# Patient Record
Sex: MALE | Race: BLACK OR AFRICAN AMERICAN | NOT HISPANIC OR LATINO | Employment: OTHER | URBAN - METROPOLITAN AREA
[De-identification: names, ages, dates, MRNs, and addresses within clinical notes are randomized per-mention and may not be internally consistent; named-entity substitution may affect disease eponyms.]

---

## 2018-05-17 ENCOUNTER — OFFICE VISIT (OUTPATIENT)
Dept: INTERNAL MEDICINE CLINIC | Facility: CLINIC | Age: 53
End: 2018-05-17
Payer: MEDICARE

## 2018-05-17 VITALS
RESPIRATION RATE: 15 BRPM | SYSTOLIC BLOOD PRESSURE: 130 MMHG | BODY MASS INDEX: 20.42 KG/M2 | TEMPERATURE: 98 F | HEART RATE: 90 BPM | HEIGHT: 72 IN | WEIGHT: 150.8 LBS | DIASTOLIC BLOOD PRESSURE: 80 MMHG

## 2018-05-17 DIAGNOSIS — I10 ESSENTIAL HYPERTENSION: Primary | ICD-10-CM

## 2018-05-17 DIAGNOSIS — E11.319 DIABETIC RETINOPATHY OF BOTH EYES ASSOCIATED WITH TYPE 2 DIABETES MELLITUS, MACULAR EDEMA PRESENCE UNSPECIFIED, UNSPECIFIED RETINOPATHY SEVERITY (HCC): ICD-10-CM

## 2018-05-17 DIAGNOSIS — E11.65 UNCONTROLLED TYPE 2 DIABETES MELLITUS WITH HYPERGLYCEMIA, WITH LONG-TERM CURRENT USE OF INSULIN (HCC): ICD-10-CM

## 2018-05-17 DIAGNOSIS — I51.7 LVH (LEFT VENTRICULAR HYPERTROPHY): ICD-10-CM

## 2018-05-17 DIAGNOSIS — L30.9 DERMATITIS: ICD-10-CM

## 2018-05-17 DIAGNOSIS — Z12.5 SCREENING FOR PROSTATE CANCER: ICD-10-CM

## 2018-05-17 DIAGNOSIS — Z79.4 UNCONTROLLED TYPE 2 DIABETES MELLITUS WITH HYPERGLYCEMIA, WITH LONG-TERM CURRENT USE OF INSULIN (HCC): ICD-10-CM

## 2018-05-17 DIAGNOSIS — Z12.11 SCREENING FOR COLON CANCER: ICD-10-CM

## 2018-05-17 DIAGNOSIS — H54.8 LEGALLY BLIND: ICD-10-CM

## 2018-05-17 PROCEDURE — 99204 OFFICE O/P NEW MOD 45 MIN: CPT | Performed by: INTERNAL MEDICINE

## 2018-05-17 PROCEDURE — 93000 ELECTROCARDIOGRAM COMPLETE: CPT | Performed by: INTERNAL MEDICINE

## 2018-05-17 RX ORDER — LISINOPRIL 5 MG/1
5 TABLET ORAL DAILY
Qty: 30 TABLET | Refills: 1 | Status: SHIPPED | OUTPATIENT
Start: 2018-05-17 | End: 2018-07-05 | Stop reason: SDUPTHER

## 2018-05-17 RX ORDER — CLOTRIMAZOLE 1 %
CREAM (GRAM) TOPICAL 2 TIMES DAILY
Qty: 30 G | Refills: 0 | Status: SHIPPED | OUTPATIENT
Start: 2018-05-17 | End: 2019-01-15

## 2018-05-17 RX ORDER — AMLODIPINE BESYLATE 10 MG/1
10 TABLET ORAL DAILY
Qty: 30 TABLET | Refills: 1 | Status: SHIPPED | OUTPATIENT
Start: 2018-05-17 | End: 2018-07-05 | Stop reason: SDUPTHER

## 2018-05-17 RX ORDER — DIPHENHYDRAMINE HYDROCHLORIDE 25 MG/1
CAPSULE, LIQUID FILLED ORAL 2 TIMES DAILY
Qty: 100 EACH | Refills: 0 | Status: SHIPPED | OUTPATIENT
Start: 2018-05-17 | End: 2018-06-21 | Stop reason: SDUPTHER

## 2018-05-17 RX ORDER — AMLODIPINE BESYLATE 10 MG/1
10 TABLET ORAL
COMMUNITY
End: 2018-05-17 | Stop reason: SDUPTHER

## 2018-05-17 RX ORDER — LANCETS 30 GAUGE
EACH MISCELLANEOUS 2 TIMES DAILY
Qty: 100 EACH | Refills: 0 | Status: SHIPPED | OUTPATIENT
Start: 2018-05-17 | End: 2018-06-21 | Stop reason: SDUPTHER

## 2018-05-17 RX ORDER — GINSENG 100 MG
1 CAPSULE ORAL 4 TIMES DAILY
COMMUNITY
End: 2019-01-15

## 2018-05-17 RX ORDER — GLIPIZIDE 10 MG/1
10 TABLET ORAL
COMMUNITY
End: 2018-05-17 | Stop reason: SDUPTHER

## 2018-05-17 RX ORDER — GLIPIZIDE 10 MG/1
10 TABLET ORAL
Qty: 60 TABLET | Refills: 1 | Status: SHIPPED | OUTPATIENT
Start: 2018-05-17 | End: 2018-07-05 | Stop reason: SDUPTHER

## 2018-05-17 RX ORDER — PEN NEEDLE, DIABETIC 30 GX5/16"
NEEDLE, DISPOSABLE MISCELLANEOUS DAILY
Qty: 100 EACH | Refills: 0 | Status: SHIPPED | OUTPATIENT
Start: 2018-05-17 | End: 2019-01-15

## 2018-05-17 NOTE — ASSESSMENT & PLAN NOTE
Check A1c, continue current regimen for now and start regular blood sugar check, change the regimen based on the blood sugar numbers

## 2018-05-17 NOTE — ASSESSMENT & PLAN NOTE
Blood pressure within goal but he was on 20 mg of amlodipine, I cut him down to 10 mg and added low-dose lisinopril  Monitor on this regimen, check microalbumin  An EKG was done in the office today which showed sinus rhythm with LVH  Get an echocardiogram to evaluate for the same

## 2018-05-17 NOTE — PROGRESS NOTES
Assessment/Plan:    Essential hypertension  Blood pressure within goal but he was on 20 mg of amlodipine, I cut him down to 10 mg and added low-dose lisinopril  Monitor on this regimen, check microalbumin  An EKG was done in the office today which showed sinus rhythm with LVH  Get an echocardiogram to evaluate for the same  Uncontrolled type 2 diabetes mellitus with hyperglycemia, with long-term current use of insulin ([facility])  Check A1c, continue current regimen for now and start regular blood sugar check, change the regimen based on the blood sugar numbers  Screening PSA, referred to Gastroenterology for screening colonoscopy, Pneumovax at next visit     Diagnoses and all orders for this visit:    Essential hypertension  -     CBC and differential  -     Comprehensive metabolic panel  -     TSH, 3rd generation with T4 reflex  -     Microalbumin / creatinine urine ratio  -     HEMOGLOBIN A1C W/ EAG ESTIMATION  -     Lipid Panel with Direct LDL reflex  -     POCT ECG  -     Echo complete with contrast if indicated; Future  -     amLODIPine (NORVASC) 10 mg tablet; Take 1 tablet (10 mg total) by mouth daily  -     lisinopril (ZESTRIL) 5 mg tablet; Take 1 tablet (5 mg total) by mouth daily    Uncontrolled type 2 diabetes mellitus with hyperglycemia, with long-term current use of insulin ([facility])  -     CBC and differential  -     Comprehensive metabolic panel  -     TSH, 3rd generation with T4 reflex  -     Microalbumin / creatinine urine ratio  -     HEMOGLOBIN A1C W/ EAG ESTIMATION  -     Lipid Panel with Direct LDL reflex  -     Blood Glucose Monitoring Suppl (BLOOD GLUCOSE MONITOR SYSTEM) w/Device KIT; by Does not apply route 2 (two) times a day  -     Lancets MISC; by Does not apply route 2 (two) times a day  -     glucose blood test strip; Use as instructed  -     metFORMIN (GLUCOPHAGE) 1000 MG tablet;  Take 1 tablet (1,000 mg total) by mouth 2 (two) times a day with meals  -     glipiZIDE (GLUCOTROL) 10 mg tablet; Take 1 tablet (10 mg total) by mouth 2 (two) times a day before meals  -     Insulin Pen Needle (PEN NEEDLES 3/16") 31G X 5 MM MISC; by Does not apply route daily  -     insulin glargine (LANTUS SOLOSTAR) injection pen 100 units/mL; Inject 0 14 mL (14 Units total) under the skin daily    Diabetic retinopathy of both eyes associated with type 2 diabetes mellitus, macular edema presence unspecified, unspecified retinopathy severity (HCC)  -     CBC and differential  -     Comprehensive metabolic panel  -     TSH, 3rd generation with T4 reflex  -     Microalbumin / creatinine urine ratio  -     HEMOGLOBIN A1C W/ EAG ESTIMATION  -     Lipid Panel with Direct LDL reflex    Legally blind  -     CBC and differential    Screening for prostate cancer  -     PSA, Total Screen; Future    LVH (left ventricular hypertrophy)  -     Echo complete with contrast if indicated; Future    Dermatitis  -     clotrimazole (LOTRIMIN) 1 % cream; Apply topically 2 (two) times a day    Screening for colon cancer  -     Ambulatory referral to Gastroenterology; Future    Other orders  -     Discontinue: glipiZIDE (GLUCOTROL) 10 mg tablet; Take 10 mg by mouth 2 (two) times a day before meals  -     Discontinue: amLODIPine (NORVASC) 10 mg tablet; Take 10 mg by mouth 2 (two) times a day  -     Discontinue: metFORMIN (GLUCOPHAGE) 1000 MG tablet; Take 1,000 mg by mouth 2 (two) times a day with meals  -     bacitracin topical ointment 500 units/g topical ointment; Apply 1 large application topically 4 (four) times a day  -     Cancel: Occult Bloood,Fecal Immunochemical          Subjective:   Chief Complaint   Patient presents with    [facility]        Patient ID: Cyrilla Severin is a 46 y o  male  He comes in as a new patient to establish care  Has a history of hypertension, diabetes, hyperlipidemia  He is not completely sure of all the meds he takes  Notes that he takes metformin, glipizide, amlodipine    When asked he does remembers being on lisinopril but not completely sure  He has not checked his blood sugars in several months since he came back from [facility]  He has a history of diabetic retinopathy and complete visual loss from that  He does follow up with an ophthalmologist   He takes 14 units of insulin but not very regularly  No symptoms of hypoglycemia  Has not checked his blood pressure either  Occasional dizziness but not related to posture  Notes that when it was last checked he was told that he has no kidney function problems from the diabetes  No tingling or numbness in the legs  No chest pain or shortness of breath  He has a itchy rash on his lower back        The following portions of the patient's history were reviewed and updated as appropriate: current medications, past medical history, past social history and past surgical history      PHQ-9 Depression Screening    PHQ-9:    Frequency of the following problems over the past two weeks:                Current Outpatient Prescriptions:     amLODIPine (NORVASC) 10 mg tablet, Take 1 tablet (10 mg total) by mouth daily, Disp: 30 tablet, Rfl: 1    bacitracin topical ointment 500 units/g topical ointment, Apply 1 large application topically 4 (four) times a day, Disp: , Rfl:     glipiZIDE (GLUCOTROL) 10 mg tablet, Take 1 tablet (10 mg total) by mouth 2 (two) times a day before meals, Disp: 60 tablet, Rfl: 1    metFORMIN (GLUCOPHAGE) 1000 MG tablet, Take 1 tablet (1,000 mg total) by mouth 2 (two) times a day with meals, Disp: 60 tablet, Rfl: 1    Blood Glucose Monitoring Suppl (BLOOD GLUCOSE MONITOR SYSTEM) w/Device KIT, by Does not apply route 2 (two) times a day, Disp: 100 each, Rfl: 0    clotrimazole (LOTRIMIN) 1 % cream, Apply topically 2 (two) times a day, Disp: 30 g, Rfl: 0    glucose blood test strip, Use as instructed, Disp: 100 each, Rfl: 0    insulin glargine (LANTUS SOLOSTAR) injection pen 100 units/mL, Inject 0 14 mL (14 Units total) under the skin daily, Disp: 5 pen, Rfl: 0    Insulin Pen Needle (PEN NEEDLES 3/16") 31G X 5 MM MISC, by Does not apply route daily, Disp: 100 each, Rfl: 0    Lancets MISC, by Does not apply route 2 (two) times a day, Disp: 100 each, Rfl: 0    lisinopril (ZESTRIL) 5 mg tablet, Take 1 tablet (5 mg total) by mouth daily, Disp: 30 tablet, Rfl: 1    Review of Systems   Constitutional: Negative for fatigue, fever and unexpected weight change  HENT: Negative for ear pain, hearing loss and sore throat  Eyes: Negative for pain and discharge  Respiratory: Negative for cough, chest tightness and shortness of breath  Cardiovascular: Negative for chest pain and palpitations  Gastrointestinal: Negative for abdominal pain, blood in stool, constipation, diarrhea and nausea  Genitourinary: Negative for dysuria, frequency and hematuria  Musculoskeletal: Negative for arthralgias and joint swelling  Skin: Negative for rash  Allergic/Immunologic: Negative for immunocompromised state  Neurological: Negative for dizziness and headaches  Hematological: Negative for adenopathy  Psychiatric/Behavioral: Negative for confusion and sleep disturbance  Objective:  /80 (BP Location: Left arm, Patient Position: Sitting, Cuff Size: Standard)   Pulse 90   Temp 98 °F (36 7 °C)   Resp 15   Ht 6' (1 829 m)   Wt 68 4 kg (150 lb 12 8 oz)   BMI 20 45 kg/m²      Physical Exam   Constitutional: He appears well-developed and well-nourished  HENT:   Head: Normocephalic and atraumatic  Right Ear: Tympanic membrane normal    Left Ear: Tympanic membrane normal    Nose: Nose normal    Mouth/Throat: Oropharynx is clear and moist  No posterior oropharyngeal edema or posterior oropharyngeal erythema  Eyes: Conjunctivae are normal  Pupils are equal, round, and reactive to light  Right eye exhibits no discharge  Neck: Normal range of motion  Neck supple  No thyromegaly present     Cardiovascular: Normal rate, regular rhythm, S1 normal, S2 normal and normal heart sounds  PMI is not displaced  No murmur heard  Pulmonary/Chest: Effort normal and breath sounds normal  No accessory muscle usage  No apnea  No respiratory distress  He has no rhonchi  He has no rales  Abdominal: Soft  Normal appearance and bowel sounds are normal  He exhibits no shifting dullness  There is no hepatosplenomegaly  There is no tenderness  There is no rebound and no CVA tenderness  Hernia confirmed negative in the right inguinal area and confirmed negative in the left inguinal area  Genitourinary: Rectum normal, prostate normal, testes normal and penis normal    Musculoskeletal: Normal range of motion  He exhibits no edema or tenderness  Lymphadenopathy:     He has no cervical adenopathy  Neurological: He is alert  Skin: Skin is warm and intact  Rash noted  Rash is urticarial         Psychiatric: He has a normal mood and affect  His speech is normal    Nursing note and vitals reviewed  No results found for this or any previous visit (from the past 1008 hour(s))  ]    No results found

## 2018-06-14 ENCOUNTER — HOSPITAL ENCOUNTER (OUTPATIENT)
Dept: NON INVASIVE DIAGNOSTICS | Facility: CLINIC | Age: 53
Discharge: HOME | End: 2018-06-14
Payer: MEDICARE

## 2018-06-14 DIAGNOSIS — I10 ESSENTIAL HYPERTENSION: ICD-10-CM

## 2018-06-14 DIAGNOSIS — I51.7 LVH (LEFT VENTRICULAR HYPERTROPHY): ICD-10-CM

## 2018-06-14 PROCEDURE — 93306 TTE W/DOPPLER COMPLETE: CPT

## 2018-06-14 PROCEDURE — 93306 TTE W/DOPPLER COMPLETE: CPT | Performed by: INTERNAL MEDICINE

## 2018-06-21 ENCOUNTER — OFFICE VISIT (OUTPATIENT)
Dept: INTERNAL MEDICINE CLINIC | Facility: CLINIC | Age: 53
End: 2018-06-21
Payer: MEDICARE

## 2018-06-21 VITALS
TEMPERATURE: 97.5 F | DIASTOLIC BLOOD PRESSURE: 70 MMHG | HEIGHT: 72 IN | HEART RATE: 90 BPM | SYSTOLIC BLOOD PRESSURE: 138 MMHG | RESPIRATION RATE: 14 BRPM | WEIGHT: 149.4 LBS | BODY MASS INDEX: 20.24 KG/M2

## 2018-06-21 DIAGNOSIS — I51.7 LVH (LEFT VENTRICULAR HYPERTROPHY): ICD-10-CM

## 2018-06-21 DIAGNOSIS — Z79.4 UNCONTROLLED TYPE 2 DIABETES MELLITUS WITH HYPERGLYCEMIA, WITH LONG-TERM CURRENT USE OF INSULIN (HCC): ICD-10-CM

## 2018-06-21 DIAGNOSIS — N18.4 CKD (CHRONIC KIDNEY DISEASE) STAGE 4, GFR 15-29 ML/MIN (HCC): Primary | ICD-10-CM

## 2018-06-21 DIAGNOSIS — E11.319 DIABETIC RETINOPATHY OF BOTH EYES ASSOCIATED WITH TYPE 2 DIABETES MELLITUS, MACULAR EDEMA PRESENCE UNSPECIFIED, UNSPECIFIED RETINOPATHY SEVERITY (HCC): ICD-10-CM

## 2018-06-21 DIAGNOSIS — IMO0002 UNCONTROLLED TYPE 2 DIABETES MELLITUS WITH STAGE 4 CHRONIC KIDNEY DISEASE, WITH LONG-TERM CURRENT USE OF INSULIN: ICD-10-CM

## 2018-06-21 DIAGNOSIS — E11.65 UNCONTROLLED TYPE 2 DIABETES MELLITUS WITH HYPERGLYCEMIA, WITH LONG-TERM CURRENT USE OF INSULIN (HCC): ICD-10-CM

## 2018-06-21 DIAGNOSIS — I10 ESSENTIAL HYPERTENSION: ICD-10-CM

## 2018-06-21 DIAGNOSIS — E78.5 HYPERLIPIDEMIA, UNSPECIFIED HYPERLIPIDEMIA TYPE: ICD-10-CM

## 2018-06-21 LAB
ALBUMIN SERPL-MCNC: 3.3 G/DL (ref 3.6–5.1)
ALBUMIN/CREAT UR: 2742 MCG/MG CREAT
ALBUMIN/GLOB SERPL: 1.2 (CALC) (ref 1–2.5)
ALP SERPL-CCNC: 70 U/L (ref 40–115)
ALT SERPL-CCNC: 11 U/L (ref 9–46)
AST SERPL-CCNC: 10 U/L (ref 10–35)
BASOPHILS # BLD AUTO: 29 CELLS/UL (ref 0–200)
BASOPHILS NFR BLD AUTO: 0.6 %
BILIRUB SERPL-MCNC: 0.7 MG/DL (ref 0.2–1.2)
BUN SERPL-MCNC: 38 MG/DL (ref 7–25)
BUN/CREAT SERPL: 8 (CALC) (ref 6–22)
CALCIUM SERPL-MCNC: 9.2 MG/DL (ref 8.6–10.3)
CHLORIDE SERPL-SCNC: 105 MMOL/L (ref 98–110)
CHOLEST SERPL-MCNC: 312 MG/DL
CHOLEST/HDLC SERPL: 6 (CALC)
CO2 SERPL-SCNC: 20 MMOL/L (ref 20–31)
CREAT SERPL-MCNC: 4.71 MG/DL (ref 0.7–1.33)
CREAT UR-MCNC: 77 MG/DL (ref 20–370)
EOSINOPHIL # BLD AUTO: 78 CELLS/UL (ref 15–500)
EOSINOPHIL NFR BLD AUTO: 1.6 %
ERYTHROCYTE [DISTWIDTH] IN BLOOD BY AUTOMATED COUNT: 13.3 % (ref 11–15)
EST. AVERAGE GLUCOSE BLD GHB EST-MCNC: 321 (CALC)
EST. AVERAGE GLUCOSE BLD GHB EST-SCNC: 17.8 (CALC)
GLOBULIN SER CALC-MCNC: 2.8 G/DL (CALC) (ref 1.9–3.7)
GLUCOSE SERPL-MCNC: 323 MG/DL (ref 65–99)
HBA1C MFR BLD: 12.8 % OF TOTAL HGB
HCT VFR BLD AUTO: 34.9 % (ref 38.5–50)
HDLC SERPL-MCNC: 52 MG/DL
HGB BLD-MCNC: 11 G/DL (ref 13.2–17.1)
LDLC SERPL CALC-MCNC: 223 MG/DL (CALC)
LYMPHOCYTES # BLD AUTO: 1651 CELLS/UL (ref 850–3900)
LYMPHOCYTES NFR BLD AUTO: 33.7 %
MCH RBC QN AUTO: 25.5 PG (ref 27–33)
MCHC RBC AUTO-ENTMCNC: 31.5 G/DL (ref 32–36)
MCV RBC AUTO: 80.8 FL (ref 80–100)
MICROALBUMIN UR-MCNC: 211.1 MG/DL
MONOCYTES # BLD AUTO: 417 CELLS/UL (ref 200–950)
MONOCYTES NFR BLD AUTO: 8.5 %
NEUTROPHILS # BLD AUTO: 2724 CELLS/UL (ref 1500–7800)
NEUTROPHILS NFR BLD AUTO: 55.6 %
NONHDLC SERPL-MCNC: 260 MG/DL (CALC)
PLATELET # BLD AUTO: 258 THOUSAND/UL (ref 140–400)
PMV BLD REES-ECKER: 9.3 FL (ref 7.5–12.5)
POTASSIUM SERPL-SCNC: 5.3 MMOL/L (ref 3.5–5.3)
PROT SERPL-MCNC: 6.1 G/DL (ref 6.1–8.1)
PSA SERPL-MCNC: 0.4 NG/ML
RBC # BLD AUTO: 4.32 MILLION/UL (ref 4.2–5.8)
SL AMB EGFR AFRICAN AMERICAN: 15 ML/MIN/1.73M2
SL AMB EGFR NON AFRICAN AMERICAN: 13 ML/MIN/1.73M2
SODIUM SERPL-SCNC: 134 MMOL/L (ref 135–146)
TRIGL SERPL-MCNC: 194 MG/DL
TSH SERPL-ACNC: 2.58 MIU/L (ref 0.4–4.5)
WBC # BLD AUTO: 4.9 THOUSAND/UL (ref 3.8–10.8)

## 2018-06-21 PROCEDURE — 99215 OFFICE O/P EST HI 40 MIN: CPT | Performed by: INTERNAL MEDICINE

## 2018-06-21 RX ORDER — ATORVASTATIN CALCIUM 40 MG/1
40 TABLET, FILM COATED ORAL DAILY
Qty: 30 TABLET | Refills: 1 | Status: SHIPPED | OUTPATIENT
Start: 2018-06-21 | End: 2018-07-05 | Stop reason: SDUPTHER

## 2018-06-21 RX ORDER — LANCETS 30 GAUGE
EACH MISCELLANEOUS 3 TIMES DAILY
Qty: 100 EACH | Refills: 0 | Status: SHIPPED | OUTPATIENT
Start: 2018-06-21 | End: 2019-01-15

## 2018-06-21 RX ORDER — DIPHENHYDRAMINE HYDROCHLORIDE 25 MG/1
CAPSULE, LIQUID FILLED ORAL 3 TIMES DAILY
Qty: 100 EACH | Refills: 0 | Status: SHIPPED | OUTPATIENT
Start: 2018-06-21 | End: 2019-01-11

## 2018-06-22 PROBLEM — N18.4 CKD (CHRONIC KIDNEY DISEASE) STAGE 4, GFR 15-29 ML/MIN (HCC): Status: ACTIVE | Noted: 2018-06-22

## 2018-06-22 PROBLEM — IMO0002 UNCONTROLLED TYPE 2 DIABETES MELLITUS WITH STAGE 4 CHRONIC KIDNEY DISEASE, WITH LONG-TERM CURRENT USE OF INSULIN: Status: ACTIVE | Noted: 2018-05-17

## 2018-06-22 PROBLEM — E78.5 HYPERLIPIDEMIA: Status: ACTIVE | Noted: 2018-06-22

## 2018-06-22 NOTE — ASSESSMENT & PLAN NOTE
Lab Results   Component Value Date    HGBA1C 12 8 (H) 06/20/2018     We discussed in detail the importance of controlling his diabetes  He has several microvascular complications  He is legally blind from diabetic retinopathy and now with CKD  Unfortunately he did not call the office despite not receiving the glucometer last visit a month ago  He has been taking metformin and 14 units of basaglar at night along with glipizide  He has no symptoms that were suggestive of hypoglycemia  Increased basilar dose to 30 units, monitor blood sugars 3 times a day, stop metformin given his chronic kidney disease

## 2018-06-22 NOTE — ASSESSMENT & PLAN NOTE
We discussed the pathophysiology of chronic kidney disease likely secondary to diabetes  He has had uncontrolled diabetes for several years  GFR around 15 at this time  The advised to see a nephrologist as soon as possible  Gloria Pennington the discussion about progression of the disease and indications for dialysis  Confirmatory blood work, ultrasound of the kidneys will be done soon  Discussed the importance of diabetic control to prevent worsening of microvascular complications  Around 2 g proteinuria will be recheck  Blood pressure is well control, continue her current regimen

## 2018-06-22 NOTE — PROGRESS NOTES
Assessment/Plan:    Uncontrolled type 2 diabetes mellitus with stage 4 chronic kidney disease, with long-term current use of insulin ([facility])  Lab Results   Component Value Date    HGBA1C 12 8 (H) 06/20/2018     We discussed in detail the importance of controlling his diabetes  He has several microvascular complications  He is legally blind from diabetic retinopathy and now with CKD  Unfortunately he did not call the office despite not receiving the glucometer last visit a month ago  He has been taking metformin and 14 units of basaglar at night along with glipizide  He has no symptoms that were suggestive of hypoglycemia  Increased basilar dose to 30 units, monitor blood sugars 3 times a day, stop metformin given his chronic kidney disease  CKD (chronic kidney disease) stage 4, GFR 15-29 ml/min ([facility])  We discussed the pathophysiology of chronic kidney disease likely secondary to diabetes  He has had uncontrolled diabetes for several years  GFR around 15 at this time  The advised to see a nephrologist as soon as possible  Doyne Cotton the discussion about progression of the disease and indications for dialysis  Confirmatory blood work, ultrasound of the kidneys will be done soon  Discussed the importance of diabetic control to prevent worsening of microvascular complications  Around 2 g proteinuria will be recheck  Blood pressure is well control, continue her current regimen  Hyperlipidemia  Start Lipitor, dose titrate as tolerated continue aspirin  Essential hypertension  Blood pressure well control, echocardiogram showed left ventricular hypertrophy  Total time spent on the visit today was 45 min, 30 min were face-to-face discussing about his blood work as mentioned of well     Diagnoses and all orders for this visit:    CKD (chronic kidney disease) stage 4, GFR 15-29 ml/min ([facility])  -     Renal function panel; Future  -     US retroperitoneal complete;  Future  -     Protein / creatinine ratio, urine  -     Phosphorus; Future  -     Uric acid; Future  -     aspirin 81 MG tablet; Take 1 tablet (81 mg total) by mouth daily  -     Ambulatory referral to Nephrology; Future  -     Protein electrophoresis, serum; Future  -     Protein electrophoresis, urine  -     DORI Screen w/ Reflex to Titer/Pattern  -     C-reactive protein    Uncontrolled type 2 diabetes mellitus with hyperglycemia, with long-term current use of insulin (HCC)  -     Lancets MISC; by Does not apply route 3 (three) times a day  -     glucose blood test strip; Use as instructed 3 times daily  -     Blood Glucose Monitoring Suppl (BLOOD GLUCOSE MONITOR SYSTEM) w/Device KIT; by Does not apply route 3 (three) times a day  -     Protein / creatinine ratio, urine  -     insulin glargine (BASAGLAR KWIKPEN) 100 units/mL injection pen; Inject 30 Units under the skin daily  -     aspirin 81 MG tablet; Take 1 tablet (81 mg total) by mouth daily  -     Ambulatory referral to Nephrology; Future    Essential hypertension  -     aspirin 81 MG tablet; Take 1 tablet (81 mg total) by mouth daily    Hyperlipidemia, unspecified hyperlipidemia type  -     atorvastatin (LIPITOR) 40 mg tablet; Take 1 tablet (40 mg total) by mouth daily    Diabetic retinopathy of both eyes associated with type 2 diabetes mellitus, macular edema presence unspecified, unspecified retinopathy severity (HCC)    LVH (left ventricular hypertrophy)    Uncontrolled type 2 diabetes mellitus with stage 4 chronic kidney disease, with long-term current use of insulin (HCC)          Subjective:   Chief Complaint   Patient presents with    Follow-up     1 month        Patient ID: Sarah Tirado is a 46 y o  male  He comes in with his wife for follow-up of diabetes, hypertension, hyperlipidemia  He denies any symptoms  He is taking all his medications  No dizziness or lightheadedness  Unfortunately did not receive the glucometer and have not been checking his blood sugars          The following portions of the patient's history were reviewed and updated as appropriate: current medications, past medical history, past social history and past surgical history  PHQ-9 Depression Screening    PHQ-9:    Frequency of the following problems over the past two weeks:                Current Outpatient Prescriptions:     amLODIPine (NORVASC) 10 mg tablet, Take 1 tablet (10 mg total) by mouth daily, Disp: 30 tablet, Rfl: 1    bacitracin topical ointment 500 units/g topical ointment, Apply 1 large application topically 4 (four) times a day, Disp: , Rfl:     Blood Glucose Monitoring Suppl (BLOOD GLUCOSE MONITOR SYSTEM) w/Device KIT, by Does not apply route 3 (three) times a day, Disp: 100 each, Rfl: 0    clotrimazole (LOTRIMIN) 1 % cream, Apply topically 2 (two) times a day, Disp: 30 g, Rfl: 0    glipiZIDE (GLUCOTROL) 10 mg tablet, Take 1 tablet (10 mg total) by mouth 2 (two) times a day before meals, Disp: 60 tablet, Rfl: 1    glucose blood test strip, Use as instructed 3 times daily, Disp: 100 each, Rfl: 0    insulin glargine (BASAGLAR KWIKPEN) 100 units/mL injection pen, Inject 30 Units under the skin daily, Disp: 5 pen, Rfl: 1    Insulin Pen Needle (PEN NEEDLES 3/16") 31G X 5 MM MISC, by Does not apply route daily, Disp: 100 each, Rfl: 0    Lancets MISC, by Does not apply route 3 (three) times a day, Disp: 100 each, Rfl: 0    lisinopril (ZESTRIL) 5 mg tablet, Take 1 tablet (5 mg total) by mouth daily, Disp: 30 tablet, Rfl: 1    aspirin 81 MG tablet, Take 1 tablet (81 mg total) by mouth daily, Disp: 30 tablet, Rfl: 2    atorvastatin (LIPITOR) 40 mg tablet, Take 1 tablet (40 mg total) by mouth daily, Disp: 30 tablet, Rfl: 1    Review of Systems   Constitutional: Negative for fatigue, fever and unexpected weight change  HENT: Negative for ear pain, hearing loss and sore throat  Eyes: Negative for pain and discharge  Respiratory: Negative for cough, chest tightness and shortness of breath  Cardiovascular: Negative for chest pain and palpitations  Gastrointestinal: Negative for abdominal pain, blood in stool, constipation, diarrhea and nausea  Genitourinary: Negative for dysuria, frequency and hematuria  Musculoskeletal: Negative for arthralgias and joint swelling  Skin: Negative for rash  Allergic/Immunologic: Negative for immunocompromised state  Neurological: Negative for dizziness and headaches  Hematological: Negative for adenopathy  Psychiatric/Behavioral: Negative for confusion and sleep disturbance  Objective:  /70 (BP Location: Left arm, Patient Position: Sitting, Cuff Size: Standard)   Pulse 90   Temp 97 5 °F (36 4 °C)   Resp 14   Ht 6' (1 829 m)   Wt 67 8 kg (149 lb 6 4 oz)   BMI 20 26 kg/m²      Physical Exam   Constitutional: He appears well-developed and well-nourished  HENT:   Head: Normocephalic and atraumatic  Right Ear: Tympanic membrane normal    Left Ear: Tympanic membrane normal    Nose: Nose normal    Mouth/Throat: Oropharynx is clear and moist  No posterior oropharyngeal edema or posterior oropharyngeal erythema  Eyes: Conjunctivae are normal  Pupils are equal, round, and reactive to light  Right eye exhibits no discharge  Neck: Normal range of motion  Neck supple  No thyromegaly present  Cardiovascular: Normal rate, regular rhythm, S1 normal, S2 normal and normal heart sounds  PMI is not displaced  No murmur heard  Pulmonary/Chest: Effort normal and breath sounds normal  No accessory muscle usage  No apnea  No respiratory distress  He has no rhonchi  He has no rales  Abdominal: Soft  Normal appearance and bowel sounds are normal  He exhibits no shifting dullness  There is no hepatosplenomegaly  There is no tenderness  There is no rebound and no CVA tenderness  Musculoskeletal: Normal range of motion  He exhibits no edema or tenderness  Lymphadenopathy:     He has no cervical adenopathy  Neurological: He is alert  Skin: Skin is warm and intact  No rash noted  Psychiatric: He has a normal mood and affect  His speech is normal    Nursing note and vitals reviewed          Recent Results (from the past 1008 hour(s))   Lipid Panel with Direct LDL reflex    Collection Time: 06/20/18  8:55 AM   Result Value Ref Range    Total Cholesterol 312 (H) <200 mg/dL    SL AMB HDL CHOLESTEROL 52 >40 mg/dL    Triglycerides 194 (H) <150 mg/dL    SL AMB LDL-CHOLESTEROL 223 (H) mg/dL (calc)    SL AMB CHOL/HDLC RATIO 6 0 (H) <5 0 (calc)    SL AMB NON HDL CHOLESTEROL 260 (H) <130 mg/dL (calc)   Comprehensive metabolic panel    Collection Time: 06/20/18  8:55 AM   Result Value Ref Range    SL AMB GLUCOSE 323 (H) 65 - 99 mg/dL    BUN 38 (H) 7 - 25 mg/dL    Creatinine, Serum 4 71 (H) 0 70 - 1 33 mg/dL    eGFR Non  13 (L) > OR = 60 mL/min/1 73m2    SL AMB EGFR  15 (L) > OR = 60 mL/min/1 73m2    SL AMB BUN/CREATININE RATIO 8 6 - 22 (calc)    SL AMB SODIUM 134 (L) 135 - 146 mmol/L    SL AMB POTASSIUM 5 3 3 5 - 5 3 mmol/L    SL AMB CHLORIDE 105 98 - 110 mmol/L    SL AMB CARBON DIOXIDE 20 20 - 31 mmol/L    SL AMB CALCIUM 9 2 8 6 - 10 3 mg/dL    SL AMB PROTEIN, TOTAL 6 1 6 1 - 8 1 g/dL    Serum Albumin 3 3 (L) 3 6 - 5 1 g/dL    SL AMB GLOBULIN 2 8 1 9 - 3 7 g/dL (calc)    SL AMB ALBUMIN/GLOBULIN RATIO 1 2 1 0 - 2 5 (calc)    SL AMB BILIRUBIN, TOTAL 0 7 0 2 - 1 2 mg/dL    SL AMB ALKALINE PHOSPHATASE 70 40 - 115 U/L    SL AMB AST 10 10 - 35 U/L    SL AMB ALT 11 9 - 46 U/L   CBC and differential    Collection Time: 06/20/18  8:55 AM   Result Value Ref Range    SL AMB LAB WHITE BLOOD CELL COUNT 4 9 3 8 - 10 8 Thousand/uL    SL AMB LAB RED BLOOD CELLS 4 32 4 20 - 5 80 Million/uL    Hemoglobin 11 0 (L) 13 2 - 17 1 g/dL    Hematocrit 34 9 (L) 38 5 - 50 0 %    MCV 80 8 80 0 - 100 0 fL    MCH 25 5 (L) 27 0 - 33 0 pg    MCHC 31 5 (L) 32 0 - 36 0 g/dL    RDW 13 3 11 0 - 15 0 %    Platelet Count 168 445 - 400 Thousand/uL    SL AMB MPV 9 3 7 5 - 12 5 fL    Neutrophils (Absolute) 2,724 1,500 - 7,800 cells/uL    Lymphocytes (Absolute) 1,651 850 - 3,900 cells/uL    Monocytes (Absolute) 417 200 - 950 cells/uL    Eosinophils (Absolute) 78 15 - 500 cells/uL    Basophils (Absolute) 29 0 - 200 cells/uL    Neutrophils 55 6 %    Lymphocytes 33 7 %    Monocytes 8 5 %    Eosinophils 1 6 %    Basophils 0 6 %   PSA, Total Screen    Collection Time: 06/20/18  8:55 AM   Result Value Ref Range    Prostate Specific Antigen Total 0 4 < OR = 4 0 ng/mL   TSH, 3rd generation with Free T4 reflex    Collection Time: 06/20/18  8:55 AM   Result Value Ref Range    SL AMB TSH W/ REFLEX TO FREE T4 2 58 0 40 - 4 50 mIU/L   Hemoglobin A1C With EAG    Collection Time: 06/20/18  8:55 AM   Result Value Ref Range    Hemoglobin A1C 12 8 (H) <5 7 % of total Hgb    Estimated Average Glucose 321 (calc)    Estimated Average Glucose (mmol/L) 17 8 (calc)   Microalbumin, Random Urine (W/Creatinine)    Collection Time: 06/20/18  2:47 PM   Result Value Ref Range    Creatinine, Urine 77 20 - 370 mg/dL    Microalbum  ,U,Random 211 1 See Note: mg/dL    Microalb/Creat Ratio 2,742 (H) <30 mcg/mg creat   ]    No results found

## 2018-06-27 ENCOUNTER — APPOINTMENT (OUTPATIENT)
Dept: LAB | Facility: HOSPITAL | Age: 53
End: 2018-06-27
Payer: MEDICARE

## 2018-06-27 ENCOUNTER — HOSPITAL ENCOUNTER (OUTPATIENT)
Dept: ULTRASOUND IMAGING | Facility: HOSPITAL | Age: 53
Discharge: HOME | End: 2018-06-27
Payer: MEDICARE

## 2018-06-27 DIAGNOSIS — N18.4 CKD (CHRONIC KIDNEY DISEASE) STAGE 4, GFR 15-29 ML/MIN (HCC): ICD-10-CM

## 2018-06-27 LAB
ALBUMIN SERPL BCP-MCNC: 2.9 G/DL (ref 3.5–5)
ALBUMIN SERPL BCP-MCNC: 3 G/DL (ref 3.5–5)
ALP SERPL-CCNC: 68 U/L (ref 46–116)
ALT SERPL W P-5'-P-CCNC: 19 U/L (ref 12–78)
ANION GAP SERPL CALCULATED.3IONS-SCNC: 12 MMOL/L (ref 4–13)
ANION GAP SERPL CALCULATED.3IONS-SCNC: 13 MMOL/L (ref 4–13)
AST SERPL W P-5'-P-CCNC: 14 U/L (ref 5–45)
BASOPHILS # BLD AUTO: 0.01 THOUSANDS/ΜL (ref 0–0.1)
BASOPHILS NFR BLD AUTO: 0 % (ref 0–1)
BILIRUB SERPL-MCNC: 0.59 MG/DL (ref 0.2–1)
BUN SERPL-MCNC: 32 MG/DL (ref 5–25)
BUN SERPL-MCNC: 33 MG/DL (ref 5–25)
CALCIUM SERPL-MCNC: 9.2 MG/DL (ref 8.3–10.1)
CALCIUM SERPL-MCNC: 9.3 MG/DL (ref 8.3–10.1)
CHLORIDE SERPL-SCNC: 105 MMOL/L (ref 100–108)
CHLORIDE SERPL-SCNC: 106 MMOL/L (ref 100–108)
CHOLEST SERPL-MCNC: 281 MG/DL (ref 50–200)
CO2 SERPL-SCNC: 19 MMOL/L (ref 21–32)
CO2 SERPL-SCNC: 21 MMOL/L (ref 21–32)
CREAT SERPL-MCNC: 4.25 MG/DL (ref 0.6–1.3)
CREAT SERPL-MCNC: 4.29 MG/DL (ref 0.6–1.3)
CREAT UR-MCNC: 89.6 MG/DL
CREAT UR-MCNC: 91.4 MG/DL
CRP SERPL QL: <3 MG/L
EOSINOPHIL # BLD AUTO: 0.06 THOUSAND/ΜL (ref 0–0.61)
EOSINOPHIL NFR BLD AUTO: 1 % (ref 0–6)
ERYTHROCYTE [DISTWIDTH] IN BLOOD BY AUTOMATED COUNT: 13.6 % (ref 11.6–15.1)
EST. AVERAGE GLUCOSE BLD GHB EST-MCNC: 295 MG/DL
GFR SERPL CREATININE-BSD FRML MDRD: 17 ML/MIN/1.73SQ M
GFR SERPL CREATININE-BSD FRML MDRD: 17 ML/MIN/1.73SQ M
GLUCOSE P FAST SERPL-MCNC: 123 MG/DL (ref 65–99)
GLUCOSE P FAST SERPL-MCNC: 124 MG/DL (ref 65–99)
HBA1C MFR BLD: 11.9 % (ref 4.2–6.3)
HCT VFR BLD AUTO: 34.2 % (ref 36.5–49.3)
HDLC SERPL-MCNC: 61 MG/DL (ref 40–60)
HGB BLD-MCNC: 11.1 G/DL (ref 12–17)
LDLC SERPL CALC-MCNC: 193 MG/DL (ref 0–100)
LYMPHOCYTES # BLD AUTO: 1.45 THOUSANDS/ΜL (ref 0.6–4.47)
LYMPHOCYTES NFR BLD AUTO: 27 % (ref 14–44)
MCH RBC QN AUTO: 25.5 PG (ref 26.8–34.3)
MCHC RBC AUTO-ENTMCNC: 32.5 G/DL (ref 31.4–37.4)
MCV RBC AUTO: 78 FL (ref 82–98)
MICROALBUMIN UR-MCNC: 3590 MG/L (ref 0–20)
MICROALBUMIN/CREAT 24H UR: 3928 MG/G CREATININE (ref 0–30)
MONOCYTES # BLD AUTO: 0.42 THOUSAND/ΜL (ref 0.17–1.22)
MONOCYTES NFR BLD AUTO: 8 % (ref 4–12)
NEUTROPHILS # BLD AUTO: 3.54 THOUSANDS/ΜL (ref 1.85–7.62)
NEUTS SEG NFR BLD AUTO: 65 % (ref 43–75)
PHOSPHATE SERPL-MCNC: 4.3 MG/DL (ref 2.7–4.5)
PLATELET # BLD AUTO: 264 THOUSANDS/UL (ref 149–390)
PMV BLD AUTO: 9.1 FL (ref 8.9–12.7)
POTASSIUM SERPL-SCNC: 4.4 MMOL/L (ref 3.5–5.3)
POTASSIUM SERPL-SCNC: 4.4 MMOL/L (ref 3.5–5.3)
PROT SERPL-MCNC: 7 G/DL (ref 6.4–8.2)
PROT UR-MCNC: 438 MG/DL
PROT/CREAT UR: 4.89 MG/G{CREAT} (ref 0–0.1)
RBC # BLD AUTO: 4.36 MILLION/UL (ref 3.88–5.62)
SODIUM SERPL-SCNC: 138 MMOL/L (ref 136–145)
SODIUM SERPL-SCNC: 138 MMOL/L (ref 136–145)
TRIGL SERPL-MCNC: 135 MG/DL
TSH SERPL DL<=0.05 MIU/L-ACNC: 2.86 UIU/ML (ref 0.36–3.74)
URATE SERPL-MCNC: 4.4 MG/DL (ref 4.2–8)
WBC # BLD AUTO: 5.48 THOUSAND/UL (ref 4.31–10.16)

## 2018-06-27 PROCEDURE — 86140 C-REACTIVE PROTEIN: CPT | Performed by: INTERNAL MEDICINE

## 2018-06-27 PROCEDURE — 76770 US EXAM ABDO BACK WALL COMP: CPT

## 2018-06-27 PROCEDURE — 84166 PROTEIN E-PHORESIS/URINE/CSF: CPT | Performed by: INTERNAL MEDICINE

## 2018-06-27 PROCEDURE — 80069 RENAL FUNCTION PANEL: CPT

## 2018-06-27 PROCEDURE — 82043 UR ALBUMIN QUANTITATIVE: CPT | Performed by: INTERNAL MEDICINE

## 2018-06-27 PROCEDURE — 82570 ASSAY OF URINE CREATININE: CPT | Performed by: INTERNAL MEDICINE

## 2018-06-27 PROCEDURE — 84165 PROTEIN E-PHORESIS SERUM: CPT

## 2018-06-27 PROCEDURE — 80061 LIPID PANEL: CPT | Performed by: INTERNAL MEDICINE

## 2018-06-27 PROCEDURE — 85025 COMPLETE CBC W/AUTO DIFF WBC: CPT | Performed by: INTERNAL MEDICINE

## 2018-06-27 PROCEDURE — 83036 HEMOGLOBIN GLYCOSYLATED A1C: CPT | Performed by: INTERNAL MEDICINE

## 2018-06-27 PROCEDURE — 84443 ASSAY THYROID STIM HORMONE: CPT | Performed by: INTERNAL MEDICINE

## 2018-06-27 PROCEDURE — 84550 ASSAY OF BLOOD/URIC ACID: CPT

## 2018-06-27 PROCEDURE — 84165 PROTEIN E-PHORESIS SERUM: CPT | Performed by: PATHOLOGY

## 2018-06-27 PROCEDURE — 36415 COLL VENOUS BLD VENIPUNCTURE: CPT | Performed by: INTERNAL MEDICINE

## 2018-06-27 PROCEDURE — 86038 ANTINUCLEAR ANTIBODIES: CPT | Performed by: INTERNAL MEDICINE

## 2018-06-27 PROCEDURE — 86039 ANTINUCLEAR ANTIBODIES (ANA): CPT | Performed by: INTERNAL MEDICINE

## 2018-06-27 PROCEDURE — 84166 PROTEIN E-PHORESIS/URINE/CSF: CPT | Performed by: PATHOLOGY

## 2018-06-27 PROCEDURE — 84156 ASSAY OF PROTEIN URINE: CPT | Performed by: INTERNAL MEDICINE

## 2018-06-27 PROCEDURE — 80053 COMPREHEN METABOLIC PANEL: CPT | Performed by: INTERNAL MEDICINE

## 2018-06-29 LAB
ANA HOMOGEN SER QL IF: NORMAL
ANA HOMOGEN TITR SER: NORMAL {TITER}
RYE IGE QN: POSITIVE

## 2018-06-30 LAB
ALBUMIN SERPL ELPH-MCNC: 3.51 G/DL (ref 3.5–5)
ALBUMIN SERPL ELPH-MCNC: 54.8 % (ref 52–65)
ALBUMIN UR ELPH-MCNC: 74.4 %
ALPHA1 GLOB MFR UR ELPH: 3.6 %
ALPHA1 GLOB SERPL ELPH-MCNC: 0.33 G/DL (ref 0.1–0.4)
ALPHA1 GLOB SERPL ELPH-MCNC: 5.2 % (ref 2.5–5)
ALPHA2 GLOB MFR UR ELPH: 5.3 %
ALPHA2 GLOB SERPL ELPH-MCNC: 0.81 G/DL (ref 0.4–1.2)
ALPHA2 GLOB SERPL ELPH-MCNC: 12.6 % (ref 7–13)
B-GLOBULIN MFR UR ELPH: 7.7 %
BETA GLOB ABNORMAL SERPL ELPH-MCNC: 0.42 G/DL (ref 0.4–0.8)
BETA1 GLOB SERPL ELPH-MCNC: 6.5 % (ref 5–13)
BETA2 GLOB SERPL ELPH-MCNC: 6 % (ref 2–8)
BETA2+GAMMA GLOB SERPL ELPH-MCNC: 0.38 G/DL (ref 0.2–0.5)
GAMMA GLOB ABNORMAL SERPL ELPH-MCNC: 0.95 G/DL (ref 0.5–1.6)
GAMMA GLOB MFR UR ELPH: 9 %
GAMMA GLOB SERPL ELPH-MCNC: 14.9 % (ref 12–22)
IGG/ALB SER: 1.21 {RATIO} (ref 1.1–1.8)
PROT PATTERN SERPL ELPH-IMP: ABNORMAL
PROT PATTERN UR ELPH-IMP: ABNORMAL
PROT SERPL-MCNC: 6.4 G/DL (ref 6.4–8.2)
PROT UR-MCNC: 436 MG/DL

## 2018-07-05 ENCOUNTER — OFFICE VISIT (OUTPATIENT)
Dept: INTERNAL MEDICINE CLINIC | Facility: CLINIC | Age: 53
End: 2018-07-05
Payer: MEDICARE

## 2018-07-05 VITALS
RESPIRATION RATE: 16 BRPM | SYSTOLIC BLOOD PRESSURE: 107 MMHG | DIASTOLIC BLOOD PRESSURE: 68 MMHG | TEMPERATURE: 97.7 F | HEART RATE: 101 BPM | HEIGHT: 72 IN | BODY MASS INDEX: 19.86 KG/M2 | WEIGHT: 146.6 LBS

## 2018-07-05 DIAGNOSIS — I51.7 LVH (LEFT VENTRICULAR HYPERTROPHY): ICD-10-CM

## 2018-07-05 DIAGNOSIS — N18.4 CKD (CHRONIC KIDNEY DISEASE) STAGE 4, GFR 15-29 ML/MIN (HCC): ICD-10-CM

## 2018-07-05 DIAGNOSIS — Z79.4 UNCONTROLLED TYPE 2 DIABETES MELLITUS WITH HYPERGLYCEMIA, WITH LONG-TERM CURRENT USE OF INSULIN (HCC): ICD-10-CM

## 2018-07-05 DIAGNOSIS — I10 ESSENTIAL HYPERTENSION: ICD-10-CM

## 2018-07-05 DIAGNOSIS — E78.5 HYPERLIPIDEMIA, UNSPECIFIED HYPERLIPIDEMIA TYPE: ICD-10-CM

## 2018-07-05 DIAGNOSIS — E11.65 UNCONTROLLED TYPE 2 DIABETES MELLITUS WITH HYPERGLYCEMIA, WITH LONG-TERM CURRENT USE OF INSULIN (HCC): ICD-10-CM

## 2018-07-05 DIAGNOSIS — IMO0002 UNCONTROLLED TYPE 2 DIABETES MELLITUS WITH STAGE 4 CHRONIC KIDNEY DISEASE, WITH LONG-TERM CURRENT USE OF INSULIN: Primary | ICD-10-CM

## 2018-07-05 PROCEDURE — 99214 OFFICE O/P EST MOD 30 MIN: CPT | Performed by: INTERNAL MEDICINE

## 2018-07-05 RX ORDER — GLIPIZIDE 10 MG/1
10 TABLET ORAL
Qty: 180 TABLET | Refills: 0 | Status: SHIPPED | OUTPATIENT
Start: 2018-07-05 | End: 2019-01-02 | Stop reason: SDUPTHER

## 2018-07-05 RX ORDER — AMLODIPINE BESYLATE 10 MG/1
10 TABLET ORAL DAILY
Qty: 90 TABLET | Refills: 0 | Status: SHIPPED | OUTPATIENT
Start: 2018-07-05 | End: 2019-01-02 | Stop reason: SDUPTHER

## 2018-07-05 RX ORDER — LISINOPRIL 5 MG/1
5 TABLET ORAL DAILY
Qty: 90 TABLET | Refills: 0 | Status: SHIPPED | OUTPATIENT
Start: 2018-07-05 | End: 2019-01-02 | Stop reason: SDUPTHER

## 2018-07-05 RX ORDER — ATORVASTATIN CALCIUM 40 MG/1
40 TABLET, FILM COATED ORAL DAILY
Qty: 90 TABLET | Refills: 0 | Status: SHIPPED | OUTPATIENT
Start: 2018-07-05 | End: 2019-01-02 | Stop reason: SDUPTHER

## 2018-07-06 NOTE — PROGRESS NOTES
Assessment/Plan:    Uncontrolled type 2 diabetes mellitus with stage 4 chronic kidney disease, with long-term current use of insulin ([facility])  Lab Results   Component Value Date    HGBA1C 11 9 (H) 06/27/2018       No results for input(s): POCGLU in the last 72 hours  Blood Sugar Average: Last 72 hrs:  Still not checking blood sugars  Plans to travel for 3-4 mo  Enforced again the importance of controlling blood sugars for further complication avoidance  Essential hypertension  Well controlled, continue current regimen  CKD (chronic kidney disease) stage 4, GFR 15-29 ml/min ([facility])  Seeing Nephrology in a week, ultrasound of the kidneys did not show any significant abnormalities  Significant proteinuria likely from diabetes  Diagnoses and all orders for this visit:    Uncontrolled type 2 diabetes mellitus with stage 4 chronic kidney disease, with long-term current use of insulin ([facility])    Essential hypertension  -     lisinopril (ZESTRIL) 5 mg tablet; Take 1 tablet (5 mg total) by mouth daily  -     aspirin 81 MG tablet; Take 1 tablet (81 mg total) by mouth daily  -     amLODIPine (NORVASC) 10 mg tablet; Take 1 tablet (10 mg total) by mouth daily    LVH (left ventricular hypertrophy)    Hyperlipidemia, unspecified hyperlipidemia type  -     atorvastatin (LIPITOR) 40 mg tablet; Take 1 tablet (40 mg total) by mouth daily    Uncontrolled type 2 diabetes mellitus with hyperglycemia, with long-term current use of insulin ([facility])  -     glipiZIDE (GLUCOTROL) 10 mg tablet; Take 1 tablet (10 mg total) by mouth 2 (two) times a day before meals  -     aspirin 81 MG tablet; Take 1 tablet (81 mg total) by mouth daily  -     insulin glargine (BASAGLAR KWIKPEN) 100 units/mL injection pen; Inject 30 Units under the skin daily    CKD (chronic kidney disease) stage 4, GFR 15-29 ml/min ([facility])  -     aspirin 81 MG tablet;  Take 1 tablet (81 mg total) by mouth daily          Subjective:   Chief Complaint   Patient presents with  Follow-up     2 week        Patient ID: Aditya Cason is a 46 y o  male  He comes in for follow-up of hypertension, diabetes, hyperlipidemia, chronic kidney disease  He is not monitoring his blood sugars  He is taking Lantus will transition to basic lower due to formulary change  No episodes of symptoms suggestive of hypoglycemia or hypertension  Will be seeing the nephrologist soon  The following portions of the patient's history were reviewed and updated as appropriate: current medications, past medical history, past social history and past surgical history      PHQ-9 Depression Screening    PHQ-9:    Frequency of the following problems over the past two weeks:                Current Outpatient Prescriptions:     amLODIPine (NORVASC) 10 mg tablet, Take 1 tablet (10 mg total) by mouth daily, Disp: 90 tablet, Rfl: 0    aspirin 81 MG tablet, Take 1 tablet (81 mg total) by mouth daily, Disp: 90 tablet, Rfl: 0    atorvastatin (LIPITOR) 40 mg tablet, Take 1 tablet (40 mg total) by mouth daily, Disp: 90 tablet, Rfl: 0    bacitracin topical ointment 500 units/g topical ointment, Apply 1 large application topically 4 (four) times a day, Disp: , Rfl:     Blood Glucose Monitoring Suppl (BLOOD GLUCOSE MONITOR SYSTEM) w/Device KIT, by Does not apply route 3 (three) times a day, Disp: 100 each, Rfl: 0    clotrimazole (LOTRIMIN) 1 % cream, Apply topically 2 (two) times a day, Disp: 30 g, Rfl: 0    glipiZIDE (GLUCOTROL) 10 mg tablet, Take 1 tablet (10 mg total) by mouth 2 (two) times a day before meals, Disp: 180 tablet, Rfl: 0    glucose blood test strip, Use as instructed 3 times daily, Disp: 100 each, Rfl: 0    insulin glargine (BASAGLAR KWIKPEN) 100 units/mL injection pen, Inject 30 Units under the skin daily, Disp: 10 pen, Rfl: 0    Insulin Pen Needle (PEN NEEDLES 3/16") 31G X 5 MM MISC, by Does not apply route daily, Disp: 100 each, Rfl: 0    Lancets MISC, by Does not apply route 3 (three) times a day, Disp: 100 each, Rfl: 0    lisinopril (ZESTRIL) 5 mg tablet, Take 1 tablet (5 mg total) by mouth daily, Disp: 90 tablet, Rfl: 0    Review of Systems   Constitutional: Negative for fatigue, fever and unexpected weight change  HENT: Negative for ear pain, hearing loss and sore throat  Eyes: Negative for pain and discharge  Respiratory: Negative for cough, chest tightness and shortness of breath  Cardiovascular: Negative for chest pain and palpitations  Gastrointestinal: Negative for abdominal pain, blood in stool, constipation, diarrhea and nausea  Genitourinary: Negative for dysuria, frequency and hematuria  Musculoskeletal: Negative for arthralgias and joint swelling  Skin: Negative for rash  Allergic/Immunologic: Negative for immunocompromised state  Neurological: Negative for dizziness and headaches  Hematological: Negative for adenopathy  Psychiatric/Behavioral: Negative for confusion and sleep disturbance  Objective:  /68 (BP Location: Left arm, Patient Position: Sitting, Cuff Size: Standard)   Pulse 101   Temp 97 7 °F (36 5 °C)   Resp 16   Ht 6' (1 829 m)   Wt 66 5 kg (146 lb 9 6 oz)   BMI 19 88 kg/m²      Physical Exam   Constitutional: He appears well-developed and well-nourished  HENT:   Head: Normocephalic and atraumatic  Right Ear: Tympanic membrane normal    Left Ear: Tympanic membrane normal    Nose: Nose normal    Mouth/Throat: Oropharynx is clear and moist  No posterior oropharyngeal edema or posterior oropharyngeal erythema  Eyes: Conjunctivae are normal  Pupils are equal, round, and reactive to light  Right eye exhibits no discharge  Neck: Normal range of motion  Neck supple  No thyromegaly present  Cardiovascular: Normal rate, regular rhythm, S1 normal, S2 normal and normal heart sounds  PMI is not displaced  No murmur heard  Pulmonary/Chest: Effort normal and breath sounds normal  No accessory muscle usage  No apnea   No respiratory distress  He has no rhonchi  He has no rales  Abdominal: Soft  Normal appearance and bowel sounds are normal  He exhibits no shifting dullness  There is no hepatosplenomegaly  There is no tenderness  There is no rebound and no CVA tenderness  Musculoskeletal: Normal range of motion  He exhibits no edema or tenderness  Lymphadenopathy:     He has no cervical adenopathy  Neurological: He is alert  Skin: Skin is warm and intact  No rash noted  Psychiatric: He has a normal mood and affect  His speech is normal    Nursing note and vitals reviewed          Recent Results (from the past 1008 hour(s))   Lipid Panel with Direct LDL reflex    Collection Time: 06/20/18  8:55 AM   Result Value Ref Range    Total Cholesterol 312 (H) <200 mg/dL    HDL 52 >40 mg/dL    Triglycerides 194 (H) <150 mg/dL    LDL Direct 223 (H) mg/dL (calc)    Chol HDLC Ratio 6 0 (H) <5 0 (calc)    Non-HDL Cholesterol 260 (H) <130 mg/dL (calc)   Comprehensive metabolic panel    Collection Time: 06/20/18  8:55 AM   Result Value Ref Range    SL AMB GLUCOSE 323 (H) 65 - 99 mg/dL    BUN 38 (H) 7 - 25 mg/dL    Creatinine, Serum 4 71 (H) 0 70 - 1 33 mg/dL    eGFR Non  13 (L) > OR = 60 mL/min/1 73m2    SL AMB EGFR  15 (L) > OR = 60 mL/min/1 73m2    SL AMB BUN/CREATININE RATIO 8 6 - 22 (calc)    SL AMB SODIUM 134 (L) 135 - 146 mmol/L    SL AMB POTASSIUM 5 3 3 5 - 5 3 mmol/L    SL AMB CHLORIDE 105 98 - 110 mmol/L    SL AMB CARBON DIOXIDE 20 20 - 31 mmol/L    SL AMB CALCIUM 9 2 8 6 - 10 3 mg/dL    SL AMB PROTEIN, TOTAL 6 1 6 1 - 8 1 g/dL    Serum Albumin 3 3 (L) 3 6 - 5 1 g/dL    SL AMB GLOBULIN 2 8 1 9 - 3 7 g/dL (calc)    SL AMB ALBUMIN/GLOBULIN RATIO 1 2 1 0 - 2 5 (calc)    SL AMB BILIRUBIN, TOTAL 0 7 0 2 - 1 2 mg/dL    SL AMB ALKALINE PHOSPHATASE 70 40 - 115 U/L    SL AMB AST 10 10 - 35 U/L    SL AMB ALT 11 9 - 46 U/L   CBC and differential    Collection Time: 06/20/18  8:55 AM   Result Value Ref Range [facility] LAB WHITE BLOOD CELL COUNT 4 9 3 8 - 10 8 Thousand/uL    [facility] LAB RED BLOOD CELLS 4 32 4 20 - 5 80 Million/uL    Hemoglobin 11 0 (L) 13 2 - 17 1 g/dL    Hematocrit 34 9 (L) 38 5 - 50 0 %    MCV 80 8 80 0 - 100 0 fL    MCH 25 5 (L) 27 0 - 33 0 pg    MCHC 31 5 (L) 32 0 - 36 0 g/dL    RDW 13 3 11 0 - 15 0 %    Platelet Count 435 814 - 400 Thousand/uL     AMB MPV 9 3 7 5 - 12 5 fL    Neutrophils (Absolute) 2,724 1,500 - 7,800 cells/uL    Lymphocytes (Absolute) 1,651 850 - 3,900 cells/uL    Monocytes (Absolute) 417 200 - 950 cells/uL    Eosinophils (Absolute) 78 15 - 500 cells/uL    Basophils (Absolute) 29 0 - 200 cells/uL    Neutrophils 55 6 %    Lymphocytes 33 7 %    Monocytes 8 5 %    Eosinophils 1 6 %    Basophils 0 6 %   PSA, Total Screen    Collection Time: 06/20/18  8:55 AM   Result Value Ref Range    Prostate Specific Antigen Total 0 4 < OR = 4 0 ng/mL   TSH, 3rd generation with Free T4 reflex    Collection Time: 06/20/18  8:55 AM   Result Value Ref Range    [facility] TSH W/ REFLEX TO FREE T4 2 58 0 40 - 4 50 mIU/L   Hemoglobin A1C With EAG    Collection Time: 06/20/18  8:55 AM   Result Value Ref Range    Hemoglobin A1C 12 8 (H) <5 7 % of total Hgb    Estimated Average Glucose 321 (calc)    Estimated Average Glucose (mmol/L) 17 8 (calc)   Microalbumin, Random Urine (W/Creatinine)    Collection Time: 06/20/18  2:47 PM   Result Value Ref Range    Creatinine, Urine 77 20 - 370 mg/dL    Microalbum  ,U,Random 211 1 See Note: mg/dL    Microalb/Creat Ratio 2,742 (H) <30 mcg/mg creat   Protein / creatinine ratio, urine    Collection Time: 06/27/18  8:59 AM   Result Value Ref Range    Creatinine, Ur 89 6 mg/dL    Protein Urine Random 438 mg/dL    Prot/Creat Ratio, Ur 4 89 (H) 0 00 - 0 10   Protein electrophoresis, urine    Collection Time: 06/27/18  8:59 AM   Result Value Ref Range    Urine Protein 436 0 (H) 2 0 - 17 5 mg/dL    Albumin ELP, Urine 74 4 %    Alpha 1, Urine 3 6 %    Alpha 2, Urine 5 3 %    Beta, Urine 7 7 %    Gamma Globulin, Urine 9 0 %    UPEP Interp       The urine total protein is increased  The urine protein electrophoresis shows non-selective proteinuria  No monoclonal bands noted  Reviewed by: Eugene Randle MD ([id number])  **Electronic Signature**   DORI Screen w/ Reflex to Titer/Pattern    Collection Time: 06/27/18  8:59 AM   Result Value Ref Range    DORI Positive (A) Negative   C-reactive protein    Collection Time: 06/27/18  8:59 AM   Result Value Ref Range    CRP <3 0 <3 0 mg/L   Renal function panel    Collection Time: 06/27/18  8:59 AM   Result Value Ref Range    Albumin 2 9 (L) 3 5 - 5 0 g/dL    Calcium 9 2 8 3 - 10 1 mg/dL    Phosphorus 4 3 2 7 - 4 5 mg/dL    BUN 33 (H) 5 - 25 mg/dL    Creatinine 4 29 (H) 0 60 - 1 30 mg/dL    Sodium 138 136 - 145 mmol/L    Potassium 4 4 3 5 - 5 3 mmol/L    Chloride 106 100 - 108 mmol/L    CO2 19 (L) 21 - 32 mmol/L    Anion Gap 13 4 - 13 mmol/L    eGFR 17 ml/min/1 73sq m    Glucose, Fasting 123 (H) 65 - 99 mg/dL   Uric acid    Collection Time: 06/27/18  8:59 AM   Result Value Ref Range    Uric Acid 4 4 4 2 - 8 0 mg/dL   Protein electrophoresis, serum    Collection Time: 06/27/18  8:59 AM   Result Value Ref Range    A/G Ratio 1 21 1 10 - 1 80    Albumin Electrophoresis 54 8 52 0 - 65 0 %    Albumin CONC 3 51 3 50 - 5 00 g/dl    Alpha 1 5 2 (H) 2 5 - 5 0 %    ALPHA 1 CONC 0 33 0 10 - 0 40 g/dL    Alpha 2 12 6 7 0 - 13 0 %    ALPHA 2 CONC 0 81 0 40 - 1 20 g/dL    Beta-1 6 5 5 0 - 13 0 %    BETA 1 CONC 0 42 0 40 - 0 80 g/dL    Beta-2 6 0 2 0 - 8 0 %    BETA 2 CONC 0 38 0 20 - 0 50 g/dL    Gamma Globulin 14 9 12 0 - 22 0 %    GAMMA CONC 0 95 0 50 - 1 60 g/dL    SPEP Interpretation       The serum total protein, albumin and electrophoresis are within normal limits  No monoclonal bands noted   Reviewed by: Eugene Randle MD  ([id number])  **Electronic Signature**    Total Protein 6 4 6 4 - 8 2 g/dL   DORI titer    Collection Time: 06/27/18  8:59 AM   Result Value Ref Range    DORI Titer 1 Titer of 40     DORI Pattern 1 Speckled pattern    CBC and differential    Collection Time: 06/27/18  9:00 AM   Result Value Ref Range    WBC 5 48 4 31 - 10 16 Thousand/uL    RBC 4 36 3 88 - 5 62 Million/uL    Hemoglobin 11 1 (L) 12 0 - 17 0 g/dL    Hematocrit 34 2 (L) 36 5 - 49 3 %    MCV 78 (L) 82 - 98 fL    MCH 25 5 (L) 26 8 - 34 3 pg    MCHC 32 5 31 4 - 37 4 g/dL    RDW 13 6 11 6 - 15 1 %    MPV 9 1 8 9 - 12 7 fL    Platelets 139 837 - 570 Thousands/uL    Neutrophils Relative 65 43 - 75 %    Lymphocytes Relative 27 14 - 44 %    Monocytes Relative 8 4 - 12 %    Eosinophils Relative 1 0 - 6 %    Basophils Relative 0 0 - 1 %    Neutrophils Absolute 3 54 1 85 - 7 62 Thousands/µL    Lymphocytes Absolute 1 45 0 60 - 4 47 Thousands/µL    Monocytes Absolute 0 42 0 17 - 1 22 Thousand/µL    Eosinophils Absolute 0 06 0 00 - 0 61 Thousand/µL    Basophils Absolute 0 01 0 00 - 0 10 Thousands/µL   Comprehensive metabolic panel    Collection Time: 06/27/18  9:00 AM   Result Value Ref Range    Sodium 138 136 - 145 mmol/L    Potassium 4 4 3 5 - 5 3 mmol/L    Chloride 105 100 - 108 mmol/L    CO2 21 21 - 32 mmol/L    Anion Gap 12 4 - 13 mmol/L    BUN 32 (H) 5 - 25 mg/dL    Creatinine 4 25 (H) 0 60 - 1 30 mg/dL    Glucose, Fasting 124 (H) 65 - 99 mg/dL    Calcium 9 3 8 3 - 10 1 mg/dL    AST 14 5 - 45 U/L    ALT 19 12 - 78 U/L    Alkaline Phosphatase 68 46 - 116 U/L    Total Protein 7 0 6 4 - 8 2 g/dL    Albumin 3 0 (L) 3 5 - 5 0 g/dL    Total Bilirubin 0 59 0 20 - 1 00 mg/dL    eGFR 17 ml/min/1 73sq m   TSH, 3rd generation with T4 reflex    Collection Time: 06/27/18  9:00 AM   Result Value Ref Range    TSH 3RD GENERATON 2 863 0 358 - 3 740 uIU/mL   Microalbumin / creatinine urine ratio    Collection Time: 06/27/18  9:00 AM   Result Value Ref Range    Creatinine, Ur 91 4 mg/dL    Microalbum  ,U,Random 3,590 0 (H) 0 0 - 20 0 mg/L    Microalb Creat Ratio 3,928 (H) 0 - 30 mg/g creatinine   HEMOGLOBIN A1C W/ EAG ESTIMATION Collection Time: 06/27/18  9:00 AM   Result Value Ref Range    Hemoglobin A1C 11 9 (H) 4 2 - 6 3 %     mg/dl   Lipid Panel with Direct LDL reflex    Collection Time: 06/27/18  9:00 AM   Result Value Ref Range    Cholesterol 281 (H) 50 - 200 mg/dL    Triglycerides 135 <=150 mg/dL    HDL, Direct 61 (H) 40 - 60 mg/dL    LDL Calculated 193 (H) 0 - 100 mg/dL   ]    No results found

## 2018-07-11 ENCOUNTER — OFFICE VISIT (OUTPATIENT)
Dept: NEPHROLOGY | Facility: CLINIC | Age: 53
End: 2018-07-11
Payer: MEDICARE

## 2018-07-11 VITALS
BODY MASS INDEX: 20.18 KG/M2 | HEIGHT: 72 IN | HEART RATE: 72 BPM | DIASTOLIC BLOOD PRESSURE: 80 MMHG | WEIGHT: 149 LBS | SYSTOLIC BLOOD PRESSURE: 142 MMHG

## 2018-07-11 DIAGNOSIS — IMO0002 UNCONTROLLED TYPE 2 DIABETES MELLITUS WITH STAGE 4 CHRONIC KIDNEY DISEASE, WITH LONG-TERM CURRENT USE OF INSULIN: Primary | ICD-10-CM

## 2018-07-11 DIAGNOSIS — E11.65 UNCONTROLLED TYPE 2 DIABETES MELLITUS WITH HYPERGLYCEMIA, WITH LONG-TERM CURRENT USE OF INSULIN (HCC): ICD-10-CM

## 2018-07-11 DIAGNOSIS — Z79.4 UNCONTROLLED TYPE 2 DIABETES MELLITUS WITH HYPERGLYCEMIA, WITH LONG-TERM CURRENT USE OF INSULIN (HCC): ICD-10-CM

## 2018-07-11 DIAGNOSIS — I10 ESSENTIAL HYPERTENSION: ICD-10-CM

## 2018-07-11 DIAGNOSIS — E11.319 DIABETIC RETINOPATHY OF BOTH EYES ASSOCIATED WITH TYPE 2 DIABETES MELLITUS, MACULAR EDEMA PRESENCE UNSPECIFIED, UNSPECIFIED RETINOPATHY SEVERITY (HCC): ICD-10-CM

## 2018-07-11 DIAGNOSIS — N18.4 CKD (CHRONIC KIDNEY DISEASE) STAGE 4, GFR 15-29 ML/MIN (HCC): ICD-10-CM

## 2018-07-11 PROCEDURE — 99205 OFFICE O/P NEW HI 60 MIN: CPT | Performed by: INTERNAL MEDICINE

## 2018-07-11 NOTE — PATIENT INSTRUCTIONS
I would like you to be seen in 3 months with repeat labs  You need to go for Kidney Smart classes  Avoid NSAIDs  You need to have better blood sugar control to slow down the progression of your kidney disease  Save your nondominant arm ( left arm ) for dialysis access in the future, no intravenously lines or blood draws over the left arm  Recommend to follow a healthy lifestyle including following a low-salt diet, avoid smoking, do regular physical activity, avoid anti-inflammatory/NSAIDs medication (no ibuprofen, Motrin, Advil, naproxen), keep a good weight and to continue with regular follow-up as instructed  Hypertension and diabetes are the two most common causes of chronic kidney disease (CKD), if you have any of them, it is important to have a good blood sugar (hemoglobin A1c less than 7 percent) as well as good blood pressure control to slow down the progression of your CKD  Chronic Kidney Disease   WHAT YOU NEED TO KNOW:   What is chronic kidney disease (CKD)? CKD is the gradual and permanent loss of kidney function  It is also called chronic kidney failure, or chronic renal insufficiency  Normally, the kidneys remove fluid, chemicals, and waste from your blood  These wastes are turned into urine by your kidneys  CKD may worsen over time and lead to kidney failure  What increases my risk for CKD? · Diabetes or obesity    · High blood pressure or heart disease    · Kidney infections or kidney stones    · Autoimmune diseases, such as lupus    · An enlarged prostate    · NSAIDs, illegal drugs, or smoking    · Family history of kidney disease  What are the signs and symptoms of CKD? Your signs and symptoms will depend on how well your kidneys work   You may not have symptoms, or you may have any of the following:  · Changes in how often you need to urinate    · Swelling in your arms, legs, or feet    · Shortness of breath    · Fatigue or weakness    · Bad or bitter taste in your mouth    · Nausea, vomiting, or loss of appetite  How is CKD diagnosed? · Blood and urine tests  show how well your kidneys are working  They may also help manage or show the cause of CKD  · An ultrasound, CT scan, or MRI  may show the cause of CKD  You may be given contrast liquid to help your kidneys show up better in the pictures  Tell the healthcare provider if you have ever had an allergic reaction to contrast liquid  Do not enter the MRI room with anything metal  Metal can cause serious injury  Tell the healthcare provider if you have any metal in or on your body  · A biopsy  is a procedure to remove a small piece of tissue from your kidney  It is done to find the cause of your CKD  How is CKD treated? The goals of treatment are to control your symptoms and prevent your CKD from getting worse  You may need the following:  · Medicines  may be given to decrease blood pressure and get rid of extra fluid  You may also receive medicine to manage health conditions that may occur with CKD, such as anemia, diabetes, and heart disease  · Dialysis  is a treatment to remove chemicals and waste from your blood when your kidneys can no longer do this  · Surgery  may be needed to create an arteriovenous fistula (AVF) in your arm or insert a catheter into your abdomen  This is done so you can receive dialysis  · A kidney transplant  may be done if your CKD becomes severe  How can I manage CKD? · Maintain a healthy weight  Ask your healthcare provider how much you should weigh  Ask him to help you create a weight loss plan if you are overweight  · Exercise 30 to 60 minutes a day, 4 to 7 times a week, or as directed  Ask about the best exercise plan for you  Regular exercise can help you manage CKD, high blood pressure, and diabetes  · Follow your healthcare provider's advice about what to eat and drink  He may tell you to eat food low in sodium (salt), potassium, phosphorus, or protein   You may need to see a dietitian if you need help planning meals  Ask how much liquid to drink each day and which liquids are best for you  · Limit alcohol  Ask how much alcohol is safe for you to drink  A drink of alcohol is 12 ounces of beer, 5 ounces of wine, or 1½ ounces of liquor  · Do not smoke  Nicotine and other chemicals in cigarettes and cigars can cause lung and kidney damage  Ask your healthcare provider for information if you currently smoke and need help to quit  E-cigarettes or smokeless tobacco still contain nicotine  Talk to your healthcare provider before you use these products  · Ask your healthcare provider if you need vaccines  Infections such as pneumonia, influenza, and hepatitis can be more harmful or more likely to occur in a person who has CKD  Vaccines reduce your risk of infection with these viruses  When should I seek immediate care? · You are confused and very drowsy  · You have a seizure  · You have shortness of breath  When should I contact my healthcare provider? · You suddenly gain or lose more weight than your healthcare provider has told you is okay  · You have itchy skin or a rash  · You urinate more or less than you normally do  · You have blood in your urine  · You have nausea and repeated vomiting  · You have fatigue or muscle weakness  · You have hiccups that will not stop  · You have questions or concerns about your condition or care  CARE AGREEMENT:   You have the right to help plan your care  Learn about your health condition and how it may be treated  Discuss treatment options with your caregivers to decide what care you want to receive  You always have the right to refuse treatment  The above information is an  only  It is not intended as medical advice for individual conditions or treatments   Talk to your doctor, nurse or pharmacist before following any medical regimen to see if it is safe and effective for you  © 2017 [address] [facility] Information is for End User's use only and may not be sold, redistributed or otherwise used for commercial purposes  All illustrations and images included in CareNotes® are the copyrighted property of [employer]  or [employer]

## 2018-07-11 NOTE — PROGRESS NOTES
OFFICE CONSULT - Nephrology   Paddy Trevin 46 y o  male MRN: [id number]        ASSESSMENT and PLAN:  Paddy was seen today for consult  Diagnoses and all orders for this visit:    Uncontrolled type 2 diabetes mellitus with stage 4 chronic kidney disease, with long-term current use of insulin ([facility])    CKD (chronic kidney disease) stage 4, GFR 15-29 ml/min ([facility])  -     Ambulatory referral to Nephrology  -     CBC; Future  -     Protein / creatinine ratio, urine; Future  -     PTH, intact; Future  -     Renal function panel; Future  -     Ambulatory referral to ckd education program; Future    Uncontrolled type 2 diabetes mellitus with hyperglycemia, with long-term current use of insulin ([facility] )  -     Ambulatory referral to Nephrology    Diabetic retinopathy of both eyes associated with type 2 diabetes mellitus, macular edema presence unspecified, unspecified retinopathy severity ([facility] )    Essential hypertension        78-year-old gentleman with history of poorly controlled diabetes diagnosed in 2002, with diabetes retinopathy and legally blind, 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, hypertension ,chronic kidney disease was referred to our office for evaluation  1  Chronic kidney disease stage 4 with proteinuria  Given uncontrolled diabetes as well as associated diabetes retinopathy / legally blind 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  Noted that patient have a SPEP and UPEP that did not show any monoclonal gammopathy  Long discussion with patient and with his sister, given his advanced kidney disease I would like him to have Kidney Smart classes soon  We discussed about modality options in the near future including transplant, hemodialysis, peritoneal dialysis  Given that he is legally blind and have uncontrolled blood sugars peritoneal dialysis does not fit like a good modality for him    We discussed that he needs to be very compliant with his follow-up and have better blood sugar control to be eventually evaluated for a transplant  I would like him to go and see a vascular surgeon for access in the near future  I would like to see him back in 3 months with repeat labs  Currently he does not have any uremic symptoms  Unfortunately he is going back to [facility] at the end of this week and he may probably come back in 3 months  Discussed with patient with his sister but as soon as they know when is coming back, they should call us to arrange a follow-up appointment as soon as possible with repeat labs  discussed above saving nondominant arm (left arm ) for dialysis access in the future, no intravenously lines or blood draws over the left arm  2  Uncontrolled insulin-dependent diabetes, with retinopathy and suspected nephropathy, noted that most recent hemoglobin A1c is 11 9%  Continue follow-up with his family doctor  3  Hypertension, continue with same antihypertensive medication, advised to follow a low-salt diet  4  Mild anemia, follow CBC in 3 months  5  Mineral bone disease, most recent phosphorus normal, will check phosphorus and PTH in 3 months  Patient Instructions   I would like you to be seen in 3 months with repeat labs  You need to go for Kidney Smart classes  Avoid NSAIDs  You need to have better blood sugar control to slow down the progression of your kidney disease  Save your nondominant arm ( left arm ) for dialysis access in the future, no intravenously lines or blood draws over the left arm  HPI:  Steffany Tena is a 46 y o male who was referred by Kayy Izaguirre MD for evaluation of Consult    Patient with history of diabetes diagnosed 16 years ago but probably have longer than that, uncontrolled diabetes with A1c of 11 9  Diabetes retinopathy/legally blind, hypertension, chronic kidney disease, who was referred to our office for his family doctor for evaluation of CKD stage 4    Patient needs between back on 4 from [facility] and the [place], he used to follow with the family doctor in the [place] [address] area last time approximately 2 years ago  He come back to [place] and establish care with Dr Sherif Lyman, was found to have advanced CKD for that reason referred to our office  Today patient presents to the office with his sister  In general feeling okay denies any complaints, appetite is stable, no nausea vomiting or diarrhea, denies any chest pain or shortness of breath, no urinary problems, no lower extremity edema, no abdominal pain  Denies NSAID use  Tobacco quit smoking approximately 5 months ago, in the past used to smoke couple of cigarettes a day  Denies alcohol use  Denies prior history of kidney stones  Denies family history of kidney disease  I personally spent over half of a total 50 minutes face to face with the patient in counseling and discussion and/or coordination of care as described above  ROS: All the systems were reviewed and were negative except as documented on the HPI  Allergies: Patient has no known allergies      Medications:   Current Outpatient Prescriptions:     amLODIPine (NORVASC) 10 mg tablet, Take 1 tablet (10 mg total) by mouth daily, Disp: 90 tablet, Rfl: 0    aspirin 81 MG tablet, Take 1 tablet (81 mg total) by mouth daily, Disp: 90 tablet, Rfl: 0    atorvastatin (LIPITOR) 40 mg tablet, Take 1 tablet (40 mg total) by mouth daily, Disp: 90 tablet, Rfl: 0    bacitracin topical ointment 500 units/g topical ointment, Apply 1 large application topically 4 (four) times a day, Disp: , Rfl:     Blood Glucose Monitoring Suppl (BLOOD GLUCOSE MONITOR SYSTEM) w/Device KIT, by Does not apply route 3 (three) times a day, Disp: 100 each, Rfl: 0    clotrimazole (LOTRIMIN) 1 % cream, Apply topically 2 (two) times a day, Disp: 30 g, Rfl: 0    glipiZIDE (GLUCOTROL) 10 mg tablet, Take 1 tablet (10 mg total) by mouth 2 (two) times a day before meals, Disp: 180 tablet, Rfl: 0    glucose blood test strip, Use as instructed 3 times daily, Disp: 100 each, Rfl: 0    insulin glargine (BASAGLAR KWIKPEN) 100 units/mL injection pen, Inject 30 Units under the skin daily, Disp: 10 pen, Rfl: 0    Insulin Pen Needle (PEN NEEDLES 3/16") 31G X 5 MM MISC, by Does not apply route daily, Disp: 100 each, Rfl: 0    Lancets MISC, by Does not apply route 3 (three) times a day, Disp: 100 each, Rfl: 0    lisinopril (ZESTRIL) 5 mg tablet, Take 1 tablet (5 mg total) by mouth daily, Disp: 90 tablet, Rfl: 0    Past Medical History:   Diagnosis Date    Blind     Chronic kidney disease     [facility] [facility]     Diabetic retinopathy ([facility] )     Hypertension      Past Surgical History:   Procedure Laterality Date    HERNIA MESH REMOVAL       Family History   Problem Relation Age of Onset    Hypertension Mother     Diabetes Father     No Known Problems Sister     No Known Problems Brother     No Known Problems Maternal Aunt     No Known Problems Maternal Uncle     No Known Problems Paternal Aunt     No Known Problems Paternal Uncle     No Known Problems Maternal Grandmother     No Known Problems Maternal Grandfather     No Known Problems Paternal Grandmother     No Known Problems Paternal Grandfather       reports that he quit smoking about 5 months ago  He smoked 0 25 packs per day  He has never used smokeless tobacco  He reports that he does not drink alcohol or use drugs  Physical Exam:   Vitals:    07/11/18 1539 07/11/18 1618   BP:  142/80   BP Location:  Left arm   Patient Position:  Sitting   Pulse:  72   Weight: 67 6 kg (149 lb)    Height: 6' (1 829 m)      Body mass index is 20 21 kg/m²      General: conscious, cooperative, in not acute distress  Eyes: conjunctivae pink, anicteric sclerae  ENT: lips and mucous membranes moist  Neck: supple, no JVD  Chest: clear breath sounds bilateral, no crackles, ronchus or wheezings  CVS: distinct S1 & S2, normal rate, regular rhythm  Abdomen: soft, non-tender, non-distended, normoactive bowel sounds  Extremities: no edema of both legs  Skin: no rash  Neuro: awake, alert, oriented      Lab Results:   Results for orders placed or performed in visit on 06/27/18   Renal function panel   Result Value Ref Range    Albumin 2 9 (L) 3 5 - 5 0 g/dL    Calcium 9 2 8 3 - 10 1 mg/dL    Phosphorus 4 3 2 7 - 4 5 mg/dL    BUN 33 (H) 5 - 25 mg/dL    Creatinine 4 29 (H) 0 60 - 1 30 mg/dL    Sodium 138 136 - 145 mmol/L    Potassium 4 4 3 5 - 5 3 mmol/L    Chloride 106 100 - 108 mmol/L    CO2 19 (L) 21 - 32 mmol/L    Anion Gap 13 4 - 13 mmol/L    eGFR 17 ml/min/1 73sq m    Glucose, Fasting 123 (H) 65 - 99 mg/dL   Uric acid   Result Value Ref Range    Uric Acid 4 4 4 2 - 8 0 mg/dL   Protein electrophoresis, serum   Result Value Ref Range    A/G Ratio 1 21 1 10 - 1 80    Albumin Electrophoresis 54 8 52 0 - 65 0 %    Albumin CONC 3 51 3 50 - 5 00 g/dl    Alpha 1 5 2 (H) 2 5 - 5 0 %    ALPHA 1 CONC 0 33 0 10 - 0 40 g/dL    Alpha 2 12 6 7 0 - 13 0 %    ALPHA 2 CONC 0 81 0 40 - 1 20 g/dL    Beta-1 6 5 5 0 - 13 0 %    BETA 1 CONC 0 42 0 40 - 0 80 g/dL    Beta-2 6 0 2 0 - 8 0 %    BETA 2 CONC 0 38 0 20 - 0 50 g/dL    Gamma Globulin 14 9 12 0 - 22 0 %    GAMMA CONC 0 95 0 50 - 1 60 g/dL    SPEP Interpretation       The serum total protein, albumin and electrophoresis are within normal limits  No monoclonal bands noted  Reviewed by: Geena Hou MD  ([id number])  **Electronic Signature**    Total Protein 6 4 6 4 - 8 2 g/dL                   Portions of the record may have been created with voice recognition software  Occasional wrong word or "sound a like" substitutions may have occurred due to the inherent limitations of voice recognition software  Read the chart carefully and recognize, using context, where substitutions have occurred  If you have any questions, please contact the dictating provider

## 2018-07-12 ENCOUNTER — OFFICE VISIT (OUTPATIENT)
Dept: GASTROENTEROLOGY | Facility: MEDICAL CENTER | Age: 53
End: 2018-07-12
Payer: MEDICARE

## 2018-07-12 VITALS
WEIGHT: 149 LBS | HEIGHT: 72 IN | BODY MASS INDEX: 20.18 KG/M2 | DIASTOLIC BLOOD PRESSURE: 70 MMHG | TEMPERATURE: 98.5 F | SYSTOLIC BLOOD PRESSURE: 120 MMHG | HEART RATE: 66 BPM

## 2018-07-12 DIAGNOSIS — Z12.11 SCREENING FOR COLON CANCER: Primary | ICD-10-CM

## 2018-07-12 DIAGNOSIS — K59.04 CHRONIC IDIOPATHIC CONSTIPATION: ICD-10-CM

## 2018-07-12 DIAGNOSIS — IMO0002 UNCONTROLLED TYPE 2 DIABETES MELLITUS WITH STAGE 4 CHRONIC KIDNEY DISEASE, WITH LONG-TERM CURRENT USE OF INSULIN: ICD-10-CM

## 2018-07-12 PROCEDURE — 99204 OFFICE O/P NEW MOD 45 MIN: CPT | Performed by: INTERNAL MEDICINE

## 2018-07-12 NOTE — PATIENT INSTRUCTIONS
Constipation   AMBULATORY CARE:   Constipation  is when you have hard, dry bowel movements, or you go longer than usual between bowel movements  Constipation may be caused by a lack of water or high-fiber foods  Medicines used to treat pain or depression, or a lack of physical activity may also cause constipation  Common symptoms include the following:   · Trouble pushing out your bowel movement    · Pain or bleeding during your bowel movement    · A feeling that you did not finish having your bowel movement    · Nausea    · Bloating    · Headache  Seek immediate care for the following symptoms:   · Blood in your bowel movement    · A fever and abdominal pain with the constipation  Contact your healthcare provider if:   · Your constipation gets worse  · You start to vomit  · You have questions or concerns about your condition or care  Medicines:   · Medicine or a fiber supplement  may help make your bowel movement softer  A laxative may help relax and loosen your intestines to help you have a bowel movement  You may also be given medicine to increase fluid in your intestines  The fluid may help move bowel movements through your intestines  · Take your medicine as directed  Contact your healthcare provider if you think your medicine is not helping or if you have side effects  Tell him of her if you are allergic to any medicine  Keep a list of the medicines, vitamins, and herbs you take  Include the amounts, and when and why you take them  Bring the list or the pill bottles to follow-up visits  Carry your medicine list with you in case of an emergency  Manage or prevent constipation:   · Drink liquids as directed  You may need to drink extra liquids to help soften and move your bowels  Ask how much liquid to drink each day and which liquids are best for you  · Eat high-fiber foods  This may help decrease constipation by adding bulk to your bowel movements   High-fiber foods include fruit, vegetables, whole-grain breads and cereals, and beans  Your healthcare provider or dietitian can help you create a high-fiber meal plan  · Exercise regularly  Regular physical activity can help stimulate your intestines  Ask which exercises are best for you  · Schedule a time each day to have a bowel movement  This may help train your body to have regular bowel movements  Bend forward while you are on the toilet to help move the bowel movement out  Sit on the toilet for at least 10 minutes, even if you do not have a bowel movement  Follow up with your healthcare provider as directed:  Write down your questions so you remember to ask them during your visits  © 2017 [facility] Information is for End User's use only and may not be sold, redistributed or otherwise used for commercial purposes  All illustrations and images included in CareNotes® are the copyrighted property of A D A M , Inc  or [employer]  The above information is an  only  It is not intended as medical advice for individual conditions or treatments  Talk to your doctor, nurse or pharmacist before following any medical regimen to see if it is safe and effective for you  Start Metamucil 15-20 g daily   Start MiraLax 17g (one cap) daily    PT will call to schedule colonoscopy  Miralaxx/ Dulcolax prescribed   PT is diabetic

## 2018-07-12 NOTE — PROGRESS NOTES
[facility] - Outpatient Consultation  Wendy Zhong 46 y o  male MRN: [id number]  Encounter: [id number]          ASSESSMENT AND PLAN:      1  Screening for colon cancer  - polyethylene glycol (GOLYTELY) 4000 mL solution; Take 4,000 mL by mouth once for 1 dose  Dispense: 4000 mL; Refill: 0  - Case request operating room: COLONOSCOPY; Standing  2  Uncontrolled type 2 diabetes mellitus with stage 4 chronic kidney disease, with long-term current use of insulin ([address] )  3  Chronic idiopathic constipation-longstanding diabetes and may have dysmotility  We will plan for colonoscopy evaluation due to change in bowel habits as well as he needs it for screening colonoscopy  We discussed increasing fluids, improving his diabetes control, and Metamucil/MiraLax  He plans to go to [facility] and will schedule for colonoscopy when he returns  Discussed glycemic control is important for procedure    ______________________________________________________________________    HPI:     He is a very pleasant 69-year-old male presents here for colonoscopy evaluation  He is legally blind  He also has longstanding uncontrolled diabetes  Blood glucose has been ranging between 200-300  He plans to go to [facility] will schedule afterwards  Constipation has been a major issue  This is associated with straining with bowel movement every 2-3 days  No prior colonoscopy  REVIEW OF SYSTEMS:    CONSTITUTIONAL: Denies any fever, chills, rigors, and weight loss  HEENT: No earache or tinnitus  Denies hearing loss or visual disturbances  CARDIOVASCULAR: No chest pain or palpitations  RESPIRATORY: Denies any cough, hemoptysis, shortness of breath or dyspnea on exertion  GASTROINTESTINAL: As noted in the History of Present Illness  GENITOURINARY: No problems with urination  Denies any hematuria or dysuria  NEUROLOGIC: No dizziness or vertigo, denies headaches     MUSCULOSKELETAL: Denies any muscle or joint pain  SKIN: Denies skin rashes or itching  ENDOCRINE: Denies excessive thirst  Denies intolerance to heat or cold  PSYCHOSOCIAL: Denies depression or anxiety  Denies any recent memory loss         Historical Information   Past Medical History:   Diagnosis Date    Blind     Chronic kidney disease     [facility] mellitus [facility] [address] [facility]     Diabetic retinopathy ([facility] [address] [facility]     Hypertension      Past Surgical History:   Procedure Laterality Date    HERNIA MESH REMOVAL       Social History   History   Alcohol Use No     History   Drug Use No     History   Smoking Status    Former Smoker    Packs/day: 0 25    Quit date: 2/1/2018   Smokeless Tobacco    Never Used     Family History   Problem Relation Age of Onset    Hypertension Mother     Diabetes Father     No Known Problems Sister     No Known Problems Brother     No Known Problems Maternal Aunt     No Known Problems Maternal Uncle     No Known Problems Paternal Aunt     No Known Problems Paternal Uncle     No Known Problems Maternal Grandmother     No Known Problems Maternal Grandfather     No Known Problems Paternal Grandmother     No Known Problems Paternal Grandfather        Meds/Allergies       Current Outpatient Prescriptions:     amLODIPine (NORVASC) 10 mg tablet    aspirin 81 MG tablet    atorvastatin (LIPITOR) 40 mg tablet    bacitracin topical ointment 500 units/g topical ointment    Blood Glucose Monitoring Suppl (BLOOD GLUCOSE MONITOR SYSTEM) w/Device KIT    clotrimazole (LOTRIMIN) 1 % cream    glipiZIDE (GLUCOTROL) 10 mg tablet    glucose blood test strip    insulin glargine (BASAGLAR KWIKPEN) 100 units/mL injection pen    Insulin Pen Needle (PEN NEEDLES 3/16") 31G X 5 MM MISC    Lancets MISC    lisinopril (ZESTRIL) 5 mg tablet    polyethylene glycol (GOLYTELY) 4000 mL solution    No Known Allergies        Objective     Blood pressure 120/70, pulse 66, temperature 98 5 °F (36 9 °C), temperature source Tympanic, height 6' (1 829 m), weight 67 6 kg (149 lb)  Body mass index is 20 21 kg/m²  PHYSICAL EXAM:      General Appearance:   Alert, cooperative, no distress   HEENT:   Normocephalic, atraumatic, anicteric      Neck:  Supple, symmetrical, trachea midline   Lungs:   Clear to auscultation bilaterally; no rales, rhonchi or wheezing; respirations unlabored    Heart[de-identified]   Regular rate and rhythm; no murmur, rub, or gallop  Abdomen:   Soft, non-tender, non-distended; normal bowel sounds; no masses, no organomegaly    Genitalia:   Deferred    Rectal:   Deferred    Extremities:  No cyanosis, clubbing or edema    Pulses:  2+ and symmetric    Skin:  No jaundice, rashes, or lesions    Lymph nodes:  No palpable cervical lymphadenopathy        Lab Results:   No visits with results within 1 Day(s) from this visit  Latest known visit with results is:   Appointment on 06/27/2018   Component Date Value    Albumin 06/27/2018 2 9*    Calcium 06/27/2018 9 2     Phosphorus 06/27/2018 4 3     BUN 06/27/2018 33*    Creatinine 06/27/2018 4 29*    Sodium 06/27/2018 138     Potassium 06/27/2018 4 4     Chloride 06/27/2018 106     CO2 06/27/2018 19*    Anion Gap 06/27/2018 13     eGFR 06/27/2018 17     Glucose, Fasting 06/27/2018 123*    Uric Acid 06/27/2018 4 4     A/G Ratio 06/27/2018 1 21     Albumin Electrophoresis 06/27/2018 54 8     Albumin CONC 06/27/2018 3 51     Alpha 1 06/27/2018 5 2*    ALPHA 1 CONC 06/27/2018 0 33     Alpha 2 06/27/2018 12 6     ALPHA 2 CONC 06/27/2018 0 81     Beta-1 06/27/2018 6 5     BETA 1 CONC 06/27/2018 0 42     Beta-2 06/27/2018 6 0     BETA 2 CONC 06/27/2018 0 38     Gamma Globulin 06/27/2018 14 9     GAMMA CONC 06/27/2018 0 95     SPEP Interpretation 06/27/2018 The serum total protein, albumin and electrophoresis are within normal limits  No monoclonal bands noted   Reviewed by: Kevin Ambrosio MD  ([id number])  **Electronic Signature**     Total Protein 06/27/2018 6 4          Radiology Results: [facility]    Result Date: 6/29/2018  Narrative: RENAL ULTRASOUND INDICATION:   N18 4: Chronic kidney disease, stage 4 (severe)  History of diabetes COMPARISON: None TECHNIQUE:   Ultrasound of the retroperitoneum was performed with a curvilinear transducer utilizing volumetric sweeps and still imaging techniques  FINDINGS: KIDNEYS: Symmetric and normal size  Right kidney:  10 5 cm  Left kidney:  12 3 cm  Right kidney Normal echogenicity and contour  No suspicious masses detected  No hydronephrosis  No shadowing calculi  No perinephric fluid collections  Left kidney Normal echogenicity and contour  No suspicious masses detected  No hydronephrosis  No shadowing calculi  No perinephric fluid collections  URETERS: Nonvisualized  BLADDER: Normally distended  No focal thickening or mass lesions  Bilateral ureteral jets detected       Impression: Normal  Workstation performed: [id number]

## 2018-07-12 NOTE — LETTER
July 12, 2018     [facility] [address] [place] [address]    Patient: Cali Fermin   YOB: 1965   Date of Visit: 7/12/2018       Dear Dr Sherif Lyman:    Thank you for referring Cali Fermin to me for evaluation  Below are my notes for this consultation  If you have questions, please do not hesitate to call me  I look forward to following your patient along with you  Sincerely,        Mitul Craig MD        CC: No Recipients  Mitul Craig MD  7/12/2018 12:13 PM  Sign at close encounter  [facility] - Outpatient Consultation  Srikanth Gaspar Trevin 46 y o  male MRN: [id number]  Encounter: [id number]          ASSESSMENT AND PLAN:      1  Screening for colon cancer  - polyethylene glycol (GOLYTELY) 4000 mL solution; Take 4,000 mL by mouth once for 1 dose  Dispense: 4000 mL; Refill: 0  - Case request operating room: COLONOSCOPY; Standing  2  Uncontrolled type 2 diabetes mellitus with stage 4 chronic kidney disease, with long-term current use of insulin ([address] )  3  Chronic idiopathic constipation-longstanding diabetes and may have dysmotility  We will plan for colonoscopy evaluation due to change in bowel habits as well as he needs it for screening colonoscopy  We discussed increasing fluids, improving his diabetes control, and Metamucil/MiraLax  He plans to go to [facility] and will schedule for colonoscopy when he returns  Discussed glycemic control is important for procedure    ______________________________________________________________________    HPI:     He is a very pleasant 79-year-old male presents here for colonoscopy evaluation  He is legally blind  He also has longstanding uncontrolled diabetes  Blood glucose has been ranging between 200-300  He plans to go to [facility] will schedule afterwards  Constipation has been a major issue  This is associated with straining with bowel movement every 2-3 days       No prior colonoscopy  REVIEW OF SYSTEMS:    CONSTITUTIONAL: Denies any fever, chills, rigors, and weight loss  HEENT: No earache or tinnitus  Denies hearing loss or visual disturbances  CARDIOVASCULAR: No chest pain or palpitations  RESPIRATORY: Denies any cough, hemoptysis, shortness of breath or dyspnea on exertion  GASTROINTESTINAL: As noted in the History of Present Illness  GENITOURINARY: No problems with urination  Denies any hematuria or dysuria  NEUROLOGIC: No dizziness or vertigo, denies headaches  MUSCULOSKELETAL: Denies any muscle or joint pain  SKIN: Denies skin rashes or itching  ENDOCRINE: Denies excessive thirst  Denies intolerance to heat or cold  PSYCHOSOCIAL: Denies depression or anxiety  Denies any recent memory loss         Historical Information   Past Medical History:   Diagnosis Date    Blind     Chronic kidney disease     Diabetes mellitus ([facility] )     Diabetic retinopathy ([facility] )     Hypertension      Past Surgical History:   Procedure Laterality Date    HERNIA MESH REMOVAL       Social History   History   Alcohol Use No     History   Drug Use No     History   Smoking Status    Former Smoker    Packs/day: 0 25    Quit date: 2/1/2018   Smokeless Tobacco    Never Used     Family History   Problem Relation Age of Onset    Hypertension Mother     Diabetes Father     No Known Problems Sister     No Known Problems Brother     No Known Problems Maternal Aunt     No Known Problems Maternal Uncle     No Known Problems Paternal Aunt     No Known Problems Paternal Uncle     No Known Problems Maternal Grandmother     No Known Problems Maternal Grandfather     No Known Problems Paternal Grandmother     No Known Problems Paternal Grandfather        Meds/Allergies       Current Outpatient Prescriptions:     amLODIPine (NORVASC) 10 mg tablet    aspirin 81 MG tablet    atorvastatin (LIPITOR) 40 mg tablet    bacitracin topical ointment 500 units/g topical ointment    Blood Glucose Monitoring Suppl (BLOOD GLUCOSE MONITOR SYSTEM) w/Device KIT    clotrimazole (LOTRIMIN) 1 % cream    glipiZIDE (GLUCOTROL) 10 mg tablet    glucose blood test strip    insulin glargine (BASAGLAR KWIKPEN) 100 units/mL injection pen    Insulin Pen Needle (PEN NEEDLES 3/16") 31G X 5 MM MISC    Lancets MISC    lisinopril (ZESTRIL) 5 mg tablet    polyethylene glycol (GOLYTELY) 4000 mL solution    No Known Allergies        Objective     Blood pressure 120/70, pulse 66, temperature 98 5 °F (36 9 °C), temperature source Tympanic, height 6' (1 829 m), weight 67 6 kg (149 lb)  Body mass index is 20 21 kg/m²  PHYSICAL EXAM:      General Appearance:   Alert, cooperative, no distress   HEENT:   Normocephalic, atraumatic, anicteric      Neck:  Supple, symmetrical, trachea midline   Lungs:   Clear to auscultation bilaterally; no rales, rhonchi or wheezing; respirations unlabored    Heart[de-identified]   Regular rate and rhythm; no murmur, rub, or gallop  Abdomen:   Soft, non-tender, non-distended; normal bowel sounds; no masses, no organomegaly    Genitalia:   Deferred    Rectal:   Deferred    Extremities:  No cyanosis, clubbing or edema    Pulses:  2+ and symmetric    Skin:  No jaundice, rashes, or lesions    Lymph nodes:  No palpable cervical lymphadenopathy        Lab Results:   No visits with results within 1 Day(s) from this visit     Latest known visit with results is:   Appointment on 06/27/2018   Component Date Value    Albumin 06/27/2018 2 9*    Calcium 06/27/2018 9 2     Phosphorus 06/27/2018 4 3     BUN 06/27/2018 33*    Creatinine 06/27/2018 4 29*    Sodium 06/27/2018 138     Potassium 06/27/2018 4 4     Chloride 06/27/2018 106     CO2 06/27/2018 19*    Anion Gap 06/27/2018 13     eGFR 06/27/2018 17     Glucose, Fasting 06/27/2018 123*    Uric Acid 06/27/2018 4 4     A/G Ratio 06/27/2018 1 21     Albumin Electrophoresis 06/27/2018 54 8     Albumin CONC 06/27/2018 3 51     Alpha 1 06/27/2018 5 2*    ALPHA 1 CONC 06/27/2018 0 33     Alpha 2 06/27/2018 12 6     ALPHA 2 CONC 06/27/2018 0 81     Beta-1 06/27/2018 6 5     BETA 1 CONC 06/27/2018 0 42     Beta-2 06/27/2018 6 0     BETA 2 CONC 06/27/2018 0 38     Gamma Globulin 06/27/2018 14 9     GAMMA CONC 06/27/2018 0 95     SPEP Interpretation 06/27/2018 The serum total protein, albumin and electrophoresis are within normal limits  No monoclonal bands noted  Reviewed by: Zaria Cruz MD  ([id number])  **Electronic Signature**     Total Protein 06/27/2018 6 4          Radiology Results:   [facility]    Result Date: 6/29/2018  Narrative: RENAL ULTRASOUND INDICATION:   N18 4: Chronic kidney disease, stage 4 (severe)  History of diabetes COMPARISON: None TECHNIQUE:   Ultrasound of the retroperitoneum was performed with a curvilinear transducer utilizing volumetric sweeps and still imaging techniques  FINDINGS: KIDNEYS: Symmetric and normal size  Right kidney:  10 5 cm  Left kidney:  12 3 cm  Right kidney Normal echogenicity and contour  No suspicious masses detected  No hydronephrosis  No shadowing calculi  No perinephric fluid collections  Left kidney Normal echogenicity and contour  No suspicious masses detected  No hydronephrosis  No shadowing calculi  No perinephric fluid collections  URETERS: Nonvisualized  BLADDER: Normally distended  No focal thickening or mass lesions  Bilateral ureteral jets detected       Impression: Normal  Workstation performed: [id number]

## 2019-01-02 ENCOUNTER — OFFICE VISIT (OUTPATIENT)
Dept: INTERNAL MEDICINE CLINIC | Facility: CLINIC | Age: 54
End: 2019-01-02
Payer: MEDICARE

## 2019-01-02 VITALS
SYSTOLIC BLOOD PRESSURE: 202 MMHG | DIASTOLIC BLOOD PRESSURE: 92 MMHG | HEIGHT: 72 IN | RESPIRATION RATE: 16 BRPM | TEMPERATURE: 98.2 F | WEIGHT: 154.3 LBS | BODY MASS INDEX: 20.9 KG/M2 | HEART RATE: 101 BPM

## 2019-01-02 DIAGNOSIS — IMO0002 UNCONTROLLED TYPE 2 DIABETES MELLITUS WITH STAGE 4 CHRONIC KIDNEY DISEASE, WITH LONG-TERM CURRENT USE OF INSULIN: Primary | ICD-10-CM

## 2019-01-02 DIAGNOSIS — E11.319 DIABETIC RETINOPATHY OF BOTH EYES ASSOCIATED WITH TYPE 2 DIABETES MELLITUS, MACULAR EDEMA PRESENCE UNSPECIFIED, UNSPECIFIED RETINOPATHY SEVERITY (HCC): ICD-10-CM

## 2019-01-02 DIAGNOSIS — E11.65 UNCONTROLLED TYPE 2 DIABETES MELLITUS WITH HYPERGLYCEMIA, WITH LONG-TERM CURRENT USE OF INSULIN (HCC): ICD-10-CM

## 2019-01-02 DIAGNOSIS — Z12.5 SCREENING FOR PROSTATE CANCER: ICD-10-CM

## 2019-01-02 DIAGNOSIS — Z11.59 NEED FOR HEPATITIS C SCREENING TEST: ICD-10-CM

## 2019-01-02 DIAGNOSIS — Z23 ENCOUNTER FOR IMMUNIZATION: ICD-10-CM

## 2019-01-02 DIAGNOSIS — Z79.4 UNCONTROLLED TYPE 2 DIABETES MELLITUS WITH HYPERGLYCEMIA, WITH LONG-TERM CURRENT USE OF INSULIN (HCC): ICD-10-CM

## 2019-01-02 DIAGNOSIS — I10 ESSENTIAL HYPERTENSION: ICD-10-CM

## 2019-01-02 DIAGNOSIS — N18.4 CKD (CHRONIC KIDNEY DISEASE) STAGE 4, GFR 15-29 ML/MIN (HCC): ICD-10-CM

## 2019-01-02 DIAGNOSIS — E78.5 HYPERLIPIDEMIA, UNSPECIFIED HYPERLIPIDEMIA TYPE: ICD-10-CM

## 2019-01-02 PROCEDURE — 99214 OFFICE O/P EST MOD 30 MIN: CPT | Performed by: INTERNAL MEDICINE

## 2019-01-02 PROCEDURE — G0008 ADMIN INFLUENZA VIRUS VAC: HCPCS | Performed by: INTERNAL MEDICINE

## 2019-01-02 PROCEDURE — 90682 RIV4 VACC RECOMBINANT DNA IM: CPT | Performed by: INTERNAL MEDICINE

## 2019-01-02 RX ORDER — LISINOPRIL 5 MG/1
5 TABLET ORAL DAILY
Qty: 90 TABLET | Refills: 0 | Status: SHIPPED | OUTPATIENT
Start: 2019-01-02 | End: 2019-01-04

## 2019-01-02 RX ORDER — GLIPIZIDE 10 MG/1
10 TABLET ORAL
Qty: 180 TABLET | Refills: 0 | Status: SHIPPED | OUTPATIENT
Start: 2019-01-02 | End: 2019-04-08 | Stop reason: HOSPADM

## 2019-01-02 RX ORDER — AMLODIPINE BESYLATE 10 MG/1
10 TABLET ORAL DAILY
Qty: 90 TABLET | Refills: 0 | Status: SHIPPED | OUTPATIENT
Start: 2019-01-02 | End: 2019-04-08 | Stop reason: HOSPADM

## 2019-01-02 RX ORDER — ATORVASTATIN CALCIUM 40 MG/1
40 TABLET, FILM COATED ORAL DAILY
Qty: 90 TABLET | Refills: 0 | Status: SHIPPED | OUTPATIENT
Start: 2019-01-02 | End: 2019-04-26 | Stop reason: SDUPTHER

## 2019-01-02 RX ORDER — FLASH GLUCOSE SENSOR
1 KIT MISCELLANEOUS
Qty: 1 DEVICE | Refills: 0 | Status: SHIPPED | OUTPATIENT
Start: 2019-01-02 | End: 2019-01-15

## 2019-01-02 RX ORDER — FLASH GLUCOSE SENSOR
KIT MISCELLANEOUS
Qty: 1 EACH | Refills: 0 | Status: SHIPPED | OUTPATIENT
Start: 2019-01-02 | End: 2019-01-15

## 2019-01-03 NOTE — PROGRESS NOTES
Assessment/Plan:    Uncontrolled type 2 diabetes mellitus with stage 4 chronic kidney disease, with long-term current use of insulin ([facility])  Lab Results   Component Value Date    HGBA1C 11 9 (H) 06/27/2018       No results for input(s): POCGLU in the last 72 hours  Blood Sugar Average: Last 72 hrs:  non compliant  Needs close monitoring  Reminded that blood sugar and BP stabilization are important to prevent further decline in kidney function  Saw opthal recently  Essential hypertension  Non compliant  Restart previous regimen    CKD (chronic kidney disease) stage 4, GFR 15-29 ml/min ([facility])  Monitor kidney function  Needs follow up wit nephrology    Influenza vaccine given today     Diagnoses and all orders for this visit:    Uncontrolled type 2 diabetes mellitus with stage 4 chronic kidney disease, with long-term current use of insulin ([facility])  -     Continuous Blood Gluc Sensor ([address] [place] [address]) [facility]; Use for blood sugar monitoring  -     CBC and differential  -     Comprehensive metabolic panel  -     Hemoglobin A1C  -     Lipid Panel with Direct LDL reflex  -     Continuous Blood Gluc  (FREESTYLE EVERETT READER) LAUREN; 1 each by Does not apply route every 14 (fourteen) days    Diabetic retinopathy of both eyes associated with type 2 diabetes mellitus, macular edema presence unspecified, unspecified retinopathy severity ([facility])  -     CBC and differential  -     Comprehensive metabolic panel  -     Hemoglobin A1C  -     Lipid Panel with Direct LDL reflex    CKD (chronic kidney disease) stage 4, GFR 15-29 ml/min ([facility])  -     aspirin 81 MG tablet; Take 1 tablet (81 mg total) by mouth daily  -     CBC and differential  -     Comprehensive metabolic panel  -     Hemoglobin A1C  -     Lipid Panel with Direct LDL reflex  -     Vitamin D 25 hydroxy  -     PTH, intact    Essential hypertension  -     lisinopril (ZESTRIL) 5 mg tablet;  Take 1 tablet (5 mg total) by mouth daily  -     aspirin 81 MG tablet; Take 1 tablet (81 mg total) by mouth daily  -     amLODIPine (NORVASC) 10 mg tablet; Take 1 tablet (10 mg total) by mouth daily  -     CBC and differential  -     Comprehensive metabolic panel  -     Hemoglobin A1C  -     Lipid Panel with Direct LDL reflex    Uncontrolled type 2 diabetes mellitus with hyperglycemia, with long-term current use of insulin ([facility])  -     insulin glargine (BASAGLAR KWIKPEN) 100 units/mL injection pen; Inject 30 Units under the skin daily  -     glipiZIDE (GLUCOTROL) 10 mg tablet; Take 1 tablet (10 mg total) by mouth 2 (two) times a day before meals  -     aspirin 81 MG tablet; Take 1 tablet (81 mg total) by mouth daily  -     CBC and differential  -     Comprehensive metabolic panel  -     Hemoglobin A1C    Hyperlipidemia, unspecified hyperlipidemia type  -     atorvastatin (LIPITOR) 40 mg tablet; Take 1 tablet (40 mg total) by mouth daily  -     Hemoglobin A1C  -     Lipid Panel with Direct LDL reflex    Screening for prostate cancer  -     PSA, Total Screen; Future    Need for hepatitis C screening test  -     Hepatitis C antibody    Encounter for immunization  -     influenza vaccine, 2665-4400, quadrivalent, recombinant, PF, 0 5 mL, for patients 18 yr+ ([facility])          Subjective:   Chief Complaint   Patient presents with    Follow-up        Patient ID: Kostas Adjutant is a 46 y o  male  He comes in for dm, htn, ckd, retinopathy  Was visiting [facility], didn't check BS, BP while he was there  Ran out of all meds a while ago  The following portions of the patient's history were reviewed and updated as appropriate: current medications, past medical history, past social history and past surgical history      PHQ-9 Depression Screening    PHQ-9:    Frequency of the following problems over the past two weeks:                Current Outpatient Prescriptions:     amLODIPine (NORVASC) 10 mg tablet, Take 1 tablet (10 mg total) by mouth daily, Disp: 90 tablet, Rfl: 0   aspirin 81 MG tablet, Take 1 tablet (81 mg total) by mouth daily, Disp: 90 tablet, Rfl: 0    atorvastatin (LIPITOR) 40 mg tablet, Take 1 tablet (40 mg total) by mouth daily, Disp: 90 tablet, Rfl: 0    bacitracin topical ointment 500 units/g topical ointment, Apply 1 large application topically 4 (four) times a day, Disp: , Rfl:     Blood Glucose Monitoring Suppl (BLOOD GLUCOSE MONITOR SYSTEM) w/Device KIT, by Does not apply route 3 (three) times a day (Patient not taking: Reported on 1/2/2019 ), Disp: 100 each, Rfl: 0    clotrimazole (LOTRIMIN) 1 % cream, Apply topically 2 (two) times a day (Patient not taking: Reported on 1/2/2019 ), Disp: 30 g, Rfl: 0    Continuous Blood Gluc  (FREESTYLE EVERETT READER) LAUREN, 1 each by Does not apply route every 14 (fourteen) days, Disp: 1 Device, Rfl: 0    Continuous Blood Gluc Sensor ([address] [place] [address]) [facility], Use for blood sugar monitoring, Disp: 1 each, Rfl: 0    glipiZIDE (GLUCOTROL) 10 mg tablet, Take 1 tablet (10 mg total) by mouth 2 (two) times a day before meals, Disp: 180 tablet, Rfl: 0    glucose blood test strip, Use as instructed 3 times daily (Patient not taking: Reported on 1/2/2019 ), Disp: 100 each, Rfl: 0    insulin glargine (BASAGLAR KWIKPEN) 100 units/mL injection pen, Inject 30 Units under the skin daily, Disp: 10 pen, Rfl: 0    Insulin Pen Needle (PEN NEEDLES 3/16") 31G X 5 MM MISC, by Does not apply route daily (Patient not taking: Reported on 1/2/2019 ), Disp: 100 each, Rfl: 0    Lancets MISC, by Does not apply route 3 (three) times a day (Patient not taking: Reported on 1/2/2019 ), Disp: 100 each, Rfl: 0    lisinopril (ZESTRIL) 5 mg tablet, Take 1 tablet (5 mg total) by mouth daily, Disp: 90 tablet, Rfl: 0    polyethylene glycol (GOLYTELY) 4000 mL solution, Take 4,000 mL by mouth once for 1 dose (Patient not taking: Reported on 1/2/2019 ), Disp: 4000 mL, Rfl: 0    Review of Systems   Constitutional: Negative for fatigue, fever and unexpected weight change  HENT: Negative for ear pain, hearing loss and sore throat  Eyes: Negative for pain and discharge  Respiratory: Negative for cough, chest tightness and shortness of breath  Cardiovascular: Negative for chest pain and palpitations  Gastrointestinal: Negative for abdominal pain, blood in stool, constipation, diarrhea and nausea  Genitourinary: Negative for dysuria, frequency and hematuria  Musculoskeletal: Negative for arthralgias and joint swelling  Skin: Negative for rash  Allergic/Immunologic: Negative for immunocompromised state  Neurological: Negative for dizziness and headaches  Hematological: Negative for adenopathy  Psychiatric/Behavioral: Negative for confusion and sleep disturbance  Objective:  BP (!) 202/92 (BP Location: Left arm, Patient Position: Sitting, Cuff Size: Standard)   Pulse 101   Temp 98 2 °F (36 8 °C)   Resp 16   Ht 6' (1 829 m)   Wt 70 kg (154 lb 4 8 oz)   BMI 20 93 kg/m²      Physical Exam   Constitutional: He appears well-developed and well-nourished  HENT:   Head: Normocephalic and atraumatic  Right Ear: Tympanic membrane normal    Left Ear: Tympanic membrane normal    Nose: Nose normal    Mouth/Throat: Oropharynx is clear and moist  No posterior oropharyngeal edema or posterior oropharyngeal erythema  Eyes: Pupils are equal, round, and reactive to light  Conjunctivae are normal  Right eye exhibits no discharge  Neck: Normal range of motion  Neck supple  No thyromegaly present  Cardiovascular: Normal rate, regular rhythm, S1 normal, S2 normal and normal heart sounds  PMI is not displaced  Pulses are no weak pulses  No murmur heard  Pulses:       Dorsalis pedis pulses are 2+ on the right side, and 2+ on the left side  Posterior tibial pulses are 2+ on the right side, and 2+ on the left side  Pulmonary/Chest: Effort normal and breath sounds normal  No accessory muscle usage  No apnea   No respiratory distress  He has no rhonchi  He has no rales  Abdominal: Soft  Normal appearance and bowel sounds are normal  He exhibits no shifting dullness  There is no hepatosplenomegaly  There is no tenderness  There is no rebound and no CVA tenderness  Musculoskeletal: Normal range of motion  He exhibits no edema or tenderness  Feet:   Right Foot:   Skin Integrity: Negative for ulcer, skin breakdown, erythema, warmth, callus or dry skin  Left Foot:   Skin Integrity: Negative for ulcer, skin breakdown, erythema, warmth, callus or dry skin  Lymphadenopathy:     He has no cervical adenopathy  Neurological: He is alert  Skin: Skin is warm and intact  No rash noted  Psychiatric: He has a normal mood and affect  His speech is normal    Nursing note and vitals reviewed  Patient's shoes and socks removed  Right Foot/Ankle   Right Foot Inspection  Skin Exam: skin normal and skin intact no dry skin, no warmth, no callus, no erythema, no maceration, no abnormal color, no pre-ulcer, no ulcer and no callus                          Toe Exam: ROM and strength within normal limitsno swelling, erythema and  no right toe deformity  Sensory       Monofilament testing: intact  Vascular    The right DP pulse is 2+  The right PT pulse is 2+  Left Foot/Ankle  Left Foot Inspection  Skin Exam: skin normal and skin intactno dry skin, no warmth, no erythema, no maceration, normal color, no pre-ulcer, no ulcer and no callus                         Toe Exam: ROM and strength within normal limitsno swelling, no erythema and no left toe deformity                   Sensory       Monofilament: intact  Vascular    The left DP pulse is 2+  The left PT pulse is 2+  Assign Risk Category:  No deformity present; No loss of protective sensation; No weak pulses       Risk: 0    No results found for this or any previous visit (from the past 1008 hour(s))  ]    No results found

## 2019-01-03 NOTE — ASSESSMENT & PLAN NOTE
Lab Results   Component Value Date    HGBA1C 11 9 (H) 06/27/2018       No results for input(s): POCGLU in the last 72 hours  Blood Sugar Average: Last 72 hrs:  non compliant  Needs close monitoring  Reminded that blood sugar and BP stabilization are important to prevent further decline in kidney function  Saw jeovanny yepez

## 2019-01-04 ENCOUNTER — TELEPHONE (OUTPATIENT)
Dept: INTERNAL MEDICINE CLINIC | Facility: CLINIC | Age: 54
End: 2019-01-04

## 2019-01-04 ENCOUNTER — APPOINTMENT (OUTPATIENT)
Dept: LAB | Facility: HOSPITAL | Age: 54
End: 2019-01-04
Payer: MEDICARE

## 2019-01-04 DIAGNOSIS — N18.6 ESRD (END STAGE RENAL DISEASE) (HCC): Primary | ICD-10-CM

## 2019-01-04 DIAGNOSIS — N18.4 CKD (CHRONIC KIDNEY DISEASE) STAGE 4, GFR 15-29 ML/MIN (HCC): Primary | ICD-10-CM

## 2019-01-04 LAB
25(OH)D3 SERPL-MCNC: 19.2 NG/ML (ref 30–100)
ALBUMIN SERPL BCP-MCNC: 2.7 G/DL (ref 3.5–5)
ALP SERPL-CCNC: 99 U/L (ref 46–116)
ALT SERPL W P-5'-P-CCNC: 25 U/L (ref 12–78)
ANION GAP SERPL CALCULATED.3IONS-SCNC: 12 MMOL/L (ref 4–13)
AST SERPL W P-5'-P-CCNC: 18 U/L (ref 5–45)
BASOPHILS # BLD AUTO: 0.04 THOUSANDS/ΜL (ref 0–0.1)
BASOPHILS NFR BLD AUTO: 1 % (ref 0–1)
BILIRUB SERPL-MCNC: 0.23 MG/DL (ref 0.2–1)
BUN SERPL-MCNC: 79 MG/DL (ref 5–25)
CALCIUM SERPL-MCNC: 9.1 MG/DL (ref 8.3–10.1)
CHLORIDE SERPL-SCNC: 104 MMOL/L (ref 100–108)
CHOLEST SERPL-MCNC: 231 MG/DL (ref 50–200)
CO2 SERPL-SCNC: 21 MMOL/L (ref 21–32)
CREAT SERPL-MCNC: 9.93 MG/DL (ref 0.6–1.3)
EOSINOPHIL # BLD AUTO: 0.17 THOUSAND/ΜL (ref 0–0.61)
EOSINOPHIL NFR BLD AUTO: 3 % (ref 0–6)
ERYTHROCYTE [DISTWIDTH] IN BLOOD BY AUTOMATED COUNT: 14 % (ref 11.6–15.1)
EST. AVERAGE GLUCOSE BLD GHB EST-MCNC: 212 MG/DL
GFR SERPL CREATININE-BSD FRML MDRD: 6 ML/MIN/1.73SQ M
GLUCOSE P FAST SERPL-MCNC: 276 MG/DL (ref 65–99)
HBA1C MFR BLD: 9 % (ref 4.2–6.3)
HCT VFR BLD AUTO: 35.2 % (ref 36.5–49.3)
HCV AB SER QL: NORMAL
HDLC SERPL-MCNC: 80 MG/DL (ref 40–60)
HGB BLD-MCNC: 10.7 G/DL (ref 12–17)
IMM GRANULOCYTES # BLD AUTO: 0.02 THOUSAND/UL (ref 0–0.2)
IMM GRANULOCYTES NFR BLD AUTO: 0 % (ref 0–2)
LDLC SERPL CALC-MCNC: 127 MG/DL (ref 0–100)
LYMPHOCYTES # BLD AUTO: 1.74 THOUSANDS/ΜL (ref 0.6–4.47)
LYMPHOCYTES NFR BLD AUTO: 28 % (ref 14–44)
MCH RBC QN AUTO: 25.5 PG (ref 26.8–34.3)
MCHC RBC AUTO-ENTMCNC: 30.4 G/DL (ref 31.4–37.4)
MCV RBC AUTO: 84 FL (ref 82–98)
MONOCYTES # BLD AUTO: 0.47 THOUSAND/ΜL (ref 0.17–1.22)
MONOCYTES NFR BLD AUTO: 8 % (ref 4–12)
NEUTROPHILS # BLD AUTO: 3.82 THOUSANDS/ΜL (ref 1.85–7.62)
NEUTS SEG NFR BLD AUTO: 60 % (ref 43–75)
NRBC BLD AUTO-RTO: 0 /100 WBCS
PLATELET # BLD AUTO: 243 THOUSANDS/UL (ref 149–390)
PMV BLD AUTO: 9.3 FL (ref 8.9–12.7)
POTASSIUM SERPL-SCNC: 5 MMOL/L (ref 3.5–5.3)
PROT SERPL-MCNC: 6.9 G/DL (ref 6.4–8.2)
PTH-INTACT SERPL-MCNC: 576 PG/ML (ref 18.4–80.1)
RBC # BLD AUTO: 4.19 MILLION/UL (ref 3.88–5.62)
SODIUM SERPL-SCNC: 137 MMOL/L (ref 136–145)
TRIGL SERPL-MCNC: 120 MG/DL
WBC # BLD AUTO: 6.26 THOUSAND/UL (ref 4.31–10.16)

## 2019-01-04 PROCEDURE — 83970 ASSAY OF PARATHORMONE: CPT | Performed by: INTERNAL MEDICINE

## 2019-01-04 PROCEDURE — 83036 HEMOGLOBIN GLYCOSYLATED A1C: CPT | Performed by: INTERNAL MEDICINE

## 2019-01-04 PROCEDURE — 36415 COLL VENOUS BLD VENIPUNCTURE: CPT | Performed by: INTERNAL MEDICINE

## 2019-01-04 PROCEDURE — 80053 COMPREHEN METABOLIC PANEL: CPT | Performed by: INTERNAL MEDICINE

## 2019-01-04 PROCEDURE — 80061 LIPID PANEL: CPT | Performed by: INTERNAL MEDICINE

## 2019-01-04 PROCEDURE — 85025 COMPLETE CBC W/AUTO DIFF WBC: CPT | Performed by: INTERNAL MEDICINE

## 2019-01-04 PROCEDURE — 82306 VITAMIN D 25 HYDROXY: CPT | Performed by: INTERNAL MEDICINE

## 2019-01-04 PROCEDURE — 86803 HEPATITIS C AB TEST: CPT | Performed by: INTERNAL MEDICINE

## 2019-01-04 NOTE — PROGRESS NOTES
Order for vein mapping place, please contact vascular for evaluation for AVF sooner    I have ordered referral to vascular Dr Nima Carrillo  Thanks,

## 2019-01-04 NOTE — TELEPHONE ENCOUNTER
Spoke to Steven, the patient's sister, and reviewed his lab results  She was instructed to have the patient stop the Lisinopril and continue with the Amlodipine  I told her that we would send the lab slip for repeat in one week  We are also referring the patient to vascular  She understood      ----- Message from Jerica Sharpe MD sent at 1/4/2019  2:30 PM EST -----  His kidney function was quite low, I would want him to stop his lisinopril and continue amlodipine, recheck in a week  I will forward the results to the nephrologist as well

## 2019-01-07 ENCOUNTER — TELEPHONE (OUTPATIENT)
Dept: NEPHROLOGY | Facility: CLINIC | Age: 54
End: 2019-01-07

## 2019-01-07 ENCOUNTER — HOSPITAL ENCOUNTER (OUTPATIENT)
Dept: NON INVASIVE DIAGNOSTICS | Facility: CLINIC | Age: 54
Discharge: HOME | End: 2019-01-07
Payer: MEDICARE

## 2019-01-07 DIAGNOSIS — N18.6 ESRD (END STAGE RENAL DISEASE) (HCC): ICD-10-CM

## 2019-01-07 PROCEDURE — G0365 VESSEL MAPPING HEMO ACCESS: HCPCS

## 2019-01-07 NOTE — TELEPHONE ENCOUNTER
----- Message from Alton Briseno MD sent at 1/4/2019  5:33 PM EST -----  Regarding: FW:  Tequila Whipple - thanks so much for working on it  Really appreciate it  Bety - just as a FYI  Our practice administrator Tequila Whipple, diligently coordinated with vascular in record time and pt will be seen next week   ----- Message -----  From: Moses Spine  Sent: 1/4/2019   4:40 PM  To: Alton Briseno MD    Patient is scheduled for the vein mapping on 1/7/19 and has an appt with Dr Danae Brewer on 1/8 for the eval   Patient and his sister are aware     ----- Message -----  From: Alton Briseno MD  Sent: 1/4/2019   2:42 PM  To: Moses Spine    Can you please help me out setting up this gentleman with vascular surgery eval for AVF creation asap  I saw him once and he went back to [facility], now he is back and his numbers are worst   He has already an apt with me in couple of weeks    Thanks,    Cristian Perkins       ----- Message -----  From: Dariel Vann MD  Sent: 1/4/2019   2:27 PM  To: Alton Briseno MD    Hi Dr Baron Parry,    Just wanted to see if you can please take a look at his most recent bloodwork, GFR 9, K 5 0  He just returned from [facility]  I stopped his lisinopril and will have him check another BMP in 1 week  He wasn't symptomatic at all  Please let me know if you have any suggestions till you see him      Thanks,  Ernesto Mcghee

## 2019-01-08 ENCOUNTER — OFFICE VISIT (OUTPATIENT)
Dept: VASCULAR SURGERY | Facility: CLINIC | Age: 54
End: 2019-01-08
Payer: MEDICARE

## 2019-01-08 VITALS
HEART RATE: 86 BPM | SYSTOLIC BLOOD PRESSURE: 144 MMHG | DIASTOLIC BLOOD PRESSURE: 80 MMHG | TEMPERATURE: 96.7 F | HEIGHT: 72 IN | WEIGHT: 159 LBS | BODY MASS INDEX: 21.54 KG/M2

## 2019-01-08 DIAGNOSIS — N18.6 ESRD (END STAGE RENAL DISEASE) (HCC): Primary | ICD-10-CM

## 2019-01-08 DIAGNOSIS — N18.4 CKD (CHRONIC KIDNEY DISEASE) STAGE 4, GFR 15-29 ML/MIN (HCC): ICD-10-CM

## 2019-01-08 PROCEDURE — 93930 UPPER EXTREMITY STUDY: CPT | Performed by: SURGERY

## 2019-01-08 PROCEDURE — 99204 OFFICE O/P NEW MOD 45 MIN: CPT | Performed by: SURGERY

## 2019-01-08 NOTE — LETTER
January 8, 2019     Link Joya, [facility] [address] [place] [address]    Patient: Elsie Kramer   YOB: 1965   Date of Visit: 1/8/2019       Dear Dr Sandra Erazo:    Thank you for referring Elsie Kramer to me for evaluation  Below are the relevant portions of my assessment and plan of care  Problem List Items Addressed This Visit     None      Visit Diagnoses     ESRD (end stage renal disease) ([facility] )    -  Primary    Relevant Orders    Case request operating room: CREATION FISTULA ARTERIOVENOUS (AV) (Completed)    Type and screen    Basic metabolic panel    CBC and differential    EKG 12 lead          If you have questions, please do not hesitate to call me  I look forward to following Paddy along with you           Sincerely,        Jamar Naylor MD        CC: No Recipients

## 2019-01-08 NOTE — PATIENT INSTRUCTIONS
Schedule for surgery in upcoming weeks    Pre-Surgery Instructions:   Medication Instructions    amLODIPine (NORVASC) 10 mg tablet Take morning of surgery    aspirin 81 MG tablet Take morning of surgery    atorvastatin (LIPITOR) 40 mg tablet Take morning of surgery

## 2019-01-08 NOTE — LETTER
January 8, 2019     Caitlyn Middleton [address] [address]    Patient: Aidan Koehler   YOB: 1965   Date of Visit: 1/8/2019       Dear Dr Wendy Bryant:    Thank you for referring Aidan Koehler to me for evaluation  Below are the relevant portions of my assessment and plan of care  Problem List Items Addressed This Visit     CKD 4     CKD (chronic kidney disease) stage 4, GFR 15-29 ml/min ([facility] )  47yo M, right hand dominant with CKD4 presents to discuss HD access creation  He underwent vein mapping prior to his clinic appointment today  Upon review patient's anatomy is only amenable for a 2 stage brachiobasilic, left, AV-fistula creation  Had an extensive discussion regarding the risks/benefits alternatives of surgery with patient and accompanying sister  Patient demonstrated understanding and consented to proceed with 1st stage brachiobasilic AV-fistula creation  I will perform ultrasound intraoperatively also to assess if cephalic vein will be amenable for fistula creation  Plan for procedure in upcoming weeks  Visit Diagnoses     ESRD (end stage renal disease) ([facility] )    -  Primary    Relevant Orders    Case request operating room: CREATION FISTULA ARTERIOVENOUS (AV) (Completed)    Type and screen    Basic metabolic panel    CBC and differential    EKG 12 lead          If you have questions, please do not hesitate to call me  I look forward to following Padyd along with you           Sincerely,        Giancarlo Ashby MD        CC: No Recipients

## 2019-01-08 NOTE — ASSESSMENT & PLAN NOTE
47yo M, right hand dominant with CKD4 presents to discuss HD access creation  He underwent vein mapping prior to his clinic appointment today  Upon review patient's anatomy is only amenable for a 2 stage brachiobasilic, left, AV-fistula creation  Had an extensive discussion regarding the risks/benefits alternatives of surgery with patient and accompanying sister  Patient demonstrated understanding and consented to proceed with 1st stage brachiobasilic AV-fistula creation  I will perform ultrasound intraoperatively also to assess if cephalic vein will be amenable for fistula creation  Plan for procedure in upcoming weeks

## 2019-01-08 NOTE — PROGRESS NOTES
Assessment/Plan:    CKD (chronic kidney disease) stage 4, GFR 15-29 ml/min ([facility] )  47yo M, right hand dominant with CKD4 presents to discuss HD access creation  He underwent vein mapping prior to his clinic appointment today  Upon review patient's anatomy is only amenable for a 2 stage brachiobasilic, left, AV-fistula creation  Had an extensive discussion regarding the risks/benefits alternatives of surgery with patient and accompanying sister  Patient demonstrated understanding and consented to proceed with 1st stage brachiobasilic AV-fistula creation  I will perform ultrasound intraoperatively also to assess if cephalic vein will be amenable for fistula creation  Plan for procedure in upcoming weeks  Problem List Items Addressed This Visit        Genitourinary    CKD (chronic kidney disease) stage 4, GFR 15-29 ml/min ([facility] )     47yo M, right hand dominant with CKD4 presents to discuss HD access creation  He underwent vein mapping prior to his clinic appointment today  Upon review patient's anatomy is only amenable for a 2 stage brachiobasilic, left, AV-fistula creation  Had an extensive discussion regarding the risks/benefits alternatives of surgery with patient and accompanying sister  Patient demonstrated understanding and consented to proceed with 1st stage brachiobasilic AV-fistula creation  I will perform ultrasound intraoperatively also to assess if cephalic vein will be amenable for fistula creation  Plan for procedure in upcoming weeks  Other Visit Diagnoses     ESRD (end stage renal disease) ([facility] )    -  Primary    Relevant Orders    Case request operating room: CREATION FISTULA ARTERIOVENOUS (AV) (Completed)    Type and screen    Basic metabolic panel    CBC and differential    EKG 12 lead            Subjective:      Patient ID: Rafael Diamond is a 46 y o  male  Patient is new to the practice and had vein mapping done on 1/7/19   He presents today to discuss possible HD access per nephrologist  Patient is here today with his sister  He is right hand/arm dominant and has no any any previous surgeries or injuries  Patient is not currently on dialysis  Patient is also diabetic and is totally blind  The following portions of the patient's history were reviewed and updated as appropriate: allergies, current medications, past family history, past medical history, past social history, past surgical history and problem list     Review of Systems   Constitutional: Positive for activity change  HENT: Negative  Eyes: Positive for visual disturbance  Respiratory: Negative  Cardiovascular: Negative  Gastrointestinal: Negative  Endocrine: Negative  Genitourinary: Negative  Musculoskeletal: Negative  Skin: Negative  Allergic/Immunologic: Negative  Neurological: Positive for weakness  Hematological: Negative  Psychiatric/Behavioral: Negative  Objective:      /80 (BP Location: Right arm, Patient Position: Sitting)   Pulse 86   Temp (!) 96 7 °F (35 9 °C) (Tympanic)   Ht 6' (1 829 m)   Wt 72 1 kg (159 lb)   BMI 21 56 kg/m²          Physical Exam   Constitutional: He is oriented to person, place, and time  He appears well-developed and well-nourished  HENT:   Head: Normocephalic and atraumatic  Eyes:   Blind in both eyes   Neck: Normal range of motion  Neck supple  Cardiovascular: Normal rate, regular rhythm and normal heart sounds  Pulmonary/Chest: Effort normal and breath sounds normal    Abdominal: Soft  Bowel sounds are normal    Musculoskeletal: Normal range of motion  Neurological: He is alert and oriented to person, place, and time  Skin: Skin is warm and dry  Palpable radial/ulnar brachial pulse bilaterally  No obvious superficial veins on exam  Sensation motor intact, brisk cap refill bilaterally       Operative Scheduling Information:    Hospital:  [address] [place] [address] 24E    Physician:  Me    Surgery: Left upper extremity AV-fistula creation  Under Brachial Plexus Block     Urgency:  Standard    Case Length:  Normal    Post-op Bed:  Outpatient    OR Table:  Standard    Equipment Needs:  None    Medication Instructions:  None    Hydration:  No

## 2019-01-09 ENCOUNTER — TELEPHONE (OUTPATIENT)
Dept: VASCULAR SURGERY | Facility: CLINIC | Age: 54
End: 2019-01-09

## 2019-01-09 PROBLEM — N18.6 ESRD (END STAGE RENAL DISEASE) (HCC): Status: ACTIVE | Noted: 2019-01-09

## 2019-01-09 NOTE — TELEPHONE ENCOUNTER
I spoke to pts sister, Naveen Downs, and confirmed date of surgery for 1/23/19 at [facility] with Dr Huyen Menendez  He will go for blood work and EKG ordered  No hold on medications  Instructions reviewed and understood

## 2019-01-10 ENCOUNTER — APPOINTMENT (OUTPATIENT)
Dept: LAB | Facility: HOSPITAL | Age: 54
End: 2019-01-10
Payer: MEDICARE

## 2019-01-10 LAB
ANION GAP SERPL CALCULATED.3IONS-SCNC: 11 MMOL/L (ref 4–13)
BUN SERPL-MCNC: 82 MG/DL (ref 5–25)
CALCIUM SERPL-MCNC: 8.8 MG/DL (ref 8.3–10.1)
CHLORIDE SERPL-SCNC: 105 MMOL/L (ref 100–108)
CO2 SERPL-SCNC: 20 MMOL/L (ref 21–32)
CREAT SERPL-MCNC: 10.45 MG/DL (ref 0.6–1.3)
GFR SERPL CREATININE-BSD FRML MDRD: 6 ML/MIN/1.73SQ M
GLUCOSE P FAST SERPL-MCNC: 182 MG/DL (ref 65–99)
POTASSIUM SERPL-SCNC: 5.2 MMOL/L (ref 3.5–5.3)
SODIUM SERPL-SCNC: 136 MMOL/L (ref 136–145)

## 2019-01-10 PROCEDURE — 36415 COLL VENOUS BLD VENIPUNCTURE: CPT | Performed by: INTERNAL MEDICINE

## 2019-01-10 PROCEDURE — 80048 BASIC METABOLIC PNL TOTAL CA: CPT | Performed by: INTERNAL MEDICINE

## 2019-01-11 DIAGNOSIS — IMO0002 UNCONTROLLED TYPE 2 DIABETES MELLITUS WITH STAGE 4 CHRONIC KIDNEY DISEASE, WITH LONG-TERM CURRENT USE OF INSULIN: Primary | ICD-10-CM

## 2019-01-11 RX ORDER — LANCETS 30 GAUGE
EACH MISCELLANEOUS 2 TIMES DAILY
Qty: 100 EACH | Refills: 0 | Status: SHIPPED | OUTPATIENT
Start: 2019-01-11 | End: 2019-01-15

## 2019-01-11 RX ORDER — BLOOD-GLUCOSE METER
EACH MISCELLANEOUS 2 TIMES DAILY
Qty: 1 EACH | Refills: 0 | Status: SHIPPED | OUTPATIENT
Start: 2019-01-11 | End: 2019-01-15

## 2019-01-14 ENCOUNTER — ANESTHESIA EVENT (OUTPATIENT)
Dept: PERIOP | Facility: HOSPITAL | Age: 54
End: 2019-01-14
Payer: MEDICARE

## 2019-01-14 RX ORDER — SODIUM CHLORIDE 9 MG/ML
125 INJECTION, SOLUTION INTRAVENOUS CONTINUOUS
Status: CANCELLED | OUTPATIENT
Start: 2019-01-23

## 2019-01-15 ENCOUNTER — APPOINTMENT (OUTPATIENT)
Dept: PREADMISSION TESTING | Facility: HOSPITAL | Age: 54
End: 2019-01-15
Payer: MEDICARE

## 2019-01-15 ENCOUNTER — APPOINTMENT (OUTPATIENT)
Dept: LAB | Facility: HOSPITAL | Age: 54
End: 2019-01-15
Attending: SURGERY
Payer: MEDICARE

## 2019-01-15 ENCOUNTER — HOSPITAL ENCOUNTER (OUTPATIENT)
Dept: NON INVASIVE DIAGNOSTICS | Facility: HOSPITAL | Age: 54
Discharge: HOME | End: 2019-01-15
Attending: SURGERY
Payer: MEDICARE

## 2019-01-15 DIAGNOSIS — E11.65 UNCONTROLLED TYPE 2 DIABETES MELLITUS WITH HYPERGLYCEMIA, WITH LONG-TERM CURRENT USE OF INSULIN (HCC): Primary | ICD-10-CM

## 2019-01-15 DIAGNOSIS — N18.6 ESRD (END STAGE RENAL DISEASE) (HCC): ICD-10-CM

## 2019-01-15 DIAGNOSIS — Z79.4 UNCONTROLLED TYPE 2 DIABETES MELLITUS WITH HYPERGLYCEMIA, WITH LONG-TERM CURRENT USE OF INSULIN (HCC): Primary | ICD-10-CM

## 2019-01-15 LAB
ABO GROUP BLD: NORMAL
ANION GAP SERPL CALCULATED.3IONS-SCNC: 11 MMOL/L (ref 4–13)
ATRIAL RATE: 79 BPM
BASOPHILS # BLD AUTO: 0.04 THOUSANDS/ΜL (ref 0–0.1)
BASOPHILS NFR BLD AUTO: 1 % (ref 0–1)
BLD GP AB SCN SERPL QL: POSITIVE
BLOOD GROUP ANTIBODIES SERPL: NORMAL
BUN SERPL-MCNC: 77 MG/DL (ref 5–25)
CALCIUM SERPL-MCNC: 9 MG/DL (ref 8.3–10.1)
CHLORIDE SERPL-SCNC: 106 MMOL/L (ref 100–108)
CO2 SERPL-SCNC: 20 MMOL/L (ref 21–32)
CREAT SERPL-MCNC: 10.32 MG/DL (ref 0.6–1.3)
EOSINOPHIL # BLD AUTO: 0.13 THOUSAND/ΜL (ref 0–0.61)
EOSINOPHIL NFR BLD AUTO: 2 % (ref 0–6)
ERYTHROCYTE [DISTWIDTH] IN BLOOD BY AUTOMATED COUNT: 13.4 % (ref 11.6–15.1)
GFR SERPL CREATININE-BSD FRML MDRD: 6 ML/MIN/1.73SQ M
GLUCOSE SERPL-MCNC: 163 MG/DL (ref 65–140)
HCT VFR BLD AUTO: 34 % (ref 36.5–49.3)
HGB BLD-MCNC: 10.5 G/DL (ref 12–17)
IMM GRANULOCYTES # BLD AUTO: 0.01 THOUSAND/UL (ref 0–0.2)
IMM GRANULOCYTES NFR BLD AUTO: 0 % (ref 0–2)
LYMPHOCYTES # BLD AUTO: 1.47 THOUSANDS/ΜL (ref 0.6–4.47)
LYMPHOCYTES NFR BLD AUTO: 23 % (ref 14–44)
MCH RBC QN AUTO: 25.8 PG (ref 26.8–34.3)
MCHC RBC AUTO-ENTMCNC: 30.9 G/DL (ref 31.4–37.4)
MCV RBC AUTO: 84 FL (ref 82–98)
MONOCYTES # BLD AUTO: 0.49 THOUSAND/ΜL (ref 0.17–1.22)
MONOCYTES NFR BLD AUTO: 8 % (ref 4–12)
NEUTROPHILS # BLD AUTO: 4.26 THOUSANDS/ΜL (ref 1.85–7.62)
NEUTS SEG NFR BLD AUTO: 66 % (ref 43–75)
NRBC BLD AUTO-RTO: 0 /100 WBCS
P AXIS: 78 DEGREES
PLATELET # BLD AUTO: 255 THOUSANDS/UL (ref 149–390)
PMV BLD AUTO: 9.2 FL (ref 8.9–12.7)
POTASSIUM SERPL-SCNC: 5.4 MMOL/L (ref 3.5–5.3)
PR INTERVAL: 138 MS
QRS AXIS: 63 DEGREES
QRSD INTERVAL: 76 MS
QT INTERVAL: 364 MS
QTC INTERVAL: 417 MS
RBC # BLD AUTO: 4.07 MILLION/UL (ref 3.88–5.62)
RH BLD: POSITIVE
SODIUM SERPL-SCNC: 137 MMOL/L (ref 136–145)
SPECIMEN EXPIRATION DATE: NORMAL
T WAVE AXIS: 150 DEGREES
VENTRICULAR RATE: 79 BPM
WBC # BLD AUTO: 6.4 THOUSAND/UL (ref 4.31–10.16)

## 2019-01-15 PROCEDURE — 93005 ELECTROCARDIOGRAM TRACING: CPT

## 2019-01-15 PROCEDURE — 86900 BLOOD TYPING SEROLOGIC ABO: CPT | Performed by: SURGERY

## 2019-01-15 PROCEDURE — 93010 ELECTROCARDIOGRAM REPORT: CPT | Performed by: INTERNAL MEDICINE

## 2019-01-15 PROCEDURE — 86850 RBC ANTIBODY SCREEN: CPT

## 2019-01-15 PROCEDURE — 36415 COLL VENOUS BLD VENIPUNCTURE: CPT

## 2019-01-15 PROCEDURE — 86901 BLOOD TYPING SEROLOGIC RH(D): CPT | Performed by: SURGERY

## 2019-01-15 PROCEDURE — 86900 BLOOD TYPING SEROLOGIC ABO: CPT

## 2019-01-15 PROCEDURE — 86870 RBC ANTIBODY IDENTIFICATION: CPT | Performed by: SURGERY

## 2019-01-15 PROCEDURE — 85025 COMPLETE CBC W/AUTO DIFF WBC: CPT

## 2019-01-15 PROCEDURE — 80048 BASIC METABOLIC PNL TOTAL CA: CPT

## 2019-01-15 PROCEDURE — 86850 RBC ANTIBODY SCREEN: CPT | Performed by: SURGERY

## 2019-01-15 PROCEDURE — 86901 BLOOD TYPING SEROLOGIC RH(D): CPT

## 2019-01-15 RX ORDER — BLOOD-GLUCOSE METER
EACH MISCELLANEOUS 3 TIMES DAILY
Qty: 1 EACH | Refills: 0 | Status: SHIPPED | OUTPATIENT
Start: 2019-01-15 | End: 2019-01-25 | Stop reason: SDUPTHER

## 2019-01-15 NOTE — PRE-PROCEDURE INSTRUCTIONS
Pre-Surgery Instructions:   Medication Instructions    amLODIPine (NORVASC) 10 mg tablet Instructed patient per Anesthesia Guidelines   aspirin 81 MG tablet Patient was instructed to contact Physician for medication instruction   atorvastatin (LIPITOR) 40 mg tablet Instructed patient per Anesthesia Guidelines   Cholecalciferol (VITAMIN D-3) 5000 units TABS Instructed patient per Anesthesia Guidelines   glipiZIDE (GLUCOTROL) 10 mg tablet Instructed patient per Anesthesia Guidelines   insulin glargine (BASAGLAR KWIKPEN) 100 units/mL injection pen Instructed patient per Anesthesia Guidelines  no oral meds needed am of surgery  To take 1/2 dose- 15 units of  basaglar insulin pm prior to surgery per anesthesia DR Kishan Crow

## 2019-01-15 NOTE — ANESTHESIA PREPROCEDURE EVALUATION
Review of Systems/Medical History          Cardiovascular  Hyperlipidemia, Hypertension controlled,    Pulmonary  Negative pulmonary ROS        GI/Hepatic  Negative GI/hepatic ROS          Chronic kidney disease stage 4,        Endo/Other  Diabetes well controlled type 2 Insulin,      GYN       Hematology  Negative hematology ROS      Musculoskeletal  Negative musculoskeletal ROS        Neurology    Headaches, Visual impairment (legally blind),    Psychology   Negative psychology ROS              Physical Exam    Airway    Mallampati score: II  TM Distance: >3 FB  Neck ROM: full     Dental   No notable dental hx     Cardiovascular  Rhythm: regular, Rate: normal, Cardiovascular exam normal    Pulmonary  Pulmonary exam normal Breath sounds clear to auscultation,     Other Findings        Anesthesia Plan  ASA Score- 4     Anesthesia Type- general and regional with ASA Monitors  Additional Monitors:   Airway Plan:     Comment: Surgeon requests brachial plexus nerve block for postop pain control  Plan Factors-Patient not instructed to abstain from smoking on day of procedure       Induction- intravenous  Postoperative Plan-     Informed Consent- Anesthetic plan and risks discussed with spouse and patient

## 2019-01-16 ENCOUNTER — TELEPHONE (OUTPATIENT)
Dept: VASCULAR SURGERY | Facility: CLINIC | Age: 54
End: 2019-01-16

## 2019-01-16 NOTE — TELEPHONE ENCOUNTER
Received call from PAT to report that pt's pre-admission antibody screen is positive  Dr Maura Jeter notified as requested by PAT  Pt is scheduled for AV fistula creation 1/23

## 2019-01-22 LAB
ABO GROUP BLD: NORMAL
BLD GP AB SCN SERPL QL: POSITIVE
RH BLD: POSITIVE
SPECIMEN EXPIRATION DATE: NORMAL

## 2019-01-23 ENCOUNTER — APPOINTMENT (OUTPATIENT)
Dept: RADIOLOGY | Facility: HOSPITAL | Age: 54
End: 2019-01-23
Payer: MEDICARE

## 2019-01-23 ENCOUNTER — ANESTHESIA (OUTPATIENT)
Dept: PERIOP | Facility: HOSPITAL | Age: 54
End: 2019-01-23
Payer: MEDICARE

## 2019-01-23 ENCOUNTER — HOSPITAL ENCOUNTER (OUTPATIENT)
Facility: HOSPITAL | Age: 54
Setting detail: OUTPATIENT SURGERY
Discharge: HOME | End: 2019-01-23
Attending: SURGERY | Admitting: SURGERY
Payer: MEDICARE

## 2019-01-23 VITALS
HEART RATE: 93 BPM | TEMPERATURE: 97.3 F | HEIGHT: 66 IN | BODY MASS INDEX: 25.81 KG/M2 | WEIGHT: 160.6 LBS | SYSTOLIC BLOOD PRESSURE: 161 MMHG | DIASTOLIC BLOOD PRESSURE: 85 MMHG | RESPIRATION RATE: 16 BRPM | OXYGEN SATURATION: 100 %

## 2019-01-23 DIAGNOSIS — N18.6 END STAGE RENAL DISEASE (HCC): ICD-10-CM

## 2019-01-23 DIAGNOSIS — N18.4 CKD (CHRONIC KIDNEY DISEASE) STAGE 4, GFR 15-29 ML/MIN (HCC): Primary | ICD-10-CM

## 2019-01-23 LAB
GLUCOSE SERPL-MCNC: 162 MG/DL (ref 65–140)
GLUCOSE SERPL-MCNC: 172 MG/DL (ref 65–140)
POTASSIUM SERPL-SCNC: 4.9 MMOL/L (ref 3.5–5.3)

## 2019-01-23 PROCEDURE — 36821 AV FUSION DIRECT ANY SITE: CPT | Performed by: SURGERY

## 2019-01-23 PROCEDURE — 84132 ASSAY OF SERUM POTASSIUM: CPT | Performed by: ANESTHESIOLOGY

## 2019-01-23 PROCEDURE — 36821 AV FUSION DIRECT ANY SITE: CPT | Performed by: PHYSICIAN ASSISTANT

## 2019-01-23 PROCEDURE — 82948 REAGENT STRIP/BLOOD GLUCOSE: CPT

## 2019-01-23 RX ORDER — PROPOFOL 10 MG/ML
INJECTION, EMULSION INTRAVENOUS AS NEEDED
Status: DISCONTINUED | OUTPATIENT
Start: 2019-01-23 | End: 2019-01-23 | Stop reason: SURG

## 2019-01-23 RX ORDER — HYDRALAZINE HYDROCHLORIDE 20 MG/ML
10 INJECTION INTRAMUSCULAR; INTRAVENOUS ONCE
Status: COMPLETED | OUTPATIENT
Start: 2019-01-23 | End: 2019-01-23

## 2019-01-23 RX ORDER — MIDAZOLAM HYDROCHLORIDE 1 MG/ML
INJECTION INTRAMUSCULAR; INTRAVENOUS AS NEEDED
Status: DISCONTINUED | OUTPATIENT
Start: 2019-01-23 | End: 2019-01-23 | Stop reason: SURG

## 2019-01-23 RX ORDER — FENTANYL CITRATE 50 UG/ML
INJECTION, SOLUTION INTRAMUSCULAR; INTRAVENOUS AS NEEDED
Status: DISCONTINUED | OUTPATIENT
Start: 2019-01-23 | End: 2019-01-23 | Stop reason: SURG

## 2019-01-23 RX ORDER — ROPIVACAINE HYDROCHLORIDE 5 MG/ML
INJECTION, SOLUTION EPIDURAL; INFILTRATION; PERINEURAL AS NEEDED
Status: DISCONTINUED | OUTPATIENT
Start: 2019-01-23 | End: 2019-01-23 | Stop reason: SURG

## 2019-01-23 RX ORDER — ONDANSETRON 2 MG/ML
INJECTION INTRAMUSCULAR; INTRAVENOUS AS NEEDED
Status: DISCONTINUED | OUTPATIENT
Start: 2019-01-23 | End: 2019-01-23 | Stop reason: SURG

## 2019-01-23 RX ORDER — EPHEDRINE SULFATE 50 MG/ML
INJECTION, SOLUTION INTRAVENOUS AS NEEDED
Status: DISCONTINUED | OUTPATIENT
Start: 2019-01-23 | End: 2019-01-23 | Stop reason: SURG

## 2019-01-23 RX ORDER — HEPARIN SODIUM 1000 [USP'U]/ML
INJECTION, SOLUTION INTRAVENOUS; SUBCUTANEOUS AS NEEDED
Status: DISCONTINUED | OUTPATIENT
Start: 2019-01-23 | End: 2019-01-23 | Stop reason: SURG

## 2019-01-23 RX ORDER — FENTANYL CITRATE/PF 50 MCG/ML
25 SYRINGE (ML) INJECTION
Status: DISCONTINUED | OUTPATIENT
Start: 2019-01-23 | End: 2019-01-23 | Stop reason: HOSPADM

## 2019-01-23 RX ORDER — ONDANSETRON 2 MG/ML
4 INJECTION INTRAMUSCULAR; INTRAVENOUS ONCE AS NEEDED
Status: DISCONTINUED | OUTPATIENT
Start: 2019-01-23 | End: 2019-01-23 | Stop reason: HOSPADM

## 2019-01-23 RX ORDER — SODIUM CHLORIDE 9 MG/ML
125 INJECTION, SOLUTION INTRAVENOUS CONTINUOUS
Status: DISCONTINUED | OUTPATIENT
Start: 2019-01-23 | End: 2019-01-23 | Stop reason: HOSPADM

## 2019-01-23 RX ORDER — OXYCODONE HYDROCHLORIDE AND ACETAMINOPHEN 5; 325 MG/1; MG/1
1 TABLET ORAL EVERY 6 HOURS PRN
Qty: 20 TABLET | Refills: 0 | Status: SHIPPED | OUTPATIENT
Start: 2019-01-23 | End: 2019-01-28

## 2019-01-23 RX ORDER — CEFAZOLIN SODIUM 1 G/3ML
INJECTION, POWDER, FOR SOLUTION INTRAMUSCULAR; INTRAVENOUS AS NEEDED
Status: DISCONTINUED | OUTPATIENT
Start: 2019-01-23 | End: 2019-01-23 | Stop reason: SURG

## 2019-01-23 RX ADMIN — ONDANSETRON 4 MG: 2 INJECTION INTRAMUSCULAR; INTRAVENOUS at 09:12

## 2019-01-23 RX ADMIN — FENTANYL CITRATE 100 MCG: 50 INJECTION, SOLUTION INTRAMUSCULAR; INTRAVENOUS at 07:17

## 2019-01-23 RX ADMIN — HEPARIN SODIUM 3000 UNITS: 1000 INJECTION INTRAVENOUS; SUBCUTANEOUS at 08:34

## 2019-01-23 RX ADMIN — HYDRALAZINE HYDROCHLORIDE 10 MG: 20 INJECTION INTRAMUSCULAR; INTRAVENOUS at 06:29

## 2019-01-23 RX ADMIN — ROPIVACAINE HYDROCHLORIDE 30 ML: 5 INJECTION, SOLUTION EPIDURAL; INFILTRATION; PERINEURAL at 07:20

## 2019-01-23 RX ADMIN — CEFAZOLIN 1000 MG: 1 INJECTION, POWDER, FOR SOLUTION INTRAVENOUS at 07:45

## 2019-01-23 RX ADMIN — EPHEDRINE SULFATE 5 MG: 50 INJECTION, SOLUTION INTRAMUSCULAR; INTRAVENOUS; SUBCUTANEOUS at 08:15

## 2019-01-23 RX ADMIN — PROPOFOL 120 MG: 10 INJECTION, EMULSION INTRAVENOUS at 07:42

## 2019-01-23 RX ADMIN — MIDAZOLAM 2 MG: 1 INJECTION INTRAMUSCULAR; INTRAVENOUS at 07:17

## 2019-01-23 RX ADMIN — LIDOCAINE HYDROCHLORIDE 60 MG: 20 INJECTION, SOLUTION INTRAVENOUS at 07:42

## 2019-01-23 RX ADMIN — SODIUM CHLORIDE 125 ML/HR: 0.9 INJECTION, SOLUTION INTRAVENOUS at 06:29

## 2019-01-23 NOTE — DISCHARGE INSTRUCTIONS
DISCHARGE INSTRUCTIONS  DIALYSIS FISTULA SURGERY    Following discharge from the hospital, you may have some questions about your operation, your activities or your general condition  These instructions may answer some of your questions and help you adjust during the first few weeks following your operation  ACTIVITY:  Limit use of the operated arm to what is absolutely necessary for the first day after surgery  On the second day after surgery, you may start to increase use of your arm as tolerated  One week after surgery, you should start to exercise your hand on the side of the graft by squeezing a ball  This increases blood flow in your graft and arm so your graft will function better  DIET:   Resume your normal diet  Try to eat low fat and low cholesterol foods  INCISION:   You may include the operated area in a shower on the second day following surgery  It is normal to have some pain at the surgical site  You will receive a prescription for pain medicine at the time of discharge  There will also be some swelling and bruising around the surgical site  If increasing redness or pain develops at the incision or severe pain, numbness or weakness occurs in the hand, call our office immediately  Numbness in the region of the incision may occur following the surgery  This normally resolves in six to twelve months  ARM SWELLING:    Most patients have some noticeable arm swelling after surgery  This usually disappears within a few weeks  If swelling is present, elevate the arm whenever possible  RESTRICTIONS:   Do not have blood draws, IVs, or blood pressures performed on the operated arm  FISTULA USE:    Your fistula will not be used for six to 12 weeks  PLEASE CALL THE OFFICE IF YOU HAVE ANY QUESTIONS  [phone] Sanam Romero [phone] [facility] FREE [phone]  [address] [place] [address] , [address], [place], [address] [facility], [place], [address] [facility] Adryan Prabhakar 79[address] 0656 [facility], hospitals, [address]  [address] [facility], [facility], [address]  Leil[address], [address]st Floor, Alma Tejeda [address]  [facility], [address] [facility], [address], [place], [address] [facility] 97   [address] [facility], [address], [facility], [address] [facility], [address] [place] [address], [facility], Tiffany Yu 6

## 2019-01-23 NOTE — OP NOTE
OPERATIVE REPORT  PATIENT NAME: Mariluz Jeter    :  1965  MRN: [id number]  Pt Location: [facility] OR ROOM 01    SURGERY DATE: 2019    Surgeon(s) and Role:     * Deidre Sánchez MD - Primary     * Christine Talley PA-C - Assisting    Preop Diagnosis:  ESRD (end stage renal disease) ([facility] ) [N18 6]    Post-Op Diagnosis Codes:     * ESRD (end stage renal disease) ([facility] ) [N18 6]    Procedure(s) (LRB):  CREATION FISTULA ARTERIOVENOUS (AV) (Left)    Specimen(s):  * No specimens in log *    Estimated Blood Loss:   Minimal    Drains:       Anesthesia Type:   Regional    Operative Indications:  ES[facility] (end stage renal disease) ([facility] ) [N18 6]      Operative Findings:  Brachiocephalic AVF, Left    Complications:   None      Procedure and Technique:  The patient was brought to the operating room and placed in the supine position  A brachial plexus block was performed in the preoperative area  Full timeout was carried out with all present  Also prophylactic antibiotics were administered  Following induction of anesthesia,  A duplex was performed that showed the cephalic vein in the antecubital fossa and upper extremity was of adequate caliber The patients left arm was prepped and draped in the usual sterile fashion  A transverse ncision was made in the region of the distal brachial artery and cephalic vein in the proximal forearm below the [facility] fossa crease  The cephalic vein was identified  This measured approximately 2 5mm  in diameter  It was dissected distally and the median cubital vein was dissected further down  The lateral branch of cephalic vein was ligated and divided  The vein was mobilized  and secured with silastic loops  Next, the radial artery was identified  and dissected  The artery measured approximately 3 5 mm in  diameter and was without significant atherosclerotic disease  The  artery was mobilized proximally and distally for a 2-cm segment    The brachial artery was dissected and loops were encircled around proximally and distally  Patient was given 3000 U of iv heparin  The artery was controlled proximally and distally with placing the vessels loops under tension  A 6mm arteriotomy was made using a #11 blade and Pott's scissors  The distal vein was ligated and the vein divided with proximal control  The end of the vein was then opened and spatulated   The arteriovenous anastomosis was then performed using arunning 6-0 prolene suture  Following completion of the anastomosis,  vascular control appeared excellent  Excellent flow was established through the fistula with an easily palpable thrill, and a strong distal pulse was palpable within the radial artery  Hemostasis was achieved with electrocautery  The wound was irrigated and the subcutaneous tissues were reapproximated using interrupted 3-0 Moncryl sutures  Skin was closed with 4-0 monocryl  Histoacryl was applied  At the end of the case the instrument, sponge and needle counts were correct  The patient tolerated the procedure well and was taken to the  postanesthesia care unit in stable condition       I was present for the entire procedure     I was present for the entire procedure, A qualified resident physician was not available and A physician assistant was required during the procedure for retraction tissue handling,dissection and suturing    Patient Disposition:  PACU    SIGNATURE: Annabella Henao MD  DATE: January 23, 2019  TIME: 9:03 AM

## 2019-01-23 NOTE — H&P (VIEW-ONLY)
Assessment/Plan:    CKD (chronic kidney disease) stage 4, GFR 15-29 ml/min ([facility] [address] )  49yo M, right hand dominant with CKD4 presents to discuss HD access creation  He underwent vein mapping prior to his clinic appointment today  Upon review patient's anatomy is only amenable for a 2 stage brachiobasilic, left, AV-fistula creation  Had an extensive discussion regarding the risks/benefits alternatives of surgery with patient and accompanying sister  Patient demonstrated understanding and consented to proceed with 1st stage brachiobasilic AV-fistula creation  I will perform ultrasound intraoperatively also to assess if cephalic vein will be amenable for fistula creation  Plan for procedure in upcoming weeks  Problem List Items Addressed This Visit        Genitourinary    CKD (chronic kidney disease) stage 4, GFR 15-29 ml/min ([facility] [address] )     49yo M, right hand dominant with CKD4 presents to discuss HD access creation  He underwent vein mapping prior to his clinic appointment today  Upon review patient's anatomy is only amenable for a 2 stage brachiobasilic, left, AV-fistula creation  Had an extensive discussion regarding the risks/benefits alternatives of surgery with patient and accompanying sister  Patient demonstrated understanding and consented to proceed with 1st stage brachiobasilic AV-fistula creation  I will perform ultrasound intraoperatively also to assess if cephalic vein will be amenable for fistula creation  Plan for procedure in upcoming weeks  Other Visit Diagnoses     ESRD (end stage renal disease) ([facility] [address] )    -  Primary    Relevant Orders    Case request operating room: CREATION FISTULA ARTERIOVENOUS (AV) (Completed)    Type and screen    Basic metabolic panel    CBC and differential    EKG 12 lead            Subjective:      Patient ID: Juliana Arthur is a 46 y o  male  Patient is new to the practice and had vein mapping done on 1/7/19   He presents today to discuss possible HD access per nephrologist  Patient is here today with his sister  He is right hand/arm dominant and has no any any previous surgeries or injuries  Patient is not currently on dialysis  Patient is also diabetic and is totally blind  The following portions of the patient's history were reviewed and updated as appropriate: allergies, current medications, past family history, past medical history, past social history, past surgical history and problem list     Review of Systems   Constitutional: Positive for activity change  HENT: Negative  Eyes: Positive for visual disturbance  Respiratory: Negative  Cardiovascular: Negative  Gastrointestinal: Negative  Endocrine: Negative  Genitourinary: Negative  Musculoskeletal: Negative  Skin: Negative  Allergic/Immunologic: Negative  Neurological: Positive for weakness  Hematological: Negative  Psychiatric/Behavioral: Negative  Objective:      /80 (BP Location: Right arm, Patient Position: Sitting)   Pulse 86   Temp (!) 96 7 °F (35 9 °C) (Tympanic)   Ht 6' (1 829 m)   Wt 72 1 kg (159 lb)   BMI 21 56 kg/m²          Physical Exam   Constitutional: He is oriented to person, place, and time  He appears well-developed and well-nourished  HENT:   Head: Normocephalic and atraumatic  Eyes:   Blind in both eyes   Neck: Normal range of motion  Neck supple  Cardiovascular: Normal rate, regular rhythm and normal heart sounds  Pulmonary/Chest: Effort normal and breath sounds normal    Abdominal: Soft  Bowel sounds are normal    Musculoskeletal: Normal range of motion  Neurological: He is alert and oriented to person, place, and time  Skin: Skin is warm and dry  Palpable radial/ulnar brachial pulse bilaterally  No obvious superficial veins on exam  Sensation motor intact, brisk cap refill bilaterally       Operative Scheduling Information:    Hospital:  [address] [place] [address] 24E    Physician:  Me    Surgery: Left upper extremity AV-fistula creation  Under Brachial Plexus Block     Urgency:  Standard    Case Length:  Normal    Post-op Bed:  Outpatient    OR Table:  Standard    Equipment Needs:  None    Medication Instructions:  None    Hydration:  No

## 2019-01-23 NOTE — ANESTHESIA PROCEDURE NOTES
Peripheral Block    Patient location during procedure: holding area  Start time: 1/23/2019 7:17 AM  Reason for block: at surgeon's request and post-op pain management  Staffing  Anesthesiologist: Genesis Harris  Performed: anesthesiologist   Preanesthetic Checklist  Completed: patient identified, site marked, surgical consent, pre-op evaluation, timeout performed, IV checked, risks and benefits discussed and monitors and equipment checked  Peripheral Block  Patient position: supine  Prep: ChloraPrep  Patient monitoring: frequent blood pressure checks and continuous pulse ox  Block type: supraclavicular  Laterality: left  Injection technique: single-shot  Procedures: ultrasound guided and nerve stimulator  Ultrasound permanent image saved  Needle  Needle type: Stimuplex   Needle gauge: 22 G  Needle length: 5 cm  Needle localization: nerve stimulator and ultrasound guidance  Assessment  Injection assessment: incremental injection, local visualized surrounding nerve on ultrasound, negative aspiration for heme and no paresthesia on injection  Paresthesia pain: none  Heart rate change: no  Slow fractionated injection: yes  Post-procedure:  site cleaned  patient tolerated the procedure well with no immediate complications

## 2019-01-25 ENCOUNTER — OFFICE VISIT (OUTPATIENT)
Dept: INTERNAL MEDICINE CLINIC | Facility: CLINIC | Age: 54
End: 2019-01-25
Payer: MEDICARE

## 2019-01-25 VITALS
HEART RATE: 100 BPM | DIASTOLIC BLOOD PRESSURE: 80 MMHG | TEMPERATURE: 97.4 F | HEIGHT: 72 IN | RESPIRATION RATE: 15 BRPM | BODY MASS INDEX: 22.08 KG/M2 | WEIGHT: 163 LBS | SYSTOLIC BLOOD PRESSURE: 170 MMHG

## 2019-01-25 DIAGNOSIS — E11.65 UNCONTROLLED TYPE 2 DIABETES MELLITUS WITH HYPERGLYCEMIA, WITH LONG-TERM CURRENT USE OF INSULIN (HCC): ICD-10-CM

## 2019-01-25 DIAGNOSIS — Z79.4 UNCONTROLLED TYPE 2 DIABETES MELLITUS WITH HYPERGLYCEMIA, WITH LONG-TERM CURRENT USE OF INSULIN (HCC): ICD-10-CM

## 2019-01-25 DIAGNOSIS — I10 ESSENTIAL HYPERTENSION: Primary | ICD-10-CM

## 2019-01-25 DIAGNOSIS — IMO0002 UNCONTROLLED TYPE 2 DIABETES MELLITUS WITH STAGE 4 CHRONIC KIDNEY DISEASE, WITH LONG-TERM CURRENT USE OF INSULIN: ICD-10-CM

## 2019-01-25 DIAGNOSIS — N18.6 ESRD (END STAGE RENAL DISEASE) (HCC): ICD-10-CM

## 2019-01-25 DIAGNOSIS — K59.04 CHRONIC IDIOPATHIC CONSTIPATION: ICD-10-CM

## 2019-01-25 PROCEDURE — 99214 OFFICE O/P EST MOD 30 MIN: CPT | Performed by: INTERNAL MEDICINE

## 2019-01-25 PROCEDURE — G0438 PPPS, INITIAL VISIT: HCPCS | Performed by: INTERNAL MEDICINE

## 2019-01-25 RX ORDER — CARVEDILOL 6.25 MG/1
6.25 TABLET ORAL 2 TIMES DAILY WITH MEALS
Qty: 60 TABLET | Refills: 1 | Status: SHIPPED | OUTPATIENT
Start: 2019-01-25 | End: 2019-04-26 | Stop reason: SDUPTHER

## 2019-01-25 RX ORDER — BLOOD-GLUCOSE METER
EACH MISCELLANEOUS 3 TIMES DAILY
Qty: 1 EACH | Refills: 0 | Status: SHIPPED | OUTPATIENT
Start: 2019-01-25

## 2019-01-25 NOTE — PROGRESS NOTES
Assessment and Plan:    Problem List Items Addressed This Visit     None        Health Maintenance Due   Topic Date Due    Depression Screening PHQ  1965    Medicare Annual Wellness Visit (AWV)  1965    CRC Screening: Colonoscopy  1965    DM Eye Exam  04/07/1974    DTaP,Tdap,and Td Vaccines (1 - Tdap) 04/07/1985         HPI:  Dyana Saleh is a 46 y o  male here for his Initial Wellness Visit  Patient Active Problem List   Diagnosis    Essential hypertension    Uncontrolled type 2 diabetes mellitus with stage 4 chronic kidney disease, with long-term current use of insulin ([facility])    Diabetic retinopathy of both eyes associated with type 2 diabetes mellitus ([facility] )   Mavis Cousin Legally blind    LVH (left ventricular hypertrophy)    CKD (chronic kidney disease) stage 4, GFR 15-29 ml/min ([facility])    Hyperlipidemia    Chronic idiopathic constipation    Screening for colon cancer    ESRD (end stage renal disease) ([facility] )     Past Medical History:   Diagnosis Date    Cataract     Chronic kidney disease     Diabetes mellitus ([facility] )      IDDM    Diabetic retinopathy ([facility] )     Dizziness     occ    ESRD (end stage renal disease) ([facility] )     Headache     occ    Hypertension     Legally blind     LVH (left ventricular hypertrophy)     Preferred language is [place]     understands some English    Use of cane as ambulatory aid      Past Surgical History:   Procedure Laterality Date    INGUINAL HERNIA REPAIR Right     MO ANASTOMOSIS,AV,ANY SITE Left 1/23/2019    Procedure: CREATION FISTULA ARTERIOVENOUS (AV);   Surgeon: Erinn Caro MD;  [facility]: [facility];  Service: Vascular     Family History   Problem Relation Age of Onset    Hypertension Mother     Diabetes Father     No Known Problems Sister     No Known Problems Brother     No Known Problems Maternal Aunt     No Known Problems Maternal Uncle     No Known Problems Paternal Aunt     No Known Problems Paternal Uncle     No Known Problems Maternal Grandmother     No Known Problems Maternal Grandfather     No Known Problems Paternal Grandmother     No Known Problems Paternal Grandfather      History   Smoking Status    Former Smoker    Packs/day: 0 25    Quit date: 2/1/2018   Smokeless Tobacco    Never Used     History   Alcohol Use    Yes     Comment: 1x month      History   Drug Use No       Current Outpatient Prescriptions   Medication Sig Dispense Refill    amLODIPine (NORVASC) 10 mg tablet Take 1 tablet (10 mg total) by mouth daily (Patient taking differently: Take 10 mg by mouth every evening  ) 90 tablet 0    aspirin 81 MG tablet Take 1 tablet (81 mg total) by mouth daily 90 tablet 0    atorvastatin (LIPITOR) 40 mg tablet Take 1 tablet (40 mg total) by mouth daily 90 tablet 0    Blood Glucose Monitoring Suppl (ONE TOUCH ULTRA 2) w/Device KIT by Does not apply route 3 (three) times a day 1 each 0    Cholecalciferol (VITAMIN D-3) 5000 units TABS Take 1 tablet by mouth daily      glipiZIDE (GLUCOTROL) 10 mg tablet Take 1 tablet (10 mg total) by mouth 2 (two) times a day before meals 180 tablet 0    glucose blood test strip Test blood sugars three times daily  100 each 2    insulin glargine (BASAGLAR KWIKPEN) 100 units/mL injection pen Inject 30 Units under the skin daily (Patient taking differently: Inject 30 Units under the skin daily at bedtime  ) 10 pen 0    ONE TOUCH LANCETS MISC by Does not apply route 3 (three) times a day 100 each 1    oxyCODONE-acetaminophen (PERCOCET) 5-325 mg per tablet Take 1 tablet by mouth every 6 (six) hours as needed for moderate pain for up to 5 days Max Daily Amount: 4 tablets 20 tablet 0     No current facility-administered medications for this visit        No Known Allergies  Immunization History   Administered Date(s) Administered    Influenza 11/11/2009, 10/06/2014, 08/04/2017, 01/02/2019    Influenza, recombinant, quadrivalent,injectable, preservative free 01/02/2019    Pneumococcal Polysaccharide PPV23 11/11/2009       Patient Care Team:  Chi Renee MD as PCP - General (Internal Medicine)    Medicare Screening Tests and Risk Assessments:  Alma Rosa Patricia is here for his Initial Wellness visit  Health Risk Assessment:  Patient rates overall health as fair  Patient feels that their physical health rating is Slightly worse  Eyesight was rated as Much worse  Hearing was rated as Slightly worse  Patient feels that their emotional and mental health rating is Same  Pain experienced by patient in the last 7 days has been Some  Patient's pain rating has been 4/10  Emotional/Mental Health:  Patient has been feeling nervous/anxious  PHQ-9 Depression Screening:    Frequency of the following problems over the past two weeks:      1  Little interest or pleasure in doing things: 0 - not at all      2  Feeling down, depressed, or hopeless: 0 - not at all  PHQ-2 Score: 0          Broken Bones/Falls: Fall Risk Assessment:    In the past year, patient has experienced: No history of falling in past year          Bladder/Bowel:  Patient has not leaked urine accidently in the last six months  Patient reports no loss of bowel control  Immunizations:  Patient has had a flu vaccination within the last year  Patient has received a pneumonia shot  Patient has not received a shingles shot  Patient has not received tetanus/diphtheria shot  Home Safety:  Patient does not have trouble with stairs inside or outside of their home  Patient currently reports that there are safety hazards present in home , working smoke alarms, working carbon monoxide detectors  Preventative Screenings:   no colon cancer screen completed, glaucoma eye exam completed, 1/4/2019      Nutrition:  Current diet: Diabetic, Low Cholesterol, Low Saturated Fat, Low Carb and No Added Salt with servings of the following:    Medications:  Patient is currently taking over-the-counter supplements  Patient is not able to manage medications  Lifestyle Choices:  Patient reports no tobacco use  Patient has smoked or used tobacco in the past   Patient has stopped his tobacco use  Patient reports no alcohol use  Patient does not drive a vehicle  Patient wears seat belt  Activities of Daily Living:  Can get out of bed by his or her self, able to dress self, unable to make own meals, unable to do own shopping, able to bathe self, unable to do laundry/housekeeping, unable to manage own money and other related tasks    Previous Hospitalizations:  No hospitalization or ED visit in past 12 months        Advanced Directives:  Patient has decided on a power of   Patient has spoken to designated power of         Preventative Screening/Counseling:      Cardiovascular:      General: Screening Current          Diabetes:      General: Screening Current          Colorectal Cancer:      General: Risks and Benefits Discussed          Prostate Cancer:      General: Screening Not Indicated          Osteoporosis:      General: Screening Not Indicated          AAA:      General: Screening Not Indicated          Glaucoma:      General: Screening Current          HIV:      General: Screening Not Indicated          Hepatitis C:      General: Screening Current        Advanced Directives:   Patient has no living will for healthcare,     Immunizations:      Influenza: Influenza UTD This Year      Pneumococcal: Lifetime Vaccine Completed

## 2019-01-25 NOTE — PROGRESS NOTES
Assessment/Plan:    Essential hypertension  Uncontrolle, ACEi stopped due to hyperkalemia, will start coreg instead, Has LV concentrci hypertrophy  Once dialysis started, can restart Ace-I  Uncontrolled type 2 diabetes mellitus with stage 4 chronic kidney disease, with long-term current use of insulin ([facility])  Lab Results   Component Value Date    HGBA1C 9 0 (H) 01/04/2019       Recent Labs      01/23/19   0623  01/23/19   0935   POCGLU  162*  172*       Blood Sugar Average: Last 72 hrs:  unfortunately not monitored yet  Will try to get new glucometer  Chronic idiopathic constipation  Start metamucil  ESRD (end stage renal disease) ([facility] )  Fistula was created  Follow up with vascular surgery in a couple of days  And then will be seeing Nephrology in a couple days after that  Had a lengthy discussion about the importance of compliance, regular follow-ups  He seems motivated at this time  We discussed the complications of missing dialysis or missing medications  Up-to-date on influenza pneumonia vaccination  Once dialysis started, will reschedule colonoscopy appointment  Diagnoses and all orders for this visit:    Essential hypertension  -     carvedilol (COREG) 6 25 mg tablet; Take 1 tablet (6 25 mg total) by mouth 2 (two) times a day with meals    Uncontrolled type 2 diabetes mellitus with hyperglycemia, with long-term current use of insulin ([facility])  -     Blood Glucose Monitoring Suppl (ONE TOUCH ULTRA 2) w/Device KIT; by Does not apply route 3 (three) times a day    ESRD (end stage renal disease) ([facility] )    Uncontrolled type 2 diabetes mellitus with stage 4 chronic kidney disease, with long-term current use of insulin ([facility])    Chronic idiopathic constipation  -     psyllium (METAMUCIL) 58 6 % powder; Take 1 packet by mouth daily          Subjective:   Chief Complaint   Patient presents with    Medicare Wellness Visit    Follow-up     2 weeks        Patient ID: Kit Blakely is a 46 y  o  male     Naman Shines in for follow-up of diabetes, hypertension, hyperlipidemia, end-stage renal disease  He is taking all his medications and insulin  He unfortunately still has not been able to get his glucometer to work and has not monitored his blood sugars  He denies any symptoms of chest pain or shortness of breath or pedal edema or itching or nausea or vomiting  Pain at the surgical site is slightly improved  The following portions of the patient's history were reviewed and updated as appropriate: current medications, past medical history, past social history and past surgical history      PHQ-9 Depression Screening    PHQ-9:    Frequency of the following problems over the past two weeks:                Current Outpatient Prescriptions:     amLODIPine (NORVASC) 10 mg tablet, Take 1 tablet (10 mg total) by mouth daily (Patient taking differently: Take 10 mg by mouth every evening  ), Disp: 90 tablet, Rfl: 0    aspirin 81 MG tablet, Take 1 tablet (81 mg total) by mouth daily, Disp: 90 tablet, Rfl: 0    atorvastatin (LIPITOR) 40 mg tablet, Take 1 tablet (40 mg total) by mouth daily, Disp: 90 tablet, Rfl: 0    Cholecalciferol (VITAMIN D-3) 5000 units TABS, Take 1 tablet by mouth daily, Disp: , Rfl:     glipiZIDE (GLUCOTROL) 10 mg tablet, Take 1 tablet (10 mg total) by mouth 2 (two) times a day before meals, Disp: 180 tablet, Rfl: 0    insulin glargine (BASAGLAR KWIKPEN) 100 units/mL injection pen, Inject 30 Units under the skin daily (Patient taking differently: Inject 30 Units under the skin daily at bedtime  ), Disp: 10 pen, Rfl: 0    oxyCODONE-acetaminophen (PERCOCET) 5-325 mg per tablet, Take 1 tablet by mouth every 6 (six) hours as needed for moderate pain for up to 5 days Max Daily Amount: 4 tablets, Disp: 20 tablet, Rfl: 0    Blood Glucose Monitoring Suppl (ONE TOUCH ULTRA 2) w/Device KIT, by Does not apply route 3 (three) times a day, Disp: 1 each, Rfl: 0    carvedilol (COREG) 6 25 mg tablet, Take 1 tablet (6 25 mg total) by mouth 2 (two) times a day with meals, Disp: 60 tablet, Rfl: 1    glucose blood test strip, Test blood sugars three times daily  , Disp: 100 each, Rfl: 2    ONE TOUCH LANCETS MISC, by Does not apply route 3 (three) times a day, Disp: 100 each, Rfl: 1    psyllium (METAMUCIL) 58 6 % powder, Take 1 packet by mouth daily, Disp: 30 packet, Rfl: 2    Review of Systems   Constitutional: Negative for fatigue, fever and unexpected weight change  HENT: Negative for ear pain, hearing loss and sore throat  Eyes: Positive for visual disturbance  Negative for pain and discharge  Respiratory: Negative for cough, chest tightness and shortness of breath  Cardiovascular: Negative for chest pain and palpitations  Gastrointestinal: Negative for abdominal pain, blood in stool, constipation, diarrhea and nausea  Genitourinary: Negative for dysuria, frequency and hematuria  Musculoskeletal: Negative for arthralgias and joint swelling  Skin: Negative for rash  Allergic/Immunologic: Negative for immunocompromised state  Neurological: Negative for dizziness and headaches  Hematological: Negative for adenopathy  Psychiatric/Behavioral: Negative for confusion and sleep disturbance  Objective:  /80 (BP Location: Right arm, Patient Position: Sitting, Cuff Size: Standard)   Pulse 100   Temp (!) 97 4 °F (36 3 °C) (Tympanic)   Resp 15   Ht 6' (1 829 m)   Wt 73 9 kg (163 lb)   BMI 22 11 kg/m²      Physical Exam   Constitutional: He appears well-developed and well-nourished  HENT:   Head: Normocephalic and atraumatic  Right Ear: Tympanic membrane normal    Left Ear: Tympanic membrane normal    Nose: Nose normal    Mouth/Throat: Oropharynx is clear and moist  No posterior oropharyngeal edema or posterior oropharyngeal erythema  Eyes: Pupils are equal, round, and reactive to light  Conjunctivae are normal  Right eye exhibits no discharge     Neck: Normal range of motion  Neck supple  No thyromegaly present  Cardiovascular: Normal rate, regular rhythm, S1 normal, S2 normal and normal heart sounds  PMI is not displaced  No murmur heard  Pulmonary/Chest: Effort normal and breath sounds normal  No accessory muscle usage  No apnea  No respiratory distress  He has no rhonchi  He has no rales  Abdominal: Soft  Normal appearance and bowel sounds are normal  He exhibits no shifting dullness  There is no hepatosplenomegaly  There is no tenderness  There is no rebound and no CVA tenderness  Musculoskeletal: Normal range of motion  He exhibits no edema or tenderness  Arms:  Lymphadenopathy:     He has no cervical adenopathy  Neurological: He is alert  Skin: Skin is warm and intact  No rash noted  Psychiatric: He has a normal mood and affect  His speech is normal    Nursing note and vitals reviewed          Recent Results (from the past 1008 hour(s))   CBC and differential    Collection Time: 01/04/19 10:05 AM   Result Value Ref Range    WBC 6 26 4 31 - 10 16 Thousand/uL    RBC 4 19 3 88 - 5 62 Million/uL    Hemoglobin 10 7 (L) 12 0 - 17 0 g/dL    Hematocrit 35 2 (L) 36 5 - 49 3 %    MCV 84 82 - 98 fL    MCH 25 5 (L) 26 8 - 34 3 pg    MCHC 30 4 (L) 31 4 - 37 4 g/dL    RDW 14 0 11 6 - 15 1 %    MPV 9 3 8 9 - 12 7 fL    Platelets 881 800 - 902 Thousands/uL    nRBC 0 /100 WBCs    Neutrophils Relative 60 43 - 75 %    Immat GRANS % 0 0 - 2 %    Lymphocytes Relative 28 14 - 44 %    Monocytes Relative 8 4 - 12 %    Eosinophils Relative 3 0 - 6 %    Basophils Relative 1 0 - 1 %    Neutrophils Absolute 3 82 1 85 - 7 62 Thousands/µL    Immature Grans Absolute 0 02 0 00 - 0 20 Thousand/uL    Lymphocytes Absolute 1 74 0 60 - 4 47 Thousands/µL    Monocytes Absolute 0 47 0 17 - 1 22 Thousand/µL    Eosinophils Absolute 0 17 0 00 - 0 61 Thousand/µL    Basophils Absolute 0 04 0 00 - 0 10 Thousands/µL   Comprehensive metabolic panel    Collection Time: 01/04/19 10:05 AM   Result Value Ref Range    Sodium 137 136 - 145 mmol/L    Potassium 5 0 3 5 - 5 3 mmol/L    Chloride 104 100 - 108 mmol/L    CO2 21 21 - 32 mmol/L    ANION GAP 12 4 - 13 mmol/L    BUN 79 (H) 5 - 25 mg/dL    Creatinine 9 93 (H) 0 60 - 1 30 mg/dL    Glucose, Fasting 276 (H) 65 - 99 mg/dL    Calcium 9 1 8 3 - 10 1 mg/dL    AST 18 5 - 45 U/L    ALT 25 12 - 78 U/L    Alkaline Phosphatase 99 46 - 116 U/L    Total Protein 6 9 6 4 - 8 2 g/dL    Albumin 2 7 (L) 3 5 - 5 0 g/dL    Total Bilirubin 0 23 0 20 - 1 00 mg/dL    eGFR 6 ml/min/1 73sq m   Hemoglobin A1C    Collection Time: 01/04/19 10:05 AM   Result Value Ref Range    Hemoglobin A1C 9 0 (H) 4 2 - 6 3 %     mg/dl   Hepatitis C antibody    Collection Time: 01/04/19 10:05 AM   Result Value Ref Range    Hepatitis C Ab Non-reactive Non-reactive   Lipid Panel with Direct LDL reflex    Collection Time: 01/04/19 10:05 AM   Result Value Ref Range    Cholesterol 231 (H) 50 - 200 mg/dL    Triglycerides 120 <=150 mg/dL    HDL, Direct 80 (H) 40 - 60 mg/dL    LDL Calculated 127 (H) 0 - 100 mg/dL   Vitamin D 25 hydroxy    Collection Time: 01/04/19 10:05 AM   Result Value Ref Range    Vit D, 25-Hydroxy 19 2 (L) 30 0 - 100 0 ng/mL   PTH, intact    Collection Time: 01/04/19 10:05 AM   Result Value Ref Range     0 (H) 18 4 - 80 1 pg/mL   Basic metabolic panel    Collection Time: 01/10/19 10:29 AM   Result Value Ref Range    Sodium 136 136 - 145 mmol/L    Potassium 5 2 3 5 - 5 3 mmol/L    Chloride 105 100 - 108 mmol/L    CO2 20 (L) 21 - 32 mmol/L    ANION GAP 11 4 - 13 mmol/L    BUN 82 (H) 5 - 25 mg/dL    Creatinine 10 45 (H) 0 60 - 1 30 mg/dL    Glucose, Fasting 182 (H) 65 - 99 mg/dL    Calcium 8 8 8 3 - 10 1 mg/dL    eGFR 6 ml/min/1 73sq m   EKG 12 lead    Collection Time: 01/15/19  2:30 PM   Result Value Ref Range    Ventricular Rate 79 BPM    Atrial Rate 79 BPM    MD Interval 138 ms    QRSD Interval 76 ms    QT Interval 364 ms    QTC Interval 417 ms    P Axis 78 degrees    QRS Axis 63 degrees    T Wave Axis 150 degrees   Type and screen    Collection Time: 01/15/19  2:41 PM   Result Value Ref Range    ABO Grouping A     Rh Factor Positive     Antibody Screen Positive     Specimen Expiration Date [id number]    Basic metabolic panel    Collection Time: 01/15/19  2:41 PM   Result Value Ref Range    Sodium 137 136 - 145 mmol/L    Potassium 5 4 (H) 3 5 - 5 3 mmol/L    Chloride 106 100 - 108 mmol/L    CO2 20 (L) 21 - 32 mmol/L    ANION GAP 11 4 - 13 mmol/L    BUN 77 (H) 5 - 25 mg/dL    Creatinine 10 32 (H) 0 60 - 1 30 mg/dL    Glucose 163 (H) 65 - 140 mg/dL    Calcium 9 0 8 3 - 10 1 mg/dL    eGFR 6 ml/min/1 73sq m   CBC and differential    Collection Time: 01/15/19  2:41 PM   Result Value Ref Range    WBC 6 40 4 31 - 10 16 Thousand/uL    RBC 4 07 3 88 - 5 62 Million/uL    Hemoglobin 10 5 (L) 12 0 - 17 0 g/dL    Hematocrit 34 0 (L) 36 5 - 49 3 %    MCV 84 82 - 98 fL    MCH 25 8 (L) 26 8 - 34 3 pg    MCHC 30 9 (L) 31 4 - 37 4 g/dL    RDW 13 4 11 6 - 15 1 %    MPV 9 2 8 9 - 12 7 fL    Platelets 295 815 - 258 Thousands/uL    nRBC 0 /100 WBCs    Neutrophils Relative 66 43 - 75 %    Immat GRANS % 0 0 - 2 %    Lymphocytes Relative 23 14 - 44 %    Monocytes Relative 8 4 - 12 %    Eosinophils Relative 2 0 - 6 %    Basophils Relative 1 0 - 1 %    Neutrophils Absolute 4 26 1 85 - 7 62 Thousands/µL    Immature Grans Absolute 0 01 0 00 - 0 20 Thousand/uL    Lymphocytes Absolute 1 47 0 60 - 4 47 Thousands/µL    Monocytes Absolute 0 49 0 17 - 1 22 Thousand/µL    Eosinophils Absolute 0 13 0 00 - 0 61 Thousand/µL    Basophils Absolute 0 04 0 00 - 0 10 Thousands/µL   Antibody identification    Collection Time: 01/15/19  2:41 PM   Result Value Ref Range    ANTIBODY ID   #1 Undetermined Specificity    Type and screen    Collection Time: 01/15/19  2:41 PM   Result Value Ref Range    ABO Grouping A     Rh Factor Positive     Antibody Screen Positive     Specimen Expiration Date [id number]    Potassium    Collection Time: 01/23/19  6:17 AM   Result Value Ref Range    Potassium 4 9 3 5 - 5 3 mmol/L   Fingerstick Glucose (POCT)    Collection Time: 01/23/19  6:23 AM   Result Value Ref Range    POC Glucose 162 (H) 65 - 140 mg/dl   Fingerstick Glucose (POCT)    Collection Time: 01/23/19  9:35 AM   Result Value Ref Range    POC Glucose 172 (H) 65 - 140 mg/dl   ]    No results found

## 2019-01-25 NOTE — ASSESSMENT & PLAN NOTE
Fistula was created  Follow up with vascular surgery in a couple of days  And then will be seeing Nephrology in a couple days after that  Had a lengthy discussion about the importance of compliance, regular follow-ups  He seems motivated at this time  We discussed the complications of missing dialysis or missing medications

## 2019-01-25 NOTE — ASSESSMENT & PLAN NOTE
Uncontrolle, ACEi stopped due to hyperkalemia, will start coreg instead, Has LV concentrci hypertrophy   Once dialysis started, can restart Ace-I

## 2019-02-05 ENCOUNTER — OFFICE VISIT (OUTPATIENT)
Dept: VASCULAR SURGERY | Facility: CLINIC | Age: 54
End: 2019-02-05

## 2019-02-05 VITALS
HEART RATE: 98 BPM | WEIGHT: 164 LBS | TEMPERATURE: 98.4 F | RESPIRATION RATE: 18 BRPM | BODY MASS INDEX: 22.21 KG/M2 | HEIGHT: 72 IN | SYSTOLIC BLOOD PRESSURE: 158 MMHG | DIASTOLIC BLOOD PRESSURE: 82 MMHG

## 2019-02-05 DIAGNOSIS — Z98.890 S/P ARTERIOVENOUS (AV) FISTULA CREATION: ICD-10-CM

## 2019-02-05 DIAGNOSIS — N18.4 CKD (CHRONIC KIDNEY DISEASE) STAGE 4, GFR 15-29 ML/MIN (HCC): Primary | ICD-10-CM

## 2019-02-05 PROCEDURE — 99024 POSTOP FOLLOW-UP VISIT: CPT | Performed by: SURGERY

## 2019-02-05 NOTE — PROGRESS NOTES
Assessment/Plan:    CKD (chronic kidney disease) stage 4, GFR 15-29 ml/min [facility])  49yo male with CKD4 s/p left brachiocephalic AVF creation on 1/48/67 presents for post-op check  Denies any significant pain, numbness, tingling of the incision or hand  Can sense thrill through fistula  Excellent thrill and +bruit, Palpable radial pulse, hand warm well perfused  Will obtain ultrasound in 3 weeks to assess for maturation  Clinic f/u to discuss results  S/P arteriovenous (AV) fistula creation  Maturing well clinically, will formally assess with ultrasound in  3 weeks         Problem List Items Addressed This Visit        Genitourinary    CKD (chronic kidney disease) stage 4, GFR 15-29 ml/min ([facility]) - Primary     49yo male with CKD4 s/p left brachiocephalic AVF creation on 9/66/06 presents for post-op check  Denies any significant pain, numbness, tingling of the incision or hand  Can sense thrill through fistula  Excellent thrill and +bruit, Palpable radial pulse, hand warm well perfused  Will obtain ultrasound in 3 weeks to assess for maturation  Clinic f/u to discuss results  Relevant Orders    VAS hemodialysis access duplex left upper limb avf       Other    S/P arteriovenous (AV) fistula creation     Maturing well clinically, will formally assess with ultrasound in  3 weeks                 Subjective:      Patient ID: Monica Jasmine is a 46 y o  male  Patient had L AVF creation on 1/23 by Dr Maura Jeter  Patient has pain at incision site, and discomfort in his L arm  Patient denies numbness and tingling  Thrill and Bruit are present  Patient is diabetic and blind  The following portions of the patient's history were reviewed and updated as appropriate: ROS allergies, current medications, past family history, past medical history, past social history, past surgical history and problem list     Review of Systems   Constitutional: Negative  HENT: Negative  Eyes: Negative  Respiratory: Negative  Cardiovascular: Negative  Gastrointestinal: Negative  Endocrine: Negative  Genitourinary: Negative  Musculoskeletal:        Arm pain   Skin: Negative  Allergic/Immunologic: Negative  Neurological: Negative  Hematological: Negative  Psychiatric/Behavioral: Negative  Objective:      /82 (BP Location: Right arm, Patient Position: Sitting)   Pulse 98   Temp 98 4 °F (36 9 °C)   Resp 18   Ht 6' (1 829 m)   Wt 74 4 kg (164 lb)   BMI 22 24 kg/m²          Physical Exam   Constitutional: He is oriented to person, place, and time  He appears well-developed and well-nourished  HENT:   Head: Normocephalic and atraumatic  Eyes:   Blind in both eyes   Neck: Normal range of motion  Neck supple  Cardiovascular: Normal rate, regular rhythm and normal heart sounds  Pulmonary/Chest: Effort normal and breath sounds normal  No respiratory distress  He has no wheezes  Abdominal: Soft  Bowel sounds are normal  He exhibits no distension  There is no tenderness  Musculoskeletal: Normal range of motion  He exhibits no edema  LUE incision healing well  Easily palpable thrill +bruit, even in the proximal arm     Palpable radial pulse, motor and sensation in the hand intact  Neurological: He is alert and oriented to person, place, and time  Skin: Skin is warm and dry  No rash noted  No erythema  No pallor  Psychiatric: He has a normal mood and affect   His behavior is normal  Judgment and thought content normal

## 2019-02-07 ENCOUNTER — TELEPHONE (OUTPATIENT)
Dept: NEPHROLOGY | Facility: CLINIC | Age: 54
End: 2019-02-07

## 2019-02-07 NOTE — TELEPHONE ENCOUNTER
Spoke with patients emergency contact and informed her of blood work that needs to get done for upcoming appointment 2/7/19

## 2019-02-11 ENCOUNTER — APPOINTMENT (OUTPATIENT)
Dept: LAB | Facility: HOSPITAL | Age: 54
End: 2019-02-11
Attending: INTERNAL MEDICINE
Payer: MEDICARE

## 2019-02-11 DIAGNOSIS — Z12.5 SCREENING FOR PROSTATE CANCER: ICD-10-CM

## 2019-02-11 DIAGNOSIS — N18.4 CKD (CHRONIC KIDNEY DISEASE) STAGE 4, GFR 15-29 ML/MIN (HCC): ICD-10-CM

## 2019-02-11 LAB
ALBUMIN SERPL BCP-MCNC: 2.5 G/DL (ref 3.5–5)
ANION GAP SERPL CALCULATED.3IONS-SCNC: 11 MMOL/L (ref 4–13)
BUN SERPL-MCNC: 62 MG/DL (ref 5–25)
CALCIUM SERPL-MCNC: 8.9 MG/DL (ref 8.3–10.1)
CHLORIDE SERPL-SCNC: 109 MMOL/L (ref 100–108)
CO2 SERPL-SCNC: 19 MMOL/L (ref 21–32)
CREAT SERPL-MCNC: 9.89 MG/DL (ref 0.6–1.3)
ERYTHROCYTE [DISTWIDTH] IN BLOOD BY AUTOMATED COUNT: 13.3 % (ref 11.6–15.1)
GFR SERPL CREATININE-BSD FRML MDRD: 6 ML/MIN/1.73SQ M
GLUCOSE P FAST SERPL-MCNC: 142 MG/DL (ref 65–99)
HCT VFR BLD AUTO: 30.6 % (ref 36.5–49.3)
HGB BLD-MCNC: 9.2 G/DL (ref 12–17)
MCH RBC QN AUTO: 26 PG (ref 26.8–34.3)
MCHC RBC AUTO-ENTMCNC: 30.1 G/DL (ref 31.4–37.4)
MCV RBC AUTO: 86 FL (ref 82–98)
PHOSPHATE SERPL-MCNC: 4.8 MG/DL (ref 2.7–4.5)
PLATELET # BLD AUTO: 216 THOUSANDS/UL (ref 149–390)
PMV BLD AUTO: 9.6 FL (ref 8.9–12.7)
POTASSIUM SERPL-SCNC: 5.1 MMOL/L (ref 3.5–5.3)
PSA SERPL-MCNC: 0.5 NG/ML (ref 0–4)
PTH-INTACT SERPL-MCNC: 476 PG/ML (ref 18.4–80.1)
RBC # BLD AUTO: 3.54 MILLION/UL (ref 3.88–5.62)
SODIUM SERPL-SCNC: 139 MMOL/L (ref 136–145)
WBC # BLD AUTO: 5.43 THOUSAND/UL (ref 4.31–10.16)

## 2019-02-11 PROCEDURE — 83970 ASSAY OF PARATHORMONE: CPT

## 2019-02-11 PROCEDURE — 80069 RENAL FUNCTION PANEL: CPT

## 2019-02-11 PROCEDURE — 85027 COMPLETE CBC AUTOMATED: CPT

## 2019-02-11 PROCEDURE — 36415 COLL VENOUS BLD VENIPUNCTURE: CPT

## 2019-02-11 PROCEDURE — G0103 PSA SCREENING: HCPCS

## 2019-02-12 ENCOUNTER — TELEPHONE (OUTPATIENT)
Dept: NEPHROLOGY | Facility: CLINIC | Age: 54
End: 2019-02-12

## 2019-02-13 ENCOUNTER — OFFICE VISIT (OUTPATIENT)
Dept: NEPHROLOGY | Facility: CLINIC | Age: 54
End: 2019-02-13
Payer: MEDICARE

## 2019-02-13 VITALS
WEIGHT: 160 LBS | HEIGHT: 72 IN | SYSTOLIC BLOOD PRESSURE: 145 MMHG | DIASTOLIC BLOOD PRESSURE: 80 MMHG | HEART RATE: 70 BPM | BODY MASS INDEX: 21.67 KG/M2

## 2019-02-13 DIAGNOSIS — I10 ESSENTIAL HYPERTENSION: ICD-10-CM

## 2019-02-13 DIAGNOSIS — N18.5 CKD (CHRONIC KIDNEY DISEASE) STAGE 5, GFR LESS THAN 15 ML/MIN (HCC): ICD-10-CM

## 2019-02-13 DIAGNOSIS — H54.8 LEGALLY BLIND: ICD-10-CM

## 2019-02-13 DIAGNOSIS — E78.5 HYPERLIPIDEMIA, UNSPECIFIED HYPERLIPIDEMIA TYPE: ICD-10-CM

## 2019-02-13 DIAGNOSIS — Z98.890 S/P ARTERIOVENOUS (AV) FISTULA CREATION: ICD-10-CM

## 2019-02-13 DIAGNOSIS — E11.319 DIABETIC RETINOPATHY OF BOTH EYES ASSOCIATED WITH TYPE 2 DIABETES MELLITUS, MACULAR EDEMA PRESENCE UNSPECIFIED, UNSPECIFIED RETINOPATHY SEVERITY (HCC): ICD-10-CM

## 2019-02-13 DIAGNOSIS — IMO0002 UNCONTROLLED SECONDARY DIABETES MELLITUS WITH STAGE 5 CKD (GFR<15): Primary | ICD-10-CM

## 2019-02-13 PROCEDURE — 99214 OFFICE O/P EST MOD 30 MIN: CPT | Performed by: INTERNAL MEDICINE

## 2019-02-27 ENCOUNTER — OFFICE VISIT (OUTPATIENT)
Dept: INTERNAL MEDICINE CLINIC | Facility: CLINIC | Age: 54
End: 2019-02-27
Payer: MEDICARE

## 2019-02-27 VITALS
BODY MASS INDEX: 21.43 KG/M2 | SYSTOLIC BLOOD PRESSURE: 150 MMHG | TEMPERATURE: 96.7 F | HEIGHT: 72 IN | DIASTOLIC BLOOD PRESSURE: 88 MMHG | WEIGHT: 158.2 LBS | HEART RATE: 78 BPM

## 2019-02-27 DIAGNOSIS — I10 ESSENTIAL HYPERTENSION: ICD-10-CM

## 2019-02-27 DIAGNOSIS — N18.5 CKD (CHRONIC KIDNEY DISEASE) STAGE 5, GFR LESS THAN 15 ML/MIN (HCC): ICD-10-CM

## 2019-02-27 DIAGNOSIS — E78.5 HYPERLIPIDEMIA, UNSPECIFIED HYPERLIPIDEMIA TYPE: ICD-10-CM

## 2019-02-27 DIAGNOSIS — IMO0002 UNCONTROLLED SECONDARY DIABETES MELLITUS WITH STAGE 5 CKD (GFR<15): Primary | ICD-10-CM

## 2019-02-27 PROCEDURE — 99214 OFFICE O/P EST MOD 30 MIN: CPT | Performed by: INTERNAL MEDICINE

## 2019-02-27 NOTE — ASSESSMENT & PLAN NOTE
Slightly elevated, hopefully will improve with introduction of dialysis  Will continue the current regimen at this time    Reinforce the importance of medication compliance

## 2019-02-27 NOTE — ASSESSMENT & PLAN NOTE
Lab Results   Component Value Date    HGBA1C 9 0 (H) 01/04/2019       No results for input(s): POCGLU in the last 72 hours  Blood Sugar Average: Last 72 hrs:   blood sugar checks between 100-160 is mostly acceptable  Advised patient an sister that once he starts dialysis, he may start to notice hyperglycemia  At this point will not increase his regimen due to risk of hypoglycemia due to significantly reduced kidney function  Asked him to continue monitoring closely, bring blood sugar readings to next office visit    One starts dialysis he, he may need even further close follow-up

## 2019-02-27 NOTE — ASSESSMENT & PLAN NOTE
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

## 2019-03-27 ENCOUNTER — APPOINTMENT (OUTPATIENT)
Dept: LAB | Facility: HOSPITAL | Age: 54
End: 2019-03-27
Attending: INTERNAL MEDICINE
Payer: MEDICARE

## 2019-03-27 ENCOUNTER — HOSPITAL ENCOUNTER (OUTPATIENT)
Dept: RADIOLOGY | Facility: HOSPITAL | Age: 54
Discharge: HOME | End: 2019-03-27
Attending: INTERNAL MEDICINE
Payer: MEDICARE

## 2019-03-27 ENCOUNTER — OFFICE VISIT (OUTPATIENT)
Dept: INTERNAL MEDICINE CLINIC | Facility: CLINIC | Age: 54
End: 2019-03-27
Payer: MEDICARE

## 2019-03-27 VITALS
TEMPERATURE: 98.9 F | WEIGHT: 170.4 LBS | HEART RATE: 95 BPM | RESPIRATION RATE: 15 BRPM | DIASTOLIC BLOOD PRESSURE: 90 MMHG | BODY MASS INDEX: 23.08 KG/M2 | SYSTOLIC BLOOD PRESSURE: 180 MMHG | HEIGHT: 72 IN

## 2019-03-27 DIAGNOSIS — IMO0002 UNCONTROLLED SECONDARY DIABETES MELLITUS WITH STAGE 5 CKD (GFR<15): ICD-10-CM

## 2019-03-27 DIAGNOSIS — N18.6 ESRD (END STAGE RENAL DISEASE) (HCC): ICD-10-CM

## 2019-03-27 DIAGNOSIS — R05.9 COUGH: ICD-10-CM

## 2019-03-27 DIAGNOSIS — IMO0002 UNCONTROLLED SECONDARY DIABETES MELLITUS WITH STAGE 5 CKD (GFR<15): Primary | ICD-10-CM

## 2019-03-27 DIAGNOSIS — I10 ESSENTIAL HYPERTENSION: ICD-10-CM

## 2019-03-27 LAB
ALBUMIN SERPL BCP-MCNC: 3 G/DL (ref 3.5–5)
ANION GAP SERPL CALCULATED.3IONS-SCNC: 14 MMOL/L (ref 4–13)
BUN SERPL-MCNC: 86 MG/DL (ref 5–25)
CALCIUM SERPL-MCNC: 9.6 MG/DL (ref 8.3–10.1)
CHLORIDE SERPL-SCNC: 105 MMOL/L (ref 100–108)
CO2 SERPL-SCNC: 19 MMOL/L (ref 21–32)
CREAT SERPL-MCNC: 14.87 MG/DL (ref 0.6–1.3)
ERYTHROCYTE [DISTWIDTH] IN BLOOD BY AUTOMATED COUNT: 14.6 % (ref 11.6–15.1)
GFR SERPL CREATININE-BSD FRML MDRD: 4 ML/MIN/1.73SQ M
GLUCOSE SERPL-MCNC: 268 MG/DL (ref 65–140)
HCT VFR BLD AUTO: 30.6 % (ref 36.5–49.3)
HGB BLD-MCNC: 9.4 G/DL (ref 12–17)
MCH RBC QN AUTO: 26.2 PG (ref 26.8–34.3)
MCHC RBC AUTO-ENTMCNC: 30.7 G/DL (ref 31.4–37.4)
MCV RBC AUTO: 85 FL (ref 82–98)
PHOSPHATE SERPL-MCNC: 5.1 MG/DL (ref 2.7–4.5)
PLATELET # BLD AUTO: 181 THOUSANDS/UL (ref 149–390)
PMV BLD AUTO: 9.4 FL (ref 8.9–12.7)
POTASSIUM SERPL-SCNC: 5.4 MMOL/L (ref 3.5–5.3)
PTH-INTACT SERPL-MCNC: 522.7 PG/ML (ref 18.4–80.1)
RBC # BLD AUTO: 3.59 MILLION/UL (ref 3.88–5.62)
SODIUM SERPL-SCNC: 138 MMOL/L (ref 136–145)
WBC # BLD AUTO: 6.42 THOUSAND/UL (ref 4.31–10.16)

## 2019-03-27 PROCEDURE — 80069 RENAL FUNCTION PANEL: CPT

## 2019-03-27 PROCEDURE — 71046 X-RAY EXAM CHEST 2 VIEWS: CPT

## 2019-03-27 PROCEDURE — 83970 ASSAY OF PARATHORMONE: CPT

## 2019-03-27 PROCEDURE — 36415 COLL VENOUS BLD VENIPUNCTURE: CPT

## 2019-03-27 PROCEDURE — 85027 COMPLETE CBC AUTOMATED: CPT

## 2019-03-27 PROCEDURE — 99215 OFFICE O/P EST HI 40 MIN: CPT | Performed by: INTERNAL MEDICINE

## 2019-03-28 NOTE — PROGRESS NOTES
Assessment/Plan:  Orthopnea, cough, 12 pound weight gain in last 1 month, all points towards fluid overload, luckily O2 sats 100% on RA, no JVD, clear lung exam, not in acute distress  Going for duplex in 2 days to ensure if fistula is mature  Hopefully it is and can be started on HD as soon as possible  Advised against travel to [facility] until dialysis started and stabilized  Talked to sister again after getting chest xray which showed small pleural effusion, no pulmonary edema, also discssed with Dr Love Rojo  Reminded sister about symptoms of acute fluid overload with worsening dyspnea, vomiting, worsning fatigue, palpitations, to seek immediate medical attention in that case  Wont change antihypertensives at this time, BP should improve with initiation of dialysis  BS will get worse once HD started so will adjust insulin regimen in couple of days after starting HD  Total time spent 50 mins with >50% face to face counselling, coordination of care, reviewing chest xray results  Diagnoses and all orders for this visit:    Uncontrolled secondary diabetes mellitus with stage 5 CKD (GFR<15) ([facility] [address] )    Essential hypertension    ESRD (end stage renal disease) (HCC)  -     XR chest pa & lateral; Future    Cough  -     XR chest pa & lateral; Future          Subjective:   Chief Complaint   Patient presents with    Follow-up     1 month        Patient ID: Isidro Grant is a 46 y o  male  He comes in for follow up of HTN, DM, ESRD    For last 2 weeks having a dry cough, feeling fatigue, somewhat short of breath on laying flat, using 3 pillows  No CP, no fevers  BS some high some good  Anxious to go to [facility] to meet family       The following portions of the patient's history were reviewed and updated as appropriate: current medications, past medical history, past social history and past surgical history      PHQ-9 Depression Screening    PHQ-9:    Frequency of the following problems over the past two weeks: Current Outpatient Medications:     amLODIPine (NORVASC) 10 mg tablet, Take 1 tablet (10 mg total) by mouth daily (Patient taking differently: Take 10 mg by mouth every evening  ), Disp: 90 tablet, Rfl: 0    aspirin 81 MG tablet, Take 1 tablet (81 mg total) by mouth daily, Disp: 90 tablet, Rfl: 0    atorvastatin (LIPITOR) 40 mg tablet, Take 1 tablet (40 mg total) by mouth daily, Disp: 90 tablet, Rfl: 0    Blood Glucose Monitoring Suppl (ONE TOUCH ULTRA 2) w/Device KIT, by Does not apply route 3 (three) times a day, Disp: 1 each, Rfl: 0    carvedilol (COREG) 6 25 mg tablet, Take 1 tablet (6 25 mg total) by mouth 2 (two) times a day with meals, Disp: 60 tablet, Rfl: 1    Cholecalciferol (VITAMIN D-3) 5000 units TABS, Take 1 tablet by mouth daily, Disp: , Rfl:     glipiZIDE (GLUCOTROL) 10 mg tablet, Take 1 tablet (10 mg total) by mouth 2 (two) times a day before meals, Disp: 180 tablet, Rfl: 0    glucose blood test strip, Test blood sugars three times daily  , Disp: 100 each, Rfl: 2    insulin glargine (BASAGLAR KWIKPEN) 100 units/mL injection pen, Inject 30 Units under the skin daily (Patient taking differently: Inject 30 Units under the skin daily at bedtime  ), Disp: 10 pen, Rfl: 0    ONE TOUCH LANCETS MISC, by Does not apply route 3 (three) times a day, Disp: 100 each, Rfl: 1    psyllium (METAMUCIL) 58 6 % powder, Take 1 packet by mouth daily, Disp: 30 packet, Rfl: 2    Review of Systems   Constitutional: Positive for fatigue  Negative for fever and unexpected weight change  HENT: Negative for ear pain, hearing loss and sore throat  Eyes: Negative for pain and discharge  Respiratory: Positive for cough and shortness of breath  Negative for chest tightness  Cardiovascular: Negative for chest pain and palpitations  Gastrointestinal: Negative for abdominal pain, blood in stool, constipation, diarrhea and nausea  Genitourinary: Negative for dysuria, frequency and hematuria  Musculoskeletal: Negative for arthralgias and joint swelling  Skin: Negative for rash  Allergic/Immunologic: Negative for immunocompromised state  Neurological: Negative for dizziness and headaches  Hematological: Negative for adenopathy  Psychiatric/Behavioral: Negative for confusion and sleep disturbance  Objective:  BP (!) 180/90 (BP Location: Right arm, Patient Position: Sitting, Cuff Size: Standard)   Pulse 95   Temp 98 9 °F (37 2 °C)   Resp 15   Ht 6' (1 829 m)   Wt 77 3 kg (170 lb 6 4 oz)   BMI 23 11 kg/m²      Physical Exam   Constitutional: He appears well-developed and well-nourished  HENT:   Head: Normocephalic and atraumatic  Right Ear: Tympanic membrane normal    Left Ear: Tympanic membrane normal    Nose: Nose normal    Mouth/Throat: Oropharynx is clear and moist  No posterior oropharyngeal edema or posterior oropharyngeal erythema  Eyes: Pupils are equal, round, and reactive to light  Conjunctivae are normal  Right eye exhibits no discharge  Neck: Normal range of motion  Neck supple  No thyromegaly present  Cardiovascular: Normal rate, regular rhythm, S1 normal, S2 normal and normal heart sounds  PMI is not displaced  No murmur heard  Pulmonary/Chest: Effort normal and breath sounds normal  No accessory muscle usage  No apnea  No respiratory distress  He has no rhonchi  He has no rales  Abdominal: Soft  Normal appearance and bowel sounds are normal  He exhibits no shifting dullness  There is no hepatosplenomegaly  There is no tenderness  There is no rebound and no CVA tenderness  Musculoskeletal: Normal range of motion  He exhibits no edema or tenderness  Lymphadenopathy:     He has no cervical adenopathy  Neurological: He is alert  Skin: Skin is warm and intact  No rash noted  Psychiatric: He has a normal mood and affect  His speech is normal    Nursing note and vitals reviewed          Recent Results (from the past 1008 hour(s))   Renal function panel    Collection Time: 03/27/19 10:27 AM   Result Value Ref Range    Albumin 3 0 (L) 3 5 - 5 0 g/dL    Calcium 9 6 8 3 - 10 1 mg/dL    Phosphorus 5 1 (H) 2 7 - 4 5 mg/dL    Glucose 268 (H) 65 - 140 mg/dL    BUN 86 (H) 5 - 25 mg/dL    Creatinine 14 87 (H) 0 60 - 1 30 mg/dL    Sodium 138 136 - 145 mmol/L    Potassium 5 4 (H) 3 5 - 5 3 mmol/L    Chloride 105 100 - 108 mmol/L    CO2 19 (L) 21 - 32 mmol/L    ANION GAP 14 (H) 4 - 13 mmol/L    eGFR 4 ml/min/1 73sq m   PTH, intact    Collection Time: 03/27/19 10:27 AM   Result Value Ref Range     7 (H) 18 4 - 80 1 pg/mL   CBC    Collection Time: 03/27/19 10:27 AM   Result Value Ref Range    WBC 6 42 4 31 - 10 16 Thousand/uL    RBC 3 59 (L) 3 88 - 5 62 Million/uL    Hemoglobin 9 4 (L) 12 0 - 17 0 g/dL    Hematocrit 30 6 (L) 36 5 - 49 3 %    MCV 85 82 - 98 fL    MCH 26 2 (L) 26 8 - 34 3 pg    MCHC 30 7 (L) 31 4 - 37 4 g/dL    RDW 14 6 11 6 - 15 1 %    Platelets 286 400 - 075 Thousands/uL    MPV 9 4 8 9 - 12 7 fL   ]    No results found

## 2019-03-29 ENCOUNTER — HOSPITAL ENCOUNTER (OUTPATIENT)
Dept: NON INVASIVE DIAGNOSTICS | Facility: CLINIC | Age: 54
Discharge: HOME | End: 2019-03-29
Payer: MEDICARE

## 2019-03-29 DIAGNOSIS — N18.4 CKD (CHRONIC KIDNEY DISEASE) STAGE 4, GFR 15-29 ML/MIN (HCC): ICD-10-CM

## 2019-03-29 PROCEDURE — 93990 DOPPLER FLOW TESTING: CPT

## 2019-03-30 PROCEDURE — 93990 DOPPLER FLOW TESTING: CPT | Performed by: SURGERY

## 2019-04-02 ENCOUNTER — TELEPHONE (OUTPATIENT)
Dept: INTERNAL MEDICINE CLINIC | Facility: CLINIC | Age: 54
End: 2019-04-02

## 2019-04-03 ENCOUNTER — APPOINTMENT (EMERGENCY)
Dept: RADIOLOGY | Facility: HOSPITAL | Age: 54
DRG: 699 | End: 2019-04-03
Payer: MEDICARE

## 2019-04-03 ENCOUNTER — APPOINTMENT (INPATIENT)
Dept: RADIOLOGY | Facility: HOSPITAL | Age: 54
DRG: 699 | End: 2019-04-03
Payer: MEDICARE

## 2019-04-03 ENCOUNTER — APPOINTMENT (EMERGENCY)
Dept: CT IMAGING | Facility: HOSPITAL | Age: 54
DRG: 699 | End: 2019-04-03
Payer: MEDICARE

## 2019-04-03 ENCOUNTER — HOSPITAL ENCOUNTER (INPATIENT)
Facility: HOSPITAL | Age: 54
LOS: 5 days | Discharge: HOME | DRG: 699 | End: 2019-04-08
Attending: EMERGENCY MEDICINE | Admitting: HOSPITALIST
Payer: MEDICARE

## 2019-04-03 ENCOUNTER — OFFICE VISIT (OUTPATIENT)
Dept: NEPHROLOGY | Facility: CLINIC | Age: 54
End: 2019-04-03
Payer: MEDICARE

## 2019-04-03 VITALS
WEIGHT: 170 LBS | HEART RATE: 80 BPM | DIASTOLIC BLOOD PRESSURE: 100 MMHG | BODY MASS INDEX: 24.34 KG/M2 | SYSTOLIC BLOOD PRESSURE: 182 MMHG | HEIGHT: 70 IN

## 2019-04-03 DIAGNOSIS — IMO0002 UNCONTROLLED SECONDARY DIABETES MELLITUS WITH STAGE 5 CKD (GFR<15): ICD-10-CM

## 2019-04-03 DIAGNOSIS — I16.0 HYPERTENSIVE URGENCY: ICD-10-CM

## 2019-04-03 DIAGNOSIS — I51.7 LVH (LEFT VENTRICULAR HYPERTROPHY): ICD-10-CM

## 2019-04-03 DIAGNOSIS — E11.319 DIABETIC RETINOPATHY OF BOTH EYES ASSOCIATED WITH TYPE 2 DIABETES MELLITUS, MACULAR EDEMA PRESENCE UNSPECIFIED, UNSPECIFIED RETINOPATHY SEVERITY (HCC): ICD-10-CM

## 2019-04-03 DIAGNOSIS — I10 ESSENTIAL HYPERTENSION: ICD-10-CM

## 2019-04-03 DIAGNOSIS — N17.9 ACUTE ON CHRONIC RENAL FAILURE (HCC): ICD-10-CM

## 2019-04-03 DIAGNOSIS — N18.5 CKD (CHRONIC KIDNEY DISEASE) STAGE 5, GFR LESS THAN 15 ML/MIN (HCC): Primary | ICD-10-CM

## 2019-04-03 DIAGNOSIS — Z98.890 S/P ARTERIOVENOUS (AV) FISTULA CREATION: ICD-10-CM

## 2019-04-03 DIAGNOSIS — N18.9 ACUTE ON CHRONIC RENAL FAILURE (HCC): ICD-10-CM

## 2019-04-03 DIAGNOSIS — E78.5 HYPERLIPIDEMIA, UNSPECIFIED HYPERLIPIDEMIA TYPE: ICD-10-CM

## 2019-04-03 DIAGNOSIS — R77.8 ELEVATED TROPONIN: ICD-10-CM

## 2019-04-03 PROBLEM — E87.2 ACIDOSIS: Status: ACTIVE | Noted: 2019-04-03

## 2019-04-03 PROBLEM — E87.5 HYPERKALEMIA: Status: ACTIVE | Noted: 2019-04-03

## 2019-04-03 PROBLEM — R79.89 ELEVATED TROPONIN I LEVEL: Status: ACTIVE | Noted: 2019-04-03

## 2019-04-03 PROBLEM — D64.9 ANEMIA: Status: ACTIVE | Noted: 2019-04-03

## 2019-04-03 PROBLEM — E87.20 ACIDOSIS: Status: ACTIVE | Noted: 2019-04-03

## 2019-04-03 LAB
ANION GAP SERPL CALCULATED.3IONS-SCNC: 15 MMOL/L (ref 4–13)
APTT PPP: 39 SECONDS (ref 26–38)
ATRIAL RATE: 90 BPM
BUN SERPL-MCNC: 103 MG/DL (ref 5–25)
CALCIUM SERPL-MCNC: 9.5 MG/DL (ref 8.3–10.1)
CHLORIDE SERPL-SCNC: 108 MMOL/L (ref 100–108)
CO2 SERPL-SCNC: 18 MMOL/L (ref 21–32)
CREAT SERPL-MCNC: 15.82 MG/DL (ref 0.6–1.3)
ERYTHROCYTE [DISTWIDTH] IN BLOOD BY AUTOMATED COUNT: 15.4 % (ref 11.6–15.1)
GFR SERPL CREATININE-BSD FRML MDRD: 3 ML/MIN/1.73SQ M
GLUCOSE SERPL-MCNC: 162 MG/DL (ref 65–140)
GLUCOSE SERPL-MCNC: 189 MG/DL (ref 65–140)
GLUCOSE SERPL-MCNC: 380 MG/DL (ref 65–140)
HCT VFR BLD AUTO: 27.9 % (ref 36.5–49.3)
HGB BLD-MCNC: 8.6 G/DL (ref 12–17)
INR PPP: 1.12 (ref 0.86–1.17)
LACTATE SERPL-SCNC: 0.6 MMOL/L (ref 0.5–2)
LIPASE SERPL-CCNC: 335 U/L (ref 73–393)
MAGNESIUM SERPL-MCNC: 1.8 MG/DL (ref 1.6–2.6)
MCH RBC QN AUTO: 26.7 PG (ref 26.8–34.3)
MCHC RBC AUTO-ENTMCNC: 30.8 G/DL (ref 31.4–37.4)
MCV RBC AUTO: 87 FL (ref 82–98)
P AXIS: 79 DEGREES
PHOSPHATE SERPL-MCNC: 5.6 MG/DL (ref 2.7–4.5)
PLATELET # BLD AUTO: 154 THOUSANDS/UL (ref 149–390)
PLATELET # BLD AUTO: 215 THOUSANDS/UL (ref 149–390)
PMV BLD AUTO: 10 FL (ref 8.9–12.7)
PMV BLD AUTO: 9.3 FL (ref 8.9–12.7)
POTASSIUM SERPL-SCNC: 5.2 MMOL/L (ref 3.5–5.3)
PR INTERVAL: 146 MS
PROCALCITONIN SERPL-MCNC: 0.24 NG/ML
PROTHROMBIN TIME: 14.5 SECONDS (ref 11.8–14.2)
QRS AXIS: 90 DEGREES
QRSD INTERVAL: 88 MS
QT INTERVAL: 366 MS
QTC INTERVAL: 447 MS
RBC # BLD AUTO: 3.22 MILLION/UL (ref 3.88–5.62)
SODIUM SERPL-SCNC: 141 MMOL/L (ref 136–145)
T WAVE AXIS: 86 DEGREES
TROPONIN I SERPL-MCNC: 0.1 NG/ML
TROPONIN I SERPL-MCNC: 0.13 NG/ML
VENTRICULAR RATE: 90 BPM
WBC # BLD AUTO: 5.99 THOUSAND/UL (ref 4.31–10.16)

## 2019-04-03 PROCEDURE — 83690 ASSAY OF LIPASE: CPT | Performed by: EMERGENCY MEDICINE

## 2019-04-03 PROCEDURE — 85610 PROTHROMBIN TIME: CPT | Performed by: EMERGENCY MEDICINE

## 2019-04-03 PROCEDURE — 99223 1ST HOSP IP/OBS HIGH 75: CPT | Performed by: HOSPITALIST

## 2019-04-03 PROCEDURE — 96365 THER/PROPH/DIAG IV INF INIT: CPT

## 2019-04-03 PROCEDURE — 77001 FLUOROGUIDE FOR VEIN DEVICE: CPT

## 2019-04-03 PROCEDURE — 99285 EMERGENCY DEPT VISIT HI MDM: CPT | Performed by: EMERGENCY MEDICINE

## 2019-04-03 PROCEDURE — 36558 INSERT TUNNELED CV CATH: CPT

## 2019-04-03 PROCEDURE — 82948 REAGENT STRIP/BLOOD GLUCOSE: CPT

## 2019-04-03 PROCEDURE — 74176 CT ABD & PELVIS W/O CONTRAST: CPT

## 2019-04-03 PROCEDURE — 77001 FLUOROGUIDE FOR VEIN DEVICE: CPT | Performed by: RADIOLOGY

## 2019-04-03 PROCEDURE — 99152 MOD SED SAME PHYS/QHP 5/>YRS: CPT

## 2019-04-03 PROCEDURE — 0JH63XZ INSERTION OF TUNNELED VASCULAR ACCESS DEVICE INTO CHEST SUBCUTANEOUS TISSUE AND FASCIA, PERCUTANEOUS APPROACH: ICD-10-PCS | Performed by: RADIOLOGY

## 2019-04-03 PROCEDURE — 99291 CRITICAL CARE FIRST HOUR: CPT

## 2019-04-03 PROCEDURE — 93005 ELECTROCARDIOGRAM TRACING: CPT

## 2019-04-03 PROCEDURE — 76937 US GUIDE VASCULAR ACCESS: CPT

## 2019-04-03 PROCEDURE — 99215 OFFICE O/P EST HI 40 MIN: CPT | Performed by: INTERNAL MEDICINE

## 2019-04-03 PROCEDURE — 84100 ASSAY OF PHOSPHORUS: CPT | Performed by: EMERGENCY MEDICINE

## 2019-04-03 PROCEDURE — 71046 X-RAY EXAM CHEST 2 VIEWS: CPT

## 2019-04-03 PROCEDURE — C1894 INTRO/SHEATH, NON-LASER: HCPCS

## 2019-04-03 PROCEDURE — C1750 CATH, HEMODIALYSIS,LONG-TERM: HCPCS

## 2019-04-03 PROCEDURE — 76937 US GUIDE VASCULAR ACCESS: CPT | Performed by: RADIOLOGY

## 2019-04-03 PROCEDURE — 99222 1ST HOSP IP/OBS MODERATE 55: CPT | Performed by: INTERNAL MEDICINE

## 2019-04-03 PROCEDURE — 83735 ASSAY OF MAGNESIUM: CPT | Performed by: EMERGENCY MEDICINE

## 2019-04-03 PROCEDURE — 93010 ELECTROCARDIOGRAM REPORT: CPT | Performed by: INTERNAL MEDICINE

## 2019-04-03 PROCEDURE — 85049 AUTOMATED PLATELET COUNT: CPT | Performed by: PHYSICIAN ASSISTANT

## 2019-04-03 PROCEDURE — 36561 INSERT TUNNELED CV CATH: CPT

## 2019-04-03 PROCEDURE — 84484 ASSAY OF TROPONIN QUANT: CPT | Performed by: PHYSICIAN ASSISTANT

## 2019-04-03 PROCEDURE — 02H633Z INSERTION OF INFUSION DEVICE INTO RIGHT ATRIUM, PERCUTANEOUS APPROACH: ICD-10-PCS | Performed by: RADIOLOGY

## 2019-04-03 PROCEDURE — 83605 ASSAY OF LACTIC ACID: CPT | Performed by: EMERGENCY MEDICINE

## 2019-04-03 PROCEDURE — 84145 PROCALCITONIN (PCT): CPT | Performed by: EMERGENCY MEDICINE

## 2019-04-03 PROCEDURE — 80048 BASIC METABOLIC PNL TOTAL CA: CPT | Performed by: EMERGENCY MEDICINE

## 2019-04-03 PROCEDURE — 96376 TX/PRO/DX INJ SAME DRUG ADON: CPT

## 2019-04-03 PROCEDURE — 99153 MOD SED SAME PHYS/QHP EA: CPT

## 2019-04-03 PROCEDURE — 96375 TX/PRO/DX INJ NEW DRUG ADDON: CPT

## 2019-04-03 PROCEDURE — 36415 COLL VENOUS BLD VENIPUNCTURE: CPT | Performed by: EMERGENCY MEDICINE

## 2019-04-03 PROCEDURE — 87040 BLOOD CULTURE FOR BACTERIA: CPT | Performed by: EMERGENCY MEDICINE

## 2019-04-03 PROCEDURE — 84484 ASSAY OF TROPONIN QUANT: CPT | Performed by: EMERGENCY MEDICINE

## 2019-04-03 PROCEDURE — 85027 COMPLETE CBC AUTOMATED: CPT | Performed by: EMERGENCY MEDICINE

## 2019-04-03 PROCEDURE — 36558 INSERT TUNNELED CV CATH: CPT | Performed by: RADIOLOGY

## 2019-04-03 PROCEDURE — 85730 THROMBOPLASTIN TIME PARTIAL: CPT | Performed by: EMERGENCY MEDICINE

## 2019-04-03 RX ORDER — HYDRALAZINE HYDROCHLORIDE 20 MG/ML
5 INJECTION INTRAMUSCULAR; INTRAVENOUS EVERY 6 HOURS PRN
Status: DISCONTINUED | OUTPATIENT
Start: 2019-04-03 | End: 2019-04-03

## 2019-04-03 RX ORDER — HYDRALAZINE HYDROCHLORIDE 20 MG/ML
10 INJECTION INTRAMUSCULAR; INTRAVENOUS EVERY 6 HOURS PRN
Status: DISCONTINUED | OUTPATIENT
Start: 2019-04-03 | End: 2019-04-03

## 2019-04-03 RX ORDER — ASPIRIN 81 MG/1
81 TABLET ORAL DAILY
Status: DISCONTINUED | OUTPATIENT
Start: 2019-04-04 | End: 2019-04-08 | Stop reason: HOSPADM

## 2019-04-03 RX ORDER — ONDANSETRON 2 MG/ML
4 INJECTION INTRAMUSCULAR; INTRAVENOUS EVERY 6 HOURS PRN
Status: DISCONTINUED | OUTPATIENT
Start: 2019-04-03 | End: 2019-04-08 | Stop reason: HOSPADM

## 2019-04-03 RX ORDER — FENTANYL CITRATE 50 UG/ML
INJECTION, SOLUTION INTRAMUSCULAR; INTRAVENOUS CODE/TRAUMA/SEDATION MEDICATION
Status: COMPLETED | OUTPATIENT
Start: 2019-04-03 | End: 2019-04-03

## 2019-04-03 RX ORDER — INSULIN GLARGINE 100 [IU]/ML
30 INJECTION, SOLUTION SUBCUTANEOUS
Status: DISCONTINUED | OUTPATIENT
Start: 2019-04-03 | End: 2019-04-05

## 2019-04-03 RX ORDER — ATORVASTATIN CALCIUM 40 MG/1
40 TABLET, FILM COATED ORAL
Status: DISCONTINUED | OUTPATIENT
Start: 2019-04-03 | End: 2019-04-08 | Stop reason: HOSPADM

## 2019-04-03 RX ORDER — ONDANSETRON 2 MG/ML
INJECTION INTRAMUSCULAR; INTRAVENOUS CODE/TRAUMA/SEDATION MEDICATION
Status: COMPLETED | OUTPATIENT
Start: 2019-04-03 | End: 2019-04-03

## 2019-04-03 RX ORDER — CARVEDILOL 3.12 MG/1
6.25 TABLET ORAL 2 TIMES DAILY WITH MEALS
Status: DISCONTINUED | OUTPATIENT
Start: 2019-04-03 | End: 2019-04-04

## 2019-04-03 RX ORDER — LIDOCAINE HYDROCHLORIDE 10 MG/ML
INJECTION, SOLUTION INFILTRATION; PERINEURAL CODE/TRAUMA/SEDATION MEDICATION
Status: COMPLETED | OUTPATIENT
Start: 2019-04-03 | End: 2019-04-03

## 2019-04-03 RX ORDER — HEPARIN SODIUM 5000 [USP'U]/ML
5000 INJECTION, SOLUTION INTRAVENOUS; SUBCUTANEOUS EVERY 8 HOURS SCHEDULED
Status: DISCONTINUED | OUTPATIENT
Start: 2019-04-03 | End: 2019-04-08 | Stop reason: HOSPADM

## 2019-04-03 RX ORDER — LABETALOL 20 MG/4 ML (5 MG/ML) INTRAVENOUS SYRINGE
10 ONCE
Status: COMPLETED | OUTPATIENT
Start: 2019-04-03 | End: 2019-04-03

## 2019-04-03 RX ORDER — HYDRALAZINE HYDROCHLORIDE 20 MG/ML
5 INJECTION INTRAMUSCULAR; INTRAVENOUS EVERY 6 HOURS PRN
Status: DISCONTINUED | OUTPATIENT
Start: 2019-04-03 | End: 2019-04-08 | Stop reason: HOSPADM

## 2019-04-03 RX ORDER — ALBUTEROL SULFATE 2.5 MG/3ML
5 SOLUTION RESPIRATORY (INHALATION) ONCE
Status: DISCONTINUED | OUTPATIENT
Start: 2019-04-03 | End: 2019-04-03

## 2019-04-03 RX ORDER — AMLODIPINE BESYLATE 10 MG/1
10 TABLET ORAL DAILY
Status: DISCONTINUED | OUTPATIENT
Start: 2019-04-04 | End: 2019-04-04

## 2019-04-03 RX ORDER — NIFEDIPINE 30 MG/1
30 TABLET, EXTENDED RELEASE ORAL DAILY
Status: DISCONTINUED | OUTPATIENT
Start: 2019-04-03 | End: 2019-04-04

## 2019-04-03 RX ORDER — HYDRALAZINE HYDROCHLORIDE 20 MG/ML
5 INJECTION INTRAMUSCULAR; INTRAVENOUS ONCE
Status: COMPLETED | OUTPATIENT
Start: 2019-04-03 | End: 2019-04-03

## 2019-04-03 RX ADMIN — HYDRALAZINE HYDROCHLORIDE 5 MG: 20 INJECTION INTRAMUSCULAR; INTRAVENOUS at 13:09

## 2019-04-03 RX ADMIN — CARVEDILOL 6.25 MG: 3.12 TABLET, FILM COATED ORAL at 18:13

## 2019-04-03 RX ADMIN — INSULIN LISPRO 4 UNITS: 100 INJECTION, SOLUTION INTRAVENOUS; SUBCUTANEOUS at 22:21

## 2019-04-03 RX ADMIN — AZITHROMYCIN MONOHYDRATE 500 MG: 500 INJECTION, POWDER, LYOPHILIZED, FOR SOLUTION INTRAVENOUS at 18:25

## 2019-04-03 RX ADMIN — FENTANYL CITRATE 50 MCG: 50 INJECTION, SOLUTION INTRAMUSCULAR; INTRAVENOUS at 16:36

## 2019-04-03 RX ADMIN — CEFTRIAXONE SODIUM 1000 MG: 10 INJECTION, POWDER, FOR SOLUTION INTRAVENOUS at 14:22

## 2019-04-03 RX ADMIN — ATORVASTATIN CALCIUM 40 MG: 40 TABLET, FILM COATED ORAL at 22:20

## 2019-04-03 RX ADMIN — HYDRALAZINE HYDROCHLORIDE 5 MG: 20 INJECTION INTRAMUSCULAR; INTRAVENOUS at 14:18

## 2019-04-03 RX ADMIN — ONDANSETRON 4 MG: 2 INJECTION INTRAMUSCULAR; INTRAVENOUS at 16:38

## 2019-04-03 RX ADMIN — LIDOCAINE HYDROCHLORIDE 9 ML: 10 INJECTION, SOLUTION INFILTRATION; PERINEURAL at 16:48

## 2019-04-03 RX ADMIN — LABETALOL 20 MG/4 ML (5 MG/ML) INTRAVENOUS SYRINGE 10 MG: at 15:02

## 2019-04-03 RX ADMIN — HEPARIN SODIUM 5000 UNITS: 5000 INJECTION INTRAVENOUS; SUBCUTANEOUS at 22:20

## 2019-04-03 RX ADMIN — INSULIN LISPRO 1 UNITS: 100 INJECTION, SOLUTION INTRAVENOUS; SUBCUTANEOUS at 18:26

## 2019-04-03 RX ADMIN — INSULIN GLARGINE 30 UNITS: 100 INJECTION, SOLUTION SUBCUTANEOUS at 22:21

## 2019-04-03 RX ADMIN — FENTANYL CITRATE 25 MCG: 50 INJECTION, SOLUTION INTRAMUSCULAR; INTRAVENOUS at 16:53

## 2019-04-03 RX ADMIN — NIFEDIPINE 30 MG: 30 TABLET, FILM COATED, EXTENDED RELEASE ORAL at 22:22

## 2019-04-04 ENCOUNTER — APPOINTMENT (INPATIENT)
Dept: DIALYSIS | Facility: HOSPITAL | Age: 54
DRG: 699 | End: 2019-04-04
Payer: MEDICARE

## 2019-04-04 PROBLEM — D63.1 ANEMIA OF CHRONIC RENAL FAILURE, STAGE 5 (HCC): Status: ACTIVE | Noted: 2019-04-03

## 2019-04-04 PROBLEM — N18.5 ANEMIA OF CHRONIC RENAL FAILURE, STAGE 5 (HCC): Status: ACTIVE | Noted: 2019-04-03

## 2019-04-04 PROBLEM — I12.9 PARENCHYMAL RENAL HYPERTENSION: Status: ACTIVE | Noted: 2019-04-04

## 2019-04-04 PROBLEM — R79.89 AZOTEMIA: Status: ACTIVE | Noted: 2019-04-04

## 2019-04-04 LAB
ANION GAP SERPL CALCULATED.3IONS-SCNC: 15 MMOL/L (ref 4–13)
BACTERIA UR QL AUTO: ABNORMAL /HPF
BASOPHILS # BLD AUTO: 0.02 THOUSANDS/ΜL (ref 0–0.1)
BASOPHILS NFR BLD AUTO: 0 % (ref 0–1)
BILIRUB UR QL STRIP: NEGATIVE
BUN SERPL-MCNC: 113 MG/DL (ref 5–25)
CALCIUM SERPL-MCNC: 9 MG/DL (ref 8.3–10.1)
CHLORIDE SERPL-SCNC: 108 MMOL/L (ref 100–108)
CLARITY UR: CLEAR
CO2 SERPL-SCNC: 16 MMOL/L (ref 21–32)
COLOR UR: YELLOW
CREAT SERPL-MCNC: 16.05 MG/DL (ref 0.6–1.3)
EOSINOPHIL # BLD AUTO: 0.02 THOUSAND/ΜL (ref 0–0.61)
EOSINOPHIL NFR BLD AUTO: 0 % (ref 0–6)
ERYTHROCYTE [DISTWIDTH] IN BLOOD BY AUTOMATED COUNT: 15.1 % (ref 11.6–15.1)
EST. AVERAGE GLUCOSE BLD GHB EST-MCNC: 186 MG/DL
GFR SERPL CREATININE-BSD FRML MDRD: 3 ML/MIN/1.73SQ M
GLUCOSE SERPL-MCNC: 101 MG/DL (ref 65–140)
GLUCOSE SERPL-MCNC: 199 MG/DL (ref 65–140)
GLUCOSE SERPL-MCNC: 204 MG/DL (ref 65–140)
GLUCOSE SERPL-MCNC: 224 MG/DL (ref 65–140)
GLUCOSE SERPL-MCNC: 258 MG/DL (ref 65–140)
GLUCOSE SERPL-MCNC: 51 MG/DL (ref 65–140)
GLUCOSE SERPL-MCNC: 65 MG/DL (ref 65–140)
GLUCOSE UR STRIP-MCNC: ABNORMAL MG/DL
HBA1C MFR BLD: 8.1 % (ref 4.2–6.3)
HBV CORE AB SER QL: NORMAL
HBV CORE IGM SER QL: NORMAL
HBV SURFACE AB SER-ACNC: <3.1 MIU/ML
HBV SURFACE AG SER QL: NORMAL
HCT VFR BLD AUTO: 24.2 % (ref 36.5–49.3)
HCV AB SER QL: NORMAL
HGB BLD-MCNC: 7.4 G/DL (ref 12–17)
HGB UR QL STRIP.AUTO: ABNORMAL
IMM GRANULOCYTES # BLD AUTO: 0.01 THOUSAND/UL (ref 0–0.2)
IMM GRANULOCYTES NFR BLD AUTO: 0 % (ref 0–2)
KETONES UR STRIP-MCNC: NEGATIVE MG/DL
LEUKOCYTE ESTERASE UR QL STRIP: NEGATIVE
LYMPHOCYTES # BLD AUTO: 0.71 THOUSANDS/ΜL (ref 0.6–4.47)
LYMPHOCYTES NFR BLD AUTO: 11 % (ref 14–44)
MAGNESIUM SERPL-MCNC: 1.8 MG/DL (ref 1.6–2.6)
MCH RBC QN AUTO: 26.4 PG (ref 26.8–34.3)
MCHC RBC AUTO-ENTMCNC: 30.6 G/DL (ref 31.4–37.4)
MCV RBC AUTO: 86 FL (ref 82–98)
MONOCYTES # BLD AUTO: 0.4 THOUSAND/ΜL (ref 0.17–1.22)
MONOCYTES NFR BLD AUTO: 6 % (ref 4–12)
NEUTROPHILS # BLD AUTO: 5.16 THOUSANDS/ΜL (ref 1.85–7.62)
NEUTS SEG NFR BLD AUTO: 83 % (ref 43–75)
NITRITE UR QL STRIP: NEGATIVE
NON-SQ EPI CELLS URNS QL MICRO: ABNORMAL /HPF
NRBC BLD AUTO-RTO: 0 /100 WBCS
PH UR STRIP.AUTO: 6 [PH]
PHOSPHATE SERPL-MCNC: 6 MG/DL (ref 2.7–4.5)
PLATELET # BLD AUTO: 181 THOUSANDS/UL (ref 149–390)
PMV BLD AUTO: 10.5 FL (ref 8.9–12.7)
POTASSIUM SERPL-SCNC: 5.9 MMOL/L (ref 3.5–5.3)
PROT UR STRIP-MCNC: >=300 MG/DL
RBC # BLD AUTO: 2.8 MILLION/UL (ref 3.88–5.62)
RBC #/AREA URNS AUTO: ABNORMAL /HPF
SODIUM SERPL-SCNC: 139 MMOL/L (ref 136–145)
SP GR UR STRIP.AUTO: 1.02 (ref 1–1.03)
TROPONIN I SERPL-MCNC: 0.17 NG/ML
TROPONIN I SERPL-MCNC: 0.21 NG/ML
UROBILINOGEN UR QL STRIP.AUTO: 0.2 E.U./DL
WBC # BLD AUTO: 6.32 THOUSAND/UL (ref 4.31–10.16)
WBC #/AREA URNS AUTO: ABNORMAL /HPF

## 2019-04-04 PROCEDURE — 80048 BASIC METABOLIC PNL TOTAL CA: CPT | Performed by: PHYSICIAN ASSISTANT

## 2019-04-04 PROCEDURE — 99232 SBSQ HOSP IP/OBS MODERATE 35: CPT | Performed by: HOSPITALIST

## 2019-04-04 PROCEDURE — 86803 HEPATITIS C AB TEST: CPT | Performed by: INTERNAL MEDICINE

## 2019-04-04 PROCEDURE — 86706 HEP B SURFACE ANTIBODY: CPT | Performed by: INTERNAL MEDICINE

## 2019-04-04 PROCEDURE — 87086 URINE CULTURE/COLONY COUNT: CPT | Performed by: PHYSICIAN ASSISTANT

## 2019-04-04 PROCEDURE — 84484 ASSAY OF TROPONIN QUANT: CPT | Performed by: PHYSICIAN ASSISTANT

## 2019-04-04 PROCEDURE — 84100 ASSAY OF PHOSPHORUS: CPT | Performed by: PHYSICIAN ASSISTANT

## 2019-04-04 PROCEDURE — 5A1D70Z PERFORMANCE OF URINARY FILTRATION, INTERMITTENT, LESS THAN 6 HOURS PER DAY: ICD-10-PCS | Performed by: INTERNAL MEDICINE

## 2019-04-04 PROCEDURE — 90935 HEMODIALYSIS ONE EVALUATION: CPT | Performed by: INTERNAL MEDICINE

## 2019-04-04 PROCEDURE — 83036 HEMOGLOBIN GLYCOSYLATED A1C: CPT | Performed by: PHYSICIAN ASSISTANT

## 2019-04-04 PROCEDURE — 87340 HEPATITIS B SURFACE AG IA: CPT | Performed by: INTERNAL MEDICINE

## 2019-04-04 PROCEDURE — 83735 ASSAY OF MAGNESIUM: CPT | Performed by: PHYSICIAN ASSISTANT

## 2019-04-04 PROCEDURE — 86705 HEP B CORE ANTIBODY IGM: CPT | Performed by: INTERNAL MEDICINE

## 2019-04-04 PROCEDURE — 82948 REAGENT STRIP/BLOOD GLUCOSE: CPT

## 2019-04-04 PROCEDURE — 85025 COMPLETE CBC W/AUTO DIFF WBC: CPT | Performed by: PHYSICIAN ASSISTANT

## 2019-04-04 PROCEDURE — 86704 HEP B CORE ANTIBODY TOTAL: CPT | Performed by: INTERNAL MEDICINE

## 2019-04-04 PROCEDURE — 81001 URINALYSIS AUTO W/SCOPE: CPT | Performed by: PHYSICIAN ASSISTANT

## 2019-04-04 RX ORDER — CARVEDILOL 3.12 MG/1
6.25 TABLET ORAL 2 TIMES DAILY WITH MEALS
Status: DISCONTINUED | OUTPATIENT
Start: 2019-04-04 | End: 2019-04-08 | Stop reason: HOSPADM

## 2019-04-04 RX ORDER — NIFEDIPINE 30 MG/1
30 TABLET, EXTENDED RELEASE ORAL DAILY
Status: DISCONTINUED | OUTPATIENT
Start: 2019-04-04 | End: 2019-04-05

## 2019-04-04 RX ADMIN — INSULIN LISPRO 2 UNITS: 100 INJECTION, SOLUTION INTRAVENOUS; SUBCUTANEOUS at 21:15

## 2019-04-04 RX ADMIN — INSULIN GLARGINE 30 UNITS: 100 INJECTION, SOLUTION SUBCUTANEOUS at 21:15

## 2019-04-04 RX ADMIN — NIFEDIPINE 30 MG: 30 TABLET, FILM COATED, EXTENDED RELEASE ORAL at 11:43

## 2019-04-04 RX ADMIN — HEPARIN SODIUM 5000 UNITS: 5000 INJECTION INTRAVENOUS; SUBCUTANEOUS at 06:46

## 2019-04-04 RX ADMIN — INSULIN LISPRO 1 UNITS: 100 INJECTION, SOLUTION INTRAVENOUS; SUBCUTANEOUS at 12:45

## 2019-04-04 RX ADMIN — HEPARIN SODIUM 5000 UNITS: 5000 INJECTION INTRAVENOUS; SUBCUTANEOUS at 21:15

## 2019-04-04 RX ADMIN — ATORVASTATIN CALCIUM 40 MG: 40 TABLET, FILM COATED ORAL at 17:41

## 2019-04-04 RX ADMIN — INSULIN LISPRO 1 UNITS: 100 INJECTION, SOLUTION INTRAVENOUS; SUBCUTANEOUS at 17:41

## 2019-04-04 RX ADMIN — ASPIRIN 81 MG: 81 TABLET, COATED ORAL at 11:47

## 2019-04-04 RX ADMIN — CARVEDILOL 6.25 MG: 3.12 TABLET, FILM COATED ORAL at 17:41

## 2019-04-04 RX ADMIN — HEPARIN SODIUM 5000 UNITS: 5000 INJECTION INTRAVENOUS; SUBCUTANEOUS at 14:14

## 2019-04-05 ENCOUNTER — APPOINTMENT (INPATIENT)
Dept: DIALYSIS | Facility: HOSPITAL | Age: 54
DRG: 699 | End: 2019-04-05
Payer: MEDICARE

## 2019-04-05 LAB
ANION GAP SERPL CALCULATED.3IONS-SCNC: 14 MMOL/L (ref 4–13)
BACTERIA UR CULT: NORMAL
BUN SERPL-MCNC: 79 MG/DL (ref 5–25)
CALCIUM SERPL-MCNC: 9 MG/DL (ref 8.3–10.1)
CHLORIDE SERPL-SCNC: 107 MMOL/L (ref 100–108)
CO2 SERPL-SCNC: 21 MMOL/L (ref 21–32)
CREAT SERPL-MCNC: 12.81 MG/DL (ref 0.6–1.3)
GFR SERPL CREATININE-BSD FRML MDRD: 4 ML/MIN/1.73SQ M
GLUCOSE SERPL-MCNC: 117 MG/DL (ref 65–140)
GLUCOSE SERPL-MCNC: 117 MG/DL (ref 65–140)
GLUCOSE SERPL-MCNC: 129 MG/DL (ref 65–140)
GLUCOSE SERPL-MCNC: 150 MG/DL (ref 65–140)
GLUCOSE SERPL-MCNC: 204 MG/DL (ref 65–140)
GLUCOSE SERPL-MCNC: 278 MG/DL (ref 65–140)
GLUCOSE SERPL-MCNC: 42 MG/DL (ref 65–140)
GLUCOSE SERPL-MCNC: 49 MG/DL (ref 65–140)
GLUCOSE SERPL-MCNC: 56 MG/DL (ref 65–140)
GLUCOSE SERPL-MCNC: 71 MG/DL (ref 65–140)
GLUCOSE SERPL-MCNC: 72 MG/DL (ref 65–140)
HCT VFR BLD AUTO: 26.2 % (ref 36.5–49.3)
HGB BLD-MCNC: 8.1 G/DL (ref 12–17)
POTASSIUM SERPL-SCNC: 4.8 MMOL/L (ref 3.5–5.3)
SODIUM SERPL-SCNC: 142 MMOL/L (ref 136–145)

## 2019-04-05 PROCEDURE — 85014 HEMATOCRIT: CPT | Performed by: HOSPITALIST

## 2019-04-05 PROCEDURE — 80048 BASIC METABOLIC PNL TOTAL CA: CPT | Performed by: INTERNAL MEDICINE

## 2019-04-05 PROCEDURE — 82948 REAGENT STRIP/BLOOD GLUCOSE: CPT

## 2019-04-05 PROCEDURE — 99232 SBSQ HOSP IP/OBS MODERATE 35: CPT | Performed by: INTERNAL MEDICINE

## 2019-04-05 PROCEDURE — 85018 HEMOGLOBIN: CPT | Performed by: HOSPITALIST

## 2019-04-05 PROCEDURE — 5A1D70Z PERFORMANCE OF URINARY FILTRATION, INTERMITTENT, LESS THAN 6 HOURS PER DAY: ICD-10-PCS | Performed by: INTERNAL MEDICINE

## 2019-04-05 PROCEDURE — 99232 SBSQ HOSP IP/OBS MODERATE 35: CPT | Performed by: HOSPITALIST

## 2019-04-05 RX ORDER — NIFEDIPINE 60 MG/1
60 TABLET, EXTENDED RELEASE ORAL DAILY
Status: DISCONTINUED | OUTPATIENT
Start: 2019-04-06 | End: 2019-04-08 | Stop reason: HOSPADM

## 2019-04-05 RX ORDER — INSULIN GLARGINE 100 [IU]/ML
25 INJECTION, SOLUTION SUBCUTANEOUS
Status: DISCONTINUED | OUTPATIENT
Start: 2019-04-05 | End: 2019-04-06

## 2019-04-05 RX ORDER — POLYETHYLENE GLYCOL 3350 17 G/17G
17 POWDER, FOR SOLUTION ORAL DAILY PRN
Status: DISCONTINUED | OUTPATIENT
Start: 2019-04-05 | End: 2019-04-08 | Stop reason: HOSPADM

## 2019-04-05 RX ADMIN — ASPIRIN 81 MG: 81 TABLET, COATED ORAL at 16:36

## 2019-04-05 RX ADMIN — ATORVASTATIN CALCIUM 40 MG: 40 TABLET, FILM COATED ORAL at 16:36

## 2019-04-05 RX ADMIN — HEPARIN SODIUM 5000 UNITS: 5000 INJECTION INTRAVENOUS; SUBCUTANEOUS at 13:14

## 2019-04-05 RX ADMIN — CARVEDILOL 6.25 MG: 3.12 TABLET, FILM COATED ORAL at 16:37

## 2019-04-05 RX ADMIN — INSULIN GLARGINE 25 UNITS: 100 INJECTION, SOLUTION SUBCUTANEOUS at 22:26

## 2019-04-05 RX ADMIN — HYDRALAZINE HYDROCHLORIDE 5 MG: 20 INJECTION INTRAMUSCULAR; INTRAVENOUS at 13:58

## 2019-04-05 RX ADMIN — INSULIN LISPRO 3 UNITS: 100 INJECTION, SOLUTION INTRAVENOUS; SUBCUTANEOUS at 13:14

## 2019-04-05 RX ADMIN — INSULIN LISPRO 1 UNITS: 100 INJECTION, SOLUTION INTRAVENOUS; SUBCUTANEOUS at 22:26

## 2019-04-05 RX ADMIN — HEPARIN SODIUM 5000 UNITS: 5000 INJECTION INTRAVENOUS; SUBCUTANEOUS at 21:10

## 2019-04-05 RX ADMIN — HEPARIN SODIUM 5000 UNITS: 5000 INJECTION INTRAVENOUS; SUBCUTANEOUS at 06:25

## 2019-04-06 ENCOUNTER — APPOINTMENT (INPATIENT)
Dept: DIALYSIS | Facility: HOSPITAL | Age: 54
DRG: 699 | End: 2019-04-06
Payer: MEDICARE

## 2019-04-06 PROBLEM — E83.9 CHRONIC KIDNEY DISEASE-MINERAL AND BONE DISORDER: Status: ACTIVE | Noted: 2019-04-06

## 2019-04-06 PROBLEM — N18.9 CHRONIC KIDNEY DISEASE-MINERAL AND BONE DISORDER: Status: ACTIVE | Noted: 2019-04-06

## 2019-04-06 PROBLEM — M89.9 CHRONIC KIDNEY DISEASE-MINERAL AND BONE DISORDER: Status: ACTIVE | Noted: 2019-04-06

## 2019-04-06 LAB
ANION GAP SERPL CALCULATED.3IONS-SCNC: 12 MMOL/L (ref 4–13)
BASOPHILS # BLD AUTO: 0.02 THOUSANDS/ΜL (ref 0–0.1)
BASOPHILS NFR BLD AUTO: 0 % (ref 0–1)
BUN SERPL-MCNC: 49 MG/DL (ref 5–25)
CALCIUM SERPL-MCNC: 8.8 MG/DL (ref 8.3–10.1)
CHLORIDE SERPL-SCNC: 105 MMOL/L (ref 100–108)
CO2 SERPL-SCNC: 24 MMOL/L (ref 21–32)
CREAT SERPL-MCNC: 8.63 MG/DL (ref 0.6–1.3)
EOSINOPHIL # BLD AUTO: 0.13 THOUSAND/ΜL (ref 0–0.61)
EOSINOPHIL NFR BLD AUTO: 2 % (ref 0–6)
ERYTHROCYTE [DISTWIDTH] IN BLOOD BY AUTOMATED COUNT: 15.5 % (ref 11.6–15.1)
GFR SERPL CREATININE-BSD FRML MDRD: 7 ML/MIN/1.73SQ M
GLUCOSE SERPL-MCNC: 102 MG/DL (ref 65–140)
GLUCOSE SERPL-MCNC: 271 MG/DL (ref 65–140)
GLUCOSE SERPL-MCNC: 285 MG/DL (ref 65–140)
GLUCOSE SERPL-MCNC: 69 MG/DL (ref 65–140)
GLUCOSE SERPL-MCNC: 86 MG/DL (ref 65–140)
GLUCOSE SERPL-MCNC: 98 MG/DL (ref 65–140)
HCT VFR BLD AUTO: 25.3 % (ref 36.5–49.3)
HGB BLD-MCNC: 7.9 G/DL (ref 12–17)
IMM GRANULOCYTES # BLD AUTO: 0.02 THOUSAND/UL (ref 0–0.2)
IMM GRANULOCYTES NFR BLD AUTO: 0 % (ref 0–2)
LYMPHOCYTES # BLD AUTO: 1.53 THOUSANDS/ΜL (ref 0.6–4.47)
LYMPHOCYTES NFR BLD AUTO: 27 % (ref 14–44)
MCH RBC QN AUTO: 26.5 PG (ref 26.8–34.3)
MCHC RBC AUTO-ENTMCNC: 31.2 G/DL (ref 31.4–37.4)
MCV RBC AUTO: 85 FL (ref 82–98)
MONOCYTES # BLD AUTO: 0.78 THOUSAND/ΜL (ref 0.17–1.22)
MONOCYTES NFR BLD AUTO: 14 % (ref 4–12)
NEUTROPHILS # BLD AUTO: 3.24 THOUSANDS/ΜL (ref 1.85–7.62)
NEUTS SEG NFR BLD AUTO: 57 % (ref 43–75)
NRBC BLD AUTO-RTO: 0 /100 WBCS
PLATELET # BLD AUTO: 207 THOUSANDS/UL (ref 149–390)
PMV BLD AUTO: 9.8 FL (ref 8.9–12.7)
POTASSIUM SERPL-SCNC: 4 MMOL/L (ref 3.5–5.3)
RBC # BLD AUTO: 2.98 MILLION/UL (ref 3.88–5.62)
SODIUM SERPL-SCNC: 141 MMOL/L (ref 136–145)
WBC # BLD AUTO: 5.72 THOUSAND/UL (ref 4.31–10.16)

## 2019-04-06 PROCEDURE — 82948 REAGENT STRIP/BLOOD GLUCOSE: CPT

## 2019-04-06 PROCEDURE — 80048 BASIC METABOLIC PNL TOTAL CA: CPT | Performed by: INTERNAL MEDICINE

## 2019-04-06 PROCEDURE — 85025 COMPLETE CBC W/AUTO DIFF WBC: CPT | Performed by: INTERNAL MEDICINE

## 2019-04-06 PROCEDURE — 90935 HEMODIALYSIS ONE EVALUATION: CPT | Performed by: INTERNAL MEDICINE

## 2019-04-06 PROCEDURE — 99232 SBSQ HOSP IP/OBS MODERATE 35: CPT | Performed by: HOSPITALIST

## 2019-04-06 RX ORDER — DOXAZOSIN 2 MG/1
2 TABLET ORAL
Status: DISCONTINUED | OUTPATIENT
Start: 2019-04-06 | End: 2019-04-07

## 2019-04-06 RX ORDER — INSULIN GLARGINE 100 [IU]/ML
20 INJECTION, SOLUTION SUBCUTANEOUS
Status: DISCONTINUED | OUTPATIENT
Start: 2019-04-06 | End: 2019-04-08 | Stop reason: HOSPADM

## 2019-04-06 RX ORDER — BUTALBITAL, ACETAMINOPHEN AND CAFFEINE 50; 325; 40 MG/1; MG/1; MG/1
1 TABLET ORAL EVERY 4 HOURS PRN
Status: DISCONTINUED | OUTPATIENT
Start: 2019-04-06 | End: 2019-04-08 | Stop reason: HOSPADM

## 2019-04-06 RX ADMIN — HEPARIN SODIUM 5000 UNITS: 5000 INJECTION INTRAVENOUS; SUBCUTANEOUS at 16:54

## 2019-04-06 RX ADMIN — BUTALBITAL, ACETAMINOPHEN, AND CAFFEINE 1 TABLET: 50; 325; 40 TABLET ORAL at 12:25

## 2019-04-06 RX ADMIN — CARVEDILOL 6.25 MG: 3.12 TABLET, FILM COATED ORAL at 09:00

## 2019-04-06 RX ADMIN — INSULIN LISPRO 3 UNITS: 100 INJECTION, SOLUTION INTRAVENOUS; SUBCUTANEOUS at 21:25

## 2019-04-06 RX ADMIN — HEPARIN SODIUM 5000 UNITS: 5000 INJECTION INTRAVENOUS; SUBCUTANEOUS at 21:26

## 2019-04-06 RX ADMIN — DOXAZOSIN 2 MG: 2 TABLET ORAL at 21:25

## 2019-04-06 RX ADMIN — ASPIRIN 81 MG: 81 TABLET, COATED ORAL at 12:23

## 2019-04-06 RX ADMIN — HEPARIN SODIUM 5000 UNITS: 5000 INJECTION INTRAVENOUS; SUBCUTANEOUS at 06:12

## 2019-04-06 RX ADMIN — CARVEDILOL 6.25 MG: 3.12 TABLET, FILM COATED ORAL at 16:54

## 2019-04-06 RX ADMIN — ATORVASTATIN CALCIUM 40 MG: 40 TABLET, FILM COATED ORAL at 16:54

## 2019-04-06 RX ADMIN — INSULIN GLARGINE 20 UNITS: 100 INJECTION, SOLUTION SUBCUTANEOUS at 21:26

## 2019-04-06 RX ADMIN — NIFEDIPINE 60 MG: 60 TABLET, FILM COATED, EXTENDED RELEASE ORAL at 12:24

## 2019-04-06 RX ADMIN — INSULIN LISPRO 3 UNITS: 100 INJECTION, SOLUTION INTRAVENOUS; SUBCUTANEOUS at 16:54

## 2019-04-07 PROBLEM — E83.39 HYPERPHOSPHATEMIA: Status: ACTIVE | Noted: 2019-04-07

## 2019-04-07 LAB
ANION GAP SERPL CALCULATED.3IONS-SCNC: 9 MMOL/L (ref 4–13)
BUN SERPL-MCNC: 32 MG/DL (ref 5–25)
CALCIUM SERPL-MCNC: 8.8 MG/DL (ref 8.3–10.1)
CHLORIDE SERPL-SCNC: 105 MMOL/L (ref 100–108)
CO2 SERPL-SCNC: 28 MMOL/L (ref 21–32)
CREAT SERPL-MCNC: 6.53 MG/DL (ref 0.6–1.3)
FERRITIN SERPL-MCNC: 164 NG/ML (ref 8–388)
GFR SERPL CREATININE-BSD FRML MDRD: 10 ML/MIN/1.73SQ M
GLUCOSE SERPL-MCNC: 151 MG/DL (ref 65–140)
GLUCOSE SERPL-MCNC: 286 MG/DL (ref 65–140)
GLUCOSE SERPL-MCNC: 378 MG/DL (ref 65–140)
GLUCOSE SERPL-MCNC: 61 MG/DL (ref 65–140)
GLUCOSE SERPL-MCNC: 66 MG/DL (ref 65–140)
IRON SATN MFR SERPL: 12 %
IRON SERPL-MCNC: 33 UG/DL (ref 65–175)
PHOSPHATE SERPL-MCNC: 3.6 MG/DL (ref 2.7–4.5)
POTASSIUM SERPL-SCNC: 4.2 MMOL/L (ref 3.5–5.3)
SODIUM SERPL-SCNC: 142 MMOL/L (ref 136–145)
TIBC SERPL-MCNC: 266 UG/DL (ref 250–450)

## 2019-04-07 PROCEDURE — 84100 ASSAY OF PHOSPHORUS: CPT | Performed by: NURSE PRACTITIONER

## 2019-04-07 PROCEDURE — 83540 ASSAY OF IRON: CPT | Performed by: NURSE PRACTITIONER

## 2019-04-07 PROCEDURE — 82948 REAGENT STRIP/BLOOD GLUCOSE: CPT

## 2019-04-07 PROCEDURE — 82728 ASSAY OF FERRITIN: CPT | Performed by: NURSE PRACTITIONER

## 2019-04-07 PROCEDURE — 83550 IRON BINDING TEST: CPT | Performed by: NURSE PRACTITIONER

## 2019-04-07 PROCEDURE — 80048 BASIC METABOLIC PNL TOTAL CA: CPT | Performed by: NURSE PRACTITIONER

## 2019-04-07 PROCEDURE — 99232 SBSQ HOSP IP/OBS MODERATE 35: CPT | Performed by: INTERNAL MEDICINE

## 2019-04-07 PROCEDURE — 99232 SBSQ HOSP IP/OBS MODERATE 35: CPT | Performed by: HOSPITALIST

## 2019-04-07 RX ORDER — DOXAZOSIN 2 MG/1
2 TABLET ORAL
Status: DISCONTINUED | OUTPATIENT
Start: 2019-04-07 | End: 2019-04-08 | Stop reason: HOSPADM

## 2019-04-07 RX ADMIN — CARVEDILOL 6.25 MG: 3.12 TABLET, FILM COATED ORAL at 17:26

## 2019-04-07 RX ADMIN — HEPARIN SODIUM 5000 UNITS: 5000 INJECTION INTRAVENOUS; SUBCUTANEOUS at 13:38

## 2019-04-07 RX ADMIN — NIFEDIPINE 60 MG: 60 TABLET, FILM COATED, EXTENDED RELEASE ORAL at 09:11

## 2019-04-07 RX ADMIN — DOXAZOSIN 2 MG: 2 TABLET ORAL at 21:43

## 2019-04-07 RX ADMIN — CARVEDILOL 6.25 MG: 3.12 TABLET, FILM COATED ORAL at 09:11

## 2019-04-07 RX ADMIN — HEPARIN SODIUM 5000 UNITS: 5000 INJECTION INTRAVENOUS; SUBCUTANEOUS at 05:31

## 2019-04-07 RX ADMIN — INSULIN LISPRO 1 UNITS: 100 INJECTION, SOLUTION INTRAVENOUS; SUBCUTANEOUS at 13:38

## 2019-04-07 RX ADMIN — HEPARIN SODIUM 5000 UNITS: 5000 INJECTION INTRAVENOUS; SUBCUTANEOUS at 21:44

## 2019-04-07 RX ADMIN — ATORVASTATIN CALCIUM 40 MG: 40 TABLET, FILM COATED ORAL at 17:26

## 2019-04-07 RX ADMIN — ASPIRIN 81 MG: 81 TABLET, COATED ORAL at 09:12

## 2019-04-07 RX ADMIN — INSULIN LISPRO 3 UNITS: 100 INJECTION, SOLUTION INTRAVENOUS; SUBCUTANEOUS at 17:28

## 2019-04-07 RX ADMIN — INSULIN LISPRO 4 UNITS: 100 INJECTION, SOLUTION INTRAVENOUS; SUBCUTANEOUS at 21:44

## 2019-04-07 RX ADMIN — INSULIN GLARGINE 20 UNITS: 100 INJECTION, SOLUTION SUBCUTANEOUS at 21:44

## 2019-04-08 VITALS
OXYGEN SATURATION: 97 % | TEMPERATURE: 96.4 F | BODY MASS INDEX: 24.18 KG/M2 | RESPIRATION RATE: 18 BRPM | DIASTOLIC BLOOD PRESSURE: 83 MMHG | HEIGHT: 70 IN | HEART RATE: 91 BPM | WEIGHT: 168.87 LBS | SYSTOLIC BLOOD PRESSURE: 158 MMHG

## 2019-04-08 LAB
BACTERIA BLD CULT: NORMAL
BACTERIA BLD CULT: NORMAL
GLUCOSE SERPL-MCNC: 298 MG/DL (ref 65–140)
GLUCOSE SERPL-MCNC: 70 MG/DL (ref 65–140)
GLUCOSE SERPL-MCNC: 99 MG/DL (ref 65–140)

## 2019-04-08 PROCEDURE — 82948 REAGENT STRIP/BLOOD GLUCOSE: CPT

## 2019-04-08 PROCEDURE — 99232 SBSQ HOSP IP/OBS MODERATE 35: CPT | Performed by: INTERNAL MEDICINE

## 2019-04-08 PROCEDURE — 99239 HOSP IP/OBS DSCHRG MGMT >30: CPT | Performed by: HOSPITALIST

## 2019-04-08 RX ORDER — NIFEDIPINE 60 MG/1
60 TABLET, FILM COATED, EXTENDED RELEASE ORAL DAILY
Qty: 30 TABLET | Refills: 1 | Status: SHIPPED | OUTPATIENT
Start: 2019-04-09 | End: 2019-04-26 | Stop reason: SDUPTHER

## 2019-04-08 RX ORDER — DOXAZOSIN 2 MG/1
2 TABLET ORAL
Qty: 30 TABLET | Refills: 1 | Status: SHIPPED | OUTPATIENT
Start: 2019-04-08 | End: 2019-04-26 | Stop reason: SDUPTHER

## 2019-04-08 RX ORDER — INSULIN GLARGINE 100 [IU]/ML
20 INJECTION, SOLUTION SUBCUTANEOUS
Qty: 10 ML | Refills: 0 | Status: SHIPPED | OUTPATIENT
Start: 2019-04-08 | End: 2019-04-12

## 2019-04-08 RX ADMIN — NIFEDIPINE 60 MG: 60 TABLET, FILM COATED, EXTENDED RELEASE ORAL at 08:34

## 2019-04-08 RX ADMIN — BUTALBITAL, ACETAMINOPHEN, AND CAFFEINE 1 TABLET: 50; 325; 40 TABLET ORAL at 08:34

## 2019-04-08 RX ADMIN — HEPARIN SODIUM 5000 UNITS: 5000 INJECTION INTRAVENOUS; SUBCUTANEOUS at 05:17

## 2019-04-08 RX ADMIN — BUTALBITAL, ACETAMINOPHEN, AND CAFFEINE 1 TABLET: 50; 325; 40 TABLET ORAL at 02:50

## 2019-04-08 RX ADMIN — INSULIN LISPRO 3 UNITS: 100 INJECTION, SOLUTION INTRAVENOUS; SUBCUTANEOUS at 11:50

## 2019-04-08 RX ADMIN — HEPARIN SODIUM 5000 UNITS: 5000 INJECTION INTRAVENOUS; SUBCUTANEOUS at 13:56

## 2019-04-08 RX ADMIN — IRON SUCROSE 200 MG: 20 INJECTION, SOLUTION INTRAVENOUS at 10:39

## 2019-04-08 RX ADMIN — CARVEDILOL 6.25 MG: 3.12 TABLET, FILM COATED ORAL at 08:33

## 2019-04-08 RX ADMIN — ASPIRIN 81 MG: 81 TABLET, COATED ORAL at 08:34

## 2019-04-09 ENCOUNTER — TRANSITIONAL CARE MANAGEMENT (OUTPATIENT)
Dept: INTERNAL MEDICINE CLINIC | Facility: CLINIC | Age: 54
End: 2019-04-09

## 2019-04-12 ENCOUNTER — OFFICE VISIT (OUTPATIENT)
Dept: INTERNAL MEDICINE CLINIC | Facility: CLINIC | Age: 54
End: 2019-04-12
Payer: MEDICARE

## 2019-04-12 VITALS
HEART RATE: 91 BPM | BODY MASS INDEX: 24.34 KG/M2 | TEMPERATURE: 97.6 F | SYSTOLIC BLOOD PRESSURE: 150 MMHG | OXYGEN SATURATION: 97 % | WEIGHT: 170 LBS | DIASTOLIC BLOOD PRESSURE: 70 MMHG | HEIGHT: 70 IN

## 2019-04-12 DIAGNOSIS — N18.6 ESRD (END STAGE RENAL DISEASE) (HCC): Primary | ICD-10-CM

## 2019-04-12 DIAGNOSIS — R26.2 AMBULATORY DYSFUNCTION: ICD-10-CM

## 2019-04-12 DIAGNOSIS — N18.6 BENIGN HYPERTENSION WITH ESRD (END-STAGE RENAL DISEASE) (HCC): ICD-10-CM

## 2019-04-12 DIAGNOSIS — IMO0002 UNCONTROLLED SECONDARY DIABETES MELLITUS WITH STAGE 5 CKD (GFR<15): ICD-10-CM

## 2019-04-12 DIAGNOSIS — I12.0 BENIGN HYPERTENSION WITH ESRD (END-STAGE RENAL DISEASE) (HCC): ICD-10-CM

## 2019-04-12 PROBLEM — I16.0 HYPERTENSIVE URGENCY: Status: RESOLVED | Noted: 2019-04-03 | Resolved: 2019-04-12

## 2019-04-12 PROCEDURE — 99496 TRANSJ CARE MGMT HIGH F2F 7D: CPT | Performed by: INTERNAL MEDICINE

## 2019-04-25 ENCOUNTER — EVALUATION (OUTPATIENT)
Dept: PHYSICAL THERAPY | Facility: CLINIC | Age: 54
End: 2019-04-25
Payer: MEDICARE

## 2019-04-25 DIAGNOSIS — R26.2 AMBULATORY DYSFUNCTION: ICD-10-CM

## 2019-04-25 PROCEDURE — 97161 PT EVAL LOW COMPLEX 20 MIN: CPT | Performed by: PHYSICAL MEDICINE & REHABILITATION

## 2019-04-26 ENCOUNTER — OFFICE VISIT (OUTPATIENT)
Dept: INTERNAL MEDICINE CLINIC | Facility: CLINIC | Age: 54
End: 2019-04-26
Payer: MEDICARE

## 2019-04-26 VITALS
WEIGHT: 164 LBS | TEMPERATURE: 96.7 F | HEART RATE: 84 BPM | DIASTOLIC BLOOD PRESSURE: 76 MMHG | HEIGHT: 70 IN | SYSTOLIC BLOOD PRESSURE: 162 MMHG | RESPIRATION RATE: 15 BRPM | BODY MASS INDEX: 23.48 KG/M2

## 2019-04-26 DIAGNOSIS — I16.0 HYPERTENSIVE URGENCY: ICD-10-CM

## 2019-04-26 DIAGNOSIS — Z99.2 ANEMIA DUE TO CHRONIC KIDNEY DISEASE, ON CHRONIC DIALYSIS (HCC): ICD-10-CM

## 2019-04-26 DIAGNOSIS — Z79.4 UNCONTROLLED TYPE 2 DIABETES MELLITUS WITH HYPERGLYCEMIA, WITH LONG-TERM CURRENT USE OF INSULIN (HCC): ICD-10-CM

## 2019-04-26 DIAGNOSIS — N18.6 BENIGN HYPERTENSION WITH ESRD (END-STAGE RENAL DISEASE) (HCC): ICD-10-CM

## 2019-04-26 DIAGNOSIS — I10 ESSENTIAL HYPERTENSION: ICD-10-CM

## 2019-04-26 DIAGNOSIS — B35.1 ONYCHOMYCOSIS: ICD-10-CM

## 2019-04-26 DIAGNOSIS — N18.6 ANEMIA DUE TO CHRONIC KIDNEY DISEASE, ON CHRONIC DIALYSIS (HCC): ICD-10-CM

## 2019-04-26 DIAGNOSIS — E11.65 UNCONTROLLED TYPE 2 DIABETES MELLITUS WITH HYPERGLYCEMIA, WITH LONG-TERM CURRENT USE OF INSULIN (HCC): ICD-10-CM

## 2019-04-26 DIAGNOSIS — N18.6 ESRD (END STAGE RENAL DISEASE) (HCC): ICD-10-CM

## 2019-04-26 DIAGNOSIS — IMO0002 UNCONTROLLED SECONDARY DIABETES MELLITUS WITH STAGE 5 CKD (GFR<15): Primary | ICD-10-CM

## 2019-04-26 DIAGNOSIS — K59.04 CHRONIC IDIOPATHIC CONSTIPATION: ICD-10-CM

## 2019-04-26 DIAGNOSIS — N18.4 CKD (CHRONIC KIDNEY DISEASE) STAGE 4, GFR 15-29 ML/MIN (HCC): ICD-10-CM

## 2019-04-26 DIAGNOSIS — I12.0 BENIGN HYPERTENSION WITH ESRD (END-STAGE RENAL DISEASE) (HCC): ICD-10-CM

## 2019-04-26 DIAGNOSIS — D63.1 ANEMIA DUE TO CHRONIC KIDNEY DISEASE, ON CHRONIC DIALYSIS (HCC): ICD-10-CM

## 2019-04-26 DIAGNOSIS — E78.5 HYPERLIPIDEMIA, UNSPECIFIED HYPERLIPIDEMIA TYPE: ICD-10-CM

## 2019-04-26 PROCEDURE — 99214 OFFICE O/P EST MOD 30 MIN: CPT | Performed by: INTERNAL MEDICINE

## 2019-04-26 RX ORDER — CARVEDILOL 6.25 MG/1
6.25 TABLET ORAL 2 TIMES DAILY WITH MEALS
Qty: 60 TABLET | Refills: 1 | Status: SHIPPED | OUTPATIENT
Start: 2019-04-26 | End: 2019-07-22 | Stop reason: SDUPTHER

## 2019-04-26 RX ORDER — DOXAZOSIN 2 MG/1
2 TABLET ORAL
Qty: 30 TABLET | Refills: 1 | Status: SHIPPED | OUTPATIENT
Start: 2019-04-26 | End: 2021-02-27 | Stop reason: HOSPADM

## 2019-04-26 RX ORDER — ATORVASTATIN CALCIUM 40 MG/1
40 TABLET, FILM COATED ORAL DAILY
Qty: 90 TABLET | Refills: 0 | Status: SHIPPED | OUTPATIENT
Start: 2019-04-26 | End: 2019-07-22 | Stop reason: SDUPTHER

## 2019-04-26 RX ORDER — NIFEDIPINE 60 MG/1
60 TABLET, FILM COATED, EXTENDED RELEASE ORAL DAILY
Qty: 30 TABLET | Refills: 1 | Status: SHIPPED | OUTPATIENT
Start: 2019-04-26 | End: 2021-02-27 | Stop reason: HOSPADM

## 2019-04-30 ENCOUNTER — HOSPITAL ENCOUNTER (OUTPATIENT)
Dept: RADIOLOGY | Facility: HOSPITAL | Age: 54
Discharge: HOME | End: 2019-04-30
Attending: INTERNAL MEDICINE | Admitting: RADIOLOGY
Payer: MEDICARE

## 2019-04-30 VITALS — DIASTOLIC BLOOD PRESSURE: 102 MMHG | HEART RATE: 76 BPM | OXYGEN SATURATION: 100 % | SYSTOLIC BLOOD PRESSURE: 200 MMHG

## 2019-04-30 DIAGNOSIS — N18.9 CKD (CHRONIC KIDNEY DISEASE): ICD-10-CM

## 2019-04-30 DIAGNOSIS — N18.9 CHRONIC KIDNEY DISEASE, UNSPECIFIED CKD STAGE: ICD-10-CM

## 2019-04-30 PROCEDURE — 36902 INTRO CATH DIALYSIS CIRCUIT: CPT

## 2019-04-30 PROCEDURE — C1725 CATH, TRANSLUMIN NON-LASER: HCPCS

## 2019-04-30 PROCEDURE — C1894 INTRO/SHEATH, NON-LASER: HCPCS

## 2019-04-30 PROCEDURE — C1769 GUIDE WIRE: HCPCS

## 2019-04-30 PROCEDURE — 36902 INTRO CATH DIALYSIS CIRCUIT: CPT | Performed by: RADIOLOGY

## 2019-04-30 RX ORDER — LABETALOL 20 MG/4 ML (5 MG/ML) INTRAVENOUS SYRINGE
CODE/TRAUMA/SEDATION MEDICATION
Status: COMPLETED | OUTPATIENT
Start: 2019-04-30 | End: 2019-04-30

## 2019-04-30 RX ORDER — HYDRALAZINE HYDROCHLORIDE 20 MG/ML
INJECTION INTRAMUSCULAR; INTRAVENOUS CODE/TRAUMA/SEDATION MEDICATION
Status: COMPLETED | OUTPATIENT
Start: 2019-04-30 | End: 2019-04-30

## 2019-04-30 RX ADMIN — HYDRALAZINE HYDROCHLORIDE 10 MG: 20 INJECTION INTRAMUSCULAR; INTRAVENOUS at 15:12

## 2019-04-30 RX ADMIN — IOHEXOL 60 ML: 300 INJECTION, SOLUTION INTRAVENOUS at 16:13

## 2019-04-30 RX ADMIN — LABETALOL 20 MG/4 ML (5 MG/ML) INTRAVENOUS SYRINGE 5 MG: at 14:39

## 2019-04-30 RX ADMIN — LABETALOL 20 MG/4 ML (5 MG/ML) INTRAVENOUS SYRINGE 10 MG: at 14:19

## 2019-05-02 ENCOUNTER — OFFICE VISIT (OUTPATIENT)
Dept: PHYSICAL THERAPY | Facility: CLINIC | Age: 54
End: 2019-05-02
Payer: MEDICARE

## 2019-05-02 DIAGNOSIS — R26.2 AMBULATORY DYSFUNCTION: Primary | ICD-10-CM

## 2019-05-02 PROCEDURE — 97116 GAIT TRAINING THERAPY: CPT | Performed by: PHYSICAL MEDICINE & REHABILITATION

## 2019-05-02 PROCEDURE — 97110 THERAPEUTIC EXERCISES: CPT | Performed by: PHYSICAL MEDICINE & REHABILITATION

## 2019-05-07 ENCOUNTER — OFFICE VISIT (OUTPATIENT)
Dept: PHYSICAL THERAPY | Facility: CLINIC | Age: 54
End: 2019-05-07
Payer: MEDICARE

## 2019-05-07 DIAGNOSIS — R26.2 AMBULATORY DYSFUNCTION: Primary | ICD-10-CM

## 2019-05-07 PROCEDURE — 97116 GAIT TRAINING THERAPY: CPT

## 2019-05-07 PROCEDURE — 97110 THERAPEUTIC EXERCISES: CPT

## 2019-05-09 ENCOUNTER — OFFICE VISIT (OUTPATIENT)
Dept: PHYSICAL THERAPY | Facility: CLINIC | Age: 54
End: 2019-05-09
Payer: MEDICARE

## 2019-05-09 DIAGNOSIS — R26.2 AMBULATORY DYSFUNCTION: Primary | ICD-10-CM

## 2019-05-09 PROCEDURE — 97116 GAIT TRAINING THERAPY: CPT

## 2019-05-09 PROCEDURE — 97110 THERAPEUTIC EXERCISES: CPT

## 2019-05-14 ENCOUNTER — OFFICE VISIT (OUTPATIENT)
Dept: VASCULAR SURGERY | Facility: CLINIC | Age: 54
End: 2019-05-14
Payer: MEDICARE

## 2019-05-14 VITALS
WEIGHT: 167 LBS | HEART RATE: 71 BPM | TEMPERATURE: 95.7 F | HEIGHT: 70 IN | SYSTOLIC BLOOD PRESSURE: 164 MMHG | BODY MASS INDEX: 23.91 KG/M2 | DIASTOLIC BLOOD PRESSURE: 76 MMHG

## 2019-05-14 DIAGNOSIS — Z98.890 S/P ARTERIOVENOUS (AV) FISTULA CREATION: Primary | ICD-10-CM

## 2019-05-14 PROCEDURE — 99213 OFFICE O/P EST LOW 20 MIN: CPT | Performed by: SURGERY

## 2019-05-14 RX ORDER — FUROSEMIDE 80 MG
80 TABLET ORAL DAILY
Refills: 0 | COMMUNITY
Start: 2019-05-11 | End: 2019-10-31 | Stop reason: SDUPTHER

## 2019-05-16 ENCOUNTER — OFFICE VISIT (OUTPATIENT)
Dept: PHYSICAL THERAPY | Facility: CLINIC | Age: 54
End: 2019-05-16
Payer: MEDICARE

## 2019-05-16 DIAGNOSIS — R26.2 AMBULATORY DYSFUNCTION: Primary | ICD-10-CM

## 2019-05-16 PROCEDURE — 97116 GAIT TRAINING THERAPY: CPT | Performed by: PHYSICAL MEDICINE & REHABILITATION

## 2019-05-16 PROCEDURE — 97530 THERAPEUTIC ACTIVITIES: CPT | Performed by: PHYSICAL MEDICINE & REHABILITATION

## 2019-05-16 PROCEDURE — 97110 THERAPEUTIC EXERCISES: CPT | Performed by: PHYSICAL MEDICINE & REHABILITATION

## 2019-05-21 ENCOUNTER — OFFICE VISIT (OUTPATIENT)
Dept: PHYSICAL THERAPY | Facility: CLINIC | Age: 54
End: 2019-05-21
Payer: MEDICARE

## 2019-05-21 DIAGNOSIS — R26.2 AMBULATORY DYSFUNCTION: Primary | ICD-10-CM

## 2019-05-21 PROCEDURE — 97116 GAIT TRAINING THERAPY: CPT

## 2019-05-21 PROCEDURE — 97530 THERAPEUTIC ACTIVITIES: CPT

## 2019-05-21 PROCEDURE — 97110 THERAPEUTIC EXERCISES: CPT

## 2019-05-23 ENCOUNTER — OFFICE VISIT (OUTPATIENT)
Dept: PHYSICAL THERAPY | Facility: CLINIC | Age: 54
End: 2019-05-23
Payer: MEDICARE

## 2019-05-23 DIAGNOSIS — R26.2 AMBULATORY DYSFUNCTION: Primary | ICD-10-CM

## 2019-05-23 PROCEDURE — 97110 THERAPEUTIC EXERCISES: CPT | Performed by: PHYSICAL MEDICINE & REHABILITATION

## 2019-05-23 PROCEDURE — 97116 GAIT TRAINING THERAPY: CPT | Performed by: PHYSICAL MEDICINE & REHABILITATION

## 2019-05-30 ENCOUNTER — OFFICE VISIT (OUTPATIENT)
Dept: PHYSICAL THERAPY | Facility: CLINIC | Age: 54
End: 2019-05-30
Payer: MEDICARE

## 2019-05-30 DIAGNOSIS — R26.2 AMBULATORY DYSFUNCTION: Primary | ICD-10-CM

## 2019-05-30 PROCEDURE — 97110 THERAPEUTIC EXERCISES: CPT

## 2019-05-30 PROCEDURE — 97112 NEUROMUSCULAR REEDUCATION: CPT

## 2019-05-30 PROCEDURE — 97116 GAIT TRAINING THERAPY: CPT

## 2019-06-04 ENCOUNTER — OFFICE VISIT (OUTPATIENT)
Dept: PHYSICAL THERAPY | Facility: CLINIC | Age: 54
End: 2019-06-04
Payer: MEDICARE

## 2019-06-04 DIAGNOSIS — R26.2 AMBULATORY DYSFUNCTION: Primary | ICD-10-CM

## 2019-06-04 PROCEDURE — 97530 THERAPEUTIC ACTIVITIES: CPT

## 2019-06-04 PROCEDURE — 97110 THERAPEUTIC EXERCISES: CPT

## 2019-06-06 ENCOUNTER — OFFICE VISIT (OUTPATIENT)
Dept: PHYSICAL THERAPY | Facility: CLINIC | Age: 54
End: 2019-06-06
Payer: MEDICARE

## 2019-06-06 DIAGNOSIS — R26.2 AMBULATORY DYSFUNCTION: Primary | ICD-10-CM

## 2019-06-06 PROCEDURE — 97110 THERAPEUTIC EXERCISES: CPT

## 2019-06-06 PROCEDURE — 97530 THERAPEUTIC ACTIVITIES: CPT

## 2019-06-11 ENCOUNTER — OFFICE VISIT (OUTPATIENT)
Dept: PHYSICAL THERAPY | Facility: CLINIC | Age: 54
End: 2019-06-11
Payer: MEDICARE

## 2019-06-11 DIAGNOSIS — R26.2 AMBULATORY DYSFUNCTION: Primary | ICD-10-CM

## 2019-06-11 PROCEDURE — 97530 THERAPEUTIC ACTIVITIES: CPT | Performed by: PHYSICAL MEDICINE & REHABILITATION

## 2019-06-11 PROCEDURE — 97116 GAIT TRAINING THERAPY: CPT | Performed by: PHYSICAL MEDICINE & REHABILITATION

## 2019-06-11 PROCEDURE — 97110 THERAPEUTIC EXERCISES: CPT | Performed by: PHYSICAL MEDICINE & REHABILITATION

## 2019-06-11 PROCEDURE — 97112 NEUROMUSCULAR REEDUCATION: CPT | Performed by: PHYSICAL MEDICINE & REHABILITATION

## 2019-06-13 ENCOUNTER — TELEPHONE (OUTPATIENT)
Dept: SURGERY | Facility: HOSPITAL | Age: 54
End: 2019-06-13

## 2019-06-13 ENCOUNTER — TELEPHONE (OUTPATIENT)
Dept: INTERVENTIONAL RADIOLOGY/VASCULAR | Facility: HOSPITAL | Age: 54
End: 2019-06-13

## 2019-06-13 ENCOUNTER — APPOINTMENT (OUTPATIENT)
Dept: PHYSICAL THERAPY | Facility: CLINIC | Age: 54
End: 2019-06-13
Payer: MEDICARE

## 2019-06-14 ENCOUNTER — HOSPITAL ENCOUNTER (OUTPATIENT)
Dept: INTERVENTIONAL RADIOLOGY/VASCULAR | Facility: HOSPITAL | Age: 54
Discharge: HOME | End: 2019-06-14
Attending: INTERNAL MEDICINE | Admitting: RADIOLOGY
Payer: MEDICARE

## 2019-06-14 VITALS
TEMPERATURE: 97.6 F | RESPIRATION RATE: 16 BRPM | BODY MASS INDEX: 21.47 KG/M2 | SYSTOLIC BLOOD PRESSURE: 176 MMHG | WEIGHT: 150 LBS | DIASTOLIC BLOOD PRESSURE: 86 MMHG | HEIGHT: 70 IN | OXYGEN SATURATION: 100 % | HEART RATE: 64 BPM

## 2019-06-14 DIAGNOSIS — N18.9 CKD (CHRONIC KIDNEY DISEASE): ICD-10-CM

## 2019-06-14 PROCEDURE — C1725 CATH, TRANSLUMIN NON-LASER: HCPCS

## 2019-06-14 PROCEDURE — C1894 INTRO/SHEATH, NON-LASER: HCPCS

## 2019-06-14 PROCEDURE — 36902 INTRO CATH DIALYSIS CIRCUIT: CPT | Performed by: RADIOLOGY

## 2019-06-14 PROCEDURE — 36902 INTRO CATH DIALYSIS CIRCUIT: CPT

## 2019-06-14 PROCEDURE — 99152 MOD SED SAME PHYS/QHP 5/>YRS: CPT | Performed by: RADIOLOGY

## 2019-06-14 PROCEDURE — 36907 BALO ANGIOP CTR DIALYSIS SEG: CPT

## 2019-06-14 PROCEDURE — C1769 GUIDE WIRE: HCPCS

## 2019-06-14 RX ORDER — LIDOCAINE HYDROCHLORIDE 10 MG/ML
INJECTION, SOLUTION INFILTRATION; PERINEURAL CODE/TRAUMA/SEDATION MEDICATION
Status: COMPLETED | OUTPATIENT
Start: 2019-06-14 | End: 2019-06-14

## 2019-06-14 RX ORDER — FENTANYL CITRATE 50 UG/ML
INJECTION, SOLUTION INTRAMUSCULAR; INTRAVENOUS CODE/TRAUMA/SEDATION MEDICATION
Status: COMPLETED | OUTPATIENT
Start: 2019-06-14 | End: 2019-06-14

## 2019-06-14 RX ORDER — MIDAZOLAM HYDROCHLORIDE 1 MG/ML
INJECTION INTRAMUSCULAR; INTRAVENOUS CODE/TRAUMA/SEDATION MEDICATION
Status: COMPLETED | OUTPATIENT
Start: 2019-06-14 | End: 2019-06-14

## 2019-06-14 RX ADMIN — MIDAZOLAM HYDROCHLORIDE 0.5 MG: 1 INJECTION, SOLUTION INTRAMUSCULAR; INTRAVENOUS at 11:32

## 2019-06-14 RX ADMIN — LIDOCAINE HYDROCHLORIDE 2 ML: 10 INJECTION, SOLUTION INFILTRATION; PERINEURAL at 11:44

## 2019-06-14 RX ADMIN — FENTANYL CITRATE 50 MCG: 50 INJECTION INTRAMUSCULAR; INTRAVENOUS at 11:21

## 2019-06-14 RX ADMIN — LIDOCAINE HYDROCHLORIDE 3 ML: 10 INJECTION, SOLUTION INFILTRATION; PERINEURAL at 11:19

## 2019-06-14 RX ADMIN — MIDAZOLAM HYDROCHLORIDE 0.5 MG: 1 INJECTION, SOLUTION INTRAMUSCULAR; INTRAVENOUS at 11:21

## 2019-06-14 RX ADMIN — FENTANYL CITRATE 50 MCG: 50 INJECTION INTRAMUSCULAR; INTRAVENOUS at 11:33

## 2019-06-18 ENCOUNTER — OFFICE VISIT (OUTPATIENT)
Dept: PHYSICAL THERAPY | Facility: CLINIC | Age: 54
End: 2019-06-18
Payer: MEDICARE

## 2019-06-18 DIAGNOSIS — R26.2 AMBULATORY DYSFUNCTION: Primary | ICD-10-CM

## 2019-06-18 PROCEDURE — 97112 NEUROMUSCULAR REEDUCATION: CPT | Performed by: PHYSICAL MEDICINE & REHABILITATION

## 2019-06-18 PROCEDURE — 97110 THERAPEUTIC EXERCISES: CPT | Performed by: PHYSICAL MEDICINE & REHABILITATION

## 2019-06-18 PROCEDURE — 97116 GAIT TRAINING THERAPY: CPT | Performed by: PHYSICAL MEDICINE & REHABILITATION

## 2019-06-20 ENCOUNTER — OFFICE VISIT (OUTPATIENT)
Dept: PHYSICAL THERAPY | Facility: CLINIC | Age: 54
End: 2019-06-20
Payer: MEDICARE

## 2019-06-20 DIAGNOSIS — R26.2 AMBULATORY DYSFUNCTION: Primary | ICD-10-CM

## 2019-06-20 PROCEDURE — 97140 MANUAL THERAPY 1/> REGIONS: CPT

## 2019-06-20 PROCEDURE — 97110 THERAPEUTIC EXERCISES: CPT

## 2019-06-25 ENCOUNTER — OFFICE VISIT (OUTPATIENT)
Dept: PHYSICAL THERAPY | Facility: CLINIC | Age: 54
End: 2019-06-25
Payer: MEDICARE

## 2019-06-25 DIAGNOSIS — R26.2 AMBULATORY DYSFUNCTION: Primary | ICD-10-CM

## 2019-06-25 PROCEDURE — 97110 THERAPEUTIC EXERCISES: CPT

## 2019-06-25 PROCEDURE — 97112 NEUROMUSCULAR REEDUCATION: CPT

## 2019-06-27 ENCOUNTER — OFFICE VISIT (OUTPATIENT)
Dept: PHYSICAL THERAPY | Facility: CLINIC | Age: 54
End: 2019-06-27
Payer: MEDICARE

## 2019-06-27 DIAGNOSIS — R26.2 AMBULATORY DYSFUNCTION: Primary | ICD-10-CM

## 2019-06-27 PROCEDURE — 97112 NEUROMUSCULAR REEDUCATION: CPT | Performed by: PHYSICAL MEDICINE & REHABILITATION

## 2019-06-27 PROCEDURE — 97110 THERAPEUTIC EXERCISES: CPT | Performed by: PHYSICAL MEDICINE & REHABILITATION

## 2019-07-09 ENCOUNTER — HOSPITAL ENCOUNTER (INPATIENT)
Facility: HOSPITAL | Age: 54
LOS: 5 days | Discharge: HOME | DRG: 314 | End: 2019-07-14
Attending: EMERGENCY MEDICINE | Admitting: INTERNAL MEDICINE
Payer: MEDICARE

## 2019-07-09 ENCOUNTER — APPOINTMENT (EMERGENCY)
Dept: RADIOLOGY | Facility: HOSPITAL | Age: 54
DRG: 314 | End: 2019-07-09
Payer: MEDICARE

## 2019-07-09 DIAGNOSIS — A41.1 SEPSIS DUE TO STAPHYLOCOCCUS EPIDERMIDIS (HCC): ICD-10-CM

## 2019-07-09 DIAGNOSIS — IMO0002 UNCONTROLLED SECONDARY DIABETES MELLITUS WITH STAGE 5 CKD (GFR<15): ICD-10-CM

## 2019-07-09 DIAGNOSIS — N18.6 ESRD (END STAGE RENAL DISEASE) (HCC): ICD-10-CM

## 2019-07-09 DIAGNOSIS — D72.819 LEUKOPENIA: ICD-10-CM

## 2019-07-09 DIAGNOSIS — R65.10 SIRS (SYSTEMIC INFLAMMATORY RESPONSE SYNDROME) (HCC): ICD-10-CM

## 2019-07-09 DIAGNOSIS — A41.9 SEPSIS (HCC): ICD-10-CM

## 2019-07-09 DIAGNOSIS — D70.9 NEUTROPENIA (HCC): ICD-10-CM

## 2019-07-09 DIAGNOSIS — R50.9 FEVER: Primary | ICD-10-CM

## 2019-07-09 PROBLEM — R11.10 VOMITING: Status: ACTIVE | Noted: 2019-07-09

## 2019-07-09 LAB
ALBUMIN SERPL BCP-MCNC: 3.5 G/DL (ref 3.5–5)
ALP SERPL-CCNC: 79 U/L (ref 46–116)
ALT SERPL W P-5'-P-CCNC: 51 U/L (ref 12–78)
ANION GAP SERPL CALCULATED.3IONS-SCNC: 11 MMOL/L (ref 4–13)
APTT PPP: 33 SECONDS (ref 23–37)
AST SERPL W P-5'-P-CCNC: 40 U/L (ref 5–45)
BASOPHILS # BLD AUTO: 0 THOUSANDS/ΜL (ref 0–0.1)
BASOPHILS NFR BLD AUTO: 0 % (ref 0–1)
BILIRUB DIRECT SERPL-MCNC: 0.09 MG/DL (ref 0–0.2)
BILIRUB SERPL-MCNC: 0.37 MG/DL (ref 0.2–1)
BUN SERPL-MCNC: 47 MG/DL (ref 5–25)
CALCIUM SERPL-MCNC: 9.1 MG/DL (ref 8.3–10.1)
CHLORIDE SERPL-SCNC: 97 MMOL/L (ref 100–108)
CO2 SERPL-SCNC: 29 MMOL/L (ref 21–32)
CREAT SERPL-MCNC: 8.79 MG/DL (ref 0.6–1.3)
EOSINOPHIL # BLD AUTO: 0.03 THOUSAND/ΜL (ref 0–0.61)
EOSINOPHIL NFR BLD AUTO: 2 % (ref 0–6)
ERYTHROCYTE [DISTWIDTH] IN BLOOD BY AUTOMATED COUNT: 12.9 % (ref 11.6–15.1)
GFR SERPL CREATININE-BSD FRML MDRD: 7 ML/MIN/1.73SQ M
GLUCOSE SERPL-MCNC: 141 MG/DL (ref 65–140)
GLUCOSE SERPL-MCNC: 161 MG/DL (ref 65–140)
GLUCOSE SERPL-MCNC: 184 MG/DL (ref 65–140)
HCT VFR BLD AUTO: 36 % (ref 36.5–49.3)
HGB BLD-MCNC: 11.6 G/DL (ref 12–17)
IMM GRANULOCYTES # BLD AUTO: 0.01 THOUSAND/UL (ref 0–0.2)
IMM GRANULOCYTES NFR BLD AUTO: 1 % (ref 0–2)
INR PPP: 1.13 (ref 0.84–1.19)
LACTATE SERPL-SCNC: 1.7 MMOL/L (ref 0.5–2)
LIPASE SERPL-CCNC: 550 U/L (ref 73–393)
LYMPHOCYTES # BLD AUTO: 0.29 THOUSANDS/ΜL (ref 0.6–4.47)
LYMPHOCYTES NFR BLD AUTO: 15 % (ref 14–44)
MCH RBC QN AUTO: 27 PG (ref 26.8–34.3)
MCHC RBC AUTO-ENTMCNC: 32.2 G/DL (ref 31.4–37.4)
MCV RBC AUTO: 84 FL (ref 82–98)
MONOCYTES # BLD AUTO: 0.03 THOUSAND/ΜL (ref 0.17–1.22)
MONOCYTES NFR BLD AUTO: 2 % (ref 4–12)
NEUTROPHILS # BLD AUTO: 1.58 THOUSANDS/ΜL (ref 1.85–7.62)
NEUTS SEG NFR BLD AUTO: 80 % (ref 43–75)
NRBC BLD AUTO-RTO: 0 /100 WBCS
PLATELET # BLD AUTO: 149 THOUSANDS/UL (ref 149–390)
PMV BLD AUTO: 9.6 FL (ref 8.9–12.7)
POTASSIUM SERPL-SCNC: 4 MMOL/L (ref 3.5–5.3)
PROCALCITONIN SERPL-MCNC: 12.78 NG/ML
PROT SERPL-MCNC: 7.5 G/DL (ref 6.4–8.2)
PROTHROMBIN TIME: 14.7 SECONDS (ref 11.6–14.5)
RBC # BLD AUTO: 4.29 MILLION/UL (ref 3.88–5.62)
SODIUM SERPL-SCNC: 137 MMOL/L (ref 136–145)
WBC # BLD AUTO: 1.94 THOUSAND/UL (ref 4.31–10.16)

## 2019-07-09 PROCEDURE — 83690 ASSAY OF LIPASE: CPT | Performed by: PHYSICIAN ASSISTANT

## 2019-07-09 PROCEDURE — 85730 THROMBOPLASTIN TIME PARTIAL: CPT | Performed by: EMERGENCY MEDICINE

## 2019-07-09 PROCEDURE — 85025 COMPLETE CBC W/AUTO DIFF WBC: CPT | Performed by: EMERGENCY MEDICINE

## 2019-07-09 PROCEDURE — 36415 COLL VENOUS BLD VENIPUNCTURE: CPT | Performed by: EMERGENCY MEDICINE

## 2019-07-09 PROCEDURE — 82948 REAGENT STRIP/BLOOD GLUCOSE: CPT

## 2019-07-09 PROCEDURE — 87040 BLOOD CULTURE FOR BACTERIA: CPT | Performed by: EMERGENCY MEDICINE

## 2019-07-09 PROCEDURE — 84145 PROCALCITONIN (PCT): CPT | Performed by: EMERGENCY MEDICINE

## 2019-07-09 PROCEDURE — 99223 1ST HOSP IP/OBS HIGH 75: CPT | Performed by: STUDENT IN AN ORGANIZED HEALTH CARE EDUCATION/TRAINING PROGRAM

## 2019-07-09 PROCEDURE — 80048 BASIC METABOLIC PNL TOTAL CA: CPT | Performed by: EMERGENCY MEDICINE

## 2019-07-09 PROCEDURE — 99285 EMERGENCY DEPT VISIT HI MDM: CPT | Performed by: EMERGENCY MEDICINE

## 2019-07-09 PROCEDURE — 93005 ELECTROCARDIOGRAM TRACING: CPT

## 2019-07-09 PROCEDURE — 99285 EMERGENCY DEPT VISIT HI MDM: CPT

## 2019-07-09 PROCEDURE — 87147 CULTURE TYPE IMMUNOLOGIC: CPT | Performed by: EMERGENCY MEDICINE

## 2019-07-09 PROCEDURE — 80076 HEPATIC FUNCTION PANEL: CPT | Performed by: EMERGENCY MEDICINE

## 2019-07-09 PROCEDURE — 83605 ASSAY OF LACTIC ACID: CPT | Performed by: EMERGENCY MEDICINE

## 2019-07-09 PROCEDURE — 87077 CULTURE AEROBIC IDENTIFY: CPT | Performed by: EMERGENCY MEDICINE

## 2019-07-09 PROCEDURE — 71046 X-RAY EXAM CHEST 2 VIEWS: CPT

## 2019-07-09 PROCEDURE — 85610 PROTHROMBIN TIME: CPT | Performed by: EMERGENCY MEDICINE

## 2019-07-09 PROCEDURE — 96365 THER/PROPH/DIAG IV INF INIT: CPT

## 2019-07-09 PROCEDURE — 87186 SC STD MICRODIL/AGAR DIL: CPT | Performed by: EMERGENCY MEDICINE

## 2019-07-09 PROCEDURE — 96375 TX/PRO/DX INJ NEW DRUG ADDON: CPT

## 2019-07-09 RX ORDER — NIFEDIPINE 60 MG/1
60 TABLET, EXTENDED RELEASE ORAL DAILY
Status: DISCONTINUED | OUTPATIENT
Start: 2019-07-10 | End: 2019-07-14 | Stop reason: HOSPADM

## 2019-07-09 RX ORDER — HEPARIN SODIUM 5000 [USP'U]/ML
5000 INJECTION, SOLUTION INTRAVENOUS; SUBCUTANEOUS EVERY 8 HOURS SCHEDULED
Status: DISCONTINUED | OUTPATIENT
Start: 2019-07-09 | End: 2019-07-14 | Stop reason: HOSPADM

## 2019-07-09 RX ORDER — ACETAMINOPHEN 325 MG/1
650 TABLET ORAL EVERY 6 HOURS PRN
Status: DISCONTINUED | OUTPATIENT
Start: 2019-07-09 | End: 2019-07-14 | Stop reason: HOSPADM

## 2019-07-09 RX ORDER — ACETAMINOPHEN 325 MG/1
650 TABLET ORAL ONCE
Status: COMPLETED | OUTPATIENT
Start: 2019-07-09 | End: 2019-07-09

## 2019-07-09 RX ORDER — VANCOMYCIN HYDROCHLORIDE 1 G/200ML
15 INJECTION, SOLUTION INTRAVENOUS ONCE
Status: COMPLETED | OUTPATIENT
Start: 2019-07-09 | End: 2019-07-09

## 2019-07-09 RX ORDER — ATORVASTATIN CALCIUM 40 MG/1
40 TABLET, FILM COATED ORAL DAILY
Status: DISCONTINUED | OUTPATIENT
Start: 2019-07-10 | End: 2019-07-14 | Stop reason: HOSPADM

## 2019-07-09 RX ORDER — CARVEDILOL 3.12 MG/1
6.25 TABLET ORAL 2 TIMES DAILY WITH MEALS
Status: DISCONTINUED | OUTPATIENT
Start: 2019-07-10 | End: 2019-07-14 | Stop reason: HOSPADM

## 2019-07-09 RX ORDER — DOXAZOSIN 2 MG/1
2 TABLET ORAL
Status: DISCONTINUED | OUTPATIENT
Start: 2019-07-09 | End: 2019-07-14 | Stop reason: HOSPADM

## 2019-07-09 RX ORDER — ASPIRIN 81 MG/1
81 TABLET, CHEWABLE ORAL DAILY
Status: DISCONTINUED | OUTPATIENT
Start: 2019-07-10 | End: 2019-07-14 | Stop reason: HOSPADM

## 2019-07-09 RX ORDER — LIDOCAINE 50 MG/G
1 PATCH TOPICAL DAILY
Status: DISCONTINUED | OUTPATIENT
Start: 2019-07-10 | End: 2019-07-14 | Stop reason: HOSPADM

## 2019-07-09 RX ORDER — INSULIN GLARGINE 100 [IU]/ML
15 INJECTION, SOLUTION SUBCUTANEOUS
Status: DISCONTINUED | OUTPATIENT
Start: 2019-07-09 | End: 2019-07-12

## 2019-07-09 RX ORDER — ONDANSETRON 2 MG/ML
4 INJECTION INTRAMUSCULAR; INTRAVENOUS EVERY 6 HOURS PRN
Status: DISCONTINUED | OUTPATIENT
Start: 2019-07-09 | End: 2019-07-14 | Stop reason: HOSPADM

## 2019-07-09 RX ORDER — FUROSEMIDE 40 MG/1
80 TABLET ORAL DAILY
Status: DISCONTINUED | OUTPATIENT
Start: 2019-07-10 | End: 2019-07-14 | Stop reason: HOSPADM

## 2019-07-09 RX ORDER — INSULIN GLARGINE 100 [IU]/ML
20 INJECTION, SOLUTION SUBCUTANEOUS
Status: DISCONTINUED | OUTPATIENT
Start: 2019-07-09 | End: 2019-07-09

## 2019-07-09 RX ADMIN — INSULIN LISPRO 1 UNITS: 100 INJECTION, SOLUTION INTRAVENOUS; SUBCUTANEOUS at 22:39

## 2019-07-09 RX ADMIN — CEFEPIME HYDROCHLORIDE 2000 MG: 2 INJECTION, POWDER, FOR SOLUTION INTRAVENOUS at 16:57

## 2019-07-09 RX ADMIN — INSULIN GLARGINE 15 UNITS: 100 INJECTION, SOLUTION SUBCUTANEOUS at 22:38

## 2019-07-09 RX ADMIN — HEPARIN SODIUM 5000 UNITS: 5000 INJECTION INTRAVENOUS; SUBCUTANEOUS at 22:38

## 2019-07-09 RX ADMIN — DOXAZOSIN 2 MG: 2 TABLET ORAL at 22:38

## 2019-07-09 RX ADMIN — ACETAMINOPHEN 650 MG: 325 TABLET ORAL at 22:37

## 2019-07-09 RX ADMIN — ACETAMINOPHEN 650 MG: 325 TABLET ORAL at 17:02

## 2019-07-09 RX ADMIN — VANCOMYCIN HYDROCHLORIDE 1000 MG: 1 INJECTION, SOLUTION INTRAVENOUS at 17:30

## 2019-07-09 NOTE — ASSESSMENT & PLAN NOTE
By fever and new leucopenia w/anc 1 58  -Unclear etiology  May be 2* occult infection or bacteremia  Pt did have bath water exposure to his tunneled catheter in his right chest wall for HD, so if found to be bacteremic need to consider catheter infection  S/sx are minimal consistent w/fatigue/malaise, gait instability, and generalized back pain through T/L/S back w/o midline tenderness and normal neuro exam in respect to strength/crude sensation  No meningismus  -rec'd cefepime/vanc in ed will continue for now, consult ID  -f/u bcx, procalcitonin, monitor cbc daily    If neutropenia develops suggest neutropenic precautions

## 2019-07-09 NOTE — ED ATTENDING ATTESTATION
Zachary Crump MD, saw and evaluated the patient  I have discussed the patient with the resident/non-physician practitioner and agree with the resident's/non-physician practitioner's findings, Plan of Care, and MDM as documented in the resident's/non-physician practitioner's note, except where noted  All available labs and Radiology studies were reviewed  I was present for key portions of any procedure(s) performed by the resident/non-physician practitioner and I was immediately available to provide assistance  At this point I agree with the current assessment done in the Emergency Department  I have conducted an independent evaluation of this patient a history and physical is as follows:  Patient with hx of ESRD, comes in with complaints of nausea, denies chest pain, dyspnea, fever, cough, abdominal pain  On exam no acute distress:  Vital signs stable, Temp noted 100 4, conscious, alert, no focal neuro deficit; lungs clear, heart sounds normal, abdomen soft nontender  Will check labs, x-ray to rule out pneumonia as patient has had a low-grade temp  Labs reviewed, leukopenia noted, lactic acid blood cultures ordered; no clear source of infection, possible dialysis catheter line as the source; will cover with cefepime and vancomycin  Admit for further eval and treatment      Critical Care Time  Procedures

## 2019-07-09 NOTE — ED PROVIDER NOTES
History  Chief Complaint   Patient presents with    Medical Problem     Pt  brought in via EMS  Pt  was at dialysis and started to have chills and vomit  Pt  reports right sided neck pain  Pt  denies chest pain  76-year-old male with a history of ESRD currently on Tuesday, Thursday, Saturday dialysis presenting to the emergency department from his dialysis clinic after an episode of chills and vomiting that occurred during his dialysis  He received 3 of for scheduled hours of dialysis before he became symptomatic and dialysis was stopped for him to be transferred to the emergency department  Upon arrival to the emergency department he states that he feels ill but does not provide more specifics other than nausea  He notes chills that occurred today while in dialysis as well as last Saturday during dialysis  Denies any symptoms in between  Says he was feeling well yesterday  Now acknowledges generalized fatigue and nausea  Denies any abdominal pain, chest pain, shortness of breath, lightheadedness, back pain, change in urination, or diarrhea  No recent antibiotics  No rashes  No pain, swelling, or purulent discharge from his catheter insertion site  Prior to Admission Medications   Prescriptions Last Dose Informant Patient Reported? Taking?    Blood Glucose Monitoring Suppl (ONE TOUCH ULTRA 2) w/Device KIT  Spouse/Significant Other No No   Sig: by Does not apply route 3 (three) times a day   NIFEdipine ER (ADALAT CC) 60 MG 24 hr tablet  Spouse/Significant Other No Yes   Sig: Take 1 tablet (60 mg total) by mouth daily   ONE TOUCH LANCETS MISC  Spouse/Significant Other No No   Sig: by Does not apply route 3 (three) times a day   aspirin 81 MG tablet  Spouse/Significant Other No Yes   Sig: Take 1 tablet (81 mg total) by mouth daily   atorvastatin (LIPITOR) 40 mg tablet  Spouse/Significant Other No Yes   Sig: Take 1 tablet (40 mg total) by mouth daily   carvedilol (COREG) 6 25 mg tablet Spouse/Significant Other No Yes   Sig: Take 1 tablet (6 25 mg total) by mouth 2 (two) times a day with meals   doxazosin (CARDURA) 2 mg tablet  Spouse/Significant Other No Yes   Sig: Take 1 tablet (2 mg total) by mouth daily at bedtime   furosemide (LASIX) 80 mg tablet  Spouse/Significant Other Yes Yes   Sig: Take 80 mg by mouth daily   glucose blood (FREESTYLE LITE) test strip  Spouse/Significant Other No No   Sig: Use as instructed TID   insulin aspart (NOVOLOG FLEXPEN) 100 Units/mL injection pen  Spouse/Significant Other No Yes   Sig: Inject 3 Units under the skin 3 (three) times a day with meals   insulin glargine (BASAGLAR KWIKPEN) 100 units/mL injection pen  Spouse/Significant Other No Yes   Sig: Inject 20 Units under the skin daily      Facility-Administered Medications: None       Past Medical History:   Diagnosis Date    Cataract     Chronic kidney disease     Diabetes mellitus (HCC)      IDDM    Diabetic retinopathy ([facility] [address] [facility]     Dizziness     occ    ESRD (end stage renal disease) ([facility] [address] [facility]     Headache     occ    Hypertension     Legally blind     LVH (left ventricular hypertrophy)     Preferred language is [place]     understands some English    Use of cane as ambulatory aid        Past Surgical History:   Procedure Laterality Date    INGUINAL HERNIA REPAIR Right     IR FISTULA/GRAFTOGRAM  4/30/2019    IR FISTULA/GRAFTOGRAM  6/14/2019    IR PERMACATH PLACEMENT  4/3/2019    DC ANASTOMOSIS,AV,ANY SITE Left 1/23/2019    Procedure: CREATION FISTULA ARTERIOVENOUS (AV);   Surgeon: Steve Sharif MD;  [facility] OR;  Service: Vascular       Family History   Problem Relation Age of Onset    Hypertension Mother     Diabetes Father     No Known Problems Sister     No Known Problems Brother     No Known Problems Maternal Aunt     No Known Problems Maternal Uncle     No Known Problems Paternal Aunt     No Known Problems Paternal Uncle     No Known Problems Maternal Grandmother     No Known Problems Maternal Grandfather     No Known Problems Paternal Grandmother     No Known Problems Paternal Grandfather      I have reviewed and agree with the history as documented  Social History     Tobacco Use    Smoking status: Former Smoker     Packs/day: 0 25     Last attempt to quit: 2018     Years since quittin 4    Smokeless tobacco: Never Used   Substance Use Topics    Alcohol use: Not Currently    Drug use: No        Review of Systems   Constitutional: Positive for chills and fatigue  Negative for fever  HENT: Negative for congestion and sore throat  Eyes: Negative for visual disturbance  Respiratory: Negative for cough and shortness of breath  Cardiovascular: Negative for chest pain and palpitations  Gastrointestinal: Positive for nausea and vomiting  Negative for abdominal pain and diarrhea  Genitourinary: Negative for difficulty urinating and dysuria  Musculoskeletal: Negative for myalgias  Skin: Negative for rash  Neurological: Negative for weakness, light-headedness, numbness and headaches  Physical Exam  ED Triage Vitals   Temperature Pulse Respirations Blood Pressure SpO2   19 1442 19 1442 19 1442 19 1442 19 1442   100 4 °F (38 °C) 90 18 167/85 98 %      Temp Source Heart Rate Source Patient Position - Orthostatic VS BP Location FiO2 (%)   19 1442 19 1442 19 1442 19 1442 --   Oral Monitor Lying Right arm       Pain Score       19 1542       No Pain             Orthostatic Vital Signs  Vitals:    19 1110 19 1115 19 1130 19 1200   BP: 154/85 146/83 145/76 119/68   Pulse: 81 80 81 76   Patient Position - Orthostatic VS:           Physical Exam   Constitutional: He is oriented to person, place, and time  He appears well-developed and well-nourished  No distress  HENT:   Head: Normocephalic and atraumatic     Mouth/Throat: Oropharynx is clear and moist    Eyes: Pupils are equal, round, and reactive to light  EOM are normal    Neck: Normal range of motion  Neck supple  Cardiovascular: Normal rate, regular rhythm, normal heart sounds and intact distal pulses  Pulmonary/Chest: Effort normal and breath sounds normal  He has no wheezes  He has no rales  Right-sided tunneled catheter in place on the chest wall without any fluctuance, tenderness, or purulent drainage  Abdominal: Soft  Bowel sounds are normal  There is no tenderness  Musculoskeletal: Normal range of motion  He exhibits no edema or tenderness  Neurological: He is alert and oriented to person, place, and time  No cranial nerve deficit  Skin: Skin is warm and dry  Capillary refill takes less than 2 seconds  Nursing note and vitals reviewed        ED Medications  Medications   aspirin chewable tablet 81 mg (81 mg Oral Given 7/11/19 0906)   atorvastatin (LIPITOR) tablet 40 mg (40 mg Oral Given 7/11/19 0906)   carvedilol (COREG) tablet 6 25 mg (6 25 mg Oral Given 7/11/19 0906)   doxazosin (CARDURA) tablet 2 mg (2 mg Oral Given 7/10/19 2150)   furosemide (LASIX) tablet 80 mg (80 mg Oral Given 7/11/19 0906)   NIFEdipine (PROCARDIA XL) 24 hr tablet 60 mg (60 mg Oral Given 7/11/19 0906)   heparin (porcine) subcutaneous injection 5,000 Units (5,000 Units Subcutaneous Given 7/11/19 0534)   ondansetron (ZOFRAN) injection 4 mg (has no administration in time range)   insulin lispro (HumaLOG) 100 units/mL subcutaneous injection 1-5 Units (2 Units Subcutaneous Given 7/11/19 1128)   insulin glargine (LANTUS) subcutaneous injection 15 Units 0 15 mL (15 Units Subcutaneous Given 7/10/19 2150)   acetaminophen (TYLENOL) tablet 650 mg (650 mg Oral Given 7/9/19 2237)   lidocaine (LIDODERM) 5 % patch 1 patch (1 patch Topical Medication Applied 7/11/19 0904)   cefepime (MAXIPIME) 1,000 mg in dextrose 5 % 50 mL IVPB (1,000 mg Intravenous New Bag 7/10/19 1827)   vancomycin (VANCOCIN) IVPB (premix) 750 mg (has no administration in time range) cefepime (MAXIPIME) 2 g/50 mL dextrose IVPB (0 mg Intravenous Stopped 7/9/19 1730)   vancomycin (VANCOCIN) IVPB (premix) 1,000 mg (0 mg Intravenous Stopped 7/9/19 1837)   acetaminophen (TYLENOL) tablet 650 mg (650 mg Oral Given 7/9/19 1702)   vancomycin (VANCOCIN) IVPB (premix) 500 mg (0 mg Intravenous Stopped 7/10/19 1430)       Diagnostic Studies  Results Reviewed     Procedure Component Value Units Date/Time    Blood culture #1 [[id number]]  (Abnormal) Collected:  07/09/19 1653    Lab Status:  Preliminary result Specimen:  Blood from Hand, Right Updated:  07/11/19 0807     Gram Stain Result Gram positive cocci in clusters    Narrative:       Culture too young, will reincubate  Blood culture #2 [[id number]]  (Abnormal) Collected:  07/09/19 1653    Lab Status:  Preliminary result Specimen:  Blood from Arm, Right Updated:  07/11/19 0805     Gram Stain Result Gram positive cocci in clusters    Narrative:       Culture too young, will reincubate      Procalcitonin [[id number]]  (Abnormal) Collected:  07/09/19 1709    Lab Status:  Final result Specimen:  Blood from Arm, Right Updated:  07/09/19 2114     Procalcitonin 12 78 ng/ml     Lipase [[id number]]  (Abnormal) Collected:  07/09/19 1709    Lab Status:  Final result Specimen:  Blood from Arm, Right Updated:  07/09/19 2050     Lipase 550 u/L     Hepatic function panel [[id number]]  (Normal) Collected:  07/09/19 1559    Lab Status:  Final result Specimen:  Blood from Arm, Right Updated:  07/09/19 1929     Total Bilirubin 0 37 mg/dL      Bilirubin, Direct 0 09 mg/dL      Alkaline Phosphatase 79 U/L      AST 40 U/L      ALT 51 U/L      Total Protein 7 5 g/dL      Albumin 3 5 g/dL     Protime-INR [[id number]]  (Abnormal) Collected:  07/09/19 1709    Lab Status:  Final result Specimen:  Blood from Arm, Right Updated:  07/09/19 1727     Protime 14 7 seconds      INR 1 13    APTT [[id number]]  (Normal) Collected:  07/09/19 1709    Lab Status:  Final result Specimen:  Blood from Arm, Right Updated:  07/09/19 1727     PTT 33 seconds     Lactic acid, plasma x2 [[id number]]  (Normal) Collected:  07/09/19 1653    Lab Status:  Final result Specimen:  Blood from Arm, Right Updated:  07/09/19 1718     LACTIC ACID 1 7 mmol/L     Narrative:       Result may be elevated if tourniquet was used during collection      CBC and differential [[id number]]  (Abnormal) Collected:  07/09/19 1559    Lab Status:  Final result Specimen:  Blood from Arm, Right Updated:  07/09/19 1621     WBC 1 94 Thousand/uL      RBC 4 29 Million/uL      Hemoglobin 11 6 g/dL      Hematocrit 36 0 %      MCV 84 fL      MCH 27 0 pg      MCHC 32 2 g/dL      RDW 12 9 %      MPV 9 6 fL      Platelets 626 Thousands/uL      nRBC 0 /100 WBCs      Neutrophils Relative 80 %      Immat GRANS % 1 %      Lymphocytes Relative 15 %      Monocytes Relative 2 %      Eosinophils Relative 2 %      Basophils Relative 0 %      Neutrophils Absolute 1 58 Thousands/µL      Immature Grans Absolute 0 01 Thousand/uL      Lymphocytes Absolute 0 29 Thousands/µL      Monocytes Absolute 0 03 Thousand/µL      Eosinophils Absolute 0 03 Thousand/µL      Basophils Absolute 0 00 Thousands/µL     Basic metabolic panel [[id number]]  (Abnormal) Collected:  07/09/19 1559    Lab Status:  Final result Specimen:  Blood from Arm, Right Updated:  07/09/19 1616     Sodium 137 mmol/L      Potassium 4 0 mmol/L      Chloride 97 mmol/L      CO2 29 mmol/L      ANION GAP 11 mmol/L      BUN 47 mg/dL      Creatinine 8 79 mg/dL      Glucose 141 mg/dL      Calcium 9 1 mg/dL      eGFR 7 ml/min/1 73sq m     Narrative:       [place] guidelines for Chronic Kidney Disease (CKD):     Stage 1 with normal or high GFR (GFR > 90 mL/min/1 73 square meters)    Stage 2 Mild CKD (GFR = 60-89 mL/min/1 73 square meters)    Stage 3A Moderate CKD (GFR = 45-59 mL/min/1 73 square meters)    Stage 3B Moderate CKD (GFR = 30-44 mL/min/1 73 square meters)    Stage 4 Severe CKD (GFR = 15-29 mL/min/1 73 square meters)    Stage 5 End Stage CKD (GFR <15 mL/min/1 73 square meters)  Note: GFR calculation is accurate only with a steady state creatinine    Fingerstick Glucose (POCT) [[id number]]  (Abnormal) Collected:  07/09/19 1454    Lab Status:  Final result Updated:  07/09/19 1455     POC Glucose 161 mg/dl                  XR chest 2 views   Final Result by Annabel Murray MD (07/09 1642)      New right IJ dialysis catheter in place with proximal lumen near the cavoatrial junction and distal lumen tip in the right atrium  No acute cardiopulmonary disease  Previously noted bilateral pleural effusions and bibasilar atelectasis have resolved  Workstation performed: [id number]               Procedures  Procedures        ED Course                   Initial Sepsis Screening     [address] Name 07/09/19 1630                Is the patient's history suggestive of a new or worsening infection? (!) Yes (Proceed)  -JS        Suspected source of infection  suspect infection, source unknown  -JS        Are two or more of the following signs & symptoms of infection both present and new to the patient? (!) Yes (Proceed)  -JS        Indicate SIRS criteria  Tachypnea > 20 resp per min;Leukopenia (WBC < 4000 IJL)  -JS        If the answer is yes to both questions, suspicion of sepsis is present          If severe sepsis is present AND tissue hypoperfusion perists in the hour after fluid resuscitation or lactate > 4, the patient meets criteria for SEPTIC SHOCK          Are any of the following organ dysfunction criteria present within 6 hours of suspected infection and SIRS criteria that are NOT considered to be chronic conditions?   No  -JS        Organ dysfunction          Date of presentation of severe sepsis          Time of presentation of severe sepsis          Tissue hypoperfusion persists in the hour after crystalloid fluid administration, evidenced, by either:          Was hypotension present within one hour of the conclusion of crystalloid fluid administration?         Date of presentation of septic shock          Time of presentation of septic shock            User Key  (r) = Recorded By, (t) = Taken By, (c) = Cosigned By    [address] Name Provider Zoila Dykes MD Resident                  MDM  Number of Diagnoses or Management Options  Fever:   Leukopenia:   Sepsis [facility]):   Diagnosis management comments: 65-year-old male with ESRD presenting emergency department with nonspecific symptoms, benign exam, fever, and leukopenia  No obvious pneumonia on his chest x-ray  Due to his fever and low white count I believe we need to further evaluate him for possible bacteremia and possibly dialysis line infection  II started him on cefepime and vancomycin in the emergency department  He did not receive a fluid bolus for sepsis because his vital signs are stable and is a dialysis patient I did not want a risk fluid overloading him  Electrolytes are stable and he does not require further emergent dialysis  I did speak with Dr Junie Gomez with Nephrology to an for him of the patient's admission and possible requirement to remove his dialysis catheter  Plan to discuss with Internal Medicine for admission and further workup        Disposition  Final diagnoses:   Fever   Leukopenia   Sepsis ([facility] [address] )   ESRD (end stage renal disease) ([facility] [address] )     Time reflects when diagnosis was documented in both MDM as applicable and the Disposition within this note     Time User Action Codes Description Comment    7/9/2019  5:28 PM Gwyndolyn Elham Add [R50 9] Fever     7/9/2019  5:28 PM Gwyndolyn Elham Add [D72 819] Leukopenia     7/9/2019  5:28 PM Abebe Ortega [place] [A41 9] Sepsis ([facility] [address] )     7/9/2019  7:32 PM Mil Driscoll Add [N18 6] ESRD (end stage renal disease) ([facility] [address] )     7/9/2019  7:32 PM Aaron Driscoll Add [D70 9] Neutropenia ([facility] [address] )     7/9/2019  7:32 PM Mil Driscoll Add [R65 10] SIRS (systemic inflammatory response syndrome) ([facility] )     7/11/2019 12:18 PM You Ortega Modify [N18 6] ESRD (end stage renal disease) [facility])       ED Disposition     ED Disposition Condition Date/Time Comment    Admit Stable Tujaycob Jul 9, 2019  5:28 PM Case was discussed with [facility] and the patient's admission status was agreed to be Admission Status: inpatient status to the service of Dr Brigette Cloud   Follow-up Information    None         Current Discharge Medication List      CONTINUE these medications which have NOT CHANGED    Details   aspirin 81 MG tablet Take 1 tablet (81 mg total) by mouth daily  Qty: 90 tablet, Refills: 0    Associated Diagnoses: CKD (chronic kidney disease) stage 4, GFR 15-29 ml/min ([facility] ); Uncontrolled type 2 diabetes mellitus with hyperglycemia, with long-term current use of insulin ([facility] );  Essential hypertension      atorvastatin (LIPITOR) 40 mg tablet Take 1 tablet (40 mg total) by mouth daily  Qty: 90 tablet, Refills: 0    Associated Diagnoses: Hyperlipidemia, unspecified hyperlipidemia type      carvedilol (COREG) 6 25 mg tablet Take 1 tablet (6 25 mg total) by mouth 2 (two) times a day with meals  Qty: 60 tablet, Refills: 1    Associated Diagnoses: Essential hypertension      doxazosin (CARDURA) 2 mg tablet Take 1 tablet (2 mg total) by mouth daily at bedtime  Qty: 30 tablet, Refills: 1    Associated Diagnoses: Hypertensive urgency      furosemide (LASIX) 80 mg tablet Take 80 mg by mouth daily  Refills: 0      insulin aspart (NOVOLOG FLEXPEN) 100 Units/mL injection pen Inject 3 Units under the skin 3 (three) times a day with meals  Qty: 5 pen, Refills: 1    Associated Diagnoses: Uncontrolled secondary diabetes mellitus with stage 5 CKD (GFR<15) ([facility])      insulin glargine (BASAGLAR KWIKPEN) 100 units/mL injection pen Inject 20 Units under the skin daily  Qty: 5 pen, Refills: 2    Associated Diagnoses: Uncontrolled secondary diabetes mellitus with stage 5 CKD (GFR<15) ([facility])      NIFEdipine ER (ADALAT CC) 60 MG 24 hr tablet Take 1 tablet (60 mg total) by mouth daily  Qty: 30 tablet, Refills: 1    Associated Diagnoses: Hypertensive urgency      Blood Glucose Monitoring Suppl (ONE TOUCH ULTRA 2) w/Device KIT by Does not apply route 3 (three) times a day  Qty: 1 each, Refills: 0    Associated Diagnoses: Uncontrolled type 2 diabetes mellitus with hyperglycemia, with long-term current use of insulin ([facility])      glucose blood (FREESTYLE LITE) test strip Use as instructed TID  Qty: 100 each, Refills: 2    Associated Diagnoses: Uncontrolled secondary diabetes mellitus with stage 5 CKD (GFR<15) ([facility])      ONE TOUCH LANCETS MISC by Does not apply route 3 (three) times a day  Qty: 100 each, Refills: 1    Associated Diagnoses: Uncontrolled type 2 diabetes mellitus with hyperglycemia, with long-term current use of insulin ([facility] )           No discharge procedures on file  ED Provider  Attending physically available and evaluated Paddy Zhong  ABIMAEL managed the patient along with the ED Attending      Electronically Signed by         Robe Lantigua MD  07/10/19 0758       Robe Lantigua MD  07/11/19 7046

## 2019-07-10 PROBLEM — N18.6 TYPE 2 DIABETES MELLITUS WITH CHRONIC KIDNEY DISEASE ON CHRONIC DIALYSIS, WITH LONG-TERM CURRENT USE OF INSULIN (HCC): Status: ACTIVE | Noted: 2018-05-17

## 2019-07-10 PROBLEM — A41.9 SEPSIS (HCC): Status: ACTIVE | Noted: 2019-07-09

## 2019-07-10 PROBLEM — Z99.2 TYPE 2 DIABETES MELLITUS WITH CHRONIC KIDNEY DISEASE ON CHRONIC DIALYSIS, WITH LONG-TERM CURRENT USE OF INSULIN (HCC): Status: ACTIVE | Noted: 2018-05-17

## 2019-07-10 PROBLEM — E11.22 TYPE 2 DIABETES MELLITUS WITH CHRONIC KIDNEY DISEASE ON CHRONIC DIALYSIS, WITH LONG-TERM CURRENT USE OF INSULIN (HCC): Status: ACTIVE | Noted: 2018-05-17

## 2019-07-10 LAB
ALBUMIN SERPL BCP-MCNC: 3 G/DL (ref 3.5–5)
ALP SERPL-CCNC: 71 U/L (ref 46–116)
ALT SERPL W P-5'-P-CCNC: 56 U/L (ref 12–78)
ANION GAP SERPL CALCULATED.3IONS-SCNC: 9 MMOL/L (ref 4–13)
ANISOCYTOSIS BLD QL SMEAR: PRESENT
AST SERPL W P-5'-P-CCNC: 38 U/L (ref 5–45)
BASOPHILS # BLD MANUAL: 0.13 THOUSAND/UL (ref 0–0.1)
BASOPHILS NFR MAR MANUAL: 1 % (ref 0–1)
BILIRUB SERPL-MCNC: 0.43 MG/DL (ref 0.2–1)
BUN SERPL-MCNC: 55 MG/DL (ref 5–25)
CALCIUM SERPL-MCNC: 9.1 MG/DL (ref 8.3–10.1)
CHLORIDE SERPL-SCNC: 96 MMOL/L (ref 100–108)
CO2 SERPL-SCNC: 30 MMOL/L (ref 21–32)
CREAT SERPL-MCNC: 10.78 MG/DL (ref 0.6–1.3)
EOSINOPHIL # BLD MANUAL: 0 THOUSAND/UL (ref 0–0.4)
EOSINOPHIL NFR BLD MANUAL: 0 % (ref 0–6)
ERYTHROCYTE [DISTWIDTH] IN BLOOD BY AUTOMATED COUNT: 13.1 % (ref 11.6–15.1)
GFR SERPL CREATININE-BSD FRML MDRD: 6 ML/MIN/1.73SQ M
GLUCOSE SERPL-MCNC: 102 MG/DL (ref 65–140)
GLUCOSE SERPL-MCNC: 117 MG/DL (ref 65–140)
GLUCOSE SERPL-MCNC: 136 MG/DL (ref 65–140)
GLUCOSE SERPL-MCNC: 178 MG/DL (ref 65–140)
GLUCOSE SERPL-MCNC: 198 MG/DL (ref 65–140)
HCT VFR BLD AUTO: 35.1 % (ref 36.5–49.3)
HGB BLD-MCNC: 11.2 G/DL (ref 12–17)
LYMPHOCYTES # BLD AUTO: 0.54 THOUSAND/UL (ref 0.6–4.47)
LYMPHOCYTES # BLD AUTO: 4 % (ref 14–44)
MCH RBC QN AUTO: 26.9 PG (ref 26.8–34.3)
MCHC RBC AUTO-ENTMCNC: 31.9 G/DL (ref 31.4–37.4)
MCV RBC AUTO: 84 FL (ref 82–98)
MONOCYTES # BLD AUTO: 0.4 THOUSAND/UL (ref 0–1.22)
MONOCYTES NFR BLD: 3 % (ref 4–12)
NEUTROPHILS # BLD MANUAL: 12.36 THOUSAND/UL (ref 1.85–7.62)
NEUTS BAND NFR BLD MANUAL: 13 % (ref 0–8)
NEUTS SEG NFR BLD AUTO: 79 % (ref 43–75)
NRBC BLD AUTO-RTO: 0 /100 WBCS
PLATELET # BLD AUTO: 149 THOUSANDS/UL (ref 149–390)
PLATELET BLD QL SMEAR: ADEQUATE
PMV BLD AUTO: 9.7 FL (ref 8.9–12.7)
POTASSIUM SERPL-SCNC: 4.8 MMOL/L (ref 3.5–5.3)
PROT SERPL-MCNC: 6.9 G/DL (ref 6.4–8.2)
RBC # BLD AUTO: 4.16 MILLION/UL (ref 3.88–5.62)
RBC MORPH BLD: PRESENT
SODIUM SERPL-SCNC: 135 MMOL/L (ref 136–145)
TOTAL CELLS COUNTED SPEC: 100
WBC # BLD AUTO: 13.43 THOUSAND/UL (ref 4.31–10.16)

## 2019-07-10 PROCEDURE — 99232 SBSQ HOSP IP/OBS MODERATE 35: CPT | Performed by: INTERNAL MEDICINE

## 2019-07-10 PROCEDURE — 82948 REAGENT STRIP/BLOOD GLUCOSE: CPT

## 2019-07-10 PROCEDURE — 85027 COMPLETE CBC AUTOMATED: CPT | Performed by: PHYSICIAN ASSISTANT

## 2019-07-10 PROCEDURE — 99223 1ST HOSP IP/OBS HIGH 75: CPT | Performed by: INTERNAL MEDICINE

## 2019-07-10 PROCEDURE — 87081 CULTURE SCREEN ONLY: CPT | Performed by: PHYSICIAN ASSISTANT

## 2019-07-10 PROCEDURE — 99222 1ST HOSP IP/OBS MODERATE 55: CPT | Performed by: INTERNAL MEDICINE

## 2019-07-10 PROCEDURE — 85007 BL SMEAR W/DIFF WBC COUNT: CPT | Performed by: PHYSICIAN ASSISTANT

## 2019-07-10 PROCEDURE — 87207 SMEAR SPECIAL STAIN: CPT | Performed by: INTERNAL MEDICINE

## 2019-07-10 PROCEDURE — 80053 COMPREHEN METABOLIC PANEL: CPT | Performed by: PHYSICIAN ASSISTANT

## 2019-07-10 RX ORDER — VANCOMYCIN HYDROCHLORIDE 500 MG/100ML
500 INJECTION, SOLUTION INTRAVENOUS ONCE
Status: COMPLETED | OUTPATIENT
Start: 2019-07-10 | End: 2019-07-10

## 2019-07-10 RX ADMIN — ASPIRIN 81 MG 81 MG: 81 TABLET ORAL at 09:51

## 2019-07-10 RX ADMIN — DOXAZOSIN 2 MG: 2 TABLET ORAL at 21:50

## 2019-07-10 RX ADMIN — FUROSEMIDE 80 MG: 40 TABLET ORAL at 09:51

## 2019-07-10 RX ADMIN — CARVEDILOL 6.25 MG: 3.12 TABLET, FILM COATED ORAL at 16:27

## 2019-07-10 RX ADMIN — INSULIN GLARGINE 15 UNITS: 100 INJECTION, SOLUTION SUBCUTANEOUS at 21:50

## 2019-07-10 RX ADMIN — HEPARIN SODIUM 5000 UNITS: 5000 INJECTION INTRAVENOUS; SUBCUTANEOUS at 13:48

## 2019-07-10 RX ADMIN — VANCOMYCIN HYDROCHLORIDE 500 MG: 500 INJECTION, SOLUTION INTRAVENOUS at 13:23

## 2019-07-10 RX ADMIN — INSULIN LISPRO 1 UNITS: 100 INJECTION, SOLUTION INTRAVENOUS; SUBCUTANEOUS at 11:42

## 2019-07-10 RX ADMIN — HEPARIN SODIUM 5000 UNITS: 5000 INJECTION INTRAVENOUS; SUBCUTANEOUS at 21:50

## 2019-07-10 RX ADMIN — LIDOCAINE 1 PATCH: 50 PATCH TOPICAL at 09:51

## 2019-07-10 RX ADMIN — CEFEPIME HYDROCHLORIDE 1000 MG: 1 INJECTION, POWDER, FOR SOLUTION INTRAMUSCULAR; INTRAVENOUS at 18:27

## 2019-07-10 RX ADMIN — ATORVASTATIN CALCIUM 40 MG: 40 TABLET, FILM COATED ORAL at 09:51

## 2019-07-10 RX ADMIN — INSULIN LISPRO 1 UNITS: 100 INJECTION, SOLUTION INTRAVENOUS; SUBCUTANEOUS at 21:50

## 2019-07-10 RX ADMIN — CARVEDILOL 6.25 MG: 3.12 TABLET, FILM COATED ORAL at 08:28

## 2019-07-10 RX ADMIN — HEPARIN SODIUM 5000 UNITS: 5000 INJECTION INTRAVENOUS; SUBCUTANEOUS at 06:00

## 2019-07-10 RX ADMIN — NIFEDIPINE 60 MG: 60 TABLET, FILM COATED, EXTENDED RELEASE ORAL at 09:51

## 2019-07-10 NOTE — H&P
H&P- Paddy Trevin 1965, 53 y o  male MRN: [id number]    Unit/Bed#: E2 -01 Encounter: [id number]    Primary Care Provider: Kenya Montez MD   Date and time admitted to hospital: 7/9/2019  2:39 PM    History and Physical - [facility] Internal Medicine    Patient Information: David Scruggs Trevin 53 y o  male MRN: [id number]  Unit/Bed#: E2 -01 Encounter: [id number]  Admitting Physician: Maryann Camarena PA-C  PCP: Kenya Montez MD  Date of Admission:  07/09/19    Assessment/Plan:    Hospital Problem List:     Principal Problem:    SIRS (systemic inflammatory response syndrome) ([facility] )  Active Problems:    ESRD (end stage renal disease) ([facility] )    Uncontrolled secondary diabetes mellitus with stage 5 CKD (GFR<15) ([facility] )    Legally blind    S/P arteriovenous (AV) fistula creation    Leucopenia    Vomiting        * SIRS (systemic inflammatory response syndrome) ([facility] )  Assessment & Plan  By fever and new leucopenia w/anc 1 58  -Unclear etiology  May be 2* occult infection or bacteremia  Pt did have bath water exposure to his tunneled catheter in his right chest wall for HD, so if found to be bacteremic need to consider catheter infection  S/sx are minimal consistent w/fatigue/malaise, gait instability, and generalized back pain through T/L/S back w/o midline tenderness and normal neuro exam in respect to strength/crude sensation  No meningismus  -rec'd cefepime/vanc in ed will continue for now, consult ID  -f/u bcx, procalcitonin, monitor cbc daily  If neutropenia develops suggest neutropenic precautions      ESRD (end stage renal disease) ([facility] )  Assessment & Plan  On HD w/tunneled cath in right chest wall  Last HD was today (partial per pt)  Maintained onT/Th/S  Consult nephrology while ip    Vomiting  Assessment & Plan  One episode of vomiting today, non bilious, non bloody  lfts  wnl and abdomen exam benign    Will check lipase for completeness, but clinically minimal s/sx otherwise to suggest GI origin      Leucopenia  Assessment & Plan  No significant elevation on diff to suggest suppression  Minimal anemia but no thrombocytopenia  Continue to monitor, workup as above    Legally blind  Assessment & Plan  Minimal vision of 'shadows' in right eye, blind in left eye    Uncontrolled secondary diabetes mellitus with stage 5 CKD (GFR<15) ([facility])  Assessment & Plan  Lab Results   Component Value Date    HGBA1C 8 1 (H) 04/04/2019       Recent Labs     07/09/19  1454   POCGLU 161*       Blood Sugar Average: Last 72 hrs:  (P) 161     Poorly controlled dm  Appetite somewhat poor  decraese lantus to 15 iu qhs and start ssi/poc bs              VTE Prophylaxis: Heparin  / sequential compression device   Code Status:   POLST: There is no POLST form on file for this patient (pre-hospital)    Anticipated Length of Stay:  Patient will be admitted on an Inpatient basis with an anticipated length of stay of  greater than 2 midnights  Justification for Hospital Stay: sirs vs sepsis of unclear etiology    Total Time for Visit, including Counseling / Coordination of Care: 45 minutes  Greater than 50% of this total time spent on direct patient counseling and coordination of care  Chief Complaint:   Fevers/chills    History of Present Illness:    Steffany Tena is a 53 y o  male who presents with [facility]  End-stage renal disease on hemodialysis, hypertension diabetes legally blind status with diabetic retinopathy coming to the hospital for fevers/chills  Pt was in normal state of health  3 days PTA pt was bathing and got bath water in his right sided tunneled cath from chest wall  He reported shortly after developing malaise/ fevers/chills Deberah Click  This progressed until day of admission where he had one episode of emesis  pt attended HD and was sent to the ED for evaluation due to apparent illness  He received partial treatment  In ED pt was found to be leucopenic which was new    He has no specific complaints other than the above s/sx save gait instability due to 'weakness' and worsening diffuse back pain in thoracolumbosacral back over last 3 mo  He denies headache, neck pain or stiffness, cp/sob/sore throat cough  He denies new arthralgias  He denies any sick contacts  He denies night sweats unexplained weight loss or hemoptysis  He did spend 6 mo from 06 to 12/2018 in [facility] but has been back since  No smoking, alcohol or drug use  We are asked to admit pt for sirs vs sepsis    Review of Systems:    Review of Systems   Constitutional: Positive for appetite change, chills and fever  HENT: Negative for sore throat  Eyes: Positive for visual disturbance  Respiratory: Negative for cough and shortness of breath  Cardiovascular: Negative for chest pain and leg swelling  Gastrointestinal: Positive for nausea and vomiting  Negative for abdominal pain and diarrhea  Musculoskeletal: Positive for back pain and gait problem  Negative for arthralgias, myalgias, neck pain and neck stiffness  Neurological: Positive for weakness  Negative for numbness and headaches  Psychiatric/Behavioral: Negative for confusion  All other systems reviewed and are negative        Past Medical and Surgical History:     Past Medical History:   Diagnosis Date    Cataract     Chronic kidney disease     Diabetes mellitus (HCC)      IDDM    Diabetic retinopathy ([facility] )     Dizziness     occ    ESRD (end stage renal disease) ([facility] )     Headache     occ    Hypertension     Legally blind     LVH (left ventricular hypertrophy)     Preferred language is [place]     understands some English    Use of cane as ambulatory aid        Past Surgical History:   Procedure Laterality Date    INGUINAL HERNIA REPAIR Right     IR FISTULA/GRAFTOGRAM  4/30/2019    IR FISTULA/GRAFTOGRAM  6/14/2019    IR [address] PLACEMENT  4/3/2019    ME ANASTOMOSIS,AV,ANY SITE Left 1/23/2019    Procedure: CREATION FISTULA ARTERIOVENOUS (AV); Surgeon: Rachel Zimmer MD;  Location: [facility];  Service: Vascular       Meds/Allergies:    Prior to Admission medications    Medication Sig Start Date End Date Taking?  Authorizing Provider   aspirin 81 MG tablet Take 1 tablet (81 mg total) by mouth daily 4/26/19  Yes Nixon Velez MD   atorvastatin (LIPITOR) 40 mg tablet Take 1 tablet (40 mg total) by mouth daily 4/26/19  Yes Nixon Velez MD   carvedilol (COREG) 6 25 mg tablet Take 1 tablet (6 25 mg total) by mouth 2 (two) times a day with meals 4/26/19  Yes Nixon Velez MD   doxazosin (CARDURA) 2 mg tablet Take 1 tablet (2 mg total) by mouth daily at bedtime 4/26/19  Yes Nixon Velez MD   furosemide (LASIX) 80 mg tablet Take 80 mg by mouth daily 5/11/19  Yes Historical Provider, MD   insulin aspart (NOVOLOG FLEXPEN) 100 Units/mL injection pen Inject 3 Units under the skin 3 (three) times a day with meals 4/26/19  Yes Nixon Velez MD   insulin glargine (BASAGLAR KWIKPEN) 100 units/mL injection pen Inject 20 Units under the skin daily 4/26/19  Yes Nixon Velez MD   NIFEdipine ER (ADALAT CC) 60 MG 24 hr tablet Take 1 tablet (60 mg total) by mouth daily 4/26/19  Yes Nixon Velez MD   Blood Glucose Monitoring Suppl (ONE TOUCH ULTRA 2) w/Device KIT by Does not apply route 3 (three) times a day 1/25/19   Nixon Velez MD   glucose blood (FREESTYLE LITE) test strip Use as instructed TID 4/26/19   Nixon Velez MD   ONE TOUCH LANCETS MISC by Does not apply route 3 (three) times a day 1/15/19   Nixon Velez MD         Allergies: No Known Allergies    Social History:     Marital Status: Legally    Occupation:   Patient Pre-hospital Living Situation:   Patient Pre-hospital Level of Mobility:   Patient Pre-hospital Diet Restrictions:   Substance Use History:   Social History     Substance and Sexual Activity   Alcohol Use Not Currently     Social History     Tobacco Use   Smoking Status Former Smoker    Packs/day: 0 25    Last attempt to quit: 2018    Years since quittin 4   Smokeless Tobacco Never Used     Social History     Substance and Sexual Activity   Drug Use No       Family History:     Family History   Problem Relation Age of Onset    Hypertension Mother     Diabetes Father     No Known Problems Sister     No Known Problems Brother     No Known Problems Maternal Aunt     No Known Problems Maternal Uncle     No Known Problems Paternal Aunt     No Known Problems Paternal Uncle     No Known Problems Maternal Grandmother     No Known Problems Maternal Grandfather     No Known Problems Paternal Grandmother     No Known Problems Paternal Grandfather        Physical Exam:     Vitals:   Blood Pressure: (!) 181/63 (19)  Pulse: 98 (19)  Temperature: (!) 101 5 °F (38 6 °C) (19)  Temp Source: Oral (19)  Respirations: 18 (19)  Height: 5' 10" (177 8 cm) (19)  Weight - Scale: 72 2 kg (159 lb 2 8 oz) (19)  SpO2: 98 % (19)    Physical Exam   Constitutional: He is oriented to person, place, and time  He appears well-developed  No distress  Patient appears diaphoretic appears stated age appears ill   HENT:   Head: Normocephalic and atraumatic  Right Ear: External ear normal    Left Ear: External ear normal    Eyes: Conjunctivae are normal    Neck: Normal range of motion  Cardiovascular: Normal rate, regular rhythm and normal heart sounds  Exam reveals no gallop and no friction rub  No murmur heard  Pulmonary/Chest: Effort normal and breath sounds normal  No stridor  No respiratory distress  He has no wheezes  He has no rales  Abdominal: Soft  He exhibits no distension and no mass  There is no tenderness  There is no rebound and no guarding  Musculoskeletal: He exhibits no edema or tenderness (no TTP of back despite palpation across midline spine and paraspinal soft tissue  )     No meningismus or kernig   Neurological: He is alert and oriented to person, place, and time  No sensory deficit  He exhibits normal muscle tone  Skin: Skin is warm  He is diaphoretic  Psychiatric: He has a normal mood and affect  Vitals reviewed  (  Be Sure to Include Physical Exam: Delete this entire line when you have entered your exam)    Additional Data:     Lab Results: I have personally reviewed pertinent reports  Results from last 7 days   Lab Units 07/09/19  1559   WBC Thousand/uL 1 94*   HEMOGLOBIN g/dL 11 6*   HEMATOCRIT % 36 0*   PLATELETS Thousands/uL 149   NEUTROS PCT % 80*   LYMPHS PCT % 15   MONOS PCT % 2*   EOS PCT % 2     Results from last 7 days   Lab Units 07/09/19  1559   POTASSIUM mmol/L 4 0   CHLORIDE mmol/L 97*   CO2 mmol/L 29   BUN mg/dL 47*   CREATININE mg/dL 8 79*   CALCIUM mg/dL 9 1   ALK PHOS U/L 79   ALT U/L 51   AST U/L 40     Results from last 7 days   Lab Units 07/09/19  1709   INR  1 13       Imaging: I have personally reviewed pertinent reports  cxr reviewed by myself  No vascular congestion or infiltrate noted  Xr Chest 2 Views    Result Date: 7/9/2019  Narrative: CHEST INDICATION:   fever, evaluate for pna  COMPARISON:  April 3, 2019  EXAM PERFORMED/VIEWS:  XR CHEST PA & LATERAL FINDINGS:  New right IJ dialysis catheter in place with proximal lumen near the cavoatrial junction and distal lumen tip in the right atrium  Cardiomediastinal silhouette appears unremarkable  The lungs are clear  Previously noted bilateral pleural effusions and bibasilar atelectasis have resolved  No pneumothorax  Osseous structures appear within normal limits for patient age  Impression: New right IJ dialysis catheter in place with proximal lumen near the cavoatrial junction and distal lumen tip in the right atrium  No acute cardiopulmonary disease  Previously noted bilateral pleural effusions and bibasilar atelectasis have resolved   Workstation performed: [id number]     Ir Fistula/graftogram    Result Date: 6/14/2019  Narrative: Arteriovenous fistulogram with angioplasty Clinical History: Nonmaturing fistula  Contrast: 33 mL Omnipaque 350 Fluoro time: 8 8 minutes Number of Images: 53 Concious sedation time: 64 minutes Technique: The patient was brought to the interventional radiology suite and identified verbally and by wristband  The patient was placed supine on the table and the existing left upper extremity fistula was prepped and draped in usual sterile fashion  A  micropuncture set  was advanced into the fistula near the arterial anastomosis, directed towards the arterial inflow  An arteriovenous fistula gram was performed from the arterial anastomosis to the superior vena cava  Findings: A brachiocephalic fistula is in place with overall small caliber throughout  There are multiple focal stenoses beginning at the arterial anastomosis, through the immediate arterialized vein, the mid fistula, and then at the cephalic arch  The  central veins are widely patent  Intervention: A 6-Lebanese vascular sheath was placed, and 6 x 2 cutting balloon angioplasty was performed of the arterial anastomosis and immediate arterialized vein  This was followed with 6 mm angioplasty at higher pressure  Follow-up imaging demonstrated significant improvement  The fistula was then punctured near the arterial anastomosis, directed towards the venous outflow  A 6-Lebanese vascular sheath was placed, and the 6 mm cutting balloon was advanced, and the identified focal stenoses were all treated with 6 mm cutting balloon angioplasty  The entire length of the fistula was then treated with 6 mm angioplasty at 15 jannet  Follow-up imaging demonstrated a significant overall improvement in caliber with a good result at the areas of focal stenosis  By physical exam, a strong thrill was felt throughout the fistula  Both sheaths were removed, manual compression applied until hemostasis was achieved  The patient tolerated the procedure well  Impression: Impression: Diffusely small fistula with multiple focal stenoses as described above  The focal stenoses were successfully treated with 6 mm cutting balloon angioplasty, and the entire length of the fistula was treated with 6 mm angioplasty at higher pressure  A good result was obtained  Workstation performed: [id number]       EKG, Pathology, and Other Studies Reviewed on Admission:   · EKG:     Allscripts / Epic Records Reviewed: Yes     ** Please Note: This note has been constructed using a voice recognition system   **

## 2019-07-10 NOTE — UTILIZATION REVIEW
Initial Clinical Review    Admission: Date/Time/Statement: 7/9/19 @ 1738     Orders Placed This Encounter   Procedures    Inpatient Admission     Standing Status:   Standing     Number of Occurrences:   1     Order Specific Question:   Admitting Physician     Answer:   Yamileth Vance [[id number]]     Order Specific Question:   Level of Care     Answer:   Med Surg [[id number]]     Order Specific Question:   Estimated length of stay     Answer:   More than 2 Midnights     Order Specific Question:   Certification     Answer:   I certify that inpatient services are medically necessary for this patient for a duration of greater than two midnights  See H&P and MD Progress Notes for additional information about the patient's course of treatment  ED Arrival Information     Expected Arrival Acuity Means of Arrival Escorted By Service Admission Type    - 7/9/2019 14:39 Urgent [facility] General Medicine Urgent    Arrival Complaint    -        Chief Complaint   Patient presents with   [facility] Medical Problem     Pt  brought in via EMS  Pt  was at dialysis and started to have chills and vomit  Pt  reports right sided neck pain  Pt  denies chest pain  Assessment/Plan: SIRS (systemic inflammatory response syndrome) (HCC)  Assessment & Plan  By fever and new leucopenia w/anc 1 58  -Unclear etiology  May be 2* occult infection or bacteremia  Pt did have bath water exposure to his tunneled catheter in his right chest wall for HD, so if found to be bacteremic need to consider catheter infection  S/sx are minimal consistent w/fatigue/malaise, gait instability, and generalized back pain through T/L/S back w/o midline tenderness and normal neuro exam in respect to strength/crude sensation  No meningismus  -rec'd cefepime/vanc in ed will continue for now, consult ID  -f/u bcx, procalcitonin, monitor cbc daily    If neutropenia develops suggest neutropenic precautions        ESRD (end stage renal disease) ([facility] )  Assessment & Plan  On HD w/tunneled cath in right chest wall  Last HD was today (partial per pt)  Maintained onT/Th/S  Consult nephrology while ip     Vomiting  Assessment & Plan  One episode of vomiting today, non bilious, non bloody  lfts  wnl and abdomen exam benign  Will check lipase for completeness, but clinically minimal s/sx otherwise to suggest GI origin  Leucopenia  Assessment & Plan  No significant elevation on diff to suggest suppression  Minimal anemia but no thrombocytopenia  Continue to monitor, workup as above     Legally blind  Assessment & Plan  Minimal vision of 'shadows' in right eye, blind in left eye     Uncontrolled secondary diabetes mellitus with stage 5 CKD (GFR<15) ([facility] [address] )   Anticipated Length of Stay:  Patient will be admitted on an Inpatient basis with an anticipated length of stay of  greater than 2 midnights  Justification for Hospital Stay: sirs vs sepsis of unclear etiology    Paddy Zhong is a 53 y o  male who presents with [facility]  End-stage renal disease on hemodialysis, hypertension diabetes legally blind status with diabetic retinopathy coming to the hospital for fevers/chills  Pt was in normal state of health  3 days PTA pt was bathing and got bath water in his right sided tunneled cath from chest wall  He reported shortly after developing malaise/ fevers/chills [facility] Members  This progressed until day of admission where he had one episode of emesis  pt attended HD and was sent to the ED for evaluation due to apparent illness  He received partial treatment      In ED pt was found to be leucopenic which was new  He has no specific complaints other than the above s/sx save gait instability due to 'weakness' and worsening diffuse back pain in thoracolumbosacral back over last 3 mo        He denies headache, neck pain or stiffness, cp/sob/sore throat cough  He denies new arthralgias  He denies any sick contacts    He denies night sweats unexplained weight loss or hemoptysis       He did spend 6 mo from 06 to 12/2018 in [facility] but has been back since  No smoking, alcohol or drug use        We are asked to admit pt for sirs vs sepsis    Per  ID  Consult:    Sepsis-POA  Fever, tachycardia, leukopenia  Concerned about the possibility of a catheter related bacteremia  No other clear source appreciated  Much less likely malaria as the patient returned from [facility] several months ago, however still need to consider this a possibility  The patient remains hemodynamically stable despite his systemic illness  He seems to be tolerating the antibiotics without difficulty  -will re-dose the vancomycin 500 mg x1 today    -increase the cefepime to 1 g IV Q 24 hours  -check vancomycin level tomorrow a m   -consult pharmacy for vancomycin trough management  -follow-up blood cultures  -check malaria smear now and tomorrow a m   -monitor CBC with diff  -supportive care     2  Leukopenia-now with leukocytosis  Suspect this is all related to the patient's sepsis  No other clear source appreciated    -monitor CBC with diff  -supportive care  -antibiotics as above  -no additional ID workup for now      ED Triage Vitals   Temperature Pulse Respirations Blood Pressure SpO2   07/09/19 1442 07/09/19 1442 07/09/19 1442 07/09/19 1442 07/09/19 1442   100 4 °F (38 °C) 90 18 167/85 98 %      Temp Source Heart Rate Source Patient Position - Orthostatic VS BP Location FiO2 (%)   07/09/19 1442 07/09/19 1442 07/09/19 1442 07/09/19 1442 --   Oral Monitor Lying Right arm       Pain Score       07/09/19 1542       No Pain        Wt Readings from Last 1 Encounters:   07/09/19 72 2 kg (159 lb 2 8 oz)     Additional Vital Signs:   07/10/19 0712  98 5 °F (36 9 °C)  91  18  152/88  99 %  None (Room air)  Lying   07/10/19 0027  98 °F (36 7 °C)  84  18  149/69  96 %    Lying   07/09/19 2238        177/60Abnormal          07/09/19 1822  101 5 °F (38 6 °C)Abnormal   98  18  181/63Abnormal   98 %  None (Room air)  Lying   07/09/19 1718    99  18  184/88Abnormal   99 %  None (Room air)  Lying   07/09/19 1542    95  17  178/84Abnormal   100 %  None (Room air)  Lying   07/09/19 1455    96  16  176/84Abnormal   99 %  None (Room air)  Lying   07/09/19 1442  100 4 °F (38 °C)  90  18  167/85  98 %  None (Room air)  Lying         Pertinent Labs/Diagnostic Test Results:     CXR  (  7/9)     New right IJ dialysis catheter in place with proximal lumen near the cavoatrial junction and distal lumen tip in the right atrium  No acute cardiopulmonary disease  Previously noted bilateral pleural effusions and bibasilar atelectasis have resolved    Results from last 7 days   Lab Units 07/10/19  0606 07/09/19  1559   WBC Thousand/uL 13 43* 1 94*   HEMOGLOBIN g/dL 11 2* 11 6*   HEMATOCRIT % 35 1* 36 0*   PLATELETS Thousands/uL 149 149   NEUTROS ABS Thousands/µL  --  1 58*   BANDS PCT % 13*  --          Results from last 7 days   Lab Units 07/10/19  0606 07/09/19  1559   SODIUM mmol/L 135* 137   POTASSIUM mmol/L 4 8 4 0   CHLORIDE mmol/L 96* 97*   CO2 mmol/L 30 29   ANION GAP mmol/L 9 11   BUN mg/dL 55* 47*   CREATININE mg/dL 10 78* 8 79*   EGFR ml/min/1 73sq m 6 7   CALCIUM mg/dL 9 1 9 1     Results from last 7 days   Lab Units 07/10/19  0606 07/09/19  1559   AST U/L 38 40   ALT U/L 56 51   ALK PHOS U/L 71 79   TOTAL PROTEIN g/dL 6 9 7 5   ALBUMIN g/dL 3 0* 3 5   TOTAL BILIRUBIN mg/dL 0 43 0 37   BILIRUBIN DIRECT mg/dL  --  0 09     Results from last 7 days   Lab Units 07/10/19  1126 07/10/19  0717 07/09/19  2129 07/09/19  1454   POC GLUCOSE mg/dl 198* 102 184* 161*     Results from last 7 days   Lab Units 07/10/19  0606 07/09/19  1559   GLUCOSE RANDOM mg/dL 117 141*         Results from last 7 days   Lab Units 07/09/19  1709   PROTIME seconds 14 7*   INR  1 13   PTT seconds 33     Results from last 7 days   Lab Units 07/09/19  1709   PROCALCITONIN ng/ml 12 78*     Results from last 7 days   Lab Units 07/09/19  1653   LACTIC ACID mmol/L 1 7           Results from last 7 days   Lab Units 07/09/19  1709   LIPASE u/L 550*         ED Treatment:   Medication Administration from 07/09/2019 1439 to 07/09/2019 1804       Date/Time Order Dose Route Action Action by Comments     07/09/2019 1730 cefepime (MAXIPIME) 2 g/50 mL dextrose IVPB 0 mg Intravenous Stopped Dustin Sosa RN      07/09/2019 1657 cefepime (MAXIPIME) 2 g/50 mL dextrose IVPB 2,000 mg Intravenous New Bag Karlo Brown RN      07/09/2019 1730 vancomycin (VANCOCIN) IVPB (premix) 1,000 mg 1,000 mg Intravenous New Bag Dustin Sosa RN      07/09/2019 1702 acetaminophen (TYLENOL) tablet 650 mg 650 mg Oral Given Karlo Brown RN         Past Medical History:   Diagnosis Date    Cataract     Chronic kidney disease     Diabetes mellitus ([facility] )      IDDM    Diabetic retinopathy ([facility] )     Dizziness     occ    ESRD (end stage renal disease) ([place] [address] )     Headache     occ    Hypertension     Legally blind     LVH (left ventricular hypertrophy)     Preferred language is [place]     understands some English    Use of cane as ambulatory aid      Present on Admission:   ESRD (end stage renal disease) ([facility] )   Benign hypertension with [facility]D (end-stage renal disease) ([facility]      Admitting Diagnosis: Leukopenia [D72 819]  Vomiting [R11 10]  Fever [R50 9]  Sepsis ([facility] ) [A41 9]  Age/Sex: 53 y o  male  Admission Orders:    Current Facility-Administered Medications:  acetaminophen 650 mg Oral Q6H PRN Kassy Driscoll PA-C   aspirin 81 mg Oral Daily Kassy Driscoll PA-C   atorvastatin 40 mg Oral Daily Kassy Driscoll PA-C   carvedilol 6 25 mg Oral BID With Meals Kassy Driscoll PA-C   cefepime 1,000 mg Intravenous Q24H Rosendo Marcano MD   doxazosin 2 mg Oral HS Kassy Driscoll PA-C   furosemide 80 mg Oral Daily Kassy Driscoll PA-C   heparin (porcine) 5,000 Units Subcutaneous Q8H [address] Kassy M Delabre, PA-C   insulin glargine 15 Units Subcutaneous HS Kassy Driscoll PAGayleC   insulin lispro 1-5 Units Subcutaneous 4x Daily (AC & HS) Kassy Driscoll PA-C   lidocaine 1 patch Topical Daily Ksasy Driscoll PA-C   NIFEdipine ER 60 mg Oral Daily Kassy Driscoll PA-C   ondansetron 4 mg Intravenous Q6H PRN Kassy Driscoll PA-C       IP CONSULT TO NEPHROLOGY  IP CONSULT TO INFECTIOUS DISEASES  IP CONSULT TO PHARMACY     VENITA Kohler   Sat    Network Utilization Review Department  Phone: [phone]; Fax [phone]  [email or web address]  ATTENTION: Please call with any questions or concerns to [phone]  and carefully listen to the prompts so that you are directed to the right person  Send all requests for admission clinical reviews, approved or denied determinations and any other requests to fax [phone]   All voicemails are confidential

## 2019-07-10 NOTE — PROGRESS NOTES
Progress Note - Paddy Trevin 1965, 53 y o  male MRN: [id number]    Unit/Bed#: E2 -01 Encounter: [id number]    Primary Care Provider: Man Kaufman MD   Date and time admitted to hospital: 7/9/2019  2:39 PM        * Sepsis [facility])  Assessment & Plan  · Due to suspected central line associated bloodstream infection  · Chest x-ray negative for infection  · Abdominal exam benign  · Blood cultures obtained yesterday and are pending  · He was initiated on vancomycin 1 g x1 yesterday and cefepime 500 mg Q 24 hours  · Follow up blood culture results and monitor clinical course      ESRD (end stage renal disease) [facility])  Assessment & Plan  Nephrology consultation  Anticipate hemodialysis tomorrow 07/11/2019 via [facility]  If he does have proven bacteremia, then catheter will have to be removed    Type 2 diabetes mellitus with chronic kidney disease on chronic dialysis, with long-term current use of insulin [facility])  Assessment & Plan  Lab Results   Component Value Date    HGBA1C 8 1 (H) 04/04/2019       Recent Labs     07/09/19  1454 07/09/19  2129 07/10/19  0717   POCGLU 161* 184* 102       Blood Sugar Average: Last 72 hrs:  (P) 149     Historically with poor control  A1c down to 8 1%  Agree with lowering his dose of Lantus  Continue 15 units with sliding-scale insulin    Leucopenia  Assessment & Plan  Possibly due to sepsis  Recheck CBC in a m  Benign hypertension with ESRD (end-stage renal disease) ([facility] [address] )  Assessment & Plan  · Watch for hypotension  Continue Coreg 6 25 mg b i d , Cardura 2 mg at bedtime, Procardia XL 60 mg daily with hold parameters      VTE Pharmacologic Prophylaxis:   Pharmacologic: Heparin  Mechanical VTE Prophylaxis in Place: No    Patient Centered Rounds: I have performed bedside rounds with nursing staff today  Discussions with Specialists or Other Care Team Provider:  H&P reviewed    Time Spent for Care: 20 minutes    More than 50% of total time spent on counseling and coordination of care as described above  Current Length of Stay: 1 day(s)    Current Patient Status: Inpatient   Certification Statement: The patient will continue to require additional inpatient hospital stay due to Suspected line infection    Discharge Plan: To be determined    Code Status: Level 1 - Full Code      Subjective:   Still complains of chills and feeling cold  No shortness of breath  No vomiting  No abdominal pain    Objective:     Vitals:   Temp (24hrs), Av 6 °F (37 6 °C), Min:98 °F (36 7 °C), Max:101 5 °F (38 6 °C)    Temp:  [98 °F (36 7 °C)-101 5 °F (38 6 °C)] 98 5 °F (36 9 °C)  HR:  [84-99] 91  Resp:  [16-18] 18  BP: (149-184)/(60-88) 152/88  SpO2:  [96 %-100 %] 99 %  Body mass index is 22 84 kg/m²  Input and Output Summary (last 24 hours): Intake/Output Summary (Last 24 hours) at 7/10/2019 1109  Last data filed at 2019 1730  Gross per 24 hour   Intake 50 ml   Output    Net 50 ml       Physical Exam:     Physical Exam   Constitutional: He is oriented to person, place, and time  He appears well-developed  HENT:   Head: Normocephalic and atraumatic  Eyes:   Legally blind   Neck: No JVD present  Cardiovascular: Normal rate  Exam reveals no gallop and no friction rub  No murmur heard  Pulmonary/Chest: Effort normal  No stridor  No respiratory distress  He has no wheezes  Abdominal: Soft  He exhibits no distension  There is no tenderness  There is no guarding  Musculoskeletal: He exhibits no edema or deformity  Neurological: He is alert and oriented to person, place, and time  Skin: Skin is warm and dry  Psychiatric: He has a normal mood and affect   His behavior is normal        Additional Data:     Labs:    Results from last 7 days   Lab Units 07/10/19  0606 19  1559   WBC Thousand/uL 13 43* 1 94*   HEMOGLOBIN g/dL 11 2* 11 6*   HEMATOCRIT % 35 1* 36 0*   PLATELETS Thousands/uL 149 149   BANDS PCT % 13*  --    NEUTROS PCT %  --  80*   LYMPHS PCT %  --  15 LYMPHO PCT % 4*  --    MONOS PCT %  --  2*   MONO PCT % 3*  --    EOS PCT % 0 2     Results from last 7 days   Lab Units 07/10/19  0606   SODIUM mmol/L 135*   POTASSIUM mmol/L 4 8   CHLORIDE mmol/L 96*   CO2 mmol/L 30   BUN mg/dL 55*   CREATININE mg/dL 10 78*   ANION GAP mmol/L 9   CALCIUM mg/dL 9 1   ALBUMIN g/dL 3 0*   TOTAL BILIRUBIN mg/dL 0 43   ALK PHOS U/L 71   ALT U/L 56   AST U/L 38   GLUCOSE RANDOM mg/dL 117     Results from last 7 days   Lab Units 07/09/19  1709   INR  1 13     Results from last 7 days   Lab Units 07/10/19  0717 07/09/19  2129 07/09/19  1454   POC GLUCOSE mg/dl 102 184* 161*         Results from last 7 days   Lab Units 07/09/19  1709 07/09/19  1653   LACTIC ACID mmol/L  --  1 7   PROCALCITONIN ng/ml 12 78*  --            * I Have Reviewed All Lab Data Listed Above  * Additional Pertinent Lab Tests Reviewed:  All Labs Within Last 24 Hours Reviewed    Imaging:    Imaging Reports Reviewed Today Include:  Chest x-ray    Recent Cultures (last 7 days):           Last 24 Hours Medication List:     Current Facility-Administered Medications:  acetaminophen 650 mg Oral Q6H PRN Kassy Driscoll PA-C   aspirin 81 mg Oral Daily Kassy Driscoll PA-C   atorvastatin 40 mg Oral Daily Kassy Driscoll PA-C   carvedilol 6 25 mg Oral BID With Meals Kassy Driscoll PA-C   cefepime 500 mg Intravenous Q24H Kassy Driscoll PA-C   doxazosin 2 mg Oral HS Kassy Driscoll PA-C   furosemide 80 mg Oral Daily Kassy Driscoll PA-C   heparin (porcine) 5,000 Units Subcutaneous Q8H [facility] Kassy Driscoll PA-C   insulin glargine 15 Units Subcutaneous HS Kassy Driscoll PA-C   insulin lispro 1-5 Units Subcutaneous 4x Daily (AC & HS) Kassy Driscoll PA-C   lidocaine 1 patch Topical Daily Kassy Driscoll PA-C   NIFEdipine ER 60 mg Oral Daily Kassy Driscoll PA-C   ondansetron 4 mg Intravenous Q6H PRN Janiya Cortez PA-C        Today, Patient Was Seen By: Jessica Antony MD    ** Please Note: Dictation voice to text software may have been used in the creation of this document   **

## 2019-07-10 NOTE — ASSESSMENT & PLAN NOTE
Lab Results   Component Value Date    HGBA1C 8 1 (H) 04/04/2019       Recent Labs     07/09/19  1454 07/09/19  2129 07/10/19  0717   POCGLU 161* 184* 102       Blood Sugar Average: Last 72 hrs:  (P) 149     Historically with poor control  A1c down to 8 1%  Agree with lowering his dose of Lantus    Continue 15 units with sliding-scale insulin

## 2019-07-10 NOTE — ASSESSMENT & PLAN NOTE
On HD w/tunneled cath in right chest wall  Last HD was today (partial per pt)    Maintained onT/Th/S  Consult nephrology while ip

## 2019-07-10 NOTE — ASSESSMENT & PLAN NOTE
· Watch for hypotension    Continue Coreg 6 25 mg b i d , Cardura 2 mg at bedtime, Procardia XL 60 mg daily with hold parameters

## 2019-07-10 NOTE — CONSULTS
Consultation - Nephrology   Paddy Trevin 53 y o  male MRN: [id number]  Unit/Bed#: E2 -01 Encounter: [id number]      Assessment/Plan:  1  End-stage renal disease, maintenance hemodialysis Tuesday Thursday Saturday, [address]  2  Fever chills, leukocytosis, possible catheter in so she ate infection, blood cultures are pending, given empiric vancomycin and cefepime  3  Anemia of chronic disease, hemoglobin appears stable  4  Hypertension, blood pressure acceptable  5  Chronic lower back pain, may require further imaging  6  Access currently right chest wall PermCath as well as left upper arm AV fistula with recent angioplasty using 1 needle in the outpatient dialysis unit    Plan:  · Continue with Tuesday Thursday Saturday schedule  · Awaiting blood cultures results, status post cefepime and IV vancomycin, id consult is pending  · Will attempt to use left upper arm AV fistula if successful we may be able to remove PermCath, without reinsertion of dialysis catheter  · Hemoglobin stable  · Blood pressure volume status acceptable, chest x-ray without infiltrate or edema    History of Present Illness   Physician Requesting Consult: Jessica Antony MD  Reason for Consult / Principal Problem:  End-stage renal disease  HPI: Rickey Juarez is a 53y o  year old male who presents with fever chills    Patient is a 21-year-old male who has a known history of end-stage renal disease on maintenance hemodialysis Tuesday Thursday Saturday  Patient presents after developing fever chills and vomiting with hemodialysis  Patient was initially found to be leukopenic although that could be secondary to hemodialysis  Noted fever chills which still has been episodic  Appetite remains poor no further vomiting  No abdominal pain  Most of his complaints are secondary to lower back discomfort      Discussed with dialysis unit, they have been using his left upper arm AV fistula with 1 needle without significant difficulty  Denies any chest pain or shortness of breath  No cough or sputum production  Legally blind  History obtained from chart review and the patient    Review of Systems   All other systems reviewed and are negative  Review of Systems: pertinent findings as noted above otherwise negative    Historical Information   Patient Active Problem List   Diagnosis    Type 2 diabetes mellitus with chronic kidney disease on chronic dialysis, with long-term current use of insulin [facility]    Diabetic retinopathy of both eyes associated with type 2 diabetes mellitus ([facility] [facility]    Legally blind    LVH (left ventricular hypertrophy)    Hyperlipidemia    Chronic idiopathic constipation    Screening for colon cancer    ESRD (end stage renal disease) ([facility]    S/P arteriovenous (AV) fistula creation    Hyperkalemia    Acidosis    Elevated troponin I level    Azotemia    Chronic kidney disease-mineral and bone disorder    Anemia due to chronic kidney disease, on chronic dialysis [facility]    Hyperphosphatemia    Benign hypertension with ESRD (end-stage renal disease) ([facility]    Leucopenia    Sepsis ([facility] [address] )     Past Medical History:   Diagnosis Date    Cataract     Chronic kidney disease     Diabetes mellitus (HCC)      IDDM    Diabetic retinopathy ([facility] [facility]     Dizziness     occ    ESRD (end stage renal disease) ([facility] [address] [facility]     Headache     occ    Hypertension     Legally blind     LVH (left ventricular hypertrophy)     Preferred language is [place]     understands some English    Use of cane as ambulatory aid      Past Surgical History:   Procedure Laterality Date    INGUINAL HERNIA REPAIR Right     IR FISTULA/GRAFTOGRAM  4/30/2019    IR FISTULA/GRAFTOGRAM  6/14/2019    IR [address] PLACEMENT  4/3/2019    NC ANASTOMOSIS,AV,ANY SITE Left 1/23/2019    Procedure: CREATION FISTULA ARTERIOVENOUS (AV);   Surgeon: Sin Cesar MD;  Location: [facility];  Service: Vascular     Social History   Social History Substance and Sexual Activity   Alcohol Use Not Currently     Social History     Substance and Sexual Activity   Drug Use No     Social History     Tobacco Use   Smoking Status Former Smoker    Packs/day: 0 25    Last attempt to quit: 2018    Years since quittin 4   Smokeless Tobacco Never Used     Family History   Problem Relation Age of Onset    Hypertension Mother     Diabetes Father     No Known Problems Sister     No Known Problems Brother     No Known Problems Maternal Aunt     No Known Problems Maternal Uncle     No Known Problems Paternal Aunt     No Known Problems Paternal Uncle     No Known Problems Maternal Grandmother     No Known Problems Maternal Grandfather     No Known Problems Paternal Grandmother     No Known Problems Paternal Grandfather        Meds/Allergies   current meds:   Current Facility-Administered Medications   Medication Dose Route Frequency    acetaminophen (TYLENOL) tablet 650 mg  650 mg Oral Q6H PRN    aspirin chewable tablet 81 mg  81 mg Oral Daily    atorvastatin (LIPITOR) tablet 40 mg  40 mg Oral Daily    carvedilol (COREG) tablet 6 25 mg  6 25 mg Oral BID With Meals    cefepime (MAXIPIME) 500 mg in sodium chloride 0 9 % 50 mL IVPB  500 mg Intravenous Q24H    doxazosin (CARDURA) tablet 2 mg  2 mg Oral HS    furosemide (LASIX) tablet 80 mg  80 mg Oral Daily    heparin (porcine) subcutaneous injection 5,000 Units  5,000 Units Subcutaneous Q8H [address]    insulin glargine (LANTUS) subcutaneous injection 15 Units 0 15 mL  15 Units Subcutaneous HS    insulin lispro (HumaLOG) 100 units/mL subcutaneous injection 1-5 Units  1-5 Units Subcutaneous 4x Daily (AC & HS)    lidocaine (LIDODERM) 5 % patch 1 patch  1 patch Topical Daily    NIFEdipine (PROCARDIA XL) 24 hr tablet 60 mg  60 mg Oral Daily    ondansetron (ZOFRAN) injection 4 mg  4 mg Intravenous Q6H PRN       No Known Allergies      Objective   /88 (BP Location: Right arm)   Pulse 91   Temp 98 5 °F (36 9 °C) (Tympanic)   Resp 18   Ht 5' 10" (1 778 m)   Wt 72 2 kg (159 lb 2 8 oz)   SpO2 99%   BMI 22 84 kg/m²     Intake/Output Summary (Last 24 hours) at 7/10/2019 1109  Last data filed at 7/9/2019 1730  Gross per 24 hour   Intake 50 ml   Output    Net 50 ml       Current Weight: Weight - Scale: 72 2 kg (159 lb 2 8 oz)    Physical Exam   Constitutional: He is oriented to person, place, and time  No distress  HENT:   Head: Normocephalic  Neck: Neck supple  Cardiovascular: Normal rate and regular rhythm  Pulmonary/Chest: Effort normal and breath sounds normal    Abdominal: Soft  Musculoskeletal: He exhibits no edema  Neurological: He is alert and oriented to person, place, and time  Skin: Skin is warm and dry  Right chest wall PermCath without erythema or tenderness with palpation  No drainage noted  Left upper arm AV fistula with good thrill and bruit although appears slightly small           Lab Results:    Results from last 7 days   Lab Units 07/10/19  0606   WBC Thousand/uL 13 43*   HEMOGLOBIN g/dL 11 2*   HEMATOCRIT % 35 1*   PLATELETS Thousands/uL 149     Results from last 7 days   Lab Units 07/10/19  0606   POTASSIUM mmol/L 4 8   CHLORIDE mmol/L 96*   CO2 mmol/L 30   BUN mg/dL 55*   CREATININE mg/dL 10 78*   CALCIUM mg/dL 9 1

## 2019-07-10 NOTE — CONSULTS
Consultation - Infectious Disease   Paddy Trevin 53 y o  male MRN: [id number]  Unit/Bed#: E2 -01 Encounter: [id number]      IMPRESSION & RECOMMENDATIONS:   1  Sepsis-POA  Fever, tachycardia, leukopenia  Concerned about the possibility of a catheter related bacteremia  No other clear source appreciated  Much less likely malaria as the patient returned from [facility] several months ago, however still need to consider this a possibility  The patient remains hemodynamically stable despite his systemic illness  He seems to be tolerating the antibiotics without difficulty  -will re-dose the vancomycin 500 mg x1 today    -increase the cefepime to 1 g IV Q 24 hours  -check vancomycin level tomorrow a m   -consult pharmacy for vancomycin trough management  -follow-up blood cultures  -check malaria smear now and tomorrow a m   -monitor CBC with diff  -supportive care    2  Leukopenia-now with leukocytosis  Suspect this is all related to the patient's sepsis  No other clear source appreciated  -monitor CBC with diff  -supportive care  -antibiotics as above  -no additional ID workup for now    3  End-stage renal disease-on hemodialysis Q Tuesday Thursday Saturday  -nephrology will attempt to use the fistula  -if it becomes apparent that no PermCath is needed for dialysis, would remove the PermCath  -close nephrology follow-up    4  Diabetes mellitus-type 2 with chronic kidney disease on chronic dialysis with long-term insulin use    Discussed the above plan with primary service    HISTORY OF PRESENT ILLNESS:  Reason for Consult:  Systemic inflammatory response syndrome  HPI: Dusty Child is a 53y o  year old male with a history of end-stage renal disease on hemodialysis admitted to [facility] in [facility] with fever and shaking chills, who I am asked to assist with management    The patient has recently transitioned to end-stage renal disease and started hemodialysis back in February this year   He apparently had been in [facility] for 6 months and return from [facility] in December  During that time he did not take any malaria chemoprophylaxis but does not think he had a mosquito bites  He has been on dialysis with a PermCath in place but also had a fistula created  Apparently 3 days prior to admission he was bathing and got bath water in the right PermCath site and was concerned may be contaminated  He began developing some malaise and overall feeling poorly  Patient was on hemodialysis yesterday and immediately following developed fever and shaking chills  He also developed some nausea and vomiting  Because of the symptoms he was sent to the ER further evaluation  In the emergency department the patient was found to be tachycardic and febrile  He was also found to be leukopenic  He had blood cultures obtained and was given a dose of vancomycin and started on cefepime and admitted for further management  Since yesterday his temperatures come down  He has not had any more nausea vomiting  He denies any headache or stiff neck, denies any sore throat or rhinorrhea or nasal congestion, denies any nausea vomiting or diarrhea, denies any dysuria or hematuria, denies any new rash or skin lesions, denies any new joint or muscle pains  REVIEW OF SYSTEMS:  A complete 12 point system-based review of systems is negative other than that noted in the HPI      PAST MEDICAL HISTORY:  Past Medical History:   Diagnosis Date    Cataract     Chronic kidney disease     Diabetes mellitus [facility] [address] [facility]      IDDM    Diabetic retinopathy ([facility] [address] [facility]     Dizziness     occ    ESRD (end stage renal disease) ([facility] [address] [facility]     Headache     occ    Hypertension     Legally blind     LVH (left ventricular hypertrophy)     Preferred language is [place]     understands some English    Use of cane as ambulatory aid      Past Surgical History:   Procedure Laterality Date    INGUINAL HERNIA REPAIR Right     IR FISTULA/GRAFTOGRAM  4/30/2019  IR FISTULA/GRAFTOGRAM  2019    IR PERMACATH PLACEMENT  4/3/2019    SD ANASTOMOSIS,AV,ANY SITE Left 2019    Procedure: CREATION FISTULA ARTERIOVENOUS (AV); Surgeon: Pili Daugherty MD;  Location: [facility];  Service: Vascular       FAMILY HISTORY:  Non-contributory    SOCIAL HISTORY:  Social History   Social History     Substance and Sexual Activity   Alcohol Use Not Currently     Social History     Substance and Sexual Activity   Drug Use No     Social History     Tobacco Use   Smoking Status Former Smoker    Packs/day: 0 25    Last attempt to quit: 2018    Years since quittin 4   Smokeless Tobacco Never Used       ALLERGIES:  No Known Allergies    MEDICATIONS:  All current active medications have been reviewed  Antibiotics:  Vancomycin cefepime 2    PHYSICAL EXAM:  Temp:  [98 °F (36 7 °C)-101 5 °F (38 6 °C)] 98 5 °F (36 9 °C)  HR:  [84-99] 91  Resp:  [16-18] 18  BP: (149-184)/(60-88) 152/88  SpO2:  [96 %-100 %] 99 %  Temp (24hrs), Av 6 °F (37 6 °C), Min:98 °F (36 7 °C), Max:101 5 °F (38 6 °C)  Current: Temperature: 98 5 °F (36 9 °C)    Intake/Output Summary (Last 24 hours) at 7/10/2019 1222  Last data filed at 2019 1730  Gross per 24 hour   Intake 50 ml   Output    Net 50 ml       General Appearance:  Appearing well, nontoxic, and in no distress   Head:  Normocephalic, without obvious abnormality, atraumatic   Eyes:  Conjunctiva pink and sclera anicteric, both eyes   Nose: Nares normal, mucosa normal, no drainage   Throat: Oropharynx moist without lesions   Neck: Supple, symmetrical, no adenopathy, no tenderness/mass/nodules   Back:   Symmetric, no curvature, ROM normal, no CVA tenderness   Lungs:   Clear to auscultation bilaterally, respirations unlabored   Chest Wall:  No tenderness or deformity    Right subclavian PermCath site without erythema or drainage   Heart:  RRR; no murmur, rub or gallop   Abdomen:   Soft, non-tender, non-distended, positive bowel sounds Extremities: No cyanosis, clubbing or edema  Left upper extremity fistula site without erythema or drainage   Skin: No rashes or lesions  No draining wounds noted  Lymph nodes: Cervical, supraclavicular nodes normal   Neurologic: Alert and oriented times 3, extremity strength 5/5 and symmetric       LABS, IMAGING, & OTHER STUDIES:  Lab Results:  I have personally reviewed pertinent labs  Results from last 7 days   Lab Units 07/10/19  0606 07/09/19  1559   WBC Thousand/uL 13 43* 1 94*   HEMOGLOBIN g/dL 11 2* 11 6*   PLATELETS Thousands/uL 149 149     Results from last 7 days   Lab Units 07/10/19  0606 07/09/19  1559   SODIUM mmol/L 135* 137   POTASSIUM mmol/L 4 8 4 0   CHLORIDE mmol/L 96* 97*   CO2 mmol/L 30 29   BUN mg/dL 55* 47*   CREATININE mg/dL 10 78* 8 79*   EGFR ml/min/1 73sq m 6 7   CALCIUM mg/dL 9 1 9 1   AST U/L 38 40   ALT U/L 56 51   ALK PHOS U/L 71 79         Results from last 7 days   Lab Units 07/09/19  1709   PROCALCITONIN ng/ml 12 78*       Imaging Studies:   I have personally reviewed pertinent imaging study reports and images in PACS      Chest x-ray-no acute cardiopulmonary disease

## 2019-07-10 NOTE — ASSESSMENT & PLAN NOTE
Nephrology consultation  Anticipate hemodialysis tomorrow 07/11/2019 via PermCath  If he does have proven bacteremia, then catheter will have to be removed

## 2019-07-10 NOTE — ASSESSMENT & PLAN NOTE
Lab Results   Component Value Date    HGBA1C 8 1 (H) 04/04/2019       Recent Labs     07/09/19  1454   POCGLU 161*       Blood Sugar Average: Last 72 hrs:  (P) 161     Poorly controlled dm  Appetite somewhat poor    decraese lantus to 15 iu qhs and start ssi/poc bs

## 2019-07-10 NOTE — SOCIAL WORK
CM met with the patient to complete a general SW assessment, he confirms no changes since his last admission:    He is here LOS day 1  GMLOS is 4 8  He is not a bundle  He is not a re-admission  Pt lives in a house with his sister  ADL's are completed with some assistance due to his visual impairment  Pt uses a cane for ambulation  PCP identified as Dr Jossie Rodriguez   Pharmacy identified as [facility] on [address] has no VNA/STR hx   Pt's sister provides transportation  Sister reports that she will be transporting him for dialysis  The patient is TTS chair time at [facility] - 1045am     CM following for discharge needs

## 2019-07-10 NOTE — PHYSICIAN ADVISOR
Current patient class: Inpatient  The patient is currently on Hospital Day: 2 at [address] [facility]      The patient was admitted to the hospital at [phone] on 7/9/19 for the following diagnosis:  Leukopenia [D72 819]  Vomiting [R11 10]  Fever [R50 9]  Sepsis ([address] ) [A41 9]       There is documentation in the medical record of an expected length of stay of at least 2 midnights  The patient is therefore expected to satisfy the 2 midnight benchmark and given the 2 midnight presumption is appropriate for INPATIENT ADMISSION  Given this expectation of a satisfying stay, CMS instructs us that the patient is most often appropriate for inpatient admission under part A provided medical necessity is documented in the chart  After review of the relevant documentation, labs, vital signs and test results, the patient is appropriate for INPATIENT ADMISSION  Admission to the hospital as an inpatient is a complex decision making process which requires the practitioner to consider the patients presenting complaint, history and physical examination and all relevant testing  With this in mind, in this case, the patient was deemed appropriate for INPATIENT ADMISSION  After review of the documentation and testing available at the time of the admission I concur with this clinical determination of medical necessity  Rationale is as follows: The patient is a 53 yrs old Male who presented to the ED at 7/9/2019  2:39 PM with a chief complaint of Medical Problem (Pt  brought in via EMS  Pt  was at dialysis and started to have chills and vomit  Pt  reports right sided neck pain  Pt  denies chest pain  ) patient did come in SIRS criteria  Patient did have a fever with leukopenia  The patient's initial WBC count was 1 94  Today it is 13,000  Patient does have a history of end-stage renal disease    Infectious Disease did evaluate the patient any think this is sepsis which could be catheter related with the patient having end-stage renal disease  They increased the cefepime to 1 gram IV every 24 hours and also put the patient on vancomycin  They also recommended checking malaria smear  Blood cultures still pending  Given that this is a 69-year-old male patient who came in with chills concerning for a probable catheter related bacteremia needing IV antibiotics the patient is going to cross the 2 midnight benchmark as set by Medicare and is therefore inpatient admission appropriate based on medical necessity  The patients vitals on arrival were ED Triage Vitals   Temperature Pulse Respirations Blood Pressure SpO2   07/09/19 1442 07/09/19 1442 07/09/19 1442 07/09/19 1442 07/09/19 1442   100 4 °F (38 °C) 90 18 167/85 98 %      Temp Source Heart Rate Source Patient Position - Orthostatic VS BP Location FiO2 (%)   07/09/19 1442 07/09/19 1442 07/09/19 1442 07/09/19 1442 --   Oral Monitor Lying Right arm       Pain Score       07/09/19 1542       No Pain           Past Medical History:   Diagnosis Date    Cataract     Chronic kidney disease     [facility] mellitus [facility] [address] [facility]      IDDM    Diabetic retinopathy ([facility] [address] [facility]     Dizziness     occ    ESRD (end stage renal disease) ([facility] [address] [facility]     Headache     occ    Hypertension     Legally blind     LVH (left ventricular hypertrophy)     Preferred language is [place]     understands some English    Use of cane as ambulatory aid      Past Surgical History:   Procedure Laterality Date    INGUINAL HERNIA REPAIR Right     IR FISTULA/GRAFTOGRAM  4/30/2019    IR FISTULA/GRAFTOGRAM  6/14/2019    IR PERMACATH PLACEMENT  4/3/2019    OH ANASTOMOSIS,AV,ANY SITE Left 1/23/2019    Procedure: CREATION FISTULA ARTERIOVENOUS (AV);   Surgeon: Sandra Honeycutt MD;  [facility];  Service: Vascular           Consults have been placed to:   IP CONSULT TO NEPHROLOGY  IP CONSULT TO INFECTIOUS DISEASES  IP CONSULT TO PHARMACY    Vitals:    07/09/19 1822 07/09/19 2238 07/10/19 0027 07/10/19 0712   BP: (!) 181/63 (!) 177/60 149/69 152/88   BP Location: Right arm  Right arm Right arm   Pulse: 98  84 91   Resp: 18  18 18   Temp: (!) 101 5 °F (38 6 °C)  98 °F (36 7 °C) 98 5 °F (36 9 °C)   TempSrc: Oral  Tympanic Tympanic   SpO2: 98%  96% 99%   Weight: 72 2 kg (159 lb 2 8 oz)      Height: 5' 10" (1 778 m)          Most recent labs:    Recent Labs     07/09/19  1709 07/10/19  0606   WBC  --  13 43*   HGB  --  11 2*   HCT  --  35 1*   PLT  --  149   K  --  4 8   CALCIUM  --  9 1   BUN  --  55*   CREATININE  --  10 78*   LIPASE 550*  --    INR 1 13  --    AST  --  38   ALT  --  56   ALKPHOS  --  71       Scheduled Meds:  Current Facility-Administered Medications:  acetaminophen 650 mg Oral Q6H PRN Kassy Driscoll PA-C   aspirin 81 mg Oral Daily Kassy Driscoll PA-C   atorvastatin 40 mg Oral Daily Kassy Driscoll PA-C   carvedilol 6 25 mg Oral BID With Meals Kassy Driscoll PA-C   cefepime 1,000 mg Intravenous Q24H Abran Arora MD   doxazosin 2 mg Oral HS Kassy Driscoll PA-C   furosemide 80 mg Oral Daily Kassy Driscoll PA-C   heparin (porcine) 5,000 Units Subcutaneous Q8H [facility] Kassy Driscoll PA-C   insulin glargine 15 Units Subcutaneous HS Kassy Driscoll PA-C   insulin lispro 1-5 Units Subcutaneous 4x Daily (AC & HS) Kassy Driscoll PA-C   lidocaine 1 patch Topical Daily Kassy Driscoll PA-C   NIFEdipine ER 60 mg Oral Daily Kassy Driscoll PA-C   ondansetron 4 mg Intravenous Q6H PRN Kassy Driscoll PA-C   [START ON 7/11/2019] vancomycin 750 mg Intravenous After Dialysis Leah Willams DO     Continuous Infusions:   PRN Meds:   acetaminophen    ondansetron    [START ON 7/11/2019] vancomycin    Surgical procedures (if appropriate):

## 2019-07-10 NOTE — ASSESSMENT & PLAN NOTE
No significant elevation on diff to suggest suppression    Minimal anemia but no thrombocytopenia  Continue to monitor, workup as above

## 2019-07-10 NOTE — ASSESSMENT & PLAN NOTE
One episode of vomiting today, non bilious, non bloody  lfts  wnl and abdomen exam benign    Will check lipase for completeness, but clinically minimal s/sx otherwise to suggest GI origin

## 2019-07-11 ENCOUNTER — APPOINTMENT (INPATIENT)
Dept: DIALYSIS | Facility: HOSPITAL | Age: 54
DRG: 314 | End: 2019-07-11
Attending: INTERNAL MEDICINE
Payer: MEDICARE

## 2019-07-11 LAB
% PARASITEMIA: 0
% PARASITEMIA: 0
ATRIAL RATE: 97 BPM
GLUCOSE SERPL-MCNC: 146 MG/DL (ref 65–140)
GLUCOSE SERPL-MCNC: 196 MG/DL (ref 65–140)
GLUCOSE SERPL-MCNC: 233 MG/DL (ref 65–140)
GLUCOSE SERPL-MCNC: 273 MG/DL (ref 65–140)
MRSA NOSE QL CULT: NORMAL
P AXIS: 74 DEGREES
PARASITE BLD: NO
PARASITE BLD: NO
PATHOLOGIST INTERPRETATION: NORMAL
PATHOLOGIST INTERPRETATION: NORMAL
PR INTERVAL: 140 MS
QRS AXIS: 50 DEGREES
QRSD INTERVAL: 76 MS
QT INTERVAL: 364 MS
QTC INTERVAL: 462 MS
T WAVE AXIS: 98 DEGREES
VANCOMYCIN SERPL-MCNC: 23.9 UG/ML
VENTRICULAR RATE: 97 BPM

## 2019-07-11 PROCEDURE — 99233 SBSQ HOSP IP/OBS HIGH 50: CPT | Performed by: INTERNAL MEDICINE

## 2019-07-11 PROCEDURE — 82948 REAGENT STRIP/BLOOD GLUCOSE: CPT

## 2019-07-11 PROCEDURE — 93010 ELECTROCARDIOGRAM REPORT: CPT | Performed by: INTERNAL MEDICINE

## 2019-07-11 PROCEDURE — 80202 ASSAY OF VANCOMYCIN: CPT | Performed by: INTERNAL MEDICINE

## 2019-07-11 PROCEDURE — 87207 SMEAR SPECIAL STAIN: CPT | Performed by: INTERNAL MEDICINE

## 2019-07-11 PROCEDURE — 99232 SBSQ HOSP IP/OBS MODERATE 35: CPT | Performed by: INTERNAL MEDICINE

## 2019-07-11 PROCEDURE — 5A1D70Z PERFORMANCE OF URINARY FILTRATION, INTERMITTENT, LESS THAN 6 HOURS PER DAY: ICD-10-PCS | Performed by: INTERNAL MEDICINE

## 2019-07-11 RX ORDER — AMOXICILLIN 250 MG
1 CAPSULE ORAL 2 TIMES DAILY
Status: DISCONTINUED | OUTPATIENT
Start: 2019-07-11 | End: 2019-07-14 | Stop reason: HOSPADM

## 2019-07-11 RX ADMIN — NIFEDIPINE 60 MG: 60 TABLET, FILM COATED, EXTENDED RELEASE ORAL at 09:06

## 2019-07-11 RX ADMIN — ATORVASTATIN CALCIUM 40 MG: 40 TABLET, FILM COATED ORAL at 09:06

## 2019-07-11 RX ADMIN — FUROSEMIDE 80 MG: 40 TABLET ORAL at 09:06

## 2019-07-11 RX ADMIN — ASPIRIN 81 MG 81 MG: 81 TABLET ORAL at 09:06

## 2019-07-11 RX ADMIN — INSULIN LISPRO 2 UNITS: 100 INJECTION, SOLUTION INTRAVENOUS; SUBCUTANEOUS at 11:28

## 2019-07-11 RX ADMIN — VANCOMYCIN HYDROCHLORIDE 750 MG: 750 INJECTION, SOLUTION INTRAVENOUS at 14:33

## 2019-07-11 RX ADMIN — INSULIN LISPRO 2 UNITS: 100 INJECTION, SOLUTION INTRAVENOUS; SUBCUTANEOUS at 21:24

## 2019-07-11 RX ADMIN — HEPARIN SODIUM 5000 UNITS: 5000 INJECTION INTRAVENOUS; SUBCUTANEOUS at 21:22

## 2019-07-11 RX ADMIN — INSULIN GLARGINE 15 UNITS: 100 INJECTION, SOLUTION SUBCUTANEOUS at 21:22

## 2019-07-11 RX ADMIN — LIDOCAINE 1 PATCH: 50 PATCH TOPICAL at 09:04

## 2019-07-11 RX ADMIN — HEPARIN SODIUM 5000 UNITS: 5000 INJECTION INTRAVENOUS; SUBCUTANEOUS at 05:34

## 2019-07-11 RX ADMIN — CARVEDILOL 6.25 MG: 3.12 TABLET, FILM COATED ORAL at 16:48

## 2019-07-11 RX ADMIN — DOXAZOSIN 2 MG: 2 TABLET ORAL at 21:24

## 2019-07-11 RX ADMIN — SENNOSIDES AND DOCUSATE SODIUM 1 TABLET: 8.6; 5 TABLET ORAL at 21:22

## 2019-07-11 RX ADMIN — INSULIN LISPRO 1 UNITS: 100 INJECTION, SOLUTION INTRAVENOUS; SUBCUTANEOUS at 09:06

## 2019-07-11 RX ADMIN — CARVEDILOL 6.25 MG: 3.12 TABLET, FILM COATED ORAL at 09:06

## 2019-07-11 RX ADMIN — HEPARIN SODIUM 5000 UNITS: 5000 INJECTION INTRAVENOUS; SUBCUTANEOUS at 14:02

## 2019-07-11 NOTE — PROGRESS NOTES
NEPHROLOGY PROGRESS NOTE   Paddy Trevin 53 y o  male MRN: [id number]  Unit/Bed#: E2 -01 Encounter: [id number]  Reason for Consult: ESRD    Assessment/Plan:  1  End-stage renal disease, maintenance hemodialysis Tuesday Thursday Saturday, [address]  2  Bacteremia, blood cultures positive with gram-positive cocci in clusters  3  Anemia of chronic disease, hemoglobin appears stable  4  Hypertension, blood pressure acceptable  5  Chronic lower back pain, improving  1  Access currently right chest wall PermCath as well as left upper arm AV fistula with recent angioplasty using 1 needle in the outpatient dialysis unit     PLAN:  · Attempt to use 2 needles with his left upper arm AV fistula if successful will plan for PermCath removal  · Antibiotics as per Infectious Disease  · Blood pressure volume status appears stable    SUBJECTIVE:  Seen examined  Patient awake alert  Overall is feeling better  No further back pain or discomfort  Denies any chest pain or shortness of Breath  Review of Systems    OBJECTIVE:  Current Weight: Weight - Scale: 72 2 kg (159 lb 2 8 oz)  Vitals:    07/10/19 1559 07/10/19 2149 07/10/19 2341 07/11/19 0747   BP: 113/68 137/78 124/73 145/75   BP Location: Right arm Right arm Right arm Right arm   Pulse: 83 82 81 80   Resp: 18  18 18   Temp: 98 °F (36 7 °C)  98 4 °F (36 9 °C) 98 1 °F (36 7 °C)   TempSrc: Tympanic  Tympanic Tympanic   SpO2: 99%  98% 98%   Weight:       Height:           Intake/Output Summary (Last 24 hours) at 7/11/2019 1027  Last data filed at 7/11/2019 0900  Gross per 24 hour   Intake 20 ml   Output    Net 20 ml       Physical Exam   Constitutional: He is oriented to person, place, and time  No distress  HENT:   Head: Normocephalic  Neck: Neck supple  Cardiovascular: Normal rate and regular rhythm  Pulmonary/Chest: Effort normal and breath sounds normal    Abdominal: Soft  Musculoskeletal: He exhibits no edema     Neurological: He is alert and oriented to person, place, and time  Skin: Skin is warm and dry         Medications:    Current Facility-Administered Medications:     acetaminophen (TYLENOL) tablet 650 mg, 650 mg, Oral, Q6H PRN, Kassy Driscoll PA-C, 650 mg at 07/09/19 2237    aspirin chewable tablet 81 mg, 81 mg, Oral, Daily, Kassy Driscoll PA-C, 81 mg at 07/11/19 0906    atorvastatin (LIPITOR) tablet 40 mg, 40 mg, Oral, Daily, Kassy Driscoll PA-C, 40 mg at 07/11/19 0906    carvedilol (COREG) tablet 6 25 mg, 6 25 mg, Oral, BID With Meals, Kassy Driscoll PA-C, 6 25 mg at 07/11/19 0906    cefepime (MAXIPIME) 1,000 mg in dextrose 5 % 50 mL IVPB, 1,000 mg, Intravenous, Q24H, Manoj Brock MD, Last Rate: 100 mL/hr at 07/10/19 1827, 1,000 mg at 07/10/19 1827    doxazosin (CARDURA) tablet 2 mg, 2 mg, Oral, HS, Kassy Driscoll PA-C, 2 mg at 07/10/19 2150    furosemide (LASIX) tablet 80 mg, 80 mg, Oral, Daily, Kassy Driscoll PA-C, 80 mg at 07/11/19 0906    heparin (porcine) subcutaneous injection 5,000 Units, 5,000 Units, Subcutaneous, Q8H [facility], 5,000 Units at 07/11/19 0534 **AND** [CANCELED] Platelet count, , , Once, Kassy Driscoll PA-C    insulin glargine (LANTUS) subcutaneous injection 15 Units 0 15 mL, 15 Units, Subcutaneous, HS, Kassy Driscoll PA-C, 15 Units at 07/10/19 2150    insulin lispro (HumaLOG) 100 units/mL subcutaneous injection 1-5 Units, 1-5 Units, Subcutaneous, 4x Daily (AC & HS), 1 Units at 07/11/19 0906 **AND** Fingerstick Glucose (POCT), , , 4x Daily AC and at bedtime, Kassy Driscoll PA-C    lidocaine (LIDODERM) 5 % patch 1 patch, 1 patch, Topical, Daily, Kassy Driscoll PA-C, 1 patch at 07/11/19 0904    NIFEdipine (PROCARDIA XL) 24 hr tablet 60 mg, 60 mg, Oral, Daily, Kassy Driscoll PA-C, 60 mg at 07/11/19 0906    ondansetron (ZOFRAN) injection 4 mg, 4 mg, Intravenous, Q6H PRN, Kassy Driscoll PA-C    vancomycin (VANCOCIN) IVPB (premix) 750 mg, 750 mg, Intravenous, After Dialysis, Birdia Rubinstein, DO    Laboratory Results:  Results from last 7 days   Lab Units 07/10/19  0606 07/09/19  1559   WBC Thousand/uL 13 43* 1 94*   HEMOGLOBIN g/dL 11 2* 11 6*   HEMATOCRIT % 35 1* 36 0*   PLATELETS Thousands/uL 149 149   POTASSIUM mmol/L 4 8 4 0   CHLORIDE mmol/L 96* 97*   CO2 mmol/L 30 29   BUN mg/dL 55* 47*   CREATININE mg/dL 10 78* 8 79*   CALCIUM mg/dL 9 1 9 1

## 2019-07-11 NOTE — ASSESSMENT & PLAN NOTE
· Due to central line associated bloodstream infection (HD catheter)  · Blood cultures obtained yesterday are growing gram-positive cocci in clusters  · He was initiated on vancomycin 1 g x1 yesterday and cefepime 500 mg Q 24 hours  · Infectious Disease follow-up  Anticipate cefepime will be discontinued    · DC HD catheter if okay with renal given that the left AV fistula is working and usable  · Check echocardiogram

## 2019-07-11 NOTE — HEMODIALYSIS
Patient completed 4 hour HD treatment using left upper arm AVF  Cannulated with two 17 gauge needles  This is first treatment for patient using 2 needles  BFR at 200 for duration of treatment  No issues with fistula, venous and arterial pressures as expected throughout  Given this success, will likely have dialysis catheter removed  Dialysis system did clot after 3 hours, pt successfully reinfused, treatment pause and restarted using new system  During last 30 minutes of treatment, SBP dropped into the 60's  UF reduced and pt given 200 mL nss  SBP quickly normalized and pt finished treatment without incident  Vanco administered during last hour  Pre treatment weight, using bedscale, was 72 2 kg and post treatment weight was 70 9 kg  Attempted to UF 2 2 L, however, events of treatment required extra fluids (prime/rinseback of extra system, vanco, and additional nss for hypotension)  No other issues noted at this time

## 2019-07-11 NOTE — ASSESSMENT & PLAN NOTE
· Watch for hypotension    · Continue Coreg 6 25 mg b i d , Cardura 2 mg at bedtime, Procardia XL 60 mg daily with hold parameters

## 2019-07-11 NOTE — ASSESSMENT & PLAN NOTE
Lab Results   Component Value Date    HGBA1C 8 1 (H) 04/04/2019       Recent Labs     07/10/19  1556 07/10/19  2058 07/11/19  0750 07/11/19  1118   POCGLU 136 178* 196* 273*       Blood Sugar Average: Last 72 hrs:  (P) 178 5     Historically with poor control    A1c down to 8 1%  Continue Lantus 15 units with sliding-scale insulin

## 2019-07-11 NOTE — PLAN OF CARE
Problem: Nutrition/Hydration-ADULT  Goal: Nutrient/Hydration intake appropriate for improving, restoring or maintaining nutritional needs  Description  Monitor and assess patient's nutrition/hydration status for malnutrition (ex- brittle hair, bruises, dry skin, pale skin and conjunctiva, muscle wasting, smooth red tongue, and disorientation)  Collaborate with interdisciplinary team and initiate plan and interventions as ordered  Monitor patient's weight and dietary intake as ordered or per policy  Utilize nutrition screening tool and intervene per policy  Determine patient's food preferences and provide high-protein, high-caloric foods as appropriate  INTERVENTIONS:  - Monitor oral intake, urinary output, labs, and treatment plans  - Assess nutrition and hydration status and recommend course of action  - Evaluate amount of meals eaten  - Assist patient with eating if necessary   - Allow adequate time for meals  - Recommend/ encourage appropriate diets, oral nutritional supplements, and vitamin/mineral supplements  - Order, calculate, and assess calorie counts as needed  - Recommend, monitor, and adjust tube feedings and TPN/PPN based on assessed needs  - Assess need for intravenous fluids  - Provide specific nutrition/hydration education as appropriate  - Include patient/family/caregiver in decisions related to nutrition  Outcome: Progressing     Problem: Potential for Falls  Goal: Patient will remain free of falls  Description  INTERVENTIONS:  - Assess patient frequently for physical needs  -  Identify cognitive and physical deficits and behaviors that affect risk of falls    -  [place] fall precautions as indicated by assessment   - Educate patient/family on patient safety including physical limitations  - Instruct patient to call for assistance with activity based on assessment  - Modify environment to reduce risk of injury  - Consider OT/PT consult to assist with strengthening/mobility  Outcome: Progressing

## 2019-07-11 NOTE — PROGRESS NOTES
Progress Note - Infectious Disease   Paddy Trevin 53 y o  male MRN: [id number]  Unit/Bed#: E2 -01 Encounter: [id number]      Impression/Plan:  1  Sepsis-POA  Fever, tachycardia, leukopenia  Appears to be secondary to Gram-positive bacteremia  No other clear source appreciated  The patient remains hemodynamically stable despite his systemic illness  He seems to be tolerating the antibiotics without difficulty   -antibiotics as below  -monitor CBC with diff  -supportive care     2  Gram-positive bacteremia-suspect catheter related bacteremia in the setting of a patient with a PermCath in place  The fistula is now being utilized for dialysis and seems to be working well  Consideration for the possibility of a methicillin-resistant Staph infection  -discontinue cefepime  -continue vancomycin with dosing to be 750 mg at each hemodialysis for now  -recheck blood cultures x2 sets tomorrow a m  -up sensitivities and adjust antibiotics as needed  -check echocardiogram  -removed PermCath     3  End-stage renal disease-on hemodialysis Q   -nephrology will attempt to use the fistula  -if it becomes apparent that no PermCath is needed for dialysis, would remove the PermCath  -close nephrology follow-up     4  Diabetes mellitus-type 2 with chronic kidney disease on chronic dialysis with long-term insulin use     Discussed the above plan with primary service    Antibiotics:  Vancomycin 3  Cefepime 3    Subjective:  Patient has no fever, chills, sweats; no nausea, vomiting, diarrhea; no cough, shortness of breath; no pain  No new symptoms  He feels a bit better today      Objective:  Vitals:  Temp:  [98 °F (36 7 °C)-98 4 °F (36 9 °C)] 98 1 °F (36 7 °C)  HR:  [72-83] 72  Resp:  [18] 18  BP: ()/(59-85) 92/59  SpO2:  [98 %-99 %] 98 %  Temp (24hrs), Av 2 °F (36 8 °C), Min:98 °F (36 7 °C), Max:98 4 °F (36 9 °C)  Current: Temperature: 98 1 °F (36 7 °C)    Physical Exam:   General Appearance:  Alert, interactive, nontoxic, no acute distress  Patient currently on dialysis   Throat: Oropharynx moist without lesions  Lungs:   Clear to auscultation bilaterally; no wheezes, rhonchi or rales; respirations unlabored   Heart:  RRR; no murmur, rub or gallop   Abdomen:   Soft, non-tender, non-distended, positive bowel sounds  Extremities: No clubbing, cyanosis or edema   Skin: No new rashes or lesions  No draining wounds noted         Labs, Imaging, & Other studies:   All pertinent labs and imaging studies were personally reviewed  Results from last 7 days   Lab Units 07/10/19  0606 07/09/19  1559   WBC Thousand/uL 13 43* 1 94*   HEMOGLOBIN g/dL 11 2* 11 6*   PLATELETS Thousands/uL 149 149     Results from last 7 days   Lab Units 07/10/19  0606 07/09/19  1559   SODIUM mmol/L 135* 137   POTASSIUM mmol/L 4 8 4 0   CHLORIDE mmol/L 96* 97*   CO2 mmol/L 30 29   BUN mg/dL 55* 47*   CREATININE mg/dL 10 78* 8 79*   EGFR ml/min/1 73sq m 6 7   CALCIUM mg/dL 9 1 9 1   AST U/L 38 40   ALT U/L 56 51   ALK PHOS U/L 71 79     Results from last 7 days   Lab Units 07/10/19  0600 07/09/19  1653   GRAM STAIN RESULT   --  Gram positive cocci in clusters*  Gram positive cocci in clusters*   MRSA CULTURE ONLY  No Methicillin Resistant Staphlyococcus aureus (MRSA) isolated  --      Results from last 7 days   Lab Units 07/09/19  1709   PROCALCITONIN ng/ml 12 78*

## 2019-07-11 NOTE — PROGRESS NOTES
Progress Note - Paddy Trevin 1965, 53 y o  male MRN: [id number]    Unit/Bed#: E2 -01 Encounter: [id number]    Primary Care Provider: Magalis Gonzalez MD   Date and time admitted to hospital: 7/9/2019  2:39 PM        * Sepsis [facility])  Assessment & Plan  · Due to central line associated bloodstream infection (HD catheter)  · Blood cultures obtained yesterday are growing gram-positive cocci in clusters  · He was initiated on vancomycin 1 g x1 yesterday and cefepime 500 mg Q 24 hours  · Infectious Disease follow-up  Anticipate cefepime will be discontinued  · DC HD catheter if okay with renal given that the left AV fistula is working and usable  · Check echocardiogram    ESRD (end stage renal disease) ([facility] )  Assessment & Plan  Hemodialysis today via AV fistula  DC HD catheter if okay with Renal given that AV fistula is working and usable    Type 2 diabetes mellitus with chronic kidney disease on chronic dialysis, with long-term current use of insulin [facility])  Assessment & Plan  Lab Results   Component Value Date    HGBA1C 8 1 (H) 04/04/2019       Recent Labs     07/10/19  1556 07/10/19  2058 07/11/19  0750 07/11/19  1118   POCGLU 136 178* 196* 273*       Blood Sugar Average: Last 72 hrs:  (P) 178 5     Historically with poor control  A1c down to 8 1%  Continue Lantus 15 units with sliding-scale insulin    Leucopenia  Assessment & Plan  Likely due to sepsis  On next blood draw, recheck CBC       Benign hypertension with ESRD (end-stage renal disease) ([facility] )  Assessment & Plan  · Watch for hypotension  · Continue Coreg 6 25 mg b i d , Cardura 2 mg at bedtime, Procardia XL 60 mg daily with hold parameters    VTE Pharmacologic Prophylaxis:   Pharmacologic: Heparin  Mechanical VTE Prophylaxis in Place: No    Patient Centered Rounds: I have performed bedside rounds with nursing staff today       Discussed with ID and renal    Education and Discussions with Family / Patient:  Discussed with sister Patel Dumont and gave update    Time Spent for Care: 30 minutes  More than 50% of total time spent on counseling and coordination of care as described above  Current Length of Stay: 2 day(s)    Current Patient Status: Inpatient   Certification Statement: The patient will continue to require additional inpatient hospital stay due to sepsis    Discharge Plan: to be determined    Code Status: Level 1 - Full Code      Subjective:   No fever or chill  Undergoing HD without difficulty via left AV fistula    Objective:     Vitals:   Temp (24hrs), Av 2 °F (36 8 °C), Min:98 °F (36 7 °C), Max:98 4 °F (36 9 °C)    Temp:  [98 °F (36 7 °C)-98 4 °F (36 9 °C)] 98 1 °F (36 7 °C)  HR:  [76-83] 76  Resp:  [18] 18  BP: (102-154)/(66-85) 102/66  SpO2:  [98 %-99 %] 98 %  Body mass index is 22 84 kg/m²  Input and Output Summary (last 24 hours): Intake/Output Summary (Last 24 hours) at 2019 1236  Last data filed at 2019 1115  Gross per 24 hour   Intake 220 ml   Output    Net 220 ml       Physical Exam:     Physical Exam   Constitutional: He appears well-developed  No distress  HENT:   Head: Normocephalic and atraumatic  Eyes:   Blind   Neck: No JVD present  Cardiovascular: Regular rhythm  Murmur heard  Pulmonary/Chest: Effort normal  No stridor  No respiratory distress  He has no wheezes  Abdominal: Soft  He exhibits no distension  There is no tenderness  Musculoskeletal: He exhibits no edema  Left AV fistula   Neurological: He is alert  Skin: Skin is warm and dry  He is not diaphoretic  Psychiatric: He has a normal mood and affect   His behavior is normal      Additional Data:     Labs:    Results from last 7 days   Lab Units 07/10/19  0606 19  1559   WBC Thousand/uL 13 43* 1 94*   HEMOGLOBIN g/dL 11 2* 11 6*   HEMATOCRIT % 35 1* 36 0*   PLATELETS Thousands/uL 149 149   BANDS PCT % 13*  --    NEUTROS PCT %  --  80*   LYMPHS PCT %  --  15   LYMPHO PCT % 4*  --    MONOS PCT %  --  2*   MONO PCT % 3*  -- EOS PCT % 0 2     Results from last 7 days   Lab Units 07/10/19  0606   SODIUM mmol/L 135*   POTASSIUM mmol/L 4 8   CHLORIDE mmol/L 96*   CO2 mmol/L 30   BUN mg/dL 55*   CREATININE mg/dL 10 78*   ANION GAP mmol/L 9   CALCIUM mg/dL 9 1   ALBUMIN g/dL 3 0*   TOTAL BILIRUBIN mg/dL 0 43   ALK PHOS U/L 71   ALT U/L 56   AST U/L 38   GLUCOSE RANDOM mg/dL 117     Results from last 7 days   Lab Units 07/09/19  1709   INR  1 13     Results from last 7 days   Lab Units 07/11/19  1118 07/11/19  0750 07/10/19  2058 07/10/19  1556 07/10/19  1126 07/10/19  0717 07/09/19  2129 07/09/19  1454   POC GLUCOSE mg/dl 273* 196* 178* 136 198* 102 184* 161*         Results from last 7 days   Lab Units 07/09/19  1709 07/09/19  1653   LACTIC ACID mmol/L  --  1 7   PROCALCITONIN ng/ml 12 78*  --            * I Have Reviewed All Lab Data Listed Above  * Additional Pertinent Lab Tests Reviewed:  All Labs Within Last 24 Hours Reviewed    Imaging:    Imaging Reports Reviewed Today Include:  None  Recent Cultures (last 7 days):     Results from last 7 days   Lab Units 07/09/19  1653   GRAM STAIN RESULT  Gram positive cocci in clusters*  Gram positive cocci in clusters*       Last 24 Hours Medication List:     Current Facility-Administered Medications:  acetaminophen 650 mg Oral Q6H PRN Kassy Driscoll PA-C    aspirin 81 mg Oral Daily Kassy Driscoll PA-C    atorvastatin 40 mg Oral Daily Kassy Driscoll PA-C    carvedilol 6 25 mg Oral BID With Meals Kassy Driscoll PA-C    cefepime 1,000 mg Intravenous Q24H Tavia Fermin MD Last Rate: 1,000 mg (07/10/19 1827)   doxazosin 2 mg Oral HS Kassy Driscoll PA-C    furosemide 80 mg Oral Daily Kassy Driscoll PA-C    heparin (porcine) 5,000 Units Subcutaneous Q8H [address] Kassy Driscoll PA-C    insulin glargine 15 Units Subcutaneous HS Kassy Driscoll PA-C    insulin lispro 1-5 Units Subcutaneous 4x Daily (AC & HS) Kassy Driscoll PA-C    lidocaine 1 patch Topical Daily Kassy ROSAS ALMA DELIA Driscoll    NIFEdipine ER 60 mg Oral Daily Kassy Driscoll PA-C    ondansetron 4 mg Intravenous Q6H PRN Kassy Driscoll PA-C    vancomycin 750 mg Intravenous After Dialysis Lindsay Alanis DO         Today, Patient Was Seen By: Digna Walsh MD    ** Please Note: Dictation voice to text software may have been used in the creation of this document   **

## 2019-07-11 NOTE — ASSESSMENT & PLAN NOTE
Hemodialysis today via AV fistula  DC HD catheter if okay with Renal given that AV fistula is working and usable

## 2019-07-12 ENCOUNTER — APPOINTMENT (INPATIENT)
Dept: NON INVASIVE DIAGNOSTICS | Facility: HOSPITAL | Age: 54
DRG: 314 | End: 2019-07-12
Payer: MEDICARE

## 2019-07-12 ENCOUNTER — APPOINTMENT (OUTPATIENT)
Dept: RADIOLOGY | Facility: HOSPITAL | Age: 54
DRG: 314 | End: 2019-07-12
Attending: INTERNAL MEDICINE
Payer: MEDICARE

## 2019-07-12 PROBLEM — D72.819 LEUCOPENIA: Status: RESOLVED | Noted: 2019-07-09 | Resolved: 2019-07-12

## 2019-07-12 PROBLEM — K59.01 SLOW TRANSIT CONSTIPATION: Status: ACTIVE | Noted: 2019-07-12

## 2019-07-12 PROBLEM — A41.2 STAPHYLOCOCCUS SEPSIS (HCC): Status: ACTIVE | Noted: 2019-07-09

## 2019-07-12 LAB
BASOPHILS # BLD AUTO: 0.05 THOUSANDS/ΜL (ref 0–0.1)
BASOPHILS NFR BLD AUTO: 1 % (ref 0–1)
EOSINOPHIL # BLD AUTO: 0.21 THOUSAND/ΜL (ref 0–0.61)
EOSINOPHIL NFR BLD AUTO: 3 % (ref 0–6)
ERYTHROCYTE [DISTWIDTH] IN BLOOD BY AUTOMATED COUNT: 13.2 % (ref 11.6–15.1)
GLUCOSE SERPL-MCNC: 159 MG/DL (ref 65–140)
GLUCOSE SERPL-MCNC: 242 MG/DL (ref 65–140)
GLUCOSE SERPL-MCNC: 393 MG/DL (ref 65–140)
GLUCOSE SERPL-MCNC: 454 MG/DL (ref 65–140)
GLUCOSE SERPL-MCNC: 467 MG/DL (ref 65–140)
GLUCOSE SERPL-MCNC: >500 MG/DL (ref 65–140)
HCT VFR BLD AUTO: 34.2 % (ref 36.5–49.3)
HGB BLD-MCNC: 10.8 G/DL (ref 12–17)
IMM GRANULOCYTES # BLD AUTO: 0.07 THOUSAND/UL (ref 0–0.2)
IMM GRANULOCYTES NFR BLD AUTO: 1 % (ref 0–2)
LYMPHOCYTES # BLD AUTO: 1.49 THOUSANDS/ΜL (ref 0.6–4.47)
LYMPHOCYTES NFR BLD AUTO: 18 % (ref 14–44)
MCH RBC QN AUTO: 27.1 PG (ref 26.8–34.3)
MCHC RBC AUTO-ENTMCNC: 31.6 G/DL (ref 31.4–37.4)
MCV RBC AUTO: 86 FL (ref 82–98)
MONOCYTES # BLD AUTO: 1.17 THOUSAND/ΜL (ref 0.17–1.22)
MONOCYTES NFR BLD AUTO: 14 % (ref 4–12)
NEUTROPHILS # BLD AUTO: 5.27 THOUSANDS/ΜL (ref 1.85–7.62)
NEUTS SEG NFR BLD AUTO: 63 % (ref 43–75)
NRBC BLD AUTO-RTO: 0 /100 WBCS
PLATELET # BLD AUTO: 161 THOUSANDS/UL (ref 149–390)
PMV BLD AUTO: 9.9 FL (ref 8.9–12.7)
RBC # BLD AUTO: 3.98 MILLION/UL (ref 3.88–5.62)
WBC # BLD AUTO: 8.26 THOUSAND/UL (ref 4.31–10.16)

## 2019-07-12 PROCEDURE — 87040 BLOOD CULTURE FOR BACTERIA: CPT | Performed by: INTERNAL MEDICINE

## 2019-07-12 PROCEDURE — 87186 SC STD MICRODIL/AGAR DIL: CPT | Performed by: INTERNAL MEDICINE

## 2019-07-12 PROCEDURE — 99232 SBSQ HOSP IP/OBS MODERATE 35: CPT | Performed by: INTERNAL MEDICINE

## 2019-07-12 PROCEDURE — 93306 TTE W/DOPPLER COMPLETE: CPT | Performed by: INTERNAL MEDICINE

## 2019-07-12 PROCEDURE — 87077 CULTURE AEROBIC IDENTIFY: CPT | Performed by: INTERNAL MEDICINE

## 2019-07-12 PROCEDURE — 82948 REAGENT STRIP/BLOOD GLUCOSE: CPT

## 2019-07-12 PROCEDURE — 36589 REMOVAL TUNNELED CV CATH: CPT | Performed by: RADIOLOGY

## 2019-07-12 PROCEDURE — 87070 CULTURE OTHR SPECIMN AEROBIC: CPT | Performed by: INTERNAL MEDICINE

## 2019-07-12 PROCEDURE — 02PYX3Z REMOVAL OF INFUSION DEVICE FROM GREAT VESSEL, EXTERNAL APPROACH: ICD-10-PCS | Performed by: RADIOLOGY

## 2019-07-12 PROCEDURE — 85025 COMPLETE CBC W/AUTO DIFF WBC: CPT | Performed by: INTERNAL MEDICINE

## 2019-07-12 PROCEDURE — 36589 REMOVAL TUNNELED CV CATH: CPT

## 2019-07-12 PROCEDURE — 93306 TTE W/DOPPLER COMPLETE: CPT

## 2019-07-12 RX ORDER — INSULIN GLARGINE 100 [IU]/ML
30 INJECTION, SOLUTION SUBCUTANEOUS
Status: DISCONTINUED | OUTPATIENT
Start: 2019-07-12 | End: 2019-07-14 | Stop reason: HOSPADM

## 2019-07-12 RX ORDER — POLYETHYLENE GLYCOL 3350 17 G/17G
17 POWDER, FOR SOLUTION ORAL DAILY PRN
Status: DISCONTINUED | OUTPATIENT
Start: 2019-07-12 | End: 2019-07-14 | Stop reason: HOSPADM

## 2019-07-12 RX ORDER — LIDOCAINE HYDROCHLORIDE AND EPINEPHRINE 10; 10 MG/ML; UG/ML
INJECTION, SOLUTION INFILTRATION; PERINEURAL CODE/TRAUMA/SEDATION MEDICATION
Status: COMPLETED | OUTPATIENT
Start: 2019-07-12 | End: 2019-07-12

## 2019-07-12 RX ADMIN — CARVEDILOL 6.25 MG: 3.12 TABLET, FILM COATED ORAL at 08:25

## 2019-07-12 RX ADMIN — INSULIN LISPRO 2 UNITS: 100 INJECTION, SOLUTION INTRAVENOUS; SUBCUTANEOUS at 21:24

## 2019-07-12 RX ADMIN — INSULIN LISPRO 10 UNITS: 100 INJECTION, SOLUTION INTRAVENOUS; SUBCUTANEOUS at 17:15

## 2019-07-12 RX ADMIN — SENNOSIDES AND DOCUSATE SODIUM 1 TABLET: 8.6; 5 TABLET ORAL at 17:15

## 2019-07-12 RX ADMIN — FUROSEMIDE 80 MG: 40 TABLET ORAL at 08:25

## 2019-07-12 RX ADMIN — ASPIRIN 81 MG 81 MG: 81 TABLET ORAL at 08:25

## 2019-07-12 RX ADMIN — SENNOSIDES AND DOCUSATE SODIUM 1 TABLET: 8.6; 5 TABLET ORAL at 08:25

## 2019-07-12 RX ADMIN — NIFEDIPINE 60 MG: 60 TABLET, FILM COATED, EXTENDED RELEASE ORAL at 08:25

## 2019-07-12 RX ADMIN — HEPARIN SODIUM 5000 UNITS: 5000 INJECTION INTRAVENOUS; SUBCUTANEOUS at 14:43

## 2019-07-12 RX ADMIN — HEPARIN SODIUM 5000 UNITS: 5000 INJECTION INTRAVENOUS; SUBCUTANEOUS at 05:56

## 2019-07-12 RX ADMIN — LIDOCAINE 1 PATCH: 50 PATCH TOPICAL at 08:25

## 2019-07-12 RX ADMIN — LIDOCAINE HYDROCHLORIDE,EPINEPHRINE BITARTRATE 5 ML: 10; .01 INJECTION, SOLUTION INFILTRATION; PERINEURAL at 08:52

## 2019-07-12 RX ADMIN — INSULIN GLARGINE 30 UNITS: 100 INJECTION, SOLUTION SUBCUTANEOUS at 21:25

## 2019-07-12 RX ADMIN — INSULIN LISPRO 1 UNITS: 100 INJECTION, SOLUTION INTRAVENOUS; SUBCUTANEOUS at 08:25

## 2019-07-12 RX ADMIN — INSULIN LISPRO 4 UNITS: 100 INJECTION, SOLUTION INTRAVENOUS; SUBCUTANEOUS at 17:15

## 2019-07-12 RX ADMIN — CARVEDILOL 6.25 MG: 3.12 TABLET, FILM COATED ORAL at 17:16

## 2019-07-12 RX ADMIN — HEPARIN SODIUM 5000 UNITS: 5000 INJECTION INTRAVENOUS; SUBCUTANEOUS at 21:25

## 2019-07-12 RX ADMIN — DOXAZOSIN 2 MG: 2 TABLET ORAL at 21:25

## 2019-07-12 RX ADMIN — ATORVASTATIN CALCIUM 40 MG: 40 TABLET, FILM COATED ORAL at 08:25

## 2019-07-12 RX ADMIN — INSULIN LISPRO 5 UNITS: 100 INJECTION, SOLUTION INTRAVENOUS; SUBCUTANEOUS at 11:30

## 2019-07-12 NOTE — ASSESSMENT & PLAN NOTE
· Due to central line associated bloodstream infection (HD catheter)  · Blood cultures obtained yesterday are growing gram-positive cocci in clusters  · He was initiated on vancomycin 750 mg after HD  · HD catheter removed    · Check echocardiogram  · Follow up repeat blood cultures

## 2019-07-12 NOTE — PROGRESS NOTES
Progress Note - Paddy Trevin 1965, 53 y o  male MRN: [id number]    Unit/Bed#: E2 -01 Encounter: [id number]    Primary Care Provider: Rohit Olivia MD   Date and time admitted to hospital: 7/9/2019  2:39 PM        * Staphylococcus sepsis [facility])  Assessment & Plan  · Due to central line associated bloodstream infection (HD catheter)  · Blood cultures obtained yesterday are growing gram-positive cocci in clusters  · He was initiated on vancomycin 750 mg after HD  · HD catheter removed  · Check echocardiogram  · Follow up repeat blood cultures    ESRD (end stage renal disease) ([facility] )  Assessment & Plan  Hemodialysis today via AV fistula  HD catheter removed    Type 2 diabetes mellitus with chronic kidney disease on chronic dialysis, with long-term current use of insulin [facility])  Assessment & Plan  Lab Results   Component Value Date    HGBA1C 8 1 (H) 04/04/2019       Recent Labs     07/11/19  2053 07/12/19  0719 07/12/19  1056 07/12/19  1058   POCGLU 233* 159* >500* 454*       Blood Sugar Average: Last 72 hrs:  (P) 993 [id number]     Historically with poor control  A1c down to 8 1%  Continue Lantus 15 units with sliding-scale insulin  Recheck blood sugar now to confirm  hyperglycemia    Slow transit constipation  Assessment & Plan  Continue PEricolac  Add miralax prn     Benign hypertension with ESRD (end-stage renal disease) ([facility] )  Assessment & Plan  · Watch for hypotension  · Continue Coreg 6 25 mg b i d , Cardura 2 mg at bedtime, Procardia XL 60 mg daily with hold parameters      VTE Pharmacologic Prophylaxis:   Pharmacologic: Heparin  Mechanical VTE Prophylaxis in Place: No    Discussions with Specialists or Other Care Team Provider: ID    Time Spent for Care: 20 minutes  More than 50% of total time spent on counseling and coordination of care as described above      Current Length of Stay: 3 day(s)    Current Patient Status: Inpatient   Certification Statement: The patient will continue to require additional inpatient hospital stay due to Bacteremia    Discharge Plan: to be determined    Code Status: Level 1 - Full Code      Subjective:   No fever/chills  No bleeding from previous HD cath insertion site  + Constipated for several days    Objective:     Vitals:   Temp (24hrs), Av 7 °F (36 5 °C), Min:97 2 °F (36 2 °C), Max:98 °F (36 7 °C)    Temp:  [97 2 °F (36 2 °C)-98 °F (36 7 °C)] 97 4 °F (36 3 °C)  HR:  [65-81] 81  Resp:  [18] 18  BP: ()/(44-86) 120/55  SpO2:  [97 %-100 %] 100 %  Body mass index is 22 84 kg/m²  Input and Output Summary (last 24 hours): Intake/Output Summary (Last 24 hours) at 2019 1341  Last data filed at 2019 1540  Gross per 24 hour   Intake 500 ml   Output 2807 ml   Net -2307 ml       Physical Exam:     Physical Exam   Constitutional: He is oriented to person, place, and time  He appears well-developed  No distress  HENT:   Head: Normocephalic and atraumatic  Eyes:   blind   Neck: No JVD present  Cardiovascular: Normal rate  Murmur heard  Pulmonary/Chest: Effort normal  No stridor  No respiratory distress  He has no wheezes  Abdominal: Soft  He exhibits no distension  There is no tenderness  Neurological: He is alert and oriented to person, place, and time  Skin: Skin is warm and dry  He is not diaphoretic  Psychiatric: He has a normal mood and affect   His behavior is normal      Additional Data:     Labs:    Results from last 7 days   Lab Units 19  0611 07/10/19  0606   WBC Thousand/uL 8 26 13 43*   HEMOGLOBIN g/dL 10 8* 11 2*   HEMATOCRIT % 34 2* 35 1*   PLATELETS Thousands/uL 161 149   BANDS PCT %  --  13*   NEUTROS PCT % 63  --    LYMPHS PCT % 18  --    LYMPHO PCT %  --  4*   MONOS PCT % 14*  --    MONO PCT %  --  3*   EOS PCT % 3 0     Results from last 7 days   Lab Units 07/10/19  0606   SODIUM mmol/L 135*   POTASSIUM mmol/L 4 8   CHLORIDE mmol/L 96*   CO2 mmol/L 30   BUN mg/dL 55*   CREATININE mg/dL 10 78*   ANION GAP mmol/L 9   CALCIUM mg/dL 9 1   ALBUMIN g/dL 3 0*   TOTAL BILIRUBIN mg/dL 0 43   ALK PHOS U/L 71   ALT U/L 56   AST U/L 38   GLUCOSE RANDOM mg/dL 117     Results from last 7 days   Lab Units 07/09/19  1709   INR  1 13     Results from last 7 days   Lab Units 07/12/19  1058 07/12/19  1056 07/12/19  0719 07/11/19  2053 07/11/19  1619 07/11/19  1118 07/11/19  0750 07/10/19  2058 07/10/19  1556 07/10/19  1126 07/10/19  0717 07/09/19  2129   POC GLUCOSE mg/dl 454* >500* 159* 233* 146* 273* 196* 178* 136 198* 102 184*         Results from last 7 days   Lab Units 07/09/19  1709 07/09/19  1653   LACTIC ACID mmol/L  --  1 7   PROCALCITONIN ng/ml 12 78*  --            * I Have Reviewed All Lab Data Listed Above  * Additional Pertinent Lab Tests Reviewed:  All Labs Within Last 24 Hours Reviewed    Imaging:    Imaging Reports Reviewed Today Include: no new imaging      Recent Cultures (last 7 days):     Results from last 7 days   Lab Units 07/09/19  1653   BLOOD CULTURE  Staphylococcus coagulase negative*  Staphylococcus coagulase negative*   GRAM STAIN RESULT  Gram positive cocci in clusters*  Gram positive cocci in clusters*       Last 24 Hours Medication List:     Current Facility-Administered Medications:  acetaminophen 650 mg Oral Q6H PRN Kassy Driscoll PA-C    aspirin 81 mg Oral Daily Kassy Driscoll PA-C    atorvastatin 40 mg Oral Daily Kassy Driscoll PA-C    carvedilol 6 25 mg Oral BID With Meals Kassy Driscoll PA-C    doxazosin 2 mg Oral HS Kassy Driscoll PA-C    furosemide 80 mg Oral Daily Kassy Driscoll PA-C    heparin (porcine) 5,000 Units Subcutaneous Q8H [facility] Kassy Driscoll PA-C    insulin glargine 15 Units Subcutaneous HS Kassy Driscoll PA-C    insulin lispro 1-5 Units Subcutaneous 4x Daily (AC & HS) Kassy Driscoll PA-C    lidocaine 1 patch Topical Daily Kassy Driscoll PA-C    NIFEdipine ER 60 mg Oral Daily Kassy Driscoll PA-C    ondansetron 4 mg Intravenous Q6H PRN Thanh Morales PA-C    polyethylene glycol 17 g Oral Daily PRN Chepe Sanford MD    senna-docusate sodium 1 tablet Oral BID Jennifer Anderson PA-C    vancomycin 750 mg Intravenous After Dialysis Marc Parry DO Last Rate: Stopped (07/11/19 1536)        Today, Patient Was Seen By: Chepe Sanfodr MD    ** Please Note: Dictation voice to text software may have been used in the creation of this document   **

## 2019-07-12 NOTE — PROGRESS NOTES
Progress Note - Infectious Disease   Paddy Zhong 53 y o  male MRN: [id number]  Unit/Bed#: E2 -01 Encounter: [id number]      Impression/Plan:  1  Sepsis-POA   Fever, tachycardia, leukopenia  Appears to be secondary to Gram-positive bacteremia   No other clear source appreciated    The patient remains hemodynamically stable despite his systemic illness   He seems to be tolerating the antibiotics without difficulty  He is much improved clinically  -antibiotics as below  -monitor CBC with diff  -supportive care     2  Coagulase-negative staphylococcal bacteremia-suspect catheter related bacteremia in the setting of a patient with a PermCath in place  The fistula is now being utilized for dialysis and seems to be working well    -continue vancomycin for now at current dose with dialysis  -pharmacy follow-up for vancomycin trough management  -follow up repeat blood cultures  -follow-up up sensitivities and adjust antibiotics as needed  -check echocardiogram  -as long as echocardiogram without valvular vegetation, and follow-up blood cultures are negative, will plan to treat through 7/16/2019 to complete at least 5 days from the time of catheter removal and at least 1 week of treatment     3  End-stage renal disease-on hemodialysis Q Tuesday Thursday Saturday  -nephrology will attempt to use the fistula  -if it becomes apparent that no PermCath is needed for dialysis, would remove the PermCath  -close nephrology follow-up     4  Diabetes mellitus-type 2 with chronic kidney disease on chronic dialysis with long-term insulin use     Discussed the above plan with primary service    Antibiotics:  Vancomycin 4  Subjective:  Patient has no fever, chills, sweats; no nausea, vomiting, diarrhea; no cough, shortness of breath; no pain  No new symptoms  He had a PermCath removed yesterday      Objective:  Vitals:  Temp:  [97 2 °F (36 2 °C)-98 °F (36 7 °C)] 97 4 °F (36 3 °C)  HR:  [65-81] 81  Resp:  [18] 18  BP: ()/(44-86) 120/55  SpO2:  [97 %-100 %] 100 %  Temp (24hrs), Av 7 °F (36 5 °C), Min:97 2 °F (36 2 °C), Max:98 °F (36 7 °C)  Current: Temperature: (!) 97 4 °F (36 3 °C)    Physical Exam:   General Appearance:  Alert, interactive, nontoxic, no acute distress  Throat: Oropharynx moist without lesions  Lungs:   Clear to auscultation bilaterally; no wheezes, rhonchi or rales; respirations unlabored   Heart:  RRR; no murmur, rub or gallop   Abdomen:   Soft, non-tender, non-distended, positive bowel sounds  Extremities: No clubbing, cyanosis or edema   Skin: No new rashes or lesions  No draining wounds noted         Labs, Imaging, & Other studies:   All pertinent labs and imaging studies were personally reviewed  Results from last 7 days   Lab Units 19  0611 07/10/19  0606 19  1559   WBC Thousand/uL 8 26 13 43* 1 94*   HEMOGLOBIN g/dL 10 8* 11 2* 11 6*   PLATELETS Thousands/uL 161 149 149     Results from last 7 days   Lab Units 07/10/19  0606 19  1559   SODIUM mmol/L 135* 137   POTASSIUM mmol/L 4 8 4 0   CHLORIDE mmol/L 96* 97*   CO2 mmol/L 30 29   BUN mg/dL 55* 47*   CREATININE mg/dL 10 78* 8 79*   EGFR ml/min/1 73sq m 6 7   CALCIUM mg/dL 9 1 9 1   AST U/L 38 40   ALT U/L 56 51   ALK PHOS U/L 71 79     Results from last 7 days   Lab Units 07/10/19  0600 19  1653   BLOOD CULTURE   --  Staphylococcus coagulase negative*  Staphylococcus coagulase negative*   GRAM STAIN RESULT   --  Gram positive cocci in clusters*  Gram positive cocci in clusters*   MRSA CULTURE ONLY  No Methicillin Resistant Staphlyococcus aureus (MRSA) isolated  --      Results from last 7 days   Lab Units 19  1709   PROCALCITONIN ng/ml 12 78*

## 2019-07-12 NOTE — PLAN OF CARE
Problem: Nutrition/Hydration-ADULT  Goal: Nutrient/Hydration intake appropriate for improving, restoring or maintaining nutritional needs  Description  Monitor and assess patient's nutrition/hydration status for malnutrition (ex- brittle hair, bruises, dry skin, pale skin and conjunctiva, muscle wasting, smooth red tongue, and disorientation)  Collaborate with interdisciplinary team and initiate plan and interventions as ordered  Monitor patient's weight and dietary intake as ordered or per policy  Utilize nutrition screening tool and intervene per policy  Determine patient's food preferences and provide high-protein, high-caloric foods as appropriate  INTERVENTIONS:  - Monitor oral intake, urinary output, labs, and treatment plans  - Assess nutrition and hydration status and recommend course of action  - Evaluate amount of meals eaten  - Assist patient with eating if necessary   - Allow adequate time for meals  - Recommend/ encourage appropriate diets, oral nutritional supplements, and vitamin/mineral supplements  - Order, calculate, and assess calorie counts as needed  - Recommend, monitor, and adjust tube feedings and TPN/PPN based on assessed needs  - Assess need for intravenous fluids  - Provide specific nutrition/hydration education as appropriate  - Include patient/family/caregiver in decisions related to nutrition  Outcome: Progressing     Problem: Potential for Falls  Goal: Patient will remain free of falls  Description  INTERVENTIONS:  - Assess patient frequently for physical needs  -  Identify cognitive and physical deficits and behaviors that affect risk of falls    -  [place] fall precautions as indicated by assessment   - Educate patient/family on patient safety including physical limitations  - Instruct patient to call for assistance with activity based on assessment  - Modify environment to reduce risk of injury  - Consider OT/PT consult to assist with strengthening/mobility  Outcome: Progressing

## 2019-07-12 NOTE — PROGRESS NOTES
Vancomycin IV Pharmacy-to-Dose Consultation    Tab Fermin is a 53 y o  male who is currently receiving Vancomycin IV with management by the Pharmacy Consult service  Assessment/Plan:  The patient was reviewed  No signs or symptoms of infusion reactions were documented in the chart  Based on todays assessment, continue current vancomycin (day # 4) dosing of 750mg post HD after each dialysis session (Tue-Thur-Sat)  Plan for trough to be drawn pre-HD on 7/13/19  We will continue to follow the patients culture results and clinical progress daily      Kobe Prather, Pharmacist

## 2019-07-12 NOTE — PROGRESS NOTES
NEPHROLOGY OUTPATIENT PROGRESS NOTE   Paddy Zhong 53 y o  male MRN: [id number]  Reason for visit: ESRD    Assessment/Plan:  1  End-stage renal disease, maintenance hemodialysis Tuesday Thursday Saturday, [address]  2  Bacteremia, blood cultures positive with gram-positive cocci in clusters--coag-negative staph  3  Anemia of chronic disease, hemoglobin appears stable  4  Hypertension, blood pressure acceptable  5  Chronic lower back pain, improving  6  Access:  PermCath removed, using left upper arm AV fistula    · PermCath has been removed  · Repeat cultures are pending  · Antibiotics as per Infectious Disease  · Continue with hemodialysis, Tuesday Thursday Saturday    SUBJECTIVE / INTERVAL HISTORY:  Seen examined  Patient awake alert  No further back pain or discomfort  Denies any chest pain or shortness of breath  Denies any nausea vomiting  Review of Systems      OBJECTIVE:  /55 (BP Location: Right arm)   Pulse 81   Temp (!) 97 4 °F (36 3 °C) (Tympanic)   Resp 18   Ht 5' 10" (1 778 m)   Wt 72 2 kg (159 lb 2 8 oz)   SpO2 100%   BMI 22 84 kg/m²   Vitals:    07/09/19 1442 07/09/19 1822   Weight: 68 kg (149 lb 14 6 oz) 72 2 kg (159 lb 2 8 oz)       Physical Exam   Constitutional: No distress  HENT:   Head: Normocephalic  Neck: Neck supple  Cardiovascular: Normal rate and regular rhythm  Pulmonary/Chest: Effort normal and breath sounds normal    Abdominal: Soft  Musculoskeletal: He exhibits no edema  Neurological: He is alert  Skin: Skin is warm and dry           Medications:    Current Facility-Administered Medications:     acetaminophen (TYLENOL) tablet 650 mg, 650 mg, Oral, Q6H PRN, Kassy Driscoll PA-C, 650 mg at 07/09/19 2237    aspirin chewable tablet 81 mg, 81 mg, Oral, Daily, Kassy Driscoll PA-C, 81 mg at 07/12/19 0825    atorvastatin (LIPITOR) tablet 40 mg, 40 mg, Oral, Daily, Kassy Driscoll PA-C, 40 mg at 07/12/19 0825    carvedilol (COREG) tablet 6 25 mg, 6 25 mg, Oral, BID With Meals, Kassy Driscoll PA-C, 6 25 mg at 07/12/19 0825    doxazosin (CARDURA) tablet 2 mg, 2 mg, Oral, HS, Kassy Driscoll PA-C, 2 mg at 07/11/19 2124    furosemide (LASIX) tablet 80 mg, 80 mg, Oral, Daily, Kassy Driscoll PA-C, 80 mg at 07/12/19 0825    heparin (porcine) subcutaneous injection 5,000 Units, 5,000 Units, Subcutaneous, Q8H [address], 5,000 Units at 07/12/19 0556 **AND** [CANCELED] Platelet count, , , Once, Kassy Driscoll PA-C    insulin glargine (LANTUS) subcutaneous injection 15 Units 0 15 mL, 15 Units, Subcutaneous, HS, Kassy Driscoll PA-C, 15 Units at 07/11/19 2122    insulin lispro (HumaLOG) 100 units/mL subcutaneous injection 1-5 Units, 1-5 Units, Subcutaneous, 4x Daily (AC & HS), 1 Units at 07/12/19 0825 **AND** Fingerstick Glucose (POCT), , , 4x Daily AC and at bedtime, Kassy Driscoll PA-C    lidocaine (LIDODERM) 5 % patch 1 patch, 1 patch, Topical, Daily, Kassy Driscoll PA-C, 1 patch at 07/12/19 0825    NIFEdipine (PROCARDIA XL) 24 hr tablet 60 mg, 60 mg, Oral, Daily, Kassy Driscoll PA-C, 60 mg at 07/12/19 0825    ondansetron (ZOFRAN) injection 4 mg, 4 mg, Intravenous, Q6H PRN, Kassy Driscoll PA-C    senna-docusate sodium (SENOKOT S) 8 6-50 mg per tablet 1 tablet, 1 tablet, Oral, BID, Elizabeth Walter PA-C, 1 tablet at 07/12/19 0825    vancomycin (VANCOCIN) IVPB (premix) 750 mg, 750 mg, Intravenous, After Dialysis, Abraham Ragsdale DO, Stopped at 07/11/19 1536    Laboratory Results:  Results for orders placed or performed during the hospital encounter of 07/09/19   Blood culture #1   Result Value Ref Range    Blood Culture Staphylococcus coagulase negative (A)     Gram Stain Result Gram positive cocci in clusters (A)    Blood culture #2   Result Value Ref Range    Blood Culture Staphylococcus coagulase negative (A)     Gram Stain Result Gram positive cocci in clusters (A)    MRSA culture   Result Value Ref Range MRSA Culture Only       No Methicillin Resistant Staphlyococcus aureus (MRSA) isolated   Blood Parasite smear   Result Value Ref Range    % Parasitemia 0     Is Blood Parasite Present No    Blood Parasite smear   Result Value Ref Range    % Parasitemia 0     Is Blood Parasite Present No    CBC and differential   Result Value Ref Range    WBC 1 94 (LL) 4 31 - 10 16 Thousand/uL    RBC 4 29 3 88 - 5 62 Million/uL    Hemoglobin 11 6 (L) 12 0 - 17 0 g/dL    Hematocrit 36 0 (L) 36 5 - 49 3 %    MCV 84 82 - 98 fL    MCH 27 0 26 8 - 34 3 pg    MCHC 32 2 31 4 - 37 4 g/dL    RDW 12 9 11 6 - 15 1 %    MPV 9 6 8 9 - 12 7 fL    Platelets 239 247 - 793 Thousands/uL    nRBC 0 /100 WBCs    Neutrophils Relative 80 (H) 43 - 75 %    Immat GRANS % 1 0 - 2 %    Lymphocytes Relative 15 14 - 44 %    Monocytes Relative 2 (L) 4 - 12 %    Eosinophils Relative 2 0 - 6 %    Basophils Relative 0 0 - 1 %    Neutrophils Absolute 1 58 (L) 1 85 - 7 62 Thousands/µL    Immature Grans Absolute 0 01 0 00 - 0 20 Thousand/uL    Lymphocytes Absolute 0 29 (L) 0 60 - 4 47 Thousands/µL    Monocytes Absolute 0 03 (L) 0 17 - 1 22 Thousand/µL    Eosinophils Absolute 0 03 0 00 - 0 61 Thousand/µL    Basophils Absolute 0 00 0 00 - 0 10 Thousands/µL   Basic metabolic panel   Result Value Ref Range    Sodium 137 136 - 145 mmol/L    Potassium 4 0 3 5 - 5 3 mmol/L    Chloride 97 (L) 100 - 108 mmol/L    CO2 29 21 - 32 mmol/L    ANION GAP 11 4 - 13 mmol/L    BUN 47 (H) 5 - 25 mg/dL    Creatinine 8 79 (H) 0 60 - 1 30 mg/dL    Glucose 141 (H) 65 - 140 mg/dL    Calcium 9 1 8 3 - 10 1 mg/dL    eGFR 7 ml/min/1 73sq m   Lactic acid, plasma x2   Result Value Ref Range    LACTIC ACID 1 7 0 5 - 2 0 mmol/L   Protime-INR   Result Value Ref Range    Protime 14 7 (H) 11 6 - 14 5 seconds    INR 1 13 0 84 - 1 19   APTT   Result Value Ref Range    PTT 33 23 - 37 seconds   Hepatic function panel   Result Value Ref Range    Total Bilirubin 0 37 0 20 - 1 00 mg/dL    Bilirubin, Direct 0 09 0 00 - 0 20 mg/dL    Alkaline Phosphatase 79 46 - 116 U/L    AST 40 5 - 45 U/L    ALT 51 12 - 78 U/L    Total Protein 7 5 6 4 - 8 2 g/dL    Albumin 3 5 3 5 - 5 0 g/dL   Procalcitonin   Result Value Ref Range    Procalcitonin 12 78 (H) <=0 25 ng/ml   Lipase   Result Value Ref Range    Lipase 550 (H) 73 - 393 u/L   Comprehensive metabolic panel   Result Value Ref Range    Sodium 135 (L) 136 - 145 mmol/L    Potassium 4 8 3 5 - 5 3 mmol/L    Chloride 96 (L) 100 - 108 mmol/L    CO2 30 21 - 32 mmol/L    ANION GAP 9 4 - 13 mmol/L    BUN 55 (H) 5 - 25 mg/dL    Creatinine 10 78 (H) 0 60 - 1 30 mg/dL    Glucose 117 65 - 140 mg/dL    Calcium 9 1 8 3 - 10 1 mg/dL    AST 38 5 - 45 U/L    ALT 56 12 - 78 U/L    Alkaline Phosphatase 71 46 - 116 U/L    Total Protein 6 9 6 4 - 8 2 g/dL    Albumin 3 0 (L) 3 5 - 5 0 g/dL    Total Bilirubin 0 43 0 20 - 1 00 mg/dL    eGFR 6 ml/min/1 73sq m   CBC and differential   Result Value Ref Range    WBC 13 43 (H) 4 31 - 10 16 Thousand/uL    RBC 4 16 3 88 - 5 62 Million/uL    Hemoglobin 11 2 (L) 12 0 - 17 0 g/dL    Hematocrit 35 1 (L) 36 5 - 49 3 %    MCV 84 82 - 98 fL    MCH 26 9 26 8 - 34 3 pg    MCHC 31 9 31 4 - 37 4 g/dL    RDW 13 1 11 6 - 15 1 %    MPV 9 7 8 9 - 12 7 fL    Platelets 336 481 - 817 Thousands/uL    nRBC 0 /100 WBCs   Vancomycin, random   Result Value Ref Range    Vancomycin Rm 23 9 ug/mL   Path Slide Review   Result Value Ref Range    Path Review       No parasites seen  Reviewed by Dr Diana Peters 7/11/2019    8:20am    Path Slide Review   Result Value Ref Range    Path Review No parasites seen  Reviewed by Diana Frankel Sober and differential   Result Value Ref Range    WBC 8 26 4 31 - 10 16 Thousand/uL    RBC 3 98 3 88 - 5 62 Million/uL    Hemoglobin 10 8 (L) 12 0 - 17 0 g/dL    Hematocrit 34 2 (L) 36 5 - 49 3 %    MCV 86 82 - 98 fL    MCH 27 1 26 8 - 34 3 pg    MCHC 31 6 31 4 - 37 4 g/dL    RDW 13 2 11 6 - 15 1 %    MPV 9 9 8 9 - 12 7 fL    Platelets 050 096 - 355 Thousands/uL    nRBC 0 /100 WBCs    Neutrophils Relative 63 43 - 75 %    Immat GRANS % 1 0 - 2 %    Lymphocytes Relative 18 14 - 44 %    Monocytes Relative 14 (H) 4 - 12 %    Eosinophils Relative 3 0 - 6 %    Basophils Relative 1 0 - 1 %    Neutrophils Absolute 5 27 1 85 - 7 62 Thousands/µL    Immature Grans Absolute 0 07 0 00 - 0 20 Thousand/uL    Lymphocytes Absolute 1 49 0 60 - 4 47 Thousands/µL    Monocytes Absolute 1 17 0 17 - 1 22 Thousand/µL    Eosinophils Absolute 0 21 0 00 - 0 61 Thousand/µL    Basophils Absolute 0 05 0 00 - 0 10 Thousands/µL   ECG 12 lead   Result Value Ref Range    Ventricular Rate 97 BPM    Atrial Rate 97 BPM    NE Interval 140 ms    QRSD Interval 76 ms    QT Interval 364 ms    QTC Interval 462 ms    P Axis 74 degrees    QRS Axis 50 degrees    T Wave Axis 98 degrees   Fingerstick Glucose (POCT)   Result Value Ref Range    POC Glucose 161 (H) 65 - 140 mg/dl   Fingerstick Glucose (POCT)   Result Value Ref Range    POC Glucose 184 (H) 65 - 140 mg/dl   Fingerstick Glucose (POCT)   Result Value Ref Range    POC Glucose 102 65 - 140 mg/dl   Fingerstick Glucose (POCT)   Result Value Ref Range    POC Glucose 198 (H) 65 - 140 mg/dl   Fingerstick Glucose (POCT)   Result Value Ref Range    POC Glucose 136 65 - 140 mg/dl   Fingerstick Glucose (POCT)   Result Value Ref Range    POC Glucose 178 (H) 65 - 140 mg/dl   Fingerstick Glucose (POCT)   Result Value Ref Range    POC Glucose 196 (H) 65 - 140 mg/dl   Fingerstick Glucose (POCT)   Result Value Ref Range    POC Glucose 273 (H) 65 - 140 mg/dl   Fingerstick Glucose (POCT)   Result Value Ref Range    POC Glucose 146 (H) 65 - 140 mg/dl   Fingerstick Glucose (POCT)   Result Value Ref Range    POC Glucose 233 (H) 65 - 140 mg/dl   Fingerstick Glucose (POCT)   Result Value Ref Range    POC Glucose 159 (H) 65 - 140 mg/dl   Fingerstick Glucose (POCT)   Result Value Ref Range    POC Glucose >500 (HH) 65 - 140 mg/dl   Fingerstick Glucose (POCT)   Result Value Ref Range    POC Glucose 454 (H) 65 - 140 mg/dl   Manual Differential(PHLEBS Do Not Order)   Result Value Ref Range    Segmented % 79 (H) 43 - 75 %    Bands % 13 (H) 0 - 8 %    Lymphocytes % 4 (L) 14 - 44 %    Monocytes % 3 (L) 4 - 12 %    Eosinophils, % 0 0 - 6 %    Basophils % 1 0 - 1 %    Absolute Neutrophils 12 36 (H) 1 85 - 7 62 Thousand/uL    Lymphocytes Absolute 0 54 (L) 0 60 - 4 47 Thousand/uL    Monocytes Absolute 0 40 0 00 - 1 22 Thousand/uL    Eosinophils Absolute 0 00 0 00 - 0 40 Thousand/uL    Basophils Absolute 0 13 (H) 0 00 - 0 10 Thousand/uL    Total Counted 100     RBC Morphology Present     Anisocytosis Present     Platelet Estimate Adequate Adequate

## 2019-07-12 NOTE — ASSESSMENT & PLAN NOTE
Lab Results   Component Value Date    HGBA1C 8 1 (H) 04/04/2019       Recent Labs     07/11/19 2053 07/12/19  0719 07/12/19  1056 07/12/19  1058   POCGLU 233* 159* >500* 454*       Blood Sugar Average: Last 72 hrs:  (P) 899 [id number]     Historically with poor control    A1c down to 8 1%  Continue Lantus 15 units with sliding-scale insulin  Recheck blood sugar now to confirm  hyperglycemia

## 2019-07-12 NOTE — PLAN OF CARE
Problem: Nutrition/Hydration-ADULT  Goal: Nutrient/Hydration intake appropriate for improving, restoring or maintaining nutritional needs  Description  Monitor and assess patient's nutrition/hydration status for malnutrition (ex- brittle hair, bruises, dry skin, pale skin and conjunctiva, muscle wasting, smooth red tongue, and disorientation)  Collaborate with interdisciplinary team and initiate plan and interventions as ordered  Monitor patient's weight and dietary intake as ordered or per policy  Utilize nutrition screening tool and intervene per policy  Determine patient's food preferences and provide high-protein, high-caloric foods as appropriate  INTERVENTIONS:  - Monitor oral intake, urinary output, labs, and treatment plans  - Assess nutrition and hydration status and recommend course of action  - Evaluate amount of meals eaten  - Assist patient with eating if necessary   - Allow adequate time for meals  - Recommend/ encourage appropriate diets, oral nutritional supplements, and vitamin/mineral supplements  - Order, calculate, and assess calorie counts as needed  - Recommend, monitor, and adjust tube feedings and TPN/PPN based on assessed needs  - Assess need for intravenous fluids  - Provide specific nutrition/hydration education as appropriate  - Include patient/family/caregiver in decisions related to nutrition  Outcome: Progressing     Problem: Potential for Falls  Goal: Patient will remain free of falls  Description  INTERVENTIONS:  - Assess patient frequently for physical needs  -  Identify cognitive and physical deficits and behaviors that affect risk of falls    -  [place] fall precautions as indicated by assessment   - Educate patient/family on patient safety including physical limitations  - Instruct patient to call for assistance with activity based on assessment  - Modify environment to reduce risk of injury  - Consider OT/PT consult to assist with strengthening/mobility  Outcome: Progressing

## 2019-07-12 NOTE — BRIEF OP NOTE (RAD/CATH)
Dialysis catheter removal - Procedure Note    PATIENT NAME: Manuel Aguilar  : 1965  MRN: [id number]     Pre-op Diagnosis:   1  Fever    2  Leukopenia    3  Sepsis ([address] )    4  ESRD (end stage renal disease) ([address] )    5  Neutropenia ([address] )    6  SIRS (systemic inflammatory response syndrome) ([facility])      Post-op Diagnosis:   1  Fever    2  Leukopenia    3  Sepsis ([address] )    4  ESRD (end stage renal disease) ([facility] [address] )    5  Neutropenia ([facility] [address] )    6  SIRS (systemic inflammatory response syndrome) [facility])        Surgeon:   Crow Adler MD  Assistants:     No qualified resident was available, Resident is only observing    Estimated Blood Loss: minimal  Findings: Successful tunneled dialysis catheter removal   Plan: Bedrest for 1 hour lying down flat      Specimens:   * No specimens in log *    Complications:  None immediate    Anesthesia: Local    rCow Adler MD     Date: 2019  Time: 9:06 AM

## 2019-07-13 ENCOUNTER — APPOINTMENT (INPATIENT)
Dept: DIALYSIS | Facility: HOSPITAL | Age: 54
DRG: 314 | End: 2019-07-13
Payer: MEDICARE

## 2019-07-13 PROBLEM — A41.1 SEPSIS DUE TO STAPHYLOCOCCUS EPIDERMIDIS (HCC): Status: ACTIVE | Noted: 2019-07-09

## 2019-07-13 LAB
ANION GAP SERPL CALCULATED.3IONS-SCNC: 12 MMOL/L (ref 4–13)
BACTERIA BLD CULT: ABNORMAL
BACTERIA BLD CULT: ABNORMAL
BUN SERPL-MCNC: 68 MG/DL (ref 5–25)
CALCIUM SERPL-MCNC: 9.5 MG/DL (ref 8.3–10.1)
CHLORIDE SERPL-SCNC: 97 MMOL/L (ref 100–108)
CO2 SERPL-SCNC: 29 MMOL/L (ref 21–32)
CREAT SERPL-MCNC: 13.53 MG/DL (ref 0.6–1.3)
GFR SERPL CREATININE-BSD FRML MDRD: 4 ML/MIN/1.73SQ M
GLUCOSE SERPL-MCNC: 131 MG/DL (ref 65–140)
GLUCOSE SERPL-MCNC: 140 MG/DL (ref 65–140)
GLUCOSE SERPL-MCNC: 172 MG/DL (ref 65–140)
GLUCOSE SERPL-MCNC: 231 MG/DL (ref 65–140)
GLUCOSE SERPL-MCNC: 246 MG/DL (ref 65–140)
GRAM STN SPEC: ABNORMAL
GRAM STN SPEC: ABNORMAL
POTASSIUM SERPL-SCNC: 4.2 MMOL/L (ref 3.5–5.3)
SODIUM SERPL-SCNC: 138 MMOL/L (ref 136–145)
VANCOMYCIN TROUGH SERPL-MCNC: 22.8 UG/ML (ref 10–20)

## 2019-07-13 PROCEDURE — 90935 HEMODIALYSIS ONE EVALUATION: CPT | Performed by: NURSE PRACTITIONER

## 2019-07-13 PROCEDURE — 99232 SBSQ HOSP IP/OBS MODERATE 35: CPT | Performed by: INTERNAL MEDICINE

## 2019-07-13 PROCEDURE — 82948 REAGENT STRIP/BLOOD GLUCOSE: CPT

## 2019-07-13 PROCEDURE — 5A1D70Z PERFORMANCE OF URINARY FILTRATION, INTERMITTENT, LESS THAN 6 HOURS PER DAY: ICD-10-PCS | Performed by: INTERNAL MEDICINE

## 2019-07-13 PROCEDURE — NC001 PR NO CHARGE: Performed by: NURSE PRACTITIONER

## 2019-07-13 PROCEDURE — 80048 BASIC METABOLIC PNL TOTAL CA: CPT | Performed by: NURSE PRACTITIONER

## 2019-07-13 PROCEDURE — 80202 ASSAY OF VANCOMYCIN: CPT | Performed by: INTERNAL MEDICINE

## 2019-07-13 RX ORDER — DOXERCALCIFEROL 2 UG/ML
1.5 INJECTION, SOLUTION INTRAVENOUS 3 TIMES WEEKLY
Status: DISCONTINUED | OUTPATIENT
Start: 2019-07-13 | End: 2019-07-14 | Stop reason: HOSPADM

## 2019-07-13 RX ADMIN — INSULIN LISPRO 10 UNITS: 100 INJECTION, SOLUTION INTRAVENOUS; SUBCUTANEOUS at 18:41

## 2019-07-13 RX ADMIN — HEPARIN SODIUM 5000 UNITS: 5000 INJECTION INTRAVENOUS; SUBCUTANEOUS at 13:08

## 2019-07-13 RX ADMIN — INSULIN LISPRO 10 UNITS: 100 INJECTION, SOLUTION INTRAVENOUS; SUBCUTANEOUS at 07:55

## 2019-07-13 RX ADMIN — NIFEDIPINE 60 MG: 60 TABLET, FILM COATED, EXTENDED RELEASE ORAL at 18:41

## 2019-07-13 RX ADMIN — INSULIN LISPRO 2 UNITS: 100 INJECTION, SOLUTION INTRAVENOUS; SUBCUTANEOUS at 13:04

## 2019-07-13 RX ADMIN — EPOETIN ALFA 2000 UNITS: 2000 SOLUTION INTRAVENOUS; SUBCUTANEOUS at 17:01

## 2019-07-13 RX ADMIN — SENNOSIDES AND DOCUSATE SODIUM 1 TABLET: 8.6; 5 TABLET ORAL at 07:59

## 2019-07-13 RX ADMIN — DOXAZOSIN 2 MG: 2 TABLET ORAL at 21:51

## 2019-07-13 RX ADMIN — CARVEDILOL 6.25 MG: 3.12 TABLET, FILM COATED ORAL at 18:40

## 2019-07-13 RX ADMIN — INSULIN LISPRO 10 UNITS: 100 INJECTION, SOLUTION INTRAVENOUS; SUBCUTANEOUS at 13:05

## 2019-07-13 RX ADMIN — DOXERCALCIFEROL 1.5 MCG: 4 INJECTION, SOLUTION INTRAVENOUS at 17:01

## 2019-07-13 RX ADMIN — ATORVASTATIN CALCIUM 40 MG: 40 TABLET, FILM COATED ORAL at 18:39

## 2019-07-13 RX ADMIN — VANCOMYCIN HYDROCHLORIDE 750 MG: 750 INJECTION, SOLUTION INTRAVENOUS at 16:56

## 2019-07-13 RX ADMIN — SENNOSIDES AND DOCUSATE SODIUM 1 TABLET: 8.6; 5 TABLET ORAL at 18:41

## 2019-07-13 RX ADMIN — HEPARIN SODIUM 5000 UNITS: 5000 INJECTION INTRAVENOUS; SUBCUTANEOUS at 06:30

## 2019-07-13 RX ADMIN — INSULIN GLARGINE 30 UNITS: 100 INJECTION, SOLUTION SUBCUTANEOUS at 21:51

## 2019-07-13 RX ADMIN — FUROSEMIDE 80 MG: 40 TABLET ORAL at 18:39

## 2019-07-13 RX ADMIN — INSULIN LISPRO 1 UNITS: 100 INJECTION, SOLUTION INTRAVENOUS; SUBCUTANEOUS at 21:51

## 2019-07-13 RX ADMIN — POLYETHYLENE GLYCOL 3350 17 G: 17 POWDER, FOR SOLUTION ORAL at 07:57

## 2019-07-13 RX ADMIN — ASPIRIN 81 MG 81 MG: 81 TABLET ORAL at 18:40

## 2019-07-13 RX ADMIN — LIDOCAINE 1 PATCH: 50 PATCH TOPICAL at 08:02

## 2019-07-13 RX ADMIN — CARVEDILOL 6.25 MG: 3.12 TABLET, FILM COATED ORAL at 08:00

## 2019-07-13 RX ADMIN — HEPARIN SODIUM 5000 UNITS: 5000 INJECTION INTRAVENOUS; SUBCUTANEOUS at 21:51

## 2019-07-13 NOTE — PROGRESS NOTES
HEMODIALYSIS PROCEDURE NOTE  The patient was seen and examined on hemodialysis  He is below EDW by 1 kg    Will attempt 0 5-1 kg UF  Time: 4hours  Sodium: 138 Blood flow: 300   Dialyzer: F160 Potassium: 4 Dialysate flow: 1 5   Access: right AVF Bicarbonate: 35 Ultrafiltration goal: 0 5-1   Medications on HD:

## 2019-07-13 NOTE — ASSESSMENT & PLAN NOTE
Hemodialysis today via AV fistula  HD catheter removed  Outpatient hemodialysis every Tuesday Thursday Saturday

## 2019-07-13 NOTE — ASSESSMENT & PLAN NOTE
· Due to central line associated bloodstream infection (HD catheter)  · He was initiated on vancomycin 750 mg after HD  · HD catheter removed    · Echocardiogram without obvious vegetation  · Follow up repeat blood cultures negative for 24 hours  · Last dose of antibiotic on 07/16/2019 after HD

## 2019-07-13 NOTE — PROGRESS NOTES
Progress Note - Infectious Disease   Paddy Trevin 53 y o  male MRN: [id number]  Unit/Bed#: E2 -01 Encounter: [id number]      Impression/Plan:  1  Sepsis  POA  Fever, tachycardia, and leukopenia  Likely secondary to Staph epidermidis bacteremia  Likely source was patient's dialysis catheter at the tip is also showing coag-negative Staph  Fortunately, despite being systemically ill, he has remained clinically stable and nontoxic  Today the patient is afebrile and his WBC count has improved  His repeat blood cultures are clear after 24 hours   -antibiotics as below  -monitor CBC and BMP  -follow up repeat blood cultures  -monitor vitals  -supportive care    2  Staph epidermidis bacteremia  Likely secondary to patient's previous PermCath  Catheter tip is also showing coag-negative Staph  Catheter has been removed  His repeat blood cultures are negative after 24 hours  TTE was negative for valvular vegetation  Patient will not need placement of a new catheter as his fistula is now matured and being utilized for dialysis  The patient has been receiving IV vancomycin is tolerating without difficulty  Will continue vancomycin dosing with HD through 07/16/2019   -continue IV vancomycin with HD through 07/16/2019  -pharmacy follow-up for vancomycin trough management  -follow up repeat blood cultures  -monitor vitals     3  End-stage renal disease  Dialysis access is now a fistula, no need to place new PermCath  His current dialysis schedule is [id number]  Is awaiting dialysis this afternoon  -HD per Nephrology  -dose antibiotics incoordination with HD  -continue close nephrology follow-up     4  Type 2 diabetes mellitus with chronic kidney disease and hyperglycemia  Patient's last hemoglobin A1c was 8 1% on 04/04/2019   -blood glucose management per primary service     Antibiotics:  Vancomycin 5  Antibiotics 5    Subjective:  Patient reports he is feeling better today    Is glad they are able to use his fistula for dialysis  He has no acute complaints  He denies fever, chills, sweats; no nausea, vomiting, or diarrhea; no cough, shortness of breath, or chest pain  No new symptoms  Objective:  Vitals:  Temp:  [96 5 °F (35 8 °C)-97 8 °F (36 6 °C)] 97 °F (36 1 °C)  HR:  [68-75] 69  Resp:  [16-18] 18  BP: (127-154)/(65-79) 150/79  SpO2:  [99 %-100 %] 100 %  Temp (24hrs), Av 1 °F (36 2 °C), Min:96 5 °F (35 8 °C), Max:97 8 °F (36 6 °C)  Current: Temperature: (!) 97 °F (36 1 °C)    Physical Exam:   General Appearance:  Alert, interactive, nontoxic, no acute distress  Eyes closed  Throat: Oropharynx moist without lesions  Lungs:   Clear to auscultation bilaterally; no wheezes, rhonchi or rales; respirations unlabored   Heart:  RRR; no murmur, rub or gallop   Abdomen:   Soft, non-tender, non-distended, positive bowel sounds  Extremities: No clubbing or cyanosis, no edema; L AV fistula   Skin: No new rashes or lesions  No draining wounds noted       Labs, Imaging, & Other studies:   All pertinent labs and imaging studies were personally reviewed  Results from last 7 days   Lab Units 19  0611 07/10/19  0619  1559   WBC Thousand/uL 8 26 13 43* 1 94*   HEMOGLOBIN g/dL 10 8* 11 2* 11 6*   PLATELETS Thousands/uL 161 149 149     Results from last 7 days   Lab Units 07/10/19  0606 19  1559   POTASSIUM mmol/L 4 8 4 0   CHLORIDE mmol/L 96* 97*   CO2 mmol/L 30 29   BUN mg/dL 55* 47*   CREATININE mg/dL 10 78* 8 79*   EGFR ml/min/1 73sq m 6 7   CALCIUM mg/dL 9 1 9 1   AST U/L 38 40   ALT U/L 56 51   ALK PHOS U/L 71 79     Results from last 7 days   Lab Units 19  0612 07/10/19  0600 19  1653   BLOOD CULTURE  No Growth at 24 hrs   --  Staphylococcus epidermidis*  Staphylococcus epidermidis*   GRAM STAIN RESULT   --   --  Gram positive cocci in clusters*  Gram positive cocci in clusters*   MRSA CULTURE ONLY   --  No Methicillin Resistant Staphlyococcus aureus (MRSA) isolated  --

## 2019-07-13 NOTE — ASSESSMENT & PLAN NOTE
Lab Results   Component Value Date    HGBA1C 8 1 (H) 04/04/2019       Recent Labs     07/12/19  1447 07/12/19  1604 07/12/19  2056 07/13/19  0716   POCGLU 467* 393* 242* 131       Blood Sugar Average: Last 72 hrs:  (P) 726 [id number]     Blood sugars were uncontrolled yesterday in the high 400s and greater than 500  This dose was increased to 30 units  Humalog 10 mg t i d  was started  Blood sugars are stable today

## 2019-07-13 NOTE — PROGRESS NOTES
NEPHROLOGY PROGRESS NOTE   Paddy Zhong 53 y o  male MRN: [id number]  Unit/Bed#: E2 -01 Encounter: [id number]  Reason for Consult: ESRD    ASSESSMENT/PLAN:  1  End-stage renal disease: On maintenance hemodialysis every T[place] at [facility] [place] 2]  -patient for dialysis today  -electrolytes stable with last check  -continue trend I/O, lab values in volume status  -outpatient dialysis center updated on patient condition and tentative discharge for tomorrow  No change in outpatient dialysis orders, however, RN aware of patient requires one more dose of vancomycin 750 mg with dialysis on Tuesday    2  Access:  Left AV fistula with positive bruit and thrill    3  Bacteremia/sepsis:  Negative staphylococcal bacteremia  -suspect catheter related in the setting of PermCath placement-now discontinued  -currently using AV fistula without issues  -patient on vancomycin with dialysis  -planned vancomycin through 07/16/2019 to complete at least five day course from time of catheter removal as long as blood cultures are negative and negative vegetation on valve  -repeat blood cultures pending-  -echocardiogram-does not reveal vegetation, EF 65%  Small concentric pericardial effusion without hemodynamic compromise noted  [facility] 750 mg on Tuesday    4  Hypertension:  BP remains acceptable  -continues on carvedilol 6 25 mg 2 times daily, furosemide 80 mg daily, doxazosin 2 mg at bedtime, and nifedipine 60 mg daily    5  Anemia Chronic Kidney Disease  -hemoglobin stable at 10 8  -will continue to trend    6  Bone mineral disease of Chronic Kidney Disease  -continue to trend outpatient phosphorus and PTH    7  Chronic back pain:  Continues to improve      Disposition:  Renal function stable from a dialysis standpoint  Okay for discharge when medically ready    SUBJECTIVE:  Patient seen and examined  Denies chest pain shortness of breath    Feels better overall    OBJECTIVE:  Current Weight: Weight - Scale: 72 2 kg (159 lb 2 8 oz)  Vitals:    07/12/19 1500 07/12/19 1715 07/12/19 2300 07/13/19 0721   BP: 127/65 148/73 154/70 150/79   BP Location: Right arm Right arm Right arm Right arm   Pulse: 68 75 72 69   Resp: 18  16 18   Temp: 97 8 °F (36 6 °C)  (!) 96 5 °F (35 8 °C) (!) 97 °F (36 1 °C)   TempSrc: Tympanic  Tympanic Tympanic   SpO2: 99%  100% 100%   Weight:       Height:         No intake or output data in the 24 hours ending 07/13/19 1131  General:  No acute distress, cooperative  Skin:  Warm and dry without rash  HEENT:  Mucous membrane moist without exudate  Neck:  Supple without JVD  Chest:  Clear on auscultation without crackles, rhonchi, wheezes  Heart:  Regular rate Without rub  Abdomen:   And rhythm soft, nontender, no distention, active bowel sound  Extremities:  No edema noted bilaterally  Neuro:  Alert oriented and awake  Psych:  Appropriate affect    Medications:    Current Facility-Administered Medications:     acetaminophen (TYLENOL) tablet 650 mg, 650 mg, Oral, Q6H PRN, AMADOU Medina-C, 650 mg at 07/09/19 2237    aspirin chewable tablet 81 mg, 81 mg, Oral, Daily, Kassy Driscoll PA-C, 81 mg at 07/12/19 0825    atorvastatin (LIPITOR) tablet 40 mg, 40 mg, Oral, Daily, Kassy Driscoll PA-C, 40 mg at 07/12/19 0825    carvedilol (COREG) tablet 6 25 mg, 6 25 mg, Oral, BID With Meals, Kassy Driscoll PA-C, 6 25 mg at 07/13/19 0800    doxazosin (CARDURA) tablet 2 mg, 2 mg, Oral, HS, Kassy Driscoll PA-C, 2 mg at 07/12/19 2125    furosemide (LASIX) tablet 80 mg, 80 mg, Oral, Daily, Kassy Driscoll PA-C, 80 mg at 07/12/19 0825    heparin (porcine) subcutaneous injection 5,000 Units, 5,000 Units, Subcutaneous, Q8H [facility], 5,000 Units at 07/13/19 0630 **AND** [CANCELED] Platelet count, , , Once, Kassy Driscoll PA-C    insulin glargine (LANTUS) subcutaneous injection 30 Units 0 3 mL, 30 Units, Subcutaneous, HS, Vaishnavi Oneil MD, 30 Units at 07/12/19 2125    insulin lispro (HumaLOG) 100 units/mL subcutaneous injection 1-5 Units, 1-5 Units, Subcutaneous, 4x Daily (AC & HS), 2 Units at 07/12/19 2124 **AND** Fingerstick Glucose (POCT), , , 4x Daily AC and at bedtime, Kassy Driscoll PA-C    insulin lispro (HumaLOG) 100 units/mL subcutaneous injection 10 Units, 10 Units, Subcutaneous, TID With Meals, Jeremy Sparrow MD, 10 Units at 07/13/19 0755    lidocaine (LIDODERM) 5 % patch 1 patch, 1 patch, Topical, Daily, Kassy Driscoll PA-C, 1 patch at 07/13/19 0802    NIFEdipine (PROCARDIA XL) 24 hr tablet 60 mg, 60 mg, Oral, Daily, Kassy Driscoll PA-C, 60 mg at 07/12/19 0825    ondansetron (ZOFRAN) injection 4 mg, 4 mg, Intravenous, Q6H PRN, Kassy Driscoll PA-C    polyethylene glycol (MIRALAX) packet 17 g, 17 g, Oral, Daily PRN, Jeremy Sparrow MD, 17 g at 07/13/19 0757    senna-docusate sodium (SENOKOT S) 8 6-50 mg per tablet 1 tablet, 1 tablet, Oral, BID, Lesley Briceño PA-C, 1 tablet at 07/13/19 0759    vancomycin (VANCOCIN) IVPB (premix) 750 mg, 750 mg, Intravenous, After Dialysis, [facility] April, DO, Stopped at 07/11/19 1536    Laboratory Results:  Results from last 7 days   Lab Units 07/12/19  0611 07/10/19  0606 07/09/19  1559   WBC Thousand/uL 8 26 13 43* 1 94*   HEMOGLOBIN g/dL 10 8* 11 2* 11 6*   HEMATOCRIT % 34 2* 35 1* 36 0*   PLATELETS Thousands/uL 161 149 149   POTASSIUM mmol/L  --  4 8 4 0   CHLORIDE mmol/L  --  96* 97*   CO2 mmol/L  --  30 29   BUN mg/dL  --  55* 47*   CREATININE mg/dL  --  10 78* 8 79*   CALCIUM mg/dL  --  9 1 9 1

## 2019-07-13 NOTE — PROGRESS NOTES
Vancomycin IV Pharmacy-to-Dose Consultation    Cyrilla Severin is a 53 y o  male who is currently receiving Vancomycin IV with management by the Pharmacy Consult service  Assessment/Plan:  The patient was reviewed  No signs or symptoms of infusion reactions were documented in the chart  The most recent vancomycin level Pre-HD is 22 8 (therapeutic)  Goal of 20-25 pre-HD equates to a 15-20 Post HD   Based on todays assessment, continue current vancomycin dosing of 750mg post HD after each dialysis session (Tue-Thur-sat)  We will continue to follow the patients culture results and clinical progress daily      Toluis Mckeon, Pharmacist

## 2019-07-13 NOTE — PROGRESS NOTES
Progress Note - Paddy Trevin 1965, 53 y o  male MRN: [id number]    Unit/Bed#: E2 -01 Encounter: [id number]    Primary Care Provider: Aby Ching MD   Date and time admitted to hospital: 7/9/2019  2:39 PM        * Sepsis due to Staphylococcus epidermidis [facility])  Assessment & Plan  · Due to central line associated bloodstream infection (HD catheter)  · He was initiated on vancomycin 750 mg after HD  · HD catheter removed  · Echocardiogram without obvious vegetation  · Follow up repeat blood cultures negative for 24 hours  · Last dose of antibiotic on 07/16/2019 after HD    ESRD (end stage renal disease) [facility])  Assessment & Plan  Hemodialysis today via AV fistula  HD catheter removed  Outpatient hemodialysis every Tuesday Thursday Saturday    Type 2 diabetes mellitus with chronic kidney disease on chronic dialysis, with long-term current use of insulin [facility])  Assessment & Plan  Lab Results   Component Value Date    HGBA1C 8 1 (H) 04/04/2019       Recent Labs     07/12/19  1447 07/12/19  1604 07/12/19  2056 07/13/19  0716   POCGLU 467* 393* 242* 131       Blood Sugar Average: Last 72 hrs:  (P) 578 [id number]     Blood sugars were uncontrolled yesterday in the high 400s and greater than 500  This dose was increased to 30 units  Humalog 10 mg t i d  was started  Blood sugars are stable today    Slow transit constipation  Assessment & Plan  Continue Pericolace  Add miralax prn     Benign hypertension with ESRD (end-stage renal disease) ([facility] [address] )  Assessment & Plan  · Watch for hypotension  · Continue Coreg 6 25 mg b i d , Cardura 2 mg at bedtime, Procardia XL 60 mg daily with hold parameters        VTE Pharmacologic Prophylaxis:   Pharmacologic: Heparin  Mechanical VTE Prophylaxis in Place: Yes    Patient Centered Rounds: I have performed bedside rounds with nursing staff today  Discussions with Specialists or Other Care Team Provider:  Nephrology    Time Spent for Care: 20 minutes    More than 50% of total time spent on counseling and coordination of care as described above  Current Length of Stay: 4 day(s)    Current Patient Status: Inpatient   Certification Statement: The patient will continue to require additional inpatient hospital stay due to Sepsis    Discharge Plan:  Anticipate discharge in 24 hours    Code Status: Level 1 - Full Code      Subjective:   Feels better with no fever or chills  No abdominal pain nausea or vomiting  He is due for hemodialysis today    Objective:     Vitals:   Temp (24hrs), Av 1 °F (36 2 °C), Min:96 5 °F (35 8 °C), Max:97 8 °F (36 6 °C)    Temp:  [96 5 °F (35 8 °C)-97 8 °F (36 6 °C)] 97 °F (36 1 °C)  HR:  [68-75] 69  Resp:  [16-18] 18  BP: (127-154)/(65-79) 150/79  SpO2:  [99 %-100 %] 100 %  Body mass index is 22 84 kg/m²  Input and Output Summary (last 24 hours):     No intake or output data in the 24 hours ending 19 1213    Physical Exam:     Physical Exam   Constitutional: He is oriented to person, place, and time  He appears well-developed  No distress  HENT:   Head: Normocephalic and atraumatic  Eyes:   Blind   Neck: No JVD present  Cardiovascular: Normal rate  Murmur heard  Pulmonary/Chest: Effort normal  No stridor  No respiratory distress  He has no wheezes  Abdominal: Soft  He exhibits no distension  There is no tenderness  There is no guarding  Musculoskeletal:   Left arm AV fistula   Neurological: He is alert and oriented to person, place, and time  Skin: Skin is warm and dry  He is not diaphoretic  Psychiatric: He has a normal mood and affect   His behavior is normal      Additional Data:     Labs:    Results from last 7 days   Lab Units 19  0611 07/10/19  0606   WBC Thousand/uL 8 26 13 43*   HEMOGLOBIN g/dL 10 8* 11 2*   HEMATOCRIT % 34 2* 35 1*   PLATELETS Thousands/uL 161 149   BANDS PCT %  --  13*   NEUTROS PCT % 63  --    LYMPHS PCT % 18  --    LYMPHO PCT %  --  4*   MONOS PCT % 14*  --    MONO PCT %  --  3* EOS PCT % 3 0     Results from last 7 days   Lab Units 07/10/19  0606   SODIUM mmol/L 135*   POTASSIUM mmol/L 4 8   CHLORIDE mmol/L 96*   CO2 mmol/L 30   BUN mg/dL 55*   CREATININE mg/dL 10 78*   ANION GAP mmol/L 9   CALCIUM mg/dL 9 1   ALBUMIN g/dL 3 0*   TOTAL BILIRUBIN mg/dL 0 43   ALK PHOS U/L 71   ALT U/L 56   AST U/L 38   GLUCOSE RANDOM mg/dL 117     Results from last 7 days   Lab Units 07/09/19  1709   INR  1 13     Results from last 7 days   Lab Units 07/13/19  0716 07/12/19  2056 07/12/19  1604 07/12/19  1447 07/12/19  1058 07/12/19  1056 07/12/19  0719 07/11/19 2053 07/11/19  1619 07/11/19  1118 07/11/19  0750 07/10/19  2058   POC GLUCOSE mg/dl 131 242* 393* 467* 454* >500* 159* 233* 146* 273* 196* 178*         Results from last 7 days   Lab Units 07/09/19  1709 07/09/19  1653   LACTIC ACID mmol/L  --  1 7   PROCALCITONIN ng/ml 12 78*  --            * I Have Reviewed All Lab Data Listed Above  * Additional Pertinent Lab Tests Reviewed: All Labs Within Last 24 Hours Reviewed    Imaging:    Imaging Reports Reviewed Today Include:  None    Recent Cultures (last 7 days):     Results from last 7 days   Lab Units 07/12/19  0612 07/09/19  1653   BLOOD CULTURE  No Growth at 24 hrs   Staphylococcus epidermidis*  Staphylococcus epidermidis*   GRAM STAIN RESULT   --  Gram positive cocci in clusters*  Gram positive cocci in clusters*       Last 24 Hours Medication List:     Current Facility-Administered Medications:  acetaminophen 650 mg Oral Q6H PRN Kassy Driscoll PA-C    aspirin 81 mg Oral Daily Kassy Driscoll PA-C    atorvastatin 40 mg Oral Daily Kassy Driscoll PA-C    carvedilol 6 25 mg Oral BID With Meals Kassy Driscoll PA-C    doxazosin 2 mg Oral HS Kassy Driscoll PA-C    furosemide 80 mg Oral Daily Kassy Driscoll PA-C    heparin (porcine) 5,000 Units Subcutaneous [facility] Kassy Driscoll PA-C    insulin glargine 30 Units Subcutaneous  Raymond Allen MD    insulin lispro 1-5 Units Subcutaneous 4x Daily (AC & HS) Kassy Driscoll PA-C    insulin lispro 10 Units Subcutaneous TID With Meals Karina Gaytan MD    lidocaine 1 patch Topical Daily Kassy Driscoll PA-C    NIFEdipine ER 60 mg Oral Daily Kassy Driscoll PA-C    ondansetron 4 mg Intravenous Q6H PRN Kassy Driscoll PA-C    polyethylene glycol 17 g Oral Daily PRN Karina Gaytan MD    senna-docusate sodium 1 tablet Oral BID Carlos Perez PA-C    vancomycin 750 mg Intravenous After Dialysis Gary Mcgrath DO Last Rate: Stopped (07/11/19 1536)        Today, Patient Was Seen By: Karina Gaytan MD    ** Please Note: Dictation voice to text software may have been used in the creation of this document   **

## 2019-07-14 VITALS
RESPIRATION RATE: 18 BRPM | HEIGHT: 70 IN | DIASTOLIC BLOOD PRESSURE: 59 MMHG | TEMPERATURE: 98.5 F | OXYGEN SATURATION: 100 % | WEIGHT: 159.17 LBS | HEART RATE: 78 BPM | BODY MASS INDEX: 22.79 KG/M2 | SYSTOLIC BLOOD PRESSURE: 100 MMHG

## 2019-07-14 LAB
GLUCOSE SERPL-MCNC: 269 MG/DL (ref 65–140)
GLUCOSE SERPL-MCNC: 277 MG/DL (ref 65–140)
GLUCOSE SERPL-MCNC: 330 MG/DL (ref 65–140)

## 2019-07-14 PROCEDURE — 99239 HOSP IP/OBS DSCHRG MGMT >30: CPT | Performed by: INTERNAL MEDICINE

## 2019-07-14 PROCEDURE — 82948 REAGENT STRIP/BLOOD GLUCOSE: CPT

## 2019-07-14 PROCEDURE — 99232 SBSQ HOSP IP/OBS MODERATE 35: CPT | Performed by: INTERNAL MEDICINE

## 2019-07-14 PROCEDURE — 99231 SBSQ HOSP IP/OBS SF/LOW 25: CPT | Performed by: NURSE PRACTITIONER

## 2019-07-14 RX ADMIN — FUROSEMIDE 80 MG: 40 TABLET ORAL at 08:54

## 2019-07-14 RX ADMIN — INSULIN LISPRO 10 UNITS: 100 INJECTION, SOLUTION INTRAVENOUS; SUBCUTANEOUS at 13:02

## 2019-07-14 RX ADMIN — ASPIRIN 81 MG 81 MG: 81 TABLET ORAL at 08:33

## 2019-07-14 RX ADMIN — INSULIN LISPRO 2 UNITS: 100 INJECTION, SOLUTION INTRAVENOUS; SUBCUTANEOUS at 08:32

## 2019-07-14 RX ADMIN — CARVEDILOL 6.25 MG: 3.12 TABLET, FILM COATED ORAL at 08:33

## 2019-07-14 RX ADMIN — INSULIN LISPRO 3 UNITS: 100 INJECTION, SOLUTION INTRAVENOUS; SUBCUTANEOUS at 11:08

## 2019-07-14 RX ADMIN — HEPARIN SODIUM 5000 UNITS: 5000 INJECTION INTRAVENOUS; SUBCUTANEOUS at 06:26

## 2019-07-14 RX ADMIN — INSULIN LISPRO 10 UNITS: 100 INJECTION, SOLUTION INTRAVENOUS; SUBCUTANEOUS at 08:32

## 2019-07-14 RX ADMIN — LIDOCAINE 1 PATCH: 50 PATCH TOPICAL at 08:35

## 2019-07-14 RX ADMIN — SENNOSIDES AND DOCUSATE SODIUM 1 TABLET: 8.6; 5 TABLET ORAL at 08:33

## 2019-07-14 RX ADMIN — NIFEDIPINE 60 MG: 60 TABLET, FILM COATED, EXTENDED RELEASE ORAL at 08:33

## 2019-07-14 RX ADMIN — ATORVASTATIN CALCIUM 40 MG: 40 TABLET, FILM COATED ORAL at 08:54

## 2019-07-14 NOTE — ASSESSMENT & PLAN NOTE
Lab Results   Component Value Date    HGBA1C 8 1 (H) 04/04/2019       Recent Labs     07/13/19  2056 07/14/19  0755 07/14/19  1103 07/14/19  1211   POCGLU 172* 269* 330* 277*       Blood Sugar Average: Last 72 hrs:  (P) 590 [id number]     Blood sugars were uncontrolled   Lantus dose was increased to 30 units  Humalog was increased to 10 mg t i d     For discharge, resume Basaglar 30 units at bedtime and NovoLog 10 units t i d

## 2019-07-14 NOTE — DISCHARGE SUMMARY
Discharge- Paddy Trevin 1965, 53 y o  male MRN: [id number]    Unit/Bed#: E2 -01 Encounter: [id number]    Primary Care Provider: Melinda Ugalde MD   Date and time admitted to hospital: 7/9/2019  2:39 PM        * Sepsis due to Staphylococcus epidermidis [facility])  Assessment & Plan  · Due to central line associated bloodstream infection (HD catheter)  · He was initiated on vancomycin 750 mg after HD  · HD catheter removed  · Echocardiogram without obvious vegetation  · Follow up repeat blood cultures negative for 24 hours  · Last dose of antibiotic on 07/16/2019 after HD  · Stable for discharge home today    ESRD (end stage renal disease) ([facility] )  Assessment & Plan  Hemodialysis today via AV fistula  HD catheter removed  Outpatient hemodialysis every Tuesday Thursday Saturday    Type 2 diabetes mellitus with chronic kidney disease on chronic dialysis, with long-term current use of insulin [facility])  Assessment & Plan  Lab Results   Component Value Date    HGBA1C 8 1 (H) 04/04/2019       Recent Labs     07/13/19  2056 07/14/19  0755 07/14/19  1103 07/14/19  1211   POCGLU 172* 269* 330* 277*       Blood Sugar Average: Last 72 hrs:  (P) 021 [id number]     Blood sugars were uncontrolled   Lantus dose was increased to 30 units  Humalog was increased to 10 mg t i d  For discharge, resume Basaglar 30 units at bedtime and NovoLog 10 units t i d      Slow transit constipation  Assessment & Plan  Placed on troy Colace and MiraLax while in the hospital    Benign hypertension with ESRD (end-stage renal disease) ([facility] )  Assessment & Plan  · Continue Coreg 6 25 mg b i d , Cardura 2 mg at bedtime, Procardia XL 60 mg daily with hold parameters      Discharging Physician / Practitioner: Attila Taylor MD  PCP: Melinda Ugalde MD  Admission Date:   Admission Orders (From admission, onward)    Ordered        07/09/19 1732  Inpatient Admission  Once             Discharge Date: 07/14/19    Resolved Problems  Date Reviewed: 7/14/2019          Resolved    Leucopenia 7/12/2019     Resolved by  Laura Colin MD          Consultations During Hospital Stay:  · Nephrology  · Id  · IR    Procedures Performed:   · HD catheter removal  · Hemodialysis    Significant Findings / Test Results:   · Staph epidermidis bacteremia      Incidental Findings:   · None     Test Results Pending at Discharge (will require follow up): · None     Outpatient Tests Requested:  · None    Complications:  None    Reason for Admission:  Fever and chills    Hospital Course:     Laura Bruno is a 53 y o  male patient who originally presented to the hospital on 7/9/2019 due to fever and chills  He has known history of end-stage renal disease, dialyzed via a right IJ PermCath  Due to suspected central line associated bloodstream infection from his HD catheter, he was admitted and started on vancomycin and cefepime  He was seen by Nephrology, Infectious Disease, and IR  This antibiotic was changed to vancomycin 750 mg Q HD alone once his cultures came back positive for gram-positive cocci in clusters  Final culture revealed that this was Staphylococcus epidermidis  He underwent hemodialysis via his left AV fistula which has now matured  His HD catheter was removed by IR  Catheter tip culture was also positive for infection  Echocardiogram showed no vegetation  Overall his infection resolved, bacteremia has cleared  He will require 1 more dose of antibiotic on July 16, 2019 after his dialysis  Blood pressure was stable on his usual home regimen  Blood sugar was uncontrolled so his basaglar dose was increased to 30 units at bedtime, and NovoLog dose was increased to 10 units with meals  Please see above list of diagnoses and related plan for additional information  Condition at Discharge: good     Discharge Day Visit / Exam:     Subjective:  No fever or chills  Ready to go home    Vitals: Blood Pressure: 100/59 (07/14/19 1200)  Pulse: 78 (07/14/19 1200)  Temperature: 98 5 °F (36 9 °C) (07/14/19 1200)  Temp Source: Tympanic (07/14/19 1200)  Respirations: 18 (07/14/19 1200)  Height: 5' 10" (177 8 cm) (07/09/19 1822)  Weight - Scale: 72 2 kg (159 lb 2 8 oz) (07/09/19 1822)  SpO2: 100 % (07/14/19 1200)  Exam:   Physical Exam   Constitutional: He is oriented to person, place, and time  He appears well-developed  No distress  HENT:   Head: Normocephalic and atraumatic  Eyes:   Blind   Neck: No JVD present  Cardiovascular: Normal rate  Exam reveals no gallop and no friction rub  Murmur heard  Pulmonary/Chest: Effort normal  No stridor  No respiratory distress  He has no wheezes  Abdominal: Soft  He exhibits no distension  There is no tenderness  There is no guarding  Musculoskeletal: He exhibits no edema or deformity  Neurological: He is alert and oriented to person, place, and time  Skin: He is not diaphoretic  Psychiatric: He has a normal mood and affect  His behavior is normal        Discussion with Family:  Spoke with sister Marlen Contreras and gave update    Discharge instructions/Information to patient and family:   See after visit summary for information provided to patient and family  Provisions for Follow-Up Care:  See after visit summary for information related to follow-up care and any pertinent home health orders  Disposition:     Home    Planned Readmission: no     Discharge Statement:  I spent >30 minutes discharging the patient  This time was spent on the day of discharge  I had direct contact with the patient on the day of discharge  Greater than 50% of the total time was spent examining patient, answering all patient questions, arranging and discussing plan of care with patient as well as directly providing post-discharge instructions  Additional time then spent on discharge activities      Discharge Medications:  See after visit summary for reconciled discharge medications provided to patient and family        ** Please Note: This note has been constructed using a voice recognition system **

## 2019-07-14 NOTE — NURSING NOTE
Discharged with nursing goals met, pt states ready to go home; accompanied by sister with whom he shares a home

## 2019-07-14 NOTE — PROGRESS NOTES
Vancomycin IV Pharmacy-to-Dose Consultation    Ele Land is a 53 y o  male who is currently receiving Vancomycin IV with management by the Pharmacy Consult service  Assessment/Plan:  The patient was reviewed  No signs or symptoms of infusion reactions were documented in the chart  Based on todays assessment, continue current vancomycin dosing of 750mg post HD after each dialysis session through 7/16/19  We will continue to follow the patients culture results and clinical progress daily      Harry Marlow, Pharmacist

## 2019-07-14 NOTE — PROGRESS NOTES
Progress Note - Infectious Disease   Paddy Trevin 53 y o  male MRN: [id number]  Unit/Bed#: E2 -01 Encounter: [id number]    Impression/Plan:  1  Sepsis  POA  Fever, tachycardia, and leukopenia  Likely secondary to Staph epidermidis bacteremia  Likely source was patient's dialysis catheter as the tip also grew staph epidermidis  Fortunately, despite being systemically ill, he has remained clinically stable and nontoxic  Today the patient is afebrile  WBC count has improved  His repeat blood cultures are clear >24 hours   -antibiotics as below  -monitor CBC and BMP  -follow up repeat blood cultures  -monitor vitals  -supportive care     2  Staph epidermidis bacteremia  Likely secondary to patient's previous PermCath  Catheter tip also grew staph epidermidis  Catheter has been removed  His repeat blood cultures are negative >24 hours  TTE was negative for valvular vegetation  Patient will not need placement of a new catheter as his fistula is now matured and being utilized for dialysis  The patient has been receiving IV vancomycin is tolerating without difficulty  Will continue vancomycin dosing with HD through 07/16/2019   -continue IV vancomycin with HD through 07/16/2019  -nephrology has already coordinated outpatient dialysis vancomycin administration  -follow up repeat blood cultures  -monitor vitals     3  End-stage renal disease  Dialysis access is now a fistula, no need to place new PermCath  His current dialysis schedule is [id number]  -HD per Nephrology  -dose antibiotics in coordination with HD  -continue close nephrology follow-up     4  Type 2 diabetes mellitus with chronic kidney disease and hyperglycemia  Patient's last hemoglobin A1c was 8 1% on 04/04/2019   -blood glucose management per primary service     Patient is stable for discharge from ID standpoint  Antibiotics:  Vancomycin 6  Antibiotics 6    Subjective:  Patient reports he is fine today   He has no acute complaints  He denies fever, chills, sweats; no nausea, vomiting, diarrhea; no cough, shortness of breath, or chest pain  No new symptoms  Objective:  Vitals:  Temp:  [97 9 °F (36 6 °C)] 97 9 °F (36 6 °C)  HR:  [66-77] 72  Resp:  [15] 15  BP: (134-192)/(71-98) 180/98  SpO2:  [100 %] 100 %  Temp (24hrs), Av 9 °F (36 6 °C), Min:97 9 °F (36 6 °C), Max:97 9 °F (36 6 °C)  Current: Temperature: 97 9 °F (36 6 °C)    Physical Exam:   General Appearance:  Alert, interactive, nontoxic, no acute distress  Eyes closed  Throat: Oropharynx moist without lesions  Lungs:   Clear to auscultation bilaterally; no wheezes, rhonchi or rales; respirations unlabored   Heart:  RRR; no murmur, rub or gallop   Abdomen:   Soft, non-tender, non-distended, positive bowel sounds  Extremities: No clubbing or cyanosis, no edema; L AV fistula   Skin: No new rashes or lesions  No draining wounds noted  Labs, Imaging, & Other studies:   All pertinent labs and imaging studies were personally reviewed  Results from last 7 days   Lab Units 19  0611 07/10/19  0606 19  1559   WBC Thousand/uL 8 26 13 43* 1 94*   HEMOGLOBIN g/dL 10 8* 11 2* 11 6*   PLATELETS Thousands/uL 161 149 149     Results from last 7 days   Lab Units 19  1423 07/10/19  0606 19  1559   POTASSIUM mmol/L 4 2 4 8 4 0   CHLORIDE mmol/L 97* 96* 97*   CO2 mmol/L 29 30 29   BUN mg/dL 68* 55* 47*   CREATININE mg/dL 13 53* 10 78* 8 79*   EGFR ml/min/1 73sq m 4 6 7   CALCIUM mg/dL 9 5 9 1 9 1   AST U/L  --  38 40   ALT U/L  --  56 51   ALK PHOS U/L  --  71 79     Results from last 7 days   Lab Units 19  1050 19  0612 07/10/19  0600 19  1653   BLOOD CULTURE  No Growth at 24 hrs   No Growth at 24 hrs   --  Staphylococcus epidermidis*  Staphylococcus epidermidis*   GRAM STAIN RESULT   --   --   --  Gram positive cocci in clusters*  Gram positive cocci in clusters*   MRSA CULTURE ONLY   --   --  No Methicillin Resistant Staphlyococcus aureus (MRSA) isolated  --

## 2019-07-14 NOTE — ASSESSMENT & PLAN NOTE
· Due to central line associated bloodstream infection (HD catheter)  · He was initiated on vancomycin 750 mg after HD  · HD catheter removed    · Echocardiogram without obvious vegetation  · Follow up repeat blood cultures negative for 24 hours  · Last dose of antibiotic on 07/16/2019 after HD  · Stable for discharge home today

## 2019-07-14 NOTE — PROGRESS NOTES
Patient status post hemodialysis yesterday, electrolytes stable  Renal function stable from a hemodialysis standpoint okay for discharge the medically ready  If patient discharged today, outpatient dialysis unit aware to give vancomycin 750 mg with next HD treatment on Tuesday    Will see Monday if patient still at admitted

## 2019-07-14 NOTE — PLAN OF CARE
Problem: Nutrition/Hydration-ADULT  Goal: Nutrient/Hydration intake appropriate for improving, restoring or maintaining nutritional needs  Description  Monitor and assess patient's nutrition/hydration status for malnutrition (ex- brittle hair, bruises, dry skin, pale skin and conjunctiva, muscle wasting, smooth red tongue, and disorientation)  Collaborate with interdisciplinary team and initiate plan and interventions as ordered  Monitor patient's weight and dietary intake as ordered or per policy  Utilize nutrition screening tool and intervene per policy  Determine patient's food preferences and provide high-protein, high-caloric foods as appropriate       INTERVENTIONS:  - Monitor oral intake, urinary output, labs, and treatment plans  - Assess nutrition and hydration status and recommend course of action  - Evaluate amount of meals eaten  - Assist patient with eating if necessary   - Allow adequate time for meals  - Recommend/ encourage appropriate diets, oral nutritional supplements, and vitamin/mineral supplements  - Order, calculate, and assess calorie counts as needed  - Recommend, monitor, and adjust tube feedings and TPN/PPN based on assessed needs  - Assess need for intravenous fluids  - Provide specific nutrition/hydration education as appropriate  - Include patient/family/caregiver in decisions related to nutrition  7/14/2019 1427 by Zeferino Mays, RN  Outcome: Adequate for Discharge  7/14/2019 1159 by Zeferino Mays, RN  Outcome: Adequate for Discharge

## 2019-07-14 NOTE — ASSESSMENT & PLAN NOTE
· Continue Coreg 6 25 mg b i d , Cardura 2 mg at bedtime, Procardia XL 60 mg daily with hold parameters

## 2019-07-14 NOTE — DISCHARGE INSTRUCTIONS
Jazmine Johnson was treated for a blood infection  His catheter was infected  His catheter was removed  Infection was treated appropriately with an antibiotic called vancomycin  He will have 1 more dose of vancomycin after his dialysis on Tuesday 07/16/2019  During this admission his blood sugars were noted to be very high  For this reason we increased his Basaglar (long-acting insulin) to 30 units at night, and NovoLog (mealtime insulin) to 10 units with meals  Please monitor his blood sugars    If his blood sugars start to come down, decrease Basaglar to his usual 20 units at night, and NovoLog to his usual 3 units with meals  Call his family doctor for advice if his blood sugars are less than 70 or greater than 300

## 2019-07-15 ENCOUNTER — TRANSITIONAL CARE MANAGEMENT (OUTPATIENT)
Dept: INTERNAL MEDICINE CLINIC | Facility: CLINIC | Age: 54
End: 2019-07-15

## 2019-07-16 LAB — BACTERIA CATH TIP CULT: ABNORMAL

## 2019-07-17 LAB
BACTERIA BLD CULT: NORMAL
BACTERIA BLD CULT: NORMAL

## 2019-07-22 ENCOUNTER — OFFICE VISIT (OUTPATIENT)
Dept: INTERNAL MEDICINE CLINIC | Facility: CLINIC | Age: 54
End: 2019-07-22
Payer: MEDICARE

## 2019-07-22 VITALS
BODY MASS INDEX: 23.13 KG/M2 | RESPIRATION RATE: 16 BRPM | HEART RATE: 76 BPM | SYSTOLIC BLOOD PRESSURE: 118 MMHG | WEIGHT: 161.6 LBS | TEMPERATURE: 98.2 F | DIASTOLIC BLOOD PRESSURE: 56 MMHG | HEIGHT: 70 IN

## 2019-07-22 DIAGNOSIS — M54.50 CHRONIC MIDLINE LOW BACK PAIN WITHOUT SCIATICA: ICD-10-CM

## 2019-07-22 DIAGNOSIS — Z12.11 SCREENING FOR COLON CANCER: ICD-10-CM

## 2019-07-22 DIAGNOSIS — N18.6 ESRD (END STAGE RENAL DISEASE) (HCC): ICD-10-CM

## 2019-07-22 DIAGNOSIS — E78.5 HYPERLIPIDEMIA, UNSPECIFIED HYPERLIPIDEMIA TYPE: ICD-10-CM

## 2019-07-22 DIAGNOSIS — Z99.2 TYPE 2 DIABETES MELLITUS WITH CHRONIC KIDNEY DISEASE ON CHRONIC DIALYSIS, WITH LONG-TERM CURRENT USE OF INSULIN (HCC): ICD-10-CM

## 2019-07-22 DIAGNOSIS — E11.22 TYPE 2 DIABETES MELLITUS WITH CHRONIC KIDNEY DISEASE ON CHRONIC DIALYSIS, WITH LONG-TERM CURRENT USE OF INSULIN (HCC): ICD-10-CM

## 2019-07-22 DIAGNOSIS — Z79.4 TYPE 2 DIABETES MELLITUS WITH CHRONIC KIDNEY DISEASE ON CHRONIC DIALYSIS, WITH LONG-TERM CURRENT USE OF INSULIN (HCC): ICD-10-CM

## 2019-07-22 DIAGNOSIS — E11.65 UNCONTROLLED TYPE 2 DIABETES MELLITUS WITH HYPERGLYCEMIA, WITH LONG-TERM CURRENT USE OF INSULIN (HCC): ICD-10-CM

## 2019-07-22 DIAGNOSIS — G89.29 CHRONIC MIDLINE LOW BACK PAIN WITHOUT SCIATICA: ICD-10-CM

## 2019-07-22 DIAGNOSIS — Z79.4 UNCONTROLLED TYPE 2 DIABETES MELLITUS WITH HYPERGLYCEMIA, WITH LONG-TERM CURRENT USE OF INSULIN (HCC): ICD-10-CM

## 2019-07-22 DIAGNOSIS — I10 ESSENTIAL HYPERTENSION: ICD-10-CM

## 2019-07-22 DIAGNOSIS — N18.6 BENIGN HYPERTENSION WITH ESRD (END-STAGE RENAL DISEASE) (HCC): ICD-10-CM

## 2019-07-22 DIAGNOSIS — N18.4 CKD (CHRONIC KIDNEY DISEASE) STAGE 4, GFR 15-29 ML/MIN (HCC): ICD-10-CM

## 2019-07-22 DIAGNOSIS — A41.1 SEPSIS DUE TO STAPHYLOCOCCUS EPIDERMIDIS (HCC): Primary | ICD-10-CM

## 2019-07-22 DIAGNOSIS — N18.6 TYPE 2 DIABETES MELLITUS WITH CHRONIC KIDNEY DISEASE ON CHRONIC DIALYSIS, WITH LONG-TERM CURRENT USE OF INSULIN (HCC): ICD-10-CM

## 2019-07-22 DIAGNOSIS — I12.0 BENIGN HYPERTENSION WITH ESRD (END-STAGE RENAL DISEASE) (HCC): ICD-10-CM

## 2019-07-22 PROCEDURE — 99495 TRANSJ CARE MGMT MOD F2F 14D: CPT | Performed by: INTERNAL MEDICINE

## 2019-07-22 RX ORDER — ATORVASTATIN CALCIUM 40 MG/1
40 TABLET, FILM COATED ORAL DAILY
Qty: 90 TABLET | Refills: 1 | Status: SHIPPED | OUTPATIENT
Start: 2019-07-22 | End: 2020-02-24

## 2019-07-22 RX ORDER — PREDNISOLONE ACETATE 10 MG/ML
1 SUSPENSION/ DROPS OPHTHALMIC 2 TIMES DAILY
Refills: 0 | COMMUNITY
Start: 2019-06-25

## 2019-07-22 RX ORDER — CARVEDILOL 6.25 MG/1
6.25 TABLET ORAL 2 TIMES DAILY WITH MEALS
Qty: 180 TABLET | Refills: 1 | Status: SHIPPED | OUTPATIENT
Start: 2019-07-22 | End: 2020-01-06

## 2019-07-22 NOTE — PROGRESS NOTES
Assessment/Plan:  Will continue current regimen of medications  I asked him to monitor his blood sugars closely and bring readings to the next office visit  Continue current dose of insulin at this time  Check A1c before next office visit as well  He was complaining of some dizziness but no orthostatic hypotension, no hypertension after dialysis either  Will arrange for physical therapy for acute worsening of chronic back pain  No fevers, sweats or anything concerning for osteomyelitis  Consider imaging if symptoms not improved     Diagnoses and all orders for this visit:    Sepsis due to Staphylococcus epidermidis ([facility] )    Benign hypertension with ESRD (end-stage renal disease) ([facility] )  -     Comprehensive metabolic panel  -     Hemoglobin A1C  -     Lipid Panel with Direct LDL reflex  -     CBC and differential    Type 2 diabetes mellitus with chronic kidney disease on chronic dialysis, with long-term current use of insulin ([facility])  -     Comprehensive metabolic panel  -     Hemoglobin A1C  -     Lipid Panel with Direct LDL reflex  -     CBC and differential    Chronic midline low back pain without sciatica  -     Ambulatory referral to Physical Therapy; Future    Screening for colon cancer  -     Occult Blood, Fecal Immunochemical    ESRD (end stage renal disease) ([facility])  -     Comprehensive metabolic panel  -     CBC and differential    Essential hypertension  -     carvedilol (COREG) 6 25 mg tablet; Take 1 tablet (6 25 mg total) by mouth 2 (two) times a day with meals  -     aspirin 81 MG tablet; Take 1 tablet (81 mg total) by mouth daily    Hyperlipidemia, unspecified hyperlipidemia type  -     atorvastatin (LIPITOR) 40 mg tablet; Take 1 tablet (40 mg total) by mouth daily    CKD (chronic kidney disease) stage 4, GFR 15-29 ml/min ([facility])  -     aspirin 81 MG tablet;  Take 1 tablet (81 mg total) by mouth daily    Uncontrolled type 2 diabetes mellitus with hyperglycemia, with long-term current use of insulin ([facility] )  -     aspirin 81 MG tablet; Take 1 tablet (81 mg total) by mouth daily    Other orders  -     prednisoLONE acetate (PRED FORTE) 1 % ophthalmic suspension          Subjective:   Chief Complaint   Patient presents with    Transition of Care Management     D/c [facility] on 7/14/19 Sepsis        Patient ID: Noman Vila is a 53 y o  male  TCM Call (since 6/21/2019)     Date and time call was made  7/15/2019 10:38 AM    Patient was hospitialized at  [address] [place] [id number]    Date of Admission  07/09/19    Date of discharge  07/14/19    Diagnosis  sepsis    Disposition  Home    Were the patients medications reviewed and updated  Yes    Current Symptoms  None      TCM Call (since 6/21/2019)     Should patient be enrolled in anticoag monitoring? No    Scheduled for follow up? Yes    I have advised the patient to call PCP with any new or worsening symptoms  ALISON Mahmood MA          He comes in for follow-up after hospitalization  He was admitted due to sepsis due to Staph epidermidis  He was treated with vancomycin which she completed the course with dialysis  Since discharge he has noticed worsening low back pain with stiffness  No tingling or numbness in the extremities  No fevers, no pinpoint tenderness  Dialysis is being performed with the AV fistula now with major issues  Blood sugars are still somewhat elevated occasionally  He notes that occasional fasting blood sugars are 120-130 but this morning was close to 190  He is taking his insulin regularly  Continues to have decline in vision, following up with his ophthalmologist   He plans to travel to [facility] end of this week, arrangements for dialysis has been already made up their  The following portions of the patient's history were reviewed and updated as appropriate: current medications, past medical history, past social history and past surgical history      PHQ-9 Depression Screening    PHQ-9:    Frequency of the following problems over the past two weeks:                Current Outpatient Medications:     aspirin 81 MG tablet, Take 1 tablet (81 mg total) by mouth daily, Disp: 90 tablet, Rfl: 1    atorvastatin (LIPITOR) 40 mg tablet, Take 1 tablet (40 mg total) by mouth daily, Disp: 90 tablet, Rfl: 1    Blood Glucose Monitoring Suppl (ONE TOUCH ULTRA 2) w/Device KIT, by Does not apply route 3 (three) times a day, Disp: 1 each, Rfl: 0    carvedilol (COREG) 6 25 mg tablet, Take 1 tablet (6 25 mg total) by mouth 2 (two) times a day with meals, Disp: 180 tablet, Rfl: 1    doxazosin (CARDURA) 2 mg tablet, Take 1 tablet (2 mg total) by mouth daily at bedtime, Disp: 30 tablet, Rfl: 1    furosemide (LASIX) 80 mg tablet, Take 80 mg by mouth daily, Disp: , Rfl: 0    glucose blood (FREESTYLE LITE) test strip, Use as instructed TID, Disp: 100 each, Rfl: 2    insulin aspart (NOVOLOG FLEXPEN) 100 Units/mL injection pen, Inject 10 Units under the skin 3 (three) times a day with meals, Disp: 5 pen, Rfl: 0    insulin glargine (BASAGLAR KWIKPEN) 100 units/mL injection pen, Inject 30 Units under the skin daily, Disp: 5 pen, Rfl: 0    NIFEdipine ER (ADALAT CC) 60 MG 24 hr tablet, Take 1 tablet (60 mg total) by mouth daily, Disp: 30 tablet, Rfl: 1    ONE TOUCH LANCETS MISC, by Does not apply route 3 (three) times a day, Disp: 100 each, Rfl: 1    prednisoLONE acetate (PRED FORTE) 1 % ophthalmic suspension, , Disp: , Rfl: 0    Review of Systems   Constitutional: Negative for fatigue, fever and unexpected weight change  HENT: Negative for ear pain, hearing loss and sore throat  Eyes: Negative for pain and discharge  Respiratory: Negative for cough, chest tightness and shortness of breath  Cardiovascular: Negative for chest pain and palpitations  Gastrointestinal: Negative for abdominal pain, blood in stool, constipation, diarrhea and nausea  Genitourinary: Negative for dysuria, frequency and hematuria  Musculoskeletal: Positive for back pain  Negative for arthralgias and joint swelling  Skin: Negative for rash  Allergic/Immunologic: Negative for immunocompromised state  Neurological: Positive for dizziness  Negative for headaches  Hematological: Negative for adenopathy  Psychiatric/Behavioral: Negative for confusion and sleep disturbance  Objective:  /56 (Patient Position: Standing)   Pulse 76   Temp 98 2 °F (36 8 °C)   Resp 16   Ht 5' 10" (1 778 m)   Wt 73 3 kg (161 lb 9 6 oz)   BMI 23 19 kg/m²      Physical Exam   Constitutional: He appears well-developed and well-nourished  HENT:   Head: Normocephalic and atraumatic  Right Ear: Tympanic membrane normal    Left Ear: Tympanic membrane normal    Nose: Nose normal    Mouth/Throat: Oropharynx is clear and moist  No posterior oropharyngeal edema or posterior oropharyngeal erythema  Eyes: Pupils are equal, round, and reactive to light  Conjunctivae are normal  Right eye exhibits no discharge  Left eye exhibits no discharge  Neck: Normal range of motion  Neck supple  No thyromegaly present  Cardiovascular: Normal rate, regular rhythm, S1 normal, S2 normal and normal heart sounds  PMI is not displaced  No murmur heard  Pulmonary/Chest: Effort normal and breath sounds normal  No accessory muscle usage  No apnea  No respiratory distress  He has no rhonchi  He has no rales  Abdominal: Soft  Normal appearance and bowel sounds are normal  He exhibits no shifting dullness  There is no hepatosplenomegaly  There is no tenderness  There is no rebound and no CVA tenderness  Musculoskeletal: Normal range of motion  He exhibits no edema or tenderness  Back:         Arms:  Lymphadenopathy:     He has no cervical adenopathy  Neurological: He is alert  Skin: Skin is warm and intact  No rash noted  Psychiatric: He has a normal mood and affect  His speech is normal    Nursing note and vitals reviewed          Recent Results (from the past 1008 hour(s))   ECG 12 lead Collection Time: 07/09/19  2:45 PM   Result Value Ref Range    Ventricular Rate 97 BPM    Atrial Rate 97 BPM    LA Interval 140 ms    QRSD Interval 76 ms    QT Interval 364 ms    QTC Interval 462 ms    P Axis 74 degrees    QRS Axis 50 degrees    T Wave Axis 98 degrees   Fingerstick Glucose (POCT)    Collection Time: 07/09/19  2:54 PM   Result Value Ref Range    POC Glucose 161 (H) 65 - 140 mg/dl   CBC and differential    Collection Time: 07/09/19  3:59 PM   Result Value Ref Range    WBC 1 94 (LL) 4 31 - 10 16 Thousand/uL    RBC 4 29 3 88 - 5 62 Million/uL    Hemoglobin 11 6 (L) 12 0 - 17 0 g/dL    Hematocrit 36 0 (L) 36 5 - 49 3 %    MCV 84 82 - 98 fL    MCH 27 0 26 8 - 34 3 pg    MCHC 32 2 31 4 - 37 4 g/dL    RDW 12 9 11 6 - 15 1 %    MPV 9 6 8 9 - 12 7 fL    Platelets 802 228 - 575 Thousands/uL    nRBC 0 /100 WBCs    Neutrophils Relative 80 (H) 43 - 75 %    Immat GRANS % 1 0 - 2 %    Lymphocytes Relative 15 14 - 44 %    Monocytes Relative 2 (L) 4 - 12 %    Eosinophils Relative 2 0 - 6 %    Basophils Relative 0 0 - 1 %    Neutrophils Absolute 1 58 (L) 1 85 - 7 62 Thousands/µL    Immature Grans Absolute 0 01 0 00 - 0 20 Thousand/uL    Lymphocytes Absolute 0 29 (L) 0 60 - 4 47 Thousands/µL    Monocytes Absolute 0 03 (L) 0 17 - 1 22 Thousand/µL    Eosinophils Absolute 0 03 0 00 - 0 61 Thousand/µL    Basophils Absolute 0 00 0 00 - 0 10 Thousands/µL   Basic metabolic panel    Collection Time: 07/09/19  3:59 PM   Result Value Ref Range    Sodium 137 136 - 145 mmol/L    Potassium 4 0 3 5 - 5 3 mmol/L    Chloride 97 (L) 100 - 108 mmol/L    CO2 29 21 - 32 mmol/L    ANION GAP 11 4 - 13 mmol/L    BUN 47 (H) 5 - 25 mg/dL    Creatinine 8 79 (H) 0 60 - 1 30 mg/dL    Glucose 141 (H) 65 - 140 mg/dL    Calcium 9 1 8 3 - 10 1 mg/dL    eGFR 7 ml/min/1 73sq m   Hepatic function panel    Collection Time: 07/09/19  3:59 PM   Result Value Ref Range    Total Bilirubin 0 37 0 20 - 1 00 mg/dL    Bilirubin, Direct 0 09 0 00 - 0 20 mg/dL Alkaline Phosphatase 79 46 - 116 U/L    AST 40 5 - 45 U/L    ALT 51 12 - 78 U/L    Total Protein 7 5 6 4 - 8 2 g/dL    Albumin 3 5 3 5 - 5 0 g/dL   Lactic acid, plasma x2    Collection Time: 07/09/19  4:53 PM   Result Value Ref Range    LACTIC ACID 1 7 0 5 - 2 0 mmol/L   Blood culture #1    Collection Time: 07/09/19  4:53 PM   Result Value Ref Range    Blood Culture Staphylococcus epidermidis (A)     Gram Stain Result Gram positive cocci in clusters (A)        Susceptibility    Staphylococcus epidermidis - KIM     ZID Performed Yes       Ampicillin ($$) <=2 00 Resistant ug/ml     Cefazolin ($) <=4 00 Resistant ug/ml     Clindamycin ($) <=0 25 Susceptible ug/ml     Erythromycin ($$$$) >4 00 Resistant ug/ml     Gentamicin ($$) <=1 Susceptible ug/ml     Oxacillin >2 00 Resistant ug/ml     Tetracycline <=2 Susceptible ug/ml     Trimethoprim + Sulfamethoxazole ($$$) >2/38 Resistant ug/ml     Vancomycin ($) 1 00 Susceptible ug/ml   Blood culture #2    Collection Time: 07/09/19  4:53 PM   Result Value Ref Range    Blood Culture Staphylococcus epidermidis (A)     Gram Stain Result Gram positive cocci in clusters (A)        Susceptibility    Staphylococcus epidermidis - KIM     ZID Performed Yes     Protime-INR    Collection Time: 07/09/19  5:09 PM   Result Value Ref Range    Protime 14 7 (H) 11 6 - 14 5 seconds    INR 1 13 0 84 - 1 19   APTT    Collection Time: 07/09/19  5:09 PM   Result Value Ref Range    PTT 33 23 - 37 seconds   Procalcitonin    Collection Time: 07/09/19  5:09 PM   Result Value Ref Range    Procalcitonin 12 78 (H) <=0 25 ng/ml   Lipase    Collection Time: 07/09/19  5:09 PM   Result Value Ref Range    Lipase 550 (H) 73 - 393 u/L   Fingerstick Glucose (POCT)    Collection Time: 07/09/19  9:29 PM   Result Value Ref Range    POC Glucose 184 (H) 65 - 140 mg/dl   MRSA culture    Collection Time: 07/10/19  6:00 AM   Result Value Ref Range    MRSA Culture Only       No Methicillin Resistant Staphlyococcus aureus (MRSA) isolated   Comprehensive metabolic panel    Collection Time: 07/10/19  6:06 AM   Result Value Ref Range    Sodium 135 (L) 136 - 145 mmol/L    Potassium 4 8 3 5 - 5 3 mmol/L    Chloride 96 (L) 100 - 108 mmol/L    CO2 30 21 - 32 mmol/L    ANION GAP 9 4 - 13 mmol/L    BUN 55 (H) 5 - 25 mg/dL    Creatinine 10 78 (H) 0 60 - 1 30 mg/dL    Glucose 117 65 - 140 mg/dL    Calcium 9 1 8 3 - 10 1 mg/dL    AST 38 5 - 45 U/L    ALT 56 12 - 78 U/L    Alkaline Phosphatase 71 46 - 116 U/L    Total Protein 6 9 6 4 - 8 2 g/dL    Albumin 3 0 (L) 3 5 - 5 0 g/dL    Total Bilirubin 0 43 0 20 - 1 00 mg/dL    eGFR 6 ml/min/1 73sq m   CBC and differential    Collection Time: 07/10/19  6:06 AM   Result Value Ref Range    WBC 13 43 (H) 4 31 - 10 16 Thousand/uL    RBC 4 16 3 88 - 5 62 Million/uL    Hemoglobin 11 2 (L) 12 0 - 17 0 g/dL    Hematocrit 35 1 (L) 36 5 - 49 3 %    MCV 84 82 - 98 fL    MCH 26 9 26 8 - 34 3 pg    MCHC 31 9 31 4 - 37 4 g/dL    RDW 13 1 11 6 - 15 1 %    MPV 9 7 8 9 - 12 7 fL    Platelets 309 117 - 165 Thousands/uL    nRBC 0 /100 WBCs   Manual Differential(PHLEBS Do Not Order)    Collection Time: 07/10/19  6:06 AM   Result Value Ref Range    Segmented % 79 (H) 43 - 75 %    Bands % 13 (H) 0 - 8 %    Lymphocytes % 4 (L) 14 - 44 %    Monocytes % 3 (L) 4 - 12 %    Eosinophils, % 0 0 - 6 %    Basophils % 1 0 - 1 %    Absolute Neutrophils 12 36 (H) 1 85 - 7 62 Thousand/uL    Lymphocytes Absolute 0 54 (L) 0 60 - 4 47 Thousand/uL    Monocytes Absolute 0 40 0 00 - 1 22 Thousand/uL    Eosinophils Absolute 0 00 0 00 - 0 40 Thousand/uL    Basophils Absolute 0 13 (H) 0 00 - 0 10 Thousand/uL    Total Counted 100     RBC Morphology Present     Anisocytosis Present     Platelet Estimate Adequate Adequate   Fingerstick Glucose (POCT)    Collection Time: 07/10/19  7:17 AM   Result Value Ref Range    POC Glucose 102 65 - 140 mg/dl   Fingerstick Glucose (POCT)    Collection Time: 07/10/19 11:26 AM   Result Value Ref Range    POC Glucose 198 (H) 65 - 140 mg/dl   Blood Parasite smear    Collection Time: 07/10/19  1:55 PM   Result Value Ref Range    % Parasitemia 0     Is Blood Parasite Present No    Path Slide Review    Collection Time: 07/10/19  1:55 PM   Result Value Ref Range    Path Review       No parasites seen  Reviewed by Dr Manfred Estrada 7/11/2019    8:20am    Fingerstick Glucose (POCT)    Collection Time: 07/10/19  3:56 PM   Result Value Ref Range    POC Glucose 136 65 - 140 mg/dl   Fingerstick Glucose (POCT)    Collection Time: 07/10/19  8:58 PM   Result Value Ref Range    POC Glucose 178 (H) 65 - 140 mg/dl   Vancomycin, random    Collection Time: 07/11/19  5:32 AM   Result Value Ref Range    Vancomycin Rm 23 9 ug/mL   Blood Parasite smear    Collection Time: 07/11/19  5:32 AM   Result Value Ref Range    % Parasitemia 0     Is Blood Parasite Present No    Path Slide Review    Collection Time: 07/11/19  5:32 AM   Result Value Ref Range    Path Review No parasites seen  Reviewed by Manfred WILLARD          Fingerstick Glucose (POCT)    Collection Time: 07/11/19  7:50 AM   Result Value Ref Range    POC Glucose 196 (H) 65 - 140 mg/dl   Fingerstick Glucose (POCT)    Collection Time: 07/11/19 11:18 AM   Result Value Ref Range    POC Glucose 273 (H) 65 - 140 mg/dl   Fingerstick Glucose (POCT)    Collection Time: 07/11/19  4:19 PM   Result Value Ref Range    POC Glucose 146 (H) 65 - 140 mg/dl   Fingerstick Glucose (POCT)    Collection Time: 07/11/19  8:53 PM   Result Value Ref Range    POC Glucose 233 (H) 65 - 140 mg/dl   CBC and differential    Collection Time: 07/12/19  6:11 AM   Result Value Ref Range    WBC 8 26 4 31 - 10 16 Thousand/uL    RBC 3 98 3 88 - 5 62 Million/uL    Hemoglobin 10 8 (L) 12 0 - 17 0 g/dL    Hematocrit 34 2 (L) 36 5 - 49 3 %    MCV 86 82 - 98 fL    MCH 27 1 26 8 - 34 3 pg    MCHC 31 6 31 4 - 37 4 g/dL    RDW 13 2 11 6 - 15 1 %    MPV 9 9 8 9 - 12 7 fL    Platelets 332 387 - 797 Thousands/uL    nRBC 0 /100 WBCs Neutrophils Relative 63 43 - 75 %    Immat GRANS % 1 0 - 2 %    Lymphocytes Relative 18 14 - 44 %    Monocytes Relative 14 (H) 4 - 12 %    Eosinophils Relative 3 0 - 6 %    Basophils Relative 1 0 - 1 %    Neutrophils Absolute 5 27 1 85 - 7 62 Thousands/µL    Immature Grans Absolute 0 07 0 00 - 0 20 Thousand/uL    Lymphocytes Absolute 1 49 0 60 - 4 47 Thousands/µL    Monocytes Absolute 1 17 0 17 - 1 22 Thousand/µL    Eosinophils Absolute 0 21 0 00 - 0 61 Thousand/µL    Basophils Absolute 0 05 0 00 - 0 10 Thousands/µL   Blood culture    Collection Time: 07/12/19  6:12 AM   Result Value Ref Range    Blood Culture No Growth After 5 Days  Fingerstick Glucose (POCT)    Collection Time: 07/12/19  7:19 AM   Result Value Ref Range    POC Glucose 159 (H) 65 - 140 mg/dl   Culture, Catheter Tip    Collection Time: 07/12/19  9:06 AM   Result Value Ref Range    Catheter Tip Culture >100 colonies Staphylococcus epidermidis (A)        Susceptibility    Staphylococcus epidermidis - KIM     ZID Performed Yes       Ampicillin ($$) >8 00 Resistant ug/ml     Cefazolin ($) <=4 00 Resistant ug/ml     Clindamycin ($) <=0 25 Susceptible ug/ml     Erythromycin ($$$$) >4 00 Resistant ug/ml     Gentamicin ($$) <=1 Susceptible ug/ml     Oxacillin >2 00 Resistant ug/ml     Tetracycline <=2 Susceptible ug/ml     Trimethoprim + Sulfamethoxazole ($$$) >2/38 Resistant ug/ml     Vancomycin ($) 2 00 Susceptible ug/ml   Blood culture    Collection Time: 07/12/19 10:50 AM   Result Value Ref Range    Blood Culture No Growth After 5 Days      Fingerstick Glucose (POCT)    Collection Time: 07/12/19 10:56 AM   Result Value Ref Range    POC Glucose >500 (HH) 65 - 140 mg/dl   Fingerstick Glucose (POCT)    Collection Time: 07/12/19 10:58 AM   Result Value Ref Range    POC Glucose 454 (H) 65 - 140 mg/dl   Fingerstick Glucose (POCT)    Collection Time: 07/12/19  2:47 PM   Result Value Ref Range    POC Glucose 467 (H) 65 - 140 mg/dl   Fingerstick Glucose (POCT) Collection Time: 07/12/19  4:04 PM   Result Value Ref Range    POC Glucose 393 (H) 65 - 140 mg/dl   Fingerstick Glucose (POCT)    Collection Time: 07/12/19  8:56 PM   Result Value Ref Range    POC Glucose 242 (H) 65 - 140 mg/dl   Fingerstick Glucose (POCT)    Collection Time: 07/13/19  7:16 AM   Result Value Ref Range    POC Glucose 131 65 - 140 mg/dl   Vancomycin, trough Take trough level PRIOR to HD (draw PERIPHERALLY)    Collection Time: 07/13/19 10:37 AM   Result Value Ref Range    Vancomycin Tr 22 8 (HH) 10 0 - 20 0 ug/mL   Fingerstick Glucose (POCT)    Collection Time: 07/13/19 11:25 AM   Result Value Ref Range    POC Glucose 246 (H) 65 - 140 mg/dl   Basic metabolic panel    Collection Time: 07/13/19  2:23 PM   Result Value Ref Range    Sodium 138 136 - 145 mmol/L    Potassium 4 2 3 5 - 5 3 mmol/L    Chloride 97 (L) 100 - 108 mmol/L    CO2 29 21 - 32 mmol/L    ANION GAP 12 4 - 13 mmol/L    BUN 68 (H) 5 - 25 mg/dL    Creatinine 13 53 (H) 0 60 - 1 30 mg/dL    Glucose 231 (H) 65 - 140 mg/dL    Calcium 9 5 8 3 - 10 1 mg/dL    eGFR 4 ml/min/1 73sq m   Fingerstick Glucose (POCT)    Collection Time: 07/13/19  3:44 PM   Result Value Ref Range    POC Glucose 140 65 - 140 mg/dl   Fingerstick Glucose (POCT)    Collection Time: 07/13/19  8:56 PM   Result Value Ref Range    POC Glucose 172 (H) 65 - 140 mg/dl   Fingerstick Glucose (POCT)    Collection Time: 07/14/19  7:55 AM   Result Value Ref Range    POC Glucose 269 (H) 65 - 140 mg/dl   Fingerstick Glucose (POCT)    Collection Time: 07/14/19 11:03 AM   Result Value Ref Range    POC Glucose 330 (H) 65 - 140 mg/dl   Fingerstick Glucose (POCT)    Collection Time: 07/14/19 12:11 PM   Result Value Ref Range    POC Glucose 277 (H) 65 - 140 mg/dl   ]    Procedure: Ir Fistula/graftogram    Result Date: 6/14/2019  Narrative: Arteriovenous fistulogram with angioplasty Clinical History: Nonmaturing fistula   Contrast: 33 mL Omnipaque 350 Fluoro time: 8 8 minutes Number of Images: 53 Concious sedation time: 64 minutes Technique: The patient was brought to the interventional radiology suite and identified verbally and by wristband  The patient was placed supine on the table and the existing left upper extremity fistula was prepped and draped in usual sterile fashion  A  micropuncture set  was advanced into the fistula near the arterial anastomosis, directed towards the arterial inflow  An arteriovenous fistula gram was performed from the arterial anastomosis to the superior vena cava  Findings: A brachiocephalic fistula is in place with overall small caliber throughout  There are multiple focal stenoses beginning at the arterial anastomosis, through the immediate arterialized vein, the mid fistula, and then at the cephalic arch  The  central veins are widely patent  Intervention: A 6-Nepalese vascular sheath was placed, and 6 x 2 cutting balloon angioplasty was performed of the arterial anastomosis and immediate arterialized vein  This was followed with 6 mm angioplasty at higher pressure  Follow-up imaging demonstrated significant improvement  The fistula was then punctured near the arterial anastomosis, directed towards the venous outflow  A 6-Nepalese vascular sheath was placed, and the 6 mm cutting balloon was advanced, and the identified focal stenoses were all treated with 6 mm cutting balloon angioplasty  The entire length of the fistula was then treated with 6 mm angioplasty at 15 jannet  Follow-up imaging demonstrated a significant overall improvement in caliber with a good result at the areas of focal stenosis  By physical exam, a strong thrill was felt throughout the fistula  Both sheaths were removed, manual compression applied until hemostasis was achieved  The patient tolerated the procedure well  Impression: Impression: Diffusely small fistula with multiple focal stenoses as described above    The focal stenoses were successfully treated with 6 mm cutting balloon angioplasty, and the entire length of the fistula was treated with 6 mm angioplasty at higher pressure  A good result was obtained   Workstation performed: [id number]

## 2019-08-30 ENCOUNTER — APPOINTMENT (OUTPATIENT)
Dept: LAB | Facility: CLINIC | Age: 54
End: 2019-08-30
Payer: MEDICARE

## 2019-08-30 LAB
ALBUMIN SERPL BCP-MCNC: 3.6 G/DL (ref 3.5–5)
ALP SERPL-CCNC: 92 U/L (ref 46–116)
ALT SERPL W P-5'-P-CCNC: 38 U/L (ref 12–78)
ANION GAP SERPL CALCULATED.3IONS-SCNC: 7 MMOL/L (ref 4–13)
AST SERPL W P-5'-P-CCNC: 19 U/L (ref 5–45)
BASOPHILS # BLD AUTO: 0.04 THOUSANDS/ΜL (ref 0–0.1)
BASOPHILS NFR BLD AUTO: 1 % (ref 0–1)
BILIRUB SERPL-MCNC: 0.45 MG/DL (ref 0.2–1)
BUN SERPL-MCNC: 49 MG/DL (ref 5–25)
CALCIUM SERPL-MCNC: 9.4 MG/DL (ref 8.3–10.1)
CHLORIDE SERPL-SCNC: 94 MMOL/L (ref 100–108)
CHOLEST SERPL-MCNC: 120 MG/DL (ref 50–200)
CO2 SERPL-SCNC: 30 MMOL/L (ref 21–32)
CREAT SERPL-MCNC: 9.12 MG/DL (ref 0.6–1.3)
EOSINOPHIL # BLD AUTO: 0.14 THOUSAND/ΜL (ref 0–0.61)
EOSINOPHIL NFR BLD AUTO: 3 % (ref 0–6)
ERYTHROCYTE [DISTWIDTH] IN BLOOD BY AUTOMATED COUNT: 16 % (ref 11.6–15.1)
EST. AVERAGE GLUCOSE BLD GHB EST-MCNC: 217 MG/DL
GFR SERPL CREATININE-BSD FRML MDRD: 7 ML/MIN/1.73SQ M
GLUCOSE P FAST SERPL-MCNC: 360 MG/DL (ref 65–99)
HBA1C MFR BLD: 9.2 % (ref 4.2–6.3)
HCT VFR BLD AUTO: 33.1 % (ref 36.5–49.3)
HDLC SERPL-MCNC: 54 MG/DL (ref 40–60)
HGB BLD-MCNC: 10.2 G/DL (ref 12–17)
IMM GRANULOCYTES # BLD AUTO: 0.01 THOUSAND/UL (ref 0–0.2)
IMM GRANULOCYTES NFR BLD AUTO: 0 % (ref 0–2)
LDLC SERPL CALC-MCNC: 49 MG/DL (ref 0–100)
LYMPHOCYTES # BLD AUTO: 1.89 THOUSANDS/ΜL (ref 0.6–4.47)
LYMPHOCYTES NFR BLD AUTO: 33 % (ref 14–44)
MCH RBC QN AUTO: 27.7 PG (ref 26.8–34.3)
MCHC RBC AUTO-ENTMCNC: 30.8 G/DL (ref 31.4–37.4)
MCV RBC AUTO: 90 FL (ref 82–98)
MONOCYTES # BLD AUTO: 0.75 THOUSAND/ΜL (ref 0.17–1.22)
MONOCYTES NFR BLD AUTO: 13 % (ref 4–12)
NEUTROPHILS # BLD AUTO: 2.88 THOUSANDS/ΜL (ref 1.85–7.62)
NEUTS SEG NFR BLD AUTO: 50 % (ref 43–75)
NRBC BLD AUTO-RTO: 0 /100 WBCS
PLATELET # BLD AUTO: 261 THOUSANDS/UL (ref 149–390)
PMV BLD AUTO: 9.5 FL (ref 8.9–12.7)
POTASSIUM SERPL-SCNC: 4.7 MMOL/L (ref 3.5–5.3)
PROT SERPL-MCNC: 7.8 G/DL (ref 6.4–8.2)
RBC # BLD AUTO: 3.68 MILLION/UL (ref 3.88–5.62)
SODIUM SERPL-SCNC: 131 MMOL/L (ref 136–145)
TRIGL SERPL-MCNC: 87 MG/DL
WBC # BLD AUTO: 5.71 THOUSAND/UL (ref 4.31–10.16)

## 2019-08-30 PROCEDURE — 83036 HEMOGLOBIN GLYCOSYLATED A1C: CPT | Performed by: INTERNAL MEDICINE

## 2019-08-30 PROCEDURE — 36415 COLL VENOUS BLD VENIPUNCTURE: CPT | Performed by: INTERNAL MEDICINE

## 2019-08-30 PROCEDURE — 80061 LIPID PANEL: CPT | Performed by: INTERNAL MEDICINE

## 2019-08-30 PROCEDURE — 85025 COMPLETE CBC W/AUTO DIFF WBC: CPT | Performed by: INTERNAL MEDICINE

## 2019-08-30 PROCEDURE — 80053 COMPREHEN METABOLIC PANEL: CPT | Performed by: INTERNAL MEDICINE

## 2019-09-04 ENCOUNTER — TELEPHONE (OUTPATIENT)
Dept: INTERNAL MEDICINE CLINIC | Facility: CLINIC | Age: 54
End: 2019-09-04

## 2019-09-04 NOTE — TELEPHONE ENCOUNTER
----- Message from Mely Bruno MD sent at 8/30/2019  4:03 PM EDT -----  Blood sugars are still elevated, he needs a follow-up appointment

## 2019-09-09 ENCOUNTER — OFFICE VISIT (OUTPATIENT)
Dept: INTERNAL MEDICINE CLINIC | Facility: CLINIC | Age: 54
End: 2019-09-09
Payer: MEDICARE

## 2019-09-09 VITALS
HEART RATE: 78 BPM | WEIGHT: 161.6 LBS | TEMPERATURE: 98.4 F | BODY MASS INDEX: 23.13 KG/M2 | SYSTOLIC BLOOD PRESSURE: 109 MMHG | HEIGHT: 70 IN | DIASTOLIC BLOOD PRESSURE: 65 MMHG | RESPIRATION RATE: 15 BRPM

## 2019-09-09 DIAGNOSIS — Z79.4 TYPE 2 DIABETES MELLITUS WITH CHRONIC KIDNEY DISEASE ON CHRONIC DIALYSIS, WITH LONG-TERM CURRENT USE OF INSULIN (HCC): ICD-10-CM

## 2019-09-09 DIAGNOSIS — E78.5 HYPERLIPIDEMIA, UNSPECIFIED HYPERLIPIDEMIA TYPE: ICD-10-CM

## 2019-09-09 DIAGNOSIS — N18.6 TYPE 2 DIABETES MELLITUS WITH CHRONIC KIDNEY DISEASE ON CHRONIC DIALYSIS, WITH LONG-TERM CURRENT USE OF INSULIN (HCC): ICD-10-CM

## 2019-09-09 DIAGNOSIS — E11.22 TYPE 2 DIABETES MELLITUS WITH CHRONIC KIDNEY DISEASE ON CHRONIC DIALYSIS, WITH LONG-TERM CURRENT USE OF INSULIN (HCC): ICD-10-CM

## 2019-09-09 DIAGNOSIS — N18.6 BENIGN HYPERTENSION WITH ESRD (END-STAGE RENAL DISEASE) (HCC): ICD-10-CM

## 2019-09-09 DIAGNOSIS — I12.0 BENIGN HYPERTENSION WITH ESRD (END-STAGE RENAL DISEASE) (HCC): ICD-10-CM

## 2019-09-09 DIAGNOSIS — Z99.2 TYPE 2 DIABETES MELLITUS WITH CHRONIC KIDNEY DISEASE ON CHRONIC DIALYSIS, WITH LONG-TERM CURRENT USE OF INSULIN (HCC): ICD-10-CM

## 2019-09-09 DIAGNOSIS — Z23 ENCOUNTER FOR IMMUNIZATION: Primary | ICD-10-CM

## 2019-09-09 PROBLEM — E87.20 ACIDOSIS: Status: RESOLVED | Noted: 2019-04-03 | Resolved: 2019-09-09

## 2019-09-09 PROBLEM — A41.1 SEPSIS DUE TO STAPHYLOCOCCUS EPIDERMIDIS (HCC): Status: RESOLVED | Noted: 2019-07-09 | Resolved: 2019-09-09

## 2019-09-09 PROBLEM — E87.2 ACIDOSIS: Status: RESOLVED | Noted: 2019-04-03 | Resolved: 2019-09-09

## 2019-09-09 PROBLEM — E87.5 HYPERKALEMIA: Status: RESOLVED | Noted: 2019-04-03 | Resolved: 2019-09-09

## 2019-09-09 PROCEDURE — 90682 RIV4 VACC RECOMBINANT DNA IM: CPT | Performed by: INTERNAL MEDICINE

## 2019-09-09 PROCEDURE — G0008 ADMIN INFLUENZA VIRUS VAC: HCPCS | Performed by: INTERNAL MEDICINE

## 2019-09-09 PROCEDURE — 99214 OFFICE O/P EST MOD 30 MIN: CPT | Performed by: INTERNAL MEDICINE

## 2019-09-10 NOTE — PROGRESS NOTES
Assessment/Plan:  Blood sugars are not well controlled  Given his current medical issues, significant complications from diabetes, visual issues and dialysis, goal A1c would be less than 8  At this point I recommended him to monitor blood sugars 3 times a day, bring the blood sugar readings to the next office visit to titrate insulin doses better  Blood pressure seems well controlled  No more evidence of fluid overload  No more signs of sepsis  LDL is less than 100, continue current dose of statin     Diagnoses and all orders for this visit:    Encounter for immunization  -     influenza vaccine, [id number], quadrivalent, recombinant, PF, 0 5 mL, for patients 50-64 yr (FLUBLOK)    Type 2 diabetes mellitus with chronic kidney disease on chronic dialysis, with long-term current use of insulin ([facility] [address] )    Benign hypertension with [facility]D (end-stage [facility]) [facility]    Hyperlipidemia, unspecified hyperlipidemia type          Subjective:   Chief Complaint   Patient presents with    Follow-up     Overdue         Patient ID: Unruly Cabrera is a 53 y o  male  He comes in for follow-up of diabetes, hypertension, end-stage renal disease on dialysis  He recently returned back from [facility]  He notes that his blood sugar readings have been sometimes up sometimes down  He is not very specific work when they are elevated only oral joana  The sister accompanies him for the appointment in hopes with some degree of consolation  She notes that he has not had any severe hypoglycemic events and no blood sugars above 200  Will discuss the blood work results, she was surprised that he had elevated above 300 since she has not noted any at home  She does admit to not checking blood sugars very frequently  Mostly does only fasting      The following portions of the patient's history were reviewed and updated as appropriate: current medications, past medical history, past social history and past surgical history      PHQ-9 Depression Screening    PHQ-9:    Frequency of the following problems over the past two weeks:                Current Outpatient Medications:     aspirin 81 MG tablet, Take 1 tablet (81 mg total) by mouth daily, Disp: 90 tablet, Rfl: 1    atorvastatin (LIPITOR) 40 mg tablet, Take 1 tablet (40 mg total) by mouth daily, Disp: 90 tablet, Rfl: 1    Blood Glucose Monitoring Suppl (ONE TOUCH ULTRA 2) w/Device KIT, by Does not apply route 3 (three) times a day, Disp: 1 each, Rfl: 0    carvedilol (COREG) 6 25 mg tablet, Take 1 tablet (6 25 mg total) by mouth 2 (two) times a day with meals, Disp: 180 tablet, Rfl: 1    doxazosin (CARDURA) 2 mg tablet, Take 1 tablet (2 mg total) by mouth daily at bedtime, Disp: 30 tablet, Rfl: 1    furosemide (LASIX) 80 mg tablet, Take 80 mg by mouth daily, Disp: , Rfl: 0    glucose blood (FREESTYLE LITE) test strip, Use as instructed TID, Disp: 100 each, Rfl: 2    insulin aspart (NOVOLOG FLEXPEN) 100 Units/mL injection pen, Inject 10 Units under the skin 3 (three) times a day with meals, Disp: 5 pen, Rfl: 0    insulin glargine (BASAGLAR KWIKPEN) 100 units/mL injection pen, Inject 30 Units under the skin daily, Disp: 5 pen, Rfl: 0    NIFEdipine ER (ADALAT CC) 60 MG 24 hr tablet, Take 1 tablet (60 mg total) by mouth daily, Disp: 30 tablet, Rfl: 1    ONE TOUCH LANCETS MISC, by Does not apply route 3 (three) times a day, Disp: 100 each, Rfl: 1    prednisoLONE acetate (PRED FORTE) 1 % ophthalmic suspension, , Disp: , Rfl: 0    Review of Systems   Constitutional: Negative for fatigue, fever and unexpected weight change  HENT: Negative for ear pain, hearing loss and sore throat  Eyes: Positive for visual disturbance  Negative for pain and discharge  Respiratory: Negative for cough, chest tightness and shortness of breath  Cardiovascular: Negative for chest pain and palpitations  Gastrointestinal: Positive for constipation   Negative for abdominal pain, blood in stool, diarrhea and nausea  Genitourinary: Negative for dysuria, frequency and hematuria  Musculoskeletal: Negative for arthralgias and joint swelling  Skin: Negative for rash  Allergic/Immunologic: Negative for immunocompromised state  Neurological: Negative for dizziness and headaches  Hematological: Negative for adenopathy  Psychiatric/Behavioral: Negative for confusion and sleep disturbance  Objective:  /65 (BP Location: Right arm, Patient Position: Sitting, Cuff Size: Standard)   Pulse 78   Temp 98 4 °F (36 9 °C)   Resp 15   Ht 5' 10" (1 778 m)   Wt 73 3 kg (161 lb 9 6 oz)   BMI 23 19 kg/m²      Physical Exam   Constitutional: He appears well-developed and well-nourished  HENT:   Head: Normocephalic and atraumatic  Right Ear: Tympanic membrane normal    Left Ear: Tympanic membrane normal    Nose: Nose normal    Mouth/Throat: Oropharynx is clear and moist  No posterior oropharyngeal edema or posterior oropharyngeal erythema  Eyes: Pupils are equal, round, and reactive to light  Conjunctivae are normal  Right eye exhibits no discharge  Left eye exhibits no discharge  Neck: Normal range of motion  Neck supple  No thyromegaly present  Cardiovascular: Normal rate, regular rhythm, S1 normal, S2 normal and normal heart sounds  PMI is not displaced  No murmur heard  Pulmonary/Chest: Effort normal and breath sounds normal  No accessory muscle usage  No apnea  No respiratory distress  He has no rhonchi  He has no rales  Abdominal: Soft  Normal appearance and bowel sounds are normal  He exhibits no shifting dullness  There is no hepatosplenomegaly  There is no tenderness  There is no rebound and no CVA tenderness  Musculoskeletal: Normal range of motion  He exhibits no edema or tenderness  Lymphadenopathy:     He has no cervical adenopathy  Neurological: He is alert  Skin: Skin is warm and intact  No rash noted  Psychiatric: He has a normal mood and affect   His speech is normal  Nursing note and vitals reviewed          Recent Results (from the past 1008 hour(s))   Comprehensive metabolic panel    Collection Time: 08/30/19  9:58 AM   Result Value Ref Range    Sodium 131 (L) 136 - 145 mmol/L    Potassium 4 7 3 5 - 5 3 mmol/L    Chloride 94 (L) 100 - 108 mmol/L    CO2 30 21 - 32 mmol/L    ANION GAP 7 4 - 13 mmol/L    BUN 49 (H) 5 - 25 mg/dL    Creatinine 9 12 (H) 0 60 - 1 30 mg/dL    Glucose, Fasting 360 (H) 65 - 99 mg/dL    Calcium 9 4 8 3 - 10 1 mg/dL    AST 19 5 - 45 U/L    ALT 38 12 - 78 U/L    Alkaline Phosphatase 92 46 - 116 U/L    Total Protein 7 8 6 4 - 8 2 g/dL    Albumin 3 6 3 5 - 5 0 g/dL    Total Bilirubin 0 45 0 20 - 1 00 mg/dL    eGFR 7 ml/min/1 73sq m   Hemoglobin A1C    Collection Time: 08/30/19  9:58 AM   Result Value Ref Range    Hemoglobin A1C 9 2 (H) 4 2 - 6 3 %     mg/dl   Lipid Panel with Direct LDL reflex    Collection Time: 08/30/19  9:58 AM   Result Value Ref Range    Cholesterol 120 50 - 200 mg/dL    Triglycerides 87 <=150 mg/dL    HDL, Direct 54 40 - 60 mg/dL    LDL Calculated 49 0 - 100 mg/dL   CBC and differential    Collection Time: 08/30/19  9:58 AM   Result Value Ref Range    WBC 5 71 4 31 - 10 16 Thousand/uL    RBC 3 68 (L) 3 88 - 5 62 Million/uL    Hemoglobin 10 2 (L) 12 0 - 17 0 g/dL    Hematocrit 33 1 (L) 36 5 - 49 3 %    MCV 90 82 - 98 fL    MCH 27 7 26 8 - 34 3 pg    MCHC 30 8 (L) 31 4 - 37 4 g/dL    RDW 16 0 (H) 11 6 - 15 1 %    MPV 9 5 8 9 - 12 7 fL    Platelets 686 129 - 653 Thousands/uL    nRBC 0 /100 WBCs    Neutrophils Relative 50 43 - 75 %    Immat GRANS % 0 0 - 2 %    Lymphocytes Relative 33 14 - 44 %    Monocytes Relative 13 (H) 4 - 12 %    Eosinophils Relative 3 0 - 6 %    Basophils Relative 1 0 - 1 %    Neutrophils Absolute 2 88 1 85 - 7 62 Thousands/µL    Immature Grans Absolute 0 01 0 00 - 0 20 Thousand/uL    Lymphocytes Absolute 1 89 0 60 - 4 47 Thousands/µL    Monocytes Absolute 0 75 0 17 - 1 22 Thousand/µL    Eosinophils Absolute 0  14 0 00 - 0 61 Thousand/µL    Basophils Absolute 0 04 0 00 - 0 10 Thousands/µL   ]    Procedure: Ir Other    Result Date: 7/12/2019  Narrative: Perma-Cath removal  Clinical History: Patient presenting for Perma-Cath removal  The existing catheter was prepped and draped in the usual sterile fashion  Lidocaine was administered the skin and subcutaneous tissues  The cuff was dissected free, and the catheter was removed  Manual compression was applied to the puncture site and tunnel until hemostasis was achieved  The patient tolerated the procedure well and suffered no complications  Impression: Impression: Successful Perma-Cath removal as described   Workstation performed: [id number]

## 2019-09-23 ENCOUNTER — OFFICE VISIT (OUTPATIENT)
Dept: INTERNAL MEDICINE CLINIC | Facility: CLINIC | Age: 54
End: 2019-09-23
Payer: MEDICARE

## 2019-09-23 VITALS
HEART RATE: 85 BPM | RESPIRATION RATE: 15 BRPM | TEMPERATURE: 97 F | WEIGHT: 164.2 LBS | SYSTOLIC BLOOD PRESSURE: 118 MMHG | BODY MASS INDEX: 23.51 KG/M2 | DIASTOLIC BLOOD PRESSURE: 67 MMHG | HEIGHT: 70 IN

## 2019-09-23 DIAGNOSIS — K59.04 CHRONIC IDIOPATHIC CONSTIPATION: ICD-10-CM

## 2019-09-23 DIAGNOSIS — Z99.2 TYPE 2 DIABETES MELLITUS WITH CHRONIC KIDNEY DISEASE ON CHRONIC DIALYSIS, WITH LONG-TERM CURRENT USE OF INSULIN (HCC): Primary | ICD-10-CM

## 2019-09-23 DIAGNOSIS — E78.5 HYPERLIPIDEMIA, UNSPECIFIED HYPERLIPIDEMIA TYPE: ICD-10-CM

## 2019-09-23 DIAGNOSIS — IMO0002 UNCONTROLLED SECONDARY DIABETES MELLITUS WITH STAGE 5 CKD (GFR<15): ICD-10-CM

## 2019-09-23 DIAGNOSIS — N18.6 ESRD (END STAGE RENAL DISEASE) (HCC): ICD-10-CM

## 2019-09-23 DIAGNOSIS — Z79.4 TYPE 2 DIABETES MELLITUS WITH CHRONIC KIDNEY DISEASE ON CHRONIC DIALYSIS, WITH LONG-TERM CURRENT USE OF INSULIN (HCC): Primary | ICD-10-CM

## 2019-09-23 DIAGNOSIS — E11.22 TYPE 2 DIABETES MELLITUS WITH CHRONIC KIDNEY DISEASE ON CHRONIC DIALYSIS, WITH LONG-TERM CURRENT USE OF INSULIN (HCC): Primary | ICD-10-CM

## 2019-09-23 DIAGNOSIS — N18.6 TYPE 2 DIABETES MELLITUS WITH CHRONIC KIDNEY DISEASE ON CHRONIC DIALYSIS, WITH LONG-TERM CURRENT USE OF INSULIN (HCC): Primary | ICD-10-CM

## 2019-09-23 PROCEDURE — 99214 OFFICE O/P EST MOD 30 MIN: CPT | Performed by: INTERNAL MEDICINE

## 2019-09-23 RX ORDER — POLYETHYLENE GLYCOL 3350 17 G/17G
17 POWDER, FOR SOLUTION ORAL DAILY
Qty: 30 EACH | Refills: 1 | Status: SHIPPED | OUTPATIENT
Start: 2019-09-23

## 2019-09-24 NOTE — PROGRESS NOTES
Assessment/Plan:  I would recommend to continue the basaglar at the current dose, given his complexity of medical problems, an A1c less than 8 would be an ideal goal   A1c of less than 7 May not be practically feasible or good for the patient given the risk of hypoglycemia  I advised him to get a higher dose NovoLog at dinnertime if he ends up eating more than usual   Nails explained to the sister that his blood sugar of 113 was good this morning for fasting blood sugar, he should still take NovoLog with his usual breakfast   Blood pressure is well controlled, fluid status is within normal limits as well  Continues to be constipated, start MiraLax  Diagnoses and all orders for this visit:    Type 2 diabetes mellitus with chronic kidney disease on chronic dialysis, with long-term current use of insulin ([facility])    Uncontrolled secondary diabetes mellitus with stage 5 CKD (GFR<15) ([facility])  -     insulin aspart (NOVOLOG FLEXPEN) 100 Units/mL injection pen; 10U with breakfast and lunch, 10-14 U with dinner    ESRD (end stage renal disease) ([facility])    Hyperlipidemia, unspecified hyperlipidemia type    Chronic idiopathic constipation  -     polyethylene glycol (MIRALAX) 17 g packet; Take 17 g by mouth daily          Subjective:   Chief Complaint   Patient presents with    Follow-up     2 weeks        Patient ID: Mannie Carrillo is a 53 y o  male  He comes in with his sister for follow-up of hypertension, diabetes, end-stage renal disease and dialysis  She brings his blood sugar readings with approximately to readings every day  Most readings have a decent fasting like today was 113  Occasionally his blood sugar readings are around 200  Discussion, understood that the days he is a have year meal at dinner time, his fastings next day are above 200 and he continues to be higher that day  He had 1 blood sugar of 73  She notes that she has been quite consistent with giving his insulin    But admits that there are days where he eats quite a bit  He skipped his NovoLog this morning after he saw blood sugar of 113 worried that he would drop  The following portions of the patient's history were reviewed and updated as appropriate: current medications, past medical history, past social history and past surgical history      PHQ-9 Depression Screening    PHQ-9:    Frequency of the following problems over the past two weeks:                Current Outpatient Medications:     aspirin 81 MG tablet, Take 1 tablet (81 mg total) by mouth daily, Disp: 90 tablet, Rfl: 1    atorvastatin (LIPITOR) 40 mg tablet, Take 1 tablet (40 mg total) by mouth daily, Disp: 90 tablet, Rfl: 1    Blood Glucose Monitoring Suppl (ONE TOUCH ULTRA 2) w/Device KIT, by Does not apply route 3 (three) times a day, Disp: 1 each, Rfl: 0    carvedilol (COREG) 6 25 mg tablet, Take 1 tablet (6 25 mg total) by mouth 2 (two) times a day with meals, Disp: 180 tablet, Rfl: 1    doxazosin (CARDURA) 2 mg tablet, Take 1 tablet (2 mg total) by mouth daily at bedtime, Disp: 30 tablet, Rfl: 1    furosemide (LASIX) 80 mg tablet, Take 80 mg by mouth daily, Disp: , Rfl: 0    glucose blood (FREESTYLE LITE) test strip, Use as instructed TID, Disp: 100 each, Rfl: 2    insulin aspart (NOVOLOG FLEXPEN) 100 Units/mL injection pen, 10U with breakfast and lunch, 10-14 U with dinner, Disp: 5 pen, Rfl: 0    insulin glargine (BASAGLAR KWIKPEN) 100 units/mL injection pen, Inject 30 Units under the skin daily, Disp: 5 pen, Rfl: 0    NIFEdipine ER (ADALAT CC) 60 MG 24 hr tablet, Take 1 tablet (60 mg total) by mouth daily, Disp: 30 tablet, Rfl: 1    ONE TOUCH LANCETS MISC, by Does not apply route 3 (three) times a day, Disp: 100 each, Rfl: 1    prednisoLONE acetate (PRED FORTE) 1 % ophthalmic suspension, , Disp: , Rfl: 0    polyethylene glycol (MIRALAX) 17 g packet, Take 17 g by mouth daily, Disp: 30 each, Rfl: 1    Review of Systems   Constitutional: Negative for fatigue, fever and unexpected weight change  HENT: Negative for ear pain, hearing loss and sore throat  Eyes: Positive for visual disturbance  Negative for pain and discharge  Respiratory: Negative for cough, chest tightness and shortness of breath  Cardiovascular: Negative for chest pain and palpitations  Gastrointestinal: Positive for constipation  Negative for abdominal pain, blood in stool, diarrhea and nausea  Genitourinary: Negative for dysuria, frequency and hematuria  Musculoskeletal: Negative for arthralgias and joint swelling  Skin: Negative for rash  Allergic/Immunologic: Negative for immunocompromised state  Neurological: Negative for dizziness and headaches  Hematological: Negative for adenopathy  Psychiatric/Behavioral: Negative for confusion and sleep disturbance  Objective:  /67 (BP Location: Right arm, Patient Position: Sitting, Cuff Size: Standard)   Pulse 85   Temp (!) 97 °F (36 1 °C)   Resp 15   Ht 5' 10" (1 778 m)   Wt 74 5 kg (164 lb 3 2 oz)   BMI 23 56 kg/m²      Physical Exam   Constitutional: He appears well-developed and well-nourished  HENT:   Head: Normocephalic and atraumatic  Right Ear: Tympanic membrane normal    Left Ear: Tympanic membrane normal    Nose: Nose normal    Mouth/Throat: Oropharynx is clear and moist  No posterior oropharyngeal edema or posterior oropharyngeal erythema  Eyes: Pupils are equal, round, and reactive to light  Conjunctivae are normal  Right eye exhibits no discharge  Left eye exhibits no discharge  Neck: Normal range of motion  Neck supple  No thyromegaly present  Cardiovascular: Normal rate, regular rhythm, S1 normal, S2 normal and normal heart sounds  PMI is not displaced  No murmur heard  Pulmonary/Chest: Effort normal and breath sounds normal  No accessory muscle usage  No apnea  No respiratory distress  He has no rhonchi  He has no rales  Abdominal: Soft   Normal appearance and bowel sounds are normal  He exhibits no shifting dullness  There is no hepatosplenomegaly  There is no tenderness  There is no rebound and no CVA tenderness  Musculoskeletal: Normal range of motion  He exhibits no edema or tenderness  Lymphadenopathy:     He has no cervical adenopathy  Neurological: He is alert  Skin: Skin is warm and intact  No rash noted  Psychiatric: He has a normal mood and affect  His speech is normal    Nursing note and vitals reviewed          Recent Results (from the past 1008 hour(s))   Comprehensive metabolic panel    Collection Time: 08/30/19  9:58 AM   Result Value Ref Range    Sodium 131 (L) 136 - 145 mmol/L    Potassium 4 7 3 5 - 5 3 mmol/L    Chloride 94 (L) 100 - 108 mmol/L    CO2 30 21 - 32 mmol/L    ANION GAP 7 4 - 13 mmol/L    BUN 49 (H) 5 - 25 mg/dL    Creatinine 9 12 (H) 0 60 - 1 30 mg/dL    Glucose, Fasting 360 (H) 65 - 99 mg/dL    Calcium 9 4 8 3 - 10 1 mg/dL    AST 19 5 - 45 U/L    ALT 38 12 - 78 U/L    Alkaline Phosphatase 92 46 - 116 U/L    Total Protein 7 8 6 4 - 8 2 g/dL    Albumin 3 6 3 5 - 5 0 g/dL    Total Bilirubin 0 45 0 20 - 1 00 mg/dL    eGFR 7 ml/min/1 73sq m   Hemoglobin A1C    Collection Time: 08/30/19  9:58 AM   Result Value Ref Range    Hemoglobin A1C 9 2 (H) 4 2 - 6 3 %     mg/dl   Lipid Panel with Direct LDL reflex    Collection Time: 08/30/19  9:58 AM   Result Value Ref Range    Cholesterol 120 50 - 200 mg/dL    Triglycerides 87 <=150 mg/dL    HDL, Direct 54 40 - 60 mg/dL    LDL Calculated 49 0 - 100 mg/dL   CBC and differential    Collection Time: 08/30/19  9:58 AM   Result Value Ref Range    WBC 5 71 4 31 - 10 16 Thousand/uL    RBC 3 68 (L) 3 88 - 5 62 Million/uL    Hemoglobin 10 2 (L) 12 0 - 17 0 g/dL    Hematocrit 33 1 (L) 36 5 - 49 3 %    MCV 90 82 - 98 fL    MCH 27 7 26 8 - 34 3 pg    MCHC 30 8 (L) 31 4 - 37 4 g/dL    RDW 16 0 (H) 11 6 - 15 1 %    MPV 9 5 8 9 - 12 7 fL    Platelets 732 926 - 816 Thousands/uL    nRBC 0 /100 WBCs    Neutrophils Relative 50 43 - 75 %    Immat GRANS % 0 0 - 2 %    Lymphocytes Relative 33 14 - 44 %    Monocytes Relative 13 (H) 4 - 12 %    Eosinophils Relative 3 0 - 6 %    Basophils Relative 1 0 - 1 %    Neutrophils Absolute 2 88 1 85 - 7 62 Thousands/µL    Immature Grans Absolute 0 01 0 00 - 0 20 Thousand/uL    Lymphocytes Absolute 1 89 0 60 - 4 47 Thousands/µL    Monocytes Absolute 0 75 0 17 - 1 22 Thousand/µL    Eosinophils Absolute 0 14 0 00 - 0 61 Thousand/µL    Basophils Absolute 0 04 0 00 - 0 10 Thousands/µL   ]    No results found

## 2019-10-31 DIAGNOSIS — I10 ESSENTIAL HYPERTENSION: Primary | ICD-10-CM

## 2019-10-31 RX ORDER — FUROSEMIDE 80 MG
80 TABLET ORAL DAILY
Qty: 90 TABLET | Refills: 3 | Status: SHIPPED | OUTPATIENT
Start: 2019-10-31 | End: 2020-11-19 | Stop reason: SDUPTHER

## 2019-12-16 ENCOUNTER — OFFICE VISIT (OUTPATIENT)
Dept: INTERNAL MEDICINE CLINIC | Facility: CLINIC | Age: 54
End: 2019-12-16
Payer: MEDICARE

## 2019-12-16 VITALS
BODY MASS INDEX: 23.74 KG/M2 | RESPIRATION RATE: 16 BRPM | WEIGHT: 165.8 LBS | DIASTOLIC BLOOD PRESSURE: 72 MMHG | HEART RATE: 74 BPM | HEIGHT: 70 IN | TEMPERATURE: 96.9 F | OXYGEN SATURATION: 99 % | SYSTOLIC BLOOD PRESSURE: 130 MMHG

## 2019-12-16 DIAGNOSIS — I12.0 BENIGN HYPERTENSION WITH ESRD (END-STAGE RENAL DISEASE) (HCC): ICD-10-CM

## 2019-12-16 DIAGNOSIS — E11.22 TYPE 2 DIABETES MELLITUS WITH CHRONIC KIDNEY DISEASE ON CHRONIC DIALYSIS, WITH LONG-TERM CURRENT USE OF INSULIN (HCC): Primary | ICD-10-CM

## 2019-12-16 DIAGNOSIS — IMO0002 UNCONTROLLED SECONDARY DIABETES MELLITUS WITH STAGE 5 CKD (GFR<15): ICD-10-CM

## 2019-12-16 DIAGNOSIS — N18.6 TYPE 2 DIABETES MELLITUS WITH CHRONIC KIDNEY DISEASE ON CHRONIC DIALYSIS, WITH LONG-TERM CURRENT USE OF INSULIN (HCC): Primary | ICD-10-CM

## 2019-12-16 DIAGNOSIS — Z12.11 SCREENING FOR COLON CANCER: ICD-10-CM

## 2019-12-16 DIAGNOSIS — N18.6 BENIGN HYPERTENSION WITH ESRD (END-STAGE RENAL DISEASE) (HCC): ICD-10-CM

## 2019-12-16 DIAGNOSIS — Z79.4 TYPE 2 DIABETES MELLITUS WITH CHRONIC KIDNEY DISEASE ON CHRONIC DIALYSIS, WITH LONG-TERM CURRENT USE OF INSULIN (HCC): Primary | ICD-10-CM

## 2019-12-16 DIAGNOSIS — E78.5 HYPERLIPIDEMIA, UNSPECIFIED HYPERLIPIDEMIA TYPE: ICD-10-CM

## 2019-12-16 DIAGNOSIS — Z99.2 TYPE 2 DIABETES MELLITUS WITH CHRONIC KIDNEY DISEASE ON CHRONIC DIALYSIS, WITH LONG-TERM CURRENT USE OF INSULIN (HCC): Primary | ICD-10-CM

## 2019-12-16 LAB — SL AMB POCT HEMOGLOBIN AIC: 9 (ref ?–6.5)

## 2019-12-16 PROCEDURE — 99214 OFFICE O/P EST MOD 30 MIN: CPT | Performed by: INTERNAL MEDICINE

## 2019-12-16 PROCEDURE — 83036 HEMOGLOBIN GLYCOSYLATED A1C: CPT | Performed by: INTERNAL MEDICINE

## 2019-12-16 NOTE — PROGRESS NOTES
Assessment/Plan:  1  Type 2 diabetes mellitus with chronic kidney disease on chronic dialysis, with long-term current use of insulin ([facility])  Assessment & Plan:    Lab Results   Component Value Date    HGBA1C 9 0 (A) 12/16/2019    Not at goal, occasional fastings are between 100-130 and post prandials are also 130-150 so that tells me that he can get well controlled if he takes his insulin regularly and follows the dietary restrictions but it is hard to make changes in insulin if he is not taking them regularly  Discussed the importance of taking ownership of his own medications, his sister helps him with making sure that the insulin doses set right since he has visual issues but ultimately he does need to take it regularly while she is at work  Orders:  -     POCT hemoglobin A1c    2  Hyperlipidemia, unspecified hyperlipidemia type  Assessment & Plan:  Continue statin  3  Benign hypertension with ESRD (end-stage renal disease) ([facility] [address] )  Assessment & Plan:  Well controlled  4  Screening for colon cancer  Assessment & Plan:  Refer to get a colonoscopy    Orders:  -     Ambulatory referral to Gastroenterology; Future    5  Uncontrolled secondary diabetes mellitus with stage 5 CKD (GFR<15) ([facility])  -     insulin glargine (BASAGLAR KWIKPEN) 100 units/mL injection pen; Inject 30 Units under the skin daily  -     insulin aspart (NOVOLOG FLEXPEN) 100 Units/mL injection pen; 10U with breakfast and lunch, 10-14 U with dinner        Subjective:   Chief Complaint   Patient presents with    Follow-up        Patient ID: Ishmael Ramirez is a 53 y o  male  He comes in with his sister for follow-up of hypertension, diabetes, end-stage renal disease and dialysis  Blood sugars continue to errattic, he does admit to missing his insulin doses occasionally  He has been having issues with his eyes and feels like a gritty sensation  Blood pressure has been well control, no issues with dialysis    Constipation seems to be improving  The following portions of the patient's history were reviewed and updated as appropriate: current medications, past medical history, past social history and past surgical history  PHQ-9 Depression Screening    PHQ-9:    Frequency of the following problems over the past two weeks:                Current Outpatient Medications:     aspirin 81 MG tablet, Take 1 tablet (81 mg total) by mouth daily, Disp: 90 tablet, Rfl: 1    atorvastatin (LIPITOR) 40 mg tablet, Take 1 tablet (40 mg total) by mouth daily, Disp: 90 tablet, Rfl: 1    Blood Glucose Monitoring Suppl (ONE TOUCH ULTRA 2) w/Device KIT, by Does not apply route 3 (three) times a day, Disp: 1 each, Rfl: 0    carvedilol (COREG) 6 25 mg tablet, Take 1 tablet (6 25 mg total) by mouth 2 (two) times a day with meals, Disp: 180 tablet, Rfl: 1    doxazosin (CARDURA) 2 mg tablet, Take 1 tablet (2 mg total) by mouth daily at bedtime, Disp: 30 tablet, Rfl: 1    furosemide (LASIX) 80 mg tablet, Take 1 tablet (80 mg total) by mouth daily, Disp: 90 tablet, Rfl: 3    glucose blood (FREESTYLE LITE) test strip, Use as instructed TID, Disp: 100 each, Rfl: 2    insulin aspart (NOVOLOG FLEXPEN) 100 Units/mL injection pen, 10U with breakfast and lunch, 10-14 U with dinner, Disp: 5 pen, Rfl: 2    NIFEdipine ER (ADALAT CC) 60 MG 24 hr tablet, Take 1 tablet (60 mg total) by mouth daily, Disp: 30 tablet, Rfl: 1    ONE TOUCH LANCETS MISC, by Does not apply route 3 (three) times a day, Disp: 100 each, Rfl: 1    polyethylene glycol (MIRALAX) 17 g packet, Take 17 g by mouth daily, Disp: 30 each, Rfl: 1    prednisoLONE acetate (PRED FORTE) 1 % ophthalmic suspension, , Disp: , Rfl: 0    insulin glargine (BASAGLAR KWIKPEN) 100 units/mL injection pen, Inject 30 Units under the skin daily, Disp: 5 pen, Rfl: 2    Review of Systems   Constitutional: Negative for fatigue, fever and unexpected weight change  HENT: Negative for ear pain, hearing loss and sore throat  Eyes: Positive for discharge and visual disturbance  Negative for pain  Respiratory: Negative for cough, chest tightness and shortness of breath  Cardiovascular: Negative for chest pain and palpitations  Gastrointestinal: Negative for abdominal pain, blood in stool, constipation, diarrhea and nausea  Genitourinary: Negative for dysuria, frequency and hematuria  Musculoskeletal: Negative for arthralgias and joint swelling  Skin: Negative for rash  Allergic/Immunologic: Negative for immunocompromised state  Neurological: Negative for dizziness and headaches  Hematological: Negative for adenopathy  Psychiatric/Behavioral: Negative for confusion and sleep disturbance  Objective:  /72   Pulse 74   Temp (!) 96 9 °F (36 1 °C) (Tympanic)   Resp 16   Ht 5' 10" (1 778 m)   Wt 75 2 kg (165 lb 12 8 oz)   SpO2 99%   BMI 23 79 kg/m²      Physical Exam   Constitutional: He appears well-developed and well-nourished  HENT:   Head: Normocephalic and atraumatic  Right Ear: Tympanic membrane normal    Left Ear: Tympanic membrane normal    Nose: Nose normal    Mouth/Throat: Oropharynx is clear and moist  No posterior oropharyngeal edema or posterior oropharyngeal erythema  Eyes: Pupils are equal, round, and reactive to light  Right eye exhibits no discharge  Left eye exhibits discharge  Left conjunctiva is injected  Neck: Normal range of motion  Neck supple  No thyromegaly present  Cardiovascular: Normal rate, regular rhythm, S1 normal, S2 normal and normal heart sounds  PMI is not displaced  No murmur heard  Pulmonary/Chest: Effort normal and breath sounds normal  No accessory muscle usage  No apnea  No respiratory distress  He has no rhonchi  He has no rales  Abdominal: Soft  Normal appearance and bowel sounds are normal  He exhibits no shifting dullness  There is no hepatosplenomegaly  There is no tenderness  There is no rebound and no CVA tenderness     Musculoskeletal: Normal range of motion  He exhibits no edema or tenderness  Lymphadenopathy:     He has no cervical adenopathy  Neurological: He is alert  Skin: Skin is warm and intact  No rash noted  Psychiatric: He has a normal mood and affect  His speech is normal    Nursing note and vitals reviewed  Recent Results (from the past 1008 hour(s))   POCT hemoglobin A1c    Collection Time: 12/16/19 10:44 AM   Result Value Ref Range    Hemoglobin A1C 9 0 (A) 6 5   ]    No results found

## 2019-12-16 NOTE — ASSESSMENT & PLAN NOTE
Lab Results   Component Value Date    HGBA1C 9 0 (A) 12/16/2019    Not at goal, occasional fastings are between 100-130 and post prandials are also 130-150 so that tells me that he can get well controlled if he takes his insulin regularly and follows the dietary restrictions but it is hard to make changes in insulin if he is not taking them regularly  Discussed the importance of taking ownership of his own medications, his sister helps him with making sure that the insulin doses set right since he has visual issues but ultimately he does need to take it regularly while she is at work

## 2020-01-05 DIAGNOSIS — I10 ESSENTIAL HYPERTENSION: ICD-10-CM

## 2020-01-06 RX ORDER — CARVEDILOL 6.25 MG/1
TABLET ORAL
Qty: 180 TABLET | Refills: 0 | Status: SHIPPED | OUTPATIENT
Start: 2020-01-06 | End: 2020-04-13

## 2020-01-17 ENCOUNTER — OFFICE VISIT (OUTPATIENT)
Dept: GASTROENTEROLOGY | Facility: MEDICAL CENTER | Age: 55
End: 2020-01-17
Payer: MEDICARE

## 2020-01-17 VITALS
HEIGHT: 70 IN | DIASTOLIC BLOOD PRESSURE: 60 MMHG | BODY MASS INDEX: 23.62 KG/M2 | SYSTOLIC BLOOD PRESSURE: 120 MMHG | WEIGHT: 165 LBS | TEMPERATURE: 97.8 F | HEART RATE: 68 BPM

## 2020-01-17 DIAGNOSIS — K59.00 CONSTIPATION, UNSPECIFIED CONSTIPATION TYPE: ICD-10-CM

## 2020-01-17 DIAGNOSIS — Z12.11 COLON CANCER SCREENING: Primary | ICD-10-CM

## 2020-01-17 DIAGNOSIS — K21.9 GASTROESOPHAGEAL REFLUX DISEASE, ESOPHAGITIS PRESENCE NOT SPECIFIED: ICD-10-CM

## 2020-01-17 PROCEDURE — 99214 OFFICE O/P EST MOD 30 MIN: CPT | Performed by: PHYSICIAN ASSISTANT

## 2020-01-17 RX ORDER — CINACALCET 30 MG/1
30 TABLET, FILM COATED ORAL DAILY
COMMUNITY
End: 2022-02-13 | Stop reason: HOSPADM

## 2020-01-17 RX ORDER — SEVELAMER CARBONATE 800 MG/1
800 TABLET, FILM COATED ORAL
COMMUNITY
End: 2022-02-13 | Stop reason: HOSPADM

## 2020-01-17 RX ORDER — PANTOPRAZOLE SODIUM 20 MG/1
20 TABLET, DELAYED RELEASE ORAL DAILY
Qty: 30 TABLET | Refills: 3 | Status: SHIPPED | OUTPATIENT
Start: 2020-01-17 | End: 2021-08-10

## 2020-01-17 RX ORDER — SODIUM ZIRCONIUM CYCLOSILICATE 10 G/10G
POWDER, FOR SUSPENSION ORAL
COMMUNITY
Start: 2019-12-31 | End: 2022-02-13 | Stop reason: HOSPADM

## 2020-01-17 NOTE — PROGRESS NOTES
[facility] - Outpatient Follow-up Note  Paddy Trevin 53 y o  male MRN: [id number]  Encounter: [id number]          ASSESSMENT AND PLAN:      1  Colon cancer screening:   He is 53years old and has never had a colonoscopy in the past, he is overdue for colon cancer screening purposes  Him and his sister here today and would like to schedule this procedure for him  Risks were discussed including but not limited to bleeding, infection, perforation and missed lesions  They understand and agree to proceed with procedure  - Colonoscopy; Future  -miralax/dulcolax bowel prep    2  Constipation, unspecified constipation type: he has a hx of constipation and has been using miralax/dulcolax without relief  He has a BM every 5 days  Will give sample of Toni Chappell and they will let us know if this works for them  -[employer]  -colonoscopy for further evaluation    3  Gastroesophageal reflux disease, esophagitis presence not specified: he admits to acid reflux after meals for the past few months  Will try PPI, if unable to control symptoms with medication would recommend EGD for further evaluation    - pantoprazole (PROTONIX) 20 mg tablet; Take 1 tablet (20 mg total) by mouth daily  Dispense: 30 tablet; Refill: 3    ______________________________________________________________________    SUBJECTIVE:  Marco Antonio Duenas is a 54 yo male with a past medical history of chronic idiopathic constipation, diabetes type 2, end-stage renal disease on hemodialysis, left ventricular hypertrophy, he is legally blind here for colon cancer screening consult  He was seen in July of 2018 for colon cancer screening consult at that time and constipation however, secondary to traveling his colonoscopy was canceled and not rescheduled  He was sent here for again to set this up today  He does have a history of constipation  He admits to having a bowel movement every 5 days    He is on MiraLax and Colace but his sister states that he does not use this regularly  We will give him samples of Amitiza 8 micro g daily for him to try  If this works we can send a prescription for him  We will plan for colonoscopy for bowel change in for colon cancer screening purposes as he has never had a colonoscopy in the past   He is also experiencing mild reflux after meals and we will start Protonix 20 mg daily      REVIEW OF SYSTEMS IS OTHERWISE NEGATIVE  Historical Information   Past Medical History:   Diagnosis Date    Cataract     Chronic kidney disease     Diabetes mellitus (HCC)      IDDM    Di[facility] retinopathy ([facility] [address] [facility]     Dizziness     occ    ESRD (end stage renal disease) ([facility] [facility]     Headache     occ    Hypertension     Legally blind     LVH (left ventricular hypertrophy)     Preferred language is [place]     understands some English    Use of cane as ambulatory aid      Past Surgical History:   Procedure Laterality Date    INGUINAL HERNIA REPAIR Right     IR FISTULA/GRAFTOGRAM  2019    IR FISTULA/GRAFTOGRAM  2019    IR PERMACATH PLACEMENT  4/3/2019    DC ANASTOMOSIS,AV,ANY SITE Left 2019    Procedure: CREATION FISTULA ARTERIOVENOUS (AV);   Surgeon: Pao Pacheco MD;  Location: [facility];  Service: Vascular     Social History   Social History     Substance and Sexual Activity   Alcohol Use Not Currently     Social History     Substance and Sexual Activity   Drug Use No     Social History     Tobacco Use   Smoking Status Former Smoker    Packs/day: 0 25    Last attempt to quit: 2018    Years since quittin 9   Smokeless Tobacco Never Used     Family History   Problem Relation Age of Onset    Hypertension Mother     Diabetes Father     No Known Problems Sister     No Known Problems Brother     No Known Problems Maternal Aunt     No Known Problems Maternal Uncle     No Known Problems Paternal Aunt     No Known Problems Paternal Uncle     No Known Problems Maternal Grandmother     No Known Problems Maternal Grandfather     No Known Problems Paternal Grandmother     No Known Problems Paternal Grandfather        Meds/Allergies       Current Outpatient Medications:     aspirin 81 MG tablet    atorvastatin (LIPITOR) 40 mg tablet    Blood Glucose Monitoring Suppl (ONE TOUCH ULTRA 2) w/Device KIT    carvedilol (COREG) 6 25 mg tablet    cinacalcet (SENSIPAR) 30 mg tablet    furosemide (LASIX) 80 mg tablet    glucose blood (FREESTYLE LITE) test strip    insulin aspart (NOVOLOG FLEXPEN) 100 Units/mL injection pen    insulin glargine (BASAGLAR KWIKPEN) 100 units/mL injection pen    NIFEdipine ER (ADALAT CC) 60 MG 24 hr tablet    ONE TOUCH LANCETS MISC    polyethylene glycol (MIRALAX) 17 g packet    prednisoLONE acetate (PRED FORTE) 1 % ophthalmic suspension    sevelamer carbonate (RENVELA) 800 mg tablet    doxazosin (CARDURA) 2 mg tablet    LOKELMA 10 g PACK    pantoprazole (PROTONIX) 20 mg tablet    No Known Allergies        Objective     Blood pressure 120/60, pulse 68, temperature 97 8 °F (36 6 °C), temperature source Tympanic, height 5' 10" (1 778 m), weight 74 8 kg (165 lb)  Body mass index is 23 68 kg/m²  PHYSICAL EXAM:      General Appearance:   Alert, cooperative, no distress, walks with cane   HEENT:   Normocephalic, atraumatic, anicteric  Patient is blind  Right eye exhibits no discharge  Left eye exhibits discharge  No scleral icterus    Neck:  Supple, symmetrical, trachea midline, no stridor    Lungs:   Clear to auscultation bilaterally; no rales, rhonchi or wheezing; respirations unlabored    Heart[de-identified]   Regular rate and rhythm; no murmur, rub, or gallop     Abdomen:   Soft, non-tender, non-distended; normal bowel sounds; no masses, no organomegaly    Genitalia:   Deferred    Rectal:   Deferred    Extremities:  No cyanosis, clubbing or edema        Skin:  No jaundice, rashes, or lesions          Lab Results:   No visits with results within 1 Day(s) from this visit  Latest known visit with results is:   Office Visit on 12/16/2019   Component Date Value    Hemoglobin A1C 12/16/2019 9 0*         Radiology Results:   No results found

## 2020-01-17 NOTE — PATIENT INSTRUCTIONS
The patient is scheduled at [facility] for a Colonoscopy with Dr Umaña on 03/18/2020   Miralax/Dulcolax  prep instructions have been gone over in the office, with the patient, by the MA  The patient is aware that they will receive a call with the arrival time the day prior to procedure and that they will need a  the day of the procedure  I have asked the patient to call with any questions that they might have prior to procedure   follow up post procedure scheduled

## 2020-02-03 DIAGNOSIS — Z79.4 UNCONTROLLED TYPE 2 DIABETES MELLITUS WITH HYPERGLYCEMIA, WITH LONG-TERM CURRENT USE OF INSULIN (HCC): ICD-10-CM

## 2020-02-03 DIAGNOSIS — I10 ESSENTIAL HYPERTENSION: ICD-10-CM

## 2020-02-03 DIAGNOSIS — E11.65 UNCONTROLLED TYPE 2 DIABETES MELLITUS WITH HYPERGLYCEMIA, WITH LONG-TERM CURRENT USE OF INSULIN (HCC): ICD-10-CM

## 2020-02-03 DIAGNOSIS — N18.4 CKD (CHRONIC KIDNEY DISEASE) STAGE 4, GFR 15-29 ML/MIN (HCC): ICD-10-CM

## 2020-02-03 RX ORDER — ACETAMINOPHEN/DIPHENHYDRAMINE 500MG-25MG
TABLET ORAL
Qty: 90 TABLET | Refills: 0 | Status: SHIPPED | OUTPATIENT
Start: 2020-02-03 | End: 2020-04-13

## 2020-02-19 NOTE — ASSESSMENT & PLAN NOTE
Lab Results   Component Value Date    HGBA1C 11 9 (H) 06/27/2018       No results for input(s): POCGLU in the last 72 hours  Blood Sugar Average: Last 72 hrs:  Still not checking blood sugars  Plans to travel for 3-4 mo  Enforced again the importance of controlling blood sugars for further complication avoidance 
Seeing Nephrology in a week, ultrasound of the kidneys did not show any significant abnormalities  Significant proteinuria likely from diabetes 
Well controlled, continue current regimen 
normal

## 2020-02-20 ENCOUNTER — DOCUMENTATION (OUTPATIENT)
Dept: GASTROENTEROLOGY | Facility: MEDICAL CENTER | Age: 55
End: 2020-02-20

## 2020-02-22 DIAGNOSIS — E78.5 HYPERLIPIDEMIA, UNSPECIFIED HYPERLIPIDEMIA TYPE: ICD-10-CM

## 2020-02-24 RX ORDER — ATORVASTATIN CALCIUM 40 MG/1
TABLET, FILM COATED ORAL
Qty: 90 TABLET | Refills: 1 | Status: SHIPPED | OUTPATIENT
Start: 2020-02-24 | End: 2020-08-27

## 2020-03-16 ENCOUNTER — OFFICE VISIT (OUTPATIENT)
Dept: INTERNAL MEDICINE CLINIC | Facility: CLINIC | Age: 55
End: 2020-03-16
Payer: MEDICARE

## 2020-03-16 VITALS
SYSTOLIC BLOOD PRESSURE: 180 MMHG | OXYGEN SATURATION: 99 % | DIASTOLIC BLOOD PRESSURE: 80 MMHG | HEIGHT: 70 IN | TEMPERATURE: 96.2 F | BODY MASS INDEX: 23.48 KG/M2 | WEIGHT: 164 LBS | HEART RATE: 72 BPM

## 2020-03-16 DIAGNOSIS — L91.0 KELOID OF SKIN: ICD-10-CM

## 2020-03-16 DIAGNOSIS — N18.6 TYPE 2 DIABETES MELLITUS WITH CHRONIC KIDNEY DISEASE ON CHRONIC DIALYSIS, WITH LONG-TERM CURRENT USE OF INSULIN (HCC): ICD-10-CM

## 2020-03-16 DIAGNOSIS — D70.9 NEUTROPENIA, UNSPECIFIED TYPE (HCC): ICD-10-CM

## 2020-03-16 DIAGNOSIS — Z12.5 SCREENING FOR PROSTATE CANCER: ICD-10-CM

## 2020-03-16 DIAGNOSIS — Z00.00 MEDICARE ANNUAL WELLNESS VISIT, SUBSEQUENT: Primary | ICD-10-CM

## 2020-03-16 DIAGNOSIS — N18.6 ESRD (END STAGE RENAL DISEASE) (HCC): ICD-10-CM

## 2020-03-16 DIAGNOSIS — E78.5 HYPERLIPIDEMIA, UNSPECIFIED HYPERLIPIDEMIA TYPE: ICD-10-CM

## 2020-03-16 DIAGNOSIS — Z99.2 TYPE 2 DIABETES MELLITUS WITH CHRONIC KIDNEY DISEASE ON CHRONIC DIALYSIS, WITH LONG-TERM CURRENT USE OF INSULIN (HCC): ICD-10-CM

## 2020-03-16 DIAGNOSIS — Z79.4 TYPE 2 DIABETES MELLITUS WITH CHRONIC KIDNEY DISEASE ON CHRONIC DIALYSIS, WITH LONG-TERM CURRENT USE OF INSULIN (HCC): ICD-10-CM

## 2020-03-16 DIAGNOSIS — Z99.2 ANEMIA DUE TO CHRONIC KIDNEY DISEASE, ON CHRONIC DIALYSIS (HCC): ICD-10-CM

## 2020-03-16 DIAGNOSIS — E11.22 TYPE 2 DIABETES MELLITUS WITH CHRONIC KIDNEY DISEASE ON CHRONIC DIALYSIS, WITH LONG-TERM CURRENT USE OF INSULIN (HCC): ICD-10-CM

## 2020-03-16 DIAGNOSIS — N18.6 BENIGN HYPERTENSION WITH ESRD (END-STAGE RENAL DISEASE) (HCC): ICD-10-CM

## 2020-03-16 DIAGNOSIS — I12.0 BENIGN HYPERTENSION WITH ESRD (END-STAGE RENAL DISEASE) (HCC): ICD-10-CM

## 2020-03-16 DIAGNOSIS — N18.6 ANEMIA DUE TO CHRONIC KIDNEY DISEASE, ON CHRONIC DIALYSIS (HCC): ICD-10-CM

## 2020-03-16 DIAGNOSIS — D63.1 ANEMIA DUE TO CHRONIC KIDNEY DISEASE, ON CHRONIC DIALYSIS (HCC): ICD-10-CM

## 2020-03-16 DIAGNOSIS — Z11.4 SCREENING FOR HIV (HUMAN IMMUNODEFICIENCY VIRUS): ICD-10-CM

## 2020-03-16 PROCEDURE — 1036F TOBACCO NON-USER: CPT | Performed by: INTERNAL MEDICINE

## 2020-03-16 PROCEDURE — 99214 OFFICE O/P EST MOD 30 MIN: CPT | Performed by: INTERNAL MEDICINE

## 2020-03-16 PROCEDURE — G0439 PPPS, SUBSEQ VISIT: HCPCS | Performed by: INTERNAL MEDICINE

## 2020-03-16 PROCEDURE — 3066F NEPHROPATHY DOC TX: CPT | Performed by: INTERNAL MEDICINE

## 2020-03-16 PROCEDURE — 3008F BODY MASS INDEX DOCD: CPT | Performed by: INTERNAL MEDICINE

## 2020-03-16 NOTE — PATIENT INSTRUCTIONS
Medicare Preventive Visit Patient Instructions  Thank you for completing your Welcome to Medicare Visit or Medicare Annual Wellness Visit today  Your next wellness visit will be due in one year (3/16/2021)  The screening/preventive services that you may require over the next 5-10 years are detailed below  Some tests may not apply to you based off risk factors and/or age  Screening tests ordered at today's visit but not completed yet may show as past due  Also, please note that scanned in results may not display below  Preventive Screenings:  Service Recommendations Previous Testing/Comments   Colorectal Cancer Screening  · Colonoscopy    · Fecal Occult Blood Test (FOBT)/Fecal Immunochemical Test (FIT)  · Fecal DNA/Cologuard Test  · Flexible Sigmoidoscopy Age: 54-65 years old   Colonoscopy: every 10 years (May be performed more frequently if at higher risk)  OR  FOBT/FIT: every 1 year  OR  Cologuard: every 3 years  OR  Sigmoidoscopy: every 5 years  Screening may be recommended earlier than age 48 if at higher risk for colorectal cancer  Also, an individualized decision between you and your healthcare provider will decide whether screening between the ages of 74-80 would be appropriate  Colonoscopy: Not on file  FOBT/FIT: Not on file  Cologuard: Not on file  Sigmoidoscopy: Not on file         Prostate Cancer Screening Individualized decision between patient and health care provider in men between ages of 53-78   Medicare will cover every 12 months beginning on the day after your 50th birthday PSA: 0 5 ng/mL          Hepatitis C Screening Once for adults born between 1945 and 1965  More frequently in patients at high risk for Hepatitis C Hep C Antibody: 04/04/2019       Diabetes Screening 1-2 times per year if you're at risk for diabetes or have pre-diabetes Fasting glucose: 360 mg/dL   A1C: 9 0       Cholesterol Screening Once every 5 years if you don't have a lipid disorder   May order more often based on risk factors  Lipid panel: 08/30/2019          Other Preventive Screenings Covered by Medicare:  1  Abdominal Aortic Aneurysm (AAA) Screening: covered once if your at risk  You're considered to be at risk if you have a family history of AAA or a male between the age of 73-68 who smoking at least 100 cigarettes in your lifetime  2  Lung Cancer Screening: covers low dose CT scan once per year if you meet all of the following conditions: (1) Age 50-69; (2) No signs or symptoms of lung cancer; (3) Current smoker or have quit smoking within the last 15 years; (4) You have a tobacco smoking history of at least 30 pack years (packs per day x number of years you smoked); (5) You get a written order from a healthcare provider  3  Glaucoma Screening: covered annually if you're considered high risk: (1) You have diabetes OR (2) Family history of glaucoma OR (3)  aged 48 and older OR (3)  American aged 72 and older  3  Osteoporosis Screening: covered every 2 years if you meet one of the following conditions: (1) Have a vertebral abnormality; (2) On glucocorticoid therapy for more than 3 months; (3) Have primary hyperparathyroidism; (4) On osteoporosis medications and need to assess response to drug therapy  5  HIV Screening: covered annually if you're between the age of 12-76  Also covered annually if you are younger than 13 and older than 72 with risk factors for HIV infection  For pregnant patients, it is covered up to 3 times per pregnancy  Immunizations:  Immunization Recommendations   Influenza Vaccine Annual influenza vaccination during flu season is recommended for all persons aged >= 6 months who do not have contraindications   Pneumococcal Vaccine (Prevnar and Pneumovax)  * Prevnar = PCV13  * Pneumovax = PPSV23 Adults 25-60 years old: 1-3 doses may be recommended based on certain risk factors  Adults 72 years old: Prevnar (PCV13) vaccine recommended followed by Pneumovax (PPSV23) vaccine   If already received PPSV23 since turning 65, then PCV13 recommended at least one year after PPSV23 dose  Hepatitis B Vaccine 3 dose series if at intermediate or high risk (ex: diabetes, end stage renal disease, liver disease)   Tetanus (Td) Vaccine - COST NOT COVERED BY MEDICARE PART B Following completion of primary series, a booster dose should be given every 10 years to maintain immunity against tetanus  Td may also be given as tetanus wound prophylaxis  Tdap Vaccine - COST NOT COVERED BY MEDICARE PART B Recommended at least once for all adults  For pregnant patients, recommended with each pregnancy  Shingles Vaccine (Shingrix) - COST NOT COVERED BY MEDICARE PART B  2 shot series recommended in those aged 48 and above     Health Maintenance Due:      Topic Date Due    CRC Screening: Colonoscopy  1965    Hepatitis C Screening  Completed     Immunizations Due:      Topic Date Due    DTaP,Tdap,and Td Vaccines (1 - Tdap) 04/07/1975    Pneumococcal Vaccine: Pediatrics (0 to 5 Years) and At-Risk Patients (6 to 59 Years) (2 of 3 - PCV13) 11/11/2010     Advance Directives   What are advance directives? Advance directives are legal documents that state your wishes and plans for medical care  These plans are made ahead of time in case you lose your ability to make decisions for yourself  Advance directives can apply to any medical decision, such as the treatments you want, and if you want to donate organs  What are the types of advance directives? There are many types of advance directives, and each state has rules about how to use them  You may choose a combination of any of the following:  · Living will: This is a written record of the treatment you want  You can also choose which treatments you do not want, which to limit, and which to stop at a certain time  This includes surgery, medicine, IV fluid, and tube feedings  · Durable power of  for healthcare [facility]):   This is a written record that states who you want to make healthcare choices for you when you are unable to make them for yourself  This person, called a proxy, is usually a family member or a friend  You may choose more than 1 proxy  · Do not resuscitate (DNR) order:  A DNR order is used in case your heart stops beating or you stop breathing  It is a request not to have certain forms of treatment, such as CPR  A DNR order may be included in other types of advance directives  · Medical directive: This covers the care that you want if you are in a coma, near death, or unable to make decisions for yourself  You can list the treatments you want for each condition  Treatment may include pain medicine, surgery, blood transfusions, dialysis, IV or tube feedings, and a ventilator (breathing machine)  · Values history: This document has questions about your views, beliefs, and how you feel and think about life  This information can help others choose the care that you would choose  Why are advance directives important? An advance directive helps you control your care  Although spoken wishes may be used, it is better to have your wishes written down  Spoken wishes can be misunderstood, or not followed  Treatments may be given even if you do not want them  An advance directive may make it easier for your family to make difficult choices about your care  © Copyright [employer] 2018 Information is for End User's use only and may not be sold, redistributed or otherwise used for commercial purposes   All illustrations and images included in CareNotes® are the copyrighted property of [employer]  or [address] [place] [address] [employer]  Full Thickness Lip Wedge Repair (Flap) Text: Given the location of the defect and the proximity to free margins a full thickness wedge repair was deemed most appropriate.  Using a sterile surgical marker, the appropriate repair was drawn incorporating the defect and placing the expected incisions perpendicular to the vermilion border.  The vermilion border was also meticulously outlined to ensure appropriate reapproximation during the repair.  The area thus outlined was incised through and through with a #15 scalpel blade.  The muscularis and dermis were reaproximated with deep sutures following hemostasis. Care was taken to realign the vermilion border before proceeding with the superficial closure.  Once the vermilion was realigned the superfical and mucosal closure was finished.

## 2020-03-16 NOTE — PROGRESS NOTES
Assessment/Plan:  1  Medicare annual wellness visit, subsequent  -     TSH, 3rd generation with Free T4 reflex    2  Type 2 diabetes mellitus with chronic kidney disease on chronic dialysis, with long-term current use of insulin ([facility])  -     CBC and differential  -     Hemoglobin A1C  -     TSH, 3rd generation with Free T4 reflex    3  Benign hypertension with ESRD (end-stage renal disease) ([facility])  -     CBC and differential  -     Hemoglobin A1C  -     Lipid Panel with Direct LDL reflex  -     TSH, 3rd generation with Free T4 reflex    4  ESRD (end stage renal disease) ([facility] [address] )  -     Lipid Panel with Direct LDL reflex  -     TSH, 3rd generation with Free T4 reflex    5  Anemia due to chronic kidney disease, on chronic dialysis ([facility])  -     CBC and differential    6  Hyperlipidemia, unspecified hyperlipidemia type  -     Comprehensive metabolic panel  -     Lipid Panel with Direct LDL reflex    7  Screening for HIV (human immunodeficiency virus)  -     HIV 1/2 Antigen/Antibody (4th Generation) w Reflex [facility]; Future    8  Screening for prostate cancer  -     PSA, Total Screen; Future    9  Neutropenia, unspecified type ([facility] )    Advised to get blood work done  Blood pressure elevated today likely due to medication noncompliance  We had another discussion about how important it is for him to take his insulin regularly to prevent any further complications from uncontrolled diabetes  Does need to see his ophthalmologist due to significant watering from his eye and redness  Subjective:   Chief Complaint   Patient presents with    Medicare Wellness Visit        Patient ID: Aimee Rascon is a 53 y o  male  He comes in with his sister for follow-up of hypertension, diabetes, end-stage renal disease and dialysis  Blood sugars continue to errattic when he misses his insulin doses occasionally  He has been having issues with his eyes and feels like a gritty sensation, seeing his ophthalmologist soon     Blood pressure has been well control, no issues with dialysis  Did not take blood pressure medicine today  Constipation seems to be improving  The following portions of the patient's history were reviewed and updated as appropriate: current medications, past medical history, past social history and past surgical history      PHQ-9 Depression Screening    PHQ-9:    Frequency of the following problems over the past two weeks:       Little interest or pleasure in doing things:  1 - several days  Feeling down, depressed, or hopeless:  1 - several days  PHQ-2 Score:  2           Current Outpatient Medications:     atorvastatin (LIPITOR) 40 mg tablet, take 1 tablet by mouth once daily, Disp: 90 tablet, Rfl: 1    Blood Glucose Monitoring Suppl (ONE TOUCH ULTRA 2) w/Device KIT, by Does not apply route 3 (three) times a day, Disp: 1 each, Rfl: 0    carvedilol (COREG) 6 25 mg tablet, take 1 tablet by mouth twice a day with meals, Disp: 180 tablet, Rfl: 0    cinacalcet (SENSIPAR) 30 mg tablet, Take 30 mg by mouth daily, Disp: , Rfl:     doxazosin (CARDURA) 2 mg tablet, Take 1 tablet (2 mg total) by mouth daily at bedtime, Disp: 30 tablet, Rfl: 1    furosemide (LASIX) 80 mg tablet, Take 1 tablet (80 mg total) by mouth daily, Disp: 90 tablet, Rfl: 3    glucose blood (FREESTYLE LITE) test strip, Use as instructed TID, Disp: 100 each, Rfl: 2    insulin aspart (NOVOLOG FLEXPEN) 100 Units/mL injection pen, 10U with breakfast and lunch, 10-14 U with dinner, Disp: 5 pen, Rfl: 2    insulin glargine (BASAGLAR KWIKPEN) 100 units/mL injection pen, Inject 30 Units under the skin daily, Disp: 5 pen, Rfl: 2    LOKELMA 10 g PACK, , Disp: , Rfl:     NIFEdipine ER (ADALAT CC) 60 MG 24 hr tablet, Take 1 tablet (60 mg total) by mouth daily, Disp: 30 tablet, Rfl: 1    ONE TOUCH LANCETS MISC, by Does not apply route 3 (three) times a day, Disp: 100 each, Rfl: 1    pantoprazole (PROTONIX) 20 mg tablet, Take 1 tablet (20 mg total) by mouth daily, Disp: 30 tablet, Rfl: 3    polyethylene glycol (MIRALAX) 17 g packet, Take 17 g by mouth daily, Disp: 30 each, Rfl: 1    prednisoLONE acetate (PRED FORTE) 1 % ophthalmic suspension, , Disp: , Rfl: 0    RA ASPIRIN EC 81 MG EC tablet, take 1 tablet by mouth once daily, Disp: 90 tablet, Rfl: 0    sevelamer carbonate (RENVELA) 800 mg tablet, Take 800 mg by mouth 3 (three) times a day with meals, Disp: , Rfl:     Review of Systems   Constitutional: Negative for fatigue, fever and unexpected weight change  HENT: Negative for ear pain, hearing loss and sore throat  Eyes: Positive for redness and visual disturbance  Negative for pain and discharge  Respiratory: Negative for cough, chest tightness and shortness of breath  Cardiovascular: Negative for chest pain and palpitations  Gastrointestinal: Negative for abdominal pain, blood in stool, constipation, diarrhea and nausea  Genitourinary: Negative for dysuria, frequency and hematuria  Musculoskeletal: Negative for arthralgias and joint swelling  Skin: Negative for rash  Allergic/Immunologic: Negative for immunocompromised state  Neurological: Negative for dizziness and headaches  Hematological: Negative for adenopathy  Psychiatric/Behavioral: Negative for confusion and sleep disturbance  Objective:  BP (!) 180/80 (BP Location: Right arm, Patient Position: Sitting, Cuff Size: Standard)   Pulse 72   Temp (!) 96 2 °F (35 7 °C) (Tympanic)   Ht 5' 10" (1 778 m)   Wt 74 4 kg (164 lb)   SpO2 99%   BMI 23 53 kg/m²      Physical Exam   Constitutional: He appears well-developed and well-nourished  HENT:   Head: Normocephalic and atraumatic  Right Ear: Tympanic membrane normal    Left Ear: Tympanic membrane normal    Nose: Nose normal    Mouth/Throat: Oropharynx is clear and moist  No posterior oropharyngeal edema or posterior oropharyngeal erythema  Eyes: Pupils are equal, round, and reactive to light   Right eye exhibits discharge  Left eye exhibits discharge  Neck: Normal range of motion  Neck supple  No thyromegaly present  Cardiovascular: Normal rate, regular rhythm, S1 normal, S2 normal and normal heart sounds  PMI is not displaced  Pulses are no weak pulses  No murmur heard  Pulses:       Dorsalis pedis pulses are 2+ on the right side, and 2+ on the left side  Posterior tibial pulses are 2+ on the right side, and 2+ on the left side  Pulmonary/Chest: Effort normal and breath sounds normal  No accessory muscle usage  No apnea  No respiratory distress  He has no rhonchi  He has no rales  Abdominal: Soft  Normal appearance and bowel sounds are normal  He exhibits no shifting dullness  There is no hepatosplenomegaly  There is no tenderness  There is no rebound and no CVA tenderness  Musculoskeletal: Normal range of motion  He exhibits no edema or tenderness  Feet:   Right Foot:   Skin Integrity: Negative for ulcer, skin breakdown, erythema, warmth, callus or dry skin  Left Foot:   Skin Integrity: Negative for ulcer, skin breakdown, erythema, warmth, callus or dry skin  Lymphadenopathy:     He has no cervical adenopathy  Neurological: He is alert  Skin: Skin is warm and intact  No rash noted  Psychiatric: He has a normal mood and affect  His speech is normal    Nursing note and vitals reviewed  Patient's shoes and socks removed  Right Foot/Ankle   Right Foot Inspection  Skin Exam: skin normal and skin intact no dry skin, no warmth, no callus, no erythema, no maceration, no abnormal color, no pre-ulcer, no ulcer and no callus                          Toe Exam: ROM and strength within normal limitsno swelling, erythema and  no right toe deformity  Sensory       Monofilament testing: intact  Vascular    The right DP pulse is 2+  The right PT pulse is 2+       Left Foot/Ankle  Left Foot Inspection  Skin Exam: skin normal and skin intactno dry skin, no warmth, no erythema, no maceration, normal color, no pre-ulcer, no ulcer and no callus                         Toe Exam: ROM and strength within normal limitsno swelling, no erythema and no left toe deformity                   Sensory       Monofilament: intact  Vascular    The left DP pulse is 2+  The left PT pulse is 2+  Assign Risk Category:  No deformity present; No loss of protective sensation; No weak pulses       Risk: 0      No results found for this or any previous visit (from the past 1008 hour(s))  ]    No results found

## 2020-03-16 NOTE — PROGRESS NOTES
Assessment and Plan:     Problem List Items Addressed This Visit        Endocrine    Type 2 diabetes mellitus with chronic kidney disease on chronic dialysis, with long-term current use of insulin ([facility] )    Relevant Orders    CBC and differential    Hemoglobin A1C    TSH, 3rd generation with Free T4 reflex       Cardiovascular and Mediastinum    Benign hypertension with ESRD (end-stage renal disease) ([facility] )    Relevant Orders    CBC and differential    Hemoglobin A1C    Lipid Panel with Direct LDL reflex    TSH, 3rd generation with Free T4 reflex       Genitourinary    ESRD (end stage renal disease) ([facility] )    Relevant Orders    Lipid Panel with Direct LDL reflex    TSH, 3rd generation with Free T4 reflex    Anemia due to chronic kidney disease, on chronic dialysis ([facility] )    Relevant Orders    CBC and differential       Other    Hyperlipidemia    Relevant Orders    Comprehensive metabolic panel    Lipid Panel with Direct LDL reflex    Neutropenia ([facility] )      Other Visit Diagnoses     Medicare annual wellness visit, subsequent    -  Primary    Relevant Orders    TSH, 3rd generation with Free T4 reflex    Screening for HIV (human immunodeficiency virus)        Relevant Orders    HIV 1/2 Antigen/Antibody (4th Generation) w Reflex [facility]    Screening for prostate cancer        Relevant Orders    PSA, Total Screen           Preventive health issues were discussed with patient, and age appropriate screening tests were ordered as noted in patient's After Visit Summary  Personalized health advice and appropriate referrals for health education or preventive services given if needed, as noted in patient's After Visit Summary       History of Present Illness:     Patient presents for Medicare Annual Wellness visit    Patient Care Team:  Nicole Williamson MD as PCP - General (Internal Medicine)  Nakita Uribe MD (Vascular Surgery)     Problem List:     Patient Active Problem List   Diagnosis    Type 2 diabetes mellitus with chronic kidney disease on chronic dialysis, with long-term current use of insulin (HCC)    Diabetic retinopathy of both eyes associated with type 2 diabetes mellitus ([facility] )    Legally blind    LVH (left ventricular hypertrophy)    Hyperlipidemia    Chronic idiopathic constipation    Screening for colon cancer    ESRD (end stage renal disease) ([facility] )    S/P arteriovenous (AV) fistula creation    Elevated troponin I level    Azotemia    Chronic kidney disease-mineral and bone disorder    Anemia due to chronic kidney disease, on chronic dialysis [facility])    Hyperphosphatemia    Benign hypertension with ESRD (end-stage renal disease) ([facility])    Slow transit constipation    Neutropenia ([facility])      Past Medical and Surgical History:     Past Medical History:   Diagnosis Date    Cataract     Chronic kidney disease     Diabetes mellitus ([facility] )      IDDM    Diabetic retinopathy ([facility] )     Dizziness     occ    ESRD (end stage renal disease) ([facility] )     Headache     occ    Hypertension     Legally blind     LVH (left ventricular hypertrophy)     Preferred language is [place]     understands some English    Use of cane as ambulatory aid      Past Surgical History:   Procedure Laterality Date    INGUINAL HERNIA REPAIR Right     IR FISTULA/GRAFTOGRAM  4/30/2019    IR FISTULA/GRAFTOGRAM  6/14/2019    IR PERMACATH PLACEMENT  4/3/2019    MS ANASTOMOSIS,AV,ANY SITE Left 1/23/2019    Procedure: CREATION FISTULA ARTERIOVENOUS (AV);   Surgeon: Christoph Haney MD;  Location: [facility] OR;  Service: Vascular      Family History:     Family History   Problem Relation Age of Onset    Hypertension Mother     Diabetes Father     No Known Problems Sister     No Known Problems Brother     No Known Problems Maternal Aunt     No Known Problems Maternal Uncle     No Known Problems Paternal Aunt     No Known Problems Paternal Uncle     No Known Problems Maternal Grandmother     No Known Problems Maternal Grandfather     No Known Problems Paternal Grandmother     No Known Problems Paternal Grandfather       Social History:        Social History     Socioeconomic History    Marital status: Legally      Spouse name: None    Number of children: None    Years of education: None    Highest education level: None   Occupational History    None   Social Needs    Financial resource strain: None    Food insecurity:     Worry: None     Inability: None    Transportation needs:     Medical: None     Non-medical: None   Tobacco Use    Smoking status: Former Smoker     Packs/day: 0 25     Last attempt to quit: 2018     Years since quittin 1    Smokeless tobacco: Never Used   Substance and Sexual Activity    Alcohol use: Not Currently    Drug use: No    Sexual activity: None   Lifestyle    Physical activity:     Days per week: None     Minutes per session: None    Stress: None   Relationships    Social connections:     Talks on phone: None     Gets together: None     Attends [employer] service: None     Active member of club or organization: None     Attends meetings of clubs or organizations: None     Relationship status: None    Intimate partner violence:     Fear of current or ex partner: None     Emotionally abused: None     Physically abused: None     Forced sexual activity: None   Other Topics Concern    None   Social History Narrative    None      Medications and Allergies:     Current Outpatient Medications   Medication Sig Dispense Refill    atorvastatin (LIPITOR) 40 mg tablet take 1 tablet by mouth once daily 90 tablet 1    Blood Glucose Monitoring Suppl (ONE TOUCH ULTRA 2) w/Device KIT by Does not apply route 3 (three) times a day 1 each 0    carvedilol (COREG) 6 25 mg tablet take 1 tablet by mouth twice a day with meals 180 tablet 0    cinacalcet (SENSIPAR) 30 mg tablet Take 30 mg by mouth daily      doxazosin (CARDURA) 2 mg tablet Take 1 tablet (2 mg total) by mouth daily at bedtime 30 tablet 1  furosemide (LASIX) 80 mg tablet Take 1 tablet (80 mg total) by mouth daily 90 tablet 3    glucose blood (FREESTYLE LITE) test strip Use as instructed  each 2    insulin aspart (NOVOLOG FLEXPEN) 100 Units/mL injection pen 10U with breakfast and lunch, 10-14 U with dinner 5 pen 2    insulin glargine (BASAGLAR KWIKPEN) 100 units/mL injection pen Inject 30 Units under the skin daily 5 pen 2    LOKELMA 10 g PACK       NIFEdipine ER (ADALAT CC) 60 MG 24 hr tablet Take 1 tablet (60 mg total) by mouth daily 30 tablet 1    ONE TOUCH LANCETS MISC by Does not apply route 3 (three) times a day 100 each 1    pantoprazole (PROTONIX) 20 mg tablet Take 1 tablet (20 mg total) by mouth daily 30 tablet 3    polyethylene glycol (MIRALAX) 17 g packet Take 17 g by mouth daily 30 each 1    prednisoLONE acetate (PRED FORTE) 1 % ophthalmic suspension   0    RA ASPIRIN EC 81 MG EC tablet take 1 tablet by mouth once daily 90 tablet 0    sevelamer carbonate (RENVELA) 800 mg tablet Take 800 mg by mouth 3 (three) times a day with meals       No current facility-administered medications for this visit        No Known Allergies   Immunizations:     Immunization History   Administered Date(s) Administered    INFLUENZA 11/11/2009, 10/06/2014, 08/04/2017, 01/02/2019    Influenza, recombinant, quadrivalent,injectable, preservative free 01/02/2019, 09/09/2019    Pneumococcal Polysaccharide PPV23 11/11/2009      Health Maintenance:         Topic Date Due    CRC Screening: Colonoscopy  1965    Hepatitis C Screening  Completed         Topic Date Due    DTaP,Tdap,and Td Vaccines (1 - Tdap) 04/07/1975    Pneumococcal Vaccine: Pediatrics (0 to 5 Years) and At-Risk Patients (6 to 59 Years) (2 of 3 - PCV13) 11/11/2010      Medicare Health Risk Assessment:     BP (!) 180/80 (BP Location: Right arm, Patient Position: Sitting, Cuff Size: Standard)   Pulse 72   Temp (!) 96 2 °F (35 7 °C) (Tympanic)   Ht 5' 10" (1 778 m)   [employer] 74 4 kg (164 lb)   SpO2 99%   BMI 23 53 kg/m²      Paddy is here for his Subsequent Wellness visit  Last Medicare Wellness visit information reviewed, patient interviewed and updates made to the record today  Health Risk Assessment:   Patient rates overall health as fair  Patient feels that their physical health rating is slightly better  Eyesight was rated as much worse  Hearing was rated as same  Patient feels that their emotional and mental health rating is same  Pain experienced in the last 7 days has been none  Patient states that he has experienced no weight loss or gain in last 6 months  Depression Screening:   PHQ-2 Score: 2      Fall Risk Screening: In the past year, patient has experienced: no history of falling in past year      Home Safety:  Patient has trouble with stairs inside or outside of their home  Patient has working smoke alarms and has working carbon monoxide detector  Home safety hazards include: none  Nutrition:   Current diet is Regular  Medications:   Patient is not currently taking any over-the-counter supplements  Patient is not able to manage medications  Activities of Daily Living (ADLs)/Instrumental Activities of Daily Living (IADLs):   Walk and transfer into and out of bed and chair?: Yes  Dress and groom yourself?: Yes    Bathe or shower yourself?: Yes    Feed yourself?  Yes  Do your laundry/housekeeping?: No  Manage your money, pay your bills and track your expenses?: No  Make your own meals?: No    Do your own shopping?: No    Previous Hospitalizations:   Any hospitalizations or ED visits within the last 12 months?: No      Advance Care Planning:   Living will: No    Advanced directive: Yes      Cognitive Screening:   Provider or family/friend/caregiver concerned regarding cognition?: No    PREVENTIVE SCREENINGS      Cardiovascular Screening:    General: Screening Not Indicated and History Lipid Disorder      Diabetes Screening:     General: Screening Not Indicated and History Diabetes      Colorectal Cancer Screening:     General: Risks and Benefits Discussed    Due for: Colonoscopy - Low Risk      Prostate Cancer Screening:      Due for: PSA      Osteoporosis Screening:    General: Screening Not Indicated      Abdominal Aortic Aneurysm (AAA) Screening:    Risk factors include: tobacco use        Lung Cancer Screening:     General: Screening Not Indicated      Hepatitis C Screening:    General: Screening Current      Baudilio Sotomayor MD

## 2020-03-17 ENCOUNTER — ANESTHESIA EVENT (OUTPATIENT)
Dept: GASTROENTEROLOGY | Facility: HOSPITAL | Age: 55
End: 2020-03-17

## 2020-03-18 ENCOUNTER — HOSPITAL ENCOUNTER (OUTPATIENT)
Dept: GASTROENTEROLOGY | Facility: HOSPITAL | Age: 55
Setting detail: OUTPATIENT SURGERY
Discharge: HOME | End: 2020-03-18
Attending: INTERNAL MEDICINE | Admitting: INTERNAL MEDICINE
Payer: MEDICARE

## 2020-03-18 ENCOUNTER — ANESTHESIA (OUTPATIENT)
Dept: GASTROENTEROLOGY | Facility: HOSPITAL | Age: 55
End: 2020-03-18

## 2020-03-18 VITALS
HEART RATE: 62 BPM | DIASTOLIC BLOOD PRESSURE: 72 MMHG | RESPIRATION RATE: 18 BRPM | TEMPERATURE: 98.6 F | HEIGHT: 70 IN | BODY MASS INDEX: 23.62 KG/M2 | WEIGHT: 165 LBS | SYSTOLIC BLOOD PRESSURE: 152 MMHG | OXYGEN SATURATION: 100 %

## 2020-03-18 DIAGNOSIS — Z12.11 COLON CANCER SCREENING: ICD-10-CM

## 2020-03-18 LAB — GLUCOSE SERPL-MCNC: 163 MG/DL (ref 65–140)

## 2020-03-18 PROCEDURE — 82948 REAGENT STRIP/BLOOD GLUCOSE: CPT

## 2020-03-18 PROCEDURE — 88305 TISSUE EXAM BY PATHOLOGIST: CPT | Performed by: PATHOLOGY

## 2020-03-18 PROCEDURE — 45385 COLONOSCOPY W/LESION REMOVAL: CPT | Performed by: INTERNAL MEDICINE

## 2020-03-18 RX ORDER — PROPOFOL 10 MG/ML
INJECTION, EMULSION INTRAVENOUS AS NEEDED
Status: DISCONTINUED | OUTPATIENT
Start: 2020-03-18 | End: 2020-03-18 | Stop reason: SURG

## 2020-03-18 RX ORDER — LIDOCAINE HYDROCHLORIDE 20 MG/ML
INJECTION, SOLUTION EPIDURAL; INFILTRATION; INTRACAUDAL; PERINEURAL AS NEEDED
Status: DISCONTINUED | OUTPATIENT
Start: 2020-03-18 | End: 2020-03-18 | Stop reason: SURG

## 2020-03-18 RX ORDER — SODIUM CHLORIDE 9 MG/ML
125 INJECTION, SOLUTION INTRAVENOUS CONTINUOUS
Status: DISCONTINUED | OUTPATIENT
Start: 2020-03-18 | End: 2020-03-22 | Stop reason: HOSPADM

## 2020-03-18 RX ADMIN — PROPOFOL 50 MG: 10 INJECTION, EMULSION INTRAVENOUS at 08:20

## 2020-03-18 RX ADMIN — PROPOFOL 50 MG: 10 INJECTION, EMULSION INTRAVENOUS at 08:24

## 2020-03-18 RX ADMIN — PROPOFOL 100 MG: 10 INJECTION, EMULSION INTRAVENOUS at 08:12

## 2020-03-18 RX ADMIN — PROPOFOL 50 MG: 10 INJECTION, EMULSION INTRAVENOUS at 08:16

## 2020-03-18 RX ADMIN — SODIUM CHLORIDE 125 ML/HR: 0.9 INJECTION, SOLUTION INTRAVENOUS at 07:56

## 2020-03-18 RX ADMIN — LIDOCAINE HYDROCHLORIDE 20 MG: 20 INJECTION, SOLUTION EPIDURAL; INFILTRATION; INTRACAUDAL; PERINEURAL at 08:12

## 2020-03-18 NOTE — H&P
History and Physical -  [facility]  Kvng Morley Trevin 53 y o  male MRN: [id number]                  HPI: Mike Raygoza is a 53y o  year old male who presents for colonoscopy for constipation  REVIEW OF SYSTEMS: Per the HPI, and otherwise unremarkable  Historical Information   Past Medical History:   Diagnosis Date    Cataract     Chronic kidney disease     Diabetes mellitus (HCC)      IDDM    [facility]     Dizziness     occ    ESRD (end stage renal disease) ([facility] )     GERD (gastroesophageal reflux disease)     Headache     occ    Hyperlipidemia     Hypertension     Legally blind     LVH (left ventricular hypertrophy)     Preferred language is [place]     understands some English    Use of cane as ambulatory aid      Past Surgical History:   Procedure Laterality Date    INGUINAL HERNIA REPAIR Right     IR FISTULA/GRAFTOGRAM  2019    IR FISTULA/GRAFTOGRAM  2019    IR PERMACATH PLACEMENT  4/3/2019    VT ANASTOMOSIS,AV,ANY SITE Left 2019    Procedure: CREATION FISTULA ARTERIOVENOUS (AV);   Surgeon: Amanda Cosme MD;  Location: [facility] OR;  Service: Vascular     Social History   Social History     Substance and Sexual Activity   Alcohol Use Not Currently     Social History     Substance and Sexual Activity   Drug Use No     Social History     Tobacco Use   Smoking Status Former Smoker    Packs/day: 0 25    Last attempt to quit: 2018    Years since quittin 1   Smokeless Tobacco Never Used     Family History   Problem Relation Age of Onset    Hypertension Mother     Diabetes Father     No Known Problems Sister     No Known Problems Brother     No Known Problems Maternal Aunt     No Known Problems Maternal Uncle     No Known Problems Paternal Aunt     No Known Problems Paternal Uncle     No Known Problems Maternal Grandmother     No Known Problems Maternal Grandfather     No Known Problems Paternal Grandmother  No Known Problems Paternal Grandfather        Meds/Allergies       (Not in a hospital admission)    No Known Allergies    Objective     There were no vitals taken for this visit  PHYSICAL EXAM    Gen: NAD  CV: RRR  CHEST: Clear  ABD: soft, NT/ND  EXT: no edema      ASSESSMENT/PLAN:  This is a 53y o  year old male here for colonoscopy, and he is stable and optimized for his procedure

## 2020-03-18 NOTE — DISCHARGE INSTRUCTIONS
Colonoscopy   WHAT YOU NEED TO KNOW:   A colonoscopy is a procedure to examine the inside of your colon (intestine) with a scope  Polyps or tissue growths may have been removed during your colonoscopy  It is normal to feel bloated and to have some abdominal discomfort  You should be passing gas  If you have hemorrhoids or you had polyps removed, you may have a small amount of bleeding  DISCHARGE INSTRUCTIONS:   Seek care immediately if:   · You have a large amount of bright red blood in your bowel movements  · Your abdomen is hard and firm and you have severe pain  · You have sudden trouble breathing  Contact your healthcare provider if:   · You develop a rash or hives  · You have a fever within 24 hours of your procedure       · You have not had a bowel movement for 3 days after your procedure  · You have questions or concerns about your condition or care  Activity:   · Do not lift, strain, or run  for 3 days after your procedure  · Rest after your procedure  You have been given medicine to relax you  Do not  drive or make important decisions until the day after your procedure  Return to your normal activity as directed  · Relieve gas and discomfort from bloating  by lying on your right side with a heating pad on your abdomen  You may need to take short walks to help the gas move out  Eat small meals until bloating is relieved  If you had polyps removed: For 7 days after your procedure:  · Do not  take aspirin  · Do not  go on long car rides  Follow up with your healthcare provider as directed:  Write down your questions so you remember to ask them during your visits  © 2017 [employer] is for End User's use only and may not be sold, redistributed or otherwise used for commercial purposes  All illustrations and images included in CareNotes® are the copyrighted property of [employer]  or [employer]    The above information is an  only  It is not intended as medical advice for individual conditions or treatments  Talk to your doctor, nurse or pharmacist before following any medical regimen to see if it is safe and effective for you

## 2020-03-18 NOTE — ANESTHESIA PREPROCEDURE EVALUATION
Review of Systems/Medical History  Patient summary reviewed  Chart reviewed  No history of anesthetic complications     Cardiovascular  Hyperlipidemia, Hypertension controlled,    Pulmonary  Negative pulmonary ROS        GI/Hepatic    GERD well controlled,        Chronic kidney disease stage 4, Dialysis AV fistula Date of last dialysis: 3/17/20,        Endo/Other  Diabetes well controlled type 2 Insulin,      GYN       Hematology  Anemia ,     Musculoskeletal  Negative musculoskeletal ROS        Neurology    Headaches, Diabetic neuropathy, Visual impairment (legally blind),    Psychology   Negative psychology ROS              Physical Exam    Airway    Mallampati score: II  TM Distance: >3 FB  Neck ROM: full     Dental   No notable dental hx     Cardiovascular  Rhythm: regular, Rate: normal, Cardiovascular exam normal    Pulmonary  Pulmonary exam normal Breath sounds clear to auscultation,     Other Findings        Anesthesia Plan  ASA Score- 4     Anesthesia Type- IV sedation with anesthesia with ASA Monitors  Additional Monitors:   Airway Plan:         Plan Factors-Patient not instructed to abstain from smoking on day of procedure       Induction- intravenous  Postoperative Plan-     Informed Consent- Anesthetic plan and risks discussed with spouse and patient  I personally reviewed this patient with the CRNA  Discussed and agreed on the Anesthesia Plan with the CRNA  Carmella Santamaria

## 2020-03-23 ENCOUNTER — TELEPHONE (OUTPATIENT)
Dept: GASTROENTEROLOGY | Facility: MEDICAL CENTER | Age: 55
End: 2020-03-23

## 2020-03-23 NOTE — TELEPHONE ENCOUNTER
----- Message from Rom Zee MD sent at 3/21/2020  4:10 PM EDT -----  Multiple colonic polyps removed were tubular adenomas repeat in 3 years

## 2020-04-11 DIAGNOSIS — I10 ESSENTIAL HYPERTENSION: ICD-10-CM

## 2020-04-11 DIAGNOSIS — N18.4 CKD (CHRONIC KIDNEY DISEASE) STAGE 4, GFR 15-29 ML/MIN (HCC): ICD-10-CM

## 2020-04-11 DIAGNOSIS — Z79.4 UNCONTROLLED TYPE 2 DIABETES MELLITUS WITH HYPERGLYCEMIA, WITH LONG-TERM CURRENT USE OF INSULIN (HCC): ICD-10-CM

## 2020-04-11 DIAGNOSIS — E11.65 UNCONTROLLED TYPE 2 DIABETES MELLITUS WITH HYPERGLYCEMIA, WITH LONG-TERM CURRENT USE OF INSULIN (HCC): ICD-10-CM

## 2020-04-13 RX ORDER — CARVEDILOL 6.25 MG/1
TABLET ORAL
Qty: 180 TABLET | Refills: 0 | Status: SHIPPED | OUTPATIENT
Start: 2020-04-13 | End: 2020-07-15

## 2020-04-13 RX ORDER — ACETAMINOPHEN/DIPHENHYDRAMINE 500MG-25MG
TABLET ORAL
Qty: 90 TABLET | Refills: 0 | Status: SHIPPED | OUTPATIENT
Start: 2020-04-13 | End: 2020-08-04

## 2020-04-27 ENCOUNTER — APPOINTMENT (OUTPATIENT)
Dept: LAB | Facility: CLINIC | Age: 55
End: 2020-04-27
Payer: MEDICARE

## 2020-04-27 DIAGNOSIS — Z11.4 SCREENING FOR HIV (HUMAN IMMUNODEFICIENCY VIRUS): ICD-10-CM

## 2020-04-27 DIAGNOSIS — Z12.5 SCREENING FOR PROSTATE CANCER: ICD-10-CM

## 2020-04-27 LAB
ALBUMIN SERPL BCP-MCNC: 4.1 G/DL (ref 3.5–5)
ALP SERPL-CCNC: 85 U/L (ref 46–116)
ALT SERPL W P-5'-P-CCNC: 46 U/L (ref 12–78)
ANION GAP SERPL CALCULATED.3IONS-SCNC: 7 MMOL/L (ref 4–13)
AST SERPL W P-5'-P-CCNC: 22 U/L (ref 5–45)
BASOPHILS # BLD AUTO: 0.04 THOUSANDS/ΜL (ref 0–0.1)
BASOPHILS NFR BLD AUTO: 1 % (ref 0–1)
BILIRUB SERPL-MCNC: 0.34 MG/DL (ref 0.2–1)
BUN SERPL-MCNC: 65 MG/DL (ref 5–25)
CALCIUM SERPL-MCNC: 9.4 MG/DL (ref 8.3–10.1)
CHLORIDE SERPL-SCNC: 101 MMOL/L (ref 100–108)
CHOLEST SERPL-MCNC: 128 MG/DL (ref 50–200)
CO2 SERPL-SCNC: 28 MMOL/L (ref 21–32)
CREAT SERPL-MCNC: 11.6 MG/DL (ref 0.6–1.3)
EOSINOPHIL # BLD AUTO: 0.17 THOUSAND/ΜL (ref 0–0.61)
EOSINOPHIL NFR BLD AUTO: 3 % (ref 0–6)
ERYTHROCYTE [DISTWIDTH] IN BLOOD BY AUTOMATED COUNT: 14 % (ref 11.6–15.1)
EST. AVERAGE GLUCOSE BLD GHB EST-MCNC: 183 MG/DL
GFR SERPL CREATININE-BSD FRML MDRD: 5 ML/MIN/1.73SQ M
GLUCOSE P FAST SERPL-MCNC: 158 MG/DL (ref 65–99)
HBA1C MFR BLD: 8 %
HCT VFR BLD AUTO: 41.4 % (ref 36.5–49.3)
HDLC SERPL-MCNC: 50 MG/DL
HGB BLD-MCNC: 13 G/DL (ref 12–17)
IMM GRANULOCYTES # BLD AUTO: 0.01 THOUSAND/UL (ref 0–0.2)
IMM GRANULOCYTES NFR BLD AUTO: 0 % (ref 0–2)
LDLC SERPL CALC-MCNC: 57 MG/DL (ref 0–100)
LYMPHOCYTES # BLD AUTO: 2.01 THOUSANDS/ΜL (ref 0.6–4.47)
LYMPHOCYTES NFR BLD AUTO: 39 % (ref 14–44)
MCH RBC QN AUTO: 28.8 PG (ref 26.8–34.3)
MCHC RBC AUTO-ENTMCNC: 31.4 G/DL (ref 31.4–37.4)
MCV RBC AUTO: 92 FL (ref 82–98)
MONOCYTES # BLD AUTO: 0.56 THOUSAND/ΜL (ref 0.17–1.22)
MONOCYTES NFR BLD AUTO: 11 % (ref 4–12)
NEUTROPHILS # BLD AUTO: 2.42 THOUSANDS/ΜL (ref 1.85–7.62)
NEUTS SEG NFR BLD AUTO: 46 % (ref 43–75)
NRBC BLD AUTO-RTO: 0 /100 WBCS
PLATELET # BLD AUTO: 239 THOUSANDS/UL (ref 149–390)
PMV BLD AUTO: 9.9 FL (ref 8.9–12.7)
POTASSIUM SERPL-SCNC: 5.4 MMOL/L (ref 3.5–5.3)
PROT SERPL-MCNC: 8 G/DL (ref 6.4–8.2)
PSA SERPL-MCNC: 0.5 NG/ML (ref 0–4)
RBC # BLD AUTO: 4.51 MILLION/UL (ref 3.88–5.62)
SODIUM SERPL-SCNC: 136 MMOL/L (ref 136–145)
TRIGL SERPL-MCNC: 103 MG/DL
TSH SERPL DL<=0.05 MIU/L-ACNC: 2.49 UIU/ML (ref 0.36–3.74)
WBC # BLD AUTO: 5.21 THOUSAND/UL (ref 4.31–10.16)

## 2020-04-27 PROCEDURE — 87389 HIV-1 AG W/HIV-1&-2 AB AG IA: CPT

## 2020-04-27 PROCEDURE — 36415 COLL VENOUS BLD VENIPUNCTURE: CPT | Performed by: INTERNAL MEDICINE

## 2020-04-27 PROCEDURE — 85025 COMPLETE CBC W/AUTO DIFF WBC: CPT | Performed by: INTERNAL MEDICINE

## 2020-04-27 PROCEDURE — 80053 COMPREHEN METABOLIC PANEL: CPT | Performed by: INTERNAL MEDICINE

## 2020-04-27 PROCEDURE — 83036 HEMOGLOBIN GLYCOSYLATED A1C: CPT | Performed by: INTERNAL MEDICINE

## 2020-04-27 PROCEDURE — 80061 LIPID PANEL: CPT | Performed by: INTERNAL MEDICINE

## 2020-04-27 PROCEDURE — G0103 PSA SCREENING: HCPCS

## 2020-04-27 PROCEDURE — 84443 ASSAY THYROID STIM HORMONE: CPT | Performed by: INTERNAL MEDICINE

## 2020-04-29 LAB — HIV 1+2 AB+HIV1 P24 AG SERPL QL IA: NORMAL

## 2020-05-18 ENCOUNTER — OFFICE VISIT (OUTPATIENT)
Dept: PODIATRY | Facility: CLINIC | Age: 55
End: 2020-05-18
Payer: MEDICARE

## 2020-05-18 DIAGNOSIS — E11.42 DIABETIC POLYNEUROPATHY ASSOCIATED WITH TYPE 2 DIABETES MELLITUS (HCC): Primary | ICD-10-CM

## 2020-05-18 DIAGNOSIS — B35.1 ONYCHOMYCOSIS: ICD-10-CM

## 2020-05-18 PROCEDURE — 3066F NEPHROPATHY DOC TX: CPT | Performed by: PODIATRIST

## 2020-05-18 PROCEDURE — 1036F TOBACCO NON-USER: CPT | Performed by: PODIATRIST

## 2020-05-18 PROCEDURE — 99203 OFFICE O/P NEW LOW 30 MIN: CPT | Performed by: PODIATRIST

## 2020-05-18 PROCEDURE — 3052F HG A1C>EQUAL 8.0%<EQUAL 9.0%: CPT | Performed by: PODIATRIST

## 2020-06-15 ENCOUNTER — OFFICE VISIT (OUTPATIENT)
Dept: INTERNAL MEDICINE CLINIC | Facility: CLINIC | Age: 55
End: 2020-06-15
Payer: MEDICARE

## 2020-06-15 ENCOUNTER — TELEPHONE (OUTPATIENT)
Dept: ADMINISTRATIVE | Facility: OTHER | Age: 55
End: 2020-06-15

## 2020-06-15 VITALS
TEMPERATURE: 97.6 F | HEIGHT: 70 IN | DIASTOLIC BLOOD PRESSURE: 94 MMHG | BODY MASS INDEX: 23.68 KG/M2 | HEART RATE: 74 BPM | SYSTOLIC BLOOD PRESSURE: 183 MMHG

## 2020-06-15 DIAGNOSIS — Z79.4 TYPE 2 DIABETES MELLITUS WITH CHRONIC KIDNEY DISEASE ON CHRONIC DIALYSIS, WITH LONG-TERM CURRENT USE OF INSULIN (HCC): Primary | ICD-10-CM

## 2020-06-15 DIAGNOSIS — I12.0 BENIGN HYPERTENSION WITH ESRD (END-STAGE RENAL DISEASE) (HCC): ICD-10-CM

## 2020-06-15 DIAGNOSIS — Z99.2 TYPE 2 DIABETES MELLITUS WITH CHRONIC KIDNEY DISEASE ON CHRONIC DIALYSIS, WITH LONG-TERM CURRENT USE OF INSULIN (HCC): Primary | ICD-10-CM

## 2020-06-15 DIAGNOSIS — M54.2 NECK PAIN: ICD-10-CM

## 2020-06-15 DIAGNOSIS — E11.22 TYPE 2 DIABETES MELLITUS WITH CHRONIC KIDNEY DISEASE ON CHRONIC DIALYSIS, WITH LONG-TERM CURRENT USE OF INSULIN (HCC): Primary | ICD-10-CM

## 2020-06-15 DIAGNOSIS — N18.6 TYPE 2 DIABETES MELLITUS WITH CHRONIC KIDNEY DISEASE ON CHRONIC DIALYSIS, WITH LONG-TERM CURRENT USE OF INSULIN (HCC): Primary | ICD-10-CM

## 2020-06-15 DIAGNOSIS — E78.5 HYPERLIPIDEMIA, UNSPECIFIED HYPERLIPIDEMIA TYPE: ICD-10-CM

## 2020-06-15 DIAGNOSIS — K59.09 CHRONIC CONSTIPATION: ICD-10-CM

## 2020-06-15 DIAGNOSIS — N18.6 BENIGN HYPERTENSION WITH ESRD (END-STAGE RENAL DISEASE) (HCC): ICD-10-CM

## 2020-06-15 PROCEDURE — 3066F NEPHROPATHY DOC TX: CPT | Performed by: INTERNAL MEDICINE

## 2020-06-15 PROCEDURE — 99214 OFFICE O/P EST MOD 30 MIN: CPT | Performed by: INTERNAL MEDICINE

## 2020-06-15 PROCEDURE — 1036F TOBACCO NON-USER: CPT | Performed by: INTERNAL MEDICINE

## 2020-06-15 PROCEDURE — 3052F HG A1C>EQUAL 8.0%<EQUAL 9.0%: CPT | Performed by: INTERNAL MEDICINE

## 2020-06-15 RX ORDER — CYCLOBENZAPRINE HCL 5 MG
5 TABLET ORAL
Qty: 30 TABLET | Refills: 1 | Status: SHIPPED | OUTPATIENT
Start: 2020-06-15 | End: 2022-02-21

## 2020-06-18 DIAGNOSIS — IMO0002 UNCONTROLLED SECONDARY DIABETES MELLITUS WITH STAGE 5 CKD (GFR<15): ICD-10-CM

## 2020-06-19 ENCOUNTER — TELEPHONE (OUTPATIENT)
Dept: INTERVENTIONAL RADIOLOGY/VASCULAR | Facility: HOSPITAL | Age: 55
End: 2020-06-19

## 2020-06-24 ENCOUNTER — HOSPITAL ENCOUNTER (OUTPATIENT)
Dept: INTERVENTIONAL RADIOLOGY/VASCULAR | Facility: HOSPITAL | Age: 55
Discharge: HOME | End: 2020-06-24
Attending: INTERNAL MEDICINE
Payer: MEDICARE

## 2020-06-24 VITALS
SYSTOLIC BLOOD PRESSURE: 145 MMHG | OXYGEN SATURATION: 100 % | DIASTOLIC BLOOD PRESSURE: 75 MMHG | TEMPERATURE: 97 F | RESPIRATION RATE: 18 BRPM | HEART RATE: 71 BPM

## 2020-06-24 DIAGNOSIS — N18.6 ESRD (END STAGE RENAL DISEASE) (HCC): ICD-10-CM

## 2020-06-24 PROCEDURE — 36902 INTRO CATH DIALYSIS CIRCUIT: CPT

## 2020-06-24 PROCEDURE — C1725 CATH, TRANSLUMIN NON-LASER: HCPCS

## 2020-06-24 PROCEDURE — 82948 REAGENT STRIP/BLOOD GLUCOSE: CPT

## 2020-06-24 PROCEDURE — 99152 MOD SED SAME PHYS/QHP 5/>YRS: CPT | Performed by: RADIOLOGY

## 2020-06-24 PROCEDURE — 99153 MOD SED SAME PHYS/QHP EA: CPT

## 2020-06-24 PROCEDURE — C1769 GUIDE WIRE: HCPCS

## 2020-06-24 PROCEDURE — C2617 STENT, NON-COR, TEM W/O DEL: HCPCS

## 2020-06-24 PROCEDURE — G9198 NO ORDER FOR CEPH NO REASON: HCPCS | Performed by: RADIOLOGY

## 2020-06-24 PROCEDURE — C1894 INTRO/SHEATH, NON-LASER: HCPCS

## 2020-06-24 PROCEDURE — 36902 INTRO CATH DIALYSIS CIRCUIT: CPT | Performed by: RADIOLOGY

## 2020-06-24 PROCEDURE — 99152 MOD SED SAME PHYS/QHP 5/>YRS: CPT

## 2020-06-24 RX ORDER — MIDAZOLAM HYDROCHLORIDE 2 MG/2ML
INJECTION, SOLUTION INTRAMUSCULAR; INTRAVENOUS CODE/TRAUMA/SEDATION MEDICATION
Status: COMPLETED | OUTPATIENT
Start: 2020-06-24 | End: 2020-06-24

## 2020-06-24 RX ORDER — FENTANYL CITRATE 50 UG/ML
INJECTION, SOLUTION INTRAMUSCULAR; INTRAVENOUS CODE/TRAUMA/SEDATION MEDICATION
Status: COMPLETED | OUTPATIENT
Start: 2020-06-24 | End: 2020-06-24

## 2020-06-24 RX ORDER — LIDOCAINE WITH 8.4% SOD BICARB 0.9%(10ML)
SYRINGE (ML) INJECTION CODE/TRAUMA/SEDATION MEDICATION
Status: COMPLETED | OUTPATIENT
Start: 2020-06-24 | End: 2020-06-24

## 2020-06-24 RX ADMIN — IOHEXOL 56 ML: 350 INJECTION, SOLUTION INTRAVENOUS at 12:05

## 2020-06-24 RX ADMIN — MIDAZOLAM HYDROCHLORIDE 1 MG: 1 INJECTION, SOLUTION INTRAMUSCULAR; INTRAVENOUS at 11:33

## 2020-06-24 RX ADMIN — Medication 5 ML: at 10:58

## 2020-06-24 RX ADMIN — MIDAZOLAM HYDROCHLORIDE 1 MG: 1 INJECTION, SOLUTION INTRAMUSCULAR; INTRAVENOUS at 11:01

## 2020-06-24 RX ADMIN — FENTANYL CITRATE 50 MCG: 50 INJECTION INTRAMUSCULAR; INTRAVENOUS at 11:01

## 2020-06-24 RX ADMIN — FENTANYL CITRATE 50 MCG: 50 INJECTION INTRAMUSCULAR; INTRAVENOUS at 11:33

## 2020-06-25 LAB — GLUCOSE SERPL-MCNC: 117 MG/DL (ref 65–140)

## 2020-07-15 DIAGNOSIS — I10 ESSENTIAL HYPERTENSION: ICD-10-CM

## 2020-07-15 RX ORDER — CARVEDILOL 6.25 MG/1
TABLET ORAL
Qty: 180 TABLET | Refills: 0 | Status: SHIPPED | OUTPATIENT
Start: 2020-07-15 | End: 2020-12-02

## 2020-07-17 ENCOUNTER — TELEPHONE (OUTPATIENT)
Dept: NEPHROLOGY | Facility: CLINIC | Age: 55
End: 2020-07-17

## 2020-07-23 DIAGNOSIS — IMO0002 UNCONTROLLED SECONDARY DIABETES MELLITUS WITH STAGE 5 CKD (GFR<15): ICD-10-CM

## 2020-08-04 DIAGNOSIS — Z79.4 UNCONTROLLED TYPE 2 DIABETES MELLITUS WITH HYPERGLYCEMIA, WITH LONG-TERM CURRENT USE OF INSULIN (HCC): ICD-10-CM

## 2020-08-04 DIAGNOSIS — E11.65 UNCONTROLLED TYPE 2 DIABETES MELLITUS WITH HYPERGLYCEMIA, WITH LONG-TERM CURRENT USE OF INSULIN (HCC): ICD-10-CM

## 2020-08-04 DIAGNOSIS — I10 ESSENTIAL HYPERTENSION: ICD-10-CM

## 2020-08-04 DIAGNOSIS — N18.4 CKD (CHRONIC KIDNEY DISEASE) STAGE 4, GFR 15-29 ML/MIN (HCC): ICD-10-CM

## 2020-08-04 RX ORDER — ACETAMINOPHEN/DIPHENHYDRAMINE 500MG-25MG
TABLET ORAL
Qty: 90 TABLET | Refills: 0 | Status: SHIPPED | OUTPATIENT
Start: 2020-08-04 | End: 2020-11-02

## 2020-08-12 ENCOUNTER — TELEPHONE (OUTPATIENT)
Dept: NEPHROLOGY | Facility: CLINIC | Age: 55
End: 2020-08-12

## 2020-08-12 ENCOUNTER — OFFICE VISIT (OUTPATIENT)
Dept: NEPHROLOGY | Facility: CLINIC | Age: 55
End: 2020-08-12
Payer: MEDICARE

## 2020-08-12 VITALS
BODY MASS INDEX: 23.68 KG/M2 | DIASTOLIC BLOOD PRESSURE: 60 MMHG | TEMPERATURE: 98.6 F | WEIGHT: 165 LBS | SYSTOLIC BLOOD PRESSURE: 114 MMHG

## 2020-08-12 DIAGNOSIS — Z01.818 PRE-TRANSPLANT EVALUATION FOR ESRD (END STAGE RENAL DISEASE): Primary | ICD-10-CM

## 2020-08-12 PROCEDURE — 1036F TOBACCO NON-USER: CPT | Performed by: INTERNAL MEDICINE

## 2020-08-12 PROCEDURE — 99215 OFFICE O/P EST HI 40 MIN: CPT | Performed by: INTERNAL MEDICINE

## 2020-08-12 PROCEDURE — 3052F HG A1C>EQUAL 8.0%<EQUAL 9.0%: CPT | Performed by: INTERNAL MEDICINE

## 2020-08-12 PROCEDURE — 3066F NEPHROPATHY DOC TX: CPT | Performed by: INTERNAL MEDICINE

## 2020-08-12 NOTE — PATIENT INSTRUCTIONS
1) Please call our office if you do not hear from [facility] in 4 weeks  Thank you

## 2020-08-12 NOTE — TELEPHONE ENCOUNTER
COVID Pre-Visit Screening     1  Is this a family member screening? No  2  Have you traveled outside of your state in the past 2 weeks? No  3  Do you presently have a fever or flu-like symptoms? No  4  Do you have symptoms of an upper respiratory infection like runny nose, sore throat, or cough? No  5  Are you suffering from new headache that you have not had in the past?  No  6  Do you have/have you experienced any new shortness of breath recently? No  7  Do you have any new diarrhea, nausea or vomiting? No  8  Have you been in contact with anyone who has been sick or diagnosed with COVID-19? No  9  Do you have any new loss of taste or smell? No  10  Are you able to wear a mask without a valve for the entire visit? Yes    Patient's sister who was accompanying patient was also screened  COVID Pre-Visit Screening     11  Is this a family member screening? Yes  12  Have you traveled outside of your state in the past 2 weeks? No  13  Do you presently have a fever or flu-like symptoms? No  14  Do you have symptoms of an upper respiratory infection like runny nose, sore throat, or cough? No  15  Are you suffering from new headache that you have not had in the past?  No  16  Do you have/have you experienced any new shortness of breath recently? No  17  Do you have any new diarrhea, nausea or vomiting? No  18  Have you been in contact with anyone who has been sick or diagnosed with COVID-19? No  19  Do you have any new loss of taste or smell? No  20  Are you able to wear a mask without a valve for the entire visit?  Yes

## 2020-08-12 NOTE — LETTER
2020     Ankur Olvera, [address]  [address]  [address] [place] [address]    Patient: Daniela Boss   YOB: 1965   Date of Visit: 2020       Dear Dr Santana Han:    Thank you for referring Daniela Boss to me for evaluation  Below are my notes for this consultation  If you have questions, please do not hesitate to call me  I look forward to following your patient along with you  Sincerely,        Dequan Womack MD        CC: MD Dequan Neely MD  2020 10:56 AM  Sign when Signing Visit  Assessment     Daniela Boss is a 63 y o  male [place] referred by Dr Santana Han, with PMHx of ESRD on HD at [facility], HTN (), DM (diagnosed ), retinopathy, legally blind, neuropathy, , history of chronic idiopathic constipation, former worked in security, former smoker quit in  (smoked since teenage years), no ETOH, no drugs,seen in the Nephrology Clinic for pre-transplant kidney evaluation  ESRD presumed to be due to DM  On HD since 2019 - admission for staph epidermidis bacteremia in setting of dialyssi catheter  2019 - ECHO EF 65; mod to severe concentric LVH;     Plan   1  Patient does have risk factors including long standing DM, blindness  Patient has strong social support system at home  2  The need to avoid blood transfusion to decrease allosensitization was explained to the patient  3  The advantages of a living donor over a  donor was explained to the patient and family  I strongly encouraged the patient and family to pursue a living donor  4  Endocrine - history of DM; 60 units total daily; BMI 23 7  5  Colonoscopy - completed in 2020 with polyp; small and internal hemorrhoids  Repeat colonoscopy in  due to poor prep  6  Cardiology - will need clearance eval  7  GI - history of chronic idiopathic constipation  8  Transplant Psychiatry - history of depression per patient  9  Social work eval - pt sister is main care giver (sister is home during day and works in Nursing at [employer])  Pt takes own medications and insulin based on clicks on insulin pen and bottles are laid out for patient and pt takes on own based on timing of day  10  PVD - will need clearance eval    I have discussed with Paddy Zhong and family at length about the risk and benefits of kidney transplantation  I have strongly encouraged him to pursue living donation, and explained to him the benefits of living donation over  donor transplantation  We have briefly discussed the surgical procedure  We have discussed about the need for life long immunosuppression and the importance of compliance  We have discussed the side effects of immunosuppression including but not limited to infection, malignancies and developing/worsening diabetes control  Paddy Zhong verbalized understanding and is interested in pursuing transplant  During this visit, I spent 60 minutes with the patient; more than 50% of the time I counseled the patient regarding the risks and benefits of kidney transplantation, the immediate and long-term complications of kidney transplantation, and the advantage of receiving a living donor kidney transplant  It was a pleasure evaluating your patient in the office today  Thank you for allowing our team to participate in the care of Mr Rosezetta Boas  Please do not hesitate to contact our team if further issues/questions shall arise in the interim  HISTORY OF PRESENT ILLNESS    Rosezetta Boas is a 63 y o  male seen in the Nephrology Clinic for evaluation for kidney transplant  ESRD due to DM  Renal disease is not biopsy proven  Primary Nephrologist is Dr Rex Villafuerte  HD unit - [facility]    HD:   he is currently on dialysis since 2019    HD days are TTS    Native Urine Output: minimal  Hx of voiding difficulty: no  Hx of hematuria: no  Hx of proteinuria: yes  Hx of nephrolithiasis: no  Hx of recurrent urinary tract infection: no    HTN: onset 2011,  hx of malignant HTN: no, admission for HTN emergencies: no    DM type 2: onset 2002    Total insulin requirement/day 60 units  DM retinopathy: yes  DM neuropathy: yes  DM gastroparesis: no  DKA: no  Hypoglycemic awareness: no  Hx of amputation: no     PAD/PVD: unknown, Claudication: some times    Cardiac history  CAD: no    Primary Cardiologist: no    Exercise tolerance: no    Hx of CVA: no    Hx of DVT/PE: no    Viral Infection:    HIV: no  Hep B: no  Hep C: no    Cancer: History of cancer: no    Health maintenance and other pertinent history:    If AA, history of sickle cell: no    Male:    Colonoscopy: yes  Prostate: no    Blood transfusion: no    Review Of Systems:     Constitutional: nl appetite  no fevers, chills, involuntary weight gain or weight loss  Eyes: retinopathy, legally blind  ENT: no ear disease, epistaxis or oral ulcers  Respiratory: no SOB, cough, hemoptysis, UMAÑA  Cardiovascular: no CP, palpitations, claudication, edema  GI: no N/V/D/C, abdominal pain, melena, BRBPR  : see HPI  Endocrine: no thyroid disease  Heme: no bleeding or clotting disorders or swollen lymph nodes  MS: no joint effusions or deformities    Skin: no skin disease  Neuro: no seizure; occ HA   Psych: + depression    Lives with mother, sister    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; prior worked in security    HD Access: LUE   Abdominal Surgeries: hernia repair prior    Smoke: quit smoking (2016)  ETOH: no  Drugs: no    Past Medical History:   Diagnosis Date    Cataract     Chronic kidney disease     Diabetes mellitus ([facility] )      IDDM    [facility]     Dizziness     occ    [facility]D [facility]end [facility]) [facility] )     GERD (gastroesophageal reflux disease)     Headache     occ    Hyperlipidemia     Hypertension     Legally blind     LVH (left ventricular hypertrophy)     Preferred language is [place] understands some English    Use of cane as ambulatory aid      Past Surgical History:   Procedure Laterality Date    INGUINAL HERNIA REPAIR Right     IR FISTULA/GRAFTOGRAM  2019    IR FISTULA/GRAFTOGRAM  2019    IR FISTULA/GRAFTOGRAM  2020    IR PERMACATH PLACEMENT  4/3/2019    ME ANASTOMOSIS,AV,ANY SITE Left 2019    Procedure: CREATION FISTULA ARTERIOVENOUS (AV);   Surgeon: Jose Collins MD;  Location: [facility];  Service: Vascular       Family History   Problem Relation Age of Onset    Hypertension Mother     Diabetes Father     No Known Problems Sister     No Known Problems Brother     No Known Problems Maternal Aunt     No Known Problems Maternal Uncle     No Known Problems Paternal Aunt     No Known Problems Paternal Uncle     No Known Problems Maternal Grandmother     No Known Problems Maternal Grandfather     No Known Problems Paternal Grandmother     No Known Problems Paternal Grandfather      Social History     Socioeconomic History    Marital status: Legally      Spouse name: None    Number of children: None    Years of education: None    Highest education level: None   Occupational History    Occupation: DISABLED   Social Needs    Financial resource strain: None    Food insecurity     Worry: None     Inability: None    Transportation needs     Medical: None     Non-medical: None   Tobacco Use    Smoking status: Former Smoker     Packs/day: 0 25     Last attempt to quit: 2018     Years since quittin 5    Smokeless tobacco: Never Used   Substance and Sexual Activity    Alcohol use: Not Currently    Drug use: No    Sexual activity: None   Lifestyle    Physical activity     Days per week: None     Minutes per session: None    Stress: None   Relationships    Social connections     Talks on phone: None     Gets together: None     Attends Uatsdin service: None     Active member of club or organization: None     Attends meetings of clubs or organizations: None     Relationship status: None    Intimate partner violence     Fear of current or ex partner: None     Emotionally abused: None     Physically abused: None     Forced sexual activity: None   Other Topics Concern    None   Social History Narrative    None         Living donors: has not discussed with family    Current Outpatient Medications   Medication Sig Dispense Refill    atorvastatin (LIPITOR) 40 mg tablet take 1 tablet by mouth once daily 90 tablet 1    carvedilol (COREG) 6 25 mg tablet take 1 tablet by mouth twice a day with meals 180 tablet 0    cinacalcet (SENSIPAR) 30 mg tablet Take 30 mg by mouth daily      cyclobenzaprine (FLEXERIL) 5 mg tablet Take 1 tablet (5 mg total) by mouth daily at bedtime as needed for muscle spasms 30 tablet 1    doxazosin (CARDURA) 2 mg tablet Take 1 tablet (2 mg total) by mouth daily at bedtime 30 tablet 1    furosemide (LASIX) 80 mg tablet Take 1 tablet (80 mg total) by mouth daily 90 tablet 3    insulin aspart (NOVOLOG FLEXPEN) 100 Units/mL injection pen 10U with breakfast and lunch, 10-14 U with dinner 5 pen 2    insulin glargine (Basaglar KwikPen) 100 units/mL injection pen Inject 30 Units under the skin daily 5 pen 2    NIFEdipine ER (ADALAT CC) 60 MG 24 hr tablet Take 1 tablet (60 mg total) by mouth daily 30 tablet 1    pantoprazole (PROTONIX) 20 mg tablet Take 1 tablet (20 mg total) by mouth daily 30 tablet 3    polyethylene glycol (MIRALAX) 17 g packet Take 17 g by mouth daily 30 each 1    prednisoLONE acetate (PRED FORTE) 1 % ophthalmic suspension   0    RA Aspirin EC 81 MG EC tablet take 1 tablet by mouth once daily 90 tablet 0    sevelamer carbonate (RENVELA) 800 mg tablet Take 800 mg by mouth 3 (three) times a day with meals      Blood Glucose Monitoring Suppl (ONE TOUCH ULTRA 2) w/Device KIT by Does not apply route 3 (three) times a day 1 each 0    glucose blood (FREESTYLE LITE) test strip Test blood sugars three times daily  100 each 2    LOKELMA 10 g PACK       ONE TOUCH LANCETS MISC by Does not apply route 3 (three) times a day 100 each 1     No current facility-administered medications for this visit        No known allergies    Physical Exam:    /60 (BP Location: Right arm, Patient Position: Sitting, Cuff Size: Standard)   Temp 98 6 °F (37 °C)   Wt 74 8 kg (165 lb)   BMI 23 68 kg/m²     General : NAD    Cardiac:  RRR, S1, S2 normal,  Soft referred murmur    Lung:  CTAB   Abdomen:  soft, nontender, no ascites   HD access: LUE AVF with good thrill/bruit   femoral pulses (2+ and soft, + bruit); pulses not palpable on feet  no edema   Neuro:no focal neuro deficits   Skin: tattoo no  Carotid bruit: no  Lymph: no LN- cervical, axillary, inguinal

## 2020-08-12 NOTE — PROGRESS NOTES
Assessment     Paddy Zhong is a 63 y o  male [place] referred by Dr Libra Ley, with PMHx of ESRD on HD at [facility], HTN (), DM (diagnosed ), retinopathy, legally blind, neuropathy, , history of chronic idiopathic constipation, former worked in security, former smoker quit in 2016 (smoked since teenage years), no ETOH, no drugs,seen in the Nephrology Clinic for pre-transplant kidney evaluation  ESRD presumed to be due to DM  On HD since 2019 - admission for staph epidermidis bacteremia in setting of dialyssi catheter  2019 - ECHO EF 65; mod to severe concentric LVH;     Plan   1  Patient does have risk factors including long standing DM, blindness  Patient has strong social support system at home  2  The need to avoid blood transfusion to decrease allosensitization was explained to the patient  3  The advantages of a living donor over a  donor was explained to the patient and family  I strongly encouraged the patient and family to pursue a living donor  4  Endocrine - history of DM; 60 units total daily; BMI 23 7  5  Colonoscopy - completed in 2020 with polyp; small and internal hemorrhoids  Repeat colonoscopy in  due to poor prep  6  Cardiology - will need clearance eval  7  GI - history of chronic idiopathic constipation  8  Transplant Psychiatry - history of depression per patient  9  Social work eval - pt sister is main care giver (sister is home during day and works in Nursing at [employer])  Pt takes own medications and insulin based on clicks on insulin pen and bottles are laid out for patient and pt takes on own based on timing of day  10  PVD - will need clearance eval    I have discussed with Paddy Zhong and family at length about the risk and benefits of kidney transplantation  I have strongly encouraged him to pursue living donation, and explained to him the benefits of living donation over  donor transplantation   We have briefly discussed the surgical procedure  We have discussed about the need for life long immunosuppression and the importance of compliance  We have discussed the side effects of immunosuppression including but not limited to infection, malignancies and developing/worsening diabetes control  Josephcari Zhong verbalized understanding and is interested in pursuing transplant  During this visit, I spent 60 minutes with the patient; more than 50% of the time I counseled the patient regarding the risks and benefits of kidney transplantation, the immediate and long-term complications of kidney transplantation, and the advantage of receiving a living donor kidney transplant  It was a pleasure evaluating your patient in the office today  Thank you for allowing our team to participate in the care of Mr Elihue Harada  Please do not hesitate to contact our team if further issues/questions shall arise in the interim  HISTORY OF PRESENT ILLNESS    Elihue Harada is a 63 y o  male seen in the Nephrology Clinic for evaluation for kidney transplant  ESRD due to DM  Renal disease is not biopsy proven  Primary Nephrologist is Dr Luba Leyden  HD unit - [facility]    HD:   he is currently on dialysis since 2019    HD days are TTS    Native Urine Output: minimal  Hx of voiding difficulty: no  Hx of hematuria: no  Hx of proteinuria: yes  Hx of nephrolithiasis: no  Hx of recurrent urinary tract infection: no    HTN: onset 2011,  hx of malignant HTN: no, admission for HTN emergencies: no    DM type 2: onset 2002    Total insulin requirement/day 60 units  DM retinopathy: yes  DM neuropathy: yes  DM gastroparesis: no  DKA: no  Hypoglycemic awareness: no  Hx of amputation: no     PAD/PVD: unknown, Claudication: some times    Cardiac history - CAD: no    Primary Cardiologist: no    Exercise tolerance: no    Hx of CVA: no    Hx of DVT/PE: no    Viral Infection:    HIV: no  Hep B: no  Hep C: no    Cancer: History of cancer: no    Health maintenance and other pertinent history:    If AA, history of sickle cell: no    Male:    Colonoscopy: yes  Prostate: no    Blood transfusion: no    Review Of Systems:     Constitutional: nl appetite  no fevers, chills, involuntary weight gain or weight loss  Eyes: retinopathy, legally blind  ENT: no ear disease, epistaxis or oral ulcers  Respiratory: no SOB, cough, hemoptysis, UMAÑA  Cardiovascular: no CP, palpitations, claudication, edema  GI: no N/V/D/C, abdominal pain, melena, BRBPR  : see HPI  Endocrine: no thyroid disease  Heme: no bleeding or clotting disorders or swollen lymph nodes  MS: no joint effusions or deformities  Skin: no skin disease  Neuro: no seizure; occ HA   Psych: + depression    Lives with mother, sister    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; prior worked in security    HD Access: LUE   Abdominal Surgeries: hernia repair prior    Smoke: quit smoking (2016)  ETOH: no  Drugs: no    Past Medical History:   Diagnosis Date    Cataract     Chronic kidney disease     [facility] )      IDDM    Diabetic retinopathy ([facility] )     Dizziness     occ    ESRD (end stage renal disease) ([facility] )     GERD (gastroesophageal reflux disease)     Headache     occ    Hyperlipidemia     Hypertension     Legally blind     LVH (left ventricular hypertrophy)     Preferred language is [place]     understands some English    Use of cane as ambulatory aid      Past Surgical History:   Procedure Laterality Date    INGUINAL HERNIA REPAIR Right     IR FISTULA/GRAFTOGRAM  4/30/2019    IR FISTULA/GRAFTOGRAM  6/14/2019    IR FISTULA/GRAFTOGRAM  6/24/2020    IR PERMACATH PLACEMENT  4/3/2019    ID ANASTOMOSIS,AV,ANY SITE Left 1/23/2019    Procedure: CREATION FISTULA ARTERIOVENOUS (AV);   Surgeon: Brad Hinds MD;  Location: [facility];  Service: Vascular       Family History   Problem Relation Age of Onset    Hypertension Mother     Diabetes Father    [facility] No Known Problems Sister     No Known Problems Brother     No Known Problems Maternal Aunt     No Known Problems Maternal Uncle     No Known Problems Paternal Aunt     No Known Problems Paternal Uncle     No Known Problems Maternal Grandmother     No Known Problems Maternal Grandfather     No Known Problems Paternal Grandmother     No Known Problems Paternal Grandfather      Social History     Socioeconomic History    Marital status: Legally      Spouse name: None    Number of children: None    Years of education: None    Highest education level: None   Occupational History    Occupation: DISABLED   Social Needs    Financial resource strain: None    Food insecurity     Worry: None     Inability: None    Transportation needs     Medical: None     Non-medical: None   Tobacco Use    Smoking status: Former Smoker     Packs/day: 0 25     Last attempt to quit: 2018     Years since quittin 5    Smokeless tobacco: Never Used   Substance and Sexual Activity    Alcohol use: Not Currently    Drug use: No    Sexual activity: None   Lifestyle    Physical activity     Days per week: None     Minutes per session: None    Stress: None   Relationships    Social connections     Talks on phone: None     Gets together: None     Attends Yarsani service: None     Active member of club or organization: None     Attends meetings of clubs or organizations: None     Relationship status: None    Intimate partner violence     Fear of current or ex partner: None     Emotionally abused: None     Physically abused: None     Forced sexual activity: None   Other Topics Concern    None   Social History Narrative    None         Living donors: has not discussed with family    Current Outpatient Medications   Medication Sig Dispense Refill    atorvastatin (LIPITOR) 40 mg tablet take 1 tablet by mouth once daily 90 tablet 1    carvedilol (COREG) 6 25 mg tablet take 1 tablet by mouth twice a day with meals 180 tablet 0    cinacalcet (SENSIPAR) 30 mg tablet Take 30 mg by mouth daily      cyclobenzaprine (FLEXERIL) 5 mg tablet Take 1 tablet (5 mg total) by mouth daily at bedtime as needed for muscle spasms 30 tablet 1    doxazosin (CARDURA) 2 mg tablet Take 1 tablet (2 mg total) by mouth daily at bedtime 30 tablet 1    furosemide (LASIX) 80 mg tablet Take 1 tablet (80 mg total) by mouth daily 90 tablet 3    insulin aspart (NOVOLOG FLEXPEN) 100 Units/mL injection pen 10U with breakfast and lunch, 10-14 U with dinner 5 pen 2    insulin glargine (Basaglar KwikPen) 100 units/mL injection pen Inject 30 Units under the skin daily 5 pen 2    NIFEdipine ER (ADALAT CC) 60 MG 24 hr tablet Take 1 tablet (60 mg total) by mouth daily 30 tablet 1    pantoprazole (PROTONIX) 20 mg tablet Take 1 tablet (20 mg total) by mouth daily 30 tablet 3    polyethylene glycol (MIRALAX) 17 g packet Take 17 g by mouth daily 30 each 1    prednisoLONE acetate (PRED FORTE) 1 % ophthalmic suspension   0    RA Aspirin EC 81 MG EC tablet take 1 tablet by mouth once daily 90 tablet 0    sevelamer carbonate (RENVELA) 800 mg tablet Take 800 mg by mouth 3 (three) times a day with meals      Blood Glucose Monitoring Suppl (ONE TOUCH ULTRA 2) w/Device KIT by Does not apply route 3 (three) times a day 1 each 0    glucose blood (FREESTYLE LITE) test strip Test blood sugars three times daily  100 each 2    LOKELMA 10 g PACK       ONE TOUCH LANCETS MISC by Does not apply route 3 (three) times a day 100 each 1     No current facility-administered medications for this visit        No known allergies    Physical Exam:    /60 (BP Location: Right arm, Patient Position: Sitting, Cuff Size: Standard)   Temp 98 6 °F (37 °C)   Wt 74 8 kg (165 lb)   BMI 23 68 kg/m²     General : NAD    Cardiac:  RRR, S1, S2 normal,  Soft referred murmur    Lung:  CTAB   Abdomen:  soft, nontender, no ascites   HD access: LUE AVF with good thrill/bruit   femoral pulses (2+ and soft, + bruit); pulses not palpable on feet  no edema   Neuro:no focal neuro deficits   Skin: tattoo no  Carotid bruit: no  Lymph: no LN- cervical, axillary, inguinal

## 2020-08-27 DIAGNOSIS — E78.5 HYPERLIPIDEMIA, UNSPECIFIED HYPERLIPIDEMIA TYPE: ICD-10-CM

## 2020-08-27 RX ORDER — ATORVASTATIN CALCIUM 40 MG/1
TABLET, FILM COATED ORAL
Qty: 90 TABLET | Refills: 1 | Status: SHIPPED | OUTPATIENT
Start: 2020-08-27 | End: 2020-09-14

## 2020-09-12 DIAGNOSIS — E78.5 HYPERLIPIDEMIA, UNSPECIFIED HYPERLIPIDEMIA TYPE: ICD-10-CM

## 2020-09-14 RX ORDER — ATORVASTATIN CALCIUM 40 MG/1
TABLET, FILM COATED ORAL
Qty: 90 TABLET | Refills: 1 | Status: SHIPPED | OUTPATIENT
Start: 2020-09-14 | End: 2021-03-30 | Stop reason: SDUPTHER

## 2020-09-16 ENCOUNTER — OFFICE VISIT (OUTPATIENT)
Dept: INTERNAL MEDICINE CLINIC | Facility: CLINIC | Age: 55
End: 2020-09-16
Payer: MEDICARE

## 2020-09-16 VITALS
BODY MASS INDEX: 24.48 KG/M2 | TEMPERATURE: 97.3 F | SYSTOLIC BLOOD PRESSURE: 114 MMHG | DIASTOLIC BLOOD PRESSURE: 66 MMHG | HEIGHT: 70 IN | WEIGHT: 171 LBS | HEART RATE: 81 BPM | RESPIRATION RATE: 12 BRPM

## 2020-09-16 DIAGNOSIS — Z23 ENCOUNTER FOR IMMUNIZATION: ICD-10-CM

## 2020-09-16 DIAGNOSIS — E11.22 TYPE 2 DIABETES MELLITUS WITH CHRONIC KIDNEY DISEASE ON CHRONIC DIALYSIS, WITH LONG-TERM CURRENT USE OF INSULIN (HCC): Primary | ICD-10-CM

## 2020-09-16 DIAGNOSIS — N18.6 ESRD (END STAGE RENAL DISEASE) (HCC): ICD-10-CM

## 2020-09-16 DIAGNOSIS — E78.5 HYPERLIPIDEMIA, UNSPECIFIED HYPERLIPIDEMIA TYPE: ICD-10-CM

## 2020-09-16 DIAGNOSIS — Z79.4 TYPE 2 DIABETES MELLITUS WITH CHRONIC KIDNEY DISEASE ON CHRONIC DIALYSIS, WITH LONG-TERM CURRENT USE OF INSULIN (HCC): Primary | ICD-10-CM

## 2020-09-16 DIAGNOSIS — N18.6 TYPE 2 DIABETES MELLITUS WITH CHRONIC KIDNEY DISEASE ON CHRONIC DIALYSIS, WITH LONG-TERM CURRENT USE OF INSULIN (HCC): Primary | ICD-10-CM

## 2020-09-16 DIAGNOSIS — Z99.2 TYPE 2 DIABETES MELLITUS WITH CHRONIC KIDNEY DISEASE ON CHRONIC DIALYSIS, WITH LONG-TERM CURRENT USE OF INSULIN (HCC): Primary | ICD-10-CM

## 2020-09-16 LAB — SL AMB POCT HEMOGLOBIN AIC: 7.6 (ref ?–6.5)

## 2020-09-16 PROCEDURE — 83036 HEMOGLOBIN GLYCOSYLATED A1C: CPT | Performed by: INTERNAL MEDICINE

## 2020-09-16 PROCEDURE — G0008 ADMIN INFLUENZA VIRUS VAC: HCPCS | Performed by: INTERNAL MEDICINE

## 2020-09-16 PROCEDURE — 90682 RIV4 VACC RECOMBINANT DNA IM: CPT | Performed by: INTERNAL MEDICINE

## 2020-09-16 PROCEDURE — 99214 OFFICE O/P EST MOD 30 MIN: CPT | Performed by: INTERNAL MEDICINE

## 2020-09-16 NOTE — PROGRESS NOTES
Assessment/Plan:  1  Type 2 diabetes mellitus with chronic kidney disease on chronic dialysis, with long-term current use of insulin ([facility])  -     POCT hemoglobin A1c    2  ESRD (end stage renal disease) ([facility] [address] )    3  Hyperlipidemia, unspecified hyperlipidemia type    4  Encounter for immunization  -     FLUBLOK: influenza vaccine, quadrivalent, recombinant, PF, 0 5 mL      A1c is better at 7 6 from 8  Blood sugar seems better but I think he does not check it as regularly as he should  No change in medication regimen but continue focusing on a better diet  Blood pressure is well controlled  Continues to have constipation but does improve when he takes the MiraLax  He just needs to take it a bit more regularly, ample fiber with limitations of avoiding things that could cause hyperkalemia    We discussed in detail about renal transplant and the process  Discussed that compliance and following all the steps are quite important before he can be put on a transplant list   We discussed about the lengthy process    Influenza vaccine was given today  Subjective:   Chief Complaint   Patient presents with    Follow-up     3 month        Patient ID: Shefali Lui is a 63 y o  male  He comes in with his sister for follow-up of hypertension, diabetes, end-stage renal disease and dialysis  He saw his eye doctor recently and is going to see another specialist   Will try to get records from them  Brings his blood sugar readings and most of them are heard around , occasional 150 and 180s but they are not done every day  He is getting better with compliance with insulin  Taking his blood pressure medications  Going for dialysis regularly  The following portions of the patient's history were reviewed and updated as appropriate: current medications, past medical history, past social history and past surgical history      PHQ-9 Depression Screening    PHQ-9:    Frequency of the following problems over the past two weeks:                Current Outpatient Medications:     atorvastatin (LIPITOR) 40 mg tablet, take 1 tablet by mouth once daily, Disp: 90 tablet, Rfl: 1    Blood Glucose Monitoring Suppl (ONE TOUCH ULTRA 2) w/Device KIT, by Does not apply route 3 (three) times a day, Disp: 1 each, Rfl: 0    carvedilol (COREG) 6 25 mg tablet, take 1 tablet by mouth twice a day with meals, Disp: 180 tablet, Rfl: 0    cinacalcet (SENSIPAR) 30 mg tablet, Take 30 mg by mouth daily, Disp: , Rfl:     cyclobenzaprine (FLEXERIL) 5 mg tablet, Take 1 tablet (5 mg total) by mouth daily at bedtime as needed for muscle spasms, Disp: 30 tablet, Rfl: 1    doxazosin (CARDURA) 2 mg tablet, Take 1 tablet (2 mg total) by mouth daily at bedtime, Disp: 30 tablet, Rfl: 1    furosemide (LASIX) 80 mg tablet, Take 1 tablet (80 mg total) by mouth daily, Disp: 90 tablet, Rfl: 3    glucose blood (FREESTYLE LITE) test strip, Test blood sugars three times daily  , Disp: 100 each, Rfl: 2    insulin aspart (NOVOLOG FLEXPEN) 100 Units/mL injection pen, 10U with breakfast and lunch, 10-14 U with dinner, Disp: 5 pen, Rfl: 2    insulin glargine (Basaglar KwikPen) 100 units/mL injection pen, Inject 30 Units under the skin daily, Disp: 5 pen, Rfl: 2    LOKELMA 10 g PACK, , Disp: , Rfl:     NIFEdipine ER (ADALAT CC) 60 MG 24 hr tablet, Take 1 tablet (60 mg total) by mouth daily, Disp: 30 tablet, Rfl: 1    ONE TOUCH LANCETS MISC, by Does not apply route 3 (three) times a day, Disp: 100 each, Rfl: 1    pantoprazole (PROTONIX) 20 mg tablet, Take 1 tablet (20 mg total) by mouth daily, Disp: 30 tablet, Rfl: 3    polyethylene glycol (MIRALAX) 17 g packet, Take 17 g by mouth daily, Disp: 30 each, Rfl: 1    prednisoLONE acetate (PRED FORTE) 1 % ophthalmic suspension, , Disp: , Rfl: 0    RA Aspirin EC 81 MG EC tablet, take 1 tablet by mouth once daily, Disp: 90 tablet, Rfl: 0    sevelamer carbonate (RENVELA) 800 mg tablet, Take 800 mg by mouth 3 (three) times a day with meals, Disp: , Rfl:     Review of Systems   Constitutional: Negative for fatigue, fever and unexpected weight change  HENT: Negative for ear pain, hearing loss and sore throat  Eyes: Positive for pain, discharge and visual disturbance  Respiratory: Negative for cough, chest tightness and shortness of breath  Cardiovascular: Negative for chest pain and palpitations  Gastrointestinal: Positive for constipation  Negative for abdominal pain, blood in stool, diarrhea and nausea  Genitourinary: Negative for dysuria, frequency and hematuria  Musculoskeletal: Negative for arthralgias and joint swelling  Skin: Negative for rash  Allergic/Immunologic: Negative for immunocompromised state  Neurological: Negative for dizziness and headaches  Hematological: Negative for adenopathy  Psychiatric/Behavioral: Negative for confusion and sleep disturbance  Objective:  /66 (BP Location: Left arm, Patient Position: Sitting, Cuff Size: Standard)   Pulse 81   Temp (!) 97 3 °F (36 3 °C)   Resp 12   Ht 5' 10" (1 778 m)   Wt 77 6 kg (171 lb)   BMI 24 54 kg/m²      Physical Exam  Vitals signs and nursing note reviewed  Constitutional:       Appearance: Normal appearance  He is well-developed  HENT:      Head: Normocephalic and atraumatic  Right Ear: Tympanic membrane normal       Left Ear: Tympanic membrane normal       Nose: Nose normal    Eyes:      General:         Right eye: Discharge present  Left eye: Discharge present  Pupils: Pupils are equal, round, and reactive to light  Neck:      Musculoskeletal: Normal range of motion and neck supple  Thyroid: No thyromegaly  Cardiovascular:      Rate and Rhythm: Normal rate and regular rhythm  Chest Wall: PMI is not displaced  Heart sounds: Normal heart sounds, S1 normal and S2 normal  No murmur     Pulmonary:      Effort: Pulmonary effort is normal  No accessory muscle usage or respiratory distress  Breath sounds: Normal breath sounds  No rhonchi or rales  Abdominal:      General: Bowel sounds are normal       Palpations: Abdomen is soft  There is no shifting dullness  Tenderness: There is no abdominal tenderness  There is no rebound  Musculoskeletal: Normal range of motion  General: No tenderness  Lymphadenopathy:      Cervical: No cervical adenopathy  Skin:     General: Skin is warm  Findings: No rash  Neurological:      Mental Status: He is alert  Psychiatric:         Speech: Speech normal            Recent Results (from the past 1008 hour(s))   POCT hemoglobin A1c    Collection Time: 09/16/20 10:30 AM   Result Value Ref Range    Hemoglobin A1C 7 6 (A) 6 5   ]    No results found

## 2020-10-01 ENCOUNTER — TRANSCRIBE ORDERS (OUTPATIENT)
Dept: ADMINISTRATIVE | Facility: HOSPITAL | Age: 55
End: 2020-10-01

## 2020-10-01 DIAGNOSIS — E78.00 PURE HYPERCHOLESTEROLEMIA: ICD-10-CM

## 2020-10-01 DIAGNOSIS — N18.6 END STAGE RENAL DISEASE (HCC): ICD-10-CM

## 2020-10-01 DIAGNOSIS — H54.8 LEGALLY BLIND: ICD-10-CM

## 2020-10-01 DIAGNOSIS — Z99.2 TYPE 2 DIABETES MELLITUS WITH CHRONIC KIDNEY DISEASE ON CHRONIC DIALYSIS, WITH LONG-TERM CURRENT USE OF INSULIN (HCC): ICD-10-CM

## 2020-10-01 DIAGNOSIS — N18.6 TYPE 2 DIABETES MELLITUS WITH CHRONIC KIDNEY DISEASE ON CHRONIC DIALYSIS, WITH LONG-TERM CURRENT USE OF INSULIN (HCC): ICD-10-CM

## 2020-10-01 DIAGNOSIS — Z98.890 S/P ARTERIOVENOUS (AV) FISTULA CREATION: ICD-10-CM

## 2020-10-01 DIAGNOSIS — E11.22 TYPE 2 DIABETES MELLITUS WITH CHRONIC KIDNEY DISEASE ON CHRONIC DIALYSIS, WITH LONG-TERM CURRENT USE OF INSULIN (HCC): ICD-10-CM

## 2020-10-01 DIAGNOSIS — I51.7 LVH (LEFT VENTRICULAR HYPERTROPHY): ICD-10-CM

## 2020-10-01 DIAGNOSIS — Z79.4 TYPE 2 DIABETES MELLITUS WITH CHRONIC KIDNEY DISEASE ON CHRONIC DIALYSIS, WITH LONG-TERM CURRENT USE OF INSULIN (HCC): ICD-10-CM

## 2020-10-01 DIAGNOSIS — R79.89 AZOTEMIA: Primary | ICD-10-CM

## 2020-10-09 ENCOUNTER — HOSPITAL ENCOUNTER (OUTPATIENT)
Dept: CT IMAGING | Facility: HOSPITAL | Age: 55
Discharge: HOME | End: 2020-10-09
Attending: INTERNAL MEDICINE
Payer: MEDICARE

## 2020-10-09 DIAGNOSIS — Z79.4 TYPE 2 DIABETES MELLITUS WITH CHRONIC KIDNEY DISEASE ON CHRONIC DIALYSIS, WITH LONG-TERM CURRENT USE OF INSULIN (HCC): ICD-10-CM

## 2020-10-09 DIAGNOSIS — H54.8 LEGALLY BLIND: ICD-10-CM

## 2020-10-09 DIAGNOSIS — Z98.890 S/P ARTERIOVENOUS (AV) FISTULA CREATION: ICD-10-CM

## 2020-10-09 DIAGNOSIS — N18.6 END STAGE RENAL DISEASE (HCC): ICD-10-CM

## 2020-10-09 DIAGNOSIS — N18.6 TYPE 2 DIABETES MELLITUS WITH CHRONIC KIDNEY DISEASE ON CHRONIC DIALYSIS, WITH LONG-TERM CURRENT USE OF INSULIN (HCC): ICD-10-CM

## 2020-10-09 DIAGNOSIS — I51.7 LVH (LEFT VENTRICULAR HYPERTROPHY): ICD-10-CM

## 2020-10-09 DIAGNOSIS — Z99.2 TYPE 2 DIABETES MELLITUS WITH CHRONIC KIDNEY DISEASE ON CHRONIC DIALYSIS, WITH LONG-TERM CURRENT USE OF INSULIN (HCC): ICD-10-CM

## 2020-10-09 DIAGNOSIS — R79.89 AZOTEMIA: ICD-10-CM

## 2020-10-09 DIAGNOSIS — E11.22 TYPE 2 DIABETES MELLITUS WITH CHRONIC KIDNEY DISEASE ON CHRONIC DIALYSIS, WITH LONG-TERM CURRENT USE OF INSULIN (HCC): ICD-10-CM

## 2020-10-09 DIAGNOSIS — E78.00 PURE HYPERCHOLESTEROLEMIA: ICD-10-CM

## 2020-10-09 PROCEDURE — 74176 CT ABD & PELVIS W/O CONTRAST: CPT

## 2020-10-09 PROCEDURE — G1004 CDSM NDSC: HCPCS

## 2020-10-23 ENCOUNTER — HOSPITAL ENCOUNTER (OUTPATIENT)
Dept: NON INVASIVE DIAGNOSTICS | Facility: HOSPITAL | Age: 55
Discharge: HOME | End: 2020-10-23
Attending: INTERNAL MEDICINE
Payer: MEDICARE

## 2020-10-23 DIAGNOSIS — E11.22 TYPE 2 DIABETES MELLITUS WITH CHRONIC KIDNEY DISEASE ON CHRONIC DIALYSIS, WITH LONG-TERM CURRENT USE OF INSULIN (HCC): ICD-10-CM

## 2020-10-23 DIAGNOSIS — N18.6 END STAGE RENAL DISEASE (HCC): ICD-10-CM

## 2020-10-23 DIAGNOSIS — H54.8 LEGALLY BLIND: ICD-10-CM

## 2020-10-23 DIAGNOSIS — Z98.890 S/P ARTERIOVENOUS (AV) FISTULA CREATION: ICD-10-CM

## 2020-10-23 DIAGNOSIS — R79.89 AZOTEMIA: ICD-10-CM

## 2020-10-23 DIAGNOSIS — E78.00 PURE HYPERCHOLESTEROLEMIA: ICD-10-CM

## 2020-10-23 DIAGNOSIS — Z99.2 TYPE 2 DIABETES MELLITUS WITH CHRONIC KIDNEY DISEASE ON CHRONIC DIALYSIS, WITH LONG-TERM CURRENT USE OF INSULIN (HCC): ICD-10-CM

## 2020-10-23 DIAGNOSIS — N18.6 TYPE 2 DIABETES MELLITUS WITH CHRONIC KIDNEY DISEASE ON CHRONIC DIALYSIS, WITH LONG-TERM CURRENT USE OF INSULIN (HCC): ICD-10-CM

## 2020-10-23 DIAGNOSIS — Z79.4 TYPE 2 DIABETES MELLITUS WITH CHRONIC KIDNEY DISEASE ON CHRONIC DIALYSIS, WITH LONG-TERM CURRENT USE OF INSULIN (HCC): ICD-10-CM

## 2020-10-23 DIAGNOSIS — I51.7 LVH (LEFT VENTRICULAR HYPERTROPHY): ICD-10-CM

## 2020-10-23 PROCEDURE — 93880 EXTRACRANIAL BILAT STUDY: CPT

## 2020-10-23 PROCEDURE — 93925 LOWER EXTREMITY STUDY: CPT

## 2020-10-23 PROCEDURE — 93923 UPR/LXTR ART STDY 3+ LVLS: CPT

## 2020-10-25 PROCEDURE — 93925 LOWER EXTREMITY STUDY: CPT | Performed by: SURGERY

## 2020-10-25 PROCEDURE — 93922 UPR/L XTREMITY ART 2 LEVELS: CPT | Performed by: SURGERY

## 2020-10-25 PROCEDURE — 93880 EXTRACRANIAL BILAT STUDY: CPT | Performed by: SURGERY

## 2020-11-01 DIAGNOSIS — I10 ESSENTIAL HYPERTENSION: ICD-10-CM

## 2020-11-01 DIAGNOSIS — N18.4 CKD (CHRONIC KIDNEY DISEASE) STAGE 4, GFR 15-29 ML/MIN (HCC): ICD-10-CM

## 2020-11-01 DIAGNOSIS — Z79.4 UNCONTROLLED TYPE 2 DIABETES MELLITUS WITH HYPERGLYCEMIA, WITH LONG-TERM CURRENT USE OF INSULIN (HCC): ICD-10-CM

## 2020-11-01 DIAGNOSIS — E11.65 UNCONTROLLED TYPE 2 DIABETES MELLITUS WITH HYPERGLYCEMIA, WITH LONG-TERM CURRENT USE OF INSULIN (HCC): ICD-10-CM

## 2020-11-02 RX ORDER — ACETAMINOPHEN/DIPHENHYDRAMINE 500MG-25MG
TABLET ORAL
Qty: 90 TABLET | Refills: 0 | Status: SHIPPED | OUTPATIENT
Start: 2020-11-02 | End: 2021-02-22

## 2020-11-13 ENCOUNTER — HOSPITAL ENCOUNTER (OUTPATIENT)
Dept: NON INVASIVE DIAGNOSTICS | Facility: HOSPITAL | Age: 55
Discharge: HOME | End: 2020-11-13
Attending: INTERNAL MEDICINE
Payer: MEDICARE

## 2020-11-13 ENCOUNTER — HOSPITAL ENCOUNTER (OUTPATIENT)
Dept: RADIOLOGY | Facility: HOSPITAL | Age: 55
Discharge: HOME | End: 2020-11-13
Attending: INTERNAL MEDICINE
Payer: MEDICARE

## 2020-11-13 DIAGNOSIS — Z98.890 S/P ARTERIOVENOUS (AV) FISTULA CREATION: ICD-10-CM

## 2020-11-13 DIAGNOSIS — N18.6 TYPE 2 DIABETES MELLITUS WITH CHRONIC KIDNEY DISEASE ON CHRONIC DIALYSIS, WITH LONG-TERM CURRENT USE OF INSULIN (HCC): ICD-10-CM

## 2020-11-13 DIAGNOSIS — Z79.4 TYPE 2 DIABETES MELLITUS WITH CHRONIC KIDNEY DISEASE ON CHRONIC DIALYSIS, WITH LONG-TERM CURRENT USE OF INSULIN (HCC): ICD-10-CM

## 2020-11-13 DIAGNOSIS — E78.00 PURE HYPERCHOLESTEROLEMIA: ICD-10-CM

## 2020-11-13 DIAGNOSIS — Z99.2 TYPE 2 DIABETES MELLITUS WITH CHRONIC KIDNEY DISEASE ON CHRONIC DIALYSIS, WITH LONG-TERM CURRENT USE OF INSULIN (HCC): ICD-10-CM

## 2020-11-13 DIAGNOSIS — E11.22 TYPE 2 DIABETES MELLITUS WITH CHRONIC KIDNEY DISEASE ON CHRONIC DIALYSIS, WITH LONG-TERM CURRENT USE OF INSULIN (HCC): ICD-10-CM

## 2020-11-13 DIAGNOSIS — R79.89 AZOTEMIA: ICD-10-CM

## 2020-11-13 DIAGNOSIS — I51.7 LVH (LEFT VENTRICULAR HYPERTROPHY): ICD-10-CM

## 2020-11-13 DIAGNOSIS — H54.8 LEGALLY BLIND: ICD-10-CM

## 2020-11-13 DIAGNOSIS — N18.6 END STAGE RENAL DISEASE (HCC): ICD-10-CM

## 2020-11-13 LAB
CHEST PAIN STATEMENT: NORMAL
MAX DIASTOLIC BP: 71 MMHG
MAX HEART RATE: 84 BPM
MAX PREDICTED HEART RATE: 164 BPM
MAX. SYSTOLIC BP: 134 MMHG
PROTOCOL NAME: NORMAL
REASON FOR TERMINATION: NORMAL
TARGET HR FORMULA: NORMAL
TEST INDICATION: NORMAL
TIME IN EXERCISE PHASE: NORMAL

## 2020-11-13 PROCEDURE — 93018 CV STRESS TEST I&R ONLY: CPT | Performed by: INTERNAL MEDICINE

## 2020-11-13 PROCEDURE — 78452 HT MUSCLE IMAGE SPECT MULT: CPT

## 2020-11-13 PROCEDURE — G1004 CDSM NDSC: HCPCS

## 2020-11-13 PROCEDURE — 93017 CV STRESS TEST TRACING ONLY: CPT

## 2020-11-13 PROCEDURE — 93306 TTE W/DOPPLER COMPLETE: CPT

## 2020-11-13 PROCEDURE — A9502 TC99M TETROFOSMIN: HCPCS

## 2020-11-13 PROCEDURE — 93016 CV STRESS TEST SUPVJ ONLY: CPT | Performed by: INTERNAL MEDICINE

## 2020-11-13 PROCEDURE — 78452 HT MUSCLE IMAGE SPECT MULT: CPT | Performed by: INTERNAL MEDICINE

## 2020-11-13 PROCEDURE — 93306 TTE W/DOPPLER COMPLETE: CPT | Performed by: INTERNAL MEDICINE

## 2020-11-13 RX ADMIN — REGADENOSON 0.4 MG: 0.08 INJECTION, SOLUTION INTRAVENOUS at 09:40

## 2020-11-19 DIAGNOSIS — I10 ESSENTIAL HYPERTENSION: ICD-10-CM

## 2020-11-19 RX ORDER — FUROSEMIDE 80 MG
80 TABLET ORAL DAILY
Qty: 90 TABLET | Refills: 1 | Status: SHIPPED | OUTPATIENT
Start: 2020-11-19 | End: 2021-06-07 | Stop reason: SDUPTHER

## 2020-12-02 ENCOUNTER — OFFICE VISIT (OUTPATIENT)
Dept: PODIATRY | Facility: CLINIC | Age: 55
End: 2020-12-02
Payer: MEDICARE

## 2020-12-02 VITALS
HEIGHT: 70 IN | WEIGHT: 171.6 LBS | HEART RATE: 74 BPM | BODY MASS INDEX: 24.57 KG/M2 | SYSTOLIC BLOOD PRESSURE: 120 MMHG | DIASTOLIC BLOOD PRESSURE: 72 MMHG

## 2020-12-02 DIAGNOSIS — B35.1 ONYCHOMYCOSIS: ICD-10-CM

## 2020-12-02 DIAGNOSIS — IMO0002 UNCONTROLLED SECONDARY DIABETES MELLITUS WITH STAGE 5 CKD (GFR<15): ICD-10-CM

## 2020-12-02 DIAGNOSIS — I10 ESSENTIAL HYPERTENSION: ICD-10-CM

## 2020-12-02 DIAGNOSIS — E11.42 DIABETIC POLYNEUROPATHY ASSOCIATED WITH TYPE 2 DIABETES MELLITUS (HCC): Primary | ICD-10-CM

## 2020-12-02 PROCEDURE — 11721 DEBRIDE NAIL 6 OR MORE: CPT | Performed by: PODIATRIST

## 2020-12-02 RX ORDER — CARVEDILOL 6.25 MG/1
TABLET ORAL
Qty: 180 TABLET | Refills: 0 | Status: SHIPPED | OUTPATIENT
Start: 2020-12-02 | End: 2021-02-27 | Stop reason: HOSPADM

## 2020-12-02 RX ORDER — INSULIN GLARGINE 100 [IU]/ML
INJECTION, SOLUTION SUBCUTANEOUS
Qty: 15 ML | Refills: 2 | Status: SHIPPED | OUTPATIENT
Start: 2020-12-02 | End: 2021-06-07 | Stop reason: SDUPTHER

## 2021-01-18 ENCOUNTER — TRANSCRIBE ORDERS (OUTPATIENT)
Dept: LAB | Facility: CLINIC | Age: 56
End: 2021-01-18

## 2021-01-18 ENCOUNTER — LAB (OUTPATIENT)
Dept: LAB | Facility: CLINIC | Age: 56
DRG: 177 | End: 2021-01-18
Payer: MEDICARE

## 2021-01-18 DIAGNOSIS — N18.6 END STAGE RENAL DISEASE (HCC): ICD-10-CM

## 2021-01-18 DIAGNOSIS — D70.0 CONGENITAL NEUTROPENIA (HCC): ICD-10-CM

## 2021-01-18 DIAGNOSIS — H54.8 LEGAL BLINDNESS: ICD-10-CM

## 2021-01-18 DIAGNOSIS — I51.7 LVH (LEFT VENTRICULAR HYPERTROPHY): ICD-10-CM

## 2021-01-18 DIAGNOSIS — K59.01 SLOW TRANSIT CONSTIPATION: ICD-10-CM

## 2021-01-18 DIAGNOSIS — M89.9 CHRONIC KIDNEY DISEASE-MINERAL AND BONE DISORDER: ICD-10-CM

## 2021-01-18 DIAGNOSIS — Z79.4 TYPE 2 DIABETES MELLITUS WITH CHRONIC KIDNEY DISEASE, WITH LONG-TERM CURRENT USE OF INSULIN, UNSPECIFIED CKD STAGE (HCC): ICD-10-CM

## 2021-01-18 DIAGNOSIS — N18.9 CHRONIC KIDNEY DISEASE-MINERAL AND BONE DISORDER: ICD-10-CM

## 2021-01-18 DIAGNOSIS — E11.22 TYPE 2 DIABETES MELLITUS WITH CHRONIC KIDNEY DISEASE, WITH LONG-TERM CURRENT USE OF INSULIN, UNSPECIFIED CKD STAGE (HCC): ICD-10-CM

## 2021-01-18 DIAGNOSIS — R79.89 AZOTEMIA: ICD-10-CM

## 2021-01-18 DIAGNOSIS — Z98.890 S/P ARTERIOVENOUS (AV) FISTULA CREATION: ICD-10-CM

## 2021-01-18 DIAGNOSIS — E83.9 CHRONIC KIDNEY DISEASE-MINERAL AND BONE DISORDER: ICD-10-CM

## 2021-01-18 DIAGNOSIS — R79.89 AZOTEMIA: Primary | ICD-10-CM

## 2021-01-18 DIAGNOSIS — E11.311 DIABETIC RETINOPATHY OF BOTH EYES WITH MACULAR EDEMA ASSOCIATED WITH TYPE 2 DIABETES MELLITUS, UNSPECIFIED RETINOPATHY SEVERITY (HCC): ICD-10-CM

## 2021-01-18 LAB
ABO GROUP BLD: NORMAL
AFP-TM SERPL-MCNC: 6.1 NG/ML (ref 0.5–8)
ALBUMIN SERPL BCP-MCNC: 3.8 G/DL (ref 3.5–5)
ALP SERPL-CCNC: 150 U/L (ref 46–116)
ALT SERPL W P-5'-P-CCNC: 82 U/L (ref 12–78)
ANION GAP SERPL CALCULATED.3IONS-SCNC: 7 MMOL/L (ref 4–13)
APTT PPP: 39 SECONDS (ref 23–37)
AST SERPL W P-5'-P-CCNC: 43 U/L (ref 5–45)
BASOPHILS # BLD AUTO: 0.05 THOUSANDS/ΜL (ref 0–0.1)
BASOPHILS NFR BLD AUTO: 1 % (ref 0–1)
BILIRUB SERPL-MCNC: 0.56 MG/DL (ref 0.2–1)
BUN SERPL-MCNC: 56 MG/DL (ref 5–25)
CALCIUM SERPL-MCNC: 8.8 MG/DL (ref 8.3–10.1)
CHLORIDE SERPL-SCNC: 100 MMOL/L (ref 100–108)
CHOLEST SERPL-MCNC: 131 MG/DL (ref 50–200)
CO2 SERPL-SCNC: 28 MMOL/L (ref 21–32)
CREAT SERPL-MCNC: 10.9 MG/DL (ref 0.6–1.3)
EOSINOPHIL # BLD AUTO: 0.18 THOUSAND/ΜL (ref 0–0.61)
EOSINOPHIL NFR BLD AUTO: 3 % (ref 0–6)
ERYTHROCYTE [DISTWIDTH] IN BLOOD BY AUTOMATED COUNT: 15.2 % (ref 11.6–15.1)
EST. AVERAGE GLUCOSE BLD GHB EST-MCNC: 169 MG/DL
GFR SERPL CREATININE-BSD FRML MDRD: 5 ML/MIN/1.73SQ M
GLUCOSE P FAST SERPL-MCNC: 120 MG/DL (ref 65–99)
HBA1C MFR BLD: 7.5 %
HCT VFR BLD AUTO: 42.4 % (ref 36.5–49.3)
HDLC SERPL-MCNC: 54 MG/DL
HGB BLD-MCNC: 12.9 G/DL (ref 12–17)
IMM GRANULOCYTES # BLD AUTO: 0.01 THOUSAND/UL (ref 0–0.2)
IMM GRANULOCYTES NFR BLD AUTO: 0 % (ref 0–2)
INR PPP: 1.05 (ref 0.84–1.19)
LDLC SERPL CALC-MCNC: 58 MG/DL (ref 0–100)
LYMPHOCYTES # BLD AUTO: 1.72 THOUSANDS/ΜL (ref 0.6–4.47)
LYMPHOCYTES NFR BLD AUTO: 29 % (ref 14–44)
MAGNESIUM SERPL-MCNC: 2.6 MG/DL (ref 1.6–2.6)
MCH RBC QN AUTO: 28.5 PG (ref 26.8–34.3)
MCHC RBC AUTO-ENTMCNC: 30.4 G/DL (ref 31.4–37.4)
MCV RBC AUTO: 94 FL (ref 82–98)
MONOCYTES # BLD AUTO: 0.54 THOUSAND/ΜL (ref 0.17–1.22)
MONOCYTES NFR BLD AUTO: 9 % (ref 4–12)
NEUTROPHILS # BLD AUTO: 3.39 THOUSANDS/ΜL (ref 1.85–7.62)
NEUTS SEG NFR BLD AUTO: 58 % (ref 43–75)
NONHDLC SERPL-MCNC: 77 MG/DL
NRBC BLD AUTO-RTO: 0 /100 WBCS
PHOSPHATE SERPL-MCNC: 3.2 MG/DL (ref 2.7–4.5)
PLATELET # BLD AUTO: 262 THOUSANDS/UL (ref 149–390)
PMV BLD AUTO: 10.4 FL (ref 8.9–12.7)
POTASSIUM SERPL-SCNC: 5 MMOL/L (ref 3.5–5.3)
PROT SERPL-MCNC: 8.1 G/DL (ref 6.4–8.2)
PROTHROMBIN TIME: 13.7 SECONDS (ref 11.6–14.5)
PSA SERPL-MCNC: 0.6 NG/ML (ref 0–4)
RBC # BLD AUTO: 4.52 MILLION/UL (ref 3.88–5.62)
RH BLD: POSITIVE
RUBV IGG SERPL IA-ACNC: >175 IU/ML
SODIUM SERPL-SCNC: 135 MMOL/L (ref 136–145)
TRIGL SERPL-MCNC: 95 MG/DL
WBC # BLD AUTO: 5.89 THOUSAND/UL (ref 4.31–10.16)

## 2021-01-18 PROCEDURE — 85730 THROMBOPLASTIN TIME PARTIAL: CPT

## 2021-01-18 PROCEDURE — 86592 SYPHILIS TEST NON-TREP QUAL: CPT

## 2021-01-18 PROCEDURE — 86787 VARICELLA-ZOSTER ANTIBODY: CPT

## 2021-01-18 PROCEDURE — 86038 ANTINUCLEAR ANTIBODIES: CPT

## 2021-01-18 PROCEDURE — G0103 PSA SCREENING: HCPCS

## 2021-01-18 PROCEDURE — 86900 BLOOD TYPING SEROLOGIC ABO: CPT

## 2021-01-18 PROCEDURE — 86645 CMV ANTIBODY IGM: CPT

## 2021-01-18 PROCEDURE — 87389 HIV-1 AG W/HIV-1&-2 AB AG IA: CPT

## 2021-01-18 PROCEDURE — 83735 ASSAY OF MAGNESIUM: CPT

## 2021-01-18 PROCEDURE — 84100 ASSAY OF PHOSPHORUS: CPT

## 2021-01-18 PROCEDURE — 85300 ANTITHROMBIN III ACTIVITY: CPT

## 2021-01-18 PROCEDURE — 85303 CLOT INHIBIT PROT C ACTIVITY: CPT

## 2021-01-18 PROCEDURE — 86704 HEP B CORE ANTIBODY TOTAL: CPT

## 2021-01-18 PROCEDURE — 86663 EPSTEIN-BARR ANTIBODY: CPT

## 2021-01-18 PROCEDURE — 80053 COMPREHEN METABOLIC PANEL: CPT

## 2021-01-18 PROCEDURE — 86901 BLOOD TYPING SEROLOGIC RH(D): CPT

## 2021-01-18 PROCEDURE — 86147 CARDIOLIPIN ANTIBODY EA IG: CPT

## 2021-01-18 PROCEDURE — 86708 HEPATITIS A ANTIBODY: CPT

## 2021-01-18 PROCEDURE — 82105 ALPHA-FETOPROTEIN SERUM: CPT

## 2021-01-18 PROCEDURE — 86644 CMV ANTIBODY: CPT

## 2021-01-18 PROCEDURE — 83036 HEMOGLOBIN GLYCOSYLATED A1C: CPT

## 2021-01-18 PROCEDURE — 85610 PROTHROMBIN TIME: CPT

## 2021-01-18 PROCEDURE — 84681 ASSAY OF C-PEPTIDE: CPT

## 2021-01-18 PROCEDURE — 85306 CLOT INHIBIT PROT S FREE: CPT

## 2021-01-18 PROCEDURE — 86735 MUMPS ANTIBODY: CPT

## 2021-01-18 PROCEDURE — 86765 RUBEOLA ANTIBODY: CPT

## 2021-01-18 PROCEDURE — 86039 ANTINUCLEAR ANTIBODIES (ANA): CPT

## 2021-01-18 PROCEDURE — 85025 COMPLETE CBC W/AUTO DIFF WBC: CPT

## 2021-01-18 PROCEDURE — 80061 LIPID PANEL: CPT

## 2021-01-18 PROCEDURE — 86146 BETA-2 GLYCOPROTEIN ANTIBODY: CPT

## 2021-01-18 PROCEDURE — 86480 TB TEST CELL IMMUN MEASURE: CPT

## 2021-01-18 PROCEDURE — 86706 HEP B SURFACE ANTIBODY: CPT

## 2021-01-18 PROCEDURE — 86762 RUBELLA ANTIBODY: CPT

## 2021-01-18 PROCEDURE — 36415 COLL VENOUS BLD VENIPUNCTURE: CPT

## 2021-01-19 ENCOUNTER — APPOINTMENT (OUTPATIENT)
Dept: LAB | Facility: CLINIC | Age: 56
DRG: 177 | End: 2021-01-19
Payer: MEDICARE

## 2021-01-19 LAB
C PEPTIDE SERPL-MCNC: 5.4 NG/ML (ref 1.1–4.4)
CMV IGG SERPL IA-ACNC: 6.8 U/ML (ref 0–0.59)
CMV IGM SERPL IA-ACNC: <30 AU/ML (ref 0–29.9)
DEPRECATED AT III PPP: 107 % OF NORMAL (ref 92–136)
EBV EA IGG SER-ACNC: <9 U/ML (ref 0–8.9)
HAV AB SER QL IA: REACTIVE
HBV CORE AB SER QL: NORMAL
HBV SURFACE AB SER-ACNC: 92.6 MIU/ML
HIV 1+2 AB+HIV1 P24 AG SERPL QL IA: NORMAL
MEV IGG SER QL: NORMAL
MUV IGG SER QL: NORMAL
RPR SER QL: NORMAL
VZV IGG SER IA-ACNC: NORMAL

## 2021-01-19 PROCEDURE — 80307 DRUG TEST PRSMV CHEM ANLYZR: CPT

## 2021-01-20 ENCOUNTER — HOSPITAL ENCOUNTER (INPATIENT)
Facility: HOSPITAL | Age: 56
LOS: 1 days | Discharge: HOME | DRG: 177 | End: 2021-01-21
Attending: INTERNAL MEDICINE | Admitting: HOSPITALIST
Payer: MEDICARE

## 2021-01-20 ENCOUNTER — TREATMENT (OUTPATIENT)
Dept: NEPHROLOGY | Facility: CLINIC | Age: 56
End: 2021-01-20

## 2021-01-20 ENCOUNTER — TELEPHONE (OUTPATIENT)
Dept: NEPHROLOGY | Facility: CLINIC | Age: 56
End: 2021-01-20

## 2021-01-20 ENCOUNTER — APPOINTMENT (INPATIENT)
Dept: RADIOLOGY | Facility: HOSPITAL | Age: 56
DRG: 177 | End: 2021-01-20
Payer: MEDICARE

## 2021-01-20 DIAGNOSIS — Z22.7 TB LUNG, LATENT: ICD-10-CM

## 2021-01-20 DIAGNOSIS — R76.12 POSITIVE QUANTIFERON-TB GOLD TEST: Primary | ICD-10-CM

## 2021-01-20 DIAGNOSIS — N18.6 ESRD (END STAGE RENAL DISEASE) (HCC): ICD-10-CM

## 2021-01-20 DIAGNOSIS — R05.9 COUGH: ICD-10-CM

## 2021-01-20 DIAGNOSIS — R76.12 POSITIVE QUANTIFERON-TB GOLD TEST: ICD-10-CM

## 2021-01-20 DIAGNOSIS — Z01.818 PRE-TRANSPLANT EVALUATION FOR ESRD (END STAGE RENAL DISEASE): Primary | ICD-10-CM

## 2021-01-20 LAB
ALBUMIN SERPL BCP-MCNC: 3.6 G/DL (ref 3.5–5)
ALP SERPL-CCNC: 153 U/L (ref 46–116)
ALT SERPL W P-5'-P-CCNC: 74 U/L (ref 12–78)
AMPHETAMINES UR QL SCN: NEGATIVE NG/ML
ANA HOMOGEN SER QL IF: NORMAL
ANA HOMOGEN TITR SER: NORMAL {TITER}
ANION GAP SERPL CALCULATED.3IONS-SCNC: 8 MMOL/L (ref 4–13)
AST SERPL W P-5'-P-CCNC: 40 U/L (ref 5–45)
B2 GLYCOPROT1 IGA SER-ACNC: <9 GPI IGA UNITS (ref 0–25)
B2 GLYCOPROT1 IGG SER-ACNC: <9 GPI IGG UNITS (ref 0–20)
B2 GLYCOPROT1 IGM SER-ACNC: <9 GPI IGM UNITS (ref 0–32)
BARBITURATES UR QL SCN: NEGATIVE NG/ML
BASOPHILS # BLD AUTO: 0.05 THOUSANDS/ΜL (ref 0–0.1)
BASOPHILS NFR BLD AUTO: 1 % (ref 0–1)
BENZODIAZ UR QL: NEGATIVE NG/ML
BILIRUB SERPL-MCNC: 0.5 MG/DL (ref 0.2–1)
BUN SERPL-MCNC: 62 MG/DL (ref 5–25)
BZE UR QL: NEGATIVE NG/ML
CALCIUM SERPL-MCNC: 8.5 MG/DL (ref 8.3–10.1)
CANNABINOIDS UR QL SCN: NEGATIVE NG/ML
CARDIOLIPIN IGA SER IA-ACNC: <9 APL U/ML (ref 0–11)
CARDIOLIPIN IGG SER IA-ACNC: <9 GPL U/ML (ref 0–14)
CARDIOLIPIN IGM SER IA-ACNC: <9 MPL U/ML (ref 0–12)
CHLORIDE SERPL-SCNC: 97 MMOL/L (ref 100–108)
CO2 SERPL-SCNC: 31 MMOL/L (ref 21–32)
CREAT SERPL-MCNC: 10.78 MG/DL (ref 0.6–1.3)
EOSINOPHIL # BLD AUTO: 0.19 THOUSAND/ΜL (ref 0–0.61)
EOSINOPHIL NFR BLD AUTO: 4 % (ref 0–6)
ERYTHROCYTE [DISTWIDTH] IN BLOOD BY AUTOMATED COUNT: 14.7 % (ref 11.6–15.1)
F5 GENE MUT ANL BLD/T: NORMAL
GAMMA INTERFERON BACKGROUND BLD IA-ACNC: 1.12 IU/ML
GFR SERPL CREATININE-BSD FRML MDRD: 5 ML/MIN/1.73SQ M
GLUCOSE SERPL-MCNC: 181 MG/DL (ref 65–140)
HCT VFR BLD AUTO: 41 % (ref 36.5–49.3)
HGB BLD-MCNC: 13.2 G/DL (ref 12–17)
IMM GRANULOCYTES # BLD AUTO: 0.01 THOUSAND/UL (ref 0–0.2)
IMM GRANULOCYTES NFR BLD AUTO: 0 % (ref 0–2)
LYMPHOCYTES # BLD AUTO: 1.72 THOUSANDS/ΜL (ref 0.6–4.47)
LYMPHOCYTES NFR BLD AUTO: 32 % (ref 14–44)
M TB IFN-G BLD-IMP: POSITIVE
M TB IFN-G CD4+ BCKGRND COR BLD-ACNC: 2.47 IU/ML
M TB IFN-G CD4+ BCKGRND COR BLD-ACNC: 2.72 IU/ML
MCH RBC QN AUTO: 29.1 PG (ref 26.8–34.3)
MCHC RBC AUTO-ENTMCNC: 32.2 G/DL (ref 31.4–37.4)
MCV RBC AUTO: 90 FL (ref 82–98)
METHADONE UR QL SCN: NEGATIVE NG/ML
MITOGEN IGNF BCKGRD COR BLD-ACNC: >10 IU/ML
MONOCYTES # BLD AUTO: 0.83 THOUSAND/ΜL (ref 0.17–1.22)
MONOCYTES NFR BLD AUTO: 16 % (ref 4–12)
NEUTROPHILS # BLD AUTO: 2.53 THOUSANDS/ΜL (ref 1.85–7.62)
NEUTS SEG NFR BLD AUTO: 47 % (ref 43–75)
NRBC BLD AUTO-RTO: 0 /100 WBCS
OPIATES UR QL: NEGATIVE NG/ML
PCP UR QL: NEGATIVE NG/ML
PLATELET # BLD AUTO: 233 THOUSANDS/UL (ref 149–390)
PMV BLD AUTO: 9.5 FL (ref 8.9–12.7)
POTASSIUM SERPL-SCNC: 5.5 MMOL/L (ref 3.5–5.3)
PROPOXYPH UR QL SCN: NEGATIVE NG/ML
PROT SERPL-MCNC: 8.2 G/DL (ref 6.4–8.2)
RBC # BLD AUTO: 4.54 MILLION/UL (ref 3.88–5.62)
RYE IGE QN: POSITIVE
SODIUM SERPL-SCNC: 136 MMOL/L (ref 136–145)
WBC # BLD AUTO: 5.33 THOUSAND/UL (ref 4.31–10.16)

## 2021-01-20 PROCEDURE — 84145 PROCALCITONIN (PCT): CPT | Performed by: PHYSICIAN ASSISTANT

## 2021-01-20 PROCEDURE — 99223 1ST HOSP IP/OBS HIGH 75: CPT | Performed by: INTERNAL MEDICINE

## 2021-01-20 PROCEDURE — 85025 COMPLETE CBC W/AUTO DIFF WBC: CPT | Performed by: PHYSICIAN ASSISTANT

## 2021-01-20 PROCEDURE — 80053 COMPREHEN METABOLIC PANEL: CPT | Performed by: PHYSICIAN ASSISTANT

## 2021-01-20 PROCEDURE — 71045 X-RAY EXAM CHEST 1 VIEW: CPT

## 2021-01-20 PROCEDURE — 0241U HB NFCT DS VIR RESP RNA 4 TRGT: CPT | Performed by: INTERNAL MEDICINE

## 2021-01-20 PROCEDURE — 82948 REAGENT STRIP/BLOOD GLUCOSE: CPT

## 2021-01-20 RX ORDER — PANTOPRAZOLE SODIUM 20 MG/1
20 TABLET, DELAYED RELEASE ORAL
Status: DISCONTINUED | OUTPATIENT
Start: 2021-01-21 | End: 2021-01-21 | Stop reason: HOSPADM

## 2021-01-20 RX ORDER — CINACALCET 30 MG/1
30 TABLET, FILM COATED ORAL DAILY
Status: DISCONTINUED | OUTPATIENT
Start: 2021-01-21 | End: 2021-01-21 | Stop reason: HOSPADM

## 2021-01-20 RX ORDER — ACETAMINOPHEN 325 MG/1
650 TABLET ORAL EVERY 6 HOURS PRN
Status: DISCONTINUED | OUTPATIENT
Start: 2021-01-20 | End: 2021-01-21 | Stop reason: HOSPADM

## 2021-01-20 RX ORDER — ASPIRIN 81 MG/1
81 TABLET ORAL DAILY
Status: DISCONTINUED | OUTPATIENT
Start: 2021-01-21 | End: 2021-01-21 | Stop reason: HOSPADM

## 2021-01-20 RX ORDER — ONDANSETRON 2 MG/ML
4 INJECTION INTRAMUSCULAR; INTRAVENOUS EVERY 6 HOURS PRN
Status: DISCONTINUED | OUTPATIENT
Start: 2021-01-20 | End: 2021-01-21 | Stop reason: HOSPADM

## 2021-01-20 RX ORDER — CALCIUM CARBONATE 200(500)MG
1000 TABLET,CHEWABLE ORAL DAILY PRN
Status: DISCONTINUED | OUTPATIENT
Start: 2021-01-20 | End: 2021-01-21 | Stop reason: HOSPADM

## 2021-01-20 RX ORDER — NIFEDIPINE 30 MG/1
60 TABLET, EXTENDED RELEASE ORAL DAILY
Status: DISCONTINUED | OUTPATIENT
Start: 2021-01-21 | End: 2021-01-21 | Stop reason: HOSPADM

## 2021-01-20 RX ORDER — BENZONATATE 100 MG/1
100 CAPSULE ORAL 3 TIMES DAILY PRN
Status: DISCONTINUED | OUTPATIENT
Start: 2021-01-20 | End: 2021-01-21 | Stop reason: HOSPADM

## 2021-01-20 RX ORDER — POLYETHYLENE GLYCOL 3350 17 G/17G
17 POWDER, FOR SOLUTION ORAL DAILY
Status: DISCONTINUED | OUTPATIENT
Start: 2021-01-21 | End: 2021-01-21 | Stop reason: HOSPADM

## 2021-01-20 RX ORDER — HYDRALAZINE HYDROCHLORIDE 25 MG/1
25 TABLET, FILM COATED ORAL EVERY 8 HOURS SCHEDULED
Status: DISCONTINUED | OUTPATIENT
Start: 2021-01-20 | End: 2021-01-21 | Stop reason: HOSPADM

## 2021-01-20 RX ORDER — DOXAZOSIN 2 MG/1
2 TABLET ORAL
Status: DISCONTINUED | OUTPATIENT
Start: 2021-01-20 | End: 2021-01-21 | Stop reason: HOSPADM

## 2021-01-20 RX ORDER — CYCLOBENZAPRINE HCL 10 MG
5 TABLET ORAL
Status: DISCONTINUED | OUTPATIENT
Start: 2021-01-20 | End: 2021-01-21 | Stop reason: HOSPADM

## 2021-01-20 RX ORDER — PREDNISOLONE ACETATE 10 MG/ML
2 SUSPENSION/ DROPS OPHTHALMIC 3 TIMES DAILY
Status: DISCONTINUED | OUTPATIENT
Start: 2021-01-20 | End: 2021-01-21 | Stop reason: HOSPADM

## 2021-01-20 RX ORDER — HEPARIN SODIUM 5000 [USP'U]/ML
5000 INJECTION, SOLUTION INTRAVENOUS; SUBCUTANEOUS EVERY 8 HOURS SCHEDULED
Status: DISCONTINUED | OUTPATIENT
Start: 2021-01-20 | End: 2021-01-21 | Stop reason: HOSPADM

## 2021-01-20 RX ORDER — ATORVASTATIN CALCIUM 40 MG/1
40 TABLET, FILM COATED ORAL DAILY
Status: DISCONTINUED | OUTPATIENT
Start: 2021-01-21 | End: 2021-01-21 | Stop reason: HOSPADM

## 2021-01-20 RX ORDER — INSULIN GLARGINE 100 [IU]/ML
30 INJECTION, SOLUTION SUBCUTANEOUS
Status: DISCONTINUED | OUTPATIENT
Start: 2021-01-20 | End: 2021-01-21 | Stop reason: HOSPADM

## 2021-01-20 RX ORDER — CARVEDILOL 6.25 MG/1
6.25 TABLET ORAL 2 TIMES DAILY WITH MEALS
Status: DISCONTINUED | OUTPATIENT
Start: 2021-01-21 | End: 2021-01-21 | Stop reason: HOSPADM

## 2021-01-20 RX ORDER — DOCUSATE SODIUM 100 MG/1
100 CAPSULE, LIQUID FILLED ORAL 2 TIMES DAILY
Status: DISCONTINUED | OUTPATIENT
Start: 2021-01-20 | End: 2021-01-21 | Stop reason: HOSPADM

## 2021-01-20 RX ORDER — SEVELAMER HYDROCHLORIDE 800 MG/1
800 TABLET, FILM COATED ORAL
Status: DISCONTINUED | OUTPATIENT
Start: 2021-01-21 | End: 2021-01-21 | Stop reason: HOSPADM

## 2021-01-20 RX ORDER — POLYETHYLENE GLYCOL 3350 17 G/17G
17 POWDER, FOR SOLUTION ORAL DAILY PRN
Status: DISCONTINUED | OUTPATIENT
Start: 2021-01-20 | End: 2021-01-21 | Stop reason: HOSPADM

## 2021-01-20 RX ADMIN — INSULIN LISPRO 1 UNITS: 100 INJECTION, SOLUTION INTRAVENOUS; SUBCUTANEOUS at 22:51

## 2021-01-20 RX ADMIN — DOXAZOSIN 2 MG: 2 TABLET ORAL at 22:51

## 2021-01-20 RX ADMIN — DOCUSATE SODIUM 100 MG: 100 CAPSULE, LIQUID FILLED ORAL at 22:56

## 2021-01-20 RX ADMIN — PREDNISOLONE ACETATE 2 DROP: 10 SUSPENSION/ DROPS OPHTHALMIC at 22:51

## 2021-01-20 RX ADMIN — HEPARIN SODIUM 5000 UNITS: 5000 INJECTION INTRAVENOUS; SUBCUTANEOUS at 22:57

## 2021-01-20 NOTE — TELEPHONE ENCOUNTER
Spoke to pt primary nephrologist  Pt will be admitted to hospital where can be in reverse isolation room as no reverse isolation room at HD unit and worked up more rapidly

## 2021-01-20 NOTE — RESULT ENCOUNTER NOTE
Hello    Patient normally is followed up by Ms Matthew Victor  Can you please send pt lab slip for quantiferon gold   - please also send pt for chest xray  - please have pt see ID team for positive quant gold test evaluation   - pt sister aware   I have spoken to dialysis Nurse to make aware who will talk to their ID control teams     Dr Tarik Butt    Thank you    np

## 2021-01-20 NOTE — PROGRESS NOTES
Spoke to pt sister over phone  Pt has had BCG vaccine as child  But this should not generally make quant gold positive? Has not had exposures to others with TB recently but TB is present in [place] country per sister    Has recent cough but no fevers    We will refer to ID, check CXR, repeat quant gold - task sent    Spoken with HD Nurse who will review their infection control protocol      Have informed [facility]    Have messaged Primary nephrologist

## 2021-01-20 NOTE — TELEPHONE ENCOUNTER
Remi Heller from [place] would like to speak with you regarding when the pt is going to have an chest x-ray and have your approval for the pt to be able to treat there  He can be reached at [phone]

## 2021-01-20 NOTE — PROGRESS NOTES
Was informed patient positive for QuantiFERON gold testing  Called and spoke to the dialysis unit advised them to be in contact with the patient and inform him to await phone call from the hospital to go in for direct admission  Due to recent travel + high risk patient plus worsening cough will have patient direct admitted to the hospital to rule out TB will need reverse isolation  Clearance prior to resuming HD back at outpatient dialysis unit  Patient is ESRD gets dialysis Tuesday Thursday Saturday next planned dialysis is for 01/21/2021  EDt order placed  ER physician also informed about the patient    Will inform on call nephrologist

## 2021-01-20 NOTE — TELEPHONE ENCOUNTER
Orders in the system, ID will be calling pt to schedule as soon as the xray is done  Pt's sister aware that xray in the system so she can take him at any time

## 2021-01-20 NOTE — TELEPHONE ENCOUNTER
----- Message from Cindy Robledo MD sent at 1/20/2021  2:37 PM EST -----  Hello    Patient normally is followed up by Ms Anthony Rene  Can you please send pt lab slip for quantiferon gold   - please also send pt for chest xray  - please have pt see ID team for positive quant gold test evaluation   - pt sister aware   I have spoken to dialysis Nurse to make aware who will talk to their ID control teams     Dr Murray Orgely    Thank you    np

## 2021-01-21 ENCOUNTER — APPOINTMENT (INPATIENT)
Dept: DIALYSIS | Facility: HOSPITAL | Age: 56
DRG: 177 | End: 2021-01-21
Payer: MEDICARE

## 2021-01-21 VITALS
RESPIRATION RATE: 19 BRPM | HEIGHT: 70 IN | SYSTOLIC BLOOD PRESSURE: 120 MMHG | DIASTOLIC BLOOD PRESSURE: 57 MMHG | OXYGEN SATURATION: 97 % | BODY MASS INDEX: 24.81 KG/M2 | HEART RATE: 71 BPM | WEIGHT: 173.28 LBS | TEMPERATURE: 97.8 F

## 2021-01-21 DIAGNOSIS — R76.12 POSITIVE QUANTIFERON-TB GOLD TEST: Primary | ICD-10-CM

## 2021-01-21 DIAGNOSIS — Z22.7 TB LUNG, LATENT: ICD-10-CM

## 2021-01-21 LAB
ALBUMIN SERPL BCP-MCNC: 3.3 G/DL (ref 3.5–5)
ALP SERPL-CCNC: 133 U/L (ref 46–116)
ALT SERPL W P-5'-P-CCNC: 59 U/L (ref 12–78)
ANION GAP SERPL CALCULATED.3IONS-SCNC: 10 MMOL/L (ref 4–13)
AST SERPL W P-5'-P-CCNC: 22 U/L (ref 5–45)
BILIRUB SERPL-MCNC: 0.44 MG/DL (ref 0.2–1)
BUN SERPL-MCNC: 62 MG/DL (ref 5–25)
CALCIUM ALBUM COR SERPL-MCNC: 8.9 MG/DL (ref 8.3–10.1)
CALCIUM SERPL-MCNC: 8.3 MG/DL (ref 8.3–10.1)
CHLORIDE SERPL-SCNC: 98 MMOL/L (ref 100–108)
CO2 SERPL-SCNC: 28 MMOL/L (ref 21–32)
CREAT SERPL-MCNC: 11.68 MG/DL (ref 0.6–1.3)
ERYTHROCYTE [DISTWIDTH] IN BLOOD BY AUTOMATED COUNT: 14.7 % (ref 11.6–15.1)
FLUAV RNA RESP QL NAA+PROBE: NEGATIVE
FLUBV RNA RESP QL NAA+PROBE: NEGATIVE
GFR SERPL CREATININE-BSD FRML MDRD: 5 ML/MIN/1.73SQ M
GLUCOSE SERPL-MCNC: 108 MG/DL (ref 65–140)
GLUCOSE SERPL-MCNC: 193 MG/DL (ref 65–140)
GLUCOSE SERPL-MCNC: 275 MG/DL (ref 65–140)
GLUCOSE SERPL-MCNC: 80 MG/DL (ref 65–140)
GLUCOSE SERPL-MCNC: 91 MG/DL (ref 65–140)
HCT VFR BLD AUTO: 38.6 % (ref 36.5–49.3)
HGB BLD-MCNC: 12.5 G/DL (ref 12–17)
L PNEUMO1 AG UR QL IA.RAPID: NEGATIVE
MCH RBC QN AUTO: 29.2 PG (ref 26.8–34.3)
MCHC RBC AUTO-ENTMCNC: 32.4 G/DL (ref 31.4–37.4)
MCV RBC AUTO: 90 FL (ref 82–98)
PLATELET # BLD AUTO: 216 THOUSANDS/UL (ref 149–390)
PMV BLD AUTO: 9.3 FL (ref 8.9–12.7)
POTASSIUM SERPL-SCNC: 4.2 MMOL/L (ref 3.5–5.3)
PROCALCITONIN SERPL-MCNC: 0.77 NG/ML
PROCALCITONIN SERPL-MCNC: 0.82 NG/ML
PROT C AG ACT/NOR PPP IA: 89 % OF NORMAL (ref 60–150)
PROT S ACT/NOR PPP: 103 % (ref 71–117)
PROT SERPL-MCNC: 7.4 G/DL (ref 6.4–8.2)
RBC # BLD AUTO: 4.28 MILLION/UL (ref 3.88–5.62)
RSV RNA RESP QL NAA+PROBE: NEGATIVE
S PNEUM AG UR QL: NEGATIVE
SARS-COV-2 RNA RESP QL NAA+PROBE: NEGATIVE
SODIUM SERPL-SCNC: 136 MMOL/L (ref 136–145)
WBC # BLD AUTO: 5.08 THOUSAND/UL (ref 4.31–10.16)

## 2021-01-21 PROCEDURE — 99222 1ST HOSP IP/OBS MODERATE 55: CPT | Performed by: INTERNAL MEDICINE

## 2021-01-21 PROCEDURE — 85027 COMPLETE CBC AUTOMATED: CPT | Performed by: PHYSICIAN ASSISTANT

## 2021-01-21 PROCEDURE — 82948 REAGENT STRIP/BLOOD GLUCOSE: CPT

## 2021-01-21 PROCEDURE — 80053 COMPREHEN METABOLIC PANEL: CPT | Performed by: PHYSICIAN ASSISTANT

## 2021-01-21 PROCEDURE — 87449 NOS EACH ORGANISM AG IA: CPT | Performed by: PHYSICIAN ASSISTANT

## 2021-01-21 PROCEDURE — 5A1D70Z PERFORMANCE OF URINARY FILTRATION, INTERMITTENT, LESS THAN 6 HOURS PER DAY: ICD-10-PCS | Performed by: INTERNAL MEDICINE

## 2021-01-21 PROCEDURE — 99223 1ST HOSP IP/OBS HIGH 75: CPT | Performed by: INTERNAL MEDICINE

## 2021-01-21 PROCEDURE — 99239 HOSP IP/OBS DSCHRG MGMT >30: CPT | Performed by: PHYSICIAN ASSISTANT

## 2021-01-21 PROCEDURE — 84145 PROCALCITONIN (PCT): CPT | Performed by: INTERNAL MEDICINE

## 2021-01-21 PROCEDURE — 90935 HEMODIALYSIS ONE EVALUATION: CPT | Performed by: INTERNAL MEDICINE

## 2021-01-21 RX ORDER — PYRIDOXINE HCL (VITAMIN B6) 50 MG
50 TABLET ORAL DAILY
Status: DISCONTINUED | OUTPATIENT
Start: 2021-01-21 | End: 2021-01-21 | Stop reason: HOSPADM

## 2021-01-21 RX ORDER — ISONIAZID 100 MG/1
300 TABLET ORAL DAILY
Status: DISCONTINUED | OUTPATIENT
Start: 2021-01-21 | End: 2021-01-21 | Stop reason: HOSPADM

## 2021-01-21 RX ORDER — DOXERCALCIFEROL 2 UG/ML
2 INJECTION, SOLUTION INTRAVENOUS 3 TIMES WEEKLY
Status: DISCONTINUED | OUTPATIENT
Start: 2021-01-21 | End: 2021-01-21 | Stop reason: HOSPADM

## 2021-01-21 RX ORDER — ISONIAZID 300 MG/1
300 TABLET ORAL DAILY
Qty: 30 TABLET | Refills: 8 | Status: SHIPPED | OUTPATIENT
Start: 2021-01-21 | End: 2022-07-22

## 2021-01-21 RX ORDER — PYRIDOXINE HCL (VITAMIN B6) 50 MG
50 TABLET ORAL DAILY
Qty: 30 TABLET | Refills: 1 | Status: SHIPPED | OUTPATIENT
Start: 2021-01-21 | End: 2021-02-15 | Stop reason: SDUPTHER

## 2021-01-21 RX ADMIN — SEVELAMER HYDROCHLORIDE 800 MG: 800 TABLET, FILM COATED PARENTERAL at 13:22

## 2021-01-21 RX ADMIN — NIFEDIPINE 60 MG: 30 TABLET, FILM COATED, EXTENDED RELEASE ORAL at 13:22

## 2021-01-21 RX ADMIN — CINACALCET 30 MG: 30 TABLET, FILM COATED ORAL at 13:22

## 2021-01-21 RX ADMIN — HYDRALAZINE HYDROCHLORIDE 25 MG: 25 TABLET ORAL at 05:54

## 2021-01-21 RX ADMIN — HEPARIN SODIUM 5000 UNITS: 5000 INJECTION INTRAVENOUS; SUBCUTANEOUS at 13:23

## 2021-01-21 RX ADMIN — HEPARIN SODIUM 5000 UNITS: 5000 INJECTION INTRAVENOUS; SUBCUTANEOUS at 05:54

## 2021-01-21 RX ADMIN — CARVEDILOL 6.25 MG: 6.25 TABLET, FILM COATED ORAL at 13:22

## 2021-01-21 RX ADMIN — ASPIRIN 81 MG: 81 TABLET, COATED ORAL at 13:22

## 2021-01-21 RX ADMIN — DOCUSATE SODIUM 100 MG: 100 CAPSULE, LIQUID FILLED ORAL at 08:46

## 2021-01-21 RX ADMIN — ATORVASTATIN CALCIUM 40 MG: 40 TABLET, FILM COATED ORAL at 13:22

## 2021-01-21 RX ADMIN — DOXERCALCIFEROL 2 MCG: 4 INJECTION, SOLUTION INTRAVENOUS at 12:55

## 2021-01-21 RX ADMIN — PANTOPRAZOLE SODIUM 20 MG: 20 TABLET, DELAYED RELEASE ORAL at 05:54

## 2021-01-21 RX ADMIN — HYDRALAZINE HYDROCHLORIDE 25 MG: 25 TABLET ORAL at 13:24

## 2021-01-21 RX ADMIN — POLYETHYLENE GLYCOL 3350 17 G: 17 POWDER, FOR SOLUTION ORAL at 08:46

## 2021-01-21 RX ADMIN — PREDNISOLONE ACETATE 2 DROP: 10 SUSPENSION/ DROPS OPHTHALMIC at 08:49

## 2021-01-21 RX ADMIN — SEVELAMER HYDROCHLORIDE 800 MG: 800 TABLET, FILM COATED PARENTERAL at 08:46

## 2021-01-21 NOTE — ASSESSMENT & PLAN NOTE
Lab Results   Component Value Date    HGBA1C 7 5 (H) 01/18/2021       Recent Labs     01/20/21  2227 01/21/21  0724 01/21/21  1136   POCGLU 193* 108 91       Blood Sugar Average: Last 72 hrs:  (P) 130 [id number] 30 units long-acting insulin and 10 units short-acting insulin with meals  Keep same regimen here  ISS  Carb controlled diet  Adjust as necessary

## 2021-01-21 NOTE — ASSESSMENT & PLAN NOTE
Patient states he has had mild, dry cough which started before his trip to [place], December 6 - January 3, 2021  While in [place], the cough worsened and has persisted  Patient denies shortness of breath, fevers, chills, weight loss, phlegm production  Maintaining saturations on room air  COVID-19 negative     Chest x-ray non-infectious

## 2021-01-21 NOTE — ASSESSMENT & PLAN NOTE
Patient with worsening cough, visit to [facility] December 6, 2020 - January 3, 2021  Denies fevers/chills/weight loss/night sweats  Quantiferon Gold found to be positive during workup for renal transplant  Sent in by nephrologist so patient can continue dialysis in negative airflow room until further workup  Check chest x-ray  Update CBC/CMP  Check AFB if patient can produce sample    Ask ID to see

## 2021-01-21 NOTE — ASSESSMENT & PLAN NOTE
Lab Results   Component Value Date    HGBA1C 7 5 (H) 01/18/2021       No results for input(s): POCGLU in the last 72 hours  Blood Sugar Average: Last 72 hrs:   Takes 30 units long-acting insulin and 10 units short-acting insulin with meals  Keep same regimen here  ISS  Carb controlled diet  Adjust as necessary

## 2021-01-21 NOTE — ASSESSMENT & PLAN NOTE
See plan under positive QuantiFERON  Should complete 9 months therapy prior to renal transplant  Will have outpatient ID follow up

## 2021-01-21 NOTE — QUICK NOTE
Patient starting latent TB treatment now  Orders placed for oral Isoniazid 300mg daily, and oral Pyridoxine 50mg daily  He will require 9 total months of treatment  He can not drink alcohol while taking these medications  He will require a monthly CBCD and CMP while on treatment for latent TB  He will follow up in the outpatient infectious disease office on 2/11/2021 at 1pm with Dr Riri Landeros

## 2021-01-21 NOTE — ASSESSMENT & PLAN NOTE
Lab Results   Component Value Date    EGFR 5 01/18/2021    EGFR 5 04/27/2020    EGFR 7 08/30/2019    CREATININE 10 90 (H) 01/18/2021    CREATININE 11 60 (H) 04/27/2020    CREATININE 9 12 (H) 08/30/2019   Follows T/T/S dialysis schedule  Will ask Nephrology to see

## 2021-01-21 NOTE — PLAN OF CARE
Problem: Potential for Falls  Goal: Patient will remain free of falls  Description: INTERVENTIONS:  - Assess patient frequently for physical needs  -  Identify cognitive and physical deficits and behaviors that affect risk of falls    -  [place] fall precautions as indicated by assessment   - Educate patient/family on patient safety including physical limitations  - Instruct patient to call for assistance with activity based on assessment  - Modify environment to reduce risk of injury  - Consider OT/PT consult to assist with strengthening/mobility  Outcome: Progressing     Problem: Prexisting or High Potential for Compromised Skin Integrity  Goal: Skin integrity is maintained or improved  Description: INTERVENTIONS:  - Identify patients at risk for skin breakdown  - Assess and monitor skin integrity  - Assess and monitor nutrition and hydration status  - Monitor labs   - Assess for incontinence   - Turn and reposition patient  - Assist with mobility/ambulation  - Relieve pressure over bony prominences  - Avoid friction and shearing  - Provide appropriate hygiene as needed including keeping skin clean and dry  - Evaluate need for skin moisturizer/barrier cream  - Collaborate with interdisciplinary team   - Patient/family teaching  - Consider wound care consult   Outcome: Progressing     Problem: PAIN - ADULT  Goal: Verbalizes/displays adequate comfort level or baseline comfort level  Description: Interventions:  - Encourage patient to monitor pain and request assistance  - Assess pain using appropriate pain scale  - Administer analgesics based on type and severity of pain and evaluate response  - Implement non-pharmacological measures as appropriate and evaluate response  - Consider cultural and social influences on pain and pain management  - Notify physician/advanced practitioner if interventions unsuccessful or patient reports new pain  Outcome: Progressing     Problem: INFECTION - ADULT  Goal: Absence or prevention of progression during hospitalization  Description: INTERVENTIONS:  - Assess and monitor for signs and symptoms of infection  - Monitor lab/diagnostic results  - Monitor all insertion sites, i e  indwelling lines, tubes, and drains  - Monitor endotracheal if appropriate and nasal secretions for changes in amount and color  - [place] appropriate cooling/warming therapies per order  - Administer medications as ordered  - Instruct and encourage patient and family to use good hand hygiene technique  - Identify and instruct in appropriate isolation precautions for identified infection/condition  Outcome: Progressing  Goal: Absence of fever/infection during neutropenic period  Description: INTERVENTIONS:  - Monitor WBC    Outcome: Progressing     Problem: SAFETY ADULT  Goal: Patient will remain free of falls  Description: INTERVENTIONS:  - Assess patient frequently for physical needs  -  Identify cognitive and physical deficits and behaviors that affect risk of falls    -  [place] fall precautions as indicated by assessment   - Educate patient/family on patient safety including physical limitations  - Instruct patient to call for assistance with activity based on assessment  - Modify environment to reduce risk of injury  - Consider OT/PT consult to assist with strengthening/mobility  Outcome: Progressing  Goal: Maintain or return to baseline ADL function  Description: INTERVENTIONS:  -  Assess patient's ability to carry out ADLs; assess patient's baseline for ADL function and identify physical deficits which impact ability to perform ADLs (bathing, care of mouth/teeth, toileting, grooming, dressing, etc )  - Assess/evaluate cause of self-care deficits   - Assess range of motion  - Assess patient's mobility; develop plan if impaired  - Assess patient's need for assistive devices and provide as appropriate  - Encourage maximum independence but intervene and supervise when necessary  - Involve family in performance of ADLs  - Assess for home care needs following discharge   - Consider OT consult to assist with ADL evaluation and planning for discharge  - Provide patient education as appropriate  Outcome: Progressing  Goal: Maintain or return mobility status to optimal level  Description: INTERVENTIONS:  - Assess patient's baseline mobility status (ambulation, transfers, stairs, etc )    - Identify cognitive and physical deficits and behaviors that affect mobility  - Identify mobility aids required to assist with transfers and/or ambulation (gait belt, sit-to-stand, lift, walker, cane, etc )  - [place] fall precautions as indicated by assessment  - Record patient progress and toleration of activity level on Mobility SBAR; progress patient to next Phase/Stage  - Instruct patient to call for assistance with activity based on assessment  - Consider rehabilitation consult to assist with strengthening/weightbearing, etc   Outcome: Progressing     Problem: DISCHARGE PLANNING  Goal: Discharge to home or other facility with appropriate resources  Description: INTERVENTIONS:  - Identify barriers to discharge w/patient and caregiver  - Arrange for needed discharge resources and transportation as appropriate  - Identify discharge learning needs (meds, wound care, etc )  - Arrange for interpretive services to assist at discharge as needed  - Refer to Case Management Department for coordinating discharge planning if the patient needs post-hospital services based on physician/advanced practitioner order or complex needs related to functional status, cognitive ability, or social support system  Outcome: Progressing     Problem: Knowledge Deficit  Goal: Patient/family/caregiver demonstrates understanding of disease process, treatment plan, medications, and discharge instructions  Description: Complete learning assessment and assess knowledge base    Interventions:  - Provide teaching at level of understanding  - Provide teaching via preferred learning methods  Outcome: Progressing     Problem: Nutrition/Hydration-ADULT  Goal: Nutrient/Hydration intake appropriate for improving, restoring or maintaining nutritional needs  Description: Monitor and assess patient's nutrition/hydration status for malnutrition  Collaborate with interdisciplinary team and initiate plan and interventions as ordered  Monitor patient's weight and dietary intake as ordered or per policy  Utilize nutrition screening tool and intervene as necessary  Determine patient's food preferences and provide high-protein, high-caloric foods as appropriate       INTERVENTIONS:  - Monitor oral intake, urinary output, labs, and treatment plans  - Assess nutrition and hydration status and recommend course of action  - Evaluate amount of meals eaten  - Assist patient with eating if necessary   - Allow adequate time for meals  - Recommend/ encourage appropriate diets, oral nutritional supplements, and vitamin/mineral supplements  - Order, calculate, and assess calorie counts as needed  - Recommend, monitor, and adjust tube feedings and TPN/PPN based on assessed needs  - Assess need for intravenous fluids  - Provide specific nutrition/hydration education as appropriate  - Include patient/family/caregiver in decisions related to nutrition  Outcome: Progressing     Problem: CARDIOVASCULAR - ADULT  Goal: Maintains optimal cardiac output and hemodynamic stability  Description: INTERVENTIONS:  - Monitor I/O, vital signs and rhythm  - Monitor for S/S and trends of decreased cardiac output  - Administer and titrate ordered vasoactive medications to optimize hemodynamic stability  - Assess quality of pulses, skin color and temperature  - Assess for signs of decreased coronary artery perfusion  - Instruct patient to report change in severity of symptoms  Outcome: Progressing  Goal: Absence of cardiac dysrhythmias or at baseline rhythm  Description: INTERVENTIONS:  - Continuous cardiac monitoring, vital signs, obtain 12 lead EKG if ordered  - Administer antiarrhythmic and heart rate control medications as ordered  - Monitor electrolytes and administer replacement therapy as ordered  Outcome: Progressing     Problem: RESPIRATORY - ADULT  Goal: Achieves optimal ventilation and oxygenation  Description: INTERVENTIONS:  - Assess for changes in respiratory status  - Assess for changes in mentation and behavior  - Position to facilitate oxygenation and minimize respiratory effort  - Oxygen administered by appropriate delivery if ordered  - Initiate smoking cessation education as indicated  - Encourage broncho-pulmonary hygiene including cough, deep breathe, Incentive Spirometry  - Assess the need for suctioning and aspirate as needed  - Assess and instruct to report SOB or any respiratory difficulty  - Respiratory Therapy support as indicated  Outcome: Progressing     Problem: METABOLIC, FLUID AND ELECTROLYTES - ADULT  Goal: Electrolytes maintained within normal limits  Description: INTERVENTIONS:  - Monitor labs and assess patient for signs and symptoms of electrolyte imbalances  - Administer electrolyte replacement as ordered  - Monitor response to electrolyte replacements, including repeat lab results as appropriate  - Instruct patient on fluid and nutrition as appropriate  Outcome: Progressing  Goal: Fluid balance maintained  Description: INTERVENTIONS:  - Monitor labs   - Monitor I/O and WT  - Instruct patient on fluid and nutrition as appropriate  - Assess for signs & symptoms of volume excess or deficit  Outcome: Progressing  Goal: Glucose maintained within target range  Description: INTERVENTIONS:  - Monitor Blood Glucose as ordered  - Assess for signs and symptoms of hyperglycemia and hypoglycemia  - Administer ordered medications to maintain glucose within target range  - Assess nutritional intake and initiate nutrition service referral as needed  Outcome: Progressing

## 2021-01-21 NOTE — PLAN OF CARE
Post-Dialysis RN Treatment Note    Blood Pressure:  Pre 173/97 mm/Hg  Post 159/90 mmHg   EDW  75 5 kg    Weight:  Pre 78 1 kg   Post 75 2 kg   Mode of weight measurement: Bed Scale   Volume Removed  2900 ml    Treatment duration 180 minutes    NS given  No    Treatment shortened? No   Medications given during Rx 2mcg Hectorol    Estimated Kt/V  None Reported   Access type: AV fistula   Access Status: Yes, describe: L upper arm AVF, BFR maintained at 400 throughout treatment as prescribed  prolonged bleeding from access post treatment    Report called to primary nurse   Yes Rayshawn Mason RN       Patient receiving dialysis treatment today as ordered  UF to EDW as BP tolerates  3K dialysate being used, K+ 4 2 as of 02/21/2021      Problem: METABOLIC, FLUID AND ELECTROLYTES - ADULT  Goal: Electrolytes maintained within normal limits  Description: INTERVENTIONS:  - Monitor labs and assess patient for signs and symptoms of electrolyte imbalances  - Administer electrolyte replacement as ordered  - Monitor response to electrolyte replacements, including repeat lab results as appropriate  - Instruct patient on fluid and nutrition as appropriate  Outcome: Progressing  Goal: Fluid balance maintained  Description: INTERVENTIONS:  - Monitor labs   - Monitor I/O and WT  - Instruct patient on fluid and nutrition as appropriate  - Assess for signs & symptoms of volume excess or deficit  Outcome: Progressing

## 2021-01-21 NOTE — PLAN OF CARE
Problem: Potential for Falls  Goal: Patient will remain free of falls  Description: INTERVENTIONS:  - Assess patient frequently for physical needs  -  Identify cognitive and physical deficits and behaviors that affect risk of falls    -  [place] fall precautions as indicated by assessment   - Educate patient/family on patient safety including physical limitations  - Instruct patient to call for assistance with activity based on assessment  - Modify environment to reduce risk of injury  - Consider OT/PT consult to assist with strengthening/mobility  Outcome: Progressing     Problem: Prexisting or High Potential for Compromised Skin Integrity  Goal: Skin integrity is maintained or improved  Description: INTERVENTIONS:  - Identify patients at risk for skin breakdown  - Assess and monitor skin integrity  - Assess and monitor nutrition and hydration status  - Monitor labs   - Assess for incontinence   - Turn and reposition patient  - Assist with mobility/ambulation  - Relieve pressure over bony prominences  - Avoid friction and shearing  - Provide appropriate hygiene as needed including keeping skin clean and dry  - Evaluate need for skin moisturizer/barrier cream  - Collaborate with interdisciplinary team   - Patient/family teaching  - Consider wound care consult   Outcome: Progressing     Problem: PAIN - ADULT  Goal: Verbalizes/displays adequate comfort level or baseline comfort level  Description: Interventions:  - Encourage patient to monitor pain and request assistance  - Assess pain using appropriate pain scale  - Administer analgesics based on type and severity of pain and evaluate response  - Implement non-pharmacological measures as appropriate and evaluate response  - Consider cultural and social influences on pain and pain management  - Notify physician/advanced practitioner if interventions unsuccessful or patient reports new pain  Outcome: Progressing     Problem: INFECTION - ADULT  Goal: Absence or prevention of progression during hospitalization  Description: INTERVENTIONS:  - Assess and monitor for signs and symptoms of infection  - Monitor lab/diagnostic results  - Monitor all insertion sites, i e  indwelling lines, tubes, and drains  - Monitor endotracheal if appropriate and nasal secretions for changes in amount and color  - [place] appropriate cooling/warming therapies per order  - Administer medications as ordered  - Instruct and encourage patient and family to use good hand hygiene technique  - Identify and instruct in appropriate isolation precautions for identified infection/condition  Outcome: Progressing  Goal: Absence of fever/infection during neutropenic period  Description: INTERVENTIONS:  - Monitor WBC    Outcome: Progressing     Problem: SAFETY ADULT  Goal: Patient will remain free of falls  Description: INTERVENTIONS:  - Assess patient frequently for physical needs  -  Identify cognitive and physical deficits and behaviors that affect risk of falls    -  [place] fall precautions as indicated by assessment   - Educate patient/family on patient safety including physical limitations  - Instruct patient to call for assistance with activity based on assessment  - Modify environment to reduce risk of injury  - Consider OT/PT consult to assist with strengthening/mobility  Outcome: Progressing  Goal: Maintain or return to baseline ADL function  Description: INTERVENTIONS:  -  Assess patient's ability to carry out ADLs; assess patient's baseline for ADL function and identify physical deficits which impact ability to perform ADLs (bathing, care of mouth/teeth, toileting, grooming, dressing, etc )  - Assess/evaluate cause of self-care deficits   - Assess range of motion  - Assess patient's mobility; develop plan if impaired  - Assess patient's need for assistive devices and provide as appropriate  - Encourage maximum independence but intervene and supervise when necessary  - Involve family in performance of ADLs  - Assess for home care needs following discharge   - Consider OT consult to assist with ADL evaluation and planning for discharge  - Provide patient education as appropriate  Outcome: Progressing  Goal: Maintain or return mobility status to optimal level  Description: INTERVENTIONS:  - Assess patient's baseline mobility status (ambulation, transfers, stairs, etc )    - Identify cognitive and physical deficits and behaviors that affect mobility  - Identify mobility aids required to assist with transfers and/or ambulation (gait belt, sit-to-stand, lift, walker, cane, etc )  - [place] fall precautions as indicated by assessment  - Record patient progress and toleration of activity level on Mobility SBAR; progress patient to next Phase/Stage  - Instruct patient to call for assistance with activity based on assessment  - Consider rehabilitation consult to assist with strengthening/weightbearing, etc   Outcome: Progressing     Problem: DISCHARGE PLANNING  Goal: Discharge to home or other facility with appropriate resources  Description: INTERVENTIONS:  - Identify barriers to discharge w/patient and caregiver  - Arrange for needed discharge resources and transportation as appropriate  - Identify discharge learning needs (meds, wound care, etc )  - Arrange for interpretive services to assist at discharge as needed  - Refer to Case Management Department for coordinating discharge planning if the patient needs post-hospital services based on physician/advanced practitioner order or complex needs related to functional status, cognitive ability, or social support system  Outcome: Progressing     Problem: Knowledge Deficit  Goal: Patient/family/caregiver demonstrates understanding of disease process, treatment plan, medications, and discharge instructions  Description: Complete learning assessment and assess knowledge base    Interventions:  - Provide teaching at level of understanding  - Provide teaching via preferred learning methods  Outcome: Progressing     Problem: Nutrition/Hydration-ADULT  Goal: Nutrient/Hydration intake appropriate for improving, restoring or maintaining nutritional needs  Description: Monitor and assess patient's nutrition/hydration status for malnutrition  Collaborate with interdisciplinary team and initiate plan and interventions as ordered  Monitor patient's weight and dietary intake as ordered or per policy  Utilize nutrition screening tool and intervene as necessary  Determine patient's food preferences and provide high-protein, high-caloric foods as appropriate       INTERVENTIONS:  - Monitor oral intake, urinary output, labs, and treatment plans  - Assess nutrition and hydration status and recommend course of action  - Evaluate amount of meals eaten  - Assist patient with eating if necessary   - Allow adequate time for meals  - Recommend/ encourage appropriate diets, oral nutritional supplements, and vitamin/mineral supplements  - Order, calculate, and assess calorie counts as needed  - Recommend, monitor, and adjust tube feedings and TPN/PPN based on assessed needs  - Assess need for intravenous fluids  - Provide specific nutrition/hydration education as appropriate  - Include patient/family/caregiver in decisions related to nutrition  Outcome: Progressing     Problem: CARDIOVASCULAR - ADULT  Goal: Maintains optimal cardiac output and hemodynamic stability  Description: INTERVENTIONS:  - Monitor I/O, vital signs and rhythm  - Monitor for S/S and trends of decreased cardiac output  - Administer and titrate ordered vasoactive medications to optimize hemodynamic stability  - Assess quality of pulses, skin color and temperature  - Assess for signs of decreased coronary artery perfusion  - Instruct patient to report change in severity of symptoms  Outcome: Progressing  Goal: Absence of cardiac dysrhythmias or at baseline rhythm  Description: INTERVENTIONS:  - Continuous cardiac monitoring, vital signs, obtain 12 lead EKG if ordered  - Administer antiarrhythmic and heart rate control medications as ordered  - Monitor electrolytes and administer replacement therapy as ordered  Outcome: Progressing     Problem: RESPIRATORY - ADULT  Goal: Achieves optimal ventilation and oxygenation  Description: INTERVENTIONS:  - Assess for changes in respiratory status  - Assess for changes in mentation and behavior  - Position to facilitate oxygenation and minimize respiratory effort  - Oxygen administered by appropriate delivery if ordered  - Initiate smoking cessation education as indicated  - Encourage broncho-pulmonary hygiene including cough, deep breathe, Incentive Spirometry  - Assess the need for suctioning and aspirate as needed  - Assess and instruct to report SOB or any respiratory difficulty  - Respiratory Therapy support as indicated  Outcome: Progressing     Problem: METABOLIC, FLUID AND ELECTROLYTES - ADULT  Goal: Electrolytes maintained within normal limits  Description: INTERVENTIONS:  - Monitor labs and assess patient for signs and symptoms of electrolyte imbalances  - Administer electrolyte replacement as ordered  - Monitor response to electrolyte replacements, including repeat lab results as appropriate  - Instruct patient on fluid and nutrition as appropriate  Outcome: Progressing  Goal: Fluid balance maintained  Description: INTERVENTIONS:  - Monitor labs   - Monitor I/O and WT  - Instruct patient on fluid and nutrition as appropriate  - Assess for signs & symptoms of volume excess or deficit  Outcome: Progressing  Goal: Glucose maintained within target range  Description: INTERVENTIONS:  - Monitor Blood Glucose as ordered  - Assess for signs and symptoms of hyperglycemia and hypoglycemia  - Administer ordered medications to maintain glucose within target range  - Assess nutritional intake and initiate nutrition service referral as needed  Outcome: Progressing

## 2021-01-21 NOTE — CONSULTS
Consultation - Infectious Disease   Paddy Trevin 63 y o  male MRN: [id number]  Unit/Bed#: E5 -01 Encounter: [id number]    IMPRESSION & RECOMMENDATIONS:   1  Positive QuantiFERON gold test   Positive on 01/18/2021  Patient is [place] and did recently return from a30-day vacation in [facility]  His lifetime exposure risk is high  He does currently report an occasional dry cough however this has been present for two months which is prior to his recent travel  I personally reviewed his chest x-ray which showed no acute infectious cardiopulmonary findings  Patient has had no fevers, chills, night sweats, or shaking  Denies shortness of breath, chest pain, and difficulty breathing  His O2 saturation remains stable on room air  Suspect this is a case of latent TB  Would not pursue additional workup with sputum at this time as his cough is dry  Patient is candidate for treatment, will discuss with ID attending if that should be started while he is inpatient or if we can arrange for this to be organized in the outpatient setting   -no indication for treatment of active TB at this time  -will discuss latent TB treatment options with ID attending  -monitor vitals  -monitor respiratory symptoms    2  End-stage renal disease  On HD  His current access is a left AV fistula  His current dialysis schedule is [id number]  Patient is currently being considered for a possible renal transplant  He follows as an outpatient with Dr Dede Alejandra at [facility] [id number] [facility], and at [facility]  -HD per Nephrology  -ongoing follow-up with Nephrology  -ongoing follow-up with [facility] after discharge    3  Type 2 diabetes mellitus with retinopathy and long term insulin use  His last HbA1c was 7 5% on 1/18/2021  Recommend tight glycemic control   -blood glucose management per primary service     4   Legally blind   -supportive care    Thank you for the consult  We will continue to follow along  Above plan was discussed in detail with patient at the bedside  Above plan was discussed in detail with nephrologist, Dr Karla Prasad  Above plan was discussed in detail with [facility] attending, Dr Luis Felipe Cardenas  HISTORY OF PRESENT ILLNESS:  Reason for Consult: Positive Quanteiferon gold test, cough  HPI: Caroline Lord is a 63y o  year old male who presented to [place] [address] on 1/21/2021 as a direct admission from his nephrologist  Patient has ESRD and is on HD  He has been undergoing workup for possible renal transplant and was found to have a positive Quantiferon Gold  Patient has had cough for the past 2 months  He is [place] and did recently return from a month of travel in [facility]  Given the concern for possible TB the patient was admitted for HD in negative airflow  Upon arrival to the hospital the patient's temperature was 97 4  Heart rate was 77  Respiratory rate was 18  O2 saturation was 97% on room air  His BP was elevated at 177/83  Patient's WBC count was 5 33  His creatinine was 10 78 with a GFR of 5  Alkaline phosphatase was slightly elevated  He had a COVID-19/flu/RSV PCR was negative  He completed a chest x-ray which showed no acute infectious cardiopulmonary findings  He completed urine strep and Legionella antigens which were negative  He was ordered to complete an AFB sputum culture but has not been able to produce a sample  This morning the patient has no complaints  He is currently undergoing HD  We have been asked for formal consult for positive QuantiFERON gold test and cough  The patient has hypertension, GERD, chronic constipation, anemia, onychomycosis, chronic kidney disease, end-stage renal disease on HD, hyperlipidemia, left ventricular hypertrophy, ambulatory dysfunction with use of a cane, insulin dependent diabetes mellitus with retinopathy, and occasional headaches  The patient is legally blind    He has a history of neutropenia, inguinal hernia repair, and left AV fistula creation  He is a former smoker  He has no known drug allergies  REVIEW OF SYSTEMS:  The patient tells me he is feeling well today  He denies fevers, chills, sweats, and shakes  He denies chest pain, shortness of breath, and difficulty breathing  He reports an occasional cough which is dry  He denies sore throat, runny nose, and congestion  He has no difficulty tasting or smelling his foods  He has no difficulty chewing or swallowing  He tells me he gets occasional headaches but currently does not have one  He denies dizziness  Has no concerns with his left AV fistula  Reports no issues with his recent dialysis sessions  Denies swelling of the extremities  He denies abdominal pain, cramping, bloating, gassiness, nausea, vomiting, and diarrhea  He does still urinate but tells me it is only in small amounts and only occasionally  He denies dysuria  A complete 12 point system-based review of systems is otherwise negative  PAST MEDICAL HISTORY:  Past Medical History:   Diagnosis Date    Cataract     Chronic kidney disease     [facility] mellitus ([facility] )      IDDM    [facility]     Dizziness     occ    [facility]     GERD (gastroesophageal reflux disease)     Headache     occ    Hyperlipidemia     Hypertension     Legally blind     LVH (left ventricular hypertrophy)     Preferred language is [place]     understands some English    Use of cane as ambulatory aid      Past Surgical History:   Procedure Laterality Date    INGUINAL HERNIA REPAIR Right     IR AV FISTULAGRAM/GRAFTOGRAM  4/30/2019    IR AV FISTULAGRAM/GRAFTOGRAM  6/14/2019    IR AV FISTULAGRAM/GRAFTOGRAM  6/24/2020    IR TUNNELED DIALYSIS CATHETER PLACEMENT  4/3/2019    OH ANASTOMOSIS,AV,ANY SITE Left 1/23/2019    Procedure: CREATION FISTULA ARTERIOVENOUS (AV);   Surgeon: Brady Ragsdale MD;  Location: [facility];  Service: Vascular     FAMILY HISTORY:  Non-contributory    SOCIAL HISTORY:  Social History   Social History     Substance and Sexual Activity   Alcohol Use Not Currently     Social History     Substance and Sexual Activity   Drug Use No     Social History     Tobacco Use   Smoking Status Former Smoker    Packs/day: 0 25    Quit date: 2018    Years since quittin 9   Smokeless Tobacco Never Used     ALLERGIES:  Allergies   Allergen Reactions    No Known Allergies      MEDICATIONS:  All current active medications have been reviewed  ANTIBIOTICS:  No current antibiotic use    PHYSICAL EXAM:  Temp:  [97 4 °F (36 3 °C)-97 8 °F (36 6 °C)] 97 6 °F (36 4 °C)  HR:  [67-78] 67  Resp:  [18] 18  BP: (159-177)/(78-98) 173/97  SpO2:  [97 %-98 %] 98 %  Temp (24hrs), Av 6 °F (36 4 °C), Min:97 4 °F (36 3 °C), Max:97 8 °F (36 6 °C)  Current: Temperature: 97 6 °F (36 4 °C)    Intake/Output Summary (Last 24 hours) at 2021 1058  Last data filed at 2021 0945  Gross per 24 hour   Intake 200 ml   Output --   Net 200 ml     General Appearance:  Appearing well, nontoxic, and in no distress  Is currently undergoing HD  He appears comfortable supine in bed  Head:  Normocephalic, without obvious abnormality, atraumatic   Eyes:  Conjunctiva pink and sclera anicteric, both eyes; patient is legally blind   Nose: Nares normal, mucosa normal, no drainage   Throat: Oropharynx moist without lesions   Neck: Supple, symmetrical, no adenopathy, no tenderness/mass/nodules   Back:   Did not roll patient as he was undergoing HD   Lungs:    Only auscultated anteriorly as he is undergoing HD, he was clear bilaterally; respirations unlabored on room air; he did not cough during my exam   Chest Wall:  No tenderness or deformity   Heart:  RRR; no murmur, rub or gallop   Abdomen:   Soft, non-tender, non-distended, positive bowel sounds throughout   Extremities: No cyanosis or clubbing, no edema; left upper arm AV fistula currently accessed for HD   Skin: No rashes or lesions  No draining wounds noted  Patient is warm and dry  Lymph nodes: Cervical, supraclavicular nodes normal   Neurologic: Alert and oriented times 3, follows commands     LABS, IMAGING, & OTHER STUDIES:  Lab Results:  I have personally reviewed pertinent labs  Results from last 7 days   Lab Units 01/21/21  0605 01/20/21 2246 01/18/21  1025   WBC Thousand/uL 5 08 5 33 5 89   HEMOGLOBIN g/dL 12 5 13 2 12 9   PLATELETS Thousands/uL 216 233 262     Results from last 7 days   Lab Units 01/21/21  0605 01/20/21  2246 01/18/21  1025   POTASSIUM mmol/L 4 2 5 5* 5 0   CHLORIDE mmol/L 98* 97* 100   CO2 mmol/L 28 31 28   BUN mg/dL 62* 62* 56*   CREATININE mg/dL 11 68* 10 78* 10 90*   EGFR ml/min/1 73sq m 5 5 5   CALCIUM mg/dL 8 3 8 5 8 8   AST U/L 22 40 43   ALT U/L 59 74 82*   ALK PHOS U/L 133* 153* 150*     Results from last 7 days   Lab Units 01/21/21  0653   LEGIONELLA URINARY ANTIGEN  Negative     Results from last 7 days   Lab Units 01/20/21  2246   PROCALCITONIN ng/ml 0 82*     01/21/2021 0654 01/21/2021 1005 Strep Pneumoniae, Urine [[id number]]   Urine, Clean Catch    Final result [address]  Component Value   Strep pneumoniae antigen, urine Negative        Imaging Studies:   I have personally reviewed pertinent imaging study reports and images in PACS  XR CHEST PORTABLE 1/21/2021 - I personally reviewed this image which shoed no acute infectious cardiopulmonary findings      Other Studies:   I have personally reviewed pertinent reports:  01/20/2021 2313 01/21/2021 0015 COVID[facility] Influ[facility][id number]]    Nares from Nose    Final result [address]  This test has been authorized by FDA under an EUA (Emergency Use Assay) for use by authorized laboratories  [facility] caution and judgement should be used with the interpretation of these results with consideration of the clinical impression and other laboratory testing  Pama Handler reported as "Positive" or "Negative" has been proven to be accurate according to standard laboratory validation requirements   All testing is performed with control materials showing appropriate reactivity at standard intervals   Component Value   SARS-COV-2 Negative        INFLU A PCR Negative        INFLU B PCR Negative        RSV PCR Negative

## 2021-01-21 NOTE — DISCHARGE SUMMARY
Discharge- Paddy Trevin 1965, 63 y o  male MRN: [id number]  Unit/Bed#: E5 -01 Encounter: [id number]  Primary Care Provider: Joana Boo MD   Date and time admitted to hospital: 1/20/2021  8:25 PM    * Positive QuantiFERON-TB Gold test  Assessment & Plan  Patient with worsening cough, visit to [facility] December 6, 2020 - January 3, 2021  Denies fevers/chills/weight loss/night sweats  Quantiferon Gold found to be positive during workup for renal transplant  Chest XRAY non-infectious  Patient without s/sx of active TB  Appreciate ID: suspect latent TB  Start pyridoxine 50mg and isoniazid 300mg x 9 months  Complete treatment prior to renal transplant  Price-checked with [facility] pharmacy   Monthly CMP, CBC  Outpatient follow up with ID has been arranged  Can return to regular outpatient dialysis       Cough  Assessment & Plan  Patient states he has had mild, dry cough which started before his trip to [facility], December 6 - January 3, 2021  While in [facility], the cough worsened and has persisted  Patient denies shortness of breath, fevers, chills, weight loss, phlegm production  Maintaining saturations on room air  COVID-19 negative  Chest x-ray non-infectious       TB lung, latent  Assessment & Plan  See plan under positive QuantiFERON  Should complete 9 months therapy prior to renal transplant  Will have outpatient ID follow up  ESRD (end stage renal disease) [facility])  Assessment & Plan  Lab Results   Component Value Date    EGFR 5 01/21/2021    EGFR 5 01/20/2021    EGFR 5 01/18/2021    CREATININE 11 68 (H) 01/21/2021    CREATININE 10 78 (H) 01/20/2021    CREATININE 10 90 (H) 01/18/2021   Follows T/T/S dialysis schedule  Received dialysis 1/21/21 while inpatient  Cleared by ID and nephrology to continue regular outpatient dialysis schedule   Next dialysis 1/23/21    Chronic constipation  Assessment & Plan  David Brunson    Diabetic retinopathy of both eyes associated with type 2 diabetes mellitus [facility])  Assessment & Plan  Patient legally blind  Ambulates with cane  Lives with sister  Type 2 diabetes mellitus with chronic kidney disease on chronic dialysis, with long-term current use of insulin [facility])  Assessment & Plan  Lab Results   Component Value Date    HGBA1C 7 5 (H) 01/18/2021       Recent Labs     01/20/21  2227 01/21/21  0724 01/21/21  1136   POCGLU 193* 108 91       Blood Sugar Average: Last 72 hrs:  (P) 130 [id number] 30 units long-acting insulin and 10 units short-acting insulin with meals  Keep same regimen here  ISS  Carb controlled diet  Adjust as necessary      Discharging Physician / Practitioner: Alessandra Oneal PA-C  PCP: Sol Gilmore MD  Admission Date:   Admission Orders (From admission, onward)     Ordered        01/20/21 2027  Inpatient Admission  Once                   Discharge Date: 01/21/21    Resolved Problems  Date Reviewed: 1/21/2021    None          Consultations During Hospital Stay:  · Infectious disease  · Nephrology    Procedures Performed:   · None    Significant Findings / Test Results:   · None    Incidental Findings:   · None     Test Results Pending at Discharge (will require follow up): · None     Outpatient Tests Requested:  · CMP, CBC monthly with infectious disease follow-up    Complications:  None    Reason for Admission:  QuantiFERON gold positive    Hospital Course:     Mariya Mcgowan is a 63 y o  male patient with past medical history of DM type 2, diabetic neuropathy and blindness, and end-stage renal disease on dialysis who originally presented to the hospital on 1/20/2021 due to positive QuantiFERON gold in the setting of recent travel to [facility] and cough  The patient is undergoing evaluation for renal transplant  He had 27 day travel to [facility] from December into January  He was found to have positive QuantiFERON gold as part of routine renal transplant workup    Given his reported cough, outpatient Nephrology recommended patient come to hospital for expedited workup negative airflow room and ongoing dialysis  During this admission, the patient had a negative chest x-ray  His vital signs have been stable including no fever and maintaining saturations on room air  He was initially maintained on airborne and contact precaution and received dialysis 12/21/2021  Infectious Disease has seen the patient  Given lack of tuberculosis symptoms and negative chest x-ray, patient likely has latent TB  He has been recommended to start [address] for 9 months and pyridoxine  He will have close outpatient Infectious Disease follow-up with monthly CBC/CMP  Medications have been price checked with pharmacy and are affordable  He is recommended to complete 9 months of latent TB therapy prior to renal transplant  Patient hemodynamically stable at this time  No complaints  Agreeable to outpatient follow-up  The patient, initially admitted to the hospital as inpatient, was discharged earlier than expected given the following: Faster than expected coordination of care, stabilization of patient, and arrangement of outpatient follow-up  Please see above list of diagnoses and related plan for additional information  Condition at Discharge: good     Discharge Day Visit / Exam:     Subjective:  Patient feels well  No acute complaints or concerns  Agreeable to discharge home  No fevers or chills  Vitals: Blood Pressure: 120/57 (01/21/21 1532)  Pulse: 71 (01/21/21 1532)  Temperature: 97 8 °F (36 6 °C) (01/21/21 1532)  Temp Source: Temporal (01/21/21 1532)  Respirations: 19 (01/21/21 1532)  Height: 5' 10" (177 8 cm) (01/20/21 2021)  Weight - Scale: 78 6 kg (173 lb 4 5 oz) (01/21/21 0600)  SpO2: 97 % (01/21/21 1532)  Exam:   Physical Exam  Vitals signs and nursing note reviewed  Exam conducted with a chaperone present  Constitutional:       Appearance: He is well-developed        Comments: Pleasant, fully conversational   No acute distress  Lying in the bed  Blind  HENT:      Head: Normocephalic and atraumatic  Eyes:      Conjunctiva/sclera: Conjunctivae normal    Neck:      Musculoskeletal: Neck supple  Cardiovascular:      Rate and Rhythm: Normal rate and regular rhythm  Heart sounds: No murmur  Pulmonary:      Effort: Pulmonary effort is normal  No respiratory distress  Breath sounds: Normal breath sounds  Abdominal:      Palpations: Abdomen is soft  Tenderness: There is no abdominal tenderness  Skin:     General: Skin is warm and dry  Neurological:      Mental Status: He is alert  Discussion with Family:  Discussed over the phone with patient's sister and full update given  Discharge instructions/Information to patient and family:   See after visit summary for information provided to patient and family  Provisions for Follow-Up Care:  See after visit summary for information related to follow-up care and any pertinent home health orders  Disposition:     Home    For Discharges to [facility] SNF:   · Not Applicable to this Patient - Not Applicable to this Patient    Planned Readmission:  None     Discharge Statement:  I spent 60 minutes discharging the patient  This time was spent on the day of discharge  I had direct contact with the patient on the day of discharge  Greater than 50% of the total time was spent examining patient, answering all patient questions, arranging and discussing plan of care with patient as well as directly providing post-discharge instructions  Additional time then spent on discharge activities  Discharge Medications:  See after visit summary for reconciled discharge medications provided to patient and family        ** Please Note: This note has been constructed using a voice recognition system **

## 2021-01-21 NOTE — ASSESSMENT & PLAN NOTE
Lab Results   Component Value Date    EGFR 5 01/21/2021    EGFR 5 01/20/2021    EGFR 5 01/18/2021    CREATININE 11 68 (H) 01/21/2021    CREATININE 10 78 (H) 01/20/2021    CREATININE 10 90 (H) 01/18/2021   Follows T/T/S dialysis schedule  Received dialysis 1/21/21 while inpatient  Cleared by ID and nephrology to continue regular outpatient dialysis schedule   Next dialysis 1/23/21

## 2021-01-21 NOTE — NURSING NOTE
Patient discharged  Reviewed discharge instructions with pt and sister via telephone  Belongings packed, IV removed, instructed sister to  patient at front entrance and pickup meds from [address] [place] [address]

## 2021-01-21 NOTE — DISCHARGE INSTRUCTIONS
Monthly CBCD and CMP while on latent TB treatment  You will be set up with an appointment in the outpatient Infectious Disease office  It is important for you to keep this appointment in order for us to monitor you while you are on laten TB treatment    This will include evaluating your lab work and making sure you are not having toxicities or side effects of the medication(s) you are receiving    ------------------------------------------------------------------------------------------------------------

## 2021-01-21 NOTE — ASSESSMENT & PLAN NOTE
Patient states he has had mild, dry cough which started before his trip to [facility], December 6 - January 3, 2021  While in [facility], the cough worsened and has persisted  Patient denies shortness of breath, fevers, chills, weight loss, phlegm production  Maintaining saturations on room air  COVID-19 reportedly negative as an outpatient; cannot see results  Will check here for completion given travel    Chest x-ray  Sputum cultures, urine strep and Legionella, procalcitonin  Workup for TB as above

## 2021-01-21 NOTE — ASSESSMENT & PLAN NOTE
Patient with worsening cough, visit to [facility] December 6, 2020 - January 3, 2021  Denies fevers/chills/weight loss/night sweats  Quantiferon Gold found to be positive during workup for renal transplant  Chest XRAY non-infectious  Patient without s/sx of active TB  Appreciate ID: suspect latent TB  Start pyridoxine 50mg and isoniazid 300mg x 9 months  Complete treatment prior to renal transplant  Price-checked with [facility] pharmacy   Monthly CMP, CBC  Outpatient follow up with ID has been arranged     Can return to regular outpatient dialysis

## 2021-01-21 NOTE — H&P
H&P- Paddy Trevin 1965, 63 y o  male MRN: [id number]  Unit/Bed#: E5 -01 Encounter: [id number]  Primary Care Provider: Ananda Novak MD   Date and time admitted to hospital: 1/20/2021  8:25 PM    * Positive QuantiFERON-TB Gold test  Assessment & Plan  Patient with worsening cough, visit to [facility] December 6, 2020 - January 3, 2021  Denies fevers/chills/weight loss/night sweats  Quantiferon Gold found to be positive during workup for renal transplant  Sent in by nephrologist so patient can continue dialysis in negative airflow room until further workup  Check chest x-ray  Update CBC/CMP  Check AFB if patient can produce sample  Ask ID to see    Cough  Assessment & Plan  Patient states he has had mild, dry cough which started before his trip to [facility], December 6 - January 3, 2021  While in [facility], the cough worsened and has persisted  Patient denies shortness of breath, fevers, chills, weight loss, phlegm production  Maintaining saturations on room air  COVID-19 reportedly negative as an outpatient; cannot see results  Will check here for completion given travel  Chest x-ray  Sputum cultures, urine strep and Legionella, procalcitonin  Workup for TB as above      ESRD (end stage renal disease) [facility])  Assessment & Plan  Lab Results   Component Value Date    EGFR 5 01/18/2021    EGFR 5 04/27/2020    EGFR 7 08/30/2019    CREATININE 10 90 (H) 01/18/2021    CREATININE 11 60 (H) 04/27/2020    CREATININE 9 12 (H) 08/30/2019   Follows T/T/S dialysis schedule  Will ask Nephrology to see    Chronic constipation  Assessment & Plan  MiraLax, Colace    Diabetic retinopathy of both eyes associated with type 2 diabetes mellitus [facility])  Assessment & Plan  Patient legally blind  Ambulates with cane  Lives with sister      Type 2 diabetes mellitus with chronic kidney disease on chronic dialysis, with long-term current use of insulin [facility])  Assessment & Plan  Lab Results   Component Value Date    HGBA1C 7 5 (H) 01/18/2021       No results for input(s): POCGLU in the last 72 hours  Blood Sugar Average: Last 72 hrs: Takes 30 units long-acting insulin and 10 units short-acting insulin with meals  Keep same regimen here  ISS  Carb controlled diet  Adjust as necessary        VTE Prophylaxis: Heparin  / sequential compression device   Code Status:  Full code  POLST: There is no POLST form on file for this patient (pre-hospital)  Discussion with family:  Discussed with patient  Anticipated Length of Stay:  Patient will be admitted on an Inpatient basis with an anticipated length of stay of  greater than 2 midnights  Justification for Hospital Stay:  End-stage renal disease requiring dialysis and negative air pressure room currently, ongoing workup for tuberculosis    Total Time for Visit, including Counseling / Coordination of Care: 45 minutes  Greater than 50% of this total time spent on direct patient counseling and coordination of care  Chief Complaint:   Cough, positive QuantiFERON gold    History of Present Illness:    Marla tSack is a 63 y o  male with past medical history of type 2 diabetic neuropathy leading to legal blindness and ESRD on dialysis in process of evaluation for renal transplant who presents with positive QuantiFERON gold in the setting of cough and recent travel  The patient traveled to [facility] for 27 days from December into January  He notes prior to leaving for [facility], he had a small dry cough without phlegm production  He states during his trip in [facility] his cough worsened and has persisted  Upon return to the [place], the patient continued workup for renal transplant with his primary nephrologist   His QuantiFERON gold test did result positive  To ensure safety of dialysis patients, Nephrology recommended patient come to emergency department for expedited workup as well as negative air pressure room while he continues to receive his dialysis      Upon presentation, patient is maintaining saturations on room air  We will update lab work as most recent CBC/CMP are from 2 days ago  We will keep patient on precautions and do preliminary workup for TB as well as other common respiratory illnesses  Will ask Infectious Disease and Nephrology to comment on this case  Patient is appropriate for inpatient medical care due to rule out tuberculosis in high risk patient  Review of Systems:    Review of Systems   Constitutional: Negative for chills, fever and unexpected weight change  Eyes: Positive for visual disturbance (Blindness)  Respiratory: Positive for cough  Negative for shortness of breath  Cardiovascular: Negative for chest pain and palpitations  Gastrointestinal: Negative for abdominal pain and vomiting  Genitourinary: Negative for dysuria and hematuria  Musculoskeletal: Negative for arthralgias and back pain  Skin: Negative for color change and rash  Neurological: Negative for seizures and syncope  All other systems reviewed and are negative  Past Medical and Surgical History:     Past Medical History:   Diagnosis Date    Cataract     Chronic kidney disease     Diabetes mellitus ([facility]      IDDM    Diabetic retinopathy ([facility]     Dizziness     occ    ESRD (end stage renal disease) ([facility]     GERD (gastroesophageal reflux disease)     Headache     occ    Hyperlipidemia     Hypertension     Legally blind     LVH (left ventricular hypertrophy)     Preferred language is [place]     understands some English    Use of cane as ambulatory aid        Past Surgical History:   Procedure Laterality Date    INGUINAL HERNIA REPAIR Right     IR AV FISTULAGRAM/GRAFTOGRAM  4/30/2019    IR AV FISTULAGRAM/GRAFTOGRAM  6/14/2019    IR AV FISTULAGRAM/GRAFTOGRAM  6/24/2020    IR TUNNELED DIALYSIS CATHETER PLACEMENT  4/3/2019    AK ANASTOMOSIS,AV,ANY SITE Left 1/23/2019    Procedure: CREATION FISTULA ARTERIOVENOUS (AV);   Surgeon: Valentina Aparicio J Luis Christine MD;  Location: [facility];  Service: Vascular       Meds/Allergies:    Prior to Admission medications    Medication Sig Start Date End Date Taking? Authorizing Provider   atorvastatin (LIPITOR) 40 mg tablet take 1 tablet by mouth once daily 9/14/20   Iesha Mcdaniels MD   Basaglar KwikPen 100 units/mL injection pen INJECT 30 UNITS UNDER THE SKIN DAILY 12/2/20   Iesha Mcdaniels MD   Blood Glucose Monitoring Suppl (ONE TOUCH ULTRA 2) w/Device KIT by Does not apply route 3 (three) times a day 1/25/19   Iesha Mcdaniels MD   carvedilol (COREG) 6 25 mg tablet take 1 tablet by mouth twice a day with meals 12/2/20   Iesha Mcdaniels MD   cinacalcet (SENSIPAR) 30 mg tablet Take 30 mg by mouth daily    Historical Provider, MD   cyclobenzaprine (FLEXERIL) 5 mg tablet Take 1 tablet (5 mg total) by mouth daily at bedtime as needed for muscle spasms 6/15/20   Iesha Mcdaniels MD   doxazosin (CARDURA) 2 mg tablet Take 1 tablet (2 mg total) by mouth daily at bedtime 4/26/19   Iesha Mcdaniels MD   furosemide (LASIX) 80 mg tablet Take 1 tablet (80 mg total) by mouth daily 11/19/20   Iesha Mcdaniels MD   glucose blood (FREESTYLE LITE) test strip Test blood sugars three times daily   6/18/20   Iesha Mcdaniels MD   insulin aspart (NOVOLOG FLEXPEN) 100 Units/mL injection pen 10U with breakfast and lunch, 10-14 U with dinner 12/16/19   Iesha Mcdaniels MD   LOKELMA 10 g PACK  12/31/19   Historical Provider, MD   NIFEdipine ER (ADALAT CC) 60 MG 24 hr tablet Take 1 tablet (60 mg total) by mouth daily 4/26/19   Iesha Mcdaniels MD   ONE TOUCH LANCETS MISC by Does not apply route 3 (three) times a day 1/15/19   Iesha Mcdaniels MD   pantoprazole (PROTONIX) 20 mg tablet Take 1 tablet (20 mg total) by mouth daily 1/17/20   Paola Raymond PA-C   polyethylene glycol (MIRALAX) 17 g packet Take 17 g by mouth daily 9/23/19   Iesha Mcdaniels MD   prednisoLONE acetate (PRED FORTE) 1 % ophthalmic suspension  6/25/19   Historical Provider, MD   RA Aspirin EC 81 MG EC tablet take 1 tablet by mouth once daily 20   Ananda Novak MD   sevelamer carbonate (RENVELA) 800 mg tablet Take 800 mg by mouth 3 (three) times a day with meals    Historical Provider, MD     I have reviewed home medications with patient personally  Allergies: Allergies   Allergen Reactions    No Known Allergies        Social History:     Marital Status: Legally      Substance Use History:   Social History     Substance and Sexual Activity   Alcohol Use Not Currently     Social History     Tobacco Use   Smoking Status Former Smoker    Packs/day: 0 25    Quit date: 2018    Years since quittin 9   Smokeless Tobacco Never Used     Social History     Substance and Sexual Activity   Drug Use No       Family History:    non-contributory    Physical Exam:     Vitals:   Blood Pressure: (!) 177/83 (21)  Pulse: 77 (21)  Temperature: (!) 97 4 °F (36 3 °C) (21)  Temp Source: Temporal (21)  Respirations: 18 (21)  Height: 5' 10" (177 8 cm) (21)  Weight - Scale: 78 5 kg (173 lb 1 oz) (21)  SpO2: 97 % (21)    Physical Exam  Vitals signs and nursing note reviewed  Exam conducted with a chaperone present  Constitutional:       Appearance: He is well-developed  Comments: The patient is pleasant  He is fully conversational on room air, in no distress lying on hospital stretcher  Blind  HENT:      Head: Normocephalic and atraumatic  Eyes:      Conjunctiva/sclera: Conjunctivae normal    Neck:      Musculoskeletal: Neck supple  Cardiovascular:      Rate and Rhythm: Normal rate and regular rhythm  Heart sounds: No murmur  Pulmonary:      Effort: Pulmonary effort is normal  No respiratory distress  Breath sounds: Normal breath sounds  Abdominal:      General: There is distension  Palpations: Abdomen is soft  Tenderness: There is no abdominal tenderness     Skin:     General: Skin is warm and dry  Neurological:      Mental Status: He is alert  Additional Data:     Lab Results: I have personally reviewed pertinent reports  Results from last 7 days   Lab Units 01/18/21  1025   WBC Thousand/uL 5 89   HEMOGLOBIN g/dL 12 9   HEMATOCRIT % 42 4   PLATELETS Thousands/uL 262   NEUTROS PCT % 58   LYMPHS PCT % 29   MONOS PCT % 9   EOS PCT % 3     Results from last 7 days   Lab Units 01/18/21  1025   SODIUM mmol/L 135*   POTASSIUM mmol/L 5 0   CHLORIDE mmol/L 100   CO2 mmol/L 28   BUN mg/dL 56*   CREATININE mg/dL 10 90*   ANION GAP mmol/L 7   CALCIUM mg/dL 8 8   ALBUMIN g/dL 3 8   TOTAL BILIRUBIN mg/dL 0 56   ALK PHOS U/L 150*   ALT U/L 82*   AST U/L 43     Results from last 7 days   Lab Units 01/18/21  1025   INR  1 05         Results from last 7 days   Lab Units 01/18/21  1025   HEMOGLOBIN A1C % 7 5*           Imaging: I have personally reviewed pertinent reports  XR chest portable    (Results Pending)           [facility] Review[facility] Yes     ** Please Note: This note has been constructed using a voice recognition system   **

## 2021-01-21 NOTE — CONSULTS
Consultation - Nephrology   Paddy Trevin 63 y o  male MRN: [id number]  Unit/Bed#: E5 -01 Encounter: [id number]    Plan:  -resume with dialysis today as scheduled  -ruling out active versus latent TB   -HD orders placed  ASSESSMENT/PLAN:  ESRD on HD:  TTS at [address] in [facility]   -continue with HD as scheduled  -EDW: 75 5 kg  Access:  Left upper extremity AVF, + bruit, + thrill  Blood pressure:  Above goal   Hopefully will improve with HD   -continue UF with HD as BP tolerates  -continues on Coreg 6 25 mg 2 times a day, doxazosin 2 mg daily, hydralazine 25 mg every 8 hours, nifedipine 60 mg po daily  Anemia in CKD:  Hemoglobin currently at goal   -goal Hgb 10-12 g/dL  -continue to monitor and transfuse as needed for Hgb <7 0    -not on COLETTE  MBD in CKD:   -continue to monitor PTH, phos, and Mg as OP    -recommend renal diet    -continues on Renagel with meals   -continue Hectorol with HD  Volume status:  EF of 68%  -continue UF as BP allows  -recommend fluid restriction  Electrolytes:  Stable and acceptable   -continue to monitor and trend with HD  Possible TB:  -positive QuantiFERON gold testing   -infectious Disease following   -ruling out active TB versus latent TB   -chest x-ray shows no acute abnormality and chest       HISTORY OF PRESENT ILLNESS:  Requesting Physician: Nelson Hernandez DO  Reason for Consult:  ESRD on HD    Soniya Morataya is a 63y o  year old male with past medical history of ESRD on HD requiring dialysis on TTS schedule at [address] in [facility], who was admitted to [facility] after presenting with complaints of cough, rule out active versus latent TB  Patient had recent travel to [facility]  He tested positive on QuantiFERON gold  A renal consultation is requested today for assistance in the management of ESRD on HD      PAST MEDICAL HISTORY:  Past Medical History:   Diagnosis Date    Cataract     Chronic kidney disease     [facility] mellitus [facility]      IDDM    Diabetic retinopathy ([facility]     Dizziness     occ    [facility]     GERD (gastroesophageal reflux disease)     Headache     occ    Hyperlipidemia     Hypertension     Legally blind     LVH (left ventricular hypertrophy)     Preferred language is [place]     understands some English    Use of cane as ambulatory aid        PAST SURGICAL HISTORY:  Past Surgical History:   Procedure Laterality Date    INGUINAL HERNIA REPAIR Right     IR AV FISTULAGRAM/GRAFTOGRAM  2019    IR AV FISTULAGRAM/GRAFTOGRAM  2019    IR AV FISTULAGRAM/GRAFTOGRAM  2020    IR TUNNELED DIALYSIS CATHETER PLACEMENT  4/3/2019    MN ANASTOMOSIS,AV,ANY SITE Left 2019    Procedure: CREATION FISTULA ARTERIOVENOUS (AV);   Surgeon: Nakita Uribe MD;  [facility]: [facility];  Service: Vascular       ALLERGIES:  Allergies   Allergen Reactions    No Known Allergies        SOCIAL HISTORY:  Social History     Substance and Sexual Activity   Alcohol Use Not Currently     Social History     Substance and Sexual Activity   Drug Use No     Social History     Tobacco Use   Smoking Status Former Smoker    Packs/day: 0 25    Quit date: 2018    Years since quittin 9   Smokeless Tobacco Never Used       FAMILY HISTORY:  Family History   Problem Relation Age of Onset    Hypertension Mother     Diabetes Father     No Known Problems Sister     No Known Problems Brother     No Known Problems Maternal Aunt     No Known Problems Maternal Uncle     No Known Problems Paternal Aunt     No Known Problems Paternal Uncle     No Known Problems Maternal Grandmother     No Known Problems Maternal Grandfather     No Known Problems Paternal Grandmother     No Known Problems Paternal Grandfather        MEDICATIONS:    Current Facility-Administered Medications:     acetaminophen (TYLENOL) tablet 650 mg, 650 mg, Oral, Q6H PRN, Caroline Chatman PA-C    aspirin (ECOTRIN LOW STRENGTH) EC tablet 81 mg, 81 mg, Oral, Daily, Caroline Chatman PA-C    atorvastatin (LIPITOR) tablet 40 mg, 40 mg, Oral, Daily, Caroline Chatman PA-C    benzonatate (TESSALON PERLES) capsule 100 mg, 100 mg, Oral, TID PRN, Caroline Chatman PA-C    calcium carbonate (TUMS) chewable tablet 1,000 mg, 1,000 mg, Oral, Daily PRN, Caroline Chatman PA-C    carvedilol (COREG) tablet 6 25 mg, 6 25 mg, Oral, BID With Meals, Caroline Chatman PA-C    cinacalcet (SENSIPAR) tablet 30 mg, 30 mg, Oral, Daily, Caroline Chatman PA-C    cyclobenzaprine (FLEXERIL) tablet 5 mg, 5 mg, Oral, HS PRN, Caroline Chatman PA-C    docusate sodium (COLACE) capsule 100 mg, 100 mg, Oral, BID, Caroline Chatman PA-C, 100 mg at 01/21/21 0994    doxazosin (CARDURA) tablet 2 mg, 2 mg, Oral, HS, Caroline Chatman PA-C, 2 mg at 01/20/21 2251    doxercalciferol (HECTOROL) injection 2 mcg, 2 mcg, Intravenous, Once per day on Tue Thu Sat, KRISTI Kauffman    heparin (porcine) subcutaneous injection 5,000 Units, 5,000 Units, Subcutaneous, Q8H [facility], 5,000 Units at 01/21/21 0554 **AND** [CANCELED] Platelet count, , , Once, Caroline Chatman PA-C    hydrALAZINE (APRESOLINE) tablet 25 mg, 25 mg, Oral, Q8H [facility], Caroline Chatman PA-C, 25 mg at 01/21/21 0554    insulin glargine (LANTUS) subcutaneous injection 30 Units 0 3 mL, 30 Units, Subcutaneous, HS, Caroline Chatman PA-C    insulin lispro (HumaLOG) 100 units/mL subcutaneous injection 1-5 Units, 1-5 Units, Subcutaneous, TID AC **AND** Fingerstick Glucose (POCT), , , TID AC, Caroline Chatman PA-C    insulin lispro (HumaLOG) 100 units/mL subcutaneous injection 1-5 Units, 1-5 Units, Subcutaneous, HS, Caroline Chatman PA-C, 1 Units at 01/20/21 2251    insulin lispro (HumaLOG) 100 units/mL subcutaneous injection 10 Units, 10 Units, Subcutaneous, TID With Meals, Caroline Chatman PA-C    NIFEdipine (PROCARDIA XL) 24 hr tablet 60 mg, 60 mg, Oral, Daily, Caroline Chatman PA-C    ondansetron (ZOFRAN) injection 4 mg, 4 mg, Intravenous, Q6H PRN, Caroline Chatman PA-C    pantoprazole (PROTONIX) EC tablet 20 mg, 20 mg, Oral, Early Morning, Caroline Chatman PA-C, 20 mg at 01/21/21 0554    polyethylene glycol (MIRALAX) packet 17 g, 17 g, Oral, Daily, Caroline Chatman PA-C, 17 g at 01/21/21 0846    polyethylene glycol (MIRALAX) packet 17 g, 17 g, Oral, Daily PRN, Caroline Chatman PA-C    prednisoLONE acetate (PRED FORTE) 1 % ophthalmic suspension 2 drop, 2 drop, Both Eyes, TID, Caroline Chatman PA-C, 2 drop at 01/21/21 0849    sevelamer (RENAGEL) tablet 800 mg, 800 mg, Oral, TID With Meals, Caroline Chatman PA-C, 800 mg at 01/21/21 8390    REVIEW OF SYSTEMS:  A complete review of systems was performed and found to be negative unless otherwise noted in the history of present illness  General: No fevers, chills  Cardiovascular:  - chest pain, - leg edema  Respiratory: No cough, sputum production,  - shortness of breath  Gastrointestinal:  - nausea/vomiting,  - diarrhea,  - abdominal pain  Genitourinary: No hematuria  No foamy urine    No dysuria    PHYSICAL EXAM:  Current Weight: Weight - Scale: 78 6 kg (173 lb 4 5 oz)  First Weight: Weight - Scale: 78 5 kg (173 lb 1 oz)  Vitals:    01/21/21 0600 01/21/21 0842 01/21/21 0844 01/21/21 0945   BP:  (!) 171/86 168/87 (!) 173/97   BP Location:  Right arm  Right arm   Pulse:  70  70   Resp:  18  18   Temp:  97 5 °F (36 4 °C)  97 6 °F (36 4 °C)   TempSrc:  Temporal  Tympanic   SpO2:  98%     Weight: 78 6 kg (173 lb 4 5 oz)      Height:           Intake/Output Summary (Last 24 hours) at 1/21/2021 1032  Last data filed at 1/21/2021 0945  Gross per 24 hour   Intake 200 ml   Output --   Net 200 ml     General: NAD  Skin: warm, dry, intact, no rash  HEENT: Moist mucous membranes, sclera anicteric, normocephalic, atraumatic  Neck: No apparent JVD appreciated  Chest:lung sounds clear B/L, on RA   CVS:Regular rate and rhythm, no murmer   Abdomen: Soft, round, non-tender, +BS  Extremities: No B/L LE edema present   LUE AVF  Neuro: alert and oriented  Psych: appropriate mood and affect     Invasive Devices:      Lab Results:   Results from last 7 days   Lab Units 01/21/21  0605 01/20/21  2246 01/18/21  1025   WBC Thousand/uL 5 08 5 33 5 89   HEMOGLOBIN g/dL 12 5 13 2 12 9   HEMATOCRIT % 38 6 41 0 42 4   PLATELETS Thousands/uL 216 233 262   SODIUM mmol/L 136 136 135*   POTASSIUM mmol/L 4 2 5 5* 5 0   CHLORIDE mmol/L 98* 97* 100   CO2 mmol/L 28 31 28   BUN mg/dL 62* 62* 56*   CREATININE mg/dL 11 68* 10 78* 10 90*   CALCIUM mg/dL 8 3 8 5 8 8   MAGNESIUM mg/dL  --   --  2 6   PHOSPHORUS mg/dL  --   --  3 2   ALK PHOS U/L 133* 153* 150*   ALT U/L 59 74 82*   AST U/L 22 40 43

## 2021-01-26 ENCOUNTER — TRANSITIONAL CARE MANAGEMENT (OUTPATIENT)
Dept: INTERNAL MEDICINE CLINIC | Facility: CLINIC | Age: 56
End: 2021-01-26

## 2021-02-01 ENCOUNTER — TELEMEDICINE (OUTPATIENT)
Dept: INTERNAL MEDICINE CLINIC | Facility: CLINIC | Age: 56
End: 2021-02-01
Payer: MEDICARE

## 2021-02-01 DIAGNOSIS — N18.6 TYPE 2 DIABETES MELLITUS WITH CHRONIC KIDNEY DISEASE ON CHRONIC DIALYSIS, WITH LONG-TERM CURRENT USE OF INSULIN (HCC): ICD-10-CM

## 2021-02-01 DIAGNOSIS — I12.0 BENIGN HYPERTENSION WITH ESRD (END-STAGE RENAL DISEASE) (HCC): ICD-10-CM

## 2021-02-01 DIAGNOSIS — R76.12 POSITIVE QUANTIFERON-TB GOLD TEST: ICD-10-CM

## 2021-02-01 DIAGNOSIS — Z79.4 TYPE 2 DIABETES MELLITUS WITH CHRONIC KIDNEY DISEASE ON CHRONIC DIALYSIS, WITH LONG-TERM CURRENT USE OF INSULIN (HCC): ICD-10-CM

## 2021-02-01 DIAGNOSIS — E11.22 TYPE 2 DIABETES MELLITUS WITH CHRONIC KIDNEY DISEASE ON CHRONIC DIALYSIS, WITH LONG-TERM CURRENT USE OF INSULIN (HCC): ICD-10-CM

## 2021-02-01 DIAGNOSIS — R05.9 COUGH: Primary | ICD-10-CM

## 2021-02-01 DIAGNOSIS — N18.6 BENIGN HYPERTENSION WITH ESRD (END-STAGE RENAL DISEASE) (HCC): ICD-10-CM

## 2021-02-01 DIAGNOSIS — Z99.2 TYPE 2 DIABETES MELLITUS WITH CHRONIC KIDNEY DISEASE ON CHRONIC DIALYSIS, WITH LONG-TERM CURRENT USE OF INSULIN (HCC): ICD-10-CM

## 2021-02-01 DIAGNOSIS — Z22.7 TB LUNG, LATENT: ICD-10-CM

## 2021-02-01 PROCEDURE — 99495 TRANSJ CARE MGMT MOD F2F 14D: CPT | Performed by: INTERNAL MEDICINE

## 2021-02-01 NOTE — PROGRESS NOTES
Assessment/Plan:        Problem List Items Addressed This Visit        Endocrine    Type 2 diabetes mellitus with chronic kidney disease on chronic dialysis, with long-term current use of insulin ([facility] )       Respiratory    TB lung, latent       Cardiovascular and Mediastinum    Benign hypertension with ESRD (end-stage renal disease) ([facility] )       Other    Positive QuantiFERON-TB Gold test    Cough - Primary         Explained the diagnosis of latent tuberculosis  We discussed that this is less likely to be BCG since quant gold was positive  Advised to complete the whole 9mo therapy and discussed the importance of compliance  Discussed the possibility increased LFTs on isoniazid and need for blood work every month  Blood sugars are stable, continue current regimen of NovoLog 10 units TID, little bit higher at dinnertime depending on the meal, 30 units of Basaglar  His nausea but in the evening may be due to acid reflux  Advised him to stay up at least 2 hours after eating, if nausea continues, call the office  Blood pressure well controlled at dialysis  Continue current regimen medications  Hopefully COVID vaccination to be completed in the near future  Follow-up in 3 months  Reason for visit is tcm    Encounter provider Jenifer Green MD       Provider located at [facility] [id number]      Recent Visits  No visits were found meeting these conditions  Showing recent visits within past 7 days and meeting all other requirements     Today's Visits  Date Type Provider Dept   02/01/21 Telemedicine Jenifer Green MD [facility] [place]   Showing today's visits and meeting all other requirements     Future Appointments  No visits were found meeting these conditions     Showing future appointments within next 150 days and meeting all other requirements        After connecting through [email or web address], the patient was identified by name and date of birth  Nam Kc was informed that this is a telemedicine visit and that the visit is being conducted through [facility] and patient was informed that this is a secure, HIPAA-compliant platform  He agrees to proceed     My office door was closed  No one else was in the room  He acknowledged consent and understanding of privacy and security of the video platform  The patient has agreed to participate and understands they can discontinue the visit at any time  Patient is aware this is a billable service  Subjective:     Patient ID: Nam Kc is a 63 y o  male  Follow-up after hospitalization  He was admitted for a positive quant gold  Sputum testing was negative  He was started on 9 months therapy of isoniazid and pyridoxine  He has been taking the medication regularly  He notes that he has been having some nausea at nighttime, no heartburn or acid reflux  No abdominal pain  Going for dialysis regularly  Blood sugars have been between 120-140  Occasional 150s  Review of Systems   Constitutional: Negative for fatigue, fever and unexpected weight change  HENT: Negative for ear pain, hearing loss and sore throat  Eyes: Negative for pain and discharge  Respiratory: Negative for cough, chest tightness and shortness of breath  Cardiovascular: Negative for chest pain and palpitations  Gastrointestinal: Positive for nausea  Negative for abdominal pain, blood in stool, constipation and diarrhea  Genitourinary: Negative for dysuria, frequency and hematuria  Musculoskeletal: Negative for arthralgias and joint swelling  Skin: Negative for rash  Allergic/Immunologic: Negative for immunocompromised state  Neurological: Negative for dizziness and headaches  Hematological: Negative for adenopathy  Psychiatric/Behavioral: Negative for confusion and sleep disturbance  Objective:     There were no vitals filed for this visit     Physical Exam  Constitutional:       General: He is not in acute distress  Appearance: He is well-developed  He is not ill-appearing or diaphoretic  HENT:      Head: Atraumatic  Nose: Nose normal    Eyes:      General: Lids are normal       Conjunctiva/sclera: Conjunctivae normal    Neck:      Musculoskeletal: Neck supple  Pulmonary:      Effort: Pulmonary effort is normal  No tachypnea or respiratory distress  Transitional Care Management Review:  Maryam Schulte is a 63 y o  male here for TCM follow up  During the TCM phone call patient stated:    TCM Call (since 1/1/2021)     Date and time call was made  1/26/2021  2:49 PM    Patient was hospitialized at  [address] [id number]        Date of Admission  01/20/21    Date of discharge  01/21/21    Diagnosis  Positive QuantiFERON-TB Gold test    Disposition  Home    Were the patients medications reviewed and updated  Yes    Current Symptoms  None      TCM Call (since 1/1/2021)     Post hospital issues  None    Should patient be enrolled in anticoag monitoring? No    Scheduled for follow up? Yes    Did you obtain your prescribed medications  Yes    Do you need help managing your prescriptions or medications  No    Is transportation to your appointment needed  No    I have advised the patient to call PCP with any new or worsening symptoms  Patti DAY)          I spent 25 minutes with the patient during this visit      Jacek Bains MD

## 2021-02-04 ENCOUNTER — TELEPHONE (OUTPATIENT)
Dept: INFECTIOUS DISEASES | Facility: CLINIC | Age: 56
End: 2021-02-04

## 2021-02-04 ENCOUNTER — TELEPHONE (OUTPATIENT)
Dept: INTERNAL MEDICINE CLINIC | Facility: CLINIC | Age: 56
End: 2021-02-04

## 2021-02-04 NOTE — PHYSICIAN ADVISOR
Current patient class: Inpatient  The patient is currently on Hospital Day: 2 at [address]      The patient was admitted to the hospital  on 1/20/21 at 2025 for the following diagnosis:  Positive QuantiFERON-TB Gold test     After review of the relevant documentation, labs, vital signs and test results, this is a PROVIDER LIABLE case  In this particular case the patient was admitted to the hospital as an inpatient  The patient however failed to satisfy the 2 midnight benchmark and closer scrutiny of the case was warranted  After review of the patient presentation and relevant labs the patient was most appropriate for observation or outpatient class at the time of admission  Given that this patient has already been discharged prior to this review they become a provider liable case  Rationale is as follows: The patient is a 63 yrs   Male who presented for evaluation of positive TB gold test  Patient with cough but negative cxr  He had recent travel to [facility] 12/6-1/3/21 but cough had started prior to travel  Discussion with ID felt this was latent TB and discharged on anti-TB regimen        The patients vitals on arrival were   ED Triage Vitals   Temperature Pulse Respirations Blood Pressure SpO2   01/20/21 2021 01/20/21 2021 01/20/21 2021 01/20/21 2021 01/20/21 2021   (!) 97 4 °F (36 3 °C) 77 18 (!) 177/83 97 %      Temp Source Heart Rate Source Patient Position - Orthostatic VS BP Location FiO2 (%)   01/20/21 2021 01/21/21 0945 01/20/21 2021 01/20/21 2021 --   Temporal Monitor Lying Right arm       Pain Score       01/20/21 2222       No Pain           Past Medical History:   Diagnosis Date    Cataract     Chronic kidney disease     [facility] mellitus [facility]      IDDM    Diabetic retinopathy ([facility]     Dizziness     occ    ESRD (end stage renal disease) ([facility]     GERD (gastroesophageal reflux disease)     Headache     occ    Hyperlipidemia     Hypertension     Legally blind     LVH (left ventricular hypertrophy)     Preferred language is [place]     understands some English    Use of cane as ambulatory aid      Past Surgical History:   Procedure Laterality Date    INGUINAL HERNIA REPAIR Right     IR AV FISTULAGRAM/GRAFTOGRAM  4/30/2019    IR AV FISTULAGRAM/GRAFTOGRAM  6/14/2019    IR AV FISTULAGRAM/GRAFTOGRAM  6/24/2020    IR TUNNELED DIALYSIS CATHETER PLACEMENT  4/3/2019    HI ANASTOMOSIS,AV,ANY SITE Left 1/23/2019    Procedure: CREATION FISTULA ARTERIOVENOUS (AV); Surgeon: Elliot Bernstein MD;  Location: [facility];  Service: Vascular           Consults have been placed to:   IP CONSULT TO NEPHROLOGY  IP CONSULT TO INFECTIOUS DISEASES    Vitals:    01/21/21 1200 01/21/21 1230 01/21/21 1245 01/21/21 1532   BP: 140/76 108/78 159/90 120/57   BP Location:   Right arm Right arm   Pulse: 88 89 73 71   Resp: 18 18 18 19   Temp:   97 6 °F (36 4 °C) 97 8 °F (36 6 °C)   TempSrc:   Tympanic Temporal   SpO2:    97%   Weight:       Height:           Most recent labs:    No results for input(s): WBC, HGB, HCT, PLT, K, NA, CALCIUM, BUN, CREATININE, LIPASE, AMYLASE, INR, TROPONINI, CKTOTAL, AST, ALT, ALKPHOS, BILITOT in the last 72 hours  Scheduled Meds:  Continuous Infusions:No current facility-administered medications for this encounter  PRN Meds:      Surgical procedures (if appropriate):

## 2021-02-05 ENCOUNTER — PREP FOR PROCEDURE (OUTPATIENT)
Dept: INTERVENTIONAL RADIOLOGY/VASCULAR | Facility: CLINIC | Age: 56
End: 2021-02-05

## 2021-02-05 ENCOUNTER — TRANSCRIBE ORDERS (OUTPATIENT)
Dept: RADIOLOGY | Facility: HOSPITAL | Age: 56
End: 2021-02-05

## 2021-02-05 DIAGNOSIS — T82.590D MALFUNCTION OF ARTERIOVENOUS DIALYSIS FISTULA, SUBSEQUENT ENCOUNTER: Primary | ICD-10-CM

## 2021-02-05 DIAGNOSIS — N18.6 END STAGE RENAL DISEASE (HCC): Primary | ICD-10-CM

## 2021-02-12 ENCOUNTER — APPOINTMENT (OUTPATIENT)
Dept: LAB | Facility: CLINIC | Age: 56
End: 2021-02-12
Payer: MEDICARE

## 2021-02-12 DIAGNOSIS — E83.9 CHRONIC KIDNEY DISEASE-MINERAL AND BONE DISORDER: ICD-10-CM

## 2021-02-12 DIAGNOSIS — Z98.890 S/P ARTERIOVENOUS (AV) FISTULA CREATION: ICD-10-CM

## 2021-02-12 DIAGNOSIS — I51.7 LVH (LEFT VENTRICULAR HYPERTROPHY): ICD-10-CM

## 2021-02-12 DIAGNOSIS — M89.9 CHRONIC KIDNEY DISEASE-MINERAL AND BONE DISORDER: ICD-10-CM

## 2021-02-12 DIAGNOSIS — Z79.4 TYPE 2 DIABETES MELLITUS WITH CHRONIC KIDNEY DISEASE, WITH LONG-TERM CURRENT USE OF INSULIN, UNSPECIFIED CKD STAGE (HCC): ICD-10-CM

## 2021-02-12 DIAGNOSIS — R76.12 POSITIVE QUANTIFERON-TB GOLD TEST: ICD-10-CM

## 2021-02-12 DIAGNOSIS — Z01.818 PRE-TRANSPLANT EVALUATION FOR ESRD (END STAGE RENAL DISEASE): ICD-10-CM

## 2021-02-12 DIAGNOSIS — N18.6 END STAGE RENAL DISEASE (HCC): ICD-10-CM

## 2021-02-12 DIAGNOSIS — R79.89 AZOTEMIA: ICD-10-CM

## 2021-02-12 DIAGNOSIS — D70.0 CONGENITAL NEUTROPENIA (HCC): ICD-10-CM

## 2021-02-12 DIAGNOSIS — H54.8 LEGAL BLINDNESS: ICD-10-CM

## 2021-02-12 DIAGNOSIS — E11.22 TYPE 2 DIABETES MELLITUS WITH CHRONIC KIDNEY DISEASE, WITH LONG-TERM CURRENT USE OF INSULIN, UNSPECIFIED CKD STAGE (HCC): ICD-10-CM

## 2021-02-12 DIAGNOSIS — N18.9 CHRONIC KIDNEY DISEASE-MINERAL AND BONE DISORDER: ICD-10-CM

## 2021-02-12 DIAGNOSIS — K59.01 SLOW TRANSIT CONSTIPATION: ICD-10-CM

## 2021-02-12 DIAGNOSIS — E11.311 DIABETIC RETINOPATHY OF BOTH EYES WITH MACULAR EDEMA ASSOCIATED WITH TYPE 2 DIABETES MELLITUS, UNSPECIFIED RETINOPATHY SEVERITY (HCC): ICD-10-CM

## 2021-02-12 LAB
ALBUMIN SERPL BCP-MCNC: 3.9 G/DL (ref 3.5–5)
ALP SERPL-CCNC: 131 U/L (ref 46–116)
ALT SERPL W P-5'-P-CCNC: 34 U/L (ref 12–78)
ANION GAP SERPL CALCULATED.3IONS-SCNC: 8 MMOL/L (ref 4–13)
AST SERPL W P-5'-P-CCNC: 37 U/L (ref 5–45)
BASOPHILS # BLD AUTO: 0.05 THOUSANDS/ΜL (ref 0–0.1)
BASOPHILS NFR BLD AUTO: 1 % (ref 0–1)
BILIRUB SERPL-MCNC: 0.52 MG/DL (ref 0.2–1)
BUN SERPL-MCNC: 60 MG/DL (ref 5–25)
CALCIUM SERPL-MCNC: 8.1 MG/DL (ref 8.3–10.1)
CHLORIDE SERPL-SCNC: 98 MMOL/L (ref 100–108)
CO2 SERPL-SCNC: 30 MMOL/L (ref 21–32)
CREAT SERPL-MCNC: 9.68 MG/DL (ref 0.6–1.3)
EOSINOPHIL # BLD AUTO: 0.15 THOUSAND/ΜL (ref 0–0.61)
EOSINOPHIL NFR BLD AUTO: 3 % (ref 0–6)
ERYTHROCYTE [DISTWIDTH] IN BLOOD BY AUTOMATED COUNT: 15.1 % (ref 11.6–15.1)
GFR SERPL CREATININE-BSD FRML MDRD: 6 ML/MIN/1.73SQ M
GLUCOSE P FAST SERPL-MCNC: 200 MG/DL (ref 65–99)
HCT VFR BLD AUTO: 43.9 % (ref 36.5–49.3)
HGB BLD-MCNC: 13.7 G/DL (ref 12–17)
IMM GRANULOCYTES # BLD AUTO: 0.02 THOUSAND/UL (ref 0–0.2)
IMM GRANULOCYTES NFR BLD AUTO: 0 % (ref 0–2)
LYMPHOCYTES # BLD AUTO: 1.92 THOUSANDS/ΜL (ref 0.6–4.47)
LYMPHOCYTES NFR BLD AUTO: 33 % (ref 14–44)
MCH RBC QN AUTO: 28.7 PG (ref 26.8–34.3)
MCHC RBC AUTO-ENTMCNC: 31.2 G/DL (ref 31.4–37.4)
MCV RBC AUTO: 92 FL (ref 82–98)
MONOCYTES # BLD AUTO: 0.63 THOUSAND/ΜL (ref 0.17–1.22)
MONOCYTES NFR BLD AUTO: 11 % (ref 4–12)
NEUTROPHILS # BLD AUTO: 3.06 THOUSANDS/ΜL (ref 1.85–7.62)
NEUTS SEG NFR BLD AUTO: 52 % (ref 43–75)
NRBC BLD AUTO-RTO: 0 /100 WBCS
PLATELET # BLD AUTO: 248 THOUSANDS/UL (ref 149–390)
PMV BLD AUTO: 9.7 FL (ref 8.9–12.7)
POTASSIUM SERPL-SCNC: 4.5 MMOL/L (ref 3.5–5.3)
PROT SERPL-MCNC: 8.4 G/DL (ref 6.4–8.2)
RBC # BLD AUTO: 4.77 MILLION/UL (ref 3.88–5.62)
SODIUM SERPL-SCNC: 136 MMOL/L (ref 136–145)
WBC # BLD AUTO: 5.83 THOUSAND/UL (ref 4.31–10.16)

## 2021-02-12 PROCEDURE — 86480 TB TEST CELL IMMUN MEASURE: CPT

## 2021-02-12 PROCEDURE — 80053 COMPREHEN METABOLIC PANEL: CPT

## 2021-02-12 PROCEDURE — 85025 COMPLETE CBC W/AUTO DIFF WBC: CPT

## 2021-02-12 PROCEDURE — 36415 COLL VENOUS BLD VENIPUNCTURE: CPT

## 2021-02-12 PROCEDURE — 87522 HEPATITIS C REVRS TRNSCRPJ: CPT

## 2021-02-14 LAB
HCV RNA SERPL NAA+PROBE-ACNC: NORMAL IU/ML
TEST INFORMATION: NORMAL

## 2021-02-15 ENCOUNTER — TELEMEDICINE (OUTPATIENT)
Dept: INFECTIOUS DISEASES | Facility: CLINIC | Age: 56
End: 2021-02-15
Payer: MEDICARE

## 2021-02-15 DIAGNOSIS — R76.12 POSITIVE QUANTIFERON-TB GOLD TEST: Primary | ICD-10-CM

## 2021-02-15 DIAGNOSIS — Z79.2 LONG TERM (CURRENT) USE OF ANTIBIOTICS: ICD-10-CM

## 2021-02-15 DIAGNOSIS — Z99.2 TYPE 2 DIABETES MELLITUS WITH CHRONIC KIDNEY DISEASE ON CHRONIC DIALYSIS, WITH LONG-TERM CURRENT USE OF INSULIN (HCC): ICD-10-CM

## 2021-02-15 DIAGNOSIS — Z22.7 TB LUNG, LATENT: ICD-10-CM

## 2021-02-15 DIAGNOSIS — Z79.4 TYPE 2 DIABETES MELLITUS WITH CHRONIC KIDNEY DISEASE ON CHRONIC DIALYSIS, WITH LONG-TERM CURRENT USE OF INSULIN (HCC): ICD-10-CM

## 2021-02-15 DIAGNOSIS — N18.6 TYPE 2 DIABETES MELLITUS WITH CHRONIC KIDNEY DISEASE ON CHRONIC DIALYSIS, WITH LONG-TERM CURRENT USE OF INSULIN (HCC): ICD-10-CM

## 2021-02-15 DIAGNOSIS — E11.22 TYPE 2 DIABETES MELLITUS WITH CHRONIC KIDNEY DISEASE ON CHRONIC DIALYSIS, WITH LONG-TERM CURRENT USE OF INSULIN (HCC): ICD-10-CM

## 2021-02-15 LAB
GAMMA INTERFERON BACKGROUND BLD IA-ACNC: 0.08 IU/ML
M TB IFN-G BLD-IMP: POSITIVE
M TB IFN-G CD4+ BCKGRND COR BLD-ACNC: 3.76 IU/ML
M TB IFN-G CD4+ BCKGRND COR BLD-ACNC: 3.9 IU/ML
MITOGEN IGNF BCKGRD COR BLD-ACNC: >10 IU/ML

## 2021-02-15 PROCEDURE — 99214 OFFICE O/P EST MOD 30 MIN: CPT | Performed by: INTERNAL MEDICINE

## 2021-02-15 RX ORDER — PYRIDOXINE HCL (VITAMIN B6) 50 MG
50 TABLET ORAL DAILY
Qty: 30 TABLET | Refills: 6 | Status: SHIPPED | OUTPATIENT
Start: 2021-03-01

## 2021-02-15 NOTE — PROGRESS NOTES
Progress Note - Infectious Disease   Paddy Zhong 63 y o  male MRN: [id number]  Unit/Bed#:  Encounter: [id number]    REQUIRED DOCUMENTATION:   1  This service was provided via Telemedicine  2  Provider located at [address] [place] [address] office  3  TeleMed provider: Mariel Galicia DO   4  Identify all parties in room with patient during tele consult: Pt and sister, Jennifer Brandt  5  After connecting through [employer], patient was identified by name and date of birth and assistant checked wristband  Patient was then informed that this was a Telemedicine visit and that the exam was being conducted confidentially over secure lines  My office door was closed  Patient acknowledged consent and understanding of privacy and security of the Telemedicine visit, and gave us permission to have the assistant stay in the room in order to assist with the history and to conduct the exam   I informed the patient that I have reviewed their record in Epic and presented the opportunity for them to ask any questions regarding the visit today  The patient agreed to participate  Impression/Recommendations:   · Latent TB: Pt is originally from [facility] and visits there frequently; last visit was Dec 2020  QFN TB gold is positive  Pt has no respiratory sxs presently and recent CXR demonstrated clear lung fields  o Continue oral isoniazid 300mg daily, and oral pyridoxine 50mg daily; start date was 1/22/21  He will require 9 total months of treatment  o Avoid hepatotoxins  Patient advised not to take acetaminophen excessively  He was advised not to drink alcohol while taking these medications  o Monthly labs: CBC with diff and LFTs while on treatment for latent TB    o Pt advised to take [address] on an empty stomach  o Rifampin avoided due to potential drug interactions with nifedipine and doxazosin  o Return to office in 4 weeks  · Diabetes mellitus type 2 with diabetic retinopathy, legally blind  Pt is insulin-dependent   Noted A1C of 7 5 on 1/18/21   o Glycemic control per primary team   · ESRD on HD; [facility]; pt has been on HD for the past year  o Kidney transplant candidate  o Lisa Gómez to proceed with transplant evaluation from ID standpoint so long as pt has completed 4 weeks of latent TB therapy    My recommendations were discussed with the patient and his sister in detail who verbalized understanding  Thank you for allowing me to participate in the care of this patient  The ID service will follow  History   Subjective:  26-year-old man seen for office follow-up after discharge from [facility] on January 21st   Pt is on treatment for latent tuberculosis with INH daily, which he is tolerating well  He denies fevers, chills, or sweats  Patient denies nausea, vomiting, diarrhea, or rash  Pt is compliant with INH and has not missed any doses  Antibiotics: INH    Current Outpatient Medications:     atorvastatin (LIPITOR) 40 mg tablet, take 1 tablet by mouth once daily, Disp: 90 tablet, Rfl: 1    Basaglar KwikPen 100 units/mL injection pen, INJECT 30 UNITS UNDER THE SKIN DAILY, Disp: 15 mL, Rfl: 2    Blood Glucose Monitoring Suppl (ONE TOUCH ULTRA 2) w/Device KIT, by Does not apply route 3 (three) times a day, Disp: 1 each, Rfl: 0    carvedilol (COREG) 6 25 mg tablet, take 1 tablet by mouth twice a day with meals, Disp: 180 tablet, Rfl: 0    cinacalcet (SENSIPAR) 30 mg tablet, Take 30 mg by mouth daily, Disp: , Rfl:     cyclobenzaprine (FLEXERIL) 5 mg tablet, Take 1 tablet (5 mg total) by mouth daily at bedtime as needed for muscle spasms, Disp: 30 tablet, Rfl: 1    doxazosin (CARDURA) 2 mg tablet, Take 1 tablet (2 mg total) by mouth daily at bedtime, Disp: 30 tablet, Rfl: 1    furosemide (LASIX) 80 mg tablet, Take 1 tablet (80 mg total) by mouth daily, Disp: 90 tablet, Rfl: 1    glucose blood (FREESTYLE LITE) test strip, Test blood sugars three times daily  , Disp: 100 each, Rfl: 2    insulin aspart (NOVOLOG FLEXPEN) 100 Units/mL injection pen, 10U with breakfast and lunch, 10-14 U with dinner, Disp: 5 pen, Rfl: 2    isoniazid (NYDRAZID) 300 mg tablet, Take 1 tablet (300 mg total) by mouth daily, Disp: 30 tablet, Rfl: 8    LOKELMA 10 g PACK, , Disp: , Rfl:     NIFEdipine ER (ADALAT CC) 60 MG 24 hr tablet, Take 1 tablet (60 mg total) by mouth daily, Disp: 30 tablet, Rfl: 1    ONE TOUCH LANCETS MISC, by Does not apply route 3 (three) times a day, Disp: 100 each, Rfl: 1    pantoprazole (PROTONIX) 20 mg tablet, Take 1 tablet (20 mg total) by mouth daily, Disp: 30 tablet, Rfl: 3    polyethylene glycol (MIRALAX) 17 g packet, Take 17 g by mouth daily, Disp: 30 each, Rfl: 1    prednisoLONE acetate (PRED FORTE) 1 % ophthalmic suspension, , Disp: , Rfl: 0    pyridoxine (VITAMIN B6) 50 mg tablet, Take 1 tablet (50 mg total) by mouth daily, Disp: 30 tablet, Rfl: 1    RA Aspirin EC 81 MG EC tablet, take 1 tablet by mouth once daily, Disp: 90 tablet, Rfl: 0    sevelamer carbonate (RENVELA) 800 mg tablet, Take 800 mg by mouth 3 (three) times a day with meals, Disp: , Rfl:     Physical Exam   Last temp checked was 2 days ago and was 80 F per pt  Limited exam due to tele health visit  General Appearance:  Appearing well, nontoxic, and in no distress   Head:  Normocephalic, atraumatic   Eyes:  EOMI   Throat: Oropharynx moist    Lungs:   Respirations nonlabored    No Capps catheter present    Extremities: LUE AVF present   Skin: No rash noted   Neurologic: Alert and oriented x3, conversant, fluent speech     Invasive Devices:   Hemodialysis AV Fistula 01/23/19 Left Other (Comment) (Active)       Hemodialysis AV Fistula 01/20/21 Left (Active)     Labs, Imaging, & Other Studies   Lab Results: I have personally reviewed pertinent labs    Results from last 7 days   Lab Units 02/12/21  0839   WBC Thousand/uL 5 83   HEMOGLOBIN g/dL 13 7   PLATELETS Thousands/uL 248     Results from last 7 days   Lab Units 02/12/21  0839 POTASSIUM mmol/L 4 5   CHLORIDE mmol/L 98*   CO2 mmol/L 30   BUN mg/dL 60*   CREATININE mg/dL 9 68*   EGFR ml/min/1 73sq m 6   CALCIUM mg/dL 8 1*   AST U/L 37   ALT U/L 34   ALK PHOS U/L 131*     1/18/21: QFN TB gold positive, HbA1C 7 5, HIV screen nonreactive, hepatitis C PCR not detected    Imaging Studies:   pCXR: No evidence of acute abnormality in the chest   I have personally reviewed pertinent imaging study reports  Counseling/Coordination of care: Total 25 minutes encounter including direct communication with the patient via telehealth, chart review and coordination of care  Labs, medical tests and imaging studies were independently reviewed by me as noted above  VIRTUAL VISIT DISCLAIMER  The patient has consented to an online visit or consultation  The patient understands that the online visit is based solely on information provided by them, and that, in the absence of a face-to-face physical evaluation by the physician, the diagnosis they receive are both limited and provisional in terms of accuracy and completeness  This is not intended to replace a full medical face-to-face evaluation by the physician  The patient understands and accepts these terms

## 2021-02-15 NOTE — PATIENT INSTRUCTIONS
Continue taking your medication as prescribed  Monthly labs as ordered  Follow up with us as scheduled in one month  Call us in the interim with questions or concerns

## 2021-02-18 ENCOUNTER — TELEPHONE (OUTPATIENT)
Dept: RADIOLOGY | Facility: HOSPITAL | Age: 56
End: 2021-02-18

## 2021-02-21 DIAGNOSIS — E11.65 UNCONTROLLED TYPE 2 DIABETES MELLITUS WITH HYPERGLYCEMIA, WITH LONG-TERM CURRENT USE OF INSULIN (HCC): ICD-10-CM

## 2021-02-21 DIAGNOSIS — Z79.4 UNCONTROLLED TYPE 2 DIABETES MELLITUS WITH HYPERGLYCEMIA, WITH LONG-TERM CURRENT USE OF INSULIN (HCC): ICD-10-CM

## 2021-02-21 DIAGNOSIS — N18.4 CKD (CHRONIC KIDNEY DISEASE) STAGE 4, GFR 15-29 ML/MIN (HCC): ICD-10-CM

## 2021-02-21 DIAGNOSIS — I10 ESSENTIAL HYPERTENSION: ICD-10-CM

## 2021-02-22 RX ORDER — ASPIRIN 81 MG/1
TABLET, COATED ORAL
Qty: 90 TABLET | Refills: 1 | Status: SHIPPED | OUTPATIENT
Start: 2021-02-22 | End: 2021-06-07 | Stop reason: SDUPTHER

## 2021-02-24 ENCOUNTER — HOSPITAL ENCOUNTER (INPATIENT)
Facility: HOSPITAL | Age: 56
LOS: 2 days | Discharge: HOME | DRG: 252 | End: 2021-02-27
Attending: EMERGENCY MEDICINE | Admitting: STUDENT IN AN ORGANIZED HEALTH CARE EDUCATION/TRAINING PROGRAM
Payer: MEDICARE

## 2021-02-24 ENCOUNTER — APPOINTMENT (EMERGENCY)
Dept: RADIOLOGY | Facility: HOSPITAL | Age: 56
DRG: 252 | End: 2021-02-24
Payer: MEDICARE

## 2021-02-24 DIAGNOSIS — R07.9 CHEST PAIN: Primary | ICD-10-CM

## 2021-02-24 DIAGNOSIS — N18.6 ESRD (END STAGE RENAL DISEASE) ON DIALYSIS (HCC): ICD-10-CM

## 2021-02-24 DIAGNOSIS — R94.31 ABNORMAL EKG: ICD-10-CM

## 2021-02-24 DIAGNOSIS — I10 HYPERTENSION: ICD-10-CM

## 2021-02-24 DIAGNOSIS — I51.7 LVH (LEFT VENTRICULAR HYPERTROPHY): ICD-10-CM

## 2021-02-24 DIAGNOSIS — Z99.2 ESRD (END STAGE RENAL DISEASE) ON DIALYSIS (HCC): ICD-10-CM

## 2021-02-24 DIAGNOSIS — N18.6 ESRD (END STAGE RENAL DISEASE) (HCC): ICD-10-CM

## 2021-02-24 LAB
ALBUMIN SERPL BCP-MCNC: 3.5 G/DL (ref 3.5–5)
ALP SERPL-CCNC: 129 U/L (ref 46–116)
ALT SERPL W P-5'-P-CCNC: 15 U/L (ref 12–78)
ANION GAP SERPL CALCULATED.3IONS-SCNC: 7 MMOL/L (ref 4–13)
AST SERPL W P-5'-P-CCNC: 50 U/L (ref 5–45)
ATRIAL RATE: 77 BPM
ATRIAL RATE: 77 BPM
ATRIAL RATE: 88 BPM
BASOPHILS # BLD AUTO: 0.03 THOUSANDS/ΜL (ref 0–0.1)
BASOPHILS NFR BLD AUTO: 0 % (ref 0–1)
BILIRUB SERPL-MCNC: 0.59 MG/DL (ref 0.2–1)
BUN SERPL-MCNC: 52 MG/DL (ref 5–25)
CALCIUM SERPL-MCNC: 8 MG/DL (ref 8.3–10.1)
CHLORIDE SERPL-SCNC: 94 MMOL/L (ref 100–108)
CO2 SERPL-SCNC: 34 MMOL/L (ref 21–32)
CREAT SERPL-MCNC: 10.92 MG/DL (ref 0.6–1.3)
EOSINOPHIL # BLD AUTO: 0.17 THOUSAND/ΜL (ref 0–0.61)
EOSINOPHIL NFR BLD AUTO: 2 % (ref 0–6)
ERYTHROCYTE [DISTWIDTH] IN BLOOD BY AUTOMATED COUNT: 14.4 % (ref 11.6–15.1)
GFR SERPL CREATININE-BSD FRML MDRD: 5 ML/MIN/1.73SQ M
GLUCOSE SERPL-MCNC: 152 MG/DL (ref 65–140)
GLUCOSE SERPL-MCNC: 234 MG/DL (ref 65–140)
GLUCOSE SERPL-MCNC: 256 MG/DL (ref 65–140)
HCT VFR BLD AUTO: 40.8 % (ref 36.5–49.3)
HGB BLD-MCNC: 13 G/DL (ref 12–17)
IMM GRANULOCYTES # BLD AUTO: 0.05 THOUSAND/UL (ref 0–0.2)
IMM GRANULOCYTES NFR BLD AUTO: 1 % (ref 0–2)
LIPASE SERPL-CCNC: 386 U/L (ref 73–393)
LYMPHOCYTES # BLD AUTO: 0.74 THOUSANDS/ΜL (ref 0.6–4.47)
LYMPHOCYTES NFR BLD AUTO: 7 % (ref 14–44)
MCH RBC QN AUTO: 28.8 PG (ref 26.8–34.3)
MCHC RBC AUTO-ENTMCNC: 31.9 G/DL (ref 31.4–37.4)
MCV RBC AUTO: 91 FL (ref 82–98)
MONOCYTES # BLD AUTO: 1.37 THOUSAND/ΜL (ref 0.17–1.22)
MONOCYTES NFR BLD AUTO: 14 % (ref 4–12)
NEUTROPHILS # BLD AUTO: 7.77 THOUSANDS/ΜL (ref 1.85–7.62)
NEUTS SEG NFR BLD AUTO: 76 % (ref 43–75)
NRBC BLD AUTO-RTO: 0 /100 WBCS
P AXIS: 65 DEGREES
P AXIS: 65 DEGREES
P AXIS: 70 DEGREES
PLATELET # BLD AUTO: 268 THOUSANDS/UL (ref 149–390)
PMV BLD AUTO: 9.5 FL (ref 8.9–12.7)
POTASSIUM SERPL-SCNC: 5 MMOL/L (ref 3.5–5.3)
PR INTERVAL: 138 MS
PR INTERVAL: 148 MS
PR INTERVAL: 154 MS
PROT SERPL-MCNC: 8.1 G/DL (ref 6.4–8.2)
QRS AXIS: 106 DEGREES
QRS AXIS: 107 DEGREES
QRS AXIS: 94 DEGREES
QRSD INTERVAL: 84 MS
QRSD INTERVAL: 86 MS
QRSD INTERVAL: 94 MS
QT INTERVAL: 384 MS
QT INTERVAL: 394 MS
QT INTERVAL: 398 MS
QTC INTERVAL: 445 MS
QTC INTERVAL: 450 MS
QTC INTERVAL: 464 MS
RBC # BLD AUTO: 4.51 MILLION/UL (ref 3.88–5.62)
SODIUM SERPL-SCNC: 135 MMOL/L (ref 136–145)
T WAVE AXIS: 0 DEGREES
T WAVE AXIS: 37 DEGREES
T WAVE AXIS: 8 DEGREES
TROPONIN I SERPL-MCNC: 0.02 NG/ML
TROPONIN I SERPL-MCNC: 0.03 NG/ML
TROPONIN I SERPL-MCNC: 0.03 NG/ML
VENTRICULAR RATE: 77 BPM
VENTRICULAR RATE: 77 BPM
VENTRICULAR RATE: 88 BPM
WBC # BLD AUTO: 10.13 THOUSAND/UL (ref 4.31–10.16)

## 2021-02-24 PROCEDURE — 71046 X-RAY EXAM CHEST 2 VIEWS: CPT

## 2021-02-24 PROCEDURE — 93005 ELECTROCARDIOGRAM TRACING: CPT

## 2021-02-24 PROCEDURE — 36415 COLL VENOUS BLD VENIPUNCTURE: CPT | Performed by: EMERGENCY MEDICINE

## 2021-02-24 PROCEDURE — 93010 ELECTROCARDIOGRAM REPORT: CPT | Performed by: INTERNAL MEDICINE

## 2021-02-24 PROCEDURE — 99285 EMERGENCY DEPT VISIT HI MDM: CPT | Performed by: EMERGENCY MEDICINE

## 2021-02-24 PROCEDURE — 82948 REAGENT STRIP/BLOOD GLUCOSE: CPT

## 2021-02-24 PROCEDURE — 84484 ASSAY OF TROPONIN QUANT: CPT | Performed by: EMERGENCY MEDICINE

## 2021-02-24 PROCEDURE — 80053 COMPREHEN METABOLIC PANEL: CPT | Performed by: EMERGENCY MEDICINE

## 2021-02-24 PROCEDURE — 99285 EMERGENCY DEPT VISIT HI MDM: CPT

## 2021-02-24 PROCEDURE — 99215 OFFICE O/P EST HI 40 MIN: CPT | Performed by: INTERNAL MEDICINE

## 2021-02-24 PROCEDURE — 99220 PR INITIAL OBSERVATION CARE/DAY 70 MINUTES: CPT | Performed by: STUDENT IN AN ORGANIZED HEALTH CARE EDUCATION/TRAINING PROGRAM

## 2021-02-24 PROCEDURE — 85025 COMPLETE CBC W/AUTO DIFF WBC: CPT | Performed by: EMERGENCY MEDICINE

## 2021-02-24 PROCEDURE — 84484 ASSAY OF TROPONIN QUANT: CPT | Performed by: PHYSICIAN ASSISTANT

## 2021-02-24 PROCEDURE — 96374 THER/PROPH/DIAG INJ IV PUSH: CPT

## 2021-02-24 PROCEDURE — 83690 ASSAY OF LIPASE: CPT | Performed by: EMERGENCY MEDICINE

## 2021-02-24 RX ORDER — LABETALOL 20 MG/4 ML (5 MG/ML) INTRAVENOUS SYRINGE
10 EVERY 4 HOURS PRN
Status: DISCONTINUED | OUTPATIENT
Start: 2021-02-24 | End: 2021-02-27 | Stop reason: HOSPADM

## 2021-02-24 RX ORDER — BRIMONIDINE TARTRATE 0.15 %
1 DROPS OPHTHALMIC (EYE) EVERY 8 HOURS SCHEDULED
Status: DISCONTINUED | OUTPATIENT
Start: 2021-02-24 | End: 2021-02-27 | Stop reason: HOSPADM

## 2021-02-24 RX ORDER — ISONIAZID 100 MG/1
300 TABLET ORAL DAILY
Status: DISCONTINUED | OUTPATIENT
Start: 2021-02-24 | End: 2021-02-27 | Stop reason: HOSPADM

## 2021-02-24 RX ORDER — DOXAZOSIN MESYLATE 4 MG/1
4 TABLET ORAL
Status: DISCONTINUED | OUTPATIENT
Start: 2021-02-24 | End: 2021-02-27 | Stop reason: HOSPADM

## 2021-02-24 RX ORDER — ATORVASTATIN CALCIUM 40 MG/1
40 TABLET, FILM COATED ORAL
Status: DISCONTINUED | OUTPATIENT
Start: 2021-02-24 | End: 2021-02-27 | Stop reason: HOSPADM

## 2021-02-24 RX ORDER — DOXAZOSIN 2 MG/1
2 TABLET ORAL
Status: DISCONTINUED | OUTPATIENT
Start: 2021-02-24 | End: 2021-02-24

## 2021-02-24 RX ORDER — PANTOPRAZOLE SODIUM 20 MG/1
20 TABLET, DELAYED RELEASE ORAL
Status: DISCONTINUED | OUTPATIENT
Start: 2021-02-24 | End: 2021-02-27 | Stop reason: HOSPADM

## 2021-02-24 RX ORDER — PYRIDOXINE HCL (VITAMIN B6) 50 MG
50 TABLET ORAL DAILY
Status: DISCONTINUED | OUTPATIENT
Start: 2021-02-24 | End: 2021-02-27 | Stop reason: HOSPADM

## 2021-02-24 RX ORDER — FUROSEMIDE 40 MG/1
80 TABLET ORAL DAILY
Status: DISCONTINUED | OUTPATIENT
Start: 2021-02-24 | End: 2021-02-27 | Stop reason: HOSPADM

## 2021-02-24 RX ORDER — CINACALCET 30 MG/1
30 TABLET, FILM COATED ORAL DAILY
Status: DISCONTINUED | OUTPATIENT
Start: 2021-02-24 | End: 2021-02-27 | Stop reason: HOSPADM

## 2021-02-24 RX ORDER — CARVEDILOL 6.25 MG/1
6.25 TABLET ORAL 2 TIMES DAILY WITH MEALS
Status: DISCONTINUED | OUTPATIENT
Start: 2021-02-24 | End: 2021-02-26

## 2021-02-24 RX ORDER — MORPHINE SULFATE 4 MG/ML
4 INJECTION, SOLUTION INTRAMUSCULAR; INTRAVENOUS ONCE
Status: COMPLETED | OUTPATIENT
Start: 2021-02-24 | End: 2021-02-24

## 2021-02-24 RX ORDER — ONDANSETRON 2 MG/ML
4 INJECTION INTRAMUSCULAR; INTRAVENOUS EVERY 6 HOURS PRN
Status: DISCONTINUED | OUTPATIENT
Start: 2021-02-24 | End: 2021-02-27 | Stop reason: HOSPADM

## 2021-02-24 RX ORDER — NIFEDIPINE 60 MG/1
60 TABLET, EXTENDED RELEASE ORAL DAILY
Status: DISCONTINUED | OUTPATIENT
Start: 2021-02-24 | End: 2021-02-24

## 2021-02-24 RX ORDER — SEVELAMER HYDROCHLORIDE 800 MG/1
800 TABLET, FILM COATED ORAL
Status: DISCONTINUED | OUTPATIENT
Start: 2021-02-24 | End: 2021-02-27 | Stop reason: HOSPADM

## 2021-02-24 RX ORDER — LABETALOL 20 MG/4 ML (5 MG/ML) INTRAVENOUS SYRINGE
20 ONCE
Status: COMPLETED | OUTPATIENT
Start: 2021-02-24 | End: 2021-02-24

## 2021-02-24 RX ORDER — INSULIN GLARGINE 100 [IU]/ML
30 INJECTION, SOLUTION SUBCUTANEOUS
Status: DISCONTINUED | OUTPATIENT
Start: 2021-02-24 | End: 2021-02-27 | Stop reason: HOSPADM

## 2021-02-24 RX ORDER — PREDNISOLONE ACETATE 10 MG/ML
1 SUSPENSION/ DROPS OPHTHALMIC 2 TIMES DAILY
Status: DISCONTINUED | OUTPATIENT
Start: 2021-02-24 | End: 2021-02-27 | Stop reason: HOSPADM

## 2021-02-24 RX ORDER — ASPIRIN 81 MG/1
81 TABLET ORAL DAILY
Status: DISCONTINUED | OUTPATIENT
Start: 2021-02-24 | End: 2021-02-27 | Stop reason: HOSPADM

## 2021-02-24 RX ORDER — BRIMONIDINE TARTRATE 2 MG/ML
1 SOLUTION/ DROPS OPHTHALMIC 2 TIMES DAILY
COMMUNITY
Start: 2021-01-27

## 2021-02-24 RX ORDER — NIFEDIPINE 30 MG/1
90 TABLET, EXTENDED RELEASE ORAL DAILY
Status: DISCONTINUED | OUTPATIENT
Start: 2021-02-24 | End: 2021-02-27 | Stop reason: HOSPADM

## 2021-02-24 RX ADMIN — SEVELAMER HYDROCHLORIDE 800 MG: 800 TABLET, FILM COATED PARENTERAL at 15:55

## 2021-02-24 RX ADMIN — CARVEDILOL 6.25 MG: 6.25 TABLET, FILM COATED ORAL at 15:54

## 2021-02-24 RX ADMIN — MORPHINE SULFATE 4 MG: 4 INJECTION INTRAVENOUS at 05:15

## 2021-02-24 RX ADMIN — BRIMONIDINE TARTRATE 1 DROP: 1.5 SOLUTION OPHTHALMIC at 21:53

## 2021-02-24 RX ADMIN — NIFEDIPINE 90 MG: 60 TABLET, FILM COATED, EXTENDED RELEASE ORAL at 10:00

## 2021-02-24 RX ADMIN — PREDNISOLONE ACETATE 1 DROP: 10 SUSPENSION/ DROPS OPHTHALMIC at 21:53

## 2021-02-24 RX ADMIN — LABETALOL 20 MG/4 ML (5 MG/ML) INTRAVENOUS SYRINGE 20 MG: at 05:52

## 2021-02-24 RX ADMIN — INSULIN GLARGINE 30 UNITS: 100 INJECTION, SOLUTION SUBCUTANEOUS at 21:52

## 2021-02-24 RX ADMIN — BRIMONIDINE TARTRATE 1 DROP: 1.5 SOLUTION OPHTHALMIC at 15:56

## 2021-02-24 RX ADMIN — PANTOPRAZOLE SODIUM 20 MG: 20 TABLET, DELAYED RELEASE ORAL at 08:18

## 2021-02-24 RX ADMIN — FAMOTIDINE 20 MG: 10 INJECTION INTRAVENOUS at 03:35

## 2021-02-24 RX ADMIN — ATORVASTATIN CALCIUM 40 MG: 40 TABLET, FILM COATED ORAL at 15:54

## 2021-02-24 RX ADMIN — Medication 50 MG: at 10:00

## 2021-02-24 RX ADMIN — SEVELAMER HYDROCHLORIDE 800 MG: 800 TABLET, FILM COATED PARENTERAL at 13:00

## 2021-02-24 RX ADMIN — ONDANSETRON 4 MG: 2 INJECTION INTRAMUSCULAR; INTRAVENOUS at 10:20

## 2021-02-24 RX ADMIN — ASPIRIN 81 MG: 81 TABLET, COATED ORAL at 10:00

## 2021-02-24 RX ADMIN — FUROSEMIDE 80 MG: 40 TABLET ORAL at 10:00

## 2021-02-24 RX ADMIN — LABETALOL 20 MG/4 ML (5 MG/ML) INTRAVENOUS SYRINGE 10 MG: at 08:18

## 2021-02-24 RX ADMIN — CARVEDILOL 6.25 MG: 6.25 TABLET, FILM COATED ORAL at 08:18

## 2021-02-24 RX ADMIN — ISONIAZID 300 MG: 100 TABLET ORAL at 10:00

## 2021-02-24 RX ADMIN — DOXAZOSIN 4 MG: 4 TABLET ORAL at 21:52

## 2021-02-24 RX ADMIN — SEVELAMER HYDROCHLORIDE 800 MG: 800 TABLET, FILM COATED PARENTERAL at 08:18

## 2021-02-24 RX ADMIN — CINACALCET HYDROCHLORIDE 30 MG: 30 TABLET, FILM COATED ORAL at 10:00

## 2021-02-24 NOTE — ASSESSMENT & PLAN NOTE
Patient diagnosed with latent TB of the lung started on isoniazid last month followed by infectious disease  Continue isoniazid and pyridoxine  Patient reports he is tolerating both of these well without side effect

## 2021-02-24 NOTE — ED NOTES
Per pharmacy brimonidine 0 2% ophthalmic solution not available, can substitute with 0 15% solution and use 3 times a day vs 2 times a day       Erika Mercado RN  02/24/21 3466

## 2021-02-24 NOTE — ASSESSMENT & PLAN NOTE
Lab Results   Component Value Date    HGBA1C 7 5 (H) 01/18/2021       No results for input(s): POCGLU in the last 72 hours      Blood Sugar Average: Last 72 hrs:   continue outpatient insulin regimen

## 2021-02-24 NOTE — UTILIZATION REVIEW
Initial Clinical Review    Admission: Date/Time/Statement:   Admission Orders (From admission, onward)     Ordered        02/24/21 0513  Place in Observation  Once                   02/25/21 0931  Inpatient Admission Once     Question Answer Comment   Level of Care Med Surg    Estimated length of stay More than 2 Midnights    Certification I certify that inpatient services are medically necessary for this patient for a duration of greater than two midnights  See H&P and MD Progress Notes for additional information about the patient's course of treatment  02/25/21 0931       ED Arrival Information     Expected Arrival Acuity Means of Arrival Escorted By Service Admission Type    - 2/24/2021 01:58 Emergent Walk-In Self General Medicine Emergency    Arrival Complaint    chest pain        Chief Complaint   Patient presents with    Chest Pain     Pain onset around 2000 while laying down  Dialysis hx  Compliant with Tues, Thurs, Sat schedule        Assessment/Plan: * Chest pain  Assessment & Plan  Immediately postprandial chest pain/epigastric pain concern for a GI etiology new patient does have hypertensive urgency/emergency by blood pressure  EKG demonstrates new ST segment depressions in inferior leads 3/HPF which is borderline  Troponin is negative  Chest x-ray without acute cardiopulmonary disease on wet read  Will trend EKGs and troponins for ACS rule out will treat hypertensive urgency/emergency and will admit to observation     ESRD (end stage renal disease) [facility])  Assessment & Plan        Lab Results   Component Value Date     EGFR 5 02/24/2021     EGFR 6 02/12/2021     EGFR 5 01/21/2021     CREATININE 10 92 (H) 02/24/2021     CREATININE 9 68 (H) 02/12/2021     CREATININE 11 68 (H) 01/21/2021   On hemodialysis Tuesday Thursday Saturday  Patient is undergoing evaluation for kidney transplant with [address] [place] [address],[address] Nephrology  TB lung, latent  Assessment & Plan  Patient diagnosed with latent TB of the lung started on isoniazid last month followed by infectious disease  Continue isoniazid and pyridoxine  Patient reports he is tolerating both of these well without side effect     Benign hypertension with ESRD (end-stage renal disease) [facility])  Assessment & Plan        Lab Results   Component Value Date     EGFR 5 02/24/2021     EGFR 6 02/12/2021     EGFR 5 01/21/2021     CREATININE 10 92 (H) 02/24/2021     CREATININE 9 68 (H) 02/12/2021     CREATININE 11 68 (H) 01/21/2021   With hypertensive emergency  Give IV labetalol 20 mg x 1  Consult Nephrology will continue outpatient regimen of nifedipine and Cardura and Coreg  Continue IV labetalol 10 mg p r n  SBP greater than 170 mm hg     Legally blind  Assessment & Plan  Secondary to diabetic retinopathy     Type 2 diabetes mellitus with chronic kidney disease on chronic dialysis, with long-term current use of insulin [facility])  Assessment & Plan        Lab Results   Component Value Date     HGBA1C 7 5 (H) 01/18/2021   Anticipated Length of Stay:  Patient will be admitted on an Observation basis with an anticipated length of stay of  Less than 2 midnights  Justification for Hospital Stay: acs r/o      Ishmael Ramirez is a 63 y o  male who presents with PMH of end-stage renal disease on Tuesday Thursday Saturday hemodialysis, insulin-dependent diabetes complicated by diabetic retinopathy and diabetic nephropathy, hypertension, legally blind status, GERD coming to the hospital for chest pain  Patient reports that he was in his normal state of health until the evening prior to admission  Patient noted that immediately after oral intake he started developed substernal and epigastric chest pain which is nonradiating sharp constant  He was not associated with no nausea vomiting no shortness of breath no diaphoresis no lightheadedness headache  He reports he tried drinking milk to see if this would alleviate his pain and it did not  Dortha Plough   He came to the ED and was given a trial of Pepcid with only modest improvement in his symptoms  His blood pressure was significantly elevated    Admission was requested to the hospital for ACS       ED Triage Vitals   Temperature Pulse Respirations Blood Pressure SpO2   02/24/21 0209 02/24/21 0207 02/24/21 0207 02/24/21 0207 02/24/21 0207   98 3 °F (36 8 °C) 87 16 (!) 187/93 100 %      Temp Source Heart Rate Source Patient Position - Orthostatic VS BP Location FiO2 (%)   02/24/21 0209 02/24/21 0207 02/24/21 0207 02/24/21 0207 --   Oral Monitor Lying Right arm       Pain Score       02/24/21 0207       8          Wt Readings from Last 1 Encounters:   01/21/21 78 6 kg (173 lb 4 5 oz)     Additional Vital Signs:   02/24/21 0815  --  74  --  188/94Abnormal   128  --  --  --   02/24/21 0745  98 1 °F (36 7 °C)  74  16  201/95Abnormal   153  100 %  None (Room air)  Lying   02/24/21 0620  --  81  18  186/96Abnormal   --  95 %  None (Room air)  --   02/24/21 0552  --  80  16  191/101Abnormal   --  98 %  None (Room air)  --   02/24/21 0503  --  84  20  198/96Abnormal   --  100 %  None (Room air)  Lying   02/24/21 0320  --  84  18  192/93Abnormal   --  100 %  None (Room air)  Lying   02/24/21 0317  --  --  --  --  --  --  None (Room air)  --   02/24/21 0209  98 3 °F (36 8 °C)  --  --  --  --  --  --  --   02/24/21 0207  --  87  16  187/93Abnormal   --  100 %  None (Room air)  Lying         Pertinent Labs/Diagnostic Test Results:       Results from last 7 days   Lab Units 02/24/21  0315   WBC Thousand/uL 10 13   HEMOGLOBIN g/dL 13 0   HEMATOCRIT % 40 8   PLATELETS Thousands/uL 268   NEUTROS ABS Thousands/µL 7 77*         Results from last 7 days   Lab Units 02/24/21  0315   SODIUM mmol/L 135*   POTASSIUM mmol/L 5 0   CHLORIDE mmol/L 94*   CO2 mmol/L 34*   ANION GAP mmol/L 7   BUN mg/dL 52*   CREATININE mg/dL 10 92*   EGFR ml/min/1 73sq m 5   CALCIUM mg/dL 8 0*     Results from last 7 days   Lab Units 02/24/21  0315   AST U/L 50*   ALT U/L 15   ALK PHOS U/L 129* TOTAL PROTEIN g/dL 8 1   ALBUMIN g/dL 3 5   TOTAL BILIRUBIN mg/dL 0 59         Results from last 7 days   Lab Units 02/24/21  0315   GLUCOSE RANDOM mg/dL 234*           Results from last 7 days   Lab Units 02/24/21  0758 02/24/21  0315   TROPONIN I ng/mL 0 03 0 02           Results from last 7 days   Lab Units 02/24/21  0315   LIPASE u/L 386             ED Treatment:   Medication Administration from 02/24/2021 0158 to 02/24/2021 0848       Date/Time Order Dose Route Action Action by Comments     02/24/2021 0335 famotidine (PEPCID) injection 20 mg 20 mg Intravenous Given Verle Stabs, RN      02/24/2021 0515 morphine (PF) 4 mg/mL injection 4 mg 4 mg Intravenous Given Verle Stabs, RN      02/24/2021 1077 Labetalol HCl (NORMODYNE) injection 20 mg 20 mg Intravenous Given Verle Stabs, RN      02/24/2021 0818 carvedilol (COREG) tablet 6 25 mg 6 25 mg Oral Given Darnell High, RN      02/24/2021 0818 pantoprazole (PROTONIX) EC tablet 20 mg 20 mg Oral Given Darnell High, RN      02/24/2021 0818 sevelamer (RENAGEL) tablet 800 mg 800 mg Oral Given Darnell High, RN      02/24/2021 0818 Labetalol HCl (NORMODYNE) injection 10 mg 10 mg Intravenous Given Darnell High, RN         Past Medical History:   Diagnosis Date    Cataract     Chronic kidney disease     [facility] mellitus ([facility] )      IDDM    Diabetic retinopathy ([facility] )     Dizziness     occ    ESRD (end stage renal disease) ([facility] )     GERD (gastroesophageal reflux disease)     Headache     occ    Hyperlipidemia     Hypertension     Legally blind     LVH (left ventricular hypertrophy)     Preferred language is [place]     understands some English    Use of cane as ambulatory aid      Present on Admission:   ESRD (end stage renal disease) ([facility] )   Positive QuantiFERON-TB Gold test   TB lung, latent   Legally blind   Benign hypertension with ESRD (end-stage renal disease) ([facility] )      Admitting Diagnosis: Chest pain [R07 9]  Age/Sex: 63 y o  male  Admission Orders:  Scheduled Medications:  aspirin, 81 mg, Oral, Daily  atorvastatin, 40 mg, Oral, Daily With Dinner  carvedilol, 6 25 mg, Oral, BID With Meals  cinacalcet, 30 mg, Oral, Daily  doxazosin, 2 mg, Oral, HS  furosemide, 80 mg, Oral, Daily  insulin glargine, 30 Units, Subcutaneous, HS  isoniazid, 300 mg, Oral, Daily  NIFEdipine ER, 60 mg, Oral, Daily  pantoprazole, 20 mg, Oral, Early Morning  pyridoxine, 50 mg, Oral, Daily  sevelamer, 800 mg, Oral, TID With Meals      Continuous IV Infusions:     PRN Meds:  Labetalol HCl, 10 mg, Intravenous, Q4H PRN  ondansetron, 4 mg, Intravenous, Q6H PRN        IP CONSULT TO NEPHROLOGY    Network Utilization Review Department  ATTENTION: Please call with any questions or concerns to [phone] and carefully listen to the prompts so that you are directed to the right person  All voicemails are confidential   Renee Garcia all requests for admission clinical reviews, approved or denied determinations and any other requests to dedicated fax number below belonging to the campus where the patient is receiving treatment   List of dedicated fax numbers for the Facilities:  [address] DENIALS (Administrative/Medical Necessity) [phone]   [address] (Maternity/NICU/Pediatrics) [phone] [address] Dr Frankie Barnes 8042 (  Abbey Valladares "[place]" 103) [address] [place] [address] Hermelinda Rios 1481 [address] [place] [address] [phone]

## 2021-02-24 NOTE — H&P
H&P- Paddy Trevin 1965, 63 y o  male MRN: [id number]    Unit/Bed#: ED 18 Encounter: [id number]    Primary Care Provider: Rik Kelsey MD   Date and time admitted to hospital: 2/24/2021  2:07 AM        * Chest pain  Assessment & Plan  Immediately postprandial chest pain/epigastric pain concern for a GI etiology new patient does have hypertensive urgency/emergency by blood pressure  EKG demonstrates new ST segment depressions in inferior leads 3/HPF which is borderline  Troponin is negative  Chest x-ray without acute cardiopulmonary disease on wet read  Will trend EKGs and troponins for ACS rule out will treat hypertensive urgency/emergency and will admit to observation    ESRD (end stage renal disease) [facility])  Assessment & Plan  Lab Results   Component Value Date    EGFR 5 02/24/2021    EGFR 6 02/12/2021    EGFR 5 01/21/2021    CREATININE 10 92 (H) 02/24/2021    CREATININE 9 68 (H) 02/12/2021    CREATININE 11 68 (H) 01/21/2021   On hemodialysis Tuesday Thursday Saturday  Patient is undergoing evaluation for kidney transplant with [address] [place] [address],[address] Nephrology    TB lung, latent  Assessment & Plan  Patient diagnosed with latent TB of the lung started on isoniazid last month followed by infectious disease  Continue isoniazid and pyridoxine  Patient reports he is tolerating both of these well without side effect    Benign hypertension with ESRD (end-stage renal disease) [facility])  Assessment & Plan  Lab Results   Component Value Date    EGFR 5 02/24/2021    EGFR 6 02/12/2021    EGFR 5 01/21/2021    CREATININE 10 92 (H) 02/24/2021    CREATININE 9 68 (H) 02/12/2021    CREATININE 11 68 (H) 01/21/2021   With hypertensive emergency  Give IV labetalol 20 mg x 1  Consult Nephrology will continue outpatient regimen of nifedipine and Cardura and Coreg  Continue IV labetalol 10 mg p r n   SBP greater than 170 mm hg    Legally blind  Assessment & Plan  Secondary to diabetic retinopathy    Type 2 diabetes mellitus with chronic kidney disease on chronic dialysis, with long-term current use of insulin (HCC)  Assessment & Plan  Lab Results   Component Value Date    HGBA1C 7 5 (H) 01/18/2021       No results for input(s): POCGLU in the last 72 hours  Blood Sugar Average: Last 72 hrs:   continue outpatient insulin regimen      VTE Prophylaxis: Heparin  / sequential compression device   Code Status:  Full code  POLST: There is no POLST form on file for this patient (pre-hospital)  Discussion with family:     Anticipated Length of Stay:  Patient will be admitted on an Observation basis with an anticipated length of stay of  Less than 2 midnights  Justification for Hospital Stay: acs r/o    Total Time for Visit, including Counseling / Coordination of Care: 45 minutes  Greater than 50% of this total time spent on direct patient counseling and coordination of care  Chief Complaint:   cp    History of Present Illness:    Soniya Morataya is a 63 y o  male who presents with PMH of end-stage renal disease on Tuesday Thursday Saturday hemodialysis, insulin-dependent diabetes complicated by diabetic retinopathy and diabetic nephropathy, hypertension, legally blind status, GERD coming to the hospital for chest pain  Patient reports that he was in his normal state of health until the evening prior to admission  Patient noted that immediately after oral intake he started developed substernal and epigastric chest pain which is nonradiating sharp constant  He was not associated with no nausea vomiting no shortness of breath no diaphoresis no lightheadedness headache  He reports he tried drinking milk to see if this would alleviate his pain and it did not  [employer] [place] He came to the ED and was given a trial of Pepcid with only modest improvement in his symptoms  His blood pressure was significantly elevated    Admission was requested to the hospital for ACS rule    Review of Systems:    Review of Systems   Constitutional: Negative for appetite change, chills and fever  Respiratory: Negative for cough and shortness of breath  Cardiovascular: Positive for chest pain  Gastrointestinal: Positive for abdominal pain  Negative for diarrhea, nausea and vomiting  Neurological: Negative for light-headedness and headaches  All other systems reviewed and are negative  Past Medical and Surgical History:     Past Medical History:   Diagnosis Date    Cataract     Chronic kidney disease     Diabetes mellitus ([facility]      IDDM    Diabetic retinopathy ([facility]     Dizziness     occ    ESRD (end stage renal disease) ([facility]     GERD (gastroesophageal reflux disease)     Headache     occ    Hyperlipidemia     Hypertension     Legally blind     LVH (left ventricular hypertrophy)     Preferred language is [place]     understands some English    Use of cane as ambulatory aid        Past Surgical History:   Procedure Laterality Date    INGUINAL HERNIA REPAIR Right     IR AV FISTULAGRAM/GRAFTOGRAM  4/30/2019    IR AV FISTULAGRAM/GRAFTOGRAM  6/14/2019    IR AV FISTULAGRAM/GRAFTOGRAM  6/24/2020    IR TUNNELED DIALYSIS CATHETER PLACEMENT  4/3/2019    NH ANASTOMOSIS,AV,ANY SITE Left 1/23/2019    Procedure: CREATION FISTULA ARTERIOVENOUS (AV); Surgeon: Brady Ragsdale MD;  Location: [facility];  Service: Vascular       Meds/Allergies:    Prior to Admission medications    Medication Sig Start Date End Date Taking?  Authorizing Provider   Aspirin Low Dose 81 MG EC tablet take 1 tablet by mouth once daily 2/22/21   Jennifer Mercado MD   atorvastatin (LIPITOR) 40 mg tablet take 1 tablet by mouth once daily 9/14/20   Jennifer Mercado MD   Basaglar KwikPen 100 units/mL injection pen INJECT 30 UNITS UNDER THE SKIN DAILY 12/2/20   Jennifer Mercado MD   Blood Glucose Monitoring Suppl (ONE TOUCH ULTRA 2) w/Device KIT by Does not apply route 3 (three) times a day 1/25/19   Jennifer Mercado MD   carvedilol (COREG) 6 25 mg tablet take 1 tablet by mouth twice a day with meals 12/2/20   Silvio Chandra MD   cinacalcet (SENSIPAR) 30 mg tablet Take 30 mg by mouth daily    Historical Provider, MD   cyclobenzaprine (FLEXERIL) 5 mg tablet Take 1 tablet (5 mg total) by mouth daily at bedtime as needed for muscle spasms 6/15/20   Silvio Chandra MD   doxazosin (CARDURA) 2 mg tablet Take 1 tablet (2 mg total) by mouth daily at bedtime 4/26/19   Silvio Chandra MD   furosemide (LASIX) 80 mg tablet Take 1 tablet (80 mg total) by mouth daily 11/19/20   Silvio Chandra MD   glucose blood (FREESTYLE LITE) test strip Test blood sugars three times daily  6/18/20   Silvio Chandra MD   insulin aspart (NOVOLOG FLEXPEN) 100 Units/mL injection pen 10U with breakfast and lunch, 10-14 U with dinner 12/16/19   Silvio Chandra MD   isoniazid (NYDRAZID) 300 mg tablet Take 1 tablet (300 mg total) by mouth daily 1/21/21 10/18/21  Caroline Herndon PA-C   LOKELMA 10 g PACK  12/31/19   Historical Provider, MD   NIFEdipine ER (ADALAT CC) 60 MG 24 hr tablet Take 1 tablet (60 mg total) by mouth daily 4/26/19   Silvio Chandra MD   ONE TOUCH LANCETS MISC by Does not apply route 3 (three) times a day 1/15/19   Silvio Chandra MD   pantoprazole (PROTONIX) 20 mg tablet Take 1 tablet (20 mg total) by mouth daily 1/17/20   Jules Yu PA-C   polyethylene glycol (MIRALAX) 17 g packet Take 17 g by mouth daily 9/23/19   Silvio Chandra MD   prednisoLONE acetate (PRED FORTE) 1 % ophthalmic suspension  6/25/19   Historical Provider, MD   pyridoxine (VITAMIN B6) 50 mg tablet Take 1 tablet (50 mg total) by mouth daily 3/1/21   Cindy Ramirez DO   sevelamer carbonate (RENVELA) 800 mg tablet Take 800 mg by mouth 3 (three) times a day with meals    Historical Provider, MD     I have reviewed home medications with patient personally  Allergies:    Allergies   Allergen Reactions    No Known Allergies        Social History:     Marital Status: Legally    Occupation:   Patient Pre-hospital Living Situation: Patient Pre-hospital Level of Mobility:   Patient Pre-hospital Diet Restrictions:   Substance Use History:   Social History     Substance and Sexual Activity   Alcohol Use Not Currently     Social History     Tobacco Use   Smoking Status Former Smoker    Packs/day: 0 25    Quit date: 2/1/2018    Years since quitting: 3 0   Smokeless Tobacco Never Used     Social History     Substance and Sexual Activity   Drug Use No       Family History:       Family History   Problem Relation Age of Onset    Hypertension Mother     Diabetes Father     No Known Problems Sister     No Known Problems Brother     No Known Problems Maternal Aunt     No Known Problems Maternal Uncle     No Known Problems Paternal Aunt     No Known Problems Paternal Uncle     No Known Problems Maternal Grandmother     No Known Problems Maternal Grandfather     No Known Problems Paternal Grandmother     No Known Problems Paternal Grandfather        Physical Exam:     Vitals:   Blood Pressure: (!) 186/96 (02/24/21 0620)  Pulse: 81 (02/24/21 0620)  Temperature: 98 3 °F (36 8 °C) (02/24/21 0209)  Temp Source: Oral (02/24/21 0209)  Respirations: 18 (02/24/21 0620)  Height: 5' 10" (177 8 cm) (02/24/21 0541)  SpO2: 95 % (02/24/21 5656)    Physical Exam  Vitals signs reviewed  Constitutional:       Comments: blind   HENT:      Head: Normocephalic  Right Ear: External ear normal       Left Ear: External ear normal       Nose: Nose normal    Eyes:      Extraocular Movements: Extraocular movements intact  Neck:      Musculoskeletal: Normal range of motion  Cardiovascular:      Rate and Rhythm: Normal rate and regular rhythm  Heart sounds: No murmur  No friction rub  No gallop  Pulmonary:      Effort: Pulmonary effort is normal  No respiratory distress  Breath sounds: No wheezing, rhonchi or rales  Abdominal:      General: There is no distension  Palpations: Abdomen is soft  There is no mass  Tenderness:  There is abdominal tenderness (epigastrum)  There is no guarding or rebound  Hernia: No hernia is present  Musculoskeletal:      Right lower leg: No edema  Left lower leg: No edema  Skin:     General: Skin is warm  Neurological:      Mental Status: He is alert  Mental status is at baseline  Psychiatric:         Mood and Affect: Mood normal          (  Be Sure to Include Physical Exam: Delete this entire line when you have entered your exam)    Additional Data:     Lab Results: I have personally reviewed pertinent reports  Results from last 7 days   Lab Units 02/24/21  0315   WBC Thousand/uL 10 13   HEMOGLOBIN g/dL 13 0   HEMATOCRIT % 40 8   PLATELETS Thousands/uL 268   NEUTROS PCT % 76*   LYMPHS PCT % 7*   MONOS PCT % 14*   EOS PCT % 2     Results from last 7 days   Lab Units 02/24/21  0315   SODIUM mmol/L 135*   POTASSIUM mmol/L 5 0   CHLORIDE mmol/L 94*   CO2 mmol/L 34*   BUN mg/dL 52*   CREATININE mg/dL 10 92*   ANION GAP mmol/L 7   CALCIUM mg/dL 8 0*   ALBUMIN g/dL 3 5   TOTAL BILIRUBIN mg/dL 0 59   ALK PHOS U/L 129*   ALT U/L 15   AST U/L 50*   GLUCOSE RANDOM mg/dL 234*                       Imaging: I have personally reviewed pertinent reports  XR chest 2 views   ED Interpretation by Rebekah Stokes DO (02/24 1087)   Cardiomegaly   NAD          EKG, Pathology, and Other Studies Reviewed on Admission:   · EKG:  Normal sinus rhythm with LVH morphology suspected on lateral leads and  · There is ST segment depression in 3 and borderline in AVF new from prior    Allscripts / Epic Records Reviewed: Yes     ** Please Note: This note has been constructed using a voice recognition system   **

## 2021-02-24 NOTE — ASSESSMENT & PLAN NOTE
Lab Results   Component Value Date    EGFR 5 02/24/2021    EGFR 6 02/12/2021    EGFR 5 01/21/2021    CREATININE 10 92 (H) 02/24/2021    CREATININE 9 68 (H) 02/12/2021    CREATININE 11 68 (H) 01/21/2021   With hypertensive emergency  Give IV labetalol 20 mg x 1  Consult Nephrology will continue outpatient regimen of nifedipine and Cardura and Coreg  Continue IV labetalol 10 mg p r n   SBP greater than 170 mm hg

## 2021-02-24 NOTE — ED NOTES
Patient vomited on floor shortly after eating breakfast, will give prn Judith Serna RN  02/24/21 0338

## 2021-02-24 NOTE — ED PROVIDER NOTES
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  Patient denies any infectious symptoms such as fever, chills, cough, etcetera  History provided by:  Patient and relative   used: No    Chest Pain  Pain location:  Epigastric  Pain radiates to:  Does not radiate  Pain radiates to the back: no    Duration:  7 hours  Timing:  Constant  Progression:  Improving  Chronicity:  New  Context: at rest    Relieved by:  None tried  Worsened by:  Nothing tried  Ineffective treatments:  None tried  Associated symptoms: no abdominal pain, no back pain, no cough, no dizziness, no fever, no lower extremity edema, no nausea, no shortness of breath, no syncope and not vomiting    Risk factors: diabetes mellitus, high cholesterol, hypertension and male sex        Prior to Admission Medications   Prescriptions Last Dose Informant Patient Reported? Taking?    Aspirin Low Dose 81 MG EC tablet   No No   Sig: take 1 tablet by mouth once daily   Basaglar KwikPen 100 units/mL injection pen   No No   Sig: INJECT 30 UNITS UNDER THE SKIN DAILY   Blood Glucose Monitoring Suppl (ONE TOUCH ULTRA 2) w/Device KIT  Spouse/Significant Other No No   Sig: by Does not apply route 3 (three) times a day   Carboxymethylcellul-Glycerin (CLEAR EYES FOR DRY EYES OP)   Yes Yes   Sig: Apply 2 drops to eye 2 (two) times a day   LOKELMA 10 g PACK  Spouse/Significant Other Yes No   NIFEdipine ER (ADALAT CC) 60 MG 24 hr tablet Spouse/Significant Other No No   Sig: Take 1 tablet (60 mg total) by mouth daily   ONE TOUCH LANCETS MISC  Spouse/Significant Other No No   Sig: by Does not apply route 3 (three) times a day   atorvastatin (LIPITOR) 40 mg tablet   No No   Sig: take 1 tablet by mouth once daily   brimonidine tartrate 0 2 % ophthalmic solution   Yes Yes   Sig: Administer 1 drop into the left eye 2 (two) times a day   carvedilol (COREG) 6 25 mg tablet   No No   Sig: take 1 tablet by mouth twice a day with meals   cinacalcet (SENSIPAR) 30 mg tablet  Spouse/Significant Other Yes No   Sig: Take 30 mg by mouth daily   cyclobenzaprine (FLEXERIL) 5 mg tablet  Spouse/Significant Other No No   Sig: Take 1 tablet (5 mg total) by mouth daily at bedtime as needed for muscle spasms   doxazosin (CARDURA) 2 mg tablet  Spouse/Significant Other No No   Sig: Take 1 tablet (2 mg total) by mouth daily at bedtime   furosemide (LASIX) 80 mg tablet   No No   Sig: Take 1 tablet (80 mg total) by mouth daily   glucose blood (FREESTYLE LITE) test strip  Spouse/Significant Other No No   Sig: Test blood sugars three times daily     insulin aspart (NOVOLOG FLEXPEN) 100 Units/mL injection pen  Spouse/Significant Other No No   Sig: 10U with breakfast and lunch, 10-14 U with dinner   isoniazid (NYDRAZID) 300 mg tablet   No No   Sig: Take 1 tablet (300 mg total) by mouth daily   pantoprazole (PROTONIX) 20 mg tablet  Spouse/Significant Other No No   Sig: Take 1 tablet (20 mg total) by mouth daily   polyethylene glycol (MIRALAX) 17 g packet  Spouse/Significant Other No No   Sig: Take 17 g by mouth daily   prednisoLONE acetate (PRED FORTE) 1 % ophthalmic suspension  Spouse/Significant Other Yes No   Sig: Administer 1 drop into the left eye 2 (two) times a day    pyridoxine (VITAMIN B6) 50 mg tablet   No No   Sig: Take 1 tablet (50 mg total) by mouth daily   sevelamer carbonate (RENVELA) 800 mg tablet  Spouse/Significant Other Yes No   Sig: Take 800 mg by mouth 3 (three) times a day with meals      Facility-Administered Medications: None       Past Medical History:   Diagnosis Date    Cataract     Chronic kidney disease     Diabetes mellitus ([facility] [address] )      IDDM    Diabetic retinopathy ([facility] [address] )     Dizziness     occ    ESRD (end stage renal disease) ([facility] [address] )     GERD (gastroesophageal reflux disease)     Headache     occ    Hyperlipidemia     Hypertension     Legally blind     LVH (left ventricular hypertrophy)     Preferred language is [place]     understands some English    Use of cane as ambulatory aid        Past Surgical History:   Procedure Laterality Date    INGUINAL HERNIA REPAIR Right     IR AV FISTULAGRAM/GRAFTOGRAM  4/30/2019    IR AV FISTULAGRAM/GRAFTOGRAM  6/14/2019    IR AV FISTULAGRAM/GRAFTOGRAM  6/24/2020    IR TUNNELED DIALYSIS CATHETER PLACEMENT  4/3/2019    [place] ANASTOMOSIS,AV,ANY SITE Left 1/23/2019    Procedure: CREATION FISTULA ARTERIOVENOUS (AV); Surgeon: Arielle Gillette MD;  Location: [facility] OR;  Service: Vascular       Family History   Problem Relation Age of Onset    Hypertension Mother     Diabetes Father     No Known Problems Sister     No Known Problems Brother     No Known Problems Maternal Aunt     No Known Problems Maternal Uncle     No Known Problems Paternal Aunt     No Known Problems Paternal Uncle     No Known Problems Maternal Grandmother     No Known Problems Maternal Grandfather     No Known Problems Paternal Grandmother     No Known Problems Paternal Grandfather      I have reviewed and agree with the history as documented      E-Cigarette/Vaping    E-Cigarette Use Never User      E-Cigarette/Vaping Substances    Nicotine No     THC No     CBD No     Flavoring No     Other No     Unknown No      Social History     Tobacco Use    Smoking status: Former Smoker     Packs/day: 0 25     Quit date: 2/1/2018     Years since quitting: 3 0    Smokeless tobacco: Never Used   Substance Use Topics    Alcohol use: Not Currently    Drug use: No       Review of Systems   Constitutional: Negative for chills and fever  Eyes: Negative for visual disturbance  Respiratory: Negative for cough, chest tightness and shortness of breath  Cardiovascular: Positive for chest pain  Negative for leg swelling and syncope  Gastrointestinal: Negative for abdominal pain, nausea and vomiting  Musculoskeletal: Negative for back pain and neck pain  Skin: Negative for color change, pallor, rash and wound  Neurological: Negative for dizziness, syncope and light-headedness  All other systems reviewed and are negative  Physical Exam  Physical Exam  Vitals signs and nursing note reviewed  Constitutional:       General: He is not in acute distress  Appearance: He is well-developed  He is not ill-appearing, toxic-appearing or diaphoretic  HENT:      Head: Normocephalic and atraumatic  Eyes:      Conjunctiva/sclera: Conjunctivae normal       Pupils: Pupils are equal, round, and reactive to light  Neck:      Musculoskeletal: Normal range of motion and neck supple  Cardiovascular:      Rate and Rhythm: Normal rate and regular rhythm  Heart sounds: Murmur present  Pulmonary:      Effort: Pulmonary effort is normal  No respiratory distress  Breath sounds: Normal breath sounds  No wheezing or rales  Abdominal:      General: There is no distension  Palpations: Abdomen is soft  Tenderness: There is no abdominal tenderness  There is no guarding or rebound  Musculoskeletal: Normal range of motion  General: No tenderness or deformity  Right lower leg: No edema  Left lower leg: No edema  Skin:     General: Skin is warm and dry  Neurological:      Mental Status: He is alert and oriented to person, place, and time           Vital Signs  ED Triage Vitals   Temperature Pulse Respirations Blood Pressure SpO2   02/24/21 0209 02/24/21 0207 02/24/21 0207 02/24/21 0207 02/24/21 0207   98 3 °F (36 8 °C) 87 16 (!) 187/93 100 %      Temp Source Heart Rate Source Patient Position - Orthostatic VS BP Location FiO2 (%)   02/24/21 0209 02/24/21 0207 02/24/21 0207 02/24/21 0207 --   Oral Monitor Lying Right arm       Pain Score       02/24/21 0207       8           Vitals:    02/24/21 1100 02/24/21 1437 02/24/21 1728 02/24/21 2148   BP: (!) 178/95 148/85 133/66 134/79   Pulse: 80 76 72 70   Patient Position - Orthostatic VS: Lying Lying Lying Lying         Visual Acuity      ED Medications  Medications   atorvastatin (LIPITOR) tablet 40 mg (40 mg Oral Given 2/24/21 1554)   insulin glargine (LANTUS) subcutaneous injection 30 Units 0 3 mL (30 Units Subcutaneous Given 2/24/21 2152)   carvedilol (COREG) tablet 6 25 mg (6 25 mg Oral Given 2/24/21 1554)   cinacalcet (SENSIPAR) tablet 30 mg (30 mg Oral Given 2/24/21 1000)   furosemide (LASIX) tablet 80 mg (80 mg Oral Given 2/24/21 1000)   isoniazid (NYDRAZID) tablet 300 mg (300 mg Oral Given 2/24/21 1000)   pantoprazole (PROTONIX) EC tablet 20 mg (20 mg Oral Given 2/24/21 0818)   sevelamer (RENAGEL) tablet 800 mg (800 mg Oral Given 2/24/21 1555)   pyridoxine (VITAMIN B6) tablet 50 mg (50 mg Oral Given 2/24/21 1000)   aspirin (ECOTRIN LOW STRENGTH) EC tablet 81 mg (81 mg Oral Given 2/24/21 1000)   ondansetron (ZOFRAN) injection 4 mg (4 mg Intravenous Given 2/24/21 1020)   Labetalol HCl (NORMODYNE) injection 10 mg (10 mg Intravenous Given 2/24/21 0818)   doxazosin (CARDURA) tablet 4 mg (4 mg Oral Given 2/24/21 2152)   NIFEdipine (PROCARDIA XL) 24 hr tablet 90 mg (90 mg Oral Given 2/24/21 1000)   prednisoLONE acetate (PRED FORTE) 1 % ophthalmic suspension 1 drop (1 drop Left Eye Given 2/24/21 2153)   brimonidine (ALPHAGAN P) 0 15 % ophthalmic solution 1 drop (1 drop Left Eye Given 2/24/21 2153)   insulin lispro (HumaLOG) 100 units/mL subcutaneous injection 1-6 Units (1 Units Subcutaneous Not Given 2/24/21 1917)   famotidine (PEPCID) injection 20 mg (20 mg Intravenous Given 2/24/21 0335)   morphine (PF) 4 mg/mL injection 4 mg (4 mg Intravenous Given 2/24/21 0515)   Labetalol HCl (NORMODYNE) injection 20 mg (20 mg Intravenous Given 2/24/21 0552)       Diagnostic Studies  Results Reviewed     Procedure Component Value Units Date/Time    Troponin I [[id number]]  (Normal) Collected: 02/24/21 1056    Lab Status: Final result Specimen: Blood from Arm, Right Updated: 02/24/21 1130     Troponin I 0 03 ng/mL     Troponin I [[id number]]  (Normal) Collected: 02/24/21 0758    Lab Status: Final result Specimen: Blood from Arm, Right Updated: 02/24/21 0826     Troponin I 0 03 ng/mL     Troponin I [[id number]]  (Normal) Collected: 02/24/21 0315    Lab Status: Final result Specimen: Blood from Arm, Right Updated: 02/24/21 0408     Troponin I 0 02 ng/mL     Lipase [[id number]]  (Normal) Collected: 02/24/21 0315    Lab Status: Final result Specimen: Blood from Arm, Right Updated: 02/24/21 0406     Lipase 386 u/L     Comprehensive metabolic panel [[id number]]  (Abnormal) Collected: 02/24/21 0315    Lab Status: Final result Specimen: Blood from Arm, Right Updated: 02/24/21 0406     Sodium 135 mmol/L      Potassium 5 0 mmol/L      Chloride 94 mmol/L      CO2 34 mmol/L      ANION GAP 7 mmol/L      BUN 52 mg/dL      Creatinine 10 92 mg/dL      Glucose 234 mg/dL      Calcium 8 0 mg/dL      AST 50 U/L      ALT 15 U/L      Alkaline Phosphatase 129 U/L      Total Protein 8 1 g/dL      Albumin 3 5 g/dL      Total Bilirubin 0 59 mg/dL      eGFR 5 ml/min/1 73sq m     Narrative:      [place] guidelines for Chronic Kidney Disease (CKD):     Stage 1 with normal or high GFR (GFR > 90 mL/min/1 73 square meters)    Stage 2 Mild CKD (GFR = 60-89 mL/min/1 73 square meters)    Stage 3A Moderate CKD (GFR = 45-59 mL/min/1 73 square meters)    Stage 3B Moderate CKD (GFR = 30-44 mL/min/1 73 square meters)    Stage 4 Severe CKD (GFR = 15-29 mL/min/1 73 square meters)    Stage 5 End Stage CKD (GFR <15 mL/min/1 73 square meters)  Note: GFR calculation is accurate only with a steady state creatinine    CBC and differential [[id number]]  (Abnormal) Collected: 02/24/21 0315    Lab Status: Final result Specimen: Blood from Arm, Right Updated: 02/24/21 0334     WBC 10 13 Thousand/uL      RBC 4 51 Million/uL      Hemoglobin 13 0 g/dL      Hematocrit 40 8 %      MCV 91 fL      MCH 28 8 pg      MCHC 31 9 g/dL      RDW 14 4 %      MPV 9 5 fL      Platelets 639 Thousands/uL      nRBC 0 /100 WBCs      Neutrophils Relative 76 %      Immat GRANS % 1 %      Lymphocytes Relative 7 %      Monocytes Relative 14 %      Eosinophils Relative 2 %      Basophils Relative 0 %      Neutrophils Absolute 7 77 Thousands/µL      Immature Grans Absolute 0 05 Thousand/uL      Lymphocytes Absolute 0 74 Thousands/µL      Monocytes Absolute 1 37 Thousand/µL      Eosinophils Absolute 0 17 Thousand/µL      Basophils Absolute 0 03 Thousands/µL                  XR chest 2 views   ED Interpretation by Junior Vicente DO (02/24 1340)   Cardiomegaly   NAD      Final Result by Maria Elena Lancaster MD (02/24 1221)   No acute cardiopulmonary disease        Findings are stable            Workstation performed: [id number]                    Procedures  ECG 12 Lead Documentation Only    Date/Time: 2/24/2021 2:15 AM  Performed by: Junior Vicente DO  Authorized by: Junior Vicente DO     Indications / Diagnosis:  Chest pain  Patient location:  ED  Previous ECG:     Previous ECG:  Compared to current    Similarity:  No change  Interpretation:     Interpretation: abnormal    Rate:     ECG rate:  88    ECG rate assessment: normal    Rhythm:     Rhythm: sinus rhythm    Ectopy:     Ectopy: none    QRS:     QRS intervals:  Normal  Conduction:     Conduction: normal    ST segments:     ST segments:  Non-specific  T waves:     T waves: non-specific and peaked      Peaked:  V2 and V3             ED Course             HEART Risk Score      Most Recent Value   Heart Score Risk Calculator   History  0 Filed at: 02/24/2021 0437   ECG  1 Filed at: 02/24/2021 0437   Age  1 Filed at: 02/24/2021 0437   Risk Factors  2 Filed at: 02/24/2021 0437   Troponin  0 Filed at: 02/24/2021 0437   HEART Score  4 Filed at: 02/24/2021 0322                      SBIRT 20yo+      Most Recent Value   SBIRT (25 yo +)   In order to provide better care to our patients, we are screening all of our patients for alcohol and drug use  Would it be okay to ask you these screening questions? Yes Filed at: 02/24/2021 0316   Initial Alcohol Screen: US AUDIT-C    1  How often do you have a drink containing alcohol?  0 Filed at: 02/24/2021 0317   2  How many drinks containing alcohol do you have on a typical day you are drinking? 0 Filed at: 02/24/2021 0317   3a  Male UNDER 65: How often do you have five or more drinks on one occasion? 0 Filed at: 02/24/2021 0317   3b  FEMALE Any Age, or MALE 65+: How often do you have 4 or more drinks on one occassion? 0 Filed at: 02/24/2021 0317   Audit-C Score  0 Filed at: 02/24/2021 6390   STEWART: How many times in the past year have you    Used an illegal drug or used a prescription medication for non-medical reasons? Never Filed at: 02/24/2021 0316                    MDM  Number of Diagnoses or Management Options  Chest pain: new and requires workup  ESRD (end stage renal disease) on dialysis [facility]): new and requires workup  Hypertension: new and requires workup  Diagnosis management comments: Patient presents for chest pain that started last night  No previous cardiac history  Patient is hypertensive but is complaining of pain so will treat pain and observe  Cardiac workup is negative  Patient still having pain so will admit for further evaluation and treatment           Amount and/or Complexity of Data Reviewed  Clinical lab tests: ordered and reviewed  Tests in the radiology section of CPT®: ordered and reviewed  Tests in the medicine section of CPT®: reviewed and ordered  Review and summarize past medical records: yes  Independent visualization of images, tracings, or specimens: yes    Patient Progress  Patient progress: stable      Disposition  Final diagnoses:   Chest pain   Hypertension   ESRD (end stage renal disease) on dialysis [facility])     Time reflects when diagnosis was documented in both MDM as applicable and the Disposition within this note     Time User Action Codes Description Comment    2/24/2021  5:09 AM Prateek Milliken Add [R07 9] Chest pain     2/24/2021  5:09 AM Cecilia Duke Add [I10] Hypertension     2/24/2021  5:10 AM Cecilia Duke Add [N18 6,  Z99 2] ESRD (end stage renal disease) on dialysis [facility])       ED Disposition     ED Disposition Condition Date/Time Comment    Admit Stable Wed Feb 24, 2021  5:09 AM Case was discussed with [facility] and the patient's admission status was agreed to be Admission Status: observation status to the service of Dr Jace Stafford   Follow-up Information    None         Current Discharge Medication List      CONTINUE these medications which have NOT CHANGED    Details   brimonidine tartrate 0 2 % ophthalmic solution Administer 1 drop into the left eye 2 (two) times a day      Carboxymethylcellul-Glycerin (CLEAR EYES FOR DRY EYES OP) Apply 2 drops to eye 2 (two) times a day      Aspirin Low Dose 81 MG EC tablet take 1 tablet by mouth once daily  Qty: 90 tablet, Refills: 1    Associated Diagnoses: CKD (chronic kidney disease) stage 4, GFR 15-29 ml/min ([facility] [address] ); Uncontrolled type 2 diabetes mellitus with hyperglycemia, with long-term current use of insulin ([address] );  Essential hypertension      atorvastatin (LIPITOR) 40 mg tablet take 1 tablet by mouth once daily  Qty: 90 tablet, Refills: 1    Associated Diagnoses: Hyperlipidemia, unspecified hyperlipidemia type      Basaglar KwikPen 100 units/mL injection pen INJECT 30 UNITS UNDER THE SKIN DAILY  Qty: 15 mL, Refills: 2    Associated Diagnoses: Uncontrolled secondary diabetes mellitus with stage 5 CKD (GFR<15) ([facility])      Blood Glucose Monitoring Suppl (ONE TOUCH ULTRA 2) w/Device KIT by Does not apply route 3 (three) times a day  Qty: 1 each, Refills: 0    Associated Diagnoses: Uncontrolled type 2 diabetes mellitus with hyperglycemia, with long-term current use of insulin ([facility])      carvedilol (COREG) 6 25 mg tablet take 1 tablet by mouth twice a day with meals  Qty: 180 tablet, Refills: 0    Associated Diagnoses: Essential hypertension      cinacalcet (SENSIPAR) 30 mg tablet Take 30 mg by mouth daily      cyclobenzaprine (FLEXERIL) 5 mg tablet Take 1 tablet (5 mg total) by mouth daily at bedtime as needed for muscle spasms  Qty: 30 tablet, Refills: 1    Associated Diagnoses: Neck pain      doxazosin (CARDURA) 2 mg tablet Take 1 tablet (2 mg total) by mouth daily at bedtime  Qty: 30 tablet, Refills: 1    Associated Diagnoses: Hypertensive urgency      furosemide (LASIX) 80 mg tablet Take 1 tablet (80 mg total) by mouth daily  Qty: 90 tablet, Refills: 1    Associated Diagnoses: Essential hypertension      glucose blood (FREESTYLE LITE) test strip Test blood sugars three times daily    Qty: 100 each, Refills: 2    Associated Diagnoses: Uncontrolled secondary diabetes mellitus with stage 5 CKD (GFR<15) ([facility])      insulin aspart (NOVOLOG FLEXPEN) 100 Units/mL injection pen 10U with breakfast and lunch, 10-14 U with dinner  Qty: 5 pen, Refills: 2    Associated Diagnoses: Uncontrolled secondary diabetes mellitus with stage 5 CKD (GFR<15) ([facility])      isoniazid (NYDRAZID) 300 mg tablet Take 1 tablet (300 mg total) by mouth daily  Qty: 30 tablet, Refills: 8    Associated Diagnoses: Positive QuantiFERON-TB Gold test; TB lung, latent      LOKELMA 10 g PACK       NIFEdipine ER (ADALAT CC) 60 MG 24 hr tablet Take 1 tablet (60 mg total) by mouth daily  Qty: 30 tablet, Refills: 1    Associated Diagnoses: Hypertensive urgency      ONE TOUCH LANCETS MISC by Does not apply route 3 (three) times a day  Qty: 100 each, Refills: 1    Associated Diagnoses: Uncontrolled type 2 diabetes mellitus with hyperglycemia, with long-term current use of insulin ([facility])      pantoprazole (PROTONIX) 20 mg tablet Take 1 tablet (20 mg total) by mouth daily  Qty: 30 tablet, Refills: 3    Associated Diagnoses: Gastroesophageal reflux disease, esophagitis presence not specified      polyethylene glycol (MIRALAX) 17 g packet Take 17 g by mouth daily  Qty: 30 each, Refills: 1    Associated Diagnoses: Chronic idiopathic constipation      prednisoLONE acetate (PRED FORTE) 1 % ophthalmic suspension Administer 1 drop into the left eye 2 (two) times a day   Refills: 0      pyridoxine (VITAMIN B6) 50 mg tablet Take 1 tablet (50 mg total) by mouth daily  Qty: 30 tablet, Refills: 6    Associated Diagnoses: Positive QuantiFERON-TB Gold test; TB lung, latent      sevelamer carbonate (RENVELA) 800 mg tablet Take 800 mg by mouth 3 (three) times a day with meals           No discharge procedures on file      PDMP Review     None          ED Provider  Electronically Signed by           Amita Mejia DO  02/24/21 9349

## 2021-02-24 NOTE — ASSESSMENT & PLAN NOTE
Lab Results   Component Value Date    EGFR 5 02/24/2021    EGFR 6 02/12/2021    EGFR 5 01/21/2021    CREATININE 10 92 (H) 02/24/2021    CREATININE 9 68 (H) 02/12/2021    CREATININE 11 68 (H) 01/21/2021   On hemodialysis Tuesday Thursday Saturday  Patient is undergoing evaluation for kidney transplant with [address] [place] [address],[address] Nephrology

## 2021-02-24 NOTE — CONSULTS
Consultation - Nephrology   Paddy Zhong 63 y o  male MRN: [id number]  Unit/Bed#: ED 25 Encounter: [id number]    PLAN:   -continue dialysis as schedule, next treatment tomorrow   -increase nifedipine to 90 mg daily and Cardura to 4 mg at bedtime  Consider adding hydralazine if no improvement   -not on COLETTE  Hemoglobin at goal   -continue Hectorol with HD   -continues on isoniazid for latent TB    -trending troponin's  ACS rule out  ASSESSMENT/PLAN:  ESRD on HD:   -receiving maintenance dialysis on T-T-S schedule at [facility]    -continue with HD as scheduled  -EDW: 75 5 kg  Access: LUE AVF + bruit + thrill  Blood pressure: presents with hypertensive urgency  -continue UF with HD as BP tolerates  -continues on Coreg 6 25 mg BID, cardura 2 mg po daily, and nifedipine 60 mg po daily, Lasix 80 mg po daily  -increase cardura to 4 mg po at bedtime and nifedipine to 90 mg po daily    -consider adding hydralazine if no improvement  Anemia in CKD:  Hemoglobin at goal for HD   -goal Hgb 10-12 g/dL  -continue to monitor and transfuse as needed for Hgb <7 0    -not on COLETTE  MBD in CKD:   -continue to monitor PTH, phos, and Mg as OP    -recommend renal diet and phosphorus binders with meals  continue hectoral with HD  Volume status:   -continue UF as BP allows  -recommend fluid restriction  Electrolytes:   -continue to monitor and trend with HD    -mild hyponatremia  Place on fluid restriction  Continue UF with HD  WIll trend  Latent TB: recent positive quantiFERON gold testing    -continues on treatment with isoniazid  Chest pain: R/O ACS vs GI etiology    -trending troponin's    -chest x-ray negative for acute cardiopulmonary disease         HISTORY OF PRESENT ILLNESS:  Requesting Physician: Paz Walker MD  Reason for Consult: ESRD on HD    Adarsh Ruby is a 63y o  year old male with past medical history of ESRD on HD, hypertension, latent TB, legally baltazar, who was admitted to [facility] after presenting with complaints of substernal chest pain  The patient states this began last night after eating  He denies radiation of his neck or down his arm  He states he attempted to drink milk which did not improve his pain  A renal consultation is requested today for assistance in the management of ESRD on HD  The patient's last hemodialysis session was yesterday  He denies any complications with his dialysis treatment  PAST MEDICAL HISTORY:  Past Medical History:   Diagnosis Date    Cataract     Chronic kidney disease     Diabetes mellitus (HCC)      IDDM    [facility] [address] [facility]     Dizziness     occ    ESRD (end stage renal disease) ([facility] [address] [facility]     GERD (gastroesophageal reflux disease)     Headache     occ    Hyperlipidemia     Hypertension     Legally blind     LVH (left ventricular hypertrophy)     Preferred language is [place]     understands some English    Use of cane as ambulatory aid        PAST SURGICAL HISTORY:  Past Surgical History:   Procedure Laterality Date    INGUINAL HERNIA REPAIR Right     IR AV FISTULAGRAM/GRAFTOGRAM  4/30/2019    IR AV FISTULAGRAM/GRAFTOGRAM  6/14/2019    IR AV FISTULAGRAM/GRAFTOGRAM  6/24/2020    IR TUNNELED DIALYSIS CATHETER PLACEMENT  4/3/2019    VT ANASTOMOSIS,AV,ANY SITE Left 1/23/2019    Procedure: CREATION FISTULA ARTERIOVENOUS (AV);   Surgeon: Edinson Nicole MD;  [facility];  Service: Vascular       ALLERGIES:  Allergies   Allergen Reactions    No Known Allergies        SOCIAL HISTORY:  Social History     Substance and Sexual Activity   Alcohol Use Not Currently     Social History     Substance and Sexual Activity   Drug Use No     Social History     Tobacco Use   Smoking Status Former Smoker    Packs/day: 0 25    Quit date: 2/1/2018    Years since quitting: 3 0   Smokeless Tobacco Never Used       FAMILY HISTORY:  Family History   Problem Relation Age of Onset    Hypertension Mother    Erasto Faith Diabetes Father     No Known Problems Sister     No Known Problems Brother     No Known Problems Maternal Aunt     No Known Problems Maternal Uncle     No Known Problems Paternal Aunt     No Known Problems Paternal Uncle     No Known Problems Maternal Grandmother     No Known Problems Maternal Grandfather     No Known Problems Paternal Grandmother     No Known Problems Paternal Grandfather        MEDICATIONS:    Current Facility-Administered Medications:     aspirin (ECOTRIN LOW STRENGTH) EC tablet 81 mg, 81 mg, Oral, Daily, Kassy Driscoll PA-C    atorvastatin (LIPITOR) tablet 40 mg, 40 mg, Oral, Daily With Dinner, Kassy Driscoll PA-C    carvedilol (COREG) tablet 6 25 mg, 6 25 mg, Oral, BID With Meals, Kassy Driscoll PA-C, 6 25 mg at 02/24/21 0818    cinacalcet (SENSIPAR) tablet 30 mg, 30 mg, Oral, Daily, Kassy Driscoll PA-C    doxazosin (CARDURA) tablet 2 mg, 2 mg, Oral, HS, Kassy Driscoll PA-C    furosemide (LASIX) tablet 80 mg, 80 mg, Oral, Daily, Kassy Driscoll PA-C    insulin glargine (LANTUS) subcutaneous injection 30 Units 0 3 mL, 30 Units, Subcutaneous, HS, Kassy Driscoll PA-C    isoniazid (NYDRAZID) tablet 300 mg, 300 mg, Oral, Daily, Kassy Driscoll PA-C    Labetalol HCl (NORMODYNE) injection 10 mg, 10 mg, Intravenous, Q4H PRN, Kassy Driscoll PA-C, 10 mg at 02/24/21 0818    NIFEdipine (PROCARDIA XL) 24 hr tablet 60 mg, 60 mg, Oral, Daily, Kassy Driscoll PA-C    ondansetron (ZOFRAN) injection 4 mg, 4 mg, Intravenous, Q6H PRN, Kassy Driscoll PA-C    pantoprazole (PROTONIX) EC tablet 20 mg, 20 mg, Oral, Early Morning, Kassy Driscoll PA-C, 20 mg at 02/24/21 0818    pyridoxine (VITAMIN B6) tablet 50 mg, 50 mg, Oral, Daily, Kassy M Delabre, PA-C    sevelamer (RENAGEL) tablet 800 mg, 800 mg, Oral, TID With Meals, Kassy Driscoll PA-C, 800 mg at 02/24/21 0818    Current Outpatient Medications:     Aspirin Low Dose 81 MG EC tablet, take 1 tablet by mouth once daily, Disp: 90 tablet, Rfl: 1    atorvastatin (LIPITOR) 40 mg tablet, take 1 tablet by mouth once daily, Disp: 90 tablet, Rfl: 1    Basaglar KwikPen 100 units/mL injection pen, INJECT 30 UNITS UNDER THE SKIN DAILY, Disp: 15 mL, Rfl: 2    Blood Glucose Monitoring Suppl (ONE TOUCH ULTRA 2) w/Device KIT, by Does not apply route 3 (three) times a day, Disp: 1 each, Rfl: 0    carvedilol (COREG) 6 25 mg tablet, take 1 tablet by mouth twice a day with meals, Disp: 180 tablet, Rfl: 0    cinacalcet (SENSIPAR) 30 mg tablet, Take 30 mg by mouth daily, Disp: , Rfl:     cyclobenzaprine (FLEXERIL) 5 mg tablet, Take 1 tablet (5 mg total) by mouth daily at bedtime as needed for muscle spasms, Disp: 30 tablet, Rfl: 1    doxazosin (CARDURA) 2 mg tablet, Take 1 tablet (2 mg total) by mouth daily at bedtime, Disp: 30 tablet, Rfl: 1    furosemide (LASIX) 80 mg tablet, Take 1 tablet (80 mg total) by mouth daily, Disp: 90 tablet, Rfl: 1    glucose blood (FREESTYLE LITE) test strip, Test blood sugars three times daily  , Disp: 100 each, Rfl: 2    insulin aspart (NOVOLOG FLEXPEN) 100 Units/mL injection pen, 10U with breakfast and lunch, 10-14 U with dinner, Disp: 5 pen, Rfl: 2    isoniazid (NYDRAZID) 300 mg tablet, Take 1 tablet (300 mg total) by mouth daily, Disp: 30 tablet, Rfl: 8    LOKELMA 10 g PACK, , Disp: , Rfl:     NIFEdipine ER (ADALAT CC) 60 MG 24 hr tablet, Take 1 tablet (60 mg total) by mouth daily, Disp: 30 tablet, Rfl: 1    ONE TOUCH LANCETS MISC, by Does not apply route 3 (three) times a day, Disp: 100 each, Rfl: 1    pantoprazole (PROTONIX) 20 mg tablet, Take 1 tablet (20 mg total) by mouth daily, Disp: 30 tablet, Rfl: 3    polyethylene glycol (MIRALAX) 17 g packet, Take 17 g by mouth daily, Disp: 30 each, Rfl: 1    prednisoLONE acetate (PRED FORTE) 1 % ophthalmic suspension, , Disp: , Rfl: 0    [START ON 3/1/2021] pyridoxine (VITAMIN B6) 50 mg tablet, Take 1 tablet (50 mg total) by mouth daily, Disp: 30 tablet, Rfl: 6    sevelamer carbonate (RENVELA) 800 mg tablet, Take 800 mg by mouth 3 (three) times a day with meals, Disp: , Rfl:     REVIEW OF SYSTEMS:  A complete review of systems was performed and found to be negative unless otherwise noted in the history of present illness  General: No fevers, chills  Cardiovascular:  + chest pain, - leg edema  Respiratory: No cough, sputum production,  - shortness of breath  Gastrointestinal:  - nausea/vomiting,  - diarrhea,  - abdominal pain  Genitourinary: No hematuria  No foamy urine    No dysuria    PHYSICAL EXAM:  Current Weight:    First Weight:    Vitals:    02/24/21 0552 02/24/21 0620 02/24/21 0745 02/24/21 0815   BP: (!) 191/101 (!) 186/96 (!) 201/95 (!) 188/94   BP Location: Right arm Right arm Right arm    Pulse: 80 81 74 74   Resp: 16 18 16    Temp:   98 1 °F (36 7 °C)    TempSrc:   Oral    SpO2: 98% 95% 100%    Height:         No intake or output data in the 24 hours ending 02/24/21 0846  General: NAD  Skin: warm, dry, intact, no rash  HEENT: Moist mucous membranes, sclera anicteric, normocephalic, atraumatic  Neck: No apparent JVD appreciated  Chest:lung sounds clear B/L, on RA   CVS:Regular rate and rhythm, no murmer   Abdomen: Soft, round, non-tender, +BS  Extremities: No B/L LE edema present, LUE avf  Neuro: alert and oriented, legally blind  Psych: appropriate mood and affect     Invasive Devices:      Lab Results:   Results from last 7 days   Lab Units 02/24/21  0315   WBC Thousand/uL 10 13   HEMOGLOBIN g/dL 13 0   HEMATOCRIT % 40 8   PLATELETS Thousands/uL 268   SODIUM mmol/L 135*   POTASSIUM mmol/L 5 0   CHLORIDE mmol/L 94*   CO2 mmol/L 34*   BUN mg/dL 52*   CREATININE mg/dL 10 92*   CALCIUM mg/dL 8 0*   ALK PHOS U/L 129*   ALT U/L 15   AST U/L 50*

## 2021-02-24 NOTE — SOCIAL WORK
Received phone call from [facility] who inform this worker patient admitted under OBSERVATION and has dialysis treatmnent at [facility] with chair time at 11 am   Chika Roach was contact and arrangement for transport completed, they are to p/u patient at 8 45 am   Met with patient at bedside who requested for this worker to contact  his sister for transport  Patient sister was contact and she will be here by 10 30 am tomorrow  Assigned RN and attending informed  No other issues rpeorted by patient

## 2021-02-24 NOTE — ASSESSMENT & PLAN NOTE
Immediately postprandial chest pain/epigastric pain concern for a GI etiology new patient does have hypertensive urgency/emergency by blood pressure  EKG demonstrates new ST segment depressions in inferior leads 3/HPF which is borderline  Troponin is negative  Chest x-ray without acute cardiopulmonary disease on wet read  Will trend EKGs and troponins for ACS rule out will treat hypertensive urgency/emergency and will admit to observation

## 2021-02-25 ENCOUNTER — APPOINTMENT (INPATIENT)
Dept: NON INVASIVE DIAGNOSTICS | Facility: HOSPITAL | Age: 56
DRG: 252 | End: 2021-02-25
Payer: MEDICARE

## 2021-02-25 ENCOUNTER — APPOINTMENT (INPATIENT)
Dept: DIALYSIS | Facility: HOSPITAL | Age: 56
DRG: 252 | End: 2021-02-25
Payer: MEDICARE

## 2021-02-25 ENCOUNTER — APPOINTMENT (INPATIENT)
Dept: CT IMAGING | Facility: HOSPITAL | Age: 56
DRG: 252 | End: 2021-02-25
Payer: MEDICARE

## 2021-02-25 PROBLEM — R94.31 ABNORMAL EKG: Status: ACTIVE | Noted: 2021-02-25

## 2021-02-25 PROBLEM — R91.1 PULMONARY NODULE: Status: ACTIVE | Noted: 2021-02-25

## 2021-02-25 LAB
ANION GAP SERPL CALCULATED.3IONS-SCNC: 16 MMOL/L (ref 4–13)
ATRIAL RATE: 68 BPM
ATRIAL RATE: 70 BPM
BUN SERPL-MCNC: 73 MG/DL (ref 5–25)
CALCIUM SERPL-MCNC: 8 MG/DL (ref 8.3–10.1)
CHLORIDE SERPL-SCNC: 95 MMOL/L (ref 100–108)
CO2 SERPL-SCNC: 23 MMOL/L (ref 21–32)
CREAT SERPL-MCNC: 13.2 MG/DL (ref 0.6–1.3)
GFR SERPL CREATININE-BSD FRML MDRD: 4 ML/MIN/1.73SQ M
GLUCOSE SERPL-MCNC: 107 MG/DL (ref 65–140)
GLUCOSE SERPL-MCNC: 133 MG/DL (ref 65–140)
GLUCOSE SERPL-MCNC: 250 MG/DL (ref 65–140)
GLUCOSE SERPL-MCNC: 253 MG/DL (ref 65–140)
GLUCOSE SERPL-MCNC: 91 MG/DL (ref 65–140)
MAGNESIUM SERPL-MCNC: 2.4 MG/DL (ref 1.6–2.6)
P AXIS: 79 DEGREES
P AXIS: 81 DEGREES
POTASSIUM SERPL-SCNC: 5.4 MMOL/L (ref 3.5–5.3)
PR INTERVAL: 144 MS
PR INTERVAL: 144 MS
QRS AXIS: 87 DEGREES
QRS AXIS: 89 DEGREES
QRSD INTERVAL: 88 MS
QRSD INTERVAL: 88 MS
QT INTERVAL: 424 MS
QT INTERVAL: 428 MS
QTC INTERVAL: 455 MS
QTC INTERVAL: 457 MS
SODIUM SERPL-SCNC: 134 MMOL/L (ref 136–145)
T WAVE AXIS: 63 DEGREES
T WAVE AXIS: 63 DEGREES
VENTRICULAR RATE: 68 BPM
VENTRICULAR RATE: 70 BPM

## 2021-02-25 PROCEDURE — NC001 PR NO CHARGE: Performed by: RADIOLOGY

## 2021-02-25 PROCEDURE — 90935 HEMODIALYSIS ONE EVALUATION: CPT | Performed by: INTERNAL MEDICINE

## 2021-02-25 PROCEDURE — 99223 1ST HOSP IP/OBS HIGH 75: CPT | Performed by: INTERNAL MEDICINE

## 2021-02-25 PROCEDURE — 93010 ELECTROCARDIOGRAM REPORT: CPT | Performed by: INTERNAL MEDICINE

## 2021-02-25 PROCEDURE — 93005 ELECTROCARDIOGRAM TRACING: CPT

## 2021-02-25 PROCEDURE — 5A1D70Z PERFORMANCE OF URINARY FILTRATION, INTERMITTENT, LESS THAN 6 HOURS PER DAY: ICD-10-PCS | Performed by: STUDENT IN AN ORGANIZED HEALTH CARE EDUCATION/TRAINING PROGRAM

## 2021-02-25 PROCEDURE — G1004 CDSM NDSC: HCPCS

## 2021-02-25 PROCEDURE — 99233 SBSQ HOSP IP/OBS HIGH 50: CPT | Performed by: STUDENT IN AN ORGANIZED HEALTH CARE EDUCATION/TRAINING PROGRAM

## 2021-02-25 PROCEDURE — 71275 CT ANGIOGRAPHY CHEST: CPT

## 2021-02-25 PROCEDURE — 82948 REAGENT STRIP/BLOOD GLUCOSE: CPT

## 2021-02-25 PROCEDURE — 80048 BASIC METABOLIC PNL TOTAL CA: CPT | Performed by: STUDENT IN AN ORGANIZED HEALTH CARE EDUCATION/TRAINING PROGRAM

## 2021-02-25 PROCEDURE — 83735 ASSAY OF MAGNESIUM: CPT | Performed by: STUDENT IN AN ORGANIZED HEALTH CARE EDUCATION/TRAINING PROGRAM

## 2021-02-25 RX ORDER — HEPARIN SODIUM 5000 [USP'U]/ML
5000 INJECTION, SOLUTION INTRAVENOUS; SUBCUTANEOUS EVERY 8 HOURS SCHEDULED
Status: DISCONTINUED | OUTPATIENT
Start: 2021-02-25 | End: 2021-02-27 | Stop reason: HOSPADM

## 2021-02-25 RX ADMIN — PANTOPRAZOLE SODIUM 20 MG: 20 TABLET, DELAYED RELEASE ORAL at 05:14

## 2021-02-25 RX ADMIN — ASPIRIN 81 MG: 81 TABLET, COATED ORAL at 09:15

## 2021-02-25 RX ADMIN — SEVELAMER HYDROCHLORIDE 800 MG: 800 TABLET, FILM COATED PARENTERAL at 09:15

## 2021-02-25 RX ADMIN — INSULIN LISPRO 3 UNITS: 100 INJECTION, SOLUTION INTRAVENOUS; SUBCUTANEOUS at 12:36

## 2021-02-25 RX ADMIN — HEPARIN SODIUM 5000 UNITS: 5000 INJECTION INTRAVENOUS; SUBCUTANEOUS at 14:54

## 2021-02-25 RX ADMIN — PREDNISOLONE ACETATE 1 DROP: 10 SUSPENSION/ DROPS OPHTHALMIC at 09:16

## 2021-02-25 RX ADMIN — ATORVASTATIN CALCIUM 40 MG: 40 TABLET, FILM COATED ORAL at 18:16

## 2021-02-25 RX ADMIN — BRIMONIDINE TARTRATE 1 DROP: 1.5 SOLUTION OPHTHALMIC at 21:33

## 2021-02-25 RX ADMIN — IOHEXOL 85 ML: 350 INJECTION, SOLUTION INTRAVENOUS at 10:35

## 2021-02-25 RX ADMIN — ISONIAZID 300 MG: 100 TABLET ORAL at 12:35

## 2021-02-25 RX ADMIN — SEVELAMER HYDROCHLORIDE 800 MG: 800 TABLET, FILM COATED PARENTERAL at 12:35

## 2021-02-25 RX ADMIN — SEVELAMER HYDROCHLORIDE 800 MG: 800 TABLET, FILM COATED PARENTERAL at 18:16

## 2021-02-25 RX ADMIN — Medication 50 MG: at 09:15

## 2021-02-25 RX ADMIN — CINACALCET HYDROCHLORIDE 30 MG: 30 TABLET, FILM COATED ORAL at 09:15

## 2021-02-25 RX ADMIN — INSULIN GLARGINE 30 UNITS: 100 INJECTION, SOLUTION SUBCUTANEOUS at 21:30

## 2021-02-25 RX ADMIN — PREDNISOLONE ACETATE 1 DROP: 10 SUSPENSION/ DROPS OPHTHALMIC at 18:18

## 2021-02-25 RX ADMIN — HEPARIN SODIUM 5000 UNITS: 5000 INJECTION INTRAVENOUS; SUBCUTANEOUS at 21:30

## 2021-02-25 RX ADMIN — BRIMONIDINE TARTRATE 1 DROP: 1.5 SOLUTION OPHTHALMIC at 05:14

## 2021-02-25 RX ADMIN — DOXAZOSIN 4 MG: 4 TABLET ORAL at 21:33

## 2021-02-25 RX ADMIN — BRIMONIDINE TARTRATE 1 DROP: 1.5 SOLUTION OPHTHALMIC at 14:54

## 2021-02-25 RX ADMIN — CARVEDILOL 6.25 MG: 6.25 TABLET, FILM COATED ORAL at 18:17

## 2021-02-25 NOTE — INCIDENTAL FINDINGS
The following findings require follow up:  Radiographic finding   Findin mm right upper lobe pulmonary nodule   Follow up required:  Noncontrast CT scan   Follow up should be done within 12 month(s)    Please notify the following clinician to assist with the follow up:   Dr Melinda Bar

## 2021-02-25 NOTE — CONSULTS
Consult - Cardiology   Paddy Zhong 63 y o  male MRN: [id number]  Unit/Bed#: E5 -01 Encounter: [id number]        Reason For Consult:  Chest pain               ASSESSMENT:  1  Chest pain, reason for consultation  2  Accelerated hypertension on arrival  3  End-stage renal disease on dialysis  4  Type 2 diabetes  5  Legal blindness  6  Latent tuberculosis  7  Abnormal EKG, normal sinus rhythm with chronic anteroseptal ST segment changes seen on prior 12 leads  8  Nonischemic nuclear stress test in November 2020  9  Markedly increased LV wall thickness with LVH November 2020    PLAN/ DISCUSSION:     The pain he describes is somewhat atypical for angina  It occurred at rest and was constant for 6 hours  Subsequent troponin levels have all been normal      · His EKG at baseline is abnormal demonstrating severe LVH and chronic ST segment changes, current EKGs are minimally changed from prior  Abnormalities likely secondary to severe LVH as well as mild hyperkalemia today  · Agree with updating echocardiogram to re-evaluate LVEF as well as check for any regional wall motion abnormalities    · No imminent plans for ischemic testing at this time especially given recent nuclear stress test in November without any perfusion defects    · Chest pain on admission likely a combination of accelerated hypertension and perhaps an element of GERD    · Reportedly in the process to be evaluated for kidney transplantation at [facility] in the future, he can follow-up as an outpatient for this be it with their cardiology group or through our group should he wish to stay with [facility]     History Of Present Illness: This is a 48-year-old male without any routine care from a cardiologist   He has no problems with his heart that he knows of  He has no family history of heart disease  He is originally from [place]    Up until 2 years ago he had been living up near the [place] before moving to [facility] with his sister  He is legally blind and his sister takes care of a lot of regular everyday chores such as grocery shopping and cooking  He has type 2 diabetes, hypertension, and end-stage renal disease for which he follows closely with Nephrology  Documents indicate he speaking with [facility] for evaluation of renal transplant  He is to seek establishment with Cardiology as part of preoperative clearance  He is presently hospitalized for chest pain  This occurred on Tuesday, 02/23/2021 around 2000 after eating some food  It was substernal and described as a burning pressure  It did not radiate  The pain was constant for approximately 6 hours without any aggravating or alleviating factors  Given the persistence of his pain he sought medical care in the ED on 02/24/2021 around 0200  Blood pressure was elevated on admission with peak 201/95  He was seen by Nephrology and his BP regimen was adjusted  As his pressure came down his pain resolved  He has an abnormal EKG at baseline  We are asked to see him in consultation for chest pain  Currently he feels comfortable and has no physical complaints      Past Medical History:        Past Medical History:   Diagnosis Date    Cataract     Chronic kidney disease     Diabetes mellitus [facility] [address] [facility]      IDDM    Diabetic retinopathy ([facility] [address] [facility]     Dizziness     occ    ESRD (end stage renal disease) ([facility] [address] [facility]     GERD (gastroesophageal reflux disease)     Headache     occ    Hyperlipidemia     Hypertension     Legally blind     LVH (left ventricular hypertrophy)     Preferred language is [place]     understands some English    Use of cane as ambulatory aid       Past Surgical History:   Procedure Laterality Date    INGUINAL HERNIA REPAIR Right     IR AV FISTULAGRAM/GRAFTOGRAM  4/30/2019    IR AV FISTULAGRAM/GRAFTOGRAM  6/14/2019    IR AV FISTULAGRAM/GRAFTOGRAM  6/24/2020    IR TUNNELED DIALYSIS CATHETER PLACEMENT  4/3/2019    PA ANASTOMOSIS,AV,ANY SITE Left 1/23/2019    Procedure: CREATION FISTULA ARTERIOVENOUS (AV); Surgeon: Efrem England MD;  [facility];  Service: Vascular        Allergy:        Allergies   Allergen Reactions    No Known Allergies        Medications:       Prior to Admission medications    Medication Sig Start Date End Date Taking? Authorizing Provider   brimonidine tartrate 0 2 % ophthalmic solution Administer 1 drop into the left eye 2 (two) times a day 1/27/21  Yes Historical Provider, MD   Carboxymethylcellul-Glycerin ([address] OP) Apply 2 drops to eye 2 (two) times a day   Yes Historical Provider, MD   Aspirin Low Dose 81 MG EC tablet take 1 tablet by mouth once daily 2/22/21   Ailyn Saenz MD   atorvastatin (LIPITOR) 40 mg tablet take 1 tablet by mouth once daily 9/14/20   Ailyn Saenz MD   Basaglar KwikPen 100 units/mL injection pen INJECT 30 UNITS UNDER THE SKIN DAILY 12/2/20   Ailyn Saenz MD   Blood Glucose Monitoring Suppl (ONE TOUCH ULTRA 2) w/Device KIT by Does not apply route 3 (three) times a day 1/25/19   Ailyn Saenz MD   carvedilol (COREG) 6 25 mg tablet take 1 tablet by mouth twice a day with meals 12/2/20   Ailyn Saenz MD   cinacalcet (SENSIPAR) 30 mg tablet Take 30 mg by mouth daily    Historical Provider, MD   cyclobenzaprine (FLEXERIL) 5 mg tablet Take 1 tablet (5 mg total) by mouth daily at bedtime as needed for muscle spasms 6/15/20   Ailyn Saenz MD   doxazosin (CARDURA) 2 mg tablet Take 1 tablet (2 mg total) by mouth daily at bedtime 4/26/19   Ailyn Saenz MD   furosemide (LASIX) 80 mg tablet Take 1 tablet (80 mg total) by mouth daily 11/19/20   Ailyn Saenz MD   glucose blood (FREESTYLE LITE) test strip Test blood sugars three times daily   6/18/20   Ailyn Saenz MD   insulin aspart (NOVOLOG FLEXPEN) 100 Units/mL injection pen 10U with breakfast and lunch, 10-14 U with dinner 12/16/19   Ailyn Saenz MD   isoniazid (NYDRAZID) 300 mg tablet Take 1 tablet (300 mg total) by mouth daily 1/21/21 10/18/21  Caroline Chatman PA-C   LOKELMA 10 g PACK  12/31/19   Historical Provider, MD   NIFEdipine ER (ADALAT CC) 60 MG 24 hr tablet Take 1 tablet (60 mg total) by mouth daily 4/26/19   Lito Mireles MD   ONE TOUCH LANCETS MISC by Does not apply route 3 (three) times a day 1/15/19   Lito Mireles MD   pantoprazole (PROTONIX) 20 mg tablet Take 1 tablet (20 mg total) by mouth daily 1/17/20   Darren Mariscal PA-C   polyethylene glycol (MIRALAX) 17 g packet Take 17 g by mouth daily 9/23/19   Lito Mireles MD   prednisoLONE acetate (PRED FORTE) 1 % ophthalmic suspension Administer 1 drop into the left eye 2 (two) times a day  6/25/19   Historical Provider, MD   pyridoxine (VITAMIN B6) 50 mg tablet Take 1 tablet (50 mg total) by mouth daily 3/1/21   Cindy Ramirez DO   sevelamer carbonate (RENVELA) 800 mg tablet Take 800 mg by mouth 3 (three) times a day with meals    Historical Provider, MD       Family History:     Family History   Problem Relation Age of Onset    Hypertension Mother     Diabetes Father     No Known Problems Sister     No Known Problems Brother     No Known Problems Maternal Aunt     No Known Problems Maternal Uncle     No Known Problems Paternal Aunt     No Known Problems Paternal Uncle     No Known Problems Maternal Grandmother     No Known Problems Maternal Grandfather     No Known Problems Paternal Grandmother     No Known Problems Paternal Grandfather         Social History:       Social History     Socioeconomic History    Marital status: Legally      Spouse name: None    Number of children: None    Years of education: None    Highest education level: None   Occupational History    Occupation: DISABLED   Social Needs    Financial resource strain: None    Food insecurity     Worry: None     Inability: None    Transportation needs     Medical: None     Non-medical: None   Tobacco Use    Smoking status: Former Smoker     Packs/day: 0 25     Quit date: 2/1/2018     Years since quitting: 3 0    Smokeless tobacco: Never Used   Substance and Sexual Activity    Alcohol use: Not Currently    Drug use: No    Sexual activity: None   Lifestyle    Physical activity     Days per week: None     Minutes per session: None    Stress: None   Relationships    Social connections     Talks on phone: None     Gets together: None     Attends [employer] service: None     Active member of club or organization: None     Attends meetings of clubs or organizations: None     Relationship status: None    Intimate partner violence     Fear of current or ex partner: None     Emotionally abused: None     Physically abused: None     Forced sexual activity: None   Other Topics Concern    None   Social History Narrative    None       ROS:  Symptoms per HPI  Remainder review of systems is negative    Exam:  General:  alert, oriented and in no distress, cooperative  Head: Normocephalic, atraumatic  Eyes:  EOMI  Pupils - equal, round, reactive to accomodation  No icterus  Normal Conjunctiva  Oropharynx: moist and normal-appearing mucosa  Neck: supple, symmetrical, trachea midline and no JVD  Heart:  RRR, No: murmer, rub or gallop, S1 & S2 normal   Respiratory effort / Chest Inspection: unlabored  Lungs:  normal air entry, lungs clear to auscultation and no rales, rhonchi or wheezing   Abdomen: flat, normal findings: bowel sounds normal and soft, non-tender  Lower Limbs:  no pitting edema  Pulses[de-identified]  RLE - DP: present 2+                 LLE - DP: present 2+  Musculoskeletal: ROM grossly normal    DATA:      ECG:                 Normal sinus rhythm  LVH  Anteroseptal ST segment abnormalities, likely secondary to severely thickened LV wall      Telemetry: Normal sinus rhythm,   HR 70's         Weights:     Wt Readings from Last 3 Encounters:   02/24/21 75 kg (165 lb 5 5 oz)   01/21/21 78 6 kg (173 lb 4 5 oz)   12/02/20 77 8 kg (171 lb 9 6 oz)   , Body mass index is 23 72 kg/m²           Lab Studies:    Results from last 7 days   Lab Units 02/24/21  1056 02/24/21  0758 02/24/21  0315   TROPONIN I ng/mL 0 03 0 03 0 02          Results from last 7 days   Lab Units 02/24/21  0315   WBC Thousand/uL 10 13   HEMOGLOBIN g/dL 13 0   HEMATOCRIT % 40 8   PLATELETS Thousands/uL 268   ,   Results from last 7 days   Lab Units 02/25/21  0510 02/24/21  0315   POTASSIUM mmol/L 5 4* 5 0   CHLORIDE mmol/L 95* 94*   CO2 mmol/L 23 34*   BUN mg/dL 73* 52*   CREATININE mg/dL 13 20* 10 92*   CALCIUM mg/dL 8 0* 8 0*   ALK PHOS U/L  --  129*   ALT U/L  --  15   AST U/L  --  50*

## 2021-02-25 NOTE — ASSESSMENT & PLAN NOTE
Lab Results   Component Value Date    HGBA1C 7 5 (H) 01/18/2021       Recent Labs     02/24/21  1822 02/24/21  2128 02/25/21  0655 02/25/21  1114   POCGLU 152* 256* 91 250*       Blood Sugar Average: Last 72 hrs:  (P) 187 25 continue outpatient insulin regimen

## 2021-02-25 NOTE — ASSESSMENT & PLAN NOTE
Lab Results   Component Value Date    EGFR 4 02/25/2021    EGFR 5 02/24/2021    EGFR 6 02/12/2021    CREATININE 13 20 (H) 02/25/2021    CREATININE 10 92 (H) 02/24/2021    CREATININE 9 68 (H) 02/12/2021   On hemodialysis Tuesday Thursday Saturday  Patient is undergoing evaluation for kidney transplant with [address] [place] [address],[address] Nephrology

## 2021-02-25 NOTE — ASSESSMENT & PLAN NOTE
Incidental finding of 4 mm right upper lobe nodule  Twelve month follow-up  Updated discharge navigator

## 2021-02-25 NOTE — TELEMEDICINE
INTERPROFESSIONAL (PHONE) CONSULTATION - Interventional Radiology  Paddy Zhong 63 y o  male MRN: [id number]  Unit/Bed#: E5 -01 Encounter: [id number]    IR has been consulted to evaluate the patient, determine the appropriate procedure, and whether or not a procedure can and should be performed regarding the care of Paddy Zhong  We were consulted by Dr Gadiel Roger concerning fistula malfunction, and to possibly perform a fistulagram if medically appropriate for the patient  IP Consult to IR  Consult performed by: Roseanne Vila DO  Consult ordered by: Marty Small MD        02/25/21      Assessment/Recommendation:   63 y F with ESRD and IRENE fistula originally scheduled for outpatient fistulagram at [facility] on 2/26 for high venous pressures  Patient admitted yesterday for chest pain / hypertensive urgency which is now improved  For dialysis today  Request to perform fistulagram as inpatient  Will keep patient on schedule for Fri AM and perform fistulagram      NPO MN  Total time spent in review of data, discussion with requesting provider and rendering advice was 10 minutes  Patient or appropriate family member was verbally informed by SLIM of this consultative service on their behalf to provide more timely access to specialty care in lieu of an in person consultation  Verbal consent was obtained  Thank you for allowing Interventional Radiology to participate in the care of Paddy Zhong  Please don't hesitate to call or TigerText us with any questions       Roseanne Vila DO

## 2021-02-25 NOTE — PROGRESS NOTES
NEPHROLOGY PROGRESS NOTE   Paddy Trevin 63 y o  male MRN: [id number]  Unit/Bed#: E5 -01 Encounter: [id number]  Reason for Consult: ESRD on HD    PLAN:   -for hemodialysis later today   -Cardura increased to 4 mg po at bedtime and  Nifedipine increased to 90 mg po daily  -mild hyperkalemia, for dialysis treatment later today  Continues on low potassium diet    -not on COLETTE  Hgb at goal     -continues on daily treatment for latent TB    -for CTA today  ASSESSMENT/PLAN:  ESRD on HD: receiving maintenance dialysis on T-T-S schedule at [place] [address] in [facility]   -for hemodialysis later today      -continue with HD as scheduled  -EDW: 75 5 kg       Access: LUE AVF + bruit + thrill      Blood pressure: presents with hypertensive urgency  BP now improved  -continue UF with HD as BP tolerates  -continues on Coreg 6 25 mg BID, cardura 2 mg po daily, and nifedipine 60 mg po daily, Lasix 80 mg po daily  -increase cardura to 4 mg po at bedtime and nifedipine to 90 mg po daily    -consider adding hydralazine if no improvement       Anemia in CKD:  Hemoglobin at goal for HD   -goal Hgb 10-12 g/dL  -continue to monitor and transfuse as needed for Hgb <7 0    -not on COLETTE       MBD in CKD:   -continue to monitor PTH, phos, and Mg as OP    -recommend renal diet and phosphorus binders with meals    -continue hectoral with HD       Volume status:   -continue UF as BP allows  -recommend fluid restriction       Electrolytes:   -continue to monitor and trend with HD    -mild hyponatremia  Place on fluid restriction  Continue UF with HD  WIll trend    -mild hyperkalemia, for HD later today  Continue low potassium diet       Latent TB: recent positive quantiFERON gold testing    -continues on treatment with isoniazid       Chest pain: R/O ACS vs GI etiology    -trending troponin's  Negative    -chest x-ray negative for acute cardiopulmonary disease    -for CTA today      Disposition:  Okay to discharge from renal once medically cleared  SUBJECTIVE:  The patient is resting in bed  He denies chest pain or shortness of breath  He denies nausea, vomiting, diarrhea  He states he is eating and drinking well  He is for dialysis later today      OBJECTIVE:  Current Weight: Weight - Scale: 75 kg (165 lb 5 5 oz)  Vitals:    02/24/21 1728 02/24/21 2148 02/24/21 2300 02/25/21 0744   BP: 133/66 134/79 131/69 142/76   BP Location: Right arm Right arm Right arm Right arm   Pulse: 72 70 68 77   Resp: 18 18 18 18   Temp: 98 2 °F (36 8 °C) 97 9 °F (36 6 °C) (!) 97 3 °F (36 3 °C) 97 9 °F (36 6 °C)   TempSrc: Temporal Tympanic Temporal Temporal   SpO2: 96% 99% 98% 99%   Weight: 75 kg (165 lb 5 5 oz)      Height: 5' 10" (1 778 m)          Intake/Output Summary (Last 24 hours) at 2/25/2021 3863  Last data filed at 2/24/2021 2013  Gross per 24 hour   Intake 702 ml   Output --   Net 702 ml     General: NAD  Skin: warm, dry, intact, no rash  HEENT: Moist mucous membranes, sclera anicteric, normocephalic, atraumatic  Neck: No apparent JVD appreciated  Chest: lung sounds clear B/L, on RA   CVS:Regular rate and rhythm, no murmer   Abdomen: Soft, round, non-tender, +BS  Extremities: No B/L LE edema present, LUE AVF  Neuro: alert and oriented  Psych: appropriate mood and affect     Medications:    Current Facility-Administered Medications:     aspirin (ECOTRIN LOW STRENGTH) EC tablet 81 mg, 81 mg, Oral, Daily, Kassy Driscoll PA-C, 81 mg at 02/24/21 1000    atorvastatin (LIPITOR) tablet 40 mg, 40 mg, Oral, Daily With Dwayne Driscoll PA-C, 40 mg at 02/24/21 1554    brimonidine (ALPHAGAN P) 0 15 % ophthalmic solution 1 drop, 1 drop, Left Eye, Q8H [facility], Phylicia Yanez MD, 1 drop at 02/25/21 0514    carvedilol (COREG) tablet 6 25 mg, 6 25 mg, Oral, BID With Meals, Kassy Driscoll PA-C, 6 25 mg at 02/24/21 1554    cinacalcet (SENSIPAR) tablet 30 mg, 30 mg, Oral, Daily, Kassy Driscoll PA-C, 30 mg at 02/24/21 1000    doxazosin (CARDURA) tablet 4 mg, 4 mg, Oral, HS, KRISTI White, 4 mg at 02/24/21 2152    furosemide (LASIX) tablet 80 mg, 80 mg, Oral, Daily, Kassy Driscoll PA-C, 80 mg at 02/24/21 1000    insulin glargine (LANTUS) subcutaneous injection 30 Units 0 3 mL, 30 Units, Subcutaneous, HS, Kassy Driscoll PA-C, 30 Units at 02/24/21 2152    insulin lispro (HumaLOG) 100 units/mL subcutaneous injection 1-6 Units, 1-6 Units, Subcutaneous, TID AC **AND** Fingerstick Glucose (POCT), , , TID AC, Kera Atkins MD    isoniazid (NYDRAZID) tablet 300 mg, 300 mg, Oral, Daily, Kassy Driscoll PA-C, 300 mg at 02/24/21 1000    Labetalol HCl (NORMODYNE) injection 10 mg, 10 mg, Intravenous, Q4H PRN, Kassy Driscoll PA-C, 10 mg at 02/24/21 0818    NIFEdipine (PROCARDIA XL) 24 hr tablet 90 mg, 90 mg, Oral, Daily, KRISTI White, 90 mg at 02/24/21 1000    ondansetron (ZOFRAN) injection 4 mg, 4 mg, Intravenous, Q6H PRN, Kassy Driscoll PA-C, 4 mg at 02/24/21 1020    pantoprazole (PROTONIX) EC tablet 20 mg, 20 mg, Oral, Early Morning, Kassy Driscoll PA-C, 20 mg at 02/25/21 0514    prednisoLONE acetate (PRED FORTE) 1 % ophthalmic suspension 1 drop, 1 drop, Left Eye, BID, Kera Atkins MD, 1 drop at 02/24/21 2153    pyridoxine (VITAMIN B6) tablet 50 mg, 50 mg, Oral, Daily, Kassy Driscoll PA-C, 50 mg at 02/24/21 1000    sevelamer (RENAGEL) tablet 800 mg, 800 mg, Oral, TID With Meals, Kassy Driscoll PA-C, 800 mg at 02/24/21 1555    Laboratory Results:  Results from last 7 days   Lab Units 02/25/21  0510 02/24/21  0315   WBC Thousand/uL  --  10 13   HEMOGLOBIN g/dL  --  13 0   HEMATOCRIT %  --  40 8   PLATELETS Thousands/uL  --  268   SODIUM mmol/L 134* 135*   POTASSIUM mmol/L 5 4* 5 0   CHLORIDE mmol/L 95* 94*   CO2 mmol/L 23 34*   BUN mg/dL 73* 52*   CREATININE mg/dL 13 20* 10 92*   CALCIUM mg/dL 8 0* 8 0*   MAGNESIUM mg/dL 2 4  --    ALK PHOS U/L  --  129*   ALT U/L  --  15   AST U/L  --  50*

## 2021-02-25 NOTE — ASSESSMENT & PLAN NOTE
· Postprandial chest pain/epigastric pain; concern for a GI etiology  Patient also presented with hypertensive urgency  · EKG demonstrates new ST segment depressions in inferior leads 3/HPF which is borderline  · EKG report: "serial changes of anterolateral infact"   · Troponins negative  · Discussed with cardiology: EKG shows axis change concerning for LV strain  CTA obtained 2/25 to rule out PE; negative for PE  · Will follow up ECHO given T wave change, ? Ischemia 2/2 azotemia insetting of ESRD  · Cardiology consult appreciated     · Continue PPI

## 2021-02-25 NOTE — PROGRESS NOTES
Progress Note - Paddy Trevin 1965, 63 y o  male MRN: [id number]  Unit/Bed#: E5 -01 Encounter: [id number]  Primary Care Provider: Elisabeth Bentley MD   Date and time admitted to hospital: 2/24/2021  2:07 AM    * Chest pain  Assessment & Plan  · Postprandial chest pain/epigastric pain; concern for a GI etiology  Patient also presented with hypertensive urgency  · EKG demonstrates new ST segment depressions in inferior leads 3/HPF which is borderline  · EKG report: "serial changes of anterolateral infact"   · Troponins negative  · Discussed with cardiology: EKG shows axis change concerning for LV strain  CTA obtained 2/25 to rule out PE; negative for PE  · Will follow up ECHO given T wave change, ? Ischemia 2/2 azotemia insetting of ESRD  · Cardiology consult appreciated  · Continue PPI    Abnormal EKG  Assessment & Plan  EKG showed serial change of prior known infarct  Also with T-wave changes and axis change  [address] Cardiology  See plan for chest pain above  CTA ruled out PE as cause of axis change  Obtaining echocardiogram    Benign hypertension with ESRD (end-stage renal disease) [facility])  Assessment & Plan  Lab Results   Component Value Date    EGFR 4 02/25/2021    EGFR 5 02/24/2021    EGFR 6 02/12/2021    CREATININE 13 20 (H) 02/25/2021    CREATININE 10 92 (H) 02/24/2021    CREATININE 9 68 (H) 02/12/2021   With hypertensive emergency  Give IV labetalol 20 mg x 1  Appreciate Nephrology; up titrated outpatient regimen of nifedipine and Cardura and Coreg  Continue IV labetalol 10 mg p r n  SBP greater than 170 mm hg    S/P arteriovenous (AV) fistula creation  Assessment & Plan  Patient with left AV fistula  Had outpatient fistulogram scheduled for 2/26 due to high venous pressures  I discussed with IR and they are able to assess the patient while admitted in the hospital   NPO past midnight  Pulmonary nodule  Assessment & Plan  Incidental finding of 4 mm right upper lobe nodule  Twelve month follow-up  Updated discharge navigator  TB lung, latent  Assessment & Plan  Patient diagnosed with latent TB of the lung started on isoniazid last month followed by infectious disease  Continue isoniazid and pyridoxine  Patient reports he is tolerating both of these well without side effect    Positive QuantiFERON-TB Gold test  Assessment & Plan  Latent TB, being treated and follows with ID    ESRD (end stage renal disease) [facility])  Assessment & Plan  Lab Results   Component Value Date    EGFR 4 02/25/2021    EGFR 5 02/24/2021    EGFR 6 02/12/2021    CREATININE 13 20 (H) 02/25/2021    CREATININE 10 92 (H) 02/24/2021    CREATININE 9 68 (H) 02/12/2021   On hemodialysis Tuesday Thursday Saturday  Patient is undergoing evaluation for kidney transplant with [facility] Nephrology    Legally blind  Assessment & Plan  Secondary to diabetic retinopathy    Type 2 diabetes mellitus with chronic kidney disease on chronic dialysis, with long-term current use of insulin [facility])  Assessment & Plan  Lab Results   Component Value Date    HGBA1C 7 5 (H) 01/18/2021       Recent Labs     02/24/21  1822 02/24/21  2128 02/25/21  0655 02/25/21  1114   POCGLU 152* 256* 91 250*       Blood Sugar Average: Last 72 hrs:  (P) 187 25 continue outpatient insulin regimen      VTE Pharmacologic Prophylaxis:   Pharmacologic: Heparin  Mechanical VTE Prophylaxis in Place: Yes    Patient Centered Rounds: I have performed bedside rounds with nursing staff today  Discussions with Specialists or Other Care Team Provider:  Discussed with nursing, case management  Discussed with Cardiology, Nephrology, and Interventional Radiology  Education and Discussions with Family / Patient:  Discussed over the phone with patient's sister or my did full medical update given  Time Spent for Care: 45 minutes  More than 50% of total time spent on counseling and coordination of care as described above      Current Length of Stay: 0 day(s)    Current Patient Status: Inpatient   Certification Statement: The patient will continue to require additional inpatient hospital stay due to Echocardiogram, repeat lab work in the a m , further workup for EKG changes, will have a IR fistulogram inpatient    Discharge Plan: pending, not ready     Code Status: Level 1 - Full Code      Subjective:   Patient currently denies chest pain  Denies abdominal pain  Denies fevers or chills  Objective:     Vitals:   Temp (24hrs), Av 8 °F (36 6 °C), Min:97 3 °F (36 3 °C), Max:98 2 °F (36 8 °C)    Temp:  [97 3 °F (36 3 °C)-98 2 °F (36 8 °C)] 97 9 °F (36 6 °C)  HR:  [68-77] 77  Resp:  [18] 18  BP: (131-148)/(66-85) 142/76  SpO2:  [96 %-99 %] 99 %  Body mass index is 23 72 kg/m²  Input and Output Summary (last 24 hours): Intake/Output Summary (Last 24 hours) at 2021 1312  Last data filed at 2021 0730  Gross per 24 hour   Intake 360 ml   Output --   Net 360 ml       Physical Exam:     Physical Exam  Vitals signs and nursing note reviewed  Exam conducted with a chaperone present  Constitutional:       Appearance: He is well-developed  Comments: Pleasant, fully conversational   Alert and oriented  Blind  HENT:      Head: Normocephalic and atraumatic  Eyes:      Conjunctiva/sclera: Conjunctivae normal    Neck:      Musculoskeletal: Neck supple  Cardiovascular:      Rate and Rhythm: Normal rate and regular rhythm  Heart sounds: No murmur  Comments: Left AV fistula  Pulmonary:      Effort: Pulmonary effort is normal  No respiratory distress  Breath sounds: Normal breath sounds  Abdominal:      Palpations: Abdomen is soft  Tenderness: There is no abdominal tenderness  Skin:     General: Skin is warm and dry  Neurological:      Mental Status: He is alert           Additional Data:     Labs:    Results from last 7 days   Lab Units 21  0315   WBC Thousand/uL 10 13   HEMOGLOBIN g/dL 13 0   HEMATOCRIT % 40 8 PLATELETS Thousands/uL 268   NEUTROS PCT % 76*   LYMPHS PCT % 7*   MONOS PCT % 14*   EOS PCT % 2     Results from last 7 days   Lab Units 02/25/21  0510 02/24/21  0315   SODIUM mmol/L 134* 135*   POTASSIUM mmol/L 5 4* 5 0   CHLORIDE mmol/L 95* 94*   CO2 mmol/L 23 34*   BUN mg/dL 73* 52*   CREATININE mg/dL 13 20* 10 92*   ANION GAP mmol/L 16* 7   CALCIUM mg/dL 8 0* 8 0*   ALBUMIN g/dL  --  3 5   TOTAL BILIRUBIN mg/dL  --  0 59   ALK PHOS U/L  --  129*   ALT U/L  --  15   AST U/L  --  50*   GLUCOSE RANDOM mg/dL 107 234*         Results from last 7 days   Lab Units 02/25/21  1114 02/25/21  0655 02/24/21  2128 02/24/21  1822   POC GLUCOSE mg/dl 250* 91 256* 152*                   * I Have Reviewed All Lab Data Listed Above  * Additional Pertinent Lab Tests Reviewed:  All Labs Within Last 24 Hours Reviewed    Imaging:    Imaging Reports Reviewed Today Include:  CTA chest  Imaging Personally Reviewed by Myself Includes:  none    Recent Cultures (last 7 days):           Last 24 Hours Medication List:   Current Facility-Administered Medications   Medication Dose Route Frequency Provider Last Rate    aspirin  81 mg Oral Daily Kassy Driscoll PA-C      atorvastatin  40 mg Oral Daily With [employer] ALMA DELIA Driscoll      brimonidine  1 drop Left Eye [facility] Yvette Chavez MD      carvedilol  6 25 mg Oral BID With Meals Kassy Driscoll PA-C      cinacalcet  30 mg Oral Daily Kassy Driscoll PA-C      doxazosin  4 mg Oral HS Cindra Dark, CRNP      furosemide  80 mg Oral Daily Kassy Driscoll PA-C      heparin (porcine)  5,000 Units Subcutaneous Q8H [address] [place] [address] Armand Chatman PA-C      insulin glargine  30 Units Subcutaneous HS Kassy Driscoll PA-C      insulin lispro  1-6 Units Subcutaneous TID AC Yvette Chavez MD      isoniazid  300 mg Oral Daily Kassy Driscoll PA-C      Labetalol HCl  10 mg Intravenous Q4H PRN Kassy Driscoll PA-C      NIFEdipine ER  90 mg Oral Daily Dejon Marshall, DELBERTNP  ondansetron  4 mg Intravenous Q6H PRN Kassy Driscoll PA-C      pantoprazole  20 mg Oral Early Morning Kassy Driscoll PA-C      prednisoLONE acetate  1 drop Left Eye BID Phylicia Yanez MD      pyridoxine  50 mg Oral Daily Kassy Driscoll PA-C      sevelamer  800 mg Oral TID With Meals Madelyn Omer Prader, PA-C          Today, Patient Was Seen By: Maura Parham PA-C    ** Please Note: Dictation voice to text software may have been used in the creation of this document   **

## 2021-02-25 NOTE — PLAN OF CARE
Pre-tx:  UF  3-4 L as tolerated to EDW          Problem: METABOLIC, FLUID AND ELECTROLYTES - ADULT  Goal: Electrolytes maintained within normal limits  Description: INTERVENTIONS:  - Monitor labs and assess patient for signs and symptoms of electrolyte imbalances  - Administer electrolyte replacement as ordered  - Monitor response to electrolyte replacements, including repeat lab results as appropriate  - Instruct patient on fluid and nutrition as appropriate  Outcome: Progressing  Goal: Fluid balance maintained  Description: INTERVENTIONS:  - Monitor labs   - Monitor I/O and WT  - Instruct patient on fluid and nutrition as appropriate  - Assess for signs & symptoms of volume excess or deficit  Outcome: Progressing

## 2021-02-25 NOTE — ASSESSMENT & PLAN NOTE
Lab Results   Component Value Date    EGFR 4 02/25/2021    EGFR 5 02/24/2021    EGFR 6 02/12/2021    CREATININE 13 20 (H) 02/25/2021    CREATININE 10 92 (H) 02/24/2021    CREATININE 9 68 (H) 02/12/2021   With hypertensive emergency  Give IV labetalol 20 mg x 1  Appreciate Nephrology; up titrated outpatient regimen of nifedipine and Cardura and Coreg  Continue IV labetalol 10 mg p r n   SBP greater than 170 mm hg

## 2021-02-25 NOTE — PLAN OF CARE
Problem: Potential for Falls  Goal: Patient will remain free of falls  Description: INTERVENTIONS:  - Assess patient frequently for physical needs  -  Identify cognitive and physical deficits and behaviors that affect risk of falls    -  [place] fall precautions as indicated by assessment   - Educate patient/family on patient safety including physical limitations  - Instruct patient to call for assistance with activity based on assessment  - Modify environment to reduce risk of injury  - Consider OT/PT consult to assist with strengthening/mobility  Outcome: Progressing     Problem: PAIN - ADULT  Goal: Verbalizes/displays adequate comfort level or baseline comfort level  Description: Interventions:  - Encourage patient to monitor pain and request assistance  - Assess pain using appropriate pain scale  - Administer analgesics based on type and severity of pain and evaluate response  - Implement non-pharmacological measures as appropriate and evaluate response  - Consider cultural and social influences on pain and pain management  - Notify physician/advanced practitioner if interventions unsuccessful or patient reports new pain  Outcome: Progressing     Problem: INFECTION - ADULT  Goal: Absence or prevention of progression during hospitalization  Description: INTERVENTIONS:  - Assess and monitor for signs and symptoms of infection  - Monitor lab/diagnostic results  - Monitor all insertion sites, i e  indwelling lines, tubes, and drains  - Monitor endotracheal if appropriate and nasal secretions for changes in amount and color  - [place] appropriate cooling/warming therapies per order  - Administer medications as ordered  - Instruct and encourage patient and family to use good hand hygiene technique  - Identify and instruct in appropriate isolation precautions for identified infection/condition  Outcome: Progressing     Problem: SAFETY ADULT  Goal: Patient will remain free of falls  Description: INTERVENTIONS:  - Assess patient frequently for physical needs  -  Identify cognitive and physical deficits and behaviors that affect risk of falls    -  [place] fall precautions as indicated by assessment   - Educate patient/family on patient safety including physical limitations  - Instruct patient to call for assistance with activity based on assessment  - Modify environment to reduce risk of injury  - Consider OT/PT consult to assist with strengthening/mobility  Outcome: Progressing  Goal: Maintain or return to baseline ADL function  Description: INTERVENTIONS:  -  Assess patient's ability to carry out ADLs; assess patient's baseline for ADL function and identify physical deficits which impact ability to perform ADLs (bathing, care of mouth/teeth, toileting, grooming, dressing, etc )  - Assess/evaluate cause of self-care deficits   - Assess range of motion  - Assess patient's mobility; develop plan if impaired  - Assess patient's need for assistive devices and provide as appropriate  - Encourage maximum independence but intervene and supervise when necessary  - Involve family in performance of ADLs  - Assess for home care needs following discharge   - Consider OT consult to assist with ADL evaluation and planning for discharge  - Provide patient education as appropriate  Outcome: Progressing  Goal: Maintain or return mobility status to optimal level  Description: INTERVENTIONS:  - Assess patient's baseline mobility status (ambulation, transfers, stairs, etc )    - Identify cognitive and physical deficits and behaviors that affect mobility  - Identify mobility aids required to assist with transfers and/or ambulation (gait belt, sit-to-stand, lift, walker, cane, etc )  - [place] fall precautions as indicated by assessment  - Record patient progress and toleration of activity level on Mobility SBAR; progress patient to next Phase/Stage  - Instruct patient to call for assistance with activity based on assessment  - Consider rehabilitation consult to assist with strengthening/weightbearing, etc   Outcome: Progressing     Problem: DISCHARGE PLANNING  Goal: Discharge to home or other facility with appropriate resources  Description: INTERVENTIONS:  - Identify barriers to discharge w/patient and caregiver  - Arrange for needed discharge resources and transportation as appropriate  - Identify discharge learning needs (meds, wound care, etc )  - Arrange for interpretive services to assist at discharge as needed  - Refer to Case Management Department for coordinating discharge planning if the patient needs post-hospital services based on physician/advanced practitioner order or complex needs related to functional status, cognitive ability, or social support system  Outcome: Progressing     Problem: Knowledge Deficit  Goal: Patient/family/caregiver demonstrates understanding of disease process, treatment plan, medications, and discharge instructions  Description: Complete learning assessment and assess knowledge base  Interventions:  - Provide teaching at level of understanding  - Provide teaching via preferred learning methods  Outcome: Progressing     Problem: Nutrition/Hydration-ADULT  Goal: Nutrient/Hydration intake appropriate for improving, restoring or maintaining nutritional needs  Description: Monitor and assess patient's nutrition/hydration status for malnutrition  Collaborate with interdisciplinary team and initiate plan and interventions as ordered  Monitor patient's weight and dietary intake as ordered or per policy  Utilize nutrition screening tool and intervene as necessary  Determine patient's food preferences and provide high-protein, high-caloric foods as appropriate       INTERVENTIONS:  - Monitor oral intake, urinary output, labs, and treatment plans  - Assess nutrition and hydration status and recommend course of action  - Evaluate amount of meals eaten  - Assist patient with eating if necessary   - Allow adequate time for meals  - Recommend/ encourage appropriate diets, oral nutritional supplements, and vitamin/mineral supplements  - Order, calculate, and assess calorie counts as needed  - Recommend, monitor, and adjust tube feedings and TPN/PPN based on assessed needs  - Assess need for intravenous fluids  - Provide specific nutrition/hydration education as appropriate  - Include patient/family/caregiver in decisions related to nutrition  Outcome: Progressing

## 2021-02-25 NOTE — ASSESSMENT & PLAN NOTE
Patient with left AV fistula  Had outpatient fistulogram scheduled for 2/26 due to high venous pressures  I discussed with IR and they are able to assess the patient while admitted in the hospital   NPO past midnight

## 2021-02-25 NOTE — ASSESSMENT & PLAN NOTE
EKG showed serial change of prior known infarct  Also with T-wave changes and axis change  [address] Cardiology  See plan for chest pain above  CTA ruled out PE as cause of axis change    Obtaining echocardiogram

## 2021-02-26 ENCOUNTER — APPOINTMENT (INPATIENT)
Dept: RADIOLOGY | Facility: HOSPITAL | Age: 56
DRG: 252 | End: 2021-02-26
Attending: RADIOLOGY
Payer: MEDICARE

## 2021-02-26 ENCOUNTER — APPOINTMENT (INPATIENT)
Dept: NON INVASIVE DIAGNOSTICS | Facility: HOSPITAL | Age: 56
DRG: 252 | End: 2021-02-26
Payer: MEDICARE

## 2021-02-26 PROBLEM — I16.0 HYPERTENSIVE URGENCY: Status: ACTIVE | Noted: 2021-02-24

## 2021-02-26 LAB
GLUCOSE SERPL-MCNC: 122 MG/DL (ref 65–140)
GLUCOSE SERPL-MCNC: 134 MG/DL (ref 65–140)
GLUCOSE SERPL-MCNC: 210 MG/DL (ref 65–140)
GLUCOSE SERPL-MCNC: 41 MG/DL (ref 65–140)
GLUCOSE SERPL-MCNC: 53 MG/DL (ref 65–140)
GLUCOSE SERPL-MCNC: 98 MG/DL (ref 65–140)

## 2021-02-26 PROCEDURE — 82948 REAGENT STRIP/BLOOD GLUCOSE: CPT

## 2021-02-26 PROCEDURE — C1725 CATH, TRANSLUMIN NON-LASER: HCPCS

## 2021-02-26 PROCEDURE — 36903 INTRO CATH DIALYSIS CIRCUIT: CPT

## 2021-02-26 PROCEDURE — 36903 INTRO CATH DIALYSIS CIRCUIT: CPT | Performed by: RADIOLOGY

## 2021-02-26 PROCEDURE — 99232 SBSQ HOSP IP/OBS MODERATE 35: CPT | Performed by: INTERNAL MEDICINE

## 2021-02-26 PROCEDURE — C1874 STENT, COATED/COV W/DEL SYS: HCPCS

## 2021-02-26 PROCEDURE — 93306 TTE W/DOPPLER COMPLETE: CPT | Performed by: INTERNAL MEDICINE

## 2021-02-26 PROCEDURE — C1894 INTRO/SHEATH, NON-LASER: HCPCS

## 2021-02-26 PROCEDURE — 99152 MOD SED SAME PHYS/QHP 5/>YRS: CPT

## 2021-02-26 PROCEDURE — 99152 MOD SED SAME PHYS/QHP 5/>YRS: CPT | Performed by: RADIOLOGY

## 2021-02-26 PROCEDURE — B51N1ZZ FLUOROSCOPY OF LEFT UPPER EXTREMITY VEINS USING LOW OSMOLAR CONTRAST: ICD-10-PCS | Performed by: RADIOLOGY

## 2021-02-26 PROCEDURE — 93306 TTE W/DOPPLER COMPLETE: CPT

## 2021-02-26 PROCEDURE — C1769 GUIDE WIRE: HCPCS

## 2021-02-26 PROCEDURE — 057F3DZ DILATION OF LEFT CEPHALIC VEIN WITH INTRALUMINAL DEVICE, PERCUTANEOUS APPROACH: ICD-10-PCS | Performed by: RADIOLOGY

## 2021-02-26 PROCEDURE — 99153 MOD SED SAME PHYS/QHP EA: CPT

## 2021-02-26 PROCEDURE — 99232 SBSQ HOSP IP/OBS MODERATE 35: CPT | Performed by: STUDENT IN AN ORGANIZED HEALTH CARE EDUCATION/TRAINING PROGRAM

## 2021-02-26 RX ORDER — LABETALOL 200 MG/1
200 TABLET, FILM COATED ORAL EVERY 12 HOURS SCHEDULED
Status: DISCONTINUED | OUTPATIENT
Start: 2021-02-26 | End: 2021-02-27 | Stop reason: HOSPADM

## 2021-02-26 RX ORDER — CEFAZOLIN SODIUM 1 G/3ML
INJECTION, POWDER, FOR SOLUTION INTRAMUSCULAR; INTRAVENOUS CODE/TRAUMA/SEDATION MEDICATION
Status: COMPLETED | OUTPATIENT
Start: 2021-02-26 | End: 2021-02-26

## 2021-02-26 RX ORDER — FENTANYL CITRATE 50 UG/ML
INJECTION, SOLUTION INTRAMUSCULAR; INTRAVENOUS CODE/TRAUMA/SEDATION MEDICATION
Status: COMPLETED | OUTPATIENT
Start: 2021-02-26 | End: 2021-02-26

## 2021-02-26 RX ORDER — MIDAZOLAM HYDROCHLORIDE 2 MG/2ML
INJECTION, SOLUTION INTRAMUSCULAR; INTRAVENOUS CODE/TRAUMA/SEDATION MEDICATION
Status: COMPLETED | OUTPATIENT
Start: 2021-02-26 | End: 2021-02-26

## 2021-02-26 RX ORDER — DEXTROSE MONOHYDRATE 25 G/50ML
INJECTION, SOLUTION INTRAVENOUS
Status: COMPLETED
Start: 2021-02-26 | End: 2021-02-26

## 2021-02-26 RX ADMIN — MIDAZOLAM 1 MG: 1 INJECTION INTRAMUSCULAR; INTRAVENOUS at 08:57

## 2021-02-26 RX ADMIN — ASPIRIN 81 MG: 81 TABLET, COATED ORAL at 10:38

## 2021-02-26 RX ADMIN — ISONIAZID 300 MG: 100 TABLET ORAL at 10:43

## 2021-02-26 RX ADMIN — LABETALOL HYDROCHLORIDE 200 MG: 200 TABLET, FILM COATED ORAL at 21:39

## 2021-02-26 RX ADMIN — CARVEDILOL 6.25 MG: 6.25 TABLET, FILM COATED ORAL at 10:38

## 2021-02-26 RX ADMIN — Medication 50 MG: at 10:39

## 2021-02-26 RX ADMIN — IOHEXOL 36 ML: 350 INJECTION, SOLUTION INTRAVENOUS at 10:13

## 2021-02-26 RX ADMIN — LABETALOL HYDROCHLORIDE 200 MG: 200 TABLET, FILM COATED ORAL at 15:03

## 2021-02-26 RX ADMIN — PREDNISOLONE ACETATE 1 DROP: 10 SUSPENSION/ DROPS OPHTHALMIC at 10:45

## 2021-02-26 RX ADMIN — PREDNISOLONE ACETATE 1 DROP: 10 SUSPENSION/ DROPS OPHTHALMIC at 17:56

## 2021-02-26 RX ADMIN — SEVELAMER HYDROCHLORIDE 800 MG: 800 TABLET, FILM COATED PARENTERAL at 16:47

## 2021-02-26 RX ADMIN — INSULIN LISPRO 2 UNITS: 100 INJECTION, SOLUTION INTRAVENOUS; SUBCUTANEOUS at 16:47

## 2021-02-26 RX ADMIN — INSULIN GLARGINE 30 UNITS: 100 INJECTION, SOLUTION SUBCUTANEOUS at 21:41

## 2021-02-26 RX ADMIN — CEFAZOLIN SODIUM 1000 MG: 1 INJECTION, POWDER, FOR SOLUTION INTRAMUSCULAR; INTRAVENOUS at 09:26

## 2021-02-26 RX ADMIN — DEXTROSE MONOHYDRATE 25 ML: 25 INJECTION, SOLUTION INTRAVENOUS at 07:45

## 2021-02-26 RX ADMIN — BRIMONIDINE TARTRATE 1 DROP: 1.5 SOLUTION OPHTHALMIC at 21:41

## 2021-02-26 RX ADMIN — MIDAZOLAM 1 MG: 1 INJECTION INTRAMUSCULAR; INTRAVENOUS at 09:27

## 2021-02-26 RX ADMIN — ATORVASTATIN CALCIUM 40 MG: 40 TABLET, FILM COATED ORAL at 16:47

## 2021-02-26 RX ADMIN — NIFEDIPINE 90 MG: 60 TABLET, FILM COATED, EXTENDED RELEASE ORAL at 10:38

## 2021-02-26 RX ADMIN — FENTANYL CITRATE 50 MCG: 50 INJECTION, SOLUTION INTRAMUSCULAR; INTRAVENOUS at 09:28

## 2021-02-26 RX ADMIN — BRIMONIDINE TARTRATE 1 DROP: 1.5 SOLUTION OPHTHALMIC at 10:44

## 2021-02-26 RX ADMIN — CINACALCET HYDROCHLORIDE 30 MG: 30 TABLET, FILM COATED ORAL at 10:39

## 2021-02-26 RX ADMIN — SEVELAMER HYDROCHLORIDE 800 MG: 800 TABLET, FILM COATED PARENTERAL at 10:38

## 2021-02-26 RX ADMIN — FUROSEMIDE 80 MG: 40 TABLET ORAL at 10:37

## 2021-02-26 RX ADMIN — HEPARIN SODIUM 5000 UNITS: 5000 INJECTION INTRAVENOUS; SUBCUTANEOUS at 21:40

## 2021-02-26 RX ADMIN — DOXAZOSIN 4 MG: 4 TABLET ORAL at 21:40

## 2021-02-26 RX ADMIN — BRIMONIDINE TARTRATE 1 DROP: 1.5 SOLUTION OPHTHALMIC at 13:27

## 2021-02-26 RX ADMIN — HEPARIN SODIUM 5000 UNITS: 5000 INJECTION INTRAVENOUS; SUBCUTANEOUS at 13:27

## 2021-02-26 RX ADMIN — FENTANYL CITRATE 50 MCG: 50 INJECTION, SOLUTION INTRAMUSCULAR; INTRAVENOUS at 08:57

## 2021-02-26 NOTE — PROGRESS NOTES
NEPHROLOGY PROGRESS NOTE   Paddy Trevin 63 y o  male MRN: [id number]  Unit/Bed#: E5 -01 Encounter: [id number]  Reason for Consult: ESRD on HD    PLAN:   -  for HD tomorrow  -  S/P fistulogram today with AVF sheath removal   -  blood pressure above goal today, if no improvement after am medications, would recommend adding hydrazine to regiment  -  echo pending    -  CTA negative for PE    -  incidental lung nodule noted, will need follow up CT in 12 months  ASSESSMENT/PLAN:  ESRD on HD: receiving maintenance dialysis on T-T-S schedule at [facility] in [facility]   -for hemodialysis later today      -continue with HD as scheduled  -EDW: 75 5 kg       Access: LUE AVF + bruit + thrill  -S/P fistulogram  H/O recurrent stenosis, previous stent placement  -S/P AVF sheath removal today with a tip stop dressing       Blood pressure: presents with hypertensive urgency  Above goal this morning, however did not recieev all of his am medications    -continue UF with HD as BP tolerates  -continues on Coreg 6 25 mg BID, cardura 4 mg po daily, and nifedipine 90 mg po daily, Lasix 80 mg po daily    -consider adding hydralazine if no improvement       Anemia in CKD:  Hemoglobin at goal for HD   -goal Hgb 10-12 g/dL  -continue to monitor and transfuse as needed for Hgb <7 0    -not on COLETTE       MBD in CKD:   -continue to monitor PTH, phos, and Mg as OP    -recommend renal diet and phosphorus binders with meals    -continue hectoral with HD       Volume status:   -continue UF as BP allows  -recommend fluid restriction  -repeat echocardiogram pending       Electrolytes:   -continue to monitor and trend with HD    -mild hyponatremia  Place on fluid restriction  Continue UF with HD  WIll trend    -mild hyperkalemia, for HD later today  Continue low potassium diet       Latent TB: recent positive quantiFERON gold testing    -continues on treatment with isoniazid     -RUL nodule seen on CT scan and will need follow up CT scan in 12 months       Chest pain: R/O ACS vs GI etiology    -trending troponin's  Negative    -chest x-ray negative for acute cardiopulmonary disease    -CTA negative for PE,small pericardial effusion present  Disposition: okay to discharge from renal once medically cleared  SUBJECTIVE:  The patient is resting comfortably in bed  He denies any acute complaints      OBJECTIVE:  Current Weight: Weight - Scale: 75 kg (165 lb 5 5 oz)  Vitals:    02/26/21 0927 02/26/21 0932 02/26/21 0937 02/26/21 0942   BP: (!) 192/102 (!) 194/93 (!) 189/110 (!) 188/89   BP Location:       Pulse: 83 82 83 77   Resp: 18 18 18 18   Temp:       TempSrc:       SpO2: 100% 100% 100% 100%   Weight:       Height:           Intake/Output Summary (Last 24 hours) at 2/26/2021 1024  Last data filed at 2/26/2021 0901  Gross per 24 hour   Intake 840 ml   Output 3378 ml   Net -2538 ml     General: NAD  Skin: warm, dry, intact, no rash  HEENT: Moist mucous membranes, sclera anicteric, normocephalic, atraumatic  Neck: No apparent JVD appreciated  Chest: lung sounds clear B/L, on RA   CVS:Regular rate and rhythm, no murmer   Abdomen: Soft, round, non-tender, +BS  Extremities: No B/L LE edema present, left upper extremity AVF  Neuro: alert and oriented  Psych: appropriate mood and affect     Medications:    Current Facility-Administered Medications:     aspirin (ECOTRIN LOW STRENGTH) EC tablet 81 mg, 81 mg, Oral, Daily, Kassy Driscoll PA-C, 81 mg at 02/25/21 0915    atorvastatin (LIPITOR) tablet 40 mg, 40 mg, Oral, Daily With Felipa Driscoll PA-C, 40 mg at 02/25/21 1816    brimonidine (ALPHAGAN P) 0 15 % ophthalmic solution 1 drop, 1 drop, Left Eye, Q8H [address], Duane Gary MD, 1 drop at 02/25/21 2133    carvedilol (COREG) tablet 6 25 mg, 6 25 mg, Oral, BID With Meals, Kassy Driscoll PA-C, 6 25 mg at 02/25/21 1817    cinacalcet (SENSIPAR) tablet 30 mg, 30 mg, Oral, Daily, Kassy Driscoll PA-C, 30 mg at 02/25/21 0915    doxazosin (CARDURA) tablet 4 mg, 4 mg, Oral, HS, KRISTI Cast, 4 mg at 02/25/21 2133    furosemide (LASIX) tablet 80 mg, 80 mg, Oral, Daily, Kassy Driscoll PA-C, Stopped at 02/25/21 0900    heparin (porcine) subcutaneous injection 5,000 Units, 5,000 Units, Subcutaneous, Q8H [address], Caroline Chatman PA-C, 5,000 Units at 02/25/21 2130    insulin glargine (LANTUS) subcutaneous injection 30 Units 0 3 mL, 30 Units, Subcutaneous, HS, Kassy Driscoll PA-C, 30 Units at 02/25/21 2130    insulin lispro (HumaLOG) 100 units/mL subcutaneous injection 1-6 Units, 1-6 Units, Subcutaneous, TID AC, 3 Units at 02/25/21 1236 **AND** Fingerstick Glucose (POCT), , , TID AC, Aury Jade MD    isoniazid (NYDRAZID) tablet 300 mg, 300 mg, Oral, Daily, Kassy Driscoll PA-C, 300 mg at 02/25/21 1235    Labetalol HCl (NORMODYNE) injection 10 mg, 10 mg, Intravenous, Q4H PRN, Kassy Driscoll PA-C, 10 mg at 02/24/21 0818    NIFEdipine (PROCARDIA XL) 24 hr tablet 90 mg, 90 mg, Oral, Daily, KRISTI Cast, Stopped at 02/25/21 0900    ondansetron (ZOFRAN) injection 4 mg, 4 mg, Intravenous, Q6H PRN, Kassy Driscoll PA-C, 4 mg at 02/24/21 1020    pantoprazole (PROTONIX) EC tablet 20 mg, 20 mg, Oral, Early Morning, Kassy Driscoll PA-C, 20 mg at 02/25/21 0514    prednisoLONE acetate (PRED FORTE) 1 % ophthalmic suspension 1 drop, 1 drop, Left Eye, BID, Aury Jade MD, 1 drop at 02/25/21 1818    pyridoxine (VITAMIN B6) tablet 50 mg, 50 mg, Oral, Daily, Kassy Driscoll PA-C, 50 mg at 02/25/21 0915    sevelamer (RENAGEL) tablet 800 mg, 800 mg, Oral, TID With Meals, Kassy Driscoll PA-C, 800 mg at 02/25/21 1816    Laboratory Results:  Results from last 7 days   Lab Units 02/25/21  0510 02/24/21  0315   WBC Thousand/uL  --  10 13   HEMOGLOBIN g/dL  --  13 0   HEMATOCRIT %  --  40 8   PLATELETS Thousands/uL  --  268   SODIUM mmol/L 134* 135*   POTASSIUM mmol/L 5 4* 5 0   CHLORIDE mmol/L 95* 94*   CO2 mmol/L 23 34*   BUN mg/dL 73* 52*   CREATININE mg/dL 13 20* 10 92*   CALCIUM mg/dL 8 0* 8 0*   MAGNESIUM mg/dL 2 4  --    ALK PHOS U/L  --  129*   ALT U/L  --  15   AST U/L  --  50*

## 2021-02-26 NOTE — NURSING NOTE
The patient was brought to the holding area to prepare of the removal of the AVF sheath  The sheath was removed and light manual compression applied

## 2021-02-26 NOTE — NURSING NOTE
Patient arrived back from IR in stable condition, pressure dressing on LUE CDI, VSS, denies pain  Catching up on AM medications and awaiting diet order

## 2021-02-26 NOTE — PROGRESS NOTES
Progress Note - Cardiology   Paddy Trevin 63 y o  male MRN: [id number]  Unit/Bed#: E5 -01 Encounter: [id number]    Assessment:  1  Accelerated hypertension  2  No further chest discomfort  3  End-stage renal disease on dialysis  4  Diabetes mellitus, type 2  5  Legal blindness  6  Latent tuberculous  7  Abnormal EKG with new right axis deviation on admission improving on most recent EKG  8  Nonischemic nuclear stress test and 11/2020  9  Severe left ventricular hypertrophy, LV wall thickness by echo 1 7 cm     Plan:  1  Will discontinue carvedilol and begin labetalol 200 mg q 12h  2  If blood pressure is high tomorrow after dialysis, would increase labetalol to 300 mg q 12h      Interval history:  Over the last 24 hours, blood pressure has ranged from 150 5/82 to 194/93  Pulse has ranged from 66 to 83, patient has been afebrile  Since 942 this morning, patient has been on room air with O2 saturations ranging from %  Vitals: BP (!) 189/88 (BP Location: Right arm)   Pulse 74   Temp (!) 97 4 °F (36 3 °C) (Temporal)   Resp 16   Ht 5' 10" (1 778 m)   Wt 75 kg (165 lb 5 5 oz)   SpO2 98%   BMI 23 72 kg/m²   Vitals:    02/24/21 1728   Weight: 75 kg (165 lb 5 5 oz)     Orthostatic Blood Pressures      Most Recent Value   Blood Pressure  (!) 189/88 filed at 02/26/2021 1036   Patient Position - Orthostatic VS  Lying filed at 02/26/2021 1036            Intake/Output Summary (Last 24 hours) at 2/26/2021 1340  Last data filed at 2/26/2021 0901  Gross per 24 hour   Intake 600 ml   Output 3378 ml   Net -2778 ml       Invasive Devices     Peripheral Intravenous Line            Peripheral IV 02/24/21 Right Antecubital 2 days          Line            Hemodialysis AV Fistula 01/23/19 Left Other (Comment) 765 days    Hemodialysis AV Fistula 01/20/21 Left 36 days                Review of Systems   Constitutional: Negative for activity change     Respiratory: Negative for cough, chest tightness, shortness of breath and wheezing  Cardiovascular: Negative for chest pain, palpitations and leg swelling  Musculoskeletal: Negative for gait problem  Skin: Negative for color change  Neurological: Negative for dizziness, tremors, syncope, weakness, light-headedness and headaches  Psychiatric/Behavioral: Negative for agitation and confusion  Physical Exam  Constitutional:       General: He is not in acute distress  Appearance: He is well-developed  HENT:      Head: Normocephalic and atraumatic  Neck:      Vascular: No JVD  Cardiovascular:      Rate and Rhythm: Normal rate and regular rhythm  Heart sounds: Normal heart sounds  No murmur  No friction rub  No gallop  Pulmonary:      Effort: Pulmonary effort is normal  No respiratory distress  Breath sounds: Normal breath sounds  No wheezing, rhonchi or rales  Chest:      Chest wall: No tenderness  Abdominal:      General: Bowel sounds are normal  There is no distension  Palpations: Abdomen is soft  Musculoskeletal:      Right lower leg: No edema  Left lower leg: No edema  Skin:     General: Skin is warm and dry  Neurological:      General: No focal deficit present  Mental Status: He is alert and oriented to person, place, and time  Psychiatric:         Mood and Affect: Mood normal          Behavior: Behavior normal          Thought Content:  Thought content normal          Judgment: Judgment normal          ----------------------------------------------------------------------------------------------  NUCLEAR STRESS TEST:   Results for orders placed during the hospital encounter of 20   NM myocardial perfusion spect (stress and/or rest)    Narrative [facility] [address]    Rest/Stress Gated SPECT Myocardial Perfusion Imaging After Regadenoson    Patient: Javier Alonso  MR number: [id number]  Account number: [de-identified]  : 1965  Age: 63 years  Gender: Male  Status: Outpatient  Location: [address] [place] [address]  Height: 70 in  Weight: 171 lb  BP: 134/ 71 mmHg    Diagnosis: E78 5 - Hyperlipidemia, unspecified, I11 9 - Hypertensive heart disease without heart failure, N18 6 - End stage renal disease    Primary Physician:  Lauren Malloy MD  RN:  Ligia York RN  Technician:  Yvette James  Group:  [facility]  Report Prepared By[de-identified]  Yvette James  Interpreting Physician:  Palak Sparks MD    INDICATIONS: Coronary artery disease  HISTORY: The patient is a 63year old  male  Chest pain status: no chest pain  Coronary artery disease risk factors: dyslipidemia, hypertension, and diabetes mellitus  Cardiovascular history: none significant  Co-morbidity:  history of end stage renal disease  Medications: a lipid lowering agent, alpha blocker and diabetic medications  Previous test results: abnormal ECG  PHYSICAL EXAM: Baseline physical exam screening: normal     REST ECG: Normal sinus rhythm at a rate of 67 beats per minute  Right axis deviation  Left ventricular hypertrophy  Anterior lateral T-wave inversions which may represent left ventricular strain pattern  PROCEDURE: The study was performed in the the [address] [place] [address]  A regadenoson infusion pharmacologic stress test was performed  Gated SPECT myocardial perfusion imaging was performed after stress and at rest  Systolic blood pressure was  134 mmHg, at the start of the study  Diastolic blood pressure was 71 mmHg, at the start of the study  The heart rate was 66 bpm, at the start of the study  Regadenoson protocol:  HR bpm SBP mmHg DBP mmHg Symptoms  Baseline 66 134 71 none  1 min 75 -- -- none  2 min 82 113 59 none  3 min 79 129 60 none  4 min 78 -- -- none  5 min 77 -- -- none  6 min 78 127 64 none  No medications or fluids given  STRESS SUMMARY: Duration of pharmacologic stress was 3 min and 0 sec  Maximal work rate was 1 METs   Maximal heart rate during stress was 84 bpm  The heart rate response to stress was normal  There was normal resting blood pressure with an  appropriate response to stress  The rate-pressure product for the peak heart rate and blood pressure was 45900  There was no chest pain during stress  The stress test was terminated due to protocol completion  Pre oxygen saturation: 98 %  Peak oxygen saturation: 100 %  With pharmacologic stress using intravenous Lexiscan, there were no electrocardiographic changes over baseline  There were no arrhythmias  ISOTOPE ADMINISTRATION:  The radiopharmaceutical was injected at the peak effect of pharmacologic stress  MYOCARDIAL PERFUSION IMAGING:  Tomographic perfusion series at rest demonstrated uniformly normal uptake in activity  Following pharmacologic stress using intravenous Lexiscan, there was no change  GATED SPECT:  Normal left ventricular systolic function, EF 79%  Normal left ventricular cavity size  Normal left ventricular wall motion  SUMMARY:  -  Stress results: There was no chest pain during stress  IMPRESSIONS: 1  Abnormal resting EKG  2  Negative pharmacologic stress test with intravenous Lexiscan for angina pectoris or electrocardiographic changes of ischemia over baseline abnormalities noted  3  Normal left ventricular systolic function, EF 70%  4  Normal tomographic perfusion series      Prepared and signed by    Richard Farrell MD  Signed 2020 08:17:41         --------------------------------------------------------------------------------  ECHO:   Results for orders placed during the hospital encounter of 20   Echo complete with contrast if indicated    Narrative [address] [place] [address]    Transthoracic Echocardiogram  2D, M-mode, Doppler, and Color Doppler    Study date:  2020    Patient: Jacqueline Rivero  MR number: [id number]  Account number: [de-identified]  : 1965  Age: 63 years  Gender: Male  Status: Outpatient  Location: [place] [facility]  Height: 70 in  Weight: 170 7 lb  BP: 114/ 66 mmHg    Indications: Cardiomegaly  Diagnoses: I51 7 - Cardiomegaly    Sonographer:  Giles Moon  Referring Physician:  Adelina Ramires MD  Group:  [facility]  Interpreting Physician:  Gabriel Benavidez MD    SUMMARY    LEFT VENTRICLE:  Systolic function was vigorous  Ejection fraction was estimated to be 68 %  There were no regional wall motion abnormalities  Wall thickness was markedly increased  Concentric hypertrophy was present  LEFT ATRIUM:  The atrium was mildly dilated  MITRAL VALVE:  There was trace regurgitation  TRICUSPID VALVE:  There was trace regurgitation  PULMONIC VALVE:  There was trace regurgitation  PERICARDIUM:  A small pericardial effusion was identified posterior to the heart  There was no evidence of hemodynamic compromise  HISTORY: PRIOR HISTORY: Type 2 diabetes  LVH  ESRD  GERD  Hyperlipidemia  Elevated troponin  Former smoker  PROCEDURE: The study was performed in the [address] [place] [address] OP  This was a routine study  The transthoracic approach was used  The study included complete 2D imaging, M-mode, complete spectral Doppler, and color Doppler  The heart rate was 73  bpm, at the start of the study  Images were obtained from the parasternal, apical, subcostal, and suprasternal notch acoustic windows  Image quality was adequate  LEFT VENTRICLE: Size was normal  Systolic function was vigorous  Ejection fraction was estimated to be 68 %  There were no regional wall motion abnormalities  Wall thickness was markedly increased  Concentric hypertrophy was present  DOPPLER: There was an increased relative contribution of atrial contraction to ventricular filling  The deceleration time of the early transmitral flow velocity was increased      RIGHT VENTRICLE: The size was normal  Systolic function was normal  Wall thickness was normal     LEFT ATRIUM: The atrium was mildly dilated  RIGHT ATRIUM: Size was normal     MITRAL VALVE: Valve structure was normal  There was normal leaflet separation  DOPPLER: The transmitral velocity was within the normal range  There was no evidence for stenosis  There was trace regurgitation  AORTIC VALVE: There was no left ventricular outflow obstruction  perhaps last ASH but normal LVOT velocities at rest The valve was trileaflet  Leaflets exhibited normal thickness and normal cuspal separation  DOPPLER: Transaortic velocity  was within the normal range  There was no evidence for stenosis  There was no regurgitation  TRICUSPID VALVE: The valve structure was normal  There was normal leaflet separation  DOPPLER: The transtricuspid velocity was within the normal range  There was no evidence for stenosis  There was trace regurgitation  The tricuspid jet  envelope definition was inadequate for estimation of RV systolic pressure  There are no indirect findings (abnormal RV volume or geometry, altered pulmonary flow velocity profile, or leftward septal displacement) which would suggest  moderate or severe pulmonary hypertension  PULMONIC VALVE: Leaflets exhibited normal thickness, no calcification, and normal cuspal separation  DOPPLER: The transpulmonic velocity was within the normal range  There was trace regurgitation  PERICARDIUM: A small pericardial effusion was identified posterior to the heart  There was no evidence of hemodynamic compromise  AORTA: The root exhibited normal size  SYSTEMIC VEINS: IVC: The inferior vena cava was normal in size and course   Respirophasic changes were normal     SYSTEM MEASUREMENT TABLES    2D  Ao Diam: 3 3 cm  EF(Teich): 68 33 %  IVSd: 1 7 cm  LA Diam: 3 6 cm  LVIDd: 3 6 cm  LVIDs: 2 cm  LVPWd: 1 7 cm  RVIDd: 3 cm    PW  E' Sept: 0 04 m/s  E/E' Sept: 13 64  MV A Lonnie: 0 65 m/s  MV Dec Emanuel: 2 25 m/s2  MV DecT: 265 81 ms  MV E Lonnie: 0 6 m/s  MV E/A Ratio: 0 92    [facility]    Prepared and electronically signed by    Rachel Ruiz MD  Signed [id number] 12:01:47       No results found for this or any previous visit   --------------------------------------------------------------------------------  HOLTER  No results found for this or any previous visit  No results found for this or any previous visit   --------------------------------------------------------------------------------  CAROTIDS  Results for orders placed during the hospital encounter of 10/23/20   VAS carotid complete study    Narrative    [facility] REPORT  CLINICAL:  Indications:  Patient presents for a general health evaluation secondary to future kidney  transplant  Patient is asymptomatic at this time  Operative History:  2019-01-23 Left brachio cephalic AVF  Risk Factors  The patient has history of CKD  He has no history of Obesity  FINDINGS:     Segment      Rig                     Left                        PSV  EDV (cm/s)  Ratio  PSV  EDV (cm/s)  Ratio    Dist  ICA    122          28   1 02  112          33   0 92    Mid  ICA     108          27   0 90   98          29   0 80    Prox  ICA     88          18   0 73   82          21   0 67    Dist CCA      87          14          99          15           Mid CCA      120               0 70  122          20   0 79    Prox CCA     171                     155          21           Ext Carotid  148               1 23  147               1 20    Prox Vert     76          16          48           7           Subclavian   137                     191                                CONCLUSION:  Impression  RIGHT:  There is <50% stenosis noted in the internal carotid artery  Plaque is  heterogenous  Vertebral artery flow is antegrade  There is no significant subclavian artery  disease  LEFT:  There is <50% stenosis noted in the internal carotid artery   Plaque is  heterogenous  Vertebral artery flow is antegrade  There is no significant subclavian artery  disease  Internal carotid artery stenosis determination by consensus criteria from:  Margarita Lainez et al  Carotid Artery Stenosis: Gray-Scale and Doppler US Diagnosis  - [employer] of [employer] in [address] [place] [address] Drive, Radiology 2003;  [phone]  SIGNATURE:  Electronically Signed by: Anders Ray MD, DAIJAVI on 2020-10-25 08:35:32 PM        ======================================================    Lab Results   Component Value Date    WBC 10 13 02/24/2021    HGB 13 0 02/24/2021    HCT 40 8 02/24/2021    MCV 91 02/24/2021     02/24/2021      Lab Results   Component Value Date    SODIUM 134 (L) 02/25/2021    K 5 4 (H) 02/25/2021    CL 95 (L) 02/25/2021    CO2 23 02/25/2021    BUN 73 (H) 02/25/2021    CREATININE 13 20 (H) 02/25/2021    GLUC 107 02/25/2021    CALCIUM 8 0 (L) 02/25/2021      Lab Results   Component Value Date    HGBA1C 7 5 (H) 01/18/2021      No results found for: CHOL  Lab Results   Component Value Date    HDL 54 01/18/2021    HDL 50 04/27/2020    HDL 54 08/30/2019     Lab Results   Component Value Date    LDLCALC 58 01/18/2021    LDLCALC 57 04/27/2020    LDLCALC 49 08/30/2019     Lab Results   Component Value Date    TRIG 95 01/18/2021    TRIG 103 04/27/2020    TRIG 87 08/30/2019     No results found for: CHOLHDL   Lab Results   Component Value Date    INR 1 05 01/18/2021    INR 1 13 07/09/2019    INR 1 12 04/03/2019    PROTIME 13 7 01/18/2021    PROTIME 14 7 (H) 07/09/2019    PROTIME 14 5 (H) 04/03/2019        Imaging:   I have personally reviewed pertinent reports

## 2021-02-26 NOTE — PLAN OF CARE
Problem: Potential for Falls  Goal: Patient will remain free of falls  Description: INTERVENTIONS:  - Assess patient frequently for physical needs  -  Identify cognitive and physical deficits and behaviors that affect risk of falls    -  [place] fall precautions as indicated by assessment   - Educate patient/family on patient safety including physical limitations  - Instruct patient to call for assistance with activity based on assessment  - Modify environment to reduce risk of injury  - Consider OT/PT consult to assist with strengthening/mobility  Outcome: Progressing     Problem: PAIN - ADULT  Goal: Verbalizes/displays adequate comfort level or baseline comfort level  Description: Interventions:  - Encourage patient to monitor pain and request assistance  - Assess pain using appropriate pain scale  - Administer analgesics based on type and severity of pain and evaluate response  - Implement non-pharmacological measures as appropriate and evaluate response  - Consider cultural and social influences on pain and pain management  - Notify physician/advanced practitioner if interventions unsuccessful or patient reports new pain  Outcome: Progressing     Problem: INFECTION - ADULT  Goal: Absence or prevention of progression during hospitalization  Description: INTERVENTIONS:  - Assess and monitor for signs and symptoms of infection  - Monitor lab/diagnostic results  - Monitor all insertion sites, i e  indwelling lines, tubes, and drains  - Monitor endotracheal if appropriate and nasal secretions for changes in amount and color  - [place] appropriate cooling/warming therapies per order  - Administer medications as ordered  - Instruct and encourage patient and family to use good hand hygiene technique  - Identify and instruct in appropriate isolation precautions for identified infection/condition  Outcome: Progressing  Goal: Absence of fever/infection during neutropenic period  Description: INTERVENTIONS:  - Monitor WBC    Outcome: Progressing     Problem: SAFETY ADULT  Goal: Patient will remain free of falls  Description: INTERVENTIONS:  - Assess patient frequently for physical needs  -  Identify cognitive and physical deficits and behaviors that affect risk of falls  -  [place] fall precautions as indicated by assessment   - Educate patient/family on patient safety including physical limitations  - Instruct patient to call for assistance with activity based on assessment  - Modify environment to reduce risk of injury  - Consider OT/PT consult to assist with strengthening/mobility  Outcome: Progressing  Goal: Maintain or return to baseline ADL function  Description: INTERVENTIONS:  - Assess patient frequently for physical needs  -  Identify cognitive and physical deficits and behaviors that affect risk of falls    -  [place] fall precautions as indicated by assessment   - Educate patient/family on patient safety including physical limitations  - Instruct patient to call for assistance with activity based on assessment  - Modify environment to reduce risk of injury  - Consider OT/PT consult to assist with strengthening/mobility  Outcome: Progressing  Goal: Maintain or return mobility status to optimal level  Description: INTERVENTIONS:  -  Assess patient's ability to carry out ADLs; assess patient's baseline for ADL function and identify physical deficits which impact ability to perform ADLs (bathing, care of mouth/teeth, toileting, grooming, dressing, etc )  - Assess/evaluate cause of self-care deficits   - Assess range of motion  - Assess patient's mobility; develop plan if impaired  - Assess patient's need for assistive devices and provide as appropriate  - Encourage maximum independence but intervene and supervise when necessary  - Involve family in performance of ADLs  - Assess for home care needs following discharge   - Consider OT consult to assist with ADL evaluation and planning for discharge  - Provide patient education as appropriate  Outcome: Progressing     Problem: METABOLIC, FLUID AND ELECTROLYTES - ADULT  Goal: Electrolytes maintained within normal limits  Description: INTERVENTIONS:  - Monitor labs and assess patient for signs and symptoms of electrolyte imbalances  - Administer electrolyte replacement as ordered  - Monitor response to electrolyte replacements, including repeat lab results as appropriate  - Instruct patient on fluid and nutrition as appropriate  Outcome: Progressing  Goal: Fluid balance maintained  Description: INTERVENTIONS:  - Monitor labs   - Monitor I/O and WT  - Instruct patient on fluid and nutrition as appropriate  - Assess for signs & symptoms of volume excess or deficit  Outcome: Progressing

## 2021-02-26 NOTE — PROGRESS NOTES
Progress Note - Paddy Trevin 1965, 63 y o  male MRN: [id number]    Unit/Bed#: E5 -01 Encounter: [id number]    Primary Care Provider: Jenifer Green MD   Date and time admitted to hospital: 2/24/2021  2:07 AM        Hypertensive urgency  Assessment & Plan  63year old male presenting with chest pain and hypertensive urgency  - Serial troponins negative  - Continue blood pressure control  - No further inpatient ischemic testing needed at this time    Recent Labs     02/24/21  0315 02/24/21  0758 02/24/21  1056   TROPONINI 0 02 0 03 0 03       Type 2 diabetes mellitus with chronic kidney disease on chronic dialysis, with long-term current use of insulin [facility])  Assessment & Plan  Lab Results   Component Value Date    HGBA1C 7 5 (H) 01/18/2021       Recent Labs     02/26/21  0801 02/26/21  1057 02/26/21  1115 02/26/21  1548   POCGLU 122 53* 98 210*       Blood Sugar Average: Last 72 hrs:  (P) 150 [id number]     Hypoglycemic this AM likely secondary to NPO status for procedure  Diet restarted  - Continue current regimen for now  TB lung, latent  Assessment & Plan  Patient diagnosed with latent TB of the lung started on isoniazid last month followed by infectious disease  Continue isoniazid and pyridoxine  Patient reports he is tolerating both of these well without side effect    Positive QuantiFERON-TB Gold test  Assessment & Plan  Latent TB, being treated and follows with ID    S/P arteriovenous (AV) fistula creation  Assessment & Plan  Patient with left AV fistula  S/p Fistulogram today      ESRD (end stage renal disease) [facility])  Assessment & Plan  Recent Labs     02/24/21  0315 02/25/21  0510   BUN 52* 73*   CREATININE 10 92* 13 20*   EGFR 5 4     Continue TThS HD  - Renal following  - Patient is undergoing evaluation for kidney transplant with Lesly Banda    Legally blind  Assessment & Plan  Secondary to diabetic retinopathy      VTE Pharmacologic Prophylaxis:   Pharmacologic: Heparin  Mechanical VTE Prophylaxis in Place: Yes    Patient Centered Rounds: I have performed bedside rounds with nursing staff today  Discussions with Specialists or Other Care Team Provider: Nursing    Education and Discussions with Family / Patient: patient    Time Spent for Care: 30 minutes  More than 50% of total time spent on counseling and coordination of care as described above  Current Length of Stay: 1 day(s)    Current Patient Status: Inpatient   Certification Statement: The patient will continue to require additional inpatient hospital stay due to blood pressure control, hemodialysis    Discharge Plan: active    Code Status: Level 1 - Full Code      Subjective:   Patient seen and examined at bedside  Denies any chest pain at this time  Blood pressure still is uncontrolled  Spoke to his sister and informed her that we might discharge him zain if he is doing better  Objective:     Vitals:   Temp (24hrs), Av 1 °F (36 7 °C), Min:97 4 °F (36 3 °C), Max:98 8 °F (37 1 °C)    Temp:  [97 4 °F (36 3 °C)-98 8 °F (37 1 °C)] 97 7 °F (36 5 °C)  HR:  [66-83] 78  Resp:  [16-20] 20  BP: (134-194)/() 158/85  SpO2:  [98 %-100 %] 99 %  Body mass index is 23 72 kg/m²  Input and Output Summary (last 24 hours): Intake/Output Summary (Last 24 hours) at 2021 1832  Last data filed at 2021 0901  Gross per 24 hour   Intake 100 ml   Output --   Net 100 ml       Physical Exam:     Physical Exam  Vitals signs reviewed  HENT:      Head: Normocephalic  Nose: Nose normal       Mouth/Throat:      Mouth: Mucous membranes are moist    Eyes:      General: No scleral icterus  Cardiovascular:      Rate and Rhythm: Normal rate and regular rhythm  Pulmonary:      Effort: Pulmonary effort is normal  No respiratory distress  Abdominal:      General: There is no distension  Palpations: Abdomen is soft  Tenderness: There is no abdominal tenderness     Musculoskeletal:      Comments: ANICETO fistula with palpable thrill   Skin:     General: Skin is warm  Neurological:      Mental Status: He is alert  Mental status is at baseline  Psychiatric:         Mood and Affect: Mood normal          Behavior: Behavior normal        Additional Data:     Labs:    Results from last 7 days   Lab Units 02/24/21  0315   WBC Thousand/uL 10 13   HEMOGLOBIN g/dL 13 0   HEMATOCRIT % 40 8   PLATELETS Thousands/uL 268   NEUTROS PCT % 76*   LYMPHS PCT % 7*   MONOS PCT % 14*   EOS PCT % 2     Results from last 7 days   Lab Units 02/25/21  0510 02/24/21  0315   SODIUM mmol/L 134* 135*   POTASSIUM mmol/L 5 4* 5 0   CHLORIDE mmol/L 95* 94*   CO2 mmol/L 23 34*   BUN mg/dL 73* 52*   CREATININE mg/dL 13 20* 10 92*   ANION GAP mmol/L 16* 7   CALCIUM mg/dL 8 0* 8 0*   ALBUMIN g/dL  --  3 5   TOTAL BILIRUBIN mg/dL  --  0 59   ALK PHOS U/L  --  129*   ALT U/L  --  15   AST U/L  --  50*   GLUCOSE RANDOM mg/dL 107 234*         Results from last 7 days   Lab Units 02/26/21  1548 02/26/21  1115 02/26/21  1057 02/26/21  0801 02/26/21  0739 02/25/21  2130 02/25/21  1536 02/25/21  1114 02/25/21  0655 02/24/21  2128 02/24/21  1822   POC GLUCOSE mg/dl 210* 98 53* 122 41* 253* 133 250* 91 256* 152*                   * I Have Reviewed All Lab Data Listed Above  * Additional Pertinent Lab Tests Reviewed:  Svet[facility]ana 65 Admission Reviewed    Imaging:    Imaging Reports Reviewed Today Include: IR fistulogram    Recent Cultures (last 7 days):           Last 24 Hours Medication List:   Current Facility-Administered Medications   Medication Dose Route Frequency Provider Last Rate    aspirin  81 mg Oral Daily Kassy Driscoll PA-C      atorvastatin  40 mg Oral Daily With [employer] ALMA DELIA Driscoll      brimonidine  1 drop Left Eye [facility] Gabriel Mcqueen MD      cinacalcet  30 mg Oral Daily Kassy Driscoll PA-C      doxazosin  4 mg Oral HS KRISTI Hanson      furosemide  80 mg Oral Daily Kassy Jhaveri PA-C      heparin (porcine)  5,000 Units Subcutaneous [facility] Caroline Chatman PA-C      insulin glargine  30 Units Subcutaneous HS Kassy Driscoll PA-C      insulin lispro  1-6 Units Subcutaneous TID AC Aury Jade MD      isoniazid  300 mg Oral Daily Kassy Driscoll PA-C      labetalol  200 mg Oral Q12H [facility] Annamae Kussmaul, MD      Labetalol HCl  10 mg Intravenous Q4H PRN Kassy Driscoll PA-C      NIFEdipine ER  90 mg Oral Daily KRISTI Cast      ondansetron  4 mg Intravenous Q6H PRN Kassy Driscoll PA-C      pantoprazole  20 mg Oral Early Morning Kassy Driscoll PA-C      prednisoLONE acetate  1 drop Left Eye BID Aury Jade MD      pyridoxine  50 mg Oral Daily Kassy Driscoll PA-C      sevelamer  800 mg Oral TID With Meals Kassy Helms PA-C          Today, Patient Was Seen By: Franny Larsen MD    ** Please Note: Dictation voice to text software may have been used in the creation of this document   **

## 2021-02-26 NOTE — ASSESSMENT & PLAN NOTE
Lab Results   Component Value Date    HGBA1C 7 5 (H) 01/18/2021       Recent Labs     02/26/21  0801 02/26/21  1057 02/26/21  1115 02/26/21  1548   POCGLU 122 53* 98 210*       Blood Sugar Average: Last 72 hrs:  (P) 150 [id number]     Hypoglycemic this AM likely secondary to NPO status for procedure  Diet restarted  - Continue current regimen for now

## 2021-02-26 NOTE — NURSING NOTE
Following multiple attempts, hemostasis was achieved  A tip-stop dressing was applied to the sheath entry site

## 2021-02-26 NOTE — DISCHARGE INSTRUCTIONS
Fistulagram   WHAT YOU NEED TO KNOW:   Your arm or leg my  be sore, swollen, and bruised after the procedure  This is normal and should get better in a few days  DISCHARGE INSTRUCTIONS:     Contact Interventional Radiology at [phone] [place] PATIENTS: Contact Interventional Radiology at [phone]) Piedad Murillo PATIENTS: Contact Interventional Radiology at [phone]) if:    · You have a fever or chills  · Your puncture site is red, swollen, or draining pus  · You have nausea or are vomiting  · Your skin is itchy, swollen, or you have a rash  · You cannot feel a thrill over your graft or fistula  · You have questions or concerns about your condition or care  Seek care immediately if:     · You have bleeding that does not stop after 10 minutes of holding firm, direct pressure over the puncture site  · Blood soaks through your bandage  · Your hand or foot closest to the graft or fistula feels cold, painful, or numb  · Your hand or foot closest to the graft or fistula is pale or blue  · You have trouble moving your arm or leg closest to the graft or fistula  · Your bruise suddenly gets bigger  Care for your wound as directed:  Remove the bandage in 4 to 6 hours or as         directed  Wash the area once a day with soap and water  Gently pat the area dry  Apply firm, steady pressure to the puncture site if it bleeds  Use a clean gauze or towel to hold pressure for 10 to 15 minutes  Call 911 if you cannot stop the bleeding or the bleeding gets heavier  Feel for a thrill once a day or as directed  Place your index and second finger over your fistula or graft as directed  You should feel a vibration  The vibration means that blood is flowing through your graft or fistula correctly  Rest your arm or leg as directed  Do not lift anything heavier than 5 pounds or do strenuous activity for 24 hours  Prevent damage to your graft or fistula    Do not wear tight-fitting clothing over your graft or fistula  Do not wear tight jewelry on the arm or leg with the graft or fistula  Tell healthcare providers not to do, IVs, blood draws, and blood pressure readings in the arm with your graft or fistula  Do not allow flu shots or vaccinations in your arm with your graft or fistula  Follow up with your healthcare provider as directedProcedural Sedation   WHAT YOU NEED TO KNOW:   Procedural sedation is medicine used during procedures to help you feel relaxed and calm  You will remember little to none of the procedure  After sedation you may feel tired, weak, or unsteady on your feet  You may also have trouble concentrating or short-term memory loss  These symptoms should go away in 24 hours or less  DISCHARGE INSTRUCTIONS:     Call 911 or have someone else call for any of the following:   · You have sudden trouble breathing      · You cannot be woken  Contact Interventional Radiology at [phone]   [place] PATIENTS: Contact Interventional Radiology at [phone]) Filippo Cerda PATIENTS: Contact Interventional Radiology at [phone]) if any of the following occur:     · You have a severe headache or dizziness      · Your heart is beating faster than usual     · You have a fever or chills      · Your skin is itchy, swollen, or you have a rash      · You have nausea or are vomiting for more than 8 hours after the procedure       · You have questions or concerns about your condition or care  Self-care:   · Have someone stay with you for 24 hours  This person can drive you to errands and help you do things around the house  This person can also watch for problems       · Rest and do quiet activities for 24 hours  Do not exercise, ride a bike, or play sports  Stand up slowly to prevent dizziness and falls  Take short walks around the house with another person   Slowly return to your usual activities the next day       · Do not drive or use dangerous machines or tools for 24 hours  You may injure yourself or others  Examples include a lawnmower, saw, or drill  Do not return to work for 24 hours if you use dangerous machines or tools for work       · Do not make important decisions for 24 hours  For example, do not sign important papers or invest money       · Drink liquids as directed  Liquids help flush the sedation medicine out of your body  Ask how much liquid to drink each day and which liquids are best for you       · Eat small, frequent meals to prevent nausea and vomiting  Start with clear liquids such as juice or broth  If you do not vomit after clear liquids, you can eat your usual foods       · Do not drink alcohol or take medicines that make you drowsy  This includes medicines that help you sleep and anxiety medicines  Ask your healthcare provider if it is safe for you to take pain medicine  Follow up with your healthcare provider as directed: Write down your questions so you remember to ask them during your visits  Procedural Sedation   WHAT YOU NEED TO KNOW:   Procedural sedation is medicine used during procedures to help you feel relaxed and calm  You will remember little to none of the procedure  After sedation you may feel tired, weak, or unsteady on your feet  You may also have trouble concentrating or short-term memory loss  These symptoms should go away in 24 hours or less  DISCHARGE INSTRUCTIONS:     Call 911 or have someone else call for any of the following:   · You have sudden trouble breathing      · You cannot be woken        Contact Interventional Radiology at [phone]   [place] PATIENTS: Contact Interventional Radiology at [phone]) Fatemeh Landrum PATIENTS: Contact Interventional Radiology at [phone]) if any of the following occur:     · You have a severe headache or dizziness      · Your heart is beating faster than usual     · You have a fever or chills      · Your skin is itchy, swollen, or you have a rash      · You have nausea or are vomiting for more than 8 hours after the procedure       · You have questions or concerns about your condition or care  Self-care:   · Have someone stay with you for 24 hours  This person can drive you to errands and help you do things around the house  This person can also watch for problems       · Rest and do quiet activities for 24 hours  Do not exercise, ride a bike, or play sports  Stand up slowly to prevent dizziness and falls  Take short walks around the house with another person  Slowly return to your usual activities the next day       · Do not drive or use dangerous machines or tools for 24 hours  You may injure yourself or others  Examples include a lawnmower, saw, or drill  Do not return to work for 24 hours if you use dangerous machines or tools for work       · Do not make important decisions for 24 hours  For example, do not sign important papers or invest money       · Drink liquids as directed  Liquids help flush the sedation medicine out of your body  Ask how much liquid to drink each day and which liquids are best for you       · Eat small, frequent meals to prevent nausea and vomiting  Start with clear liquids such as juice or broth  If you do not vomit after clear liquids, you can eat your usual foods       · Do not drink alcohol or take medicines that make you drowsy  This includes medicines that help you sleep and anxiety medicines  Ask your healthcare provider if it is safe for you to take pain medicine  Follow up with your healthcare provider as directed: Write down your questions so you remember to ask them during your visits

## 2021-02-26 NOTE — BRIEF OP NOTE (RAD/CATH)
INTERVENTIONAL RADIOLOGY PROCEDURE NOTE    Date: 2/26/2021    Procedure: FISTULOGRAM    Preoperative diagnosis:   1  Chest pain    2  Hypertension    3  ESRD (end stage renal disease) on dialysis ([facility] )    4  LVH (left ventricular hypertrophy)    5  ESRD (end stage renal disease) ([facility] )    6  Abnormal EKG         Postoperative diagnosis: Same  Surgeon: Bishop Demetrius MD     Assistant: None  No qualified resident was available  Blood loss: minimal    Specimens: none     Findings: PTA of multiple recurrent stenoses with placement of a covered stent proximally from the previous stent  Complications: None immediate      Anesthesia: conscious sedation and local

## 2021-02-26 NOTE — ASSESSMENT & PLAN NOTE
63year old male presenting with chest pain and hypertensive urgency    - Serial troponins negative  - Continue blood pressure control  - No further inpatient ischemic testing needed at this time    Recent Labs     02/24/21  0315 02/24/21  0758 02/24/21  1056   TROPONINI 0 02 0 03 0 03

## 2021-02-27 ENCOUNTER — APPOINTMENT (INPATIENT)
Dept: DIALYSIS | Facility: HOSPITAL | Age: 56
DRG: 252 | End: 2021-02-27
Payer: MEDICARE

## 2021-02-27 VITALS
OXYGEN SATURATION: 99 % | DIASTOLIC BLOOD PRESSURE: 81 MMHG | WEIGHT: 165.34 LBS | TEMPERATURE: 98.2 F | BODY MASS INDEX: 23.67 KG/M2 | RESPIRATION RATE: 18 BRPM | HEART RATE: 78 BPM | HEIGHT: 70 IN | SYSTOLIC BLOOD PRESSURE: 130 MMHG

## 2021-02-27 LAB
ANION GAP SERPL CALCULATED.3IONS-SCNC: 13 MMOL/L (ref 4–13)
BUN SERPL-MCNC: 62 MG/DL (ref 5–25)
CALCIUM SERPL-MCNC: 7.8 MG/DL (ref 8.3–10.1)
CHLORIDE SERPL-SCNC: 93 MMOL/L (ref 100–108)
CO2 SERPL-SCNC: 26 MMOL/L (ref 21–32)
CREAT SERPL-MCNC: 13.25 MG/DL (ref 0.6–1.3)
GFR SERPL CREATININE-BSD FRML MDRD: 4 ML/MIN/1.73SQ M
GLUCOSE SERPL-MCNC: 111 MG/DL (ref 65–140)
GLUCOSE SERPL-MCNC: 118 MG/DL (ref 65–140)
GLUCOSE SERPL-MCNC: 131 MG/DL (ref 65–140)
GLUCOSE SERPL-MCNC: 182 MG/DL (ref 65–140)
MAGNESIUM SERPL-MCNC: 2.2 MG/DL (ref 1.6–2.6)
POTASSIUM SERPL-SCNC: 5.4 MMOL/L (ref 3.5–5.3)
SODIUM SERPL-SCNC: 132 MMOL/L (ref 136–145)

## 2021-02-27 PROCEDURE — 99232 SBSQ HOSP IP/OBS MODERATE 35: CPT | Performed by: INTERNAL MEDICINE

## 2021-02-27 PROCEDURE — 5A1D70Z PERFORMANCE OF URINARY FILTRATION, INTERMITTENT, LESS THAN 6 HOURS PER DAY: ICD-10-PCS | Performed by: STUDENT IN AN ORGANIZED HEALTH CARE EDUCATION/TRAINING PROGRAM

## 2021-02-27 PROCEDURE — 82948 REAGENT STRIP/BLOOD GLUCOSE: CPT

## 2021-02-27 PROCEDURE — 83735 ASSAY OF MAGNESIUM: CPT | Performed by: STUDENT IN AN ORGANIZED HEALTH CARE EDUCATION/TRAINING PROGRAM

## 2021-02-27 PROCEDURE — 80048 BASIC METABOLIC PNL TOTAL CA: CPT | Performed by: STUDENT IN AN ORGANIZED HEALTH CARE EDUCATION/TRAINING PROGRAM

## 2021-02-27 PROCEDURE — 99239 HOSP IP/OBS DSCHRG MGMT >30: CPT | Performed by: STUDENT IN AN ORGANIZED HEALTH CARE EDUCATION/TRAINING PROGRAM

## 2021-02-27 RX ORDER — HYDROMORPHONE HCL/PF 1 MG/ML
0.2 SYRINGE (ML) INJECTION ONCE
Status: COMPLETED | OUTPATIENT
Start: 2021-02-27 | End: 2021-02-27

## 2021-02-27 RX ORDER — NIFEDIPINE 90 MG/1
90 TABLET, EXTENDED RELEASE ORAL DAILY
Qty: 30 TABLET | Refills: 0 | Status: SHIPPED | OUTPATIENT
Start: 2021-02-28 | End: 2021-06-07 | Stop reason: SDUPTHER

## 2021-02-27 RX ORDER — LABETALOL 200 MG/1
200 TABLET, FILM COATED ORAL EVERY 12 HOURS SCHEDULED
Qty: 60 TABLET | Refills: 0 | Status: SHIPPED | OUTPATIENT
Start: 2021-02-27 | End: 2021-12-06

## 2021-02-27 RX ORDER — DOXAZOSIN MESYLATE 4 MG/1
4 TABLET ORAL
Qty: 30 TABLET | Refills: 0 | Status: SHIPPED | OUTPATIENT
Start: 2021-02-27 | End: 2022-07-22

## 2021-02-27 RX ADMIN — HEPARIN SODIUM 5000 UNITS: 5000 INJECTION INTRAVENOUS; SUBCUTANEOUS at 05:47

## 2021-02-27 RX ADMIN — CINACALCET HYDROCHLORIDE 30 MG: 30 TABLET, FILM COATED ORAL at 08:22

## 2021-02-27 RX ADMIN — SEVELAMER HYDROCHLORIDE 800 MG: 800 TABLET, FILM COATED PARENTERAL at 08:22

## 2021-02-27 RX ADMIN — HYDROMORPHONE HYDROCHLORIDE 0.2 MG: 1 INJECTION, SOLUTION INTRAMUSCULAR; INTRAVENOUS; SUBCUTANEOUS at 08:18

## 2021-02-27 RX ADMIN — SEVELAMER HYDROCHLORIDE 800 MG: 800 TABLET, FILM COATED PARENTERAL at 12:34

## 2021-02-27 RX ADMIN — PREDNISOLONE ACETATE 1 DROP: 10 SUSPENSION/ DROPS OPHTHALMIC at 08:26

## 2021-02-27 RX ADMIN — HEPARIN SODIUM 5000 UNITS: 5000 INJECTION INTRAVENOUS; SUBCUTANEOUS at 13:48

## 2021-02-27 RX ADMIN — PANTOPRAZOLE SODIUM 20 MG: 20 TABLET, DELAYED RELEASE ORAL at 05:47

## 2021-02-27 RX ADMIN — BRIMONIDINE TARTRATE 1 DROP: 1.5 SOLUTION OPHTHALMIC at 05:47

## 2021-02-27 RX ADMIN — INSULIN LISPRO 1 UNITS: 100 INJECTION, SOLUTION INTRAVENOUS; SUBCUTANEOUS at 12:34

## 2021-02-27 RX ADMIN — ATORVASTATIN CALCIUM 40 MG: 40 TABLET, FILM COATED ORAL at 15:50

## 2021-02-27 RX ADMIN — ISONIAZID 300 MG: 100 TABLET ORAL at 12:34

## 2021-02-27 RX ADMIN — SEVELAMER HYDROCHLORIDE 800 MG: 800 TABLET, FILM COATED PARENTERAL at 15:50

## 2021-02-27 RX ADMIN — BRIMONIDINE TARTRATE 1 DROP: 1.5 SOLUTION OPHTHALMIC at 13:48

## 2021-02-27 NOTE — ASSESSMENT & PLAN NOTE
63year old male presenting with chest pain and hypertensive urgency  - Serial troponins negative  - No further inpatient ischemic testing needed at this time  - Discharge with labetalol, nifedipine, and cardura

## 2021-02-27 NOTE — SOCIAL WORK
LSW covering on Saturday  Rec a consult that pt goes to dialysis and uses the bus  Pt is asking for help to get to/from dialysis  Met with pt while getting dialysis  Pt stated his sister will now transport to/from dialysis  Discuss with pt about [employer] ride and gave him application to fill out as a back up plan if sister is unable to transport in the future  MD is planning on discharging pt after dialysis today and pt's sister will transport home

## 2021-02-27 NOTE — PLAN OF CARE
Problem: Potential for Falls  Goal: Patient will remain free of falls  Description: INTERVENTIONS:  - Assess patient frequently for physical needs  -  Identify cognitive and physical deficits and behaviors that affect risk of falls    -  [place] fall precautions as indicated by assessment   - Educate patient/family on patient safety including physical limitations  - Instruct patient to call for assistance with activity based on assessment  - Modify environment to reduce risk of injury  - Consider OT/PT consult to assist with strengthening/mobility  Outcome: Progressing     Problem: PAIN - ADULT  Goal: Verbalizes/displays adequate comfort level or baseline comfort level  Description: Interventions:  - Encourage patient to monitor pain and request assistance  - Assess pain using appropriate pain scale  - Administer analgesics based on type and severity of pain and evaluate response  - Implement non-pharmacological measures as appropriate and evaluate response  - Consider cultural and social influences on pain and pain management  - Notify physician/advanced practitioner if interventions unsuccessful or patient reports new pain  Outcome: Progressing     Problem: INFECTION - ADULT  Goal: Absence or prevention of progression during hospitalization  Description: INTERVENTIONS:  - Assess and monitor for signs and symptoms of infection  - Monitor lab/diagnostic results  - Monitor all insertion sites, i e  indwelling lines, tubes, and drains  - Monitor endotracheal if appropriate and nasal secretions for changes in amount and color  - [place] appropriate cooling/warming therapies per order  - Administer medications as ordered  - Instruct and encourage patient and family to use good hand hygiene technique  - Identify and instruct in appropriate isolation precautions for identified infection/condition  Outcome: Progressing  Goal: Absence of fever/infection during neutropenic period  Description: INTERVENTIONS:  - Monitor WBC    Outcome: Progressing     Problem: SAFETY ADULT  Goal: Patient will remain free of falls  Description: INTERVENTIONS:  - Assess patient frequently for physical needs  -  Identify cognitive and physical deficits and behaviors that affect risk of falls    -  [place] fall precautions as indicated by assessment   - Educate patient/family on patient safety including physical limitations  - Instruct patient to call for assistance with activity based on assessment  - Modify environment to reduce risk of injury  - Consider OT/PT consult to assist with strengthening/mobility  Outcome: Progressing  Goal: Maintain or return to baseline ADL function  Description: INTERVENTIONS:  -  Assess patient's ability to carry out ADLs; assess patient's baseline for ADL function and identify physical deficits which impact ability to perform ADLs (bathing, care of mouth/teeth, toileting, grooming, dressing, etc )  - Assess/evaluate cause of self-care deficits   - Assess range of motion  - Assess patient's mobility; develop plan if impaired  - Assess patient's need for assistive devices and provide as appropriate  - Encourage maximum independence but intervene and supervise when necessary  - Involve family in performance of ADLs  - Assess for home care needs following discharge   - Consider OT consult to assist with ADL evaluation and planning for discharge  - Provide patient education as appropriate  Outcome: Progressing  Goal: Maintain or return mobility status to optimal level  Description: INTERVENTIONS:  - Assess patient's baseline mobility status (ambulation, transfers, stairs, etc )    - Identify cognitive and physical deficits and behaviors that affect mobility  - Identify mobility aids required to assist with transfers and/or ambulation (gait belt, sit-to-stand, lift, walker, cane, etc )  - [place] fall precautions as indicated by assessment  - Record patient progress and toleration of activity level on Mobility SBAR; progress patient to next Phase/Stage  - Instruct patient to call for assistance with activity based on assessment  - Consider rehabilitation consult to assist with strengthening/weightbearing, etc   Outcome: Progressing     Problem: DISCHARGE PLANNING  Goal: Discharge to home or other facility with appropriate resources  Description: INTERVENTIONS:  - Identify barriers to discharge w/patient and caregiver  - Arrange for needed discharge resources and transportation as appropriate  - Identify discharge learning needs (meds, wound care, etc )  - Arrange for interpretive services to assist at discharge as needed  - Refer to Case Management Department for coordinating discharge planning if the patient needs post-hospital services based on physician/advanced practitioner order or complex needs related to functional status, cognitive ability, or social support system  Outcome: Progressing     Problem: Knowledge Deficit  Goal: Patient/family/caregiver demonstrates understanding of disease process, treatment plan, medications, and discharge instructions  Description: Complete learning assessment and assess knowledge base  Interventions:  - Provide teaching at level of understanding  - Provide teaching via preferred learning methods  Outcome: Progressing     Problem: Nutrition/Hydration-ADULT  Goal: Nutrient/Hydration intake appropriate for improving, restoring or maintaining nutritional needs  Description: Monitor and assess patient's nutrition/hydration status for malnutrition  Collaborate with interdisciplinary team and initiate plan and interventions as ordered  Monitor patient's weight and dietary intake as ordered or per policy  Utilize nutrition screening tool and intervene as necessary  Determine patient's food preferences and provide high-protein, high-caloric foods as appropriate       INTERVENTIONS:  - Monitor oral intake, urinary output, labs, and treatment plans  - Assess nutrition and hydration status and recommend course of action  - Evaluate amount of meals eaten  - Assist patient with eating if necessary   - Allow adequate time for meals  - Recommend/ encourage appropriate diets, oral nutritional supplements, and vitamin/mineral supplements  - Order, calculate, and assess calorie counts as needed  - Recommend, monitor, and adjust tube feedings and TPN/PPN based on assessed needs  - Assess need for intravenous fluids  - Provide specific nutrition/hydration education as appropriate  - Include patient/family/caregiver in decisions related to nutrition  Outcome: Progressing     Problem: METABOLIC, FLUID AND ELECTROLYTES - ADULT  Goal: Electrolytes maintained within normal limits  Description: INTERVENTIONS:  - Monitor labs and assess patient for signs and symptoms of electrolyte imbalances  - Administer electrolyte replacement as ordered  - Monitor response to electrolyte replacements, including repeat lab results as appropriate  - Instruct patient on fluid and nutrition as appropriate  Outcome: Progressing  Goal: Fluid balance maintained  Description: INTERVENTIONS:  - Monitor labs   - Monitor I/O and WT  - Instruct patient on fluid and nutrition as appropriate  - Assess for signs & symptoms of volume excess or deficit  Outcome: Progressing

## 2021-02-27 NOTE — DISCHARGE SUMMARY
Discharge- Paddy Trevin 1965, 63 y o  male MRN: [id number]    Unit/Bed#: E5 -01 Encounter: [id number]    Primary Care Provider: Laura Rodriguez MD   Date and time admitted to hospital: 2/24/2021  2:07 AM        * Hypertensive urgency  Assessment & Plan  63year old male presenting with chest pain and hypertensive urgency  - Serial troponins negative  - No further inpatient ischemic testing needed at this time  - Discharge with labetalol, nifedipine, and cardura  Type 2 diabetes mellitus with chronic kidney disease on chronic dialysis, with long-term current use of insulin [facility])  Assessment & Plan  Lab Results   Component Value Date    HGBA1C 7 5 (H) 01/18/2021       Recent Labs     02/26/21  1548 02/26/21  2032 02/27/21  0700 02/27/21  1103   POCGLU 210* 134 111 182*       Blood Sugar Average: Last 72 hrs:  (P) 149     Continue home regimen    Pulmonary nodule  Assessment & Plan  Incidental finding of 4 mm right upper lobe nodule  Twelve month follow-up  Updated discharge navigator      Positive QuantiFERON-TB Gold test  Assessment & Plan  Latent TB, being treated and follows with ID    ESRD (end stage renal disease) ([facility] [address] )  Assessment & Plan  Recent Labs     02/25/21  0510 02/27/21  0546   BUN 73* 62*   CREATININE 13 20* 13 25*   EGFR 4 4     Continue [facility]ThS HD  - Patient is undergoing evaluation for kidney transplant with [facility]    Legally blind  Assessment & Plan  Secondary to diabetic retinopathy      Discharging Physician / Practitioner: Elana Sevilla MD  PCP: Laura Rodriguez MD  Admission Date:   Admission Orders (From admission, onward)     Ordered        02/25/21 0931  Inpatient Admission  Once         02/24/21 0513  Place in Observation  Once                   Discharge Date: 02/27/21    Resolved Problems  Date Reviewed: 2/27/2021    None          Consultations During Hospital Stay:  · Renal, cardiology    Procedures Performed:   Recurrence of the stenosis at the peripheral margin of the previously placed stent treated with angioplasty and covered stent in addition to dilatation of multiple additional stenoses  Significant Findings / Test Results:   · Imaging  · XR Chest pa & Lateral    No acute cardiopulmonary disease      Findings are stable      · CTA chest pe study    No evidence for pulmonary embolism      Small pericardial effusion      4 mm right upper lobe nodule  Based on current Fleischner Society 2017 Guidelines on incidental pulmonary nodule, no routine follow-up is needed if the patient is considered low risk for lung cancer  If the patient is considered high risk for lung cancer, 12 month follow-up non-contrast chest CT is recommended  · Transthoracic Echo        Transthoracic echo  LEFT VENTRICLE:  Normal left ventricular systolic function, EF 26%  Moderate concentric left ventricular hypertrophy  Normal left ventricular cavity size  Normal left ventricular wall motion without regional wall motion abnormalities  Grade 1 left  ventricular diastolic dysfunction  Normal left atrial pressure      MITRAL VALVE:  There was trace regurgitation      AORTIC VALVE:  Tricuspid aortic valve with aortic sclerosis  No stenosis or regurgitation      TRICUSPID VALVE:  There was trace regurgitation      PULMONIC VALVE:  There was trace regurgitation      PERICARDIUM:  Small hemodynamically insignificant concentric pericardial effusion      Incidental Findings:   4 mm right upper lobe nodule  Based on current Fleischner Society 2017 Guidelines on incidental pulmonary nodule, no routine follow-up is needed if the patient is considered low risk for lung cancer  If the patient is considered high risk for lung cancer, 12 month follow-up non-contrast chest CT is recommended  Test Results Pending at Discharge (will require follow up):    · None     Outpatient Tests Requested:  · Follow-up: PCP and renal    Complications:  None    Reason for Admission: hypertensive urgency    Hospital Course:     Bee Stanley is a 63 y o  male patient who originally presented to the hospital on 2/24/2021 due to chest pain  65-year-old male admitted due to atypical chest pain  He also presented with hypertensive urgency requiring IV medications to bring his blood pressure down  He was showing evidence of fluid overload does renal was consulted  Due to EKG changes despite negative serial troponins cardiology was consulted  Upon further review by our cardiologist, CTA was recommended due to concern for some right heart strain  Fortunately, he did not show any evidence of pulmonary embolism  But it did show a 4 mm right upper lobe nodule which should be evaluated in 12 months by his primary care provider if indicated  His blood pressure continued to remain elevated but improved after his regimen was further optimized  IR was consulted for AV fistulogram   They noted recurrence of the stenosis at the peripheral margin of the previously placed stent  They manage this with angioplasty in addition to dilatation of multiple additional stenosis  Currently hemodynamically stable and was cleared for discharge with both Cardiology and Renal     Please see above list of diagnoses and related plan for additional information  Condition at Discharge: fair     Discharge Day Visit / Exam:     Subjective:  Patient seen and examined at bedside  He was having some left upper extremity pain this morning, but improved after dilaudid was given  Vitals: Blood Pressure: 130/81 (02/27/21 1515)  Pulse: 78 (02/27/21 1515)  Temperature: 98 2 °F (36 8 °C) (02/27/21 1515)  Temp Source: Temporal (02/27/21 1515)  Respirations: 18 (02/27/21 1515)  Height: 5' 10" (177 8 cm) (02/24/21 1728)  Weight - Scale: 75 kg (165 lb 5 5 oz) (02/24/21 1728)  SpO2: 99 % (02/27/21 1515)  Exam:   Physical Exam  Vitals signs reviewed  HENT:      Head: Normocephalic        Nose: Nose normal       Mouth/Throat:      Mouth: Mucous membranes are moist    Eyes:      General: No scleral icterus  Cardiovascular:      Rate and Rhythm: Normal rate and regular rhythm  Pulmonary:      Effort: Pulmonary effort is normal  No respiratory distress  Breath sounds: No wheezing or rales  Abdominal:      General: There is no distension  Palpations: Abdomen is soft  Tenderness: There is no abdominal tenderness  Musculoskeletal:      Comments: LUE fistula   Skin:     General: Skin is warm  Neurological:      Mental Status: He is alert  Mental status is at baseline  Psychiatric:         Mood and Affect: Mood normal          Behavior: Behavior normal        Discussion with Family: sister    Discharge instructions/Information to patient and family:   See after visit summary for information provided to patient and family  Provisions for Follow-Up Care:  See after visit summary for information related to follow-up care and any pertinent home health orders  Disposition:     Home    For Discharges to [facility] SN[facility]:   · Not Applicable to this Patient - Not Applicable to this Patient    Planned Readmission: No     Discharge Statement:  I spent 41 minutes discharging the patient  This time was spent on the day of discharge  I had direct contact with the patient on the day of discharge  Greater than 50% of the total time was spent examining patient, answering all patient questions, arranging and discussing plan of care with patient as well as directly providing post-discharge instructions  Additional time then spent on discharge activities  Discharge Medications:  See after visit summary for reconciled discharge medications provided to patient and family        ** Please Note: This note has been constructed using a voice recognition system **

## 2021-02-27 NOTE — ASSESSMENT & PLAN NOTE
Recent Labs     02/25/21  0510 02/27/21  0546   BUN 73* 62*   CREATININE 13 20* 13 25*   EGFR 4 4     Continue [facility] HD  - Patient is undergoing evaluation for kidney transplant with [facility] 9[facility]

## 2021-02-27 NOTE — PLAN OF CARE
Problem: Potential for Falls  Goal: Patient will remain free of falls  Description: INTERVENTIONS:  - Assess patient frequently for physical needs  -  Identify cognitive and physical deficits and behaviors that affect risk of falls    -  [place] fall precautions as indicated by assessment   - Educate patient/family on patient safety including physical limitations  - Instruct patient to call for assistance with activity based on assessment  - Modify environment to reduce risk of injury  - Consider OT/PT consult to assist with strengthening/mobility  Outcome: Adequate for Discharge     Problem: PAIN - ADULT  Goal: Verbalizes/displays adequate comfort level or baseline comfort level  Description: Interventions:  - Encourage patient to monitor pain and request assistance  - Assess pain using appropriate pain scale  - Administer analgesics based on type and severity of pain and evaluate response  - Implement non-pharmacological measures as appropriate and evaluate response  - Consider cultural and social influences on pain and pain management  - Notify physician/advanced practitioner if interventions unsuccessful or patient reports new pain  Outcome: Adequate for Discharge     Problem: INFECTION - ADULT  Goal: Absence or prevention of progression during hospitalization  Description: INTERVENTIONS:  - Assess and monitor for signs and symptoms of infection  - Monitor lab/diagnostic results  - Monitor all insertion sites, i e  indwelling lines, tubes, and drains  - Monitor endotracheal if appropriate and nasal secretions for changes in amount and color  - [place] appropriate cooling/warming therapies per order  - Administer medications as ordered  - Instruct and encourage patient and family to use good hand hygiene technique  - Identify and instruct in appropriate isolation precautions for identified infection/condition  Outcome: Adequate for Discharge  Goal: Absence of fever/infection during neutropenic period  Description: INTERVENTIONS:  - Monitor WBC    Outcome: Adequate for Discharge     Problem: SAFETY ADULT  Goal: Patient will remain free of falls  Description: INTERVENTIONS:  - Assess patient frequently for physical needs  -  Identify cognitive and physical deficits and behaviors that affect risk of falls    -  [place] fall precautions as indicated by assessment   - Educate patient/family on patient safety including physical limitations  - Instruct patient to call for assistance with activity based on assessment  - Modify environment to reduce risk of injury  - Consider OT/PT consult to assist with strengthening/mobility  Outcome: Adequate for Discharge  Goal: Maintain or return to baseline ADL function  Description: INTERVENTIONS:  -  Assess patient's ability to carry out ADLs; assess patient's baseline for ADL function and identify physical deficits which impact ability to perform ADLs (bathing, care of mouth/teeth, toileting, grooming, dressing, etc )  - Assess/evaluate cause of self-care deficits   - Assess range of motion  - Assess patient's mobility; develop plan if impaired  - Assess patient's need for assistive devices and provide as appropriate  - Encourage maximum independence but intervene and supervise when necessary  - Involve family in performance of ADLs  - Assess for home care needs following discharge   - Consider OT consult to assist with ADL evaluation and planning for discharge  - Provide patient education as appropriate  Outcome: Adequate for Discharge  Goal: Maintain or return mobility status to optimal level  Description: INTERVENTIONS:  - Assess patient's baseline mobility status (ambulation, transfers, stairs, etc )    - Identify cognitive and physical deficits and behaviors that affect mobility  - Identify mobility aids required to assist with transfers and/or ambulation (gait belt, sit-to-stand, lift, walker, cane, etc )  - [place] fall precautions as indicated by assessment  - Record patient progress and toleration of activity level on Mobility SBAR; progress patient to next Phase/Stage  - Instruct patient to call for assistance with activity based on assessment  - Consider rehabilitation consult to assist with strengthening/weightbearing, etc   Outcome: Adequate for Discharge     Problem: DISCHARGE PLANNING  Goal: Discharge to home or other facility with appropriate resources  Description: INTERVENTIONS:  - Identify barriers to discharge w/patient and caregiver  - Arrange for needed discharge resources and transportation as appropriate  - Identify discharge learning needs (meds, wound care, etc )  - Arrange for interpretive services to assist at discharge as needed  - Refer to Case Management Department for coordinating discharge planning if the patient needs post-hospital services based on physician/advanced practitioner order or complex needs related to functional status, cognitive ability, or social support system  Outcome: Adequate for Discharge     Problem: Knowledge Deficit  Goal: Patient/family/caregiver demonstrates understanding of disease process, treatment plan, medications, and discharge instructions  Description: Complete learning assessment and assess knowledge base  Interventions:  - Provide teaching at level of understanding  - Provide teaching via preferred learning methods  Outcome: Adequate for Discharge     Problem: Nutrition/Hydration-ADULT  Goal: Nutrient/Hydration intake appropriate for improving, restoring or maintaining nutritional needs  Description: Monitor and assess patient's nutrition/hydration status for malnutrition  Collaborate with interdisciplinary team and initiate plan and interventions as ordered  Monitor patient's weight and dietary intake as ordered or per policy  Utilize nutrition screening tool and intervene as necessary  Determine patient's food preferences and provide high-protein, high-caloric foods as appropriate       INTERVENTIONS:  - Monitor oral intake, urinary output, labs, and treatment plans  - Assess nutrition and hydration status and recommend course of action  - Evaluate amount of meals eaten  - Assist patient with eating if necessary   - Allow adequate time for meals  - Recommend/ encourage appropriate diets, oral nutritional supplements, and vitamin/mineral supplements  - Order, calculate, and assess calorie counts as needed  - Recommend, monitor, and adjust tube feedings and TPN/PPN based on assessed needs  - Assess need for intravenous fluids  - Provide specific nutrition/hydration education as appropriate  - Include patient/family/caregiver in decisions related to nutrition  Outcome: Adequate for Discharge     Problem: METABOLIC, FLUID AND ELECTROLYTES - ADULT  Goal: Electrolytes maintained within normal limits  Description: INTERVENTIONS:  - Monitor labs and assess patient for signs and symptoms of electrolyte imbalances  - Administer electrolyte replacement as ordered  - Monitor response to electrolyte replacements, including repeat lab results as appropriate  - Instruct patient on fluid and nutrition as appropriate  Outcome: Adequate for Discharge  Goal: Fluid balance maintained  Description: INTERVENTIONS:  - Monitor labs   - Monitor I/O and WT  - Instruct patient on fluid and nutrition as appropriate  - Assess for signs & symptoms of volume excess or deficit  Outcome: Adequate for Discharge     Problem: Nutrition/Hydration-ADULT  Goal: Nutrient/Hydration intake appropriate for improving, restoring or maintaining nutritional needs  Description: Monitor and assess patient's nutrition/hydration status for malnutrition  Collaborate with interdisciplinary team and initiate plan and interventions as ordered  Monitor patient's weight and dietary intake as ordered or per policy  Utilize nutrition screening tool and intervene as necessary  Determine patient's food preferences and provide high-protein, high-caloric foods as appropriate  INTERVENTIONS:  - Monitor oral intake, urinary output, labs, and treatment plans  - Assess nutrition and hydration status and recommend course of action  - Evaluate amount of meals eaten  - Assist patient with eating if necessary   - Allow adequate time for meals  - Recommend/ encourage appropriate diets, oral nutritional supplements, and vitamin/mineral supplements  - Order, calculate, and assess calorie counts as needed  - Recommend, monitor, and adjust tube feedings and TPN/PPN based on assessed needs  - Assess need for intravenous fluids  - Provide specific nutrition/hydration education as appropriate  - Include patient/family/caregiver in decisions related to nutrition  Outcome: Adequate for Discharge     Problem: METABOLIC, FLUID AND ELECTROLYTES - ADULT  Goal: Electrolytes maintained within normal limits  Description: INTERVENTIONS:  - Monitor labs and assess patient for signs and symptoms of electrolyte imbalances  - Administer electrolyte replacement as ordered  - Monitor response to electrolyte replacements, including repeat lab results as appropriate  - Instruct patient on fluid and nutrition as appropriate  Outcome: Adequate for Discharge  Goal: Fluid balance maintained  Description: INTERVENTIONS:  - Monitor labs   - Monitor I/O and WT  - Instruct patient on fluid and nutrition as appropriate  - Assess for signs & symptoms of volume excess or deficit  Outcome: Adequate for Discharge     Problem: METABOLIC, FLUID AND ELECTROLYTES - ADULT  Goal: Electrolytes maintained within normal limits  Description: INTERVENTIONS:  - Monitor labs and assess patient for signs and symptoms of electrolyte imbalances  - Administer electrolyte replacement as ordered  - Monitor response to electrolyte replacements, including repeat lab results as appropriate  - Instruct patient on fluid and nutrition as appropriate  Outcome: Adequate for Discharge  Goal: Fluid balance maintained  Description: INTERVENTIONS:  - Monitor labs   - Monitor I/O and WT  - Instruct patient on fluid and nutrition as appropriate  - Assess for signs & symptoms of volume excess or deficit  Outcome: Adequate for Discharge

## 2021-02-27 NOTE — PLAN OF CARE
Pt for 4hr HD treatment today  Left upper arm fistula cannulated with #15g needles x2 without difficulty  Able to achieve ordered BFR  UF 3kg as tolerated  Using 2K+ bath for treatment  AM labs pending  Will adjust bath as needed per protocol        Problem: METABOLIC, FLUID AND ELECTROLYTES - ADULT  Goal: Electrolytes maintained within normal limits  Description: INTERVENTIONS:  - Monitor labs and assess patient for signs and symptoms of electrolyte imbalances  - Administer electrolyte replacement as ordered  - Monitor response to electrolyte replacements, including repeat lab results as appropriate  - Instruct patient on fluid and nutrition as appropriate  Outcome: Progressing  Goal: Fluid balance maintained  Description: INTERVENTIONS:  - Monitor labs   - Monitor I/O and WT  - Instruct patient on fluid and nutrition as appropriate  - Assess for signs & symptoms of volume excess or deficit  Outcome: Progressing

## 2021-02-27 NOTE — PROGRESS NOTES
Progress note- Nephrology   Paddy Trevin 63 y o  male MRN: [id number]  Unit/Bed#: E5 -01 Encounter: [id number]      ASSESSMENT/PLAN:  End-stage renal disease: On maintenance hemodialysis TTS at [facility]  -status post hemodialysis today without difficulty for UF negative two 0 5 L  -target weight 74 5 kg  -post HD weight today 75 kg  -will continue to monitor I/O, lab values volume status  -patient for discharge today to resume outpatient schedule on Tuesday    Access:  AVF positive bruit and thrill    Hypertension:  BP acceptable  -admitted with hypertensive urgency  -will continue to UF as blood pressure allows  -avoid variations in blood pressure  -currently on Coreg 6 25 mg b i d , Cardura 4 mg p o  Daily, nifedipine 90 mg p o  Daily, Lasix 80 mg p o  Daily, labetalol 200 mg q 12 hours was added    Anemia likely Chronic Kidney Disease:  -recent hemoglobin at goal at 13  -no need for COLETTE at this time  -transfuse for hemoglobin less than 7 0    Bone mineral disease of Chronic Kidney Disease   -hyperphosphatemia:  On Renagel 800 mg t i d  With meals   -secondary hyperparathyroidism on Sensipar 30 mg daily   -continue renal diet   -continue check PTH and phosphorus as outpatient    Hyperkalemia: In the setting of end-stage renal disease  -will treat with dialysis  -maintain low-potassium diet    Hyponatremia:  Suspect volume mediated  -will treat with dialysis  -fluid restriction of 1500 mL per day recommended    Other history:  Latent TB-recent positive QuantiFERON gold testing  -continues on treatment with isoniazid      Disposition:  Patient stable from a dialysis standpoint  Okay for discharge when medically ready    SUBJECTIVE:  Patient seen examined  Denies chest pain shortness of breath    Patient denies issues with dialysis today    OBJECTIVE:  Current Weight: Weight - Scale: 75 kg (165 lb 5 5 oz)  Vitals:    02/27/21 1100 02/27/21 1130 02/27/21 1148 02/27/21 1515   BP: 114/68 130/70 143/82 130/81   BP Location:   Right arm Right arm   Pulse: 78 78 81 78   Resp: 18 18 18 18   Temp:   97 5 °F (36 4 °C) 98 2 °F (36 8 °C)   TempSrc:   Temporal Temporal   SpO2:    99%   Weight:       Height:           Intake/Output Summary (Last 24 hours) at 2/27/2021 1649  Last data filed at 2/27/2021 1148  Gross per 24 hour   Intake 500 ml   Output 3005 ml   Net -2505 ml     General:  No acute distress, cooperative,   Skin:  Warm and dry without rash  ENMT:  Mucous membranes moist, sclera anicteric, tongue is midline  Neck:  Supple without JVD  Respiratory:  Essentially clear on auscultation without crackles, rhonchi, wheezes  Cardiac:  Regular rate and rhythm without rub  GI:  Soft, nontender, no distention, active bowel sounds  Extremities:  No significant edema noted bilaterally    Left upper extremity AV fistula positive bruit and thrill  Neuro:  Alert oriented and awake, no gross deficits  Psych:  Appropriate affect    Medications:    Current Facility-Administered Medications:     aspirin (ECOTRIN LOW STRENGTH) EC tablet 81 mg, 81 mg, Oral, Daily, Kassy Driscoll PA-C, 81 mg at 02/26/21 1038    atorvastatin (LIPITOR) tablet 40 mg, 40 mg, Oral, Daily With Navid Driscoll PA-C, 40 mg at 02/27/21 1550    brimonidine (ALPHAGAN P) 0 15 % ophthalmic solution 1 drop, 1 drop, Left Eye, Q8H [address], Marty Small MD, 1 drop at 02/27/21 1348    cinacalcet (SENSIPAR) tablet 30 mg, 30 mg, Oral, Daily, Kassy Driscoll PA-C, 30 mg at 02/27/21 4649    doxazosin (CARDURA) tablet 4 mg, 4 mg, Oral, HS, KRISTI Aldridge, 4 mg at 02/26/21 2140    furosemide (LASIX) tablet 80 mg, 80 mg, Oral, Daily, Kassy Driscoll PA-C, 80 mg at 02/26/21 1037    heparin (porcine) subcutaneous injection 5,000 Units, 5,000 Units, Subcutaneous, Q8H [address], Caroline Chatman PA-C, 5,000 Units at 02/27/21 1348    insulin glargine (LANTUS) subcutaneous injection 30 Units 0 3 mL, 30 Units, Subcutaneous, HS, Kassy Driscoll, ALMA DELIA, 30 Units at 02/26/21 2141    insulin lispro (HumaLOG) 100 units/mL subcutaneous injection 1-6 Units, 1-6 Units, Subcutaneous, TID AC, 1 Units at 02/27/21 1234 **AND** Fingerstick Glucose (POCT), , , TID AC, Henna Cagle MD    isoniazid (NYDRAZID) tablet 300 mg, 300 mg, Oral, Daily, Kassy Driscoll PA-C, 300 mg at 02/27/21 1234    labetalol (NORMODYNE) tablet 200 mg, 200 mg, Oral, Q12H [facility], Chris Ramachandran MD, 200 mg at 02/26/21 2139    Labetalol HCl (NORMODYNE) injection 10 mg, 10 mg, Intravenous, Q4H PRN, Kassy Driscoll PA-C, 10 mg at 02/24/21 0818    NIFEdipine (PROCARDIA XL) 24 hr tablet 90 mg, 90 mg, Oral, Daily, KRISTI Doan, 90 mg at 02/26/21 1038    ondansetron (ZOFRAN) injection 4 mg, 4 mg, Intravenous, Q6H PRN, Kasys Driscoll PA-C, 4 mg at 02/24/21 1020    pantoprazole (PROTONIX) EC tablet 20 mg, 20 mg, Oral, Early Morning, Kassy Driscoll PA-C, 20 mg at 02/27/21 0547    prednisoLONE acetate (PRED FORTE) 1 % ophthalmic suspension 1 drop, 1 drop, Left Eye, BID, Henna Cagle MD, 1 drop at 02/27/21 2837    pyridoxine (VITAMIN B6) tablet 50 mg, 50 mg, Oral, Daily, Kassy Driscoll PA-C, 50 mg at 02/26/21 1039    sevelamer (RENAGEL) tablet 800 mg, 800 mg, Oral, TID With Meals, Kassy Driscoll PA-C, 800 mg at 02/27/21 1550    Laboratory Results:  Results from last 7 days   Lab Units 02/27/21  0546 02/25/21  0510 02/24/21  0315   WBC Thousand/uL  --   --  10 13   HEMOGLOBIN g/dL  --   --  13 0   HEMATOCRIT %  --   --  40 8   PLATELETS Thousands/uL  --   --  268   SODIUM mmol/L 132* 134* 135*   POTASSIUM mmol/L 5 4* 5 4* 5 0   CHLORIDE mmol/L 93* 95* 94*   CO2 mmol/L 26 23 34*   BUN mg/dL 62* 73* 52*   CREATININE mg/dL 13 25* 13 20* 10 92*   CALCIUM mg/dL 7 8* 8 0* 8 0*   MAGNESIUM mg/dL 2 2 2 4  --

## 2021-02-27 NOTE — ASSESSMENT & PLAN NOTE
Lab Results   Component Value Date    HGBA1C 7 5 (H) 01/18/2021       Recent Labs     02/26/21  1548 02/26/21 2032 02/27/21  0700 02/27/21  1103   POCGLU 210* 134 111 182*       Blood Sugar Average: Last 72 hrs:  (P) 149     Continue home regimen

## 2021-02-27 NOTE — NURSING NOTE
Instructions given to patient and sister  All questions answered  IV removed  Patient left in stable condition  Medications returned from pharmacy

## 2021-03-02 ENCOUNTER — TRANSITIONAL CARE MANAGEMENT (OUTPATIENT)
Dept: INTERNAL MEDICINE CLINIC | Facility: CLINIC | Age: 56
End: 2021-03-02

## 2021-03-08 ENCOUNTER — OFFICE VISIT (OUTPATIENT)
Dept: INTERNAL MEDICINE CLINIC | Facility: CLINIC | Age: 56
End: 2021-03-08
Payer: MEDICARE

## 2021-03-08 VITALS
SYSTOLIC BLOOD PRESSURE: 100 MMHG | WEIGHT: 172.2 LBS | TEMPERATURE: 96.6 F | DIASTOLIC BLOOD PRESSURE: 60 MMHG | RESPIRATION RATE: 18 BRPM | OXYGEN SATURATION: 98 % | HEART RATE: 70 BPM | BODY MASS INDEX: 24.65 KG/M2 | HEIGHT: 70 IN

## 2021-03-08 DIAGNOSIS — R91.1 PULMONARY NODULE: ICD-10-CM

## 2021-03-08 DIAGNOSIS — IMO0002 UNCONTROLLED SECONDARY DIABETES MELLITUS WITH STAGE 5 CKD (GFR<15): ICD-10-CM

## 2021-03-08 DIAGNOSIS — Z79.4 TYPE 2 DIABETES MELLITUS WITH CHRONIC KIDNEY DISEASE ON CHRONIC DIALYSIS, WITH LONG-TERM CURRENT USE OF INSULIN (HCC): ICD-10-CM

## 2021-03-08 DIAGNOSIS — N18.6 TYPE 2 DIABETES MELLITUS WITH CHRONIC KIDNEY DISEASE ON CHRONIC DIALYSIS, WITH LONG-TERM CURRENT USE OF INSULIN (HCC): ICD-10-CM

## 2021-03-08 DIAGNOSIS — I16.0 HYPERTENSIVE URGENCY: Primary | ICD-10-CM

## 2021-03-08 DIAGNOSIS — Z99.2 TYPE 2 DIABETES MELLITUS WITH CHRONIC KIDNEY DISEASE ON CHRONIC DIALYSIS, WITH LONG-TERM CURRENT USE OF INSULIN (HCC): ICD-10-CM

## 2021-03-08 DIAGNOSIS — E11.319 DIABETIC RETINOPATHY OF BOTH EYES ASSOCIATED WITH TYPE 2 DIABETES MELLITUS, MACULAR EDEMA PRESENCE UNSPECIFIED, UNSPECIFIED RETINOPATHY SEVERITY (HCC): ICD-10-CM

## 2021-03-08 DIAGNOSIS — E11.42 DIABETIC POLYNEUROPATHY ASSOCIATED WITH TYPE 2 DIABETES MELLITUS (HCC): ICD-10-CM

## 2021-03-08 DIAGNOSIS — R68.81 EARLY SATIETY: ICD-10-CM

## 2021-03-08 DIAGNOSIS — E11.22 TYPE 2 DIABETES MELLITUS WITH CHRONIC KIDNEY DISEASE ON CHRONIC DIALYSIS, WITH LONG-TERM CURRENT USE OF INSULIN (HCC): ICD-10-CM

## 2021-03-08 PROCEDURE — 99495 TRANSJ CARE MGMT MOD F2F 14D: CPT | Performed by: INTERNAL MEDICINE

## 2021-03-08 RX ORDER — BLOOD-GLUCOSE METER
KIT MISCELLANEOUS
Qty: 100 EACH | Refills: 2 | Status: SHIPPED | OUTPATIENT
Start: 2021-03-08 | End: 2021-03-09 | Stop reason: SDUPTHER

## 2021-03-08 RX ORDER — FLASH GLUCOSE SENSOR
1 KIT MISCELLANEOUS DAILY
Qty: 1 EACH | Refills: 1 | Status: SHIPPED | OUTPATIENT
Start: 2021-03-08 | End: 2021-06-07 | Stop reason: SDUPTHER

## 2021-03-08 RX ORDER — LANCETS 30 GAUGE
EACH MISCELLANEOUS 2 TIMES DAILY
Qty: 100 EACH | Refills: 0 | Status: SHIPPED | OUTPATIENT
Start: 2021-03-08 | End: 2021-09-03 | Stop reason: SDUPTHER

## 2021-03-08 RX ORDER — FLASH GLUCOSE SCANNING READER
1 EACH MISCELLANEOUS DAILY
Qty: 1 DEVICE | Refills: 1 | Status: SHIPPED | OUTPATIENT
Start: 2021-03-08 | End: 2021-06-07 | Stop reason: SDUPTHER

## 2021-03-09 DIAGNOSIS — IMO0002 UNCONTROLLED SECONDARY DIABETES MELLITUS WITH STAGE 5 CKD (GFR<15): ICD-10-CM

## 2021-03-09 RX ORDER — BLOOD-GLUCOSE METER
KIT MISCELLANEOUS
Qty: 100 EACH | Refills: 2 | Status: SHIPPED | OUTPATIENT
Start: 2021-03-09 | End: 2021-09-03 | Stop reason: SDUPTHER

## 2021-03-09 RX ORDER — LANCETS 28 GAUGE
EACH MISCELLANEOUS
Qty: 100 EACH | Refills: 1 | Status: SHIPPED | OUTPATIENT
Start: 2021-03-09

## 2021-03-09 NOTE — PROGRESS NOTES
Assessment/Plan:  1  Hypertensive urgency    2  Type 2 diabetes mellitus with chronic kidney disease on chronic dialysis, with long-term current use of insulin (formerly [facility])  -     NM gastric emptying; Future; Expected date: 03/08/2021  -     Continuous Blood Gluc Sensor (FreeStyle Jessica 14 Day Sensor) MISC; Use 1 each daily  -     Continuous Blood Gluc  (FreeStyle Jessica 14 Day [place]) LAUREN; Use 1 each daily  -     Lancets MISC; Use 2 (two) times a day    3  Early satiety  -     NM gastric emptying; Future; Expected date: 03/08/2021    4  Pulmonary nodule  -     CT chest wo contrast; Future; Expected date: 02/01/2022    5  Diabetic retinopathy of both eyes associated with type 2 diabetes mellitus, macular edema presence unspecified, unspecified retinopathy severity (formerly [facility])  -     Continuous Blood Gluc Sensor (FreeStyle Jessica 14 Day Sensor) MISC; Use 1 each daily  -     Continuous Blood Gluc  (FreeStyle Jessica 14 Day [place]ubon) LAUREN; Use 1 each daily    6  Diabetic polyneuropathy associated with type 2 diabetes mellitus (formerly [facility])  -     Continuous Blood Gluc Sensor (FreeStyle Jessica 14 Day Sensor) MISC; Use 1 each daily  -     Continuous Blood Gluc  (FreeStyle Jessica 14 Day [place]on) LAUREN; Use 1 each daily    7  Uncontrolled secondary diabetes mellitus with stage 5 CKD (GFR<15) (formerly [facility])  -     glucose blood (FREESTYLE LITE) test strip; Test blood sugars three times daily  Blood pressure has been better controlled  For now continue the current regimen  Post dialysis hypertension continues may need reduction of regimen     Uncontrolled blood sugar, on dialysis, due to his visual issues with being legally blind, fluctuations in blood sugars her significant, I think he will benefit from continues glucose monitoring for better control of his blood sugars  Early satiety and epigastric fullness  No acid reflux, will get a gastric emptying study    Advised small portion size and avoid simple carbohydrates      Subjective:   Chief Complaint   Patient presents with    Transition of Care Management        Patient ID: Sincere Leblanc is a 63 y o  male  TCM Call (since 2/5/2021)     Date and time call was made  3/2/2021  1:13 PM    Patient was hospitialized at  [address]        Date of Admission  02/24/21    Date of discharge  02/27/21    Diagnosis  Hypertensive urgency    Disposition  Home    Were the patients medications reviewed and updated  Yes    Current Symptoms  None      TCM Call (since 2/5/2021)     Post hospital issues  None    Should patient be enrolled in anticoag monitoring? No    Did you obtain your prescribed medications  Yes    Do you need help managing your prescriptions or medications  No    Is transportation to your appointment needed  No    I have advised the patient to call PCP with any new or worsening symptoms  Erin Marx(MA)          Follow-up after hospital stay  Was admitted with chest discomfort due to uncontrolled hypertension  His medication regimen was adjusted  Since discharge she has been feeling okay no more chest pain  Feels fall in his epigastric area and has early satiety  Has been taking his blood pressure medications regularly  Blood sugars are anywhere between   Depends on sister mostly to check his blood sugar since he cannot do it himself due to visual issues  Trying to eat better, continues to have some constipation      The following portions of the patient's history were reviewed and updated as appropriate: current medications, past medical history, past social history and past surgical history      PHQ-9 Depression Screening    PHQ-9:   Frequency of the following problems over the past two weeks:               Current Outpatient Medications:     Aspirin Low Dose 81 MG EC tablet, take 1 tablet by mouth once daily, Disp: 90 tablet, Rfl: 1    atorvastatin (LIPITOR) 40 mg tablet, take 1 tablet by mouth once daily, Disp: 90 tablet, Rfl: 1    Basaglar KwikPen 100 units/mL injection pen, INJECT 30 UNITS UNDER THE SKIN DAILY, Disp: 15 mL, Rfl: 2    Blood Glucose Monitoring Suppl (ONE TOUCH ULTRA 2) w/Device KIT, by Does not apply route 3 (three) times a day, Disp: 1 each, Rfl: 0    brimonidine tartrate 0 2 % ophthalmic solution, Administer 1 drop into the left eye 2 (two) times a day, Disp: , Rfl:     Carboxymethylcellul-Glycerin (CLEAR EYES FOR DRY EYES OP), Apply 2 drops to eye 2 (two) times a day, Disp: , Rfl:     cinacalcet (SENSIPAR) 30 mg tablet, Take 30 mg by mouth daily, Disp: , Rfl:     cyclobenzaprine (FLEXERIL) 5 mg tablet, Take 1 tablet (5 mg total) by mouth daily at bedtime as needed for muscle spasms, Disp: 30 tablet, Rfl: 1    doxazosin (CARDURA) 4 mg tablet, Take 1 tablet (4 mg total) by mouth daily at bedtime, Disp: 30 tablet, Rfl: 0    furosemide (LASIX) 80 mg tablet, Take 1 tablet (80 mg total) by mouth daily, Disp: 90 tablet, Rfl: 1    glucose blood (FREESTYLE LITE) test strip, Test blood sugars three times daily  , Disp: 100 each, Rfl: 2    insulin aspart (NOVOLOG FLEXPEN) 100 Units/mL injection pen, 10U with breakfast and lunch, 10-14 U with dinner, Disp: 5 pen, Rfl: 2    isoniazid (NYDRAZID) 300 mg tablet, Take 1 tablet (300 mg total) by mouth daily, Disp: 30 tablet, Rfl: 8    labetalol (NORMODYNE) 200 mg tablet, Take 1 tablet (200 mg total) by mouth every 12 (twelve) hours, Disp: 60 tablet, Rfl: 0    LOKELMA 10 g PACK, , Disp: , Rfl:     NIFEdipine (PROCARDIA XL) 90 mg 24 hr tablet, Take 1 tablet (90 mg total) by mouth daily, Disp: 30 tablet, Rfl: 0    ONE TOUCH LANCETS MISC, by Does not apply route 3 (three) times a day, Disp: 100 each, Rfl: 1    pantoprazole (PROTONIX) 20 mg tablet, Take 1 tablet (20 mg total) by mouth daily, Disp: 30 tablet, Rfl: 3    polyethylene glycol (MIRALAX) 17 g packet, Take 17 g by mouth daily, Disp: 30 each, Rfl: 1    prednisoLONE acetate (PRED FORTE) 1 % ophthalmic suspension, Administer 1 drop into the left eye 2 (two) times a day , Disp: , Rfl: 0    sevelamer carbonate (RENVELA) 800 mg tablet, Take 800 mg by mouth 3 (three) times a day with meals, Disp: , Rfl:     Continuous Blood Gluc  (FreeStyle Jessica 14 Day [place]) LAUREN, Use 1 each daily, Disp: 1 Device, Rfl: 1    Continuous Blood Gluc Sensor (FreeStyle Jessica 14 Day Sensor) MISC, Use 1 each daily, Disp: 1 each, Rfl: 1    Lancets MISC, Use 2 (two) times a day, Disp: 100 each, Rfl: 0    pyridoxine (VITAMIN B6) 50 mg tablet, Take 1 tablet (50 mg total) by mouth daily, Disp: 30 tablet, Rfl: 6    Review of Systems   Constitutional: Positive for fatigue  Negative for fever and unexpected weight change  HENT: Negative for ear pain, hearing loss and sore throat  Eyes: Negative for pain and discharge  Respiratory: Negative for cough, chest tightness and shortness of breath  Cardiovascular: Negative for chest pain and palpitations  Gastrointestinal: Positive for abdominal pain  Negative for blood in stool, constipation, diarrhea and nausea  Genitourinary: Negative for dysuria, frequency and hematuria  Musculoskeletal: Negative for arthralgias and joint swelling  Skin: Negative for rash  Allergic/Immunologic: Negative for immunocompromised state  Neurological: Negative for dizziness and headaches  Hematological: Negative for adenopathy  Psychiatric/Behavioral: Negative for confusion and sleep disturbance  Objective:  /60 (BP Location: Left arm, Patient Position: Sitting, Cuff Size: Standard)   Pulse 70   Temp (!) 96 6 °F (35 9 °C)   Resp 18   Ht 5' 10" (1 778 m)   Wt 78 1 kg (172 lb 3 2 oz)   SpO2 98%   BMI 24 71 kg/m²      Physical Exam  Vitals signs and nursing note reviewed  Constitutional:       Appearance: Normal appearance  He is well-developed  HENT:      Head: Normocephalic and atraumatic        Right Ear: Tympanic membrane normal       Left Ear: Tympanic membrane normal       Nose: Nose normal    Eyes:      General:         Right eye: No discharge  Left eye: No discharge  Conjunctiva/sclera: Conjunctivae normal       Pupils: Pupils are equal, round, and reactive to light  Neck:      Musculoskeletal: Normal range of motion and neck supple  Thyroid: No thyromegaly  Cardiovascular:      Rate and Rhythm: Normal rate and regular rhythm  Chest Wall: PMI is not displaced  Heart sounds: Normal heart sounds, S1 normal and S2 normal  No murmur  Pulmonary:      Effort: Pulmonary effort is normal  No accessory muscle usage or respiratory distress  Breath sounds: Normal breath sounds  No rhonchi or rales  Abdominal:      General: Bowel sounds are normal       Palpations: Abdomen is soft  There is no shifting dullness  Tenderness: There is no abdominal tenderness  There is no rebound  Musculoskeletal: Normal range of motion  General: No tenderness  Lymphadenopathy:      Cervical: No cervical adenopathy  Skin:     General: Skin is warm  Findings: No rash  Neurological:      Mental Status: He is alert     Psychiatric:         Speech: Speech normal            Recent Results (from the past 1008 hour(s))   CBC and differential    Collection Time: 02/12/21  8:39 AM   Result Value Ref Range    WBC 5 83 4 31 - 10 16 Thousand/uL    RBC 4 77 3 88 - 5 62 Million/uL    Hemoglobin 13 7 12 0 - 17 0 g/dL    Hematocrit 43 9 36 5 - 49 3 %    MCV 92 82 - 98 fL    MCH 28 7 26 8 - 34 3 pg    MCHC 31 2 (L) 31 4 - 37 4 g/dL    RDW 15 1 11 6 - 15 1 %    MPV 9 7 8 9 - 12 7 fL    Platelets 907 776 - 152 Thousands/uL    nRBC 0 /100 WBCs    Neutrophils Relative 52 43 - 75 %    Immat GRANS % 0 0 - 2 %    Lymphocytes Relative 33 14 - 44 %    Monocytes Relative 11 4 - 12 %    Eosinophils Relative 3 0 - 6 %    Basophils Relative 1 0 - 1 %    Neutrophils Absolute 3 06 1 85 - 7 62 Thousands/µL    Immature Grans Absolute 0 02 0 00 - 0 20 Thousand/uL    Lymphocytes Absolute 1 92 0 60 - 4 47 Thousands/µL    Monocytes Absolute 0 63 0 17 - 1 22 Thousand/µL    Eosinophils Absolute 0 15 0 00 - 0 61 Thousand/µL    Basophils Absolute 0 05 0 00 - 0 10 Thousands/µL   Comprehensive metabolic panel    Collection Time: 02/12/21  8:39 AM   Result Value Ref Range    Sodium 136 136 - 145 mmol/L    Potassium 4 5 3 5 - 5 3 mmol/L    Chloride 98 (L) 100 - 108 mmol/L    CO2 30 21 - 32 mmol/L    ANION GAP 8 4 - 13 mmol/L    BUN 60 (H) 5 - 25 mg/dL    Creatinine 9 68 (H) 0 60 - 1 30 mg/dL    Glucose, Fasting 200 (H) 65 - 99 mg/dL    Calcium 8 1 (L) 8 3 - 10 1 mg/dL    AST 37 5 - 45 U/L    ALT 34 12 - 78 U/L    Alkaline Phosphatase 131 (H) 46 - 116 U/L    Total Protein 8 4 (H) 6 4 - 8 2 g/dL    Albumin 3 9 3 5 - 5 0 g/dL    Total Bilirubin 0 52 0 20 - 1 00 mg/dL    eGFR 6 ml/min/1 73sq m   Hepatitis C RNA, quantitative, PCR    Collection Time: 02/12/21  8:39 AM   Result Value Ref Range    HCV PCR Quantitative HCV Not Detected IU/mL    Test Information Comment    Quantiferon TB Gold Plus    Collection Time: 02/12/21  8:39 AM   Result Value Ref Range    QFT Nil 0 08 0 - 8 0 IU/ml    QFT TB1-NIL 3 76 IU/ml    QFT TB2-NIL 3 90 IU/ml    QFT Mitogen-NIL >10 00 IU/ml    QFT Final Interpretation Positive (A) Negative   ECG 12 lead    Collection Time: 02/24/21  2:10 AM   Result Value Ref Range    Ventricular Rate 88 BPM    Atrial Rate 88 BPM    TN Interval 138 ms    QRSD Interval 86 ms    QT Interval 384 ms    QTC Interval 464 ms    P Axis 70 degrees    QRS Axis 106 degrees    T Wave Axis 8 degrees   CBC and differential    Collection Time: 02/24/21  3:15 AM   Result Value Ref Range    WBC 10 13 4 31 - 10 16 Thousand/uL    RBC 4 51 3 88 - 5 62 Million/uL    Hemoglobin 13 0 12 0 - 17 0 g/dL    Hematocrit 40 8 36 5 - 49 3 %    MCV 91 82 - 98 fL    MCH 28 8 26 8 - 34 3 pg    MCHC 31 9 31 4 - 37 4 g/dL    RDW 14 4 11 6 - 15 1 %    MPV 9 5 8 9 - 12 7 fL    Platelets 633 251 - 610 Thousands/uL    nRBC 0 /100 WBCs    Neutrophils Relative 76 (H) 43 - 75 %    Immat GRANS % 1 0 - 2 %    Lymphocytes Relative 7 (L) 14 - 44 %    Monocytes Relative 14 (H) 4 - 12 %    Eosinophils Relative 2 0 - 6 %    Basophils Relative 0 0 - 1 %    Neutrophils Absolute 7 77 (H) 1 85 - 7 62 Thousands/µL    Immature Grans Absolute 0 05 0 00 - 0 20 Thousand/uL    Lymphocytes Absolute 0 74 0 60 - 4 47 Thousands/µL    Monocytes Absolute 1 37 (H) 0 17 - 1 22 Thousand/µL    Eosinophils Absolute 0 17 0 00 - 0 61 Thousand/µL    Basophils Absolute 0 03 0 00 - 0 10 Thousands/µL   Comprehensive metabolic panel    Collection Time: 02/24/21  3:15 AM   Result Value Ref Range    Sodium 135 (L) 136 - 145 mmol/L    Potassium 5 0 3 5 - 5 3 mmol/L    Chloride 94 (L) 100 - 108 mmol/L    CO2 34 (H) 21 - 32 mmol/L    ANION GAP 7 4 - 13 mmol/L    BUN 52 (H) 5 - 25 mg/dL    Creatinine 10 92 (H) 0 60 - 1 30 mg/dL    Glucose 234 (H) 65 - 140 mg/dL    Calcium 8 0 (L) 8 3 - 10 1 mg/dL    AST 50 (H) 5 - 45 U/L    ALT 15 12 - 78 U/L    Alkaline Phosphatase 129 (H) 46 - 116 U/L    Total Protein 8 1 6 4 - 8 2 g/dL    Albumin 3 5 3 5 - 5 0 g/dL    Total Bilirubin 0 59 0 20 - 1 00 mg/dL    eGFR 5 ml/min/1 73sq m   Lipase    Collection Time: 02/24/21  3:15 AM   Result Value Ref Range    Lipase 386 73 - 393 u/L   Troponin I    Collection Time: 02/24/21  3:15 AM   Result Value Ref Range    Troponin I 0 02 <=0 04 ng/mL   ECG 12 lead    Collection Time: 02/24/21  7:57 AM   Result Value Ref Range    Ventricular Rate 77 BPM    Atrial Rate 77 BPM    MT Interval 148 ms    QRSD Interval 84 ms    QT Interval 394 ms    QTC Interval 445 ms    P Axis 65 degrees    QRS Axis 107 degrees    T Wave Axis 0 degrees   Troponin I    Collection Time: 02/24/21  7:58 AM   Result Value Ref Range    Troponin I 0 03 <=0 04 ng/mL   ECG 12 lead    Collection Time: 02/24/21 10:51 AM   Result Value Ref Range    Ventricular Rate 77 BPM    Atrial Rate 77 BPM    MT Interval 154 ms QRSD Interval 94 ms    QT Interval 398 ms    QTC Interval 450 ms    P Axis 65 degrees    QRS Axis 94 degrees    T Wave Axis 37 degrees   Troponin I    Collection Time: 02/24/21 10:56 AM   Result Value Ref Range    Troponin I 0 03 <=0 04 ng/mL   Fingerstick Glucose (POCT)    Collection Time: 02/24/21  6:22 PM   Result Value Ref Range    POC Glucose 152 (H) 65 - 140 mg/dl   Fingerstick Glucose (POCT)    Collection Time: 02/24/21  9:28 PM   Result Value Ref Range    POC Glucose 256 (H) 65 - 140 mg/dl   Basic metabolic panel    Collection Time: 02/25/21  5:10 AM   Result Value Ref Range    Sodium 134 (L) 136 - 145 mmol/L    Potassium 5 4 (H) 3 5 - 5 3 mmol/L    Chloride 95 (L) 100 - 108 mmol/L    CO2 23 21 - 32 mmol/L    ANION GAP 16 (H) 4 - 13 mmol/L    BUN 73 (H) 5 - 25 mg/dL    Creatinine 13 20 (H) 0 60 - 1 30 mg/dL    Glucose 107 65 - 140 mg/dL    Calcium 8 0 (L) 8 3 - 10 1 mg/dL    eGFR 4 ml/min/1 73sq m   Magnesium    Collection Time: 02/25/21  5:10 AM   Result Value Ref Range    Magnesium 2 4 1 6 - 2 6 mg/dL   Fingerstick Glucose (POCT)    Collection Time: 02/25/21  6:55 AM   Result Value Ref Range    POC Glucose 91 65 - 140 mg/dl   ECG 12 lead    Collection Time: 02/25/21  9:03 AM   Result Value Ref Range    Ventricular Rate 68 BPM    Atrial Rate 68 BPM    ND Interval 144 ms    QRSD Interval 88 ms    QT Interval 428 ms    QTC Interval 455 ms    P Axis 81 degrees    QRS Axis 89 degrees    T Wave Axis 63 degrees   ECG 12 lead    Collection Time: 02/25/21  9:05 AM   Result Value Ref Range    Ventricular Rate 70 BPM    Atrial Rate 70 BPM    ND Interval 144 ms    QRSD Interval 88 ms    QT Interval 424 ms    QTC Interval 457 ms    P Axis 79 degrees    QRS Axis 87 degrees    T Wave Axis 63 degrees   Fingerstick Glucose (POCT)    Collection Time: 02/25/21 11:14 AM   Result Value Ref Range    POC Glucose 250 (H) 65 - 140 mg/dl   Fingerstick Glucose (POCT)    Collection Time: 02/25/21  3:36 PM   Result Value Ref Range POC Glucose 133 65 - 140 mg/dl   Fingerstick Glucose (POCT)    Collection Time: 02/25/21  9:30 PM   Result Value Ref Range    POC Glucose 253 (H) 65 - 140 mg/dl   Fingerstick Glucose (POCT)    Collection Time: 02/26/21  7:39 AM   Result Value Ref Range    POC Glucose 41 (L) 65 - 140 mg/dl   Fingerstick Glucose (POCT)    Collection Time: 02/26/21  8:01 AM   Result Value Ref Range    POC Glucose 122 65 - 140 mg/dl   Fingerstick Glucose (POCT)    Collection Time: 02/26/21 10:57 AM   Result Value Ref Range    POC Glucose 53 (L) 65 - 140 mg/dl   Fingerstick Glucose (POCT)    Collection Time: 02/26/21 11:15 AM   Result Value Ref Range    POC Glucose 98 65 - 140 mg/dl   Fingerstick Glucose (POCT)    Collection Time: 02/26/21  3:48 PM   Result Value Ref Range    POC Glucose 210 (H) 65 - 140 mg/dl   Fingerstick Glucose (POCT)    Collection Time: 02/26/21  8:32 PM   Result Value Ref Range    POC Glucose 134 65 - 140 mg/dl   Basic metabolic panel    Collection Time: 02/27/21  5:46 AM   Result Value Ref Range    Sodium 132 (L) 136 - 145 mmol/L    Potassium 5 4 (H) 3 5 - 5 3 mmol/L    Chloride 93 (L) 100 - 108 mmol/L    CO2 26 21 - 32 mmol/L    ANION GAP 13 4 - 13 mmol/L    BUN 62 (H) 5 - 25 mg/dL    Creatinine 13 25 (H) 0 60 - 1 30 mg/dL    Glucose 131 65 - 140 mg/dL    Calcium 7 8 (L) 8 3 - 10 1 mg/dL    eGFR 4 ml/min/1 73sq m   Magnesium    Collection Time: 02/27/21  5:46 AM   Result Value Ref Range    Magnesium 2 2 1 6 - 2 6 mg/dL   Fingerstick Glucose (POCT)    Collection Time: 02/27/21  7:00 AM   Result Value Ref Range    POC Glucose 111 65 - 140 mg/dl   Fingerstick Glucose (POCT)    Collection Time: 02/27/21 11:03 AM   Result Value Ref Range    POC Glucose 182 (H) 65 - 140 mg/dl   Fingerstick Glucose (POCT)    Collection Time: 02/27/21  3:52 PM   Result Value Ref Range    POC Glucose 118 65 - 140 mg/dl   ]    Procedure: Xr Chest Portable    Result Date: 1/21/2021  Narrative: CHEST INDICATION:   positive QuantiFeron goal, high risk for TB with cough  COMPARISON:  July 9, 2019 EXAM PERFORMED/VIEWS:  XR CHEST PORTABLE FINDINGS:  Stent in the left subclavian artery  Heart top normal in size, unchanged since the earlier exam   Pulmonary vessels unremarkable  The lungs are clear  No pneumothorax or pleural effusion  Osseous structures appear within normal limits for patient age  Impression: No evidence of acute abnormality in the chest  Workstation performed: [id number]     Procedure: Xr Chest 2 Views    Result Date: 2/24/2021  Narrative: CHEST INDICATION:   chest pain  COMPARISON:  1/20/2021 EXAM PERFORMED/VIEWS:  XR CHEST PA & LATERAL Images: 2 FINDINGS: Cardiomediastinal silhouette appears unremarkable  The lungs are clear  No pneumothorax or pleural effusion  Osseous structures appear within normal limits for patient age  Left subclavian region vascular stent again noted     Impression: No acute cardiopulmonary disease  Findings are stable Workstation performed: [id number]     Procedure: Cta Chest Pe Study    Result Date: 2/25/2021  Narrative: CTA - CHEST WITH IV CONTRAST - PULMONARY ANGIOGRAM INDICATION:   PE suspected, high pretest prob R/O PE given evidence of heart strain on EKG  COMPARISON: None  TECHNIQUE: CTA examination of the chest was performed using angiographic technique according to a protocol specifically tailored to evaluate for pulmonary embolism  Axial, sagittal, and coronal 2D reformatted images were created from the source data and  submitted for interpretation  In addition, coronal 3D MIP postprocessing was performed on the acquisition scanner  Radiation dose length product (DLP) for this visit:  238 mGy-cm   This examination, like all CT scans performed in the [facility], was performed utilizing techniques to minimize radiation dose exposure, including the use of iterative reconstruction and automated exposure control   IV Contrast:  85 mL of iohexol (OMNIPAQUE)  FINDINGS: PULMONARY ARTERIAL TREE:  No pulmonary embolus is seen  LUNGS:  There is a 4 mm nodule in the right upper lobe (image 41, series 3)  There is no tracheal or endobronchial lesion  PLEURA:  Unremarkable  HEART/GREAT VESSELS:  The heart is enlarged  Small pericardial effusion  MEDIASTINUM AND DORIS:  Unremarkable  CHEST WALL AND LOWER NECK:   Unremarkable  VISUALIZED STRUCTURES IN THE UPPER ABDOMEN:  Unremarkable  OSSEOUS STRUCTURES:  No acute fracture or destructive osseous lesion  Impression: No evidence for pulmonary embolism  Small pericardial effusion  4 mm right upper lobe nodule  Based on current Fleischner Society 2017 Guidelines on incidental pulmonary nodule, no routine follow-up is needed if the patient is considered low risk for lung cancer  If the patient is considered high risk for lung cancer, 12 month follow-up non-contrast chest CT is recommended  Workstation performed: [id number]     Procedure: Ir Av Fistulagram/graftogram    Result Date: 2/26/2021  Narrative: Left upper stomach fistulogram with angioplasty and stent Clinical History: Increased venous pressures Fluoroscopy time: 6 1 MINUTES ( 22 mGy ) Contrast: 36 mL of iohexol (OMNIPAQUE) Images: 19 Sedation time: 45 MINUTES Procedure: After explaining the risks and benefits of the procedure to the patient, informed consent was obtained  The patient's left upper arm brachiocephalic fistula was prepped and draped in usual sterile fashion and local anesthesia obtained with a 1% lidocaine solution  A micropuncture needle was used to access the dialysis fistula through which a 0 018 wire was advanced and a 5 Western Fifi exchange dilator inserted  A fistulogram was performed  FINDINGS: The arterial anastomosis is patent  There is mild narrowing in the proximal portion of the fistula which was previously dilated  The mid cephalic vein has a recurrent weblike stenosis   The more central cephalic vein has recurrence of the previously treated stenoses in addition to a mild recurrent stenosis on the central margin of the previously placed stent  Intervention: A 0 035 guidewire was advanced centrally and a 5 Italian sheath was inserted  The recurrent central cephalic vein stenoses were dilated with a 7 mm x 4 cm high-pressure balloon  Follow-up imaging demonstrates a satisfactory result of the central stenosis with partial response of the more peripheral stenosis  Given the prior treatment and recurrence, stenting of the lesion was performed  Once an 8 Western Fifi sheath was inserted, an 8 mm x 60 mm comparison was overlapped from the previously placed stent across the proximal stenoses and postdilated with a 7 mm balloon  The follow-up imaging demonstrates a satisfactory result, although there is mild narrowing of the inflow vessel which was successfully treated with a 7 mm and 8 mm conquest balloon resulting in minimal narrowing  Attention was given to the mid cephalic vein stenosis which responded to dilatation using a 9 mm x 40 mm conquest balloon  Following the procedure, the patient had a good thrill  The sheath was removed and hemostasis obtained with manual compression  There was no bleeding or hematoma  The patient tolerated the procedure well and left the department in stable condition  Impression: Impression: Recurrence of the stenosis at the peripheral margin of the previously placed stent treated with angioplasty and covered stent in addition to dilatation of multiple additional stenoses   Workstation performed: [id number]

## 2021-03-24 ENCOUNTER — OFFICE VISIT (OUTPATIENT)
Dept: INFECTIOUS DISEASES | Facility: CLINIC | Age: 56
End: 2021-03-24
Payer: MEDICARE

## 2021-03-24 VITALS
TEMPERATURE: 97.8 F | DIASTOLIC BLOOD PRESSURE: 68 MMHG | HEART RATE: 68 BPM | BODY MASS INDEX: 25.2 KG/M2 | HEIGHT: 70 IN | RESPIRATION RATE: 16 BRPM | WEIGHT: 176 LBS | SYSTOLIC BLOOD PRESSURE: 140 MMHG

## 2021-03-24 DIAGNOSIS — Z22.7 TB LUNG, LATENT: ICD-10-CM

## 2021-03-24 DIAGNOSIS — H54.8 LEGALLY BLIND: ICD-10-CM

## 2021-03-24 DIAGNOSIS — R76.12 POSITIVE QUANTIFERON-TB GOLD TEST: ICD-10-CM

## 2021-03-24 DIAGNOSIS — Z79.2 LONG TERM (CURRENT) USE OF ANTIBIOTICS: Primary | ICD-10-CM

## 2021-03-24 DIAGNOSIS — N18.6 ESRD (END STAGE RENAL DISEASE) (HCC): ICD-10-CM

## 2021-03-24 PROCEDURE — 99213 OFFICE O/P EST LOW 20 MIN: CPT | Performed by: PHYSICIAN ASSISTANT

## 2021-03-24 NOTE — PROGRESS NOTES
Assessment/Plan:    TB lung, latent  Patient found to have Positive Quantiferon Gold during work up for possible kidney transplant  Originally from [facility] and visits there regularly  No known exposures  No respiratory symptoms and clear CXR  Patient started on INH and B6 on 1/22/2021  Rifampin avoided due potential drug interactions with nifedipine and doxazosin  Patient continues to tolerate the regimen  No complaints  Labs satisfactory  Plan:   - continue isoniazid 300 mg po daily to complete 9 months  - continue vitamin B6 50 mg po daily to complete 9 months  - again reviewed with patient to avoid alcohol and hepatoxic medications while on this regimen  - monthly CBC with diff and LFTs  - RTO in one month    ESRD (end stage renal disease) ([facility] [address] )  On HD  Currently undergoing evaluation at [facility] for kidney transplant  Diagnoses and all orders for this visit:    Long term (current) use of antibiotics    Positive QuantiFERON-TB Gold test  -     Hepatic function panel; Future  -     CBC and differential; Future    TB lung, latent  -     Hepatic function panel; Future  -     CBC and differential; Future    Legally blind    ESRD (end stage renal disease) ([facility])          Subjective:      Patient ID: Corazon Mackenzie is a 63 y o  male  HPI  64 y/o male presents for one month office follow up today regarding latent Tb treatment  Patient started isoniazid and vitamin B6 on 1/22/2021  He has been tolerating the regimen without difficulty  He has no new complaints today  He has not started any new medications since his last visit  He does not drink alcohol  He is in the process of evaluation for kidney transplant through [facility]     The following portions of the patient's history were reviewed and updated as appropriate: allergies, current medications, past family history, past medical history, past social history, past surgical history and problem list     Review of Systems   Constitutional: Negative for chills and fever  Eyes:        + legally blind   Respiratory: Negative for cough and shortness of breath  Cardiovascular: Negative for leg swelling  Gastrointestinal: Negative for abdominal pain, diarrhea, nausea and vomiting  Skin: Negative for rash  Neurological: Negative for numbness  Psychiatric/Behavioral: Negative for behavioral problems and confusion  Objective:      /68 (BP Location: Right arm, Patient Position: Sitting, Cuff Size: Adult)   Pulse 68   Temp 97 8 °F (36 6 °C) (Temporal)   Resp 16   Ht 5' 10" (1 778 m)   Wt 79 8 kg (176 lb)   BMI 25 25 kg/m²          Physical Exam  Vitals signs reviewed  Constitutional:       General: He is not in acute distress  Appearance: Normal appearance  He is not ill-appearing, toxic-appearing or diaphoretic  HENT:      Head: Normocephalic and atraumatic  Eyes:      General: No scleral icterus  Right eye: No discharge  Left eye: No discharge  Conjunctiva/sclera: Conjunctivae normal    Cardiovascular:      Rate and Rhythm: Normal rate and regular rhythm  Pulmonary:      Effort: Pulmonary effort is normal  No respiratory distress  Breath sounds: Normal breath sounds  No stridor  No wheezing, rhonchi or rales  Chest:      Chest wall: No tenderness  Abdominal:      General: Bowel sounds are normal  There is no distension  Palpations: Abdomen is soft  Musculoskeletal:      Right lower leg: No edema  Left lower leg: No edema  Skin:     General: Skin is warm and dry  Coloration: Skin is not jaundiced or pale  Findings: No erythema or rash  Neurological:      Mental Status: He is alert and oriented to person, place, and time     Psychiatric:         Mood and Affect: Mood normal          Behavior: Behavior normal          Labs;   3/19/2021  Wbc: 5 0  Hgb: 13 4  Plt: 258  LFTs: WNL

## 2021-03-24 NOTE — ASSESSMENT & PLAN NOTE
Patient found to have Positive Quantiferon Gold during work up for possible kidney transplant  Originally from [facility] and visits there regularly  No known exposures  No respiratory symptoms and clear CXR  Patient started on INH and B6 on 1/22/2021  Rifampin avoided due potential drug interactions with nifedipine and doxazosin  Patient continues to tolerate the regimen  No complaints  Labs satisfactory  Plan:   - continue isoniazid 300 mg po daily to complete 9 months  - continue vitamin B6 50 mg po daily to complete 9 months  - again reviewed with patient to avoid alcohol and hepatoxic medications while on this regimen     - monthly CBC with diff and LFTs  - RTO in one month

## 2021-03-24 NOTE — PATIENT INSTRUCTIONS
- continue isoniazid and vitamin B6 as ordered  - obtain labs prior to next appointment  - continue to avoid alcohol and liver toxic medications  - Follow up in one month

## 2021-03-30 DIAGNOSIS — E78.5 HYPERLIPIDEMIA, UNSPECIFIED HYPERLIPIDEMIA TYPE: ICD-10-CM

## 2021-03-30 RX ORDER — ATORVASTATIN CALCIUM 40 MG/1
40 TABLET, FILM COATED ORAL DAILY
Qty: 90 TABLET | Refills: 1 | Status: SHIPPED | OUTPATIENT
Start: 2021-03-30 | End: 2021-06-07 | Stop reason: SDUPTHER

## 2021-04-14 ENCOUNTER — HOSPITAL ENCOUNTER (OUTPATIENT)
Dept: NUCLEAR MEDICINE | Facility: HOSPITAL | Age: 56
Discharge: HOME | End: 2021-04-14
Payer: MEDICARE

## 2021-04-14 DIAGNOSIS — N18.6 TYPE 2 DIABETES MELLITUS WITH CHRONIC KIDNEY DISEASE ON CHRONIC DIALYSIS, WITH LONG-TERM CURRENT USE OF INSULIN (HCC): ICD-10-CM

## 2021-04-14 DIAGNOSIS — K31.84 GASTROPARESIS DUE TO DM (HCC): Primary | ICD-10-CM

## 2021-04-14 DIAGNOSIS — E11.22 TYPE 2 DIABETES MELLITUS WITH CHRONIC KIDNEY DISEASE ON CHRONIC DIALYSIS, WITH LONG-TERM CURRENT USE OF INSULIN (HCC): ICD-10-CM

## 2021-04-14 DIAGNOSIS — E11.43 GASTROPARESIS DUE TO DM (HCC): Primary | ICD-10-CM

## 2021-04-14 DIAGNOSIS — R68.81 EARLY SATIETY: ICD-10-CM

## 2021-04-14 DIAGNOSIS — Z79.4 TYPE 2 DIABETES MELLITUS WITH CHRONIC KIDNEY DISEASE ON CHRONIC DIALYSIS, WITH LONG-TERM CURRENT USE OF INSULIN (HCC): ICD-10-CM

## 2021-04-14 DIAGNOSIS — Z99.2 TYPE 2 DIABETES MELLITUS WITH CHRONIC KIDNEY DISEASE ON CHRONIC DIALYSIS, WITH LONG-TERM CURRENT USE OF INSULIN (HCC): ICD-10-CM

## 2021-04-14 PROCEDURE — A9541 TC99M SULFUR COLLOID: HCPCS

## 2021-04-14 PROCEDURE — 78264 GASTRIC EMPTYING IMG STUDY: CPT

## 2021-04-14 PROCEDURE — G1004 CDSM NDSC: HCPCS

## 2021-04-19 ENCOUNTER — TELEPHONE (OUTPATIENT)
Dept: INTERNAL MEDICINE CLINIC | Facility: CLINIC | Age: 56
End: 2021-04-19

## 2021-04-19 NOTE — TELEPHONE ENCOUNTER
----- Message from Shobha Casillas MD sent at 4/14/2021  3:16 PM EDT -----  Please call the patient regarding his abnormal result   Gastroparesis, referral to GI

## 2021-04-21 NOTE — ASSESSMENT & PLAN NOTE
Recent Labs     02/24/21  0315 02/25/21  0510   BUN 52* 73*   CREATININE 10 92* 13 20*   EGFR 5 4     Continue TThS HD  - Renal following  - Patient is undergoing evaluation for kidney transplant with [facility] 98 O2sat dropped

## 2021-04-23 ENCOUNTER — OFFICE VISIT (OUTPATIENT)
Dept: CARDIOLOGY CLINIC | Facility: CLINIC | Age: 56
End: 2021-04-23
Payer: MEDICARE

## 2021-04-23 VITALS
WEIGHT: 173.6 LBS | SYSTOLIC BLOOD PRESSURE: 120 MMHG | DIASTOLIC BLOOD PRESSURE: 64 MMHG | BODY MASS INDEX: 24.91 KG/M2 | HEART RATE: 72 BPM

## 2021-04-23 DIAGNOSIS — E78.5 HYPERLIPIDEMIA, UNSPECIFIED HYPERLIPIDEMIA TYPE: ICD-10-CM

## 2021-04-23 DIAGNOSIS — N18.6 BENIGN HYPERTENSION WITH ESRD (END-STAGE RENAL DISEASE) (HCC): ICD-10-CM

## 2021-04-23 DIAGNOSIS — N18.9 CHRONIC KIDNEY DISEASE-MINERAL AND BONE DISORDER: ICD-10-CM

## 2021-04-23 DIAGNOSIS — I12.0 BENIGN HYPERTENSION WITH ESRD (END-STAGE RENAL DISEASE) (HCC): ICD-10-CM

## 2021-04-23 DIAGNOSIS — Z98.890 S/P ARTERIOVENOUS (AV) FISTULA CREATION: ICD-10-CM

## 2021-04-23 DIAGNOSIS — I51.7 LVH (LEFT VENTRICULAR HYPERTROPHY): ICD-10-CM

## 2021-04-23 DIAGNOSIS — Z01.810 PREOP CARDIOVASCULAR EXAM: Primary | ICD-10-CM

## 2021-04-23 DIAGNOSIS — E11.42 DIABETIC POLYNEUROPATHY ASSOCIATED WITH TYPE 2 DIABETES MELLITUS (HCC): ICD-10-CM

## 2021-04-23 DIAGNOSIS — H54.8 LEGALLY BLIND: ICD-10-CM

## 2021-04-23 DIAGNOSIS — M89.9 CHRONIC KIDNEY DISEASE-MINERAL AND BONE DISORDER: ICD-10-CM

## 2021-04-23 DIAGNOSIS — E83.9 CHRONIC KIDNEY DISEASE-MINERAL AND BONE DISORDER: ICD-10-CM

## 2021-04-23 DIAGNOSIS — N18.6 ESRD (END STAGE RENAL DISEASE) (HCC): ICD-10-CM

## 2021-04-23 PROCEDURE — 93000 ELECTROCARDIOGRAM COMPLETE: CPT

## 2021-04-23 PROCEDURE — 99214 OFFICE O/P EST MOD 30 MIN: CPT

## 2021-04-23 NOTE — H&P (VIEW-ONLY)
Cardiology Consultation   Kerrie Fife, MD Kevan Leventhal, MD Ramona Patterson, DO, MD Ana Kee DO, Crispin Billingsley DO, [facility] [place]  -------------------------------------------------------------------  Critical access hospital and Vascular Center  [address] [place], [place] [address]  [phone]  [phone]  04/23/21  Paddy Zhong  YOB: 1965   MRN: [id number]      Referring Physician: Noah Chacon MD  [address] [place] [address]     HPI:  I am seeing this patient in cardiology consultation for:  Preoperative cardiac evaluation prior to kidney transplant    Yulissa Reyna is a 61 y o  male with end-stage renal disease on hemodialysis, diabetic retinopathy, now legally blind, latent tuberculosis, hypertension, dyslipidemia, abnormal electrocardiogram who presents today for cardiac evaluation  He is currently being followed by [facility] for his end-stage renal disease and being considered for renal transplant  He has been recommended for right left heart catheterization in workup for renal transplant  His electrocardiogram today is abnormal, he has poor anterior R-wave progression, concerning for old anterior wall MI, there are inverted T-waves in the lateral leads, consider lateral ischemia  He had a stress test last year which was negative for ischemia however  Echo shows preserved ejection fraction    He has no known family history of heart disease  Review of Systems   Constitutional: Negative for chills and fever  HENT: Negative for facial swelling and sore throat  Eyes: Negative for visual disturbance  Respiratory: Negative for cough, chest tightness, shortness of breath and wheezing  Cardiovascular: Negative for chest pain, palpitations and leg swelling  Gastrointestinal: Negative for abdominal pain, blood in stool, constipation, diarrhea, nausea and vomiting     Endocrine: Negative for cold intolerance and heat intolerance  Genitourinary: Negative for decreased urine volume, difficulty urinating, dysuria and hematuria  Musculoskeletal: Negative for arthralgias, back pain and myalgias  Skin: Negative for rash  Neurological: Negative for dizziness, syncope, weakness and numbness  Psychiatric/Behavioral: Negative for agitation, behavioral problems and confusion  The patient is not nervous/anxious  OBJECTIVE  Vitals:    04/23/21 1354   BP: 120/64   Pulse: 72       Physical Exam   Constitutional: awake, alert and oriented, in no acute distress   Vital signs as above  Head: Normocephalic, without obvious abnormality, atraumatic  Eyes:  He is blind  Ear nose mouth and throat: ears are symmetrical bilaterally, hearing appears to be equal bilaterally, no nasal discharge or epistaxis, oropharynx is clear with moist mucous membranes  Neck:  Trachea is midline, neck is supple, no thyromegaly or significant lymphadenopathy, there is full range of motion  Lungs: clear to auscultation bilaterally, no wheezes, no rales, no rhonchi, no accessory muscle use, breathing is nonlabored  Heart: regular rate and rhythm, S1, S2 normal, no murmur, click, rub or gallop, no lower extremity edema  He has a left upper extremity AV fistula with bruit auscultated  Abdomen: soft, non-tender; bowel sounds normal; no masses,  no organomegaly  Psychiatric:  Patient is oriented to time, place, person, mood/affect is negative for depression, anxiety, agitation, appears to have appropriate insight  Skin: Skin is warm, dry, intact  No obvious rashes or lesions on exposed extremities  Nail beds are pink with no cyanosis or clubbing  EKG:  No results found for this visit on 04/23/21  IMPRESSION:  1  Preoperative cardiac evaluation   2  Hypertension   3  End-stage renal disease on hemodialysis, transplant workup in progress   4  Uncontrolled type 2 diabetes   5  Diabetic retinopathy, now blind   6   Latent tuberculosis   7  Abnormal electrocardiogram  8  Nonischemic nuclear stress 2020  9  Left ventricular hypertrophy by echo 2020    DISCUSSION/RECOMMENDATIONS:  Dalia Ley At this time he is stable  He has no chest pain or shortness of breath although EKG is abnormal and does suggest old anterior MI, does have noted LVH by echo, recent nonischemic nuclear stress   In workup for renal transplant, has been recommended for left to right heart catheterization  Orders were placed today along with CBC, BMP, PT INR   He has no chest pain, never gets chest pain or shortness of breath  Denies palpitations, denies any angina symptoms at all   Blood pressure is controlled today   Will obtain the right left heart catheterization and plan for follow-up thereafter    He follows with Dr Madison Harley in preparation for renal transplant    Radha Snider DO, [facility] [place]  --------------------------------------------------------------------------------  TREADMILL STRESS  No results found for this or any previous visit    ----------------------------------------------------------------------------------------------  NUCLEAR STRESS TEST:   Results for orders placed during the hospital encounter of 20   NM myocardial perfusion spect (stress and/or rest)    [facility] [address]    Rest/Stress Gated SPECT Myocardial Perfusion Imaging After Regadenoson    Patient: Lauri Staton  MR number: [id number]  Account number: [de-identified]  : 1965  Age: 63 years  Gender: Male  Status: Outpatient  Location: [address] [place] [address]  Height: 70 in  Weight: 171 lb  BP: 134/ 71 mmHg    Diagnosis: E78 5 - Hyperlipidemia, unspecified, I11 9 - Hypertensive heart disease without heart failure, N18 6 - End stage renal disease    Primary Physician:  Neeta Allison MD  RN:  Ximena Lofton RN  Technician:  [facility]  Group:  [facility]  Report Prepared By[de-identified]  Jennie Moss  Interpreting Physician:  Bon Silverman MD    INDICATIONS: Coronary artery disease  HISTORY: The patient is a 63year old  male  Chest pain status: no chest pain  Coronary artery disease risk factors: dyslipidemia, hypertension, and diabetes mellitus  Cardiovascular history: none significant  Co-morbidity:  history of end stage renal disease  Medications: a lipid lowering agent, alpha blocker and diabetic medications  Previous test results: abnormal ECG  PHYSICAL EXAM: Baseline physical exam screening: normal     REST ECG: Normal sinus rhythm at a rate of 67 beats per minute  Right axis deviation  Left ventricular hypertrophy  Anterior lateral T-wave inversions which may represent left ventricular strain pattern  PROCEDURE: The study was performed in the the [facility]  A regadenoson infusion pharmacologic stress test was performed  Gated SPECT myocardial perfusion imaging was performed after stress and at rest  Systolic blood pressure was  134 mmHg, at the start of the study  Diastolic blood pressure was 71 mmHg, at the start of the study  The heart rate was 66 bpm, at the start of the study  Regadenoson protocol:  HR bpm SBP mmHg DBP mmHg Symptoms  Baseline 66 134 71 none  1 min 75 -- -- none  2 min 82 113 59 none  3 min 79 129 60 none  4 min 78 -- -- none  5 min 77 -- -- none  6 min 78 127 64 none  No medications or fluids given  STRESS SUMMARY: Duration of pharmacologic stress was 3 min and 0 sec  Maximal work rate was 1 METs  Maximal heart rate during stress was 84 bpm  The heart rate response to stress was normal  There was normal resting blood pressure with an  appropriate response to stress  The rate-pressure product for the peak heart rate and blood pressure was 23045  There was no chest pain during stress  The stress test was terminated due to protocol completion  Pre oxygen saturation: 98 %    Peak oxygen saturation: 100 %  With pharmacologic stress using intravenous Lexiscan, there were no electrocardiographic changes over baseline  There were no arrhythmias  ISOTOPE ADMINISTRATION:  The radiopharmaceutical was injected at the peak effect of pharmacologic stress  MYOCARDIAL PERFUSION IMAGING:  Tomographic perfusion series at rest demonstrated uniformly normal uptake in activity  Following pharmacologic stress using intravenous Lexiscan, there was no change  GATED SPECT:  Normal left ventricular systolic function, EF 17%  Normal left ventricular cavity size  Normal left ventricular wall motion  SUMMARY:  -  Stress results: There was no chest pain during stress  IMPRESSIONS: 1  Abnormal resting EKG  2  Negative pharmacologic stress test with intravenous Lexiscan for angina pectoris or electrocardiographic changes of ischemia over baseline abnormalities noted  3  Normal left ventricular systolic function, EF 96%  4  Normal tomographic perfusion series  Prepared and signed by    Ju Philip MD  Signed 2020 08:17:41       No results found for this or any previous visit     --------------------------------------------------------------------------------  CATH:  No results found for this or any previous visit   --------------------------------------------------------------------------------  ECHO:   Results for orders placed during the hospital encounter of 21   Echo complete with contrast if indicated    Narrative [address] [place] [address]    [place], [address]  [phone]    Transthoracic Echocardiogram  2D, M-mode, Doppler, and Color Doppler    Study date:  2021    Patient: Brandi Kramer  MR number: [id number]  Account number: [de-identified]  : 1965  Age: 63 years  Gender: Male  Status: Inpatient  Location: Bedside  Height: 70 in  Weight: 165 lb  BP: 142/ 76 mmHg    Indications: Chest Pain    Diagnoses: R07 9 - Chest pain, unspecified    Sonographer:  Lima Shirley RDCS  Primary Physician:  Luc Rodrigez MD  Referring Physician:  Elba Arnett PA-C  Group:  [facility]  Interpreting Physician:  Monica Astudillo MD    SUMMARY    LEFT VENTRICLE:  Normal left ventricular systolic function, EF 81%  Moderate concentric left ventricular hypertrophy  Normal left ventricular cavity size  Normal left ventricular wall motion without regional wall motion abnormalities  Grade 1 left  ventricular diastolic dysfunction  Normal left atrial pressure  MITRAL VALVE:  There was trace regurgitation  AORTIC VALVE:  Tricuspid aortic valve with aortic sclerosis  No stenosis or regurgitation  TRICUSPID VALVE:  There was trace regurgitation  PULMONIC VALVE:  There was trace regurgitation  PERICARDIUM:  Small hemodynamically insignificant concentric pericardial effusion    SUMMARY MEASUREMENTS  2D measurements:  Unspecified Anatomy:   %FS was 38 8 %  Ao Diam was 3 3 cm  Ao asc was 2 9 cm   EDV(Teich) was 51 9 ml   EF(Teich) was 70 2 %  ESV(Teich) was 15 5 ml  IVSd was 1 5 cm  LA Diam was 3 3 cm  LAAs A4C was 11 3 cm2  LAESV A-L A4C was 21 2  ml  LAESV MOD A4C was 20 ml  LALs A4C was 5 1 cm  LVEDV MOD A4C was 20 6 ml  LVEF MOD A4C was 68 %  LVESV MOD A4C was 6 6 ml  LVIDd was 3 5 cm  LVIDs was 2 2 cm  LVLd A4C was 7 8 cm  LVLs A4C was 7 cm  LVPWd was 1 5 cm  RAAs A4C  was 17 6 cm2  RAESV A-L was 49 6 ml   RAESV MOD was 47 2 ml  RALs was 5 3 cm  RVIDd was 3 cm  SV MOD A4C was 14 ml   SV(Teich) was 36 5 ml   CW measurements:  Unspecified Anatomy:   AV Env  Ti was 266 4 ms   AV VTI was 22 9 cm   AV Vmax was 1 3 m/s  AV Vmean was 0 9 m/s  AV maxPG was 6 5 mmHg  AV meanPG was 3 3 mmHg  TR Vmax was 1 9 m/s   TR maxPG was 14 9 mmHg  MM measurements:  Unspecified Anatomy:   TAPSE was 2 1 cm  PW measurements:  Unspecified Anatomy:   DVI was 1 1   E' Sept was 0 1 m/s  E/E' Sept was 11 2     LVOT Env Ti was 304 5 ms  LVOT VTI was 25 4 cm  LVOT Vmax was 1 2 m/s  LVOT Vmean was 0 8 m/s  LVOT maxPG was 6 mmHg  LVOT meanPG was 3 2 mmHg  MV A  Lonnie was 0 8 m/s  MV Dec [place] was 2 1 m/s2  MV DecT was 294 5 ms   MV E Lonnie was 0 6 m/s  MV E/A Ratio was 0 7   MV PHT was 85 4 ms  MVA By PHT was 2 6 cm2  HISTORY: PRIOR HISTORY: Hypercholesterolemia, Hypertension, ESRD, DM    PROCEDURE: The procedure was performed at the bedside  This was a routine study  The transthoracic approach was used  The study included complete 2D imaging, M-mode, complete spectral Doppler, and color Doppler  The heart rate was 70 bpm,  at the start of the study  Images were obtained from the parasternal, apical, subcostal, and suprasternal notch acoustic windows  Image quality was adequate  LEFT VENTRICLE: Normal left ventricular systolic function, EF 18%  Moderate concentric left ventricular hypertrophy  Normal left ventricular cavity size  Normal left ventricular wall motion without regional wall motion abnormalities  Grade  1 left ventricular diastolic dysfunction  Normal left atrial pressure  RIGHT VENTRICLE: The size was normal  Systolic function was normal  Wall thickness was normal     LEFT ATRIUM: Size was normal     RIGHT ATRIUM: Size was normal     MITRAL VALVE: Valve structure was normal  There was normal leaflet separation  DOPPLER: The transmitral velocity was within the normal range  There was no evidence for stenosis  There was trace regurgitation  AORTIC VALVE: Tricuspid aortic valve with aortic sclerosis  No stenosis or regurgitation  TRICUSPID VALVE: The valve structure was normal  There was normal leaflet separation  DOPPLER: The transtricuspid velocity was within the normal range  There was no evidence for stenosis  There was trace regurgitation  PULMONIC VALVE: Leaflets exhibited normal thickness, no calcification, and normal cuspal separation   DOPPLER: The transpulmonic velocity was within the normal range  There was trace regurgitation  PERICARDIUM: Small hemodynamically insignificant concentric pericardial effusion    AORTA: The root exhibited normal size  PULMONARY ARTERY: The size was normal  DOPPLER: Systolic pressure was within the normal range  SYSTEM MEASUREMENT TABLES    2D  %FS: 38 8 %  Ao Diam: 3 3 cm  Ao asc: 2 9 cm  EDV(Teich): 51 9 ml  EF(Teich): 70 2 %  ESV(Teich): 15 5 ml  IVSd: 1 5 cm  LA Diam: 3 3 cm  LAAs A4C: 11 3 cm2  LAESV A-L A4C: 21 2 ml  LAESV MOD A4C: 20 ml  LALs A4C: 5 1 cm  LVEDV MOD A4C: 20 6 ml  LVEF MOD A4C: 68 %  LVESV MOD A4C: 6 6 ml  LVIDd: 3 5 cm  LVIDs: 2 2 cm  LVLd A4C: 7 8 cm  LVLs A4C: 7 cm  LVPWd: 1 5 cm  RAAs A4C: 17 6 cm2  RAESV A-L: 49 6 ml  RAESV MOD: 47 2 ml  RALs: 5 3 cm  RVIDd: 3 cm  SV MOD A4C: 14 ml  SV(Teich): 36 5 ml    CW  AV Env  Ti: 266 4 ms  AV VTI: 22 9 cm  AV Vmax: 1 3 m/s  AV Vmean: 0 9 m/s  AV maxP 5 mmHg  AV meanPG: 3 3 mmHg  TR Vmax: 1 9 m/s  TR maxP 9 mmHg    MM  TAPSE: 2 1 cm    PW  DVI: 1 1  E' Sept: 0 1 m/s  E/E' Sept: 11 2  LVOT Env  Ti: 304 5 ms  LVOT VTI: 25 4 cm  LVOT Vmax: 1 2 m/s  LVOT Vmean: 0 8 m/s  LVOT maxP mmHg  LVOT meanPG: 3 2 mmHg  MV A Lonnie: 0 8 m/s  MV Dec [place]: 2 1 m/s2  MV DecT: 294 5 ms  MV E Lonnie: 0 6 m/s  MV E/A Ratio: 0 7  MV PHT: 85 4 ms  MVA By PHT: 2 6 cm2    [facility] Accredited [facility]chocardi[facility] Laboratory    Prepared and electronically signed by    Talisha Osullivan MD  Signed 2021 16:36:19       No results found for this or any previous visit   --------------------------------------------------------------------------------  HOLTER  No results found for this or any previous visit   --------------------------------------------------------------------------------  CAROTIDS  Results for orders placed during the hospital encounter of 10/23/20   VAS carotid complete study    Narrative    THE VASCULAR CENTER REPORT  CLINICAL:  Indications:  Patient presents for a general health evaluation secondary to future kidney  transplant  Patient is asymptomatic at this time  Operative History:  2019-01-23 Left brachio cephalic AVF  Risk Factors  The patient has history of CKD  He has no history of Obesity  FINDINGS:     Segment      Rig                     Left                        PSV  EDV (cm/s)  Ratio  PSV  EDV (cm/s)  Ratio    Dist  ICA    122          28   1 02  112          33   0 92    Mid  ICA     108          27   0 90   98          29   0 80    Prox  ICA     88          18   0 73   82          21   0 67    Dist CCA      87          14          99          15           Mid CCA      120               0 70  122          20   0 79    Prox CCA     171                     155          21           Ext Carotid  148               1 23  147               1 20    Prox Vert     76          16          48           7           Subclavian   137                     191                                CONCLUSION:  Impression  RIGHT:  There is <50% stenosis noted in the internal carotid artery  Plaque is  heterogenous  Vertebral artery flow is antegrade  There is no significant subclavian artery  disease  LEFT:  There is <50% stenosis noted in the internal carotid artery  Plaque is  heterogenous  Vertebral artery flow is antegrade  There is no significant subclavian artery  disease  Internal carotid artery stenosis determination by consensus criteria from:  Ji Patel et al  Carotid Artery Stenosis: Gray-Scale and Doppler US Diagnosis  - Society of Radiologists in [address] [place] [address], Radiology 2003;  [phone]  SIGNATURE:  Electronically Signed by: Kanwal Gabriel MD, RPVI on 2020-10-25 08:35:32 PM        Diagnoses and all orders for this visit:    Preop cardiovascular exam  -     POCT ECG  -     Cardiac catheterization;  Future  -     Protime-INR  -     CBC and differential  -     Basic metabolic panel    Benign hypertension with ESRD (end-stage renal disease) ([facility] [address] )  - Cardiac catheterization; Future    LVH (left ventricular hypertrophy)    Diabetic polyneuropathy associated with type 2 diabetes mellitus (HCC)    ESRD (end stage renal disease) ([facility] )  -     Cardiac catheterization; Future  -     Protime-INR  -     CBC and differential  -     Basic metabolic panel    Chronic kidney disease-mineral and bone disorder  -     Cardiac catheterization; Future  -     Protime-INR  -     CBC and differential  -     Basic metabolic panel    S/P arteriovenous (AV) fistula creation    Hyperlipidemia, unspecified hyperlipidemia type    Legally blind       ======================================================    Past Medical History:   Diagnosis Date    Cataract     Chronic kidney disease     Diabetes mellitus ([place] [address] )      IDDM    Diabetic retinopathy ([place] [address] )     Dizziness     occ    ESRD (end stage renal disease) ([place] [address] )     GERD (gastroesophageal reflux disease)     Headache     occ    Hyperlipidemia     Hypertension     Legally blind     LVH (left ventricular hypertrophy)     Preferred language is [place]     understands some English    Use of cane as ambulatory aid      Past Surgical History:   Procedure Laterality Date    INGUINAL HERNIA REPAIR Right     IR AV FISTULAGRAM/GRAFTOGRAM  4/30/2019    IR AV FISTULAGRAM/GRAFTOGRAM  6/14/2019    IR AV FISTULAGRAM/GRAFTOGRAM  6/24/2020    IR AV FISTULAGRAM/GRAFTOGRAM  2/26/2021    IR TUNNELED DIALYSIS CATHETER PLACEMENT  4/3/2019    MI ANASTOMOSIS,AV,ANY SITE Left 1/23/2019    Procedure: CREATION FISTULA ARTERIOVENOUS (AV);   Surgeon: Nafisa Zapata MD;  Location: [facility];  Service: Vascular         Medications  Current Outpatient Medications   Medication Sig Dispense Refill    Aspirin Low Dose 81 MG EC tablet take 1 tablet by mouth once daily 90 tablet 1    atorvastatin (LIPITOR) 40 mg tablet Take 1 tablet (40 mg total) by mouth daily 90 tablet 1    Basaglar KwikPen 100 units/mL injection pen INJECT 30 UNITS UNDER THE SKIN DAILY 15 mL 2    Blood Glucose Monitoring Suppl (ONE TOUCH ULTRA 2) w/Device KIT by Does not apply route 3 (three) times a day 1 each 0    brimonidine tartrate 0 2 % ophthalmic solution Administer 1 drop into the left eye 2 (two) times a day      Carboxymethylcellul-Glycerin (CLEAR EYES FOR DRY EYES OP) Apply 2 drops to eye 2 (two) times a day      cinacalcet (SENSIPAR) 30 mg tablet Take 30 mg by mouth daily      Continuous Blood Gluc  (FreeStyle Jessica 14 Day [place]) LAUREN Use 1 each daily 1 Device 1    Continuous Blood Gluc Sensor (FreeStyle Jessica 14 Day Sensor) MISC Use 1 each daily 1 each 1    cyclobenzaprine (FLEXERIL) 5 mg tablet Take 1 tablet (5 mg total) by mouth daily at bedtime as needed for muscle spasms 30 tablet 1    doxazosin (CARDURA) 4 mg tablet Take 1 tablet (4 mg total) by mouth daily at bedtime 30 tablet 0    furosemide (LASIX) 80 mg tablet Take 1 tablet (80 mg total) by mouth daily 90 tablet 1    glucose blood (FREESTYLE LITE) test strip Test blood sugars three times daily   100 each 2    insulin aspart (NOVOLOG FLEXPEN) 100 Units/mL injection pen 10U with breakfast and lunch, 10-14 U with dinner 5 pen 2    isoniazid (NYDRAZID) 300 mg tablet Take 1 tablet (300 mg total) by mouth daily 30 tablet 8    labetalol (NORMODYNE) 200 mg tablet Take 1 tablet (200 mg total) by mouth every 12 (twelve) hours 60 tablet 0    Lancets (freestyle) lancets Use as instructed 100 each 1    Lancets MISC Use 2 (two) times a day 100 each 0    LOKELMA 10 g PACK       NIFEdipine (PROCARDIA XL) 90 mg 24 hr tablet Take 1 tablet (90 mg total) by mouth daily 30 tablet 0    ONE TOUCH LANCETS MISC by Does not apply route 3 (three) times a day 100 each 1    pantoprazole (PROTONIX) 20 mg tablet Take 1 tablet (20 mg total) by mouth daily 30 tablet 3    polyethylene glycol (MIRALAX) 17 g packet Take 17 g by mouth daily 30 each 1    prednisoLONE acetate (PRED FORTE) 1 % ophthalmic suspension Administer 1 drop into the left eye 2 (two) times a day   0    pyridoxine (VITAMIN B6) 50 mg tablet Take 1 tablet (50 mg total) by mouth daily 30 tablet 6    sevelamer carbonate (RENVELA) 800 mg tablet Take 800 mg by mouth 3 (three) times a day with meals       No current facility-administered medications for this visit           Allergies   Allergen Reactions    No Known Allergies        Social History     Socioeconomic History    Marital status: Legally      Spouse name: Not on file    Number of children: Not on file    Years of education: Not on file    Highest education level: Not on file   Occupational History    Occupation: DISABLED   Social Needs    Financial resource strain: Not on file    Food insecurity     Worry: Not on file     Inability: Not on file    Transportation needs     Medical: Not on file     Non-medical: Not on file   Tobacco Use    Smoking status: Former Smoker     Packs/day: 0 25     Quit date: 2/1/2018     Years since quitting: 3 2    Smokeless tobacco: Never Used   Substance and Sexual Activity    Alcohol use: Not Currently    Drug use: No    Sexual activity: Not on file   Lifestyle    Physical activity     Days per week: Not on file     Minutes per session: Not on file    Stress: Not on file   Relationships    Social connections     Talks on phone: Not on file     Gets together: Not on file     Attends Restoration service: Not on file     Active member of club or organization: Not on file     Attends meetings of clubs or organizations: Not on file     Relationship status: Not on file    Intimate partner violence     Fear of current or ex partner: Not on file     Emotionally abused: Not on file     Physically abused: Not on file     Forced sexual activity: Not on file   Other Topics Concern    Not on file   Social History Narrative    Not on file        Family History   Problem Relation Age of Onset    Hypertension Mother     Diabetes Father     No Known Problems Sister  No Known Problems Brother     No Known Problems Maternal Aunt     No Known Problems Maternal Uncle     No Known Problems Paternal Aunt     No Known Problems Paternal Uncle     No Known Problems Maternal Grandmother     No Known Problems Maternal Grandfather     No Known Problems Paternal Grandmother     No Known Problems Paternal Grandfather        Lab Results   Component Value Date    WBC 10 13 02/24/2021    HGB 13 0 02/24/2021    HCT 40 8 02/24/2021    MCV 91 02/24/2021     02/24/2021      Lab Results   Component Value Date    SODIUM 132 (L) 02/27/2021    K 5 4 (H) 02/27/2021    CL 93 (L) 02/27/2021    CO2 26 02/27/2021    BUN 62 (H) 02/27/2021    CREATININE 13 25 (H) 02/27/2021    GLUC 131 02/27/2021    CALCIUM 7 8 (L) 02/27/2021      Lab Results   Component Value Date    HGBA1C 7 5 (H) 01/18/2021      No results found for: CHOL  Lab Results   Component Value Date    HDL 54 01/18/2021    HDL 50 04/27/2020    HDL 54 08/30/2019     Lab Results   Component Value Date    LDLCALC 58 01/18/2021    LDLCALC 57 04/27/2020    LDLCALC 49 08/30/2019     Lab Results   Component Value Date    TRIG 95 01/18/2021    TRIG 103 04/27/2020    TRIG 87 08/30/2019     No results found for: CHOLHDL   Lab Results   Component Value Date    INR 1 05 01/18/2021    INR 1 13 07/09/2019    INR 1 12 04/03/2019    PROTIME 13 7 01/18/2021    PROTIME 14 7 (H) 07/09/2019    PROTIME 14 5 (H) 04/03/2019          Patient Active Problem List    Diagnosis Date Noted    Abnormal EKG 02/25/2021    Pulmonary nodule 02/25/2021    Hypertensive urgency 02/24/2021    Long term (current) use of antibiotics 02/15/2021    TB lung, latent 01/21/2021    Positive QuantiFERON-TB Gold test 01/20/2021    Cough 01/20/2021    Onychomycosis 12/02/2020    Diabetic polyneuropathy associated with type 2 diabetes mellitus ([facility] [address] ) 12/02/2020    Neutropenia (HCC) 03/16/2020    Slow transit constipation 07/12/2019    Hyperphosphatemia 04/07/2019    Benign hypertension with ESRD (end-stage renal disease) ([facility] [address] )     Chronic kidney disease-mineral and bone disorder 04/06/2019    Anemia due to chronic kidney disease, on chronic dialysis ([facility] [address] )     Azotemia 04/04/2019    Elevated troponin I level 04/03/2019    S/P arteriovenous (AV) fistula creation 02/05/2019    ESRD (end stage renal disease) ([facility] [address] ) 01/09/2019    Chronic constipation 07/12/2018    Screening for colon cancer 07/12/2018    Hyperlipidemia 06/22/2018    Type 2 diabetes mellitus with chronic kidney disease on chronic dialysis, with long-term current use of insulin [facility] 05/17/2018    Diabetic retinopathy of both eyes associated with type 2 diabetes mellitus ([facility] 05/17/2018    Legally blind 05/17/2018    LVH (left ventricular hypertrophy) 05/17/2018       Portions of the record may have been created with voice recognition software  Occasional wrong word or "sound a like" substitutions may have occurred due to the inherent limitations of voice recognition software  Read the chart carefully and recognize, using context, where substitutions have occurred          Yuriy Villegas DO, [facility] [place]  4/23/2021 2:25 PM

## 2021-04-23 NOTE — PROGRESS NOTES
Cardiology Consultation   MD Tala Madison MD Kelsey Churches, DO, MD Prabhu Pang DO, Cristopher Perez DO, [facility] [place]  -------------------------------------------------------------------  [facility]  [address], [place] [id number] [phone]  [phone]  04/23/21  Paddy Zhong  YOB: 1965   MRN: [id number]      Referring Physician: Jerica Sharpe MD  [address] [place] [address]     HPI:  I am seeing this patient in cardiology consultation for:  Preoperative cardiac evaluation prior to kidney transplant    Rafael Diamond is a 61 y o  male with end-stage renal disease on hemodialysis, diabetic retinopathy, now legally blind, latent tuberculosis, hypertension, dyslipidemia, abnormal electrocardiogram who presents today for cardiac evaluation  He is currently being followed by [facility] for his end-stage renal disease and being considered for renal transplant  He has been recommended for right left heart catheterization in workup for renal transplant  His electrocardiogram today is abnormal, he has poor anterior R-wave progression, concerning for old anterior wall MI, there are inverted T-waves in the lateral leads, consider lateral ischemia  He had a stress test last year which was negative for ischemia however  Echo shows preserved ejection fraction    He has no known family history of heart disease  Review of Systems   Constitutional: Negative for chills and fever  HENT: Negative for facial swelling and sore throat  Eyes: Negative for visual disturbance  Respiratory: Negative for cough, chest tightness, shortness of breath and wheezing  Cardiovascular: Negative for chest pain, palpitations and leg swelling  Gastrointestinal: Negative for abdominal pain, blood in stool, constipation, diarrhea, nausea and vomiting     Endocrine: Negative for cold intolerance and heat intolerance  Genitourinary: Negative for decreased urine volume, difficulty urinating, dysuria and hematuria  Musculoskeletal: Negative for arthralgias, back pain and myalgias  Skin: Negative for rash  Neurological: Negative for dizziness, syncope, weakness and numbness  Psychiatric/Behavioral: Negative for agitation, behavioral problems and confusion  The patient is not nervous/anxious  OBJECTIVE  Vitals:    04/23/21 1354   BP: 120/64   Pulse: 72       Physical Exam   Constitutional: awake, alert and oriented, in no acute distress   Vital signs as above  Head: Normocephalic, without obvious abnormality, atraumatic  Eyes:  He is blind  Ear nose mouth and throat: ears are symmetrical bilaterally, hearing appears to be equal bilaterally, no nasal discharge or epistaxis, oropharynx is clear with moist mucous membranes  Neck:  Trachea is midline, neck is supple, no thyromegaly or significant lymphadenopathy, there is full range of motion  Lungs: clear to auscultation bilaterally, no wheezes, no rales, no rhonchi, no accessory muscle use, breathing is nonlabored  Heart: regular rate and rhythm, S1, S2 normal, no murmur, click, rub or gallop, no lower extremity edema  He has a left upper extremity AV fistula with bruit auscultated  Abdomen: soft, non-tender; bowel sounds normal; no masses,  no organomegaly  Psychiatric:  Patient is oriented to time, place, person, mood/affect is negative for depression, anxiety, agitation, appears to have appropriate insight  Skin: Skin is warm, dry, intact  No obvious rashes or lesions on exposed extremities  Nail beds are pink with no cyanosis or clubbing  EKG:  No results found for this visit on 04/23/21  IMPRESSION:  1  Preoperative cardiac evaluation   2  Hypertension   3  End-stage renal disease on hemodialysis, transplant workup in progress   4  Uncontrolled type 2 diabetes   5  Diabetic retinopathy, now blind   6   Latent tuberculosis   7  Abnormal electrocardiogram  8  Nonischemic nuclear stress November 2020  9  Left ventricular hypertrophy by echo November 2020    DISCUSSION/RECOMMENDATIONS:  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  He has no chest pain or shortness of breath although EKG is abnormal and does suggest old anterior MI, does have noted LVH by echo, recent nonischemic nuclear stress   In workup for renal transplant, has been recommended for left to right heart catheterization  Orders were placed today along with CBC, BMP, PT INR   He has no chest pain, never gets chest pain or shortness of breath  Denies palpitations, denies any angina symptoms at all   Blood pressure is controlled today   Will obtain the right left heart catheterization and plan for follow-up thereafter  He follows with Dr Azeem Ashton in preparation for renal transplant    Addendum 05/10/2021  Patient's catheterization was completed on 05/07/2021  HEMODYNAMICS: Hemodynamic assessment demonstrated normal LVEDP, normal cardiac output, and normal pulmonary capillary wedge pressure  There was no evidence of pulmonary hypertension      CORONARY VESSELS:   --  The coronary circulation is right dominant  --  Left main: Normal   --  Mid LAD: There was a discrete 30 % stenosis  --  Circumflex: Normal   --  RCA: Normal      IMPRESSIONS:  There is no significant coronary artery disease  He will be low risk for renal transplant      Valeriano Pace DO, [facility] [place]  --------------------------------------------------------------------------------  TREADMILL STRESS  No results found for this or any previous visit    ----------------------------------------------------------------------------------------------  NUCLEAR STRESS TEST:   Results for orders placed during the hospital encounter of 11/13/20   NM myocardial perfusion spect (stress and/or rest)    [facility]  [address]    Rest/Stress Gated SPECT Myocardial Perfusion Imaging After Regadenoson    Patient: Brandi Kramer  MR number: [id number]  Account number: [de-identified]  : 1965  Age: 63 years  Gender: Male  Status: Outpatient  Location: [address] [place] [address]  Height: 70 in  Weight: 171 lb  BP: 134/ 71 mmHg    Diagnosis: E78 5 - Hyperlipidemia, unspecified, I11 9 - Hypertensive heart disease without heart failure, N18 6 - End stage renal disease    Primary Physician:  Aniket Marie MD  RN:  Thanh Barfield RN  Technician:  Stacey Lama  Group:  [facility]  Report Prepared By[de-identified]  Stacey Lama  Interpreting Physician:  Jeaneth Jeter MD    INDICATIONS: Coronary artery disease  HISTORY: The patient is a 63year old  male  Chest pain status: no chest pain  Coronary artery disease risk factors: dyslipidemia, hypertension, and diabetes mellitus  Cardiovascular history: none significant  Co-morbidity:  history of end stage renal disease  Medications: a lipid lowering agent, alpha blocker and diabetic medications  Previous test results: abnormal ECG  PHYSICAL EXAM: Baseline physical exam screening: normal     REST ECG: Normal sinus rhythm at a rate of 67 beats per minute  Right axis deviation  Left ventricular hypertrophy  Anterior lateral T-wave inversions which may represent left ventricular strain pattern  PROCEDURE: The study was performed in the the [address] [place] [address]  A regadenoson infusion pharmacologic stress test was performed  Gated SPECT myocardial perfusion imaging was performed after stress and at rest  Systolic blood pressure was  134 mmHg, at the start of the study  Diastolic blood pressure was 71 mmHg, at the start of the study  The heart rate was 66 bpm, at the start of the study    Regadenoson protocol:  HR bpm SBP mmHg DBP mmHg Symptoms  Baseline 66 134 71 none  1 min 75 -- -- none  2 min 82 113 59 none  3 min 79 129 60 none  4 min 78 -- -- none  5 min 77 -- -- none  6 min 78 127 64 none  No medications or fluids given  STRESS SUMMARY: Duration of pharmacologic stress was 3 min and 0 sec  Maximal work rate was 1 METs  Maximal heart rate during stress was 84 bpm  The heart rate response to stress was normal  There was normal resting blood pressure with an  appropriate response to stress  The rate-pressure product for the peak heart rate and blood pressure was 89618  There was no chest pain during stress  The stress test was terminated due to protocol completion  Pre oxygen saturation: 98 %  Peak oxygen saturation: 100 %  With pharmacologic stress using intravenous Lexiscan, there were no electrocardiographic changes over baseline  There were no arrhythmias  ISOTOPE ADMINISTRATION:  The radiopharmaceutical was injected at the peak effect of pharmacologic stress  MYOCARDIAL PERFUSION IMAGING:  Tomographic perfusion series at rest demonstrated uniformly normal uptake in activity  Following pharmacologic stress using intravenous Lexiscan, there was no change  GATED SPECT:  Normal left ventricular systolic function, EF 54%  Normal left ventricular cavity size  Normal left ventricular wall motion  SUMMARY:  -  Stress results: There was no chest pain during stress  IMPRESSIONS: 1  Abnormal resting EKG  2  Negative pharmacologic stress test with intravenous Lexiscan for angina pectoris or electrocardiographic changes of ischemia over baseline abnormalities noted  3  Normal left ventricular systolic function, EF 58%  4  Normal tomographic perfusion series      Prepared and signed by    Alexandra Elizondo MD  Signed 11/16/2020 08:17:41       No results found for this or any previous visit     --------------------------------------------------------------------------------  CATH:  No results found for this or any previous visit   --------------------------------------------------------------------------------  ECHO:   Results for orders placed during the hospital encounter of 21   Echo complete with contrast if indicated    Narrative [address]  [place], [address]  [phone]    Transthoracic Echocardiogram  2D, M-mode, Doppler, and Color Doppler    Study date:  2021    Patient: Lauri Staton  MR number: [id number]  Account number: [de-identified]  : 1965  Age: 63 years  Gender: Male  Status: Inpatient  Location: Bedside  Height: 70 in  Weight: 165 lb  BP: 142/ 76 mmHg    Indications: Chest Pain    Diagnoses: R07 9 - Chest pain, unspecified    Sonographer:  Nella Renteria RDCS  Primary Physician:  Neeta Allison MD  Referring Physician:  Aranza Lorenzo PA-C  Group:  [facility]  Interpreting Physician:  Nevaeh Acosta MD    SUMMARY    LEFT VENTRICLE:  Normal left ventricular systolic function, EF 62%  Moderate concentric left ventricular hypertrophy  Normal left ventricular cavity size  Normal left ventricular wall motion without regional wall motion abnormalities  Grade 1 left  ventricular diastolic dysfunction  Normal left atrial pressure  MITRAL VALVE:  There was trace regurgitation  AORTIC VALVE:  Tricuspid aortic valve with aortic sclerosis  No stenosis or regurgitation  TRICUSPID VALVE:  There was trace regurgitation  PULMONIC VALVE:  There was trace regurgitation  PERICARDIUM:  Small hemodynamically insignificant concentric pericardial effusion    SUMMARY MEASUREMENTS  2D measurements:  Unspecified Anatomy:   %FS was 38 8 %  Ao Diam was 3 3 cm  Ao asc was 2 9 cm   EDV(Teich) was 51 9 ml   EF(Teich) was 70 2 %  ESV(Teich) was 15 5 ml  IVSd was 1 5 cm  LA Diam was 3 3 cm  LAAs A4C was 11 3 cm2  LAESV A-L A4C was 21 2  ml  LAESV MOD A4C was 20 ml  LALs A4C was 5 1 cm  LVEDV MOD A4C was 20 6 ml  LVEF MOD A4C was 68 %  LVESV MOD A4C was 6 6 ml  LVIDd was 3 5 cm  LVIDs was 2 2 cm  LVLd A4C was 7 8 cm  LVLs A4C was 7 cm    LVPWd was 1 5 cm   RAAs A4C  was 17 6 cm2  RAESV A-L was 49 6 ml   RAESV MOD was 47 2 ml  RALs was 5 3 cm  RVIDd was 3 cm  SV MOD A4C was 14 ml   SV(Teich) was 36 5 ml   CW measurements:  Unspecified Anatomy:   AV Env  Ti was 266 4 ms   AV VTI was 22 9 cm   AV Vmax was 1 3 m/s  AV Vmean was 0 9 m/s  AV maxPG was 6 5 mmHg  AV meanPG was 3 3 mmHg  TR Vmax was 1 9 m/s   TR maxPG was 14 9 mmHg  MM measurements:  Unspecified Anatomy:   TAPSE was 2 1 cm  PW measurements:  Unspecified Anatomy:   DVI was 1 1   E' Sept was 0 1 m/s  E/E' Sept was 11 2   LVOT Env  Ti was 304 5 ms  LVOT VTI was 25 4 cm  LVOT Vmax was 1 2 m/s  LVOT Vmean was 0 8 m/s  LVOT maxPG was 6 mmHg  LVOT meanPG was 3 2 mmHg  MV A  Lonnie was 0 8 m/s  MV Dec Frederick was 2 1 m/s2  MV DecT was 294 5 ms   MV E Lonnie was 0 6 m/s  MV E/A Ratio was 0 7   MV PHT was 85 4 ms  MVA By PHT was 2 6 cm2  HISTORY: PRIOR HISTORY: Hypercholesterolemia, Hypertension, ESRD, DM    PROCEDURE: The procedure was performed at the bedside  This was a routine study  The transthoracic approach was used  The study included complete 2D imaging, M-mode, complete spectral Doppler, and color Doppler  The heart rate was 70 bpm,  at the start of the study  Images were obtained from the parasternal, apical, subcostal, and suprasternal notch acoustic windows  Image quality was adequate  LEFT VENTRICLE: Normal left ventricular systolic function, EF 58%  Moderate concentric left ventricular hypertrophy  Normal left ventricular cavity size  Normal left ventricular wall motion without regional wall motion abnormalities  Grade  1 left ventricular diastolic dysfunction  Normal left atrial pressure  RIGHT VENTRICLE: The size was normal  Systolic function was normal  Wall thickness was normal     LEFT ATRIUM: Size was normal     RIGHT ATRIUM: Size was normal     MITRAL VALVE: Valve structure was normal  There was normal leaflet separation   DOPPLER: The transmitral velocity was within the normal range  There was no evidence for stenosis  There was trace regurgitation  AORTIC VALVE: Tricuspid aortic valve with aortic sclerosis  No stenosis or regurgitation  TRICUSPID VALVE: The valve structure was normal  There was normal leaflet separation  DOPPLER: The transtricuspid velocity was within the normal range  There was no evidence for stenosis  There was trace regurgitation  PULMONIC VALVE: Leaflets exhibited normal thickness, no calcification, and normal cuspal separation  DOPPLER: The transpulmonic velocity was within the normal range  There was trace regurgitation  PERICARDIUM: Small hemodynamically insignificant concentric pericardial effusion    AORTA: The root exhibited normal size  PULMONARY ARTERY: The size was normal  DOPPLER: Systolic pressure was within the normal range  SYSTEM MEASUREMENT TABLES    2D  %FS: 38 8 %  Ao Diam: 3 3 cm  Ao asc: 2 9 cm  EDV(Teich): 51 9 ml  EF(Teich): 70 2 %  ESV(Teich): 15 5 ml  IVSd: 1 5 cm  LA Diam: 3 3 cm  LAAs A4C: 11 3 cm2  LAESV A-L A4C: 21 2 ml  LAESV MOD A4C: 20 ml  LALs A4C: 5 1 cm  LVEDV MOD A4C: 20 6 ml  LVEF MOD A4C: 68 %  LVESV MOD A4C: 6 6 ml  LVIDd: 3 5 cm  LVIDs: 2 2 cm  LVLd A4C: 7 8 cm  LVLs A4C: 7 cm  LVPWd: 1 5 cm  RAAs A4C: 17 6 cm2  RAESV A-L: 49 6 ml  RAESV MOD: 47 2 ml  RALs: 5 3 cm  RVIDd: 3 cm  SV MOD A4C: 14 ml  SV(Teich): 36 5 ml    CW  AV Env  Ti: 266 4 ms  AV VTI: 22 9 cm  AV Vmax: 1 3 m/s  AV Vmean: 0 9 m/s  AV maxP 5 mmHg  AV meanPG: 3 3 mmHg  TR Vmax: 1 9 m/s  TR maxP 9 mmHg    MM  TAPSE: 2 1 cm    PW  DVI: 1 1  E' Sept: 0 1 m/s  E/E' Sept: 11 2  LVOT Env  Ti: 304 5 ms  LVOT VTI: 25 4 cm  LVOT Vmax: 1 2 m/s  LVOT Vmean: 0 8 m/s  LVOT maxP mmHg  LVOT meanPG: 3 2 mmHg  MV A Lonnie: 0 8 m/s  MV Dec Evangeline: 2 1 m/s2  MV DecT: 294 5 ms  MV E Lonnie: 0 6 m/s  MV E/A Ratio: 0 7  MV PHT: 85 4 ms  MVA By PHT: 2 6 cm2    [employer] Accredited Echocardiography Laboratory    Prepared and electronically signed by    Deana Scott MD  Signed [id number] 16:36:19       No results found for this or any previous visit   --------------------------------------------------------------------------------  HOLTER  No results found for this or any previous visit   --------------------------------------------------------------------------------  Luba Parham  Results for orders placed during the hospital encounter of 10/23/20   VAS carotid complete study    Narrative    [facility] REPORT  CLINICAL:  Indications:  Patient presents for a general health evaluation secondary to future kidney  transplant  Patient is asymptomatic at this time  Operative History:  2019-01-23 Left brachio cephalic AVF  Risk Factors  The patient has history of CKD  He has no history of Obesity  FINDINGS:     Segment      Rig                     Left                        PSV  EDV (cm/s)  Ratio  PSV  EDV (cm/s)  Ratio    Dist  ICA    122          28   1 02  112          33   0 92    Mid  ICA     108          27   0 90   98          29   0 80    Prox  ICA     88          18   0 73   82          21   0 67    Dist CCA      87          14          99          15           Mid CCA      120               0 70  122          20   0 79    Prox CCA     171                     155          21           Ext Carotid  148               1 23  147               1 20    Prox Vert     76          16          48           7           Subclavian   137                     191                                CONCLUSION:  Impression  RIGHT:  There is <50% stenosis noted in the internal carotid artery  Plaque is  heterogenous  Vertebral artery flow is antegrade  There is no significant subclavian artery  disease  LEFT:  There is <50% stenosis noted in the internal carotid artery  Plaque is  heterogenous  Vertebral artery flow is antegrade  There is no significant subclavian artery  disease          Internal carotid artery stenosis determination by consensus criteria from:  carlin Bolton al  Carotid Artery Stenosis: Gray-Scale and Doppler US Diagnosis  - Society of Radiologists in [address] [place] [address] Drive, Radiology 2003;  [phone]  SIGNATURE:  Electronically Signed by: Gm Lombardi MD, RPVI on 2020-10-25 08:35:32 PM        Diagnoses and all orders for this visit:    Preop cardiovascular exam  -     POCT ECG  -     Cardiac catheterization; Future  -     Protime-INR  -     CBC and differential  -     Basic metabolic panel    Benign hypertension with ESRD (end-stage renal disease) ([facility] )  -     Cardiac catheterization; Future    LVH (left ventricular hypertrophy)    Diabetic polyneuropathy associated with type 2 diabetes mellitus (HCC)    ESRD (end stage renal disease) ([facility] )  -     Cardiac catheterization; Future  -     Protime-INR  -     CBC and differential  -     Basic metabolic panel    Chronic kidney disease-mineral and bone disorder  -     Cardiac catheterization;  Future  -     Protime-INR  -     CBC and differential  -     Basic metabolic panel    S/P arteriovenous (AV) fistula creation    Hyperlipidemia, unspecified hyperlipidemia type    Legally blind       ======================================================    Past Medical History:   Diagnosis Date    Cataract     Chronic kidney disease     Diabetes mellitus ([facility] )      IDDM    [facility]     Dizziness     occ    ESRD (end stage renal disease) ([facility] )     GERD (gastroesophageal reflux disease)     Headache     occ    Hyperlipidemia     Hypertension     Legally blind     LVH (left ventricular hypertrophy)     Preferred language is [place]     understands some English    Use of cane as ambulatory aid      Past Surgical History:   Procedure Laterality Date    INGUINAL HERNIA REPAIR Right     IR AV FISTULAGRAM/GRAFTOGRAM  4/30/2019    IR AV FISTULAGRAM/GRAFTOGRAM  6/14/2019    IR AV FISTULAGRAM/GRAFTOGRAM  6/24/2020    IR AV FISTULAGRAM/GRAFTOGRAM  2/26/2021    IR TUNNELED DIALYSIS CATHETER PLACEMENT  4/3/2019    DC ANASTOMOSIS,AV,ANY SITE Left 1/23/2019    Procedure: CREATION FISTULA ARTERIOVENOUS (AV); Surgeon: Lotus Parra MD;  Location: [facility];  Service: Vascular         Medications  Current Outpatient Medications   Medication Sig Dispense Refill    Aspirin Low Dose 81 MG EC tablet take 1 tablet by mouth once daily 90 tablet 1    atorvastatin (LIPITOR) 40 mg tablet Take 1 tablet (40 mg total) by mouth daily 90 tablet 1    Basaglar KwikPen 100 units/mL injection pen INJECT 30 UNITS UNDER THE SKIN DAILY 15 mL 2    Blood Glucose Monitoring Suppl (ONE TOUCH ULTRA 2) w/Device KIT by Does not apply route 3 (three) times a day 1 each 0    brimonidine tartrate 0 2 % ophthalmic solution Administer 1 drop into the left eye 2 (two) times a day      Carboxymethylcellul-Glycerin (CLEAR EYES FOR DRY EYES OP) Apply 2 drops to eye 2 (two) times a day      cinacalcet (SENSIPAR) 30 mg tablet Take 30 mg by mouth daily      Continuous Blood Gluc  (FreeStyle Jessica 14 Day [place]) LAUREN Use 1 each daily 1 Device 1    Continuous Blood Gluc Sensor (FreeStyle Jessica 14 Day Sensor) MISC Use 1 each daily 1 each 1    cyclobenzaprine (FLEXERIL) 5 mg tablet Take 1 tablet (5 mg total) by mouth daily at bedtime as needed for muscle spasms 30 tablet 1    doxazosin (CARDURA) 4 mg tablet Take 1 tablet (4 mg total) by mouth daily at bedtime 30 tablet 0    furosemide (LASIX) 80 mg tablet Take 1 tablet (80 mg total) by mouth daily 90 tablet 1    glucose blood (FREESTYLE LITE) test strip Test blood sugars three times daily   100 each 2    insulin aspart (NOVOLOG FLEXPEN) 100 Units/mL injection pen 10U with breakfast and lunch, 10-14 U with dinner 5 pen 2    isoniazid (NYDRAZID) 300 mg tablet Take 1 tablet (300 mg total) by mouth daily 30 tablet 8    labetalol (NORMODYNE) 200 mg tablet Take 1 tablet (200 mg total) by mouth every 12 (twelve) hours 60 tablet 0    Lancets (freestyle) lancets Use as instructed 100 each 1    Lancets MISC Use 2 (two) times a day 100 each 0    LOKELMA 10 g PACK       NIFEdipine (PROCARDIA XL) 90 mg 24 hr tablet Take 1 tablet (90 mg total) by mouth daily 30 tablet 0    ONE TOUCH LANCETS MISC by Does not apply route 3 (three) times a day 100 each 1    pantoprazole (PROTONIX) 20 mg tablet Take 1 tablet (20 mg total) by mouth daily 30 tablet 3    polyethylene glycol (MIRALAX) 17 g packet Take 17 g by mouth daily 30 each 1    prednisoLONE acetate (PRED FORTE) 1 % ophthalmic suspension Administer 1 drop into the left eye 2 (two) times a day   0    pyridoxine (VITAMIN B6) 50 mg tablet Take 1 tablet (50 mg total) by mouth daily 30 tablet 6    sevelamer carbonate (RENVELA) 800 mg tablet Take 800 mg by mouth 3 (three) times a day with meals       No current facility-administered medications for this visit           Allergies   Allergen Reactions    No Known Allergies        Social History     Socioeconomic History    Marital status: Legally      Spouse name: Not on file    Number of children: Not on file    Years of education: Not on file    Highest education level: Not on file   Occupational History    Occupation: DISABLED   Social Needs    Financial resource strain: Not on file    Food insecurity     Worry: Not on file     Inability: Not on file    Transportation needs     Medical: Not on file     Non-medical: Not on file   Tobacco Use    Smoking status: Former Smoker     Packs/day: 0 25     Quit date: 2/1/2018     Years since quitting: 3 2    Smokeless tobacco: Never Used   Substance and Sexual Activity    Alcohol use: Not Currently    Drug use: No    Sexual activity: Not on file   Lifestyle    Physical activity     Days per week: Not on file     Minutes per session: Not on file    Stress: Not on file   Relationships    Social connections     Talks on phone: Not on file     Gets together: Not on file     Attends [employer] service: Not on file     Active member of club or organization: Not on file     Attends meetings of clubs or organizations: Not on file     Relationship status: Not on file    Intimate partner violence     Fear of current or ex partner: Not on file     Emotionally abused: Not on file     Physically abused: Not on file     Forced sexual activity: Not on file   Other Topics Concern    Not on file   Social History Narrative    Not on file        Family History   Problem Relation Age of Onset    Hypertension Mother     Diabetes Father     No Known Problems Sister     No Known Problems Brother     No Known Problems Maternal Aunt     No Known Problems Maternal Uncle     No Known Problems Paternal Aunt     No Known Problems Paternal Uncle     No Known Problems Maternal Grandmother     No Known Problems Maternal Grandfather     No Known Problems Paternal Grandmother     No Known Problems Paternal Grandfather        Lab Results   Component Value Date    WBC 10 13 02/24/2021    HGB 13 0 02/24/2021    HCT 40 8 02/24/2021    MCV 91 02/24/2021     02/24/2021      Lab Results   Component Value Date    SODIUM 132 (L) 02/27/2021    K 5 4 (H) 02/27/2021    CL 93 (L) 02/27/2021    CO2 26 02/27/2021    BUN 62 (H) 02/27/2021    CREATININE 13 25 (H) 02/27/2021    GLUC 131 02/27/2021    CALCIUM 7 8 (L) 02/27/2021      Lab Results   Component Value Date    HGBA1C 7 5 (H) 01/18/2021      No results found for: CHOL  Lab Results   Component Value Date    HDL 54 01/18/2021    HDL 50 04/27/2020    HDL 54 08/30/2019     Lab Results   Component Value Date    LDLCALC 58 01/18/2021    LDLCALC 57 04/27/2020    LDLCALC 49 08/30/2019     Lab Results   Component Value Date    TRIG 95 01/18/2021    TRIG 103 04/27/2020    TRIG 87 08/30/2019     No results found for: CHOLHDL   Lab Results   Component Value Date    INR 1 05 01/18/2021    INR 1 13 07/09/2019    INR 1 12 04/03/2019    PROTIME 13 7 01/18/2021    PROTIME 14 7 (H) 07/09/2019    PROTIME 14  5 (H) 04/03/2019          Patient Active Problem List    Diagnosis Date Noted    Abnormal EKG 02/25/2021    Pulmonary nodule 02/25/2021    Hypertensive urgency 02/24/2021    Long term (current) use of antibiotics 02/15/2021    TB lung, latent 01/21/2021    Positive QuantiFERON-TB Gold test 01/20/2021    Cough 01/20/2021    Onychomycosis 12/02/2020    Diabetic polyneuropathy associated with type 2 diabetes mellitus ([facility] ) 12/02/2020    Neutropenia (HCC) 03/16/2020    Slow transit constipation 07/12/2019    Hyperphosphatemia 04/07/2019    Benign hypertension with ESRD (end-stage renal disease) ([facility] )     Chronic kidney disease-mineral and bone disorder 04/06/2019    Anemia due to chronic kidney disease, on chronic dialysis ([facility] )     Azotemia 04/04/2019    Elevated troponin I level 04/03/2019    S/P arteriovenous (AV) fistula creation 02/05/2019    ESRD (end stage renal disease) ([facility] ) 01/09/2019    Chronic constipation 07/12/2018    Screening for colon cancer 07/12/2018    Hyperlipidemia 06/22/2018    Type 2 diabetes mellitus with chronic kidney disease on chronic dialysis, with long-term current use of insulin ([facility] ) 05/17/2018    Diabetic retinopathy of both eyes associated with type 2 diabetes mellitus ([facility] ) 05/17/2018    Legally blind 05/17/2018    LVH (left ventricular hypertrophy) 05/17/2018       Portions of the record may have been created with voice recognition software  Occasional wrong word or "sound a like" substitutions may have occurred due to the inherent limitations of voice recognition software  Read the chart carefully and recognize, using context, where substitutions have occurred          Yasmine Lama DO, [facility] [place]  4/23/2021 2:25 PM

## 2021-05-03 ENCOUNTER — APPOINTMENT (OUTPATIENT)
Dept: LAB | Facility: MEDICAL CENTER | Age: 56
End: 2021-05-03
Payer: MEDICARE

## 2021-05-03 LAB
ALBUMIN SERPL BCP-MCNC: 3.8 G/DL (ref 3.5–5)
ALP SERPL-CCNC: 81 U/L (ref 46–116)
ALT SERPL W P-5'-P-CCNC: 10 U/L (ref 12–78)
ANION GAP SERPL CALCULATED.3IONS-SCNC: 7 MMOL/L (ref 4–13)
AST SERPL W P-5'-P-CCNC: 12 U/L (ref 5–45)
BASOPHILS # BLD AUTO: 0.04 THOUSANDS/ΜL (ref 0–0.1)
BASOPHILS NFR BLD AUTO: 1 % (ref 0–1)
BILIRUB DIRECT SERPL-MCNC: 0.17 MG/DL (ref 0–0.2)
BILIRUB SERPL-MCNC: 0.69 MG/DL (ref 0.2–1)
BUN SERPL-MCNC: 60 MG/DL (ref 5–25)
CALCIUM SERPL-MCNC: 7.2 MG/DL (ref 8.3–10.1)
CHLORIDE SERPL-SCNC: 99 MMOL/L (ref 100–108)
CO2 SERPL-SCNC: 29 MMOL/L (ref 21–32)
CREAT SERPL-MCNC: 13.2 MG/DL (ref 0.6–1.3)
EOSINOPHIL # BLD AUTO: 0.32 THOUSAND/ΜL (ref 0–0.61)
EOSINOPHIL NFR BLD AUTO: 5 % (ref 0–6)
ERYTHROCYTE [DISTWIDTH] IN BLOOD BY AUTOMATED COUNT: 14.4 % (ref 11.6–15.1)
GFR SERPL CREATININE-BSD FRML MDRD: 4 ML/MIN/1.73SQ M
GLUCOSE P FAST SERPL-MCNC: 158 MG/DL (ref 65–99)
HCT VFR BLD AUTO: 39.8 % (ref 36.5–49.3)
HGB BLD-MCNC: 12.3 G/DL (ref 12–17)
IMM GRANULOCYTES # BLD AUTO: 0.01 THOUSAND/UL (ref 0–0.2)
IMM GRANULOCYTES NFR BLD AUTO: 0 % (ref 0–2)
INR PPP: 1.12 (ref 0.84–1.19)
LYMPHOCYTES # BLD AUTO: 1.69 THOUSANDS/ΜL (ref 0.6–4.47)
LYMPHOCYTES NFR BLD AUTO: 28 % (ref 14–44)
MCH RBC QN AUTO: 28.1 PG (ref 26.8–34.3)
MCHC RBC AUTO-ENTMCNC: 30.9 G/DL (ref 31.4–37.4)
MCV RBC AUTO: 91 FL (ref 82–98)
MONOCYTES # BLD AUTO: 0.54 THOUSAND/ΜL (ref 0.17–1.22)
MONOCYTES NFR BLD AUTO: 9 % (ref 4–12)
NEUTROPHILS # BLD AUTO: 3.42 THOUSANDS/ΜL (ref 1.85–7.62)
NEUTS SEG NFR BLD AUTO: 57 % (ref 43–75)
NRBC BLD AUTO-RTO: 0 /100 WBCS
PLATELET # BLD AUTO: 262 THOUSANDS/UL (ref 149–390)
PMV BLD AUTO: 9.5 FL (ref 8.9–12.7)
POTASSIUM SERPL-SCNC: 4.4 MMOL/L (ref 3.5–5.3)
PROT SERPL-MCNC: 8.1 G/DL (ref 6.4–8.2)
PROTHROMBIN TIME: 14.4 SECONDS (ref 11.6–14.5)
RBC # BLD AUTO: 4.37 MILLION/UL (ref 3.88–5.62)
SODIUM SERPL-SCNC: 135 MMOL/L (ref 136–145)
WBC # BLD AUTO: 6.02 THOUSAND/UL (ref 4.31–10.16)

## 2021-05-03 PROCEDURE — 85610 PROTHROMBIN TIME: CPT

## 2021-05-03 PROCEDURE — 80048 BASIC METABOLIC PNL TOTAL CA: CPT

## 2021-05-03 PROCEDURE — 80076 HEPATIC FUNCTION PANEL: CPT

## 2021-05-03 PROCEDURE — 36415 COLL VENOUS BLD VENIPUNCTURE: CPT

## 2021-05-03 PROCEDURE — 85025 COMPLETE CBC W/AUTO DIFF WBC: CPT

## 2021-05-05 RX ORDER — ASPIRIN 81 MG/1
324 TABLET, CHEWABLE ORAL ONCE
Status: CANCELLED | OUTPATIENT
Start: 2021-05-05 | End: 2021-05-05

## 2021-05-06 ENCOUNTER — TELEPHONE (OUTPATIENT)
Dept: SURGERY | Facility: HOSPITAL | Age: 56
End: 2021-05-06

## 2021-05-07 ENCOUNTER — HOSPITAL ENCOUNTER (OUTPATIENT)
Dept: NON INVASIVE DIAGNOSTICS | Facility: HOSPITAL | Age: 56
Discharge: HOME | End: 2021-05-07
Admitting: INTERNAL MEDICINE
Payer: MEDICARE

## 2021-05-07 VITALS
SYSTOLIC BLOOD PRESSURE: 155 MMHG | RESPIRATION RATE: 16 BRPM | TEMPERATURE: 98 F | DIASTOLIC BLOOD PRESSURE: 74 MMHG | OXYGEN SATURATION: 99 % | HEART RATE: 69 BPM

## 2021-05-07 DIAGNOSIS — Z98.890 S/P CARDIAC CATHETERIZATION: Primary | ICD-10-CM

## 2021-05-07 DIAGNOSIS — Z01.810 PREOP CARDIOVASCULAR EXAM: ICD-10-CM

## 2021-05-07 DIAGNOSIS — I12.0 BENIGN HYPERTENSION WITH ESRD (END-STAGE RENAL DISEASE) (HCC): ICD-10-CM

## 2021-05-07 DIAGNOSIS — N18.6 ESRD (END STAGE RENAL DISEASE) (HCC): ICD-10-CM

## 2021-05-07 DIAGNOSIS — N18.9 CHRONIC KIDNEY DISEASE-MINERAL AND BONE DISORDER: ICD-10-CM

## 2021-05-07 DIAGNOSIS — M89.9 CHRONIC KIDNEY DISEASE-MINERAL AND BONE DISORDER: ICD-10-CM

## 2021-05-07 DIAGNOSIS — E83.9 CHRONIC KIDNEY DISEASE-MINERAL AND BONE DISORDER: ICD-10-CM

## 2021-05-07 DIAGNOSIS — N18.6 BENIGN HYPERTENSION WITH ESRD (END-STAGE RENAL DISEASE) (HCC): ICD-10-CM

## 2021-05-07 LAB
ANION GAP SERPL CALCULATED.3IONS-SCNC: 12 MMOL/L (ref 4–13)
BUN SERPL-MCNC: 48 MG/DL (ref 5–25)
CALCIUM SERPL-MCNC: 7.5 MG/DL (ref 8.3–10.1)
CHLORIDE SERPL-SCNC: 98 MMOL/L (ref 100–108)
CO2 SERPL-SCNC: 30 MMOL/L (ref 21–32)
CREAT SERPL-MCNC: 10.22 MG/DL (ref 0.6–1.3)
GFR SERPL CREATININE-BSD FRML MDRD: 6 ML/MIN/1.73SQ M
GLUCOSE P FAST SERPL-MCNC: 165 MG/DL (ref 65–99)
GLUCOSE SERPL-MCNC: 165 MG/DL (ref 65–140)
POTASSIUM SERPL-SCNC: 4.3 MMOL/L (ref 3.5–5.3)
SODIUM SERPL-SCNC: 140 MMOL/L (ref 136–145)

## 2021-05-07 PROCEDURE — C1769 GUIDE WIRE: HCPCS

## 2021-05-07 PROCEDURE — 80048 BASIC METABOLIC PNL TOTAL CA: CPT | Performed by: PHYSICIAN ASSISTANT

## 2021-05-07 PROCEDURE — 93460 R&L HRT ART/VENTRICLE ANGIO: CPT | Performed by: INTERNAL MEDICINE

## 2021-05-07 PROCEDURE — 99152 MOD SED SAME PHYS/QHP 5/>YRS: CPT | Performed by: INTERNAL MEDICINE

## 2021-05-07 PROCEDURE — 99153 MOD SED SAME PHYS/QHP EA: CPT

## 2021-05-07 PROCEDURE — C1894 INTRO/SHEATH, NON-LASER: HCPCS

## 2021-05-07 PROCEDURE — 93460 R&L HRT ART/VENTRICLE ANGIO: CPT

## 2021-05-07 PROCEDURE — 99152 MOD SED SAME PHYS/QHP 5/>YRS: CPT

## 2021-05-07 PROCEDURE — 82810 BLOOD GASES O2 SAT ONLY: CPT

## 2021-05-07 RX ORDER — NITROGLYCERIN 20 MG/100ML
INJECTION INTRAVENOUS CODE/TRAUMA/SEDATION MEDICATION
Status: COMPLETED | OUTPATIENT
Start: 2021-05-07 | End: 2021-05-07

## 2021-05-07 RX ORDER — LIDOCAINE HYDROCHLORIDE 10 MG/ML
INJECTION, SOLUTION EPIDURAL; INFILTRATION; INTRACAUDAL; PERINEURAL CODE/TRAUMA/SEDATION MEDICATION
Status: COMPLETED | OUTPATIENT
Start: 2021-05-07 | End: 2021-05-07

## 2021-05-07 RX ORDER — ONDANSETRON 2 MG/ML
4 INJECTION INTRAMUSCULAR; INTRAVENOUS EVERY 6 HOURS PRN
Status: DISCONTINUED | OUTPATIENT
Start: 2021-05-07 | End: 2021-05-08 | Stop reason: HOSPADM

## 2021-05-07 RX ORDER — MIDAZOLAM HYDROCHLORIDE 2 MG/2ML
INJECTION, SOLUTION INTRAMUSCULAR; INTRAVENOUS CODE/TRAUMA/SEDATION MEDICATION
Status: COMPLETED | OUTPATIENT
Start: 2021-05-07 | End: 2021-05-07

## 2021-05-07 RX ORDER — SODIUM CHLORIDE 9 MG/ML
20 INJECTION, SOLUTION INTRAVENOUS CONTINUOUS
Status: DISCONTINUED | OUTPATIENT
Start: 2021-05-07 | End: 2021-05-08 | Stop reason: HOSPADM

## 2021-05-07 RX ORDER — HEPARIN SODIUM 1000 [USP'U]/ML
INJECTION, SOLUTION INTRAVENOUS; SUBCUTANEOUS CODE/TRAUMA/SEDATION MEDICATION
Status: COMPLETED | OUTPATIENT
Start: 2021-05-07 | End: 2021-05-07

## 2021-05-07 RX ORDER — ACETAMINOPHEN 325 MG/1
650 TABLET ORAL EVERY 4 HOURS PRN
Status: DISCONTINUED | OUTPATIENT
Start: 2021-05-07 | End: 2021-05-08 | Stop reason: HOSPADM

## 2021-05-07 RX ORDER — FENTANYL CITRATE 50 UG/ML
INJECTION, SOLUTION INTRAMUSCULAR; INTRAVENOUS CODE/TRAUMA/SEDATION MEDICATION
Status: COMPLETED | OUTPATIENT
Start: 2021-05-07 | End: 2021-05-07

## 2021-05-07 RX ORDER — VERAPAMIL HYDROCHLORIDE 2.5 MG/ML
INJECTION, SOLUTION INTRAVENOUS CODE/TRAUMA/SEDATION MEDICATION
Status: COMPLETED | OUTPATIENT
Start: 2021-05-07 | End: 2021-05-07

## 2021-05-07 RX ORDER — ASPIRIN 81 MG/1
324 TABLET, CHEWABLE ORAL ONCE
Status: COMPLETED | OUTPATIENT
Start: 2021-05-07 | End: 2021-05-07

## 2021-05-07 RX ADMIN — MIDAZOLAM 0.5 MG: 1 INJECTION INTRAMUSCULAR; INTRAVENOUS at 09:06

## 2021-05-07 RX ADMIN — HEPARIN SODIUM 4000 UNITS: 1000 INJECTION INTRAVENOUS; SUBCUTANEOUS at 09:08

## 2021-05-07 RX ADMIN — FENTANYL CITRATE 50 MCG: 50 INJECTION INTRAMUSCULAR; INTRAVENOUS at 09:06

## 2021-05-07 RX ADMIN — LIDOCAINE HYDROCHLORIDE 1 ML: 10 INJECTION, SOLUTION EPIDURAL; INFILTRATION; INTRACAUDAL; PERINEURAL at 09:07

## 2021-05-07 RX ADMIN — MIDAZOLAM 0.5 MG: 1 INJECTION INTRAMUSCULAR; INTRAVENOUS at 08:59

## 2021-05-07 RX ADMIN — VERAPAMIL HYDROCHLORIDE 2.5 MG: 2.5 INJECTION, SOLUTION INTRAVENOUS at 09:08

## 2021-05-07 RX ADMIN — LIDOCAINE HYDROCHLORIDE 9 ML: 10 INJECTION, SOLUTION EPIDURAL; INFILTRATION; INTRACAUDAL; PERINEURAL at 09:16

## 2021-05-07 RX ADMIN — NITROGLYCERIN 200 MCG: 20 INJECTION INTRAVENOUS at 09:08

## 2021-05-07 RX ADMIN — FENTANYL CITRATE 50 MCG: 50 INJECTION INTRAMUSCULAR; INTRAVENOUS at 08:59

## 2021-05-07 RX ADMIN — IOHEXOL 50 ML: 350 INJECTION, SOLUTION INTRAVENOUS at 09:25

## 2021-05-07 RX ADMIN — SODIUM CHLORIDE 20 ML/HR: 0.9 INJECTION, SOLUTION INTRAVENOUS at 07:45

## 2021-05-07 RX ADMIN — ASPIRIN 324 MG: 81 TABLET, CHEWABLE ORAL at 07:47

## 2021-05-07 RX ADMIN — TICAGRELOR 180 MG: 90 TABLET ORAL at 07:46

## 2021-05-07 NOTE — DISCHARGE INSTRUCTIONS
BLOOD TEST  Diego have chemistry (BMP) blood test done - hopefully Sunday or Monday of next week  An electronic prescription has been sent to the [facility] lab for it  You do not need to fast for this test    -----------------------------------------------------------------------------------------------------------------------------------------------------------------------------  SITE CARE  1  Please see the post cardiac catheterization dishcarge instructions  No lifting greater than 10 lbs  or strenuous activity for 72 hrs  2  Remove band aid tomorrow  Shower and wash area (wrist /groin) gently with soap and water- beginning tomorrow  Rinse and pat dry  Apply new water seal band aid  Repeat this process for 5 days  No powders, creams lotions or antibiotic ointments for 5 days  No tub baths, hot tubs/sauna or swimming for 5 days (it is okay to shower)  3  Please call our office ([phone]) if you have any fever, redness, swelling, discharge from your wrist/access site  4  No driving for 1 day       -----------------------------------------------------------------------------------------------------------------------------------------------------------------------------  PROCEDURE INFORMATION    LEFT HEART CATHETERIZATION    WHAT YOU NEED TO KNOW:   A left heart catheterization is a procedure to look at your heart and its arteries  You may need this procedure if you have chest pain, heart disease, or your heart is not working as it should  DISCHARGE INSTRUCTIONS:   Follow up with your healthcare provider as directed:  Write down your questions so you remember to ask them during your visits  Limit activity as directed:   · Avoid unnecessary stair climbing for 48 hours, if a catheter was put in your groin  · Do not place pressure on your arm, hand, or wrist, if the catheter was placed in your wrist  Avoid pushing, pulling, or heavy lifting with that arm      · If you need to cough, support the area where the catheter was inserted with your hand  · Ask your healthcare provider how long you need to limit movement and avoid certain activities  · You may feel like resting more after your procedure  Slowly start to do more each day  Rest when you feel it is needed  Drink liquids as directed:  Liquids help flush the dye used for your procedure out of your body  Ask your healthcare provider how much liquid to drink each day, and which liquids to drink  Some foods, such as soup and fruit, also provide liquid  Wound care:  Ask your healthcare provider about how to care for your incision wound  Ask when you can get into a tub, shower, or pool  Contact your healthcare provider if:   · You have a fever  · The skin around your wound is red, swollen, or has pus coming from it  · You have trouble breathing, or your skin is itchy, swollen, or has a rash  · You have questions or concerns about your condition or care  Seek care immediately or call 911 if:   · The area where the catheter was placed is swollen and filled with blood or is bleeding  · The leg or arm used for the procedure becomes numb or turns white or blue  · You feel lightheaded, short of breath, and have chest pain  · You cough up blood  · You have any of the following signs of a heart attack:      ¨ Squeezing, pressure, or pain in your chest that lasts longer than 5 minutes or returns    ¨ Discomfort or pain in your back, neck, jaw, stomach, or arm     ¨ Trouble breathing    ¨ Nausea or vomiting    ¨ Lightheadedness or a sudden cold sweat, especially with chest pain or trouble breathing    · Your arm or leg feels warm, tender, and painful  It may look swollen and red      · You have any of the following signs of a stroke:     ¨ Part of your face droops or is numb    ¨ Weakness in an arm or leg    ¨ Confusion or difficulty speaking    ¨ Dizziness, a severe headache, or vision loss  © 2017 [facility] is for End User's use only and may not be sold, redistributed or otherwise used for commercial purposes  All illustrations and images included in CareNotes® are the copyrighted property of A D A M [employer] Inc  or [employer]  The above information is an  only  It is not intended as medical advice for individual conditions or treatments  Talk to your doctor, nurse or pharmacist before following any medical regimen to see if it is safe and effective for you

## 2021-05-07 NOTE — INTERVAL H&P NOTE
/75   Pulse 71   Temp 98 °F (36 7 °C) (Temporal)   Resp 14   SpO2 98%     H&P reviewed  After examining the patient, I find no changed to the H&P since it had been written  Patient re-evaluated   Accept as history and physical     Vladislav Landeros MD/May 7, 2021/7:22 AM

## 2021-05-10 ENCOUNTER — TELEPHONE (OUTPATIENT)
Dept: CARDIOLOGY CLINIC | Facility: CLINIC | Age: 56
End: 2021-05-10

## 2021-05-10 ENCOUNTER — APPOINTMENT (OUTPATIENT)
Dept: LAB | Facility: HOSPITAL | Age: 56
End: 2021-05-10
Payer: MEDICARE

## 2021-05-10 DIAGNOSIS — Z98.890 S/P CARDIAC CATHETERIZATION: ICD-10-CM

## 2021-05-10 LAB
ANION GAP SERPL CALCULATED.3IONS-SCNC: 11 MMOL/L (ref 4–13)
BUN SERPL-MCNC: 56 MG/DL (ref 5–25)
CALCIUM SERPL-MCNC: 7.5 MG/DL (ref 8.3–10.1)
CHLORIDE SERPL-SCNC: 98 MMOL/L (ref 100–108)
CO2 SERPL-SCNC: 31 MMOL/L (ref 21–32)
CREAT SERPL-MCNC: 13.97 MG/DL (ref 0.6–1.3)
GFR SERPL CREATININE-BSD FRML MDRD: 4 ML/MIN/1.73SQ M
GLUCOSE P FAST SERPL-MCNC: 113 MG/DL (ref 65–99)
POTASSIUM SERPL-SCNC: 5 MMOL/L (ref 3.5–5.3)
SODIUM SERPL-SCNC: 140 MMOL/L (ref 136–145)

## 2021-05-10 PROCEDURE — 80048 BASIC METABOLIC PNL TOTAL CA: CPT

## 2021-05-10 PROCEDURE — 36415 COLL VENOUS BLD VENIPUNCTURE: CPT

## 2021-05-10 NOTE — TELEPHONE ENCOUNTER
Musa's cath is finished and transplant team is waiting on preauth clearance added to last ov note  I will call when completed   [phone]

## 2021-06-07 ENCOUNTER — OFFICE VISIT (OUTPATIENT)
Dept: INTERNAL MEDICINE CLINIC | Facility: CLINIC | Age: 56
End: 2021-06-07
Payer: MEDICARE

## 2021-06-07 VITALS
SYSTOLIC BLOOD PRESSURE: 160 MMHG | RESPIRATION RATE: 12 BRPM | HEART RATE: 86 BPM | WEIGHT: 175 LBS | BODY MASS INDEX: 25.05 KG/M2 | TEMPERATURE: 97.6 F | HEIGHT: 70 IN | DIASTOLIC BLOOD PRESSURE: 86 MMHG

## 2021-06-07 DIAGNOSIS — IMO0002 UNCONTROLLED SECONDARY DIABETES MELLITUS WITH STAGE 5 CKD (GFR<15): ICD-10-CM

## 2021-06-07 DIAGNOSIS — N18.6 TYPE 2 DIABETES MELLITUS WITH CHRONIC KIDNEY DISEASE ON CHRONIC DIALYSIS, WITH LONG-TERM CURRENT USE OF INSULIN (HCC): ICD-10-CM

## 2021-06-07 DIAGNOSIS — M70.21 OLECRANON BURSITIS OF RIGHT ELBOW: ICD-10-CM

## 2021-06-07 DIAGNOSIS — R76.12 POSITIVE QUANTIFERON-TB GOLD TEST: ICD-10-CM

## 2021-06-07 DIAGNOSIS — I10 ESSENTIAL HYPERTENSION: ICD-10-CM

## 2021-06-07 DIAGNOSIS — E11.65 UNCONTROLLED TYPE 2 DIABETES MELLITUS WITH HYPERGLYCEMIA, WITH LONG-TERM CURRENT USE OF INSULIN (HCC): ICD-10-CM

## 2021-06-07 DIAGNOSIS — Z79.4 TYPE 2 DIABETES MELLITUS WITH CHRONIC KIDNEY DISEASE ON CHRONIC DIALYSIS, WITH LONG-TERM CURRENT USE OF INSULIN (HCC): ICD-10-CM

## 2021-06-07 DIAGNOSIS — E11.22 TYPE 2 DIABETES MELLITUS WITH CHRONIC KIDNEY DISEASE ON CHRONIC DIALYSIS, WITH LONG-TERM CURRENT USE OF INSULIN (HCC): ICD-10-CM

## 2021-06-07 DIAGNOSIS — N18.6 ESRD (END STAGE RENAL DISEASE) (HCC): Primary | ICD-10-CM

## 2021-06-07 DIAGNOSIS — N18.4 CKD (CHRONIC KIDNEY DISEASE) STAGE 4, GFR 15-29 ML/MIN (HCC): ICD-10-CM

## 2021-06-07 DIAGNOSIS — Z99.2 TYPE 2 DIABETES MELLITUS WITH CHRONIC KIDNEY DISEASE ON CHRONIC DIALYSIS, WITH LONG-TERM CURRENT USE OF INSULIN (HCC): ICD-10-CM

## 2021-06-07 DIAGNOSIS — Z79.4 UNCONTROLLED TYPE 2 DIABETES MELLITUS WITH HYPERGLYCEMIA, WITH LONG-TERM CURRENT USE OF INSULIN (HCC): ICD-10-CM

## 2021-06-07 DIAGNOSIS — E78.5 HYPERLIPIDEMIA, UNSPECIFIED HYPERLIPIDEMIA TYPE: ICD-10-CM

## 2021-06-07 DIAGNOSIS — E11.319 DIABETIC RETINOPATHY OF BOTH EYES ASSOCIATED WITH TYPE 2 DIABETES MELLITUS, MACULAR EDEMA PRESENCE UNSPECIFIED, UNSPECIFIED RETINOPATHY SEVERITY (HCC): ICD-10-CM

## 2021-06-07 LAB — SL AMB POCT HEMOGLOBIN AIC: 6.7 (ref ?–6.5)

## 2021-06-07 PROCEDURE — 83036 HEMOGLOBIN GLYCOSYLATED A1C: CPT | Performed by: INTERNAL MEDICINE

## 2021-06-07 PROCEDURE — 99214 OFFICE O/P EST MOD 30 MIN: CPT | Performed by: INTERNAL MEDICINE

## 2021-06-07 RX ORDER — INSULIN GLARGINE 100 [IU]/ML
30 INJECTION, SOLUTION SUBCUTANEOUS DAILY
Qty: 15 ML | Refills: 2 | Status: SHIPPED | OUTPATIENT
Start: 2021-06-07 | End: 2021-10-01

## 2021-06-07 RX ORDER — NIFEDIPINE 60 MG/1
60 TABLET, FILM COATED, EXTENDED RELEASE ORAL DAILY
Qty: 30 TABLET | Refills: 0 | Status: SHIPPED | OUTPATIENT
Start: 2021-06-07 | End: 2021-09-08

## 2021-06-07 RX ORDER — ATORVASTATIN CALCIUM 40 MG/1
40 TABLET, FILM COATED ORAL DAILY
Qty: 90 TABLET | Refills: 1 | Status: SHIPPED | OUTPATIENT
Start: 2021-06-07 | End: 2022-04-04

## 2021-06-07 RX ORDER — FLASH GLUCOSE SENSOR
1 KIT MISCELLANEOUS DAILY
Qty: 2 EACH | Refills: 1 | Status: SHIPPED | OUTPATIENT
Start: 2021-06-07 | End: 2021-07-07 | Stop reason: SDUPTHER

## 2021-06-07 RX ORDER — ASPIRIN 81 MG/1
81 TABLET ORAL DAILY
Qty: 90 TABLET | Refills: 1 | Status: SHIPPED | OUTPATIENT
Start: 2021-06-07 | End: 2022-03-10

## 2021-06-07 RX ORDER — FLASH GLUCOSE SCANNING READER
1 EACH MISCELLANEOUS DAILY
Qty: 1 EACH | Refills: 0 | Status: SHIPPED | OUTPATIENT
Start: 2021-06-07 | End: 2021-07-07 | Stop reason: SDUPTHER

## 2021-06-07 RX ORDER — FUROSEMIDE 80 MG
80 TABLET ORAL DAILY
Qty: 90 TABLET | Refills: 1 | Status: SHIPPED | OUTPATIENT
Start: 2021-06-07 | End: 2022-03-28

## 2021-06-26 ENCOUNTER — HOSPITAL ENCOUNTER (EMERGENCY)
Facility: HOSPITAL | Age: 56
Discharge: HOME | End: 2021-06-26
Attending: EMERGENCY MEDICINE
Payer: MEDICARE

## 2021-06-26 VITALS
RESPIRATION RATE: 16 BRPM | HEART RATE: 77 BPM | OXYGEN SATURATION: 98 % | DIASTOLIC BLOOD PRESSURE: 78 MMHG | TEMPERATURE: 98.1 F | SYSTOLIC BLOOD PRESSURE: 144 MMHG

## 2021-06-26 DIAGNOSIS — M54.2 NECK PAIN: Primary | ICD-10-CM

## 2021-06-26 PROCEDURE — 99284 EMERGENCY DEPT VISIT MOD MDM: CPT | Performed by: EMERGENCY MEDICINE

## 2021-06-26 PROCEDURE — 99283 EMERGENCY DEPT VISIT LOW MDM: CPT

## 2021-06-26 NOTE — ED PROVIDER NOTES
Emergency Department Note- Paddy Zhong 61 y o  male MRN: [id number]    Unit/Bed#: ED 03 Encounter: [id number]        History of Present Illness     Patient is a 59-year-old male, predominant language is [place], does speak English somewhat, accompanied by his sister who is bilingual and fluent  He indicates he would prefer to have the sister  rather using a  service here for the past 2 months the patient has had some mild pain on both sides of his neck which is relatively constant, mild severity, worse with twisting or moving  No trauma, no weakness, no numbness or tingling  He does get dialysis 3 days a week, Tuesday, Thursday and Saturday, had dialysis today which is Saturday, has not noticed any change in the pain either before or after dialysis  He has not taken any analgesic medication or attempted any treatments for the neck pain, nor has he sought medical attention, prior to come to the ED today  He did complete his dialysis today  He denies fever, denies chills, denies weakness  Does have chronic decreased vision secondary to diabetic retinopathy which is unchanged      REVIEW OF SYSTEMS     Constitutional:  No recent weight  gains or losses   Eyes:  As per hpi   ENT:  No tinnitus or hearing changes   Cardiac: No chest pain or palpitations   Respiratory:  No cough or shortness of breath   Abdominal:  No nausea or vomiting   Urinary: No dysuria or hematuria   Hematologic: No easy bruising or bleeding   Skin: No rash   Musculoskeletal: As per HPI   Neurologic: As per HPI   Psychiatric: No mood changes      Historical Information   Past Medical History:   Diagnosis Date    Cataract     Chronic kidney disease     Diabetes mellitus ([facility] )      IDDM    Diabetic retinopathy ([facility] )     Dizziness     occ    ESRD (end stage renal disease) ([facility] )     GERD (gastroesophageal reflux disease)     Headache     occ    Hyperlipidemia     Hypertension     Legally blind     LVH (left ventricular hypertrophy)     Preferred language is [place]     understands some English    Use of cane as ambulatory aid      Past Surgical History:   Procedure Laterality Date    INGUINAL HERNIA REPAIR Right     IR AV FISTULAGRAM/GRAFTOGRAM  4/30/2019    IR AV FISTULAGRAM/GRAFTOGRAM  6/14/2019    IR AV FISTULAGRAM/GRAFTOGRAM  6/24/2020    IR AV FISTULAGRAM/GRAFTOGRAM  2/26/2021    IR TUNNELED DIALYSIS CATHETER PLACEMENT  4/3/2019    HI ANASTOMOSIS,AV,ANY SITE Left 1/23/2019    Procedure: CREATION FISTULA ARTERIOVENOUS (AV); Surgeon: Rachel Zimmer MD;  Location: [facility];  Service: Vascular     Social History   Social History     Substance and Sexual Activity   Alcohol Use Not Currently     Social History     Substance and Sexual Activity   Drug Use No     Social History     Tobacco Use   Smoking Status Former Smoker    Packs/day: 0 25    Quit date: 2/1/2018    Years since quitting: 3 4   Smokeless Tobacco Never Used     Family History:   Family History   Problem Relation Age of Onset    Hypertension Mother     Diabetes Father     No Known Problems Sister     No Known Problems Brother     No Known Problems Maternal Aunt     No Known Problems Maternal Uncle     No Known Problems Paternal Aunt     No Known Problems Paternal Uncle     No Known Problems Maternal Grandmother     No Known Problems Maternal Grandfather     No Known Problems Paternal Grandmother     No Known Problems Paternal Grandfather        MEDICATIONS:  No current facility-administered medications on file prior to encounter       Current Outpatient Medications on File Prior to Encounter   Medication Sig Dispense Refill    aspirin (Aspirin Low Dose) 81 mg EC tablet Take 1 tablet (81 mg total) by mouth daily 90 tablet 1    atorvastatin (LIPITOR) 40 mg tablet Take 1 tablet (40 mg total) by mouth daily 90 tablet 1    Blood Glucose Monitoring Suppl (ONE TOUCH ULTRA 2) w/Device KIT by Does not apply route 3 (three) times a day 1 each 0    brimonidine tartrate 0 2 % ophthalmic solution Administer 1 drop into the left eye 2 (two) times a day      Carboxymethylcellul-Glycerin (CLEAR EYES FOR DRY EYES OP) Apply 2 drops to eye 2 (two) times a day      cinacalcet (SENSIPAR) 30 mg tablet Take 30 mg by mouth daily      Continuous Blood Gluc  (FreeStyle Jessica 14 Day Dumont) LAUREN Use 1 each daily 1 each 0    Continuous Blood Gluc Sensor (FreeStyle Jessica 14 Day Sensor) MISC Use 1 each daily 2 each 1    cyclobenzaprine (FLEXERIL) 5 mg tablet Take 1 tablet (5 mg total) by mouth daily at bedtime as needed for muscle spasms 30 tablet 1    doxazosin (CARDURA) 4 mg tablet Take 1 tablet (4 mg total) by mouth daily at bedtime 30 tablet 0    furosemide (LASIX) 80 mg tablet Take 1 tablet (80 mg total) by mouth daily 90 tablet 1    glucose blood (FREESTYLE LITE) test strip Test blood sugars three times daily   100 each 2    insulin aspart (NovoLOG) 100 Units/mL injection pen 10U with breakfast and lunch, 10-14 U with dinner 5 pen 2    insulin glargine (Basaglar KwikPen) 100 units/mL injection pen Inject 30 Units under the skin daily 15 mL 2    isoniazid (NYDRAZID) 300 mg tablet Take 1 tablet (300 mg total) by mouth daily 30 tablet 8    labetalol (NORMODYNE) 200 mg tablet Take 1 tablet (200 mg total) by mouth every 12 (twelve) hours 60 tablet 0    Lancets (freestyle) lancets Use as instructed 100 each 1    Lancets MISC Use 2 (two) times a day 100 each 0    LOKELMA 10 g PACK       NIFEdipine (ADALAT CC) 60 MG 24 hr tablet Take 1 tablet (60 mg total) by mouth daily 30 tablet 0    ONE TOUCH LANCETS MISC by Does not apply route 3 (three) times a day 100 each 1    pantoprazole (PROTONIX) 20 mg tablet Take 1 tablet (20 mg total) by mouth daily 30 tablet 3    polyethylene glycol (MIRALAX) 17 g packet Take 17 g by mouth daily 30 each 1    prednisoLONE acetate (PRED FORTE) 1 % ophthalmic suspension Administer 1 drop into the left eye 2 (two) times a day   0    pyridoxine (VITAMIN B6) 50 mg tablet Take 1 tablet (50 mg total) by mouth daily 30 tablet 6    sevelamer carbonate (RENVELA) 800 mg tablet Take 800 mg by mouth 3 (three) times a day with meals       ALLERGIES:  Allergies   Allergen Reactions    No Known Allergies        Vitals:    06/26/21 1618   BP: 144/78   TempSrc: Oral   Pulse: 77   Resp: 16   Patient Position - Orthostatic VS: Sitting   Temp: 98 1 °F (36 7 °C)       PHYSICAL EXAM    General:  Patient is well-appearing  Head:  Atraumatic  Eyes:  Conjunctiva pink  ENT:  Mucous membranes are moist  Neck:  Neck is visibly atraumatic, no warmth or redness  He has no bony tenderness  There is no step-offs or deformities  He has full range of motion but slight discomfort at the extremes of side bending and rotation  He has mild tenderness to palpation to the bilateral paraspinal musculature, predominantly trapezius, extending down to the base of the neck  He has no warmth or redness  He has no anterior neck swelling, tracheal deviation, or internal jugular vein tenderness on either side  Cardiac:  S1-S2, without murmurs  Lungs:  Clear to auscultation bilaterally  Abdomen:  Soft, nontender, normal bowel sounds, no CVA tenderness, no tympany, no rigidity, no guarding  Extremities:  Normal range of motion, left upper extremity dialysis fistula with good thrill  Neurologic:  Awake, fluent speech, normal comprehension  AAOx3  His strength is 5/5 of the bilateral shoulders, elbows, wrist,  strengths are equal symmetric bilaterally  Normal sensation throughout the head, neck, torso and arms  Skin:  Pink warm and dry  Psychiatric:  Alert, pleasant, cooperative            Labs Reviewed - No data to display    Medications - No data to display    No orders to display            Assessment/Plan     ED Medical Decision Making:    Suspect the patient's pain is muscular, do not believe imaging is indicated this point    Supportive care, importance of follow-up and return precautions were discussed with patient and sister, who expressed understanding  Time reflects when diagnosis was documented in both MDM as applicable and the Disposition within this note     Time User Action Codes Description Comment    6/26/2021  7:15 PM Alonso Gallego Add [M54 2] Neck pain       ED Disposition     ED Disposition Condition Date/Time Comment    Discharge Stable Sat Jun 26, 2021  7:15 PM Paddy Zhong discharge to home/self care              Follow-up Information     Follow up With Specialties Details Why Jovana Blackwell MD Internal Medicine In 5 days  [address] [address]  [phone]            New Prescriptions    DICLOFENAC SODIUM (VOLTAREN) 1 %    Apply 2 g topically 4 (four) times a day for 14 days            Svitlana Moss DO  06/26/21 1912

## 2021-06-26 NOTE — DISCHARGE INSTRUCTIONS
Neck Pain   DISCHARGE INSTRUCTIONS:   Return to the emergency department if:   You have an injury that causes neck pain and shooting pain down your arms or legs  Your neck pain suddenly becomes severe  You have neck pain along with numbness, tingling, or weakness in your arms or legs  You have a stiff neck, a headache, and a fever  Contact your healthcare provider if:   You have new or worsening symptoms  Your symptoms continue even after treatment  You have questions or concerns about your condition or care

## 2021-07-07 DIAGNOSIS — N18.6 TYPE 2 DIABETES MELLITUS WITH CHRONIC KIDNEY DISEASE ON CHRONIC DIALYSIS, WITH LONG-TERM CURRENT USE OF INSULIN (HCC): ICD-10-CM

## 2021-07-07 DIAGNOSIS — Z79.4 TYPE 2 DIABETES MELLITUS WITH CHRONIC KIDNEY DISEASE ON CHRONIC DIALYSIS, WITH LONG-TERM CURRENT USE OF INSULIN (HCC): ICD-10-CM

## 2021-07-07 DIAGNOSIS — Z99.2 TYPE 2 DIABETES MELLITUS WITH CHRONIC KIDNEY DISEASE ON CHRONIC DIALYSIS, WITH LONG-TERM CURRENT USE OF INSULIN (HCC): ICD-10-CM

## 2021-07-07 DIAGNOSIS — E11.319 DIABETIC RETINOPATHY OF BOTH EYES ASSOCIATED WITH TYPE 2 DIABETES MELLITUS, MACULAR EDEMA PRESENCE UNSPECIFIED, UNSPECIFIED RETINOPATHY SEVERITY (HCC): ICD-10-CM

## 2021-07-07 DIAGNOSIS — E11.22 TYPE 2 DIABETES MELLITUS WITH CHRONIC KIDNEY DISEASE ON CHRONIC DIALYSIS, WITH LONG-TERM CURRENT USE OF INSULIN (HCC): ICD-10-CM

## 2021-07-07 RX ORDER — FLASH GLUCOSE SCANNING READER
1 EACH MISCELLANEOUS DAILY
Qty: 1 EACH | Refills: 0 | Status: SHIPPED | OUTPATIENT
Start: 2021-07-07

## 2021-07-07 RX ORDER — FLASH GLUCOSE SENSOR
KIT MISCELLANEOUS
Qty: 2 EACH | Refills: 1 | Status: SHIPPED | OUTPATIENT
Start: 2021-07-07 | End: 2021-09-03 | Stop reason: SDUPTHER

## 2021-08-04 DIAGNOSIS — K21.9 GASTROESOPHAGEAL REFLUX DISEASE: ICD-10-CM

## 2021-08-10 RX ORDER — PANTOPRAZOLE SODIUM 20 MG/1
TABLET, DELAYED RELEASE ORAL
Qty: 30 TABLET | Refills: 3 | Status: SHIPPED | OUTPATIENT
Start: 2021-08-10 | End: 2022-01-08

## 2021-08-16 ENCOUNTER — OFFICE VISIT (OUTPATIENT)
Dept: GASTROENTEROLOGY | Facility: MEDICAL CENTER | Age: 56
End: 2021-08-16
Payer: MEDICARE

## 2021-08-16 VITALS
HEART RATE: 73 BPM | WEIGHT: 177.8 LBS | DIASTOLIC BLOOD PRESSURE: 83 MMHG | TEMPERATURE: 98.3 F | SYSTOLIC BLOOD PRESSURE: 170 MMHG | BODY MASS INDEX: 25.51 KG/M2

## 2021-08-16 DIAGNOSIS — Z12.11 SCREENING FOR COLON CANCER: ICD-10-CM

## 2021-08-16 DIAGNOSIS — K21.9 GASTROESOPHAGEAL REFLUX DISEASE, UNSPECIFIED WHETHER ESOPHAGITIS PRESENT: ICD-10-CM

## 2021-08-16 DIAGNOSIS — K59.09 CHRONIC CONSTIPATION: Primary | ICD-10-CM

## 2021-08-16 PROCEDURE — 99214 OFFICE O/P EST MOD 30 MIN: CPT | Performed by: PHYSICIAN ASSISTANT

## 2021-08-16 NOTE — PROGRESS NOTES
Assessment/Plan:     Diagnoses and all orders for this visit:    Chronic constipation  Patient has history of constipation  He is currently on senna CT which she only takes once per week  When he takes it he has have good result however he is afraid to take it on dialysis days  I did recommend that he take it when he returns from dialysis so that he would have the entire next day to use of the toilet  Hopefully by increasing bowel movements will also help with his abdominal bloating/distension  Screening for colon cancer  Patient underwent screening colonoscopy last year and was found to have tubular adenomatous polyps is recommended that he repeat this in 1 year due to poor prep  Risks and benefits of the procedure were discussed and they agreeable to proceed  -     Colonoscopy; Future    Gastroesophageal reflux disease, unspecified whether esophagitis present  Patient has a history of GERD, typically well controlled with pantoprazole however if he does miss a day he will have breakthrough symptoms  Given the chronicity of his symptoms would recommend endoscopy at the same time as his colonoscopy for further evaluation of peptic ulcer disease, H pylori, etc  -     EGD; Future    Will see him back after to discuss all results  Subjective:      Patient ID: Callum Gillette is a 61 y o  male  HPI     This is a follow-up for GERD and constipation  Patient is company by a family member who aids in translation as he is mainly [place] speaking  He has a history of end-stage renal disease, on hemodialysis Tuesday, Thursday, Saturday  Patient was last seen 1 year ago at which time he was complaining of reflux  He is currently on pantoprazole 20 mg daily  He states this typically controls his symptoms however if he misses a dose he will have breakthrough symptoms  He has been on medication for many years  He has never had an endoscopy  He also suffers with constipation    It was previously recommended that he take Amitiza however date never obtained the medication  He is currently taking Senokot but only takes it on Saturdays as he does not want to take get with dialysis  He did undergo colonoscopy last year and was found to several small tubular adenomatous polyps, fair prep, recommended to be repeated in 1 year's time      Patient Active Problem List   Diagnosis    Type 2 diabetes mellitus with chronic kidney disease on chronic dialysis, with long-term current use of insulin ([facility] )    Diabetic retinopathy of both eyes associated with type 2 diabetes mellitus ([place] [address] )    Legally blind    LVH (left ventricular hypertrophy)    Hyperlipidemia    Chronic constipation    Screening for colon cancer    ESRD (end stage renal disease) ([place] [address] )    S/P arteriovenous (AV) fistula creation    Elevated troponin I level    Azotemia    Chronic kidney disease-mineral and bone disorder    Anemia due to chronic kidney disease, on chronic dialysis ([place] [address] )    Hyperphosphatemia    Benign hypertension with ESRD (end-stage renal disease) (HCC)    Slow transit constipation    Neutropenia (HCC)    Onychomycosis    Diabetic polyneuropathy associated with type 2 diabetes mellitus (HCC)    Positive QuantiFERON-TB Gold test    Cough    TB lung, latent    Long term (current) use of antibiotics    Hypertensive urgency    Abnormal EKG    Pulmonary nodule    GERD (gastroesophageal reflux disease)     Allergies   Allergen Reactions    No Known Allergies      Current Outpatient Medications on File Prior to Visit   Medication Sig    aspirin (Aspirin Low Dose) 81 mg EC tablet Take 1 tablet (81 mg total) by mouth daily    atorvastatin (LIPITOR) 40 mg tablet Take 1 tablet (40 mg total) by mouth daily    Blood Glucose Monitoring Suppl (ONE TOUCH ULTRA 2) w/Device KIT by Does not apply route 3 (three) times a day    brimonidine tartrate 0 2 % ophthalmic solution Administer 1 drop into the left eye 2 (two) times a day  Carboxymethylcellul-Glycerin (CLEAR EYES FOR DRY EYES OP) Apply 2 drops to eye 2 (two) times a day    cinacalcet (SENSIPAR) 30 mg tablet Take 30 mg by mouth daily    Continuous Blood Gluc  (FreeStyle Jessica 14 Day [place]) LAUREN Use 1 each daily    Continuous Blood Gluc Sensor (FreeStyle Jessica 14 Day Sensor) MISC Test blood sugars three times daily   cyclobenzaprine (FLEXERIL) 5 mg tablet Take 1 tablet (5 mg total) by mouth daily at bedtime as needed for muscle spasms    furosemide (LASIX) 80 mg tablet Take 1 tablet (80 mg total) by mouth daily    glucose blood (FREESTYLE LITE) test strip Test blood sugars three times daily      insulin aspart (NovoLOG) 100 Units/mL injection pen 10U with breakfast and lunch, 10-14 U with dinner    insulin glargine (Basaglar KwikPen) 100 units/mL injection pen Inject 30 Units under the skin daily    isoniazid (NYDRAZID) 300 mg tablet Take 1 tablet (300 mg total) by mouth daily    Lancets (freestyle) lancets Use as instructed    Lancets MISC Use 2 (two) times a day    LOKELMA 10 g PACK     ONE TOUCH LANCETS MISC by Does not apply route 3 (three) times a day    pantoprazole (PROTONIX) 20 mg tablet take 1 tablet by mouth once daily    polyethylene glycol (MIRALAX) 17 g packet Take 17 g by mouth daily    prednisoLONE acetate (PRED FORTE) 1 % ophthalmic suspension Administer 1 drop into the left eye 2 (two) times a day     pyridoxine (VITAMIN B6) 50 mg tablet Take 1 tablet (50 mg total) by mouth daily    sevelamer carbonate (RENVELA) 800 mg tablet Take 800 mg by mouth 3 (three) times a day with meals    Diclofenac Sodium (VOLTAREN) 1 % Apply 2 g topically 4 (four) times a day for 14 days    doxazosin (CARDURA) 4 mg tablet Take 1 tablet (4 mg total) by mouth daily at bedtime    labetalol (NORMODYNE) 200 mg tablet Take 1 tablet (200 mg total) by mouth every 12 (twelve) hours    NIFEdipine (ADALAT CC) 60 MG 24 hr tablet Take 1 tablet (60 mg total) by mouth daily     No current facility-administered medications on file prior to visit       Family History   Problem Relation Age of Onset    Hypertension Mother     Diabetes Father     No Known Problems Sister     No Known Problems Brother     No Known Problems Maternal Aunt     No Known Problems Maternal Uncle     No Known Problems Paternal Aunt     No Known Problems Paternal Uncle     No Known Problems Maternal Grandmother     No Known Problems Maternal Grandfather     No Known Problems Paternal Grandmother     No Known Problems Paternal Grandfather      Past Medical History:   Diagnosis Date    Cataract     Chronic kidney disease     Diabetes mellitus ([facility] )      IDDM    Di[facility] retinopathy ([facility] )     Dizziness     occ    ESRD (end stage renal disease) ([facility] )     GERD (gastroesophageal reflux disease)     Headache     occ    Hyperlipidemia     Hypertension     Legally blind     LVH (left ventricular hypertrophy)     Preferred language is [place]     understands some English    Use of cane as ambulatory aid      Social History     Socioeconomic History    Marital status: Legally      Spouse name: None    Number of children: None    Years of education: None    Highest education level: None   Occupational History    Occupation: DISABLED   Tobacco Use    Smoking status: Former Smoker     Packs/day: 0 25     Quit date: 2/1/2018     Years since quitting: 3 5    Smokeless tobacco: Never Used   Vaping Use    Vaping Use: Never used   Substance and Sexual Activity    Alcohol use: Not Currently    Drug use: No    Sexual activity: None   Other Topics Concern    None   Social History Narrative    None     Social Determinants of Health     Financial Resource Strain:     Difficulty of Paying Living Expenses:    Food Insecurity:     Worried About Running Out of Food in the Last Year:     [address] in the Last Year:    Transportation Needs:     Lack of Transportation (Medical):    Sierra Enriquez Lack of Transportation (Non-Medical):    Physical Activity:     Days of Exercise per Week:     Minutes of Exercise per Session:    Stress:     Feeling of Stress :    Social Connections:     Frequency of Communication with Friends and Family:     Frequency of Social Gatherings with Friends and Family:     Attends [employer] Services:     Active Member of Clubs or Organizations:     Attends Club or Organization Meetings:     Marital Status:    Intimate Partner Violence:     Fear of Current or Ex-Partner:     Emotionally Abused:     Physically Abused:     Sexually Abused:      Past Surgical History:   Procedure Laterality Date    INGUINAL HERNIA REPAIR Right     IR AV FISTULAGRAM/GRAFTOGRAM  4/30/2019    IR AV FISTULAGRAM/GRAFTOGRAM  6/14/2019    IR AV FISTULAGRAM/GRAFTOGRAM  6/24/2020    IR AV FISTULAGRAM/GRAFTOGRAM  2/26/2021    IR TUNNELED DIALYSIS CATHETER PLACEMENT  4/3/2019    OH ANASTOMOSIS,AV,ANY SITE Left 1/23/2019    Procedure: CREATION FISTULA ARTERIOVENOUS (AV); Surgeon: Rangel Alex MD;  Location: [facility];  Service: Vascular         Review of Systems   All other systems reviewed and are negative  Objective:      /83   Pulse 73   Temp 98 3 °F (36 8 °C)   Wt 80 6 kg (177 lb 12 8 oz)   BMI 25 51 kg/m²          Physical Exam  Constitutional:       Appearance: Normal appearance  He is well-developed  HENT:      Head: Normocephalic and atraumatic  Eyes:      Conjunctiva/sclera: Conjunctivae normal    Cardiovascular:      Rate and Rhythm: Normal rate and regular rhythm  Pulmonary:      Effort: Pulmonary effort is normal       Breath sounds: Normal breath sounds  Abdominal:      General: Bowel sounds are normal  There is distension (Mild)  Palpations: Abdomen is soft  Tenderness: There is no abdominal tenderness  Musculoskeletal:         General: Normal range of motion  Cervical back: Normal range of motion     Skin:     General: Skin is warm and dry    Neurological:      Mental Status: He is alert and oriented to person, place, and time     Psychiatric:         Mood and Affect: Mood normal          Behavior: Behavior normal

## 2021-08-18 ENCOUNTER — PREP FOR PROCEDURE (OUTPATIENT)
Dept: INTERVENTIONAL RADIOLOGY/VASCULAR | Facility: CLINIC | Age: 56
End: 2021-08-18

## 2021-08-18 DIAGNOSIS — T82.590D DIALYSIS AV FISTULA MALFUNCTION, SUBSEQUENT ENCOUNTER: Primary | ICD-10-CM

## 2021-08-23 ENCOUNTER — TELEPHONE (OUTPATIENT)
Dept: GASTROENTEROLOGY | Facility: CLINIC | Age: 56
End: 2021-08-23

## 2021-08-23 ENCOUNTER — OFFICE VISIT (OUTPATIENT)
Dept: NEPHROLOGY | Facility: CLINIC | Age: 56
End: 2021-08-23
Payer: MEDICARE

## 2021-08-23 VITALS
HEIGHT: 70 IN | SYSTOLIC BLOOD PRESSURE: 160 MMHG | WEIGHT: 176 LBS | DIASTOLIC BLOOD PRESSURE: 80 MMHG | BODY MASS INDEX: 25.2 KG/M2

## 2021-08-23 DIAGNOSIS — Z94.0 RENAL TRANSPLANT RECIPIENT: ICD-10-CM

## 2021-08-23 DIAGNOSIS — I65.23 BILATERAL CAROTID ARTERY STENOSIS: Primary | ICD-10-CM

## 2021-08-23 PROCEDURE — 99215 OFFICE O/P EST HI 40 MIN: CPT | Performed by: INTERNAL MEDICINE

## 2021-08-23 NOTE — TELEPHONE ENCOUNTER
EGD/COLON scheduled on 11/18 at [address] [facility] with Dr Jaiyeola Irmid states patient was given Miralax/Dulcolax instructions at [address]

## 2021-08-23 NOTE — H&P (VIEW-ONLY)
NEPHROLOGY OFFICE VISIT   Paddy Zhong 61 y o  male MRN: [id number]  8/23/2021    Reason for Visit: renal transplant pre evaluation f/u (yearly)    ASSESSMENT and PLAN:    I had the pleasure of seeing Mr Marianne Dorsey today in the renal clinic for the continued management of pre transplant evaluation for yearly f/u  Pee Moe is a 63 y o  male [place] referred by Dr Tonya East, with PMHx of ESRD on HD at [facility], HTN (2011), DM (diagnosed 2002), retinopathy, legally blind, neuropathy, , history of chronic idiopathic constipation, former worked in security, former smoker quit in 2016 (smoked since teenage years), no ETOH, no drugs,seen in the Nephrology Clinic for pre-transplant kidney evaluation      ESRD presumed to be due to DM  On HD since 4/2019 7/2019 - admission for staph epidermidis bacteremia in setting of dialyssi catheter       7/2019 - ECHO EF 65; mod to severe concentric LVH;     January 2021-patient was admitted due to finding of positive QuantiFERON gold test with suspicion of latent TB due to history  During pre evaluation workup  Patient was started on treatment for 9 months  February 2021-patient admitted for chest pain and hypertensive urgency  Troponins unrevealing  May 2021-patient completed cardiac catheterization-no significant coronary     Plan   1  Patient's case will be reviewed with the transplant committee at the next transplant committee meeting  2  Patient is due for colonoscopy which was rescheduled to next month  3  Patient will need yearly CT scan regarding incidental pulmonary nodule finding in February 2021  Patient's primary care physician has already ordered  Patient was reminded of this today also  4  Carotid artery ultrasound as patient is due toward the end of this year (had less than 50% stenosis bilaterally prior)  5  Endocrine - history of DM; 60 units total daily; BMI 23 7  6   Patient had cardiac catheterization completed with 30% mid LAD stenosis noted  Otherwise did not require intervention  From the transplant standpoint, if the patient does not have transplant by 2024, will likely need repeat catheterization  7  Back pain-new for the past couple months  Patient was advised to talk to PCP  8  Latent TB-patient is completing 9 month course of treatment    No problem-specific Assessment & Plan notes found for this encounter  HPI:    Patient denies fevers, chills, nausea, vomiting, diarrhea  PATIENT INSTRUCTIONS:    Patient Instructions   1) please complete carotid ultrasound prior to end of the year  2) appointment in 1 year  3) once your colonoscopy is completed - please call [facility]        OBJECTIVE:  Current Weight: Weight - Scale: 79 8 kg (176 lb)  Vitals:    08/23/21 1416   Weight: 79 8 kg (176 lb)   Height: 5' 10" (1 778 m)    Body mass index is 25 25 kg/m²  REVIEW OF SYSTEMS:    Review of Systems   Constitutional: Negative  Negative for appetite change and fatigue  HENT: Negative  Eyes: Negative  Respiratory: Negative  Negative for shortness of breath  Cardiovascular: Negative  Negative for leg swelling  Gastrointestinal: Negative  Endocrine: Negative  Genitourinary: Negative  Negative for difficulty urinating  Musculoskeletal: Positive for back pain  Allergic/Immunologic: Negative  Neurological: Negative  Hematological: Negative  Psychiatric/Behavioral: Negative  All other systems reviewed and are negative  PHYSICAL EXAM:      Physical Exam  Vitals and nursing note reviewed  Constitutional:       General: He is not in acute distress  Appearance: He is well-developed  He is not diaphoretic  HENT:      Head: Normocephalic and atraumatic  Eyes:      General: No scleral icterus  Right eye: No discharge  Left eye: No discharge  Conjunctiva/sclera: Conjunctivae normal    Neck:      Vascular: No JVD     Cardiovascular:      Rate and Rhythm: Normal rate and regular rhythm  Heart sounds: Normal heart sounds  No murmur heard  No friction rub  No gallop  Pulmonary:      Effort: Pulmonary effort is normal  No respiratory distress  Breath sounds: Normal breath sounds  No wheezing or rales  Chest:      Chest wall: No tenderness  Abdominal:      General: Bowel sounds are normal  There is no distension  Palpations: Abdomen is soft  Tenderness: There is no abdominal tenderness  There is no rebound  Musculoskeletal:         General: No tenderness or deformity  Normal range of motion  Cervical back: Normal range of motion and neck supple  Skin:     General: Skin is warm and dry  Coloration: Skin is not pale  Findings: No erythema or rash  Neurological:      Mental Status: He is alert and oriented to person, place, and time  Cranial Nerves: No cranial nerve deficit  Coordination: Coordination normal       Deep Tendon Reflexes: Reflexes are normal and symmetric  Psychiatric:         Behavior: Behavior normal          Thought Content:  Thought content normal          Judgment: Judgment normal          Medications:    Current Outpatient Medications:     aspirin (Aspirin Low Dose) 81 mg EC tablet, Take 1 tablet (81 mg total) by mouth daily, Disp: 90 tablet, Rfl: 1    atorvastatin (LIPITOR) 40 mg tablet, Take 1 tablet (40 mg total) by mouth daily, Disp: 90 tablet, Rfl: 1    Blood Glucose Monitoring Suppl (ONE TOUCH ULTRA 2) w/Device KIT, by Does not apply route 3 (three) times a day, Disp: 1 each, Rfl: 0    brimonidine tartrate 0 2 % ophthalmic solution, Administer 1 drop into the left eye 2 (two) times a day, Disp: , Rfl:     Carboxymethylcellul-Glycerin (CLEAR EYES FOR DRY EYES OP), Apply 2 drops to eye 2 (two) times a day, Disp: , Rfl:     cinacalcet (SENSIPAR) 30 mg tablet, Take 30 mg by mouth daily, Disp: , Rfl:     Continuous Blood Gluc  (FreeStyle Jessica 14 Day [place]) LAUREN, Use 1 each daily, Disp: 1 each, Rfl: 0    Continuous Blood Gluc Sensor (FreeStyle Jessica 14 Day Sensor) MISC, Test blood sugars three times daily  , Disp: 2 each, Rfl: 1    cyclobenzaprine (FLEXERIL) 5 mg tablet, Take 1 tablet (5 mg total) by mouth daily at bedtime as needed for muscle spasms, Disp: 30 tablet, Rfl: 1    Diclofenac Sodium (VOLTAREN) 1 %, Apply 2 g topically 4 (four) times a day for 14 days, Disp: 150 g, Rfl: 0    doxazosin (CARDURA) 4 mg tablet, Take 1 tablet (4 mg total) by mouth daily at bedtime, Disp: 30 tablet, Rfl: 0    furosemide (LASIX) 80 mg tablet, Take 1 tablet (80 mg total) by mouth daily, Disp: 90 tablet, Rfl: 1    glucose blood (FREESTYLE LITE) test strip, Test blood sugars three times daily  , Disp: 100 each, Rfl: 2    insulin aspart (NovoLOG) 100 Units/mL injection pen, 10U with breakfast and lunch, 10-14 U with dinner, Disp: 5 pen, Rfl: 2    insulin glargine (Basaglar KwikPen) 100 units/mL injection pen, Inject 30 Units under the skin daily, Disp: 15 mL, Rfl: 2    isoniazid (NYDRAZID) 300 mg tablet, Take 1 tablet (300 mg total) by mouth daily, Disp: 30 tablet, Rfl: 8    labetalol (NORMODYNE) 200 mg tablet, Take 1 tablet (200 mg total) by mouth every 12 (twelve) hours, Disp: 60 tablet, Rfl: 0    Lancets (freestyle) lancets, Use as instructed, Disp: 100 each, Rfl: 1    Lancets MISC, Use 2 (two) times a day, Disp: 100 each, Rfl: 0    LOKELMA 10 g PACK, , Disp: , Rfl:     NIFEdipine (ADALAT CC) 60 MG 24 hr tablet, Take 1 tablet (60 mg total) by mouth daily, Disp: 30 tablet, Rfl: 0    ONE TOUCH LANCETS MISC, by Does not apply route 3 (three) times a day, Disp: 100 each, Rfl: 1    pantoprazole (PROTONIX) 20 mg tablet, take 1 tablet by mouth once daily, Disp: 30 tablet, Rfl: 3    polyethylene glycol (MIRALAX) 17 g packet, Take 17 g by mouth daily, Disp: 30 each, Rfl: 1    prednisoLONE acetate (PRED FORTE) 1 % ophthalmic suspension, Administer 1 drop into the left eye 2 (two) times a day , Disp: , Rfl: 0    pyridoxine (VITAMIN B6) 50 mg tablet, Take 1 tablet (50 mg total) by mouth daily, Disp: 30 tablet, Rfl: 6    sevelamer carbonate (RENVELA) 800 mg tablet, Take 800 mg by mouth 3 (three) times a day with meals, Disp: , Rfl:     Laboratory Results:        Invalid input(s): ALBUMIN    Results for orders placed or performed in visit on 06/07/21   POCT hemoglobin A1c   Result Value Ref Range    Hemoglobin A1C 6 7 (A) 6 5 WDL

## 2021-08-23 NOTE — PROGRESS NOTES
NEPHROLOGY OFFICE VISIT   Paddy Zhong 61 y o  male MRN: [id number]  8/23/2021    Reason for Visit: renal transplant pre evaluation f/u (yearly)    ASSESSMENT and PLAN:    I had the pleasure of seeing Mr Fre Farley today in the renal clinic for the continued management of pre transplant evaluation for yearly f/u  Rylee Osuna is a 63 y o  male [place] referred by Dr Sadia Causey, with PMHx of ESRD on HD at [facility], HTN (2011), DM (diagnosed 2002), retinopathy, legally blind, neuropathy, , history of chronic idiopathic constipation, former worked in security, former smoker quit in 2016 (smoked since teenage years), no ETOH, no drugs,seen in the Nephrology Clinic for pre-transplant kidney evaluation      ESRD presumed to be due to DM  On HD since 4/2019 7/2019 - admission for staph epidermidis bacteremia in setting of dialyssi catheter       7/2019 - ECHO EF 65; mod to severe concentric LVH;     January 2021-patient was admitted due to finding of positive QuantiFERON gold test with suspicion of latent TB due to history  During pre evaluation workup  Patient was started on treatment for 9 months  February 2021-patient admitted for chest pain and hypertensive urgency  Troponins unrevealing  May 2021-patient completed cardiac catheterization-no significant coronary     Plan   1  Patient's case will be reviewed with the transplant committee at the next transplant committee meeting  2  Patient is due for colonoscopy which was rescheduled to next month  3  Patient will need yearly CT scan regarding incidental pulmonary nodule finding in February 2021  Patient's primary care physician has already ordered  Patient was reminded of this today also  4  Carotid artery ultrasound as patient is due toward the end of this year (had less than 50% stenosis bilaterally prior)  5  Endocrine - history of DM; 60 units total daily; BMI 23 7  6   Patient had cardiac catheterization completed with 30% mid LAD stenosis noted  Otherwise did not require intervention  From the transplant standpoint, if the patient does not have transplant by 2024, will likely need repeat catheterization  7  Back pain-new for the past couple months  Patient was advised to talk to PCP  8  Latent TB-patient is completing 9 month course of treatment    No problem-specific Assessment & Plan notes found for this encounter  HPI:    Patient denies fevers, chills, nausea, vomiting, diarrhea  PATIENT INSTRUCTIONS:    Patient Instructions   1) please complete carotid ultrasound prior to end of the year  2) appointment in 1 year  3) once your colonoscopy is completed - please call [facility]        OBJECTIVE:  Current Weight: Weight - Scale: 79 8 kg (176 lb)  Vitals:    08/23/21 1416   Weight: 79 8 kg (176 lb)   Height: 5' 10" (1 778 m)    Body mass index is 25 25 kg/m²  REVIEW OF SYSTEMS:    Review of Systems   Constitutional: Negative  Negative for appetite change and fatigue  HENT: Negative  Eyes: Negative  Respiratory: Negative  Negative for shortness of breath  Cardiovascular: Negative  Negative for leg swelling  Gastrointestinal: Negative  Endocrine: Negative  Genitourinary: Negative  Negative for difficulty urinating  Musculoskeletal: Positive for back pain  Allergic/Immunologic: Negative  Neurological: Negative  Hematological: Negative  Psychiatric/Behavioral: Negative  All other systems reviewed and are negative  PHYSICAL EXAM:      Physical Exam  Vitals and nursing note reviewed  Constitutional:       General: He is not in acute distress  Appearance: He is well-developed  He is not diaphoretic  HENT:      Head: Normocephalic and atraumatic  Eyes:      General: No scleral icterus  Right eye: No discharge  Left eye: No discharge  Conjunctiva/sclera: Conjunctivae normal    Neck:      Vascular: No JVD     Cardiovascular:      Rate and Rhythm: Normal rate and regular rhythm  Heart sounds: Normal heart sounds  No murmur heard  No friction rub  No gallop  Pulmonary:      Effort: Pulmonary effort is normal  No respiratory distress  Breath sounds: Normal breath sounds  No wheezing or rales  Chest:      Chest wall: No tenderness  Abdominal:      General: Bowel sounds are normal  There is no distension  Palpations: Abdomen is soft  Tenderness: There is no abdominal tenderness  There is no rebound  Musculoskeletal:         General: No tenderness or deformity  Normal range of motion  Cervical back: Normal range of motion and neck supple  Skin:     General: Skin is warm and dry  Coloration: Skin is not pale  Findings: No erythema or rash  Neurological:      Mental Status: He is alert and oriented to person, place, and time  Cranial Nerves: No cranial nerve deficit  Coordination: Coordination normal       Deep Tendon Reflexes: Reflexes are normal and symmetric  Psychiatric:         Behavior: Behavior normal          Thought Content:  Thought content normal          Judgment: Judgment normal          Medications:    Current Outpatient Medications:     aspirin (Aspirin Low Dose) 81 mg EC tablet, Take 1 tablet (81 mg total) by mouth daily, Disp: 90 tablet, Rfl: 1    atorvastatin (LIPITOR) 40 mg tablet, Take 1 tablet (40 mg total) by mouth daily, Disp: 90 tablet, Rfl: 1    Blood Glucose Monitoring Suppl (ONE TOUCH ULTRA 2) w/Device KIT, by Does not apply route 3 (three) times a day, Disp: 1 each, Rfl: 0    brimonidine tartrate 0 2 % ophthalmic solution, Administer 1 drop into the left eye 2 (two) times a day, Disp: , Rfl:     Carboxymethylcellul-Glycerin (CLEAR EYES FOR DRY EYES OP), Apply 2 drops to eye 2 (two) times a day, Disp: , Rfl:     cinacalcet (SENSIPAR) 30 mg tablet, Take 30 mg by mouth daily, Disp: , Rfl:     Continuous Blood Gluc  (FreeStyle Jessica 14 Day [place]) LAUREN, Use 1 each daily, Disp: 1 each, Rfl: 0    Continuous Blood Gluc Sensor (FreeStyle Jessica 14 Day Sensor) MISC, Test blood sugars three times daily  , Disp: 2 each, Rfl: 1    cyclobenzaprine (FLEXERIL) 5 mg tablet, Take 1 tablet (5 mg total) by mouth daily at bedtime as needed for muscle spasms, Disp: 30 tablet, Rfl: 1    Diclofenac Sodium (VOLTAREN) 1 %, Apply 2 g topically 4 (four) times a day for 14 days, Disp: 150 g, Rfl: 0    doxazosin (CARDURA) 4 mg tablet, Take 1 tablet (4 mg total) by mouth daily at bedtime, Disp: 30 tablet, Rfl: 0    furosemide (LASIX) 80 mg tablet, Take 1 tablet (80 mg total) by mouth daily, Disp: 90 tablet, Rfl: 1    glucose blood (FREESTYLE LITE) test strip, Test blood sugars three times daily  , Disp: 100 each, Rfl: 2    insulin aspart (NovoLOG) 100 Units/mL injection pen, 10U with breakfast and lunch, 10-14 U with dinner, Disp: 5 pen, Rfl: 2    insulin glargine (Basaglar KwikPen) 100 units/mL injection pen, Inject 30 Units under the skin daily, Disp: 15 mL, Rfl: 2    isoniazid (NYDRAZID) 300 mg tablet, Take 1 tablet (300 mg total) by mouth daily, Disp: 30 tablet, Rfl: 8    labetalol (NORMODYNE) 200 mg tablet, Take 1 tablet (200 mg total) by mouth every 12 (twelve) hours, Disp: 60 tablet, Rfl: 0    Lancets (freestyle) lancets, Use as instructed, Disp: 100 each, Rfl: 1    Lancets MISC, Use 2 (two) times a day, Disp: 100 each, Rfl: 0    LOKELMA 10 g PACK, , Disp: , Rfl:     NIFEdipine (ADALAT CC) 60 MG 24 hr tablet, Take 1 tablet (60 mg total) by mouth daily, Disp: 30 tablet, Rfl: 0    ONE TOUCH LANCETS MISC, by Does not apply route 3 (three) times a day, Disp: 100 each, Rfl: 1    pantoprazole (PROTONIX) 20 mg tablet, take 1 tablet by mouth once daily, Disp: 30 tablet, Rfl: 3    polyethylene glycol (MIRALAX) 17 g packet, Take 17 g by mouth daily, Disp: 30 each, Rfl: 1    prednisoLONE acetate (PRED FORTE) 1 % ophthalmic suspension, Administer 1 drop into the left eye 2 (two) times a day , Disp: , Rfl: 0    pyridoxine (VITAMIN B6) 50 mg tablet, Take 1 tablet (50 mg total) by mouth daily, Disp: 30 tablet, Rfl: 6    sevelamer carbonate (RENVELA) 800 mg tablet, Take 800 mg by mouth 3 (three) times a day with meals, Disp: , Rfl:     Laboratory Results:        Invalid input(s): ALBUMIN    Results for orders placed or performed in visit on 06/07/21   POCT hemoglobin A1c   Result Value Ref Range    Hemoglobin A1C 6 7 (A) 6 5

## 2021-08-23 NOTE — LETTER
August 23, 2021     [address] [place] [address]    Patient: Sarah Tirado   YOB: 1965   Date of Visit: 8/23/2021       Dear Dr Ramila Meyers:    Thank you for referring Sarah Tirado to me for evaluation  Below are my notes for this consultation  If you have questions, please do not hesitate to call me  I look forward to following your patient along with you  Sincerely,        Annalee Goel MD        CC: MD Annalee Sparks MD  8/23/2021  2:41 PM  Sign when Signing Visit  NEPHROLOGY OFFICE VISIT   Cristine Kennedy Trevin 61 y o  male MRN: [id number]  8/23/2021    Reason for Visit: renal transplant pre evaluation f/u (yearly)    ASSESSMENT and PLAN:    I had the pleasure of seeing Mr Michelle Chand today in the renal clinic for the continued management of pre transplant evaluation for yearly f/u  Sarah Tirado is a 63 y o  male [place] referred by Dr Leo Francois, with PMHx of ESRD on HD at [facility], HTN (2011), DM (diagnosed 2002), retinopathy, legally blind, neuropathy, , history of chronic idiopathic constipation, former worked in security, former smoker quit in 2016 (smoked since teenage years), no ETOH, no drugs,seen in the Nephrology Clinic for pre-transplant kidney evaluation      ESRD presumed to be due to DM  On HD since 4/2019 7/2019 - admission for staph epidermidis bacteremia in setting of dialyssi catheter       7/2019 - ECHO EF 65; mod to severe concentric LVH;     January 2021-patient was admitted due to finding of positive QuantiFERON gold test with suspicion of latent TB due to history  During pre evaluation workup  Patient was started on treatment for 9 months  February 2021-patient admitted for chest pain and hypertensive urgency  Troponins unrevealing  May 2021-patient completed cardiac catheterization-no significant coronary     Plan   1   Patient's case will be reviewed with the transplant committee at the next transplant committee meeting  2  Patient is due for colonoscopy which was rescheduled to next month  3  Patient will need yearly CT scan regarding incidental pulmonary nodule finding in February 2021  Patient's primary care physician has already ordered  Patient was reminded of this today also  4  Carotid artery ultrasound as patient is due toward the end of this year (had less than 50% stenosis bilaterally prior)  5  Endocrine - history of DM; 60 units total daily; BMI 23 7  6  Patient had cardiac catheterization completed with 30% mid LAD stenosis noted  Otherwise did not require intervention  From the transplant standpoint, if the patient does not have transplant by 2024, will likely need repeat catheterization  7  Back pain-new for the past couple months  Patient was advised to talk to PCP  8  Latent TB-patient is completing 9 month course of treatment    No problem-specific Assessment & Plan notes found for this encounter  HPI:    Patient denies fevers, chills, nausea, vomiting, diarrhea  PATIENT INSTRUCTIONS:    Patient Instructions   1) please complete carotid ultrasound prior to end of the year  2) appointment in 1 year  3) once your colonoscopy is completed - please call [facility]        OBJECTIVE:  Current Weight: Weight - Scale: 79 8 kg (176 lb)  Vitals:    08/23/21 1416   Weight: 79 8 kg (176 lb)   Height: 5' 10" (1 778 m)    Body mass index is 25 25 kg/m²  REVIEW OF SYSTEMS:    Review of Systems   Constitutional: Negative  Negative for appetite change and fatigue  HENT: Negative  Eyes: Negative  Respiratory: Negative  Negative for shortness of breath  Cardiovascular: Negative  Negative for leg swelling  Gastrointestinal: Negative  Endocrine: Negative  Genitourinary: Negative  Negative for difficulty urinating  Musculoskeletal: Positive for back pain  Allergic/Immunologic: Negative  Neurological: Negative  Hematological: Negative  Psychiatric/Behavioral: Negative  All other systems reviewed and are negative  PHYSICAL EXAM:      Physical Exam  Vitals and nursing note reviewed  Constitutional:       General: He is not in acute distress  Appearance: He is well-developed  He is not diaphoretic  HENT:      Head: Normocephalic and atraumatic  Eyes:      General: No scleral icterus  Right eye: No discharge  Left eye: No discharge  Conjunctiva/sclera: Conjunctivae normal    Neck:      Vascular: No JVD  Cardiovascular:      Rate and Rhythm: Normal rate and regular rhythm  Heart sounds: Normal heart sounds  No murmur heard  No friction rub  No gallop  Pulmonary:      Effort: Pulmonary effort is normal  No respiratory distress  Breath sounds: Normal breath sounds  No wheezing or rales  Chest:      Chest wall: No tenderness  Abdominal:      General: Bowel sounds are normal  There is no distension  Palpations: Abdomen is soft  Tenderness: There is no abdominal tenderness  There is no rebound  Musculoskeletal:         General: No tenderness or deformity  Normal range of motion  Cervical back: Normal range of motion and neck supple  Skin:     General: Skin is warm and dry  Coloration: Skin is not pale  Findings: No erythema or rash  Neurological:      Mental Status: He is alert and oriented to person, place, and time  Cranial Nerves: No cranial nerve deficit  Coordination: Coordination normal       Deep Tendon Reflexes: Reflexes are normal and symmetric  Psychiatric:         Behavior: Behavior normal          Thought Content:  Thought content normal          Judgment: Judgment normal          Medications:    Current Outpatient Medications:     aspirin (Aspirin Low Dose) 81 mg EC tablet, Take 1 tablet (81 mg total) by mouth daily, Disp: 90 tablet, Rfl: 1    atorvastatin (LIPITOR) 40 mg tablet, Take 1 tablet (40 mg total) by mouth daily, Disp: 90 tablet, Rfl: 1    Blood Glucose Monitoring Suppl (ONE TOUCH ULTRA 2) w/Device KIT, by Does not apply route 3 (three) times a day, Disp: 1 each, Rfl: 0    brimonidine tartrate 0 2 % ophthalmic solution, Administer 1 drop into the left eye 2 (two) times a day, Disp: , Rfl:     Carboxymethylcellul-Glycerin (CLEAR EYES FOR DRY EYES OP), Apply 2 drops to eye 2 (two) times a day, Disp: , Rfl:     cinacalcet (SENSIPAR) 30 mg tablet, Take 30 mg by mouth daily, Disp: , Rfl:     Continuous Blood Gluc  (FreeStyle Jessica 14 Day [place]) LAUREN, Use 1 each daily, Disp: 1 each, Rfl: 0    Continuous Blood Gluc Sensor (FreeStyle Jessica 14 Day Sensor) MISC, Test blood sugars three times daily  , Disp: 2 each, Rfl: 1    cyclobenzaprine (FLEXERIL) 5 mg tablet, Take 1 tablet (5 mg total) by mouth daily at bedtime as needed for muscle spasms, Disp: 30 tablet, Rfl: 1    Diclofenac Sodium (VOLTAREN) 1 %, Apply 2 g topically 4 (four) times a day for 14 days, Disp: 150 g, Rfl: 0    doxazosin (CARDURA) 4 mg tablet, Take 1 tablet (4 mg total) by mouth daily at bedtime, Disp: 30 tablet, Rfl: 0    furosemide (LASIX) 80 mg tablet, Take 1 tablet (80 mg total) by mouth daily, Disp: 90 tablet, Rfl: 1    glucose blood (FREESTYLE LITE) test strip, Test blood sugars three times daily  , Disp: 100 each, Rfl: 2    insulin aspart (NovoLOG) 100 Units/mL injection pen, 10U with breakfast and lunch, 10-14 U with dinner, Disp: 5 pen, Rfl: 2    insulin glargine (Basaglar KwikPen) 100 units/mL injection pen, Inject 30 Units under the skin daily, Disp: 15 mL, Rfl: 2    isoniazid (NYDRAZID) 300 mg tablet, Take 1 tablet (300 mg total) by mouth daily, Disp: 30 tablet, Rfl: 8    labetalol (NORMODYNE) 200 mg tablet, Take 1 tablet (200 mg total) by mouth every 12 (twelve) hours, Disp: 60 tablet, Rfl: 0    Lancets (freestyle) lancets, Use as instructed, Disp: 100 each, Rfl: 1    Lancets MISC, Use 2 (two) times a day, Disp: 100 each, Rfl: 0    LOKELMA 10 g PACK, , Disp: , Rfl:     NIFEdipine (ADALAT CC) 60 MG 24 hr tablet, Take 1 tablet (60 mg total) by mouth daily, Disp: 30 tablet, Rfl: 0    ONE TOUCH LANCETS MISC, by Does not apply route 3 (three) times a day, Disp: 100 each, Rfl: 1    pantoprazole (PROTONIX) 20 mg tablet, take 1 tablet by mouth once daily, Disp: 30 tablet, Rfl: 3    polyethylene glycol (MIRALAX) 17 g packet, Take 17 g by mouth daily, Disp: 30 each, Rfl: 1    prednisoLONE acetate (PRED FORTE) 1 % ophthalmic suspension, Administer 1 drop into the left eye 2 (two) times a day , Disp: , Rfl: 0    pyridoxine (VITAMIN B6) 50 mg tablet, Take 1 tablet (50 mg total) by mouth daily, Disp: 30 tablet, Rfl: 6    sevelamer carbonate (RENVELA) 800 mg tablet, Take 800 mg by mouth 3 (three) times a day with meals, Disp: , Rfl:     Laboratory Results:        Invalid input(s): ALBUMIN    Results for orders placed or performed in visit on 06/07/21   POCT hemoglobin A1c   Result Value Ref Range    Hemoglobin A1C 6 7 (A) 6 5

## 2021-08-23 NOTE — PATIENT INSTRUCTIONS
1) please complete carotid ultrasound prior to end of the year  2) appointment in 1 year  3) once your colonoscopy is completed - please call [facility]

## 2021-08-23 NOTE — TELEPHONE ENCOUNTER
----- Message from Anjel Mcclure PA-C sent at 8/23/2021  9:20 AM EDT -----  Can you reach out to the pt to have his procedures scheduled? Not sure why they weren't done at the time of his office visit last week    Sukhdeep Beltran

## 2021-08-27 ENCOUNTER — TELEPHONE (OUTPATIENT)
Dept: RADIOLOGY | Facility: HOSPITAL | Age: 56
End: 2021-08-27

## 2021-09-02 ENCOUNTER — TELEPHONE (OUTPATIENT)
Dept: SURGERY | Facility: HOSPITAL | Age: 56
End: 2021-09-02

## 2021-09-03 ENCOUNTER — HOSPITAL ENCOUNTER (OUTPATIENT)
Dept: RADIOLOGY | Facility: HOSPITAL | Age: 56
Discharge: HOME | End: 2021-09-03
Attending: RADIOLOGY | Admitting: RADIOLOGY
Payer: MEDICARE

## 2021-09-03 VITALS
TEMPERATURE: 98.2 F | DIASTOLIC BLOOD PRESSURE: 77 MMHG | HEIGHT: 70 IN | OXYGEN SATURATION: 99 % | SYSTOLIC BLOOD PRESSURE: 157 MMHG | RESPIRATION RATE: 16 BRPM | WEIGHT: 176 LBS | HEART RATE: 91 BPM | BODY MASS INDEX: 25.2 KG/M2

## 2021-09-03 DIAGNOSIS — T82.590D DIALYSIS AV FISTULA MALFUNCTION, SUBSEQUENT ENCOUNTER: ICD-10-CM

## 2021-09-03 PROCEDURE — C1894 INTRO/SHEATH, NON-LASER: HCPCS

## 2021-09-03 PROCEDURE — C2623 CATH, TRANSLUMIN, DRUG-COAT: HCPCS

## 2021-09-03 PROCEDURE — C1769 GUIDE WIRE: HCPCS

## 2021-09-03 PROCEDURE — 36902 INTRO CATH DIALYSIS CIRCUIT: CPT

## 2021-09-03 PROCEDURE — 99153 MOD SED SAME PHYS/QHP EA: CPT

## 2021-09-03 PROCEDURE — 36902 INTRO CATH DIALYSIS CIRCUIT: CPT | Performed by: INTERNAL MEDICINE

## 2021-09-03 PROCEDURE — 99152 MOD SED SAME PHYS/QHP 5/>YRS: CPT | Performed by: INTERNAL MEDICINE

## 2021-09-03 PROCEDURE — G9198 NO ORDER FOR CEPH NO REASON: HCPCS | Performed by: INTERNAL MEDICINE

## 2021-09-03 PROCEDURE — 99152 MOD SED SAME PHYS/QHP 5/>YRS: CPT

## 2021-09-03 PROCEDURE — C1725 CATH, TRANSLUMIN NON-LASER: HCPCS

## 2021-09-03 RX ORDER — FENTANYL CITRATE 50 UG/ML
INJECTION, SOLUTION INTRAMUSCULAR; INTRAVENOUS CODE/TRAUMA/SEDATION MEDICATION
Status: COMPLETED | OUTPATIENT
Start: 2021-09-03 | End: 2021-09-03

## 2021-09-03 RX ORDER — HYDRALAZINE HYDROCHLORIDE 20 MG/ML
INJECTION INTRAMUSCULAR; INTRAVENOUS CODE/TRAUMA/SEDATION MEDICATION
Status: COMPLETED | OUTPATIENT
Start: 2021-09-03 | End: 2021-09-03

## 2021-09-03 RX ORDER — LIDOCAINE WITH 8.4% SOD BICARB 0.9%(10ML)
SYRINGE (ML) INJECTION CODE/TRAUMA/SEDATION MEDICATION
Status: COMPLETED | OUTPATIENT
Start: 2021-09-03 | End: 2021-09-03

## 2021-09-03 RX ORDER — HEPARIN SODIUM 1000 [USP'U]/ML
INJECTION, SOLUTION INTRAVENOUS; SUBCUTANEOUS CODE/TRAUMA/SEDATION MEDICATION
Status: COMPLETED | OUTPATIENT
Start: 2021-09-03 | End: 2021-09-03

## 2021-09-03 RX ORDER — MIDAZOLAM HYDROCHLORIDE 2 MG/2ML
INJECTION, SOLUTION INTRAMUSCULAR; INTRAVENOUS CODE/TRAUMA/SEDATION MEDICATION
Status: COMPLETED | OUTPATIENT
Start: 2021-09-03 | End: 2021-09-03

## 2021-09-03 RX ADMIN — MIDAZOLAM 1 MG: 1 INJECTION INTRAMUSCULAR; INTRAVENOUS at 12:25

## 2021-09-03 RX ADMIN — HEPARIN SODIUM 5000 UNITS: 1000 INJECTION INTRAVENOUS; SUBCUTANEOUS at 12:40

## 2021-09-03 RX ADMIN — FENTANYL CITRATE 50 MCG: 50 INJECTION INTRAMUSCULAR; INTRAVENOUS at 12:44

## 2021-09-03 RX ADMIN — HYDRALAZINE HYDROCHLORIDE 20 MG: 20 INJECTION INTRAMUSCULAR; INTRAVENOUS at 12:45

## 2021-09-03 RX ADMIN — Medication 5 ML: at 12:21

## 2021-09-03 RX ADMIN — IOHEXOL 32 ML: 350 INJECTION, SOLUTION INTRAVENOUS at 13:15

## 2021-09-03 RX ADMIN — FENTANYL CITRATE 50 MCG: 50 INJECTION INTRAMUSCULAR; INTRAVENOUS at 12:25

## 2021-09-03 RX ADMIN — MIDAZOLAM 1 MG: 1 INJECTION INTRAMUSCULAR; INTRAVENOUS at 12:44

## 2021-09-03 NOTE — BRIEF OP NOTE (RAD/CATH)
INTERVENTIONAL RADIOLOGY PROCEDURE NOTE    Date: 9/3/2021    Procedure: IR AV FISTULAGRAM/GRAFTOGRAM    Preoperative diagnosis:   1  Dialysis AV fistula malfunction, subsequent encounter         Postoperative diagnosis: Same  Surgeon: Carlita Guy MD     Assistant: None  No qualified resident was available  Blood loss: 5 mL    Specimens: None     Findings: Fistulogram showed moderate stenosis present adjacent to the inflow of the cephalic vein arch stents and in-stent stenosis at the outflow of the cephalic vein arch stents  The stenoses were treated using 8 mm Conquest balloons and Lutonix balloons  No other flow limiting stenosis were identified  Complications: None immediate      Anesthesia: conscious sedation

## 2021-09-03 NOTE — INTERVAL H&P NOTE
Update: (This section must be completed if the H&P was completed greater than 24 hrs to procedure or admission)    H&P reviewed  After examining the patient, I find no changed to the H&P since it had been written  BP (!) 184/100 Comment: manual GREG - pt reporting he forgot to take his Nifedipine last pm  Pulse 79   Temp 98 2 °F (36 8 °C) (Temporal)   Resp 16   Ht 5' 10" (1 778 m)   Wt 79 8 kg (176 lb)   SpO2 100%   BMI 25 25 kg/m²     Patient re-evaluated  Accept as history and physical     Patient with prolonged bleeding after dialysis, we will plan for left upper extremity fistulogram with possible interventions      William Ramos MD/September 3, 2021/11:52 AM

## 2021-09-03 NOTE — DISCHARGE INSTRUCTIONS
[address] [facility] [address]     Interventional Radiology Phone Number: [phone]  WHAT YOU NEED TO KNOW:   Your arm or leg may be sore, swollen, and bruised after the procedure  This is normal and should get better in a few days  DISCHARGE INSTRUCTIONS:   Call 911 for any of the following:   · You have any of the following signs of a heart attack:    Squeezing, pressure, or pain in your chest that lasts longer than 5 minutes or returns  ¨ Discomfort or pain in your back, neck, jaw, stomach, or arm   ¨ Trouble breathing  ¨ Nausea or vomiting  ¨ Lightheadedness or a sudden cold sweat, especially with chest pain or trouble breathing  · You have any of the following signs of a stroke:    ¨ Numbness or drooping on one side of your face   ¨ Weakness in an arm or leg  ¨ Confusion or difficulty speaking  ¨ Dizziness, a severe headache, or vision loss  · You feel lightheaded, short of breath, and have chest pain  · You cough up blood  · You have trouble breathing  · You have bleeding that does not stop after 10 minutes of holding firm, direct pressure over the puncture site  Seek care immediately if:   · Blood soaks through your bandage  · Your hand or foot closest to the graft or fistula feels cold, painful, or numb  Your hand or foot closest to the graft or fistula is pale or blue  · You have trouble moving your arm or leg closest to the graft or fistula  · Your bruise suddenly gets bigger  Contact your healthcare provider if:   · You have a fever or chills  · Your puncture site is red, swollen, or draining pus  · You have nausea or are vomiting  · Your skin is itchy, swollen, or you have a rash  · You cannot feel a thrill over your graft or fistula  · You have questions or concerns about your condition or care  Medicines: You may  need any of the following:  · Acetaminophen  decreases pain  It is available without a doctor's order  Ask how much to take and how often to take it  Follow directions   Read the labels of all other medicines you are using to see if they also contain acetaminophen, or ask your doctor or pharmacist  Acetaminophen can cause liver damage if not taken correctly  Do not take more than 4 grams (4,000 milligrams) of acetaminophen in one day  · Blood thinners help prevent blood clots  Examples of blood thinners include heparin and warfarin  Clots can cause strokes, heart attacks, and death  The following are general safety guidelines to follow while you are taking a blood thinner:  ¨ Watch for bleeding and bruising while you take blood thinners  Watch for bleeding from your gums or nose  Watch for blood in your urine and bowel movements  Use a soft washcloth on your skin, and a soft toothbrush to brush your teeth  This can keep your skin and gums from bleeding  If you shave, use an electric shaver  Do not play contact sports  ¨ Tell your dentist and other healthcare providers that you take anticoagulants  Wear a bracelet or necklace that says you take this medicine  ¨ Do not start or stop any medicines unless your healthcare provider tells you to  Many medicines cannot be used with blood thinners  ¨ Tell your healthcare provider right away if you forget to take the medicine, or if you take too much  ¨ Warfarin  is a blood thinner that you may need to take  The following are things you should be aware of if you take warfarin  § Foods and medicines can affect the amount of warfarin in your blood  Do not make major changes to your diet while you take warfarin  Warfarin works best when you eat about the same amount of vitamin K every day  Vitamin K is found in green leafy vegetables and certain other foods  Ask for more information about what to eat when you are taking warfarin  § You will need to see your healthcare provider for follow-up visits when you are on warfarin  You will need regular blood tests  These tests are used to decide how much medicine you need    · Take your medicine as directed  Call your healthcare provider if you think your medicine is not helping or if you have side effects  Tell him if you are allergic to any medicine  Keep a list of the medicines, vitamins, and herbs you take  Include the amounts, and when and why you take them  Bring the list or the pill bottles to follow-up visits  Carry your medicine list with you in case of an emergency  Care for your wound as directed:  Remove the bandage in 4 to 6 hours or as directed  Wash the area once a day with soap and water  Gently pat the area dry  Self-care:   · Apply firm, steady pressure to the puncture site if it bleeds  Use a clean gauze or towel to hold pressure for 10 to 15 minutes  Call 911 if you cannot stop the bleeding or the bleeding gets heavier  · Feel for a thrill once a day or as directed  Place your index and second finger over your fistula or graft as directed  You should feel a vibration  The vibration means that blood is flowing through your graft or fistula correctly  · Rest your arm or leg as directed  Do not lift anything heavier than 5 pounds or do strenuous activity for 24 hours  · Prevent damage to your graft or fistula  Do not wear tight-fitting clothing over your graft or fistula  Do not wear tight jewelry on the arm or leg with the graft or fistula  Tell healthcare providers not to do, IVs, blood draws, and blood pressure readings in the arm with your graft or fistula  Do not allow flu shots or vaccinations in your arm with your graft or fistula  Follow up with your healthcare provider as directed:  Write down your questions so you remember to ask them during your visits  © 2016 [employer] is for End User's use only and may not be sold, redistributed or otherwise used for commercial purposes  All illustrations and images included in CareNotes® are the copyrighted property of [employer]  or [employer]    The above information is an  only  It is not intended as medical advice for individual conditions or treatments  Talk to your doctor, nurse or pharmacist before following any medical regimen to see if it is safe and effective for you  Procedural Sedation   WHAT YOU NEED TO KNOW:   Procedural sedation is medicine used during procedures to help you feel relaxed and calm  You will remember little to none of the procedure  After sedation you may feel tired, weak, or unsteady on your feet  You may also have trouble concentrating or short-term memory loss  These symptoms should go away in 24 hours or less  DISCHARGE INSTRUCTIONS:   Call 911 or have someone else call for any of the following:   · You have sudden trouble breathing      · You cannot be woken  ·    Contact Interventional Radiology at [phone] PATIENTS: Contact Interventional Radiology at [phone] [facility] PATIENTS: Contact Interventional Radiology at [phone]) if any of the following occur:      · You have a severe headache or dizziness      · Your heart is beating faster than usual     · You have a fever or chills      · Your skin is itchy, swollen, or you have a rash      · You have nausea or are vomiting for more than 8 hours after the procedure       · You have questions or concerns about your condition or care  Self-care:   · Have someone stay with you for 24 hours  This person can drive you to errands and help you do things around the house  This person can also watch for problems       · Rest and do quiet activities for 24 hours  Do not exercise, ride a bike, or play sports  Stand up slowly to prevent dizziness and falls  Take short walks around the house with another person  Slowly return to your usual activities the next day       · Do not drive or use dangerous machines or tools for 24 hours  You may injure yourself or others  Examples include a lawnmower, saw, or drill   Do not return to work for 24 hours if you use dangerous machines or tools for work       · Do not make important decisions for 24 hours  For example, do not sign important papers or invest money       · Drink liquids as directed  Liquids help flush the sedation medicine out of your body  Ask how much liquid to drink each day and which liquids are best for you       · Eat small, frequent meals to prevent nausea and vomiting  Start with clear liquids such as juice or broth  If you do not vomit after clear liquids, you can eat your usual foods       · Do not drink alcohol or take medicines that make you drowsy  This includes medicines that help you sleep and anxiety medicines  Ask your healthcare provider if it is safe for you to take pain medicine  Follow up with your healthcare provider as directed: Write down your questions so you remember to ask them during your visits

## 2021-09-07 DIAGNOSIS — I10 ESSENTIAL HYPERTENSION: ICD-10-CM

## 2021-09-08 RX ORDER — NIFEDIPINE 60 MG/1
TABLET, FILM COATED, EXTENDED RELEASE ORAL
Qty: 30 TABLET | Refills: 0 | Status: SHIPPED | OUTPATIENT
Start: 2021-09-08 | End: 2021-12-06

## 2021-09-23 NOTE — TELEPHONE ENCOUNTER
Colon/egd rescheduled for 12/27/21 @[facility] with Dr Vincent Whitaker  Dulcolax/miralax instructions re-mailed to pt    Also,f/u appt with Dr Jaylin Young rescheduled for 12/31/21

## 2021-10-01 ENCOUNTER — OFFICE VISIT (OUTPATIENT)
Dept: INTERNAL MEDICINE CLINIC | Facility: CLINIC | Age: 56
End: 2021-10-01
Payer: MEDICARE

## 2021-10-01 VITALS
HEART RATE: 72 BPM | RESPIRATION RATE: 16 BRPM | DIASTOLIC BLOOD PRESSURE: 68 MMHG | OXYGEN SATURATION: 98 % | WEIGHT: 177 LBS | BODY MASS INDEX: 25.34 KG/M2 | TEMPERATURE: 96.5 F | SYSTOLIC BLOOD PRESSURE: 150 MMHG | HEIGHT: 70 IN

## 2021-10-01 DIAGNOSIS — E11.22 TYPE 2 DIABETES MELLITUS WITH CHRONIC KIDNEY DISEASE ON CHRONIC DIALYSIS, WITH LONG-TERM CURRENT USE OF INSULIN (HCC): Primary | ICD-10-CM

## 2021-10-01 DIAGNOSIS — R10.9 ABDOMINAL DISCOMFORT: ICD-10-CM

## 2021-10-01 DIAGNOSIS — N18.6 ESRD (END STAGE RENAL DISEASE) (HCC): ICD-10-CM

## 2021-10-01 DIAGNOSIS — N18.6 BENIGN HYPERTENSION WITH ESRD (END-STAGE RENAL DISEASE) (HCC): ICD-10-CM

## 2021-10-01 DIAGNOSIS — N18.6 TYPE 2 DIABETES MELLITUS WITH CHRONIC KIDNEY DISEASE ON CHRONIC DIALYSIS, WITH LONG-TERM CURRENT USE OF INSULIN (HCC): Primary | ICD-10-CM

## 2021-10-01 DIAGNOSIS — R91.1 PULMONARY NODULE: ICD-10-CM

## 2021-10-01 DIAGNOSIS — Z79.4 TYPE 2 DIABETES MELLITUS WITH CHRONIC KIDNEY DISEASE ON CHRONIC DIALYSIS, WITH LONG-TERM CURRENT USE OF INSULIN (HCC): Primary | ICD-10-CM

## 2021-10-01 DIAGNOSIS — D12.6 ADENOMATOUS POLYP OF COLON, UNSPECIFIED PART OF COLON: ICD-10-CM

## 2021-10-01 DIAGNOSIS — Z99.2 TYPE 2 DIABETES MELLITUS WITH CHRONIC KIDNEY DISEASE ON CHRONIC DIALYSIS, WITH LONG-TERM CURRENT USE OF INSULIN (HCC): Primary | ICD-10-CM

## 2021-10-01 DIAGNOSIS — Z22.7 TB LUNG, LATENT: ICD-10-CM

## 2021-10-01 DIAGNOSIS — IMO0002 UNCONTROLLED SECONDARY DIABETES MELLITUS WITH STAGE 5 CKD (GFR<15): ICD-10-CM

## 2021-10-01 DIAGNOSIS — I12.0 BENIGN HYPERTENSION WITH ESRD (END-STAGE RENAL DISEASE) (HCC): ICD-10-CM

## 2021-10-01 LAB — SL AMB POCT HEMOGLOBIN AIC: 7.8 (ref ?–6.5)

## 2021-10-01 PROCEDURE — 99214 OFFICE O/P EST MOD 30 MIN: CPT | Performed by: INTERNAL MEDICINE

## 2021-10-01 PROCEDURE — 83036 HEMOGLOBIN GLYCOSYLATED A1C: CPT | Performed by: INTERNAL MEDICINE

## 2021-10-01 RX ORDER — CALCIUM ACETATE 667 MG/1
667 CAPSULE ORAL
COMMUNITY

## 2021-10-01 RX ORDER — INSULIN GLARGINE 100 [IU]/ML
32 INJECTION, SOLUTION SUBCUTANEOUS DAILY
Qty: 15 ML | Refills: 2 | Status: SHIPPED | OUTPATIENT
Start: 2021-10-01 | End: 2022-02-21

## 2021-11-11 LAB — MISCELLANEOUS LAB TEST RESULT: NORMAL

## 2021-12-06 ENCOUNTER — CONSULT (OUTPATIENT)
Dept: INTERNAL MEDICINE CLINIC | Facility: CLINIC | Age: 56
End: 2021-12-06
Payer: MEDICARE

## 2021-12-06 VITALS
BODY MASS INDEX: 25.77 KG/M2 | SYSTOLIC BLOOD PRESSURE: 200 MMHG | OXYGEN SATURATION: 92 % | TEMPERATURE: 97.6 F | WEIGHT: 180 LBS | DIASTOLIC BLOOD PRESSURE: 80 MMHG | RESPIRATION RATE: 18 BRPM | HEIGHT: 70 IN | HEART RATE: 90 BPM

## 2021-12-06 DIAGNOSIS — I10 ESSENTIAL HYPERTENSION: ICD-10-CM

## 2021-12-06 DIAGNOSIS — Z99.2 TYPE 2 DIABETES MELLITUS WITH CHRONIC KIDNEY DISEASE ON CHRONIC DIALYSIS, WITH LONG-TERM CURRENT USE OF INSULIN (HCC): ICD-10-CM

## 2021-12-06 DIAGNOSIS — E11.22 TYPE 2 DIABETES MELLITUS WITH CHRONIC KIDNEY DISEASE ON CHRONIC DIALYSIS, WITH LONG-TERM CURRENT USE OF INSULIN (HCC): ICD-10-CM

## 2021-12-06 DIAGNOSIS — I16.0 HYPERTENSIVE URGENCY: ICD-10-CM

## 2021-12-06 DIAGNOSIS — H26.9 CATARACT OF RIGHT EYE, UNSPECIFIED CATARACT TYPE: ICD-10-CM

## 2021-12-06 DIAGNOSIS — N18.6 TYPE 2 DIABETES MELLITUS WITH CHRONIC KIDNEY DISEASE ON CHRONIC DIALYSIS, WITH LONG-TERM CURRENT USE OF INSULIN (HCC): ICD-10-CM

## 2021-12-06 DIAGNOSIS — Z01.818 PREOP EXAMINATION: Primary | ICD-10-CM

## 2021-12-06 DIAGNOSIS — I10 HYPERTENSION: ICD-10-CM

## 2021-12-06 DIAGNOSIS — N18.6 ESRD (END STAGE RENAL DISEASE) (HCC): ICD-10-CM

## 2021-12-06 DIAGNOSIS — Z22.7 TB LUNG, LATENT: ICD-10-CM

## 2021-12-06 DIAGNOSIS — Z79.4 TYPE 2 DIABETES MELLITUS WITH CHRONIC KIDNEY DISEASE ON CHRONIC DIALYSIS, WITH LONG-TERM CURRENT USE OF INSULIN (HCC): ICD-10-CM

## 2021-12-06 PROCEDURE — 99213 OFFICE O/P EST LOW 20 MIN: CPT | Performed by: INTERNAL MEDICINE

## 2021-12-06 RX ORDER — NIFEDIPINE 90 MG/1
90 TABLET, FILM COATED, EXTENDED RELEASE ORAL DAILY
Qty: 90 TABLET | Refills: 0
Start: 2021-12-06

## 2021-12-06 RX ORDER — LABETALOL 200 MG/1
300 TABLET, FILM COATED ORAL EVERY 12 HOURS SCHEDULED
Qty: 90 TABLET | Refills: 0 | Status: SHIPPED | OUTPATIENT
Start: 2021-12-06 | End: 2022-02-13 | Stop reason: HOSPADM

## 2021-12-07 ENCOUNTER — TELEPHONE (OUTPATIENT)
Dept: PREADMISSION TESTING | Facility: HOSPITAL | Age: 56
End: 2021-12-07

## 2021-12-10 ENCOUNTER — OFFICE VISIT (OUTPATIENT)
Dept: CARDIOLOGY CLINIC | Facility: CLINIC | Age: 56
End: 2021-12-10
Payer: MEDICARE

## 2021-12-10 ENCOUNTER — CLINICAL SUPPORT (OUTPATIENT)
Dept: INTERNAL MEDICINE CLINIC | Facility: CLINIC | Age: 56
End: 2021-12-10
Payer: MEDICARE

## 2021-12-10 VITALS
HEIGHT: 70 IN | DIASTOLIC BLOOD PRESSURE: 60 MMHG | HEART RATE: 69 BPM | WEIGHT: 175 LBS | SYSTOLIC BLOOD PRESSURE: 110 MMHG | BODY MASS INDEX: 25.05 KG/M2

## 2021-12-10 VITALS — SYSTOLIC BLOOD PRESSURE: 100 MMHG | DIASTOLIC BLOOD PRESSURE: 56 MMHG

## 2021-12-10 DIAGNOSIS — Z79.4 TYPE 2 DIABETES MELLITUS WITH CHRONIC KIDNEY DISEASE ON CHRONIC DIALYSIS, WITH LONG-TERM CURRENT USE OF INSULIN (HCC): ICD-10-CM

## 2021-12-10 DIAGNOSIS — N18.6 BENIGN HYPERTENSION WITH ESRD (END-STAGE RENAL DISEASE) (HCC): ICD-10-CM

## 2021-12-10 DIAGNOSIS — E11.22 TYPE 2 DIABETES MELLITUS WITH CHRONIC KIDNEY DISEASE ON CHRONIC DIALYSIS, WITH LONG-TERM CURRENT USE OF INSULIN (HCC): ICD-10-CM

## 2021-12-10 DIAGNOSIS — I10 ESSENTIAL HYPERTENSION: Primary | ICD-10-CM

## 2021-12-10 DIAGNOSIS — N18.6 ESRD (END STAGE RENAL DISEASE) (HCC): ICD-10-CM

## 2021-12-10 DIAGNOSIS — H54.8 LEGALLY BLIND: ICD-10-CM

## 2021-12-10 DIAGNOSIS — Z99.2 TYPE 2 DIABETES MELLITUS WITH CHRONIC KIDNEY DISEASE ON CHRONIC DIALYSIS, WITH LONG-TERM CURRENT USE OF INSULIN (HCC): ICD-10-CM

## 2021-12-10 DIAGNOSIS — I51.7 LVH (LEFT VENTRICULAR HYPERTROPHY): Primary | ICD-10-CM

## 2021-12-10 DIAGNOSIS — N18.6 TYPE 2 DIABETES MELLITUS WITH CHRONIC KIDNEY DISEASE ON CHRONIC DIALYSIS, WITH LONG-TERM CURRENT USE OF INSULIN (HCC): ICD-10-CM

## 2021-12-10 DIAGNOSIS — I12.0 BENIGN HYPERTENSION WITH ESRD (END-STAGE RENAL DISEASE) (HCC): ICD-10-CM

## 2021-12-10 PROCEDURE — 99214 OFFICE O/P EST MOD 30 MIN: CPT

## 2021-12-10 PROCEDURE — 99211 OFF/OP EST MAY X REQ PHY/QHP: CPT

## 2021-12-13 ENCOUNTER — TELEPHONE (OUTPATIENT)
Dept: GASTROENTEROLOGY | Facility: CLINIC | Age: 56
End: 2021-12-13

## 2021-12-23 ENCOUNTER — ANESTHESIA EVENT (OUTPATIENT)
Dept: ANESTHESIOLOGY | Facility: HOSPITAL | Age: 56
End: 2021-12-23

## 2021-12-23 ENCOUNTER — ANESTHESIA (OUTPATIENT)
Dept: ANESTHESIOLOGY | Facility: HOSPITAL | Age: 56
End: 2021-12-23

## 2021-12-23 ENCOUNTER — TELEPHONE (OUTPATIENT)
Dept: GASTROENTEROLOGY | Facility: HOSPITAL | Age: 56
End: 2021-12-23

## 2021-12-23 PROBLEM — H53.9 VISUAL DISORDER: Status: ACTIVE | Noted: 2021-12-23

## 2021-12-23 PROBLEM — H54.8 LEGALLY BLIND: Status: RESOLVED | Noted: 2018-05-17 | Resolved: 2021-12-23

## 2021-12-23 RX ORDER — SODIUM CHLORIDE 9 MG/ML
125 INJECTION, SOLUTION INTRAVENOUS CONTINUOUS
Status: CANCELLED | OUTPATIENT
Start: 2021-12-23

## 2021-12-27 ENCOUNTER — ANESTHESIA EVENT (OUTPATIENT)
Dept: GASTROENTEROLOGY | Facility: HOSPITAL | Age: 56
End: 2021-12-27

## 2021-12-27 ENCOUNTER — HOSPITAL ENCOUNTER (OUTPATIENT)
Dept: GASTROENTEROLOGY | Facility: HOSPITAL | Age: 56
Setting detail: OUTPATIENT SURGERY
Discharge: HOME | End: 2021-12-27
Attending: INTERNAL MEDICINE | Admitting: INTERNAL MEDICINE
Payer: MEDICARE

## 2021-12-27 ENCOUNTER — ANESTHESIA (OUTPATIENT)
Dept: GASTROENTEROLOGY | Facility: HOSPITAL | Age: 56
End: 2021-12-27

## 2021-12-27 VITALS
OXYGEN SATURATION: 97 % | RESPIRATION RATE: 20 BRPM | HEART RATE: 70 BPM | DIASTOLIC BLOOD PRESSURE: 85 MMHG | BODY MASS INDEX: 25.11 KG/M2 | HEIGHT: 70 IN | SYSTOLIC BLOOD PRESSURE: 174 MMHG | TEMPERATURE: 96.5 F

## 2021-12-27 DIAGNOSIS — Z12.11 SCREENING FOR COLON CANCER: ICD-10-CM

## 2021-12-27 DIAGNOSIS — K21.9 GASTROESOPHAGEAL REFLUX DISEASE, UNSPECIFIED WHETHER ESOPHAGITIS PRESENT: ICD-10-CM

## 2021-12-27 PROBLEM — Z99.2 DIALYSIS PATIENT (HCC): Status: ACTIVE | Noted: 2021-12-27

## 2021-12-27 LAB — GLUCOSE SERPL-MCNC: 202 MG/DL (ref 65–140)

## 2021-12-27 PROCEDURE — 88305 TISSUE EXAM BY PATHOLOGIST: CPT | Performed by: PATHOLOGY

## 2021-12-27 PROCEDURE — 82948 REAGENT STRIP/BLOOD GLUCOSE: CPT

## 2021-12-27 PROCEDURE — 43239 EGD BIOPSY SINGLE/MULTIPLE: CPT | Performed by: INTERNAL MEDICINE

## 2021-12-27 PROCEDURE — G0121 COLON CA SCRN NOT HI RSK IND: HCPCS | Performed by: INTERNAL MEDICINE

## 2021-12-27 RX ORDER — SODIUM CHLORIDE 9 MG/ML
125 INJECTION, SOLUTION INTRAVENOUS CONTINUOUS
Status: DISCONTINUED | OUTPATIENT
Start: 2021-12-27 | End: 2021-12-31 | Stop reason: HOSPADM

## 2021-12-27 RX ORDER — PROPOFOL 10 MG/ML
INJECTION, EMULSION INTRAVENOUS AS NEEDED
Status: DISCONTINUED | OUTPATIENT
Start: 2021-12-27 | End: 2021-12-27

## 2021-12-27 RX ADMIN — PROPOFOL 100 MG: 10 INJECTION, EMULSION INTRAVENOUS at 08:18

## 2021-12-27 RX ADMIN — SODIUM CHLORIDE 125 ML/HR: 0.9 INJECTION, SOLUTION INTRAVENOUS at 07:14

## 2021-12-27 RX ADMIN — PROPOFOL 100 MG: 10 INJECTION, EMULSION INTRAVENOUS at 08:07

## 2021-12-27 RX ADMIN — PROPOFOL 150 MG: 10 INJECTION, EMULSION INTRAVENOUS at 07:57

## 2022-01-08 ENCOUNTER — APPOINTMENT (EMERGENCY)
Dept: RADIOLOGY | Facility: HOSPITAL | Age: 57
End: 2022-01-08
Payer: MEDICARE

## 2022-01-08 ENCOUNTER — HOSPITAL ENCOUNTER (EMERGENCY)
Facility: HOSPITAL | Age: 57
Discharge: HOME | End: 2022-01-08
Attending: EMERGENCY MEDICINE
Payer: MEDICARE

## 2022-01-08 VITALS
DIASTOLIC BLOOD PRESSURE: 63 MMHG | OXYGEN SATURATION: 97 % | SYSTOLIC BLOOD PRESSURE: 140 MMHG | RESPIRATION RATE: 20 BRPM | TEMPERATURE: 100.8 F | HEART RATE: 80 BPM

## 2022-01-08 DIAGNOSIS — A04.8 H. PYLORI INFECTION: Primary | ICD-10-CM

## 2022-01-08 DIAGNOSIS — U07.1 COVID-19 VIRUS INFECTION: Primary | ICD-10-CM

## 2022-01-08 DIAGNOSIS — N18.6 ESRD (END STAGE RENAL DISEASE) (HCC): ICD-10-CM

## 2022-01-08 DIAGNOSIS — K21.9 GASTROESOPHAGEAL REFLUX DISEASE: ICD-10-CM

## 2022-01-08 LAB
ALBUMIN SERPL BCP-MCNC: 3.3 G/DL (ref 3.5–5)
ALP SERPL-CCNC: 71 U/L (ref 46–116)
ALT SERPL W P-5'-P-CCNC: 24 U/L (ref 12–78)
ANION GAP SERPL CALCULATED.3IONS-SCNC: 10 MMOL/L (ref 4–13)
APTT PPP: 54 SECONDS (ref 23–37)
AST SERPL W P-5'-P-CCNC: 55 U/L (ref 5–45)
ATRIAL RATE: 80 BPM
BASOPHILS # BLD AUTO: 0.01 THOUSANDS/ΜL (ref 0–0.1)
BASOPHILS NFR BLD AUTO: 0 % (ref 0–1)
BILIRUB SERPL-MCNC: 0.79 MG/DL (ref 0.2–1)
BUN SERPL-MCNC: 29 MG/DL (ref 5–25)
CALCIUM ALBUM COR SERPL-MCNC: 9.6 MG/DL (ref 8.3–10.1)
CALCIUM SERPL-MCNC: 9 MG/DL (ref 8.3–10.1)
CHLORIDE SERPL-SCNC: 95 MMOL/L (ref 100–108)
CO2 SERPL-SCNC: 33 MMOL/L (ref 21–32)
CREAT SERPL-MCNC: 7.66 MG/DL (ref 0.6–1.3)
EOSINOPHIL # BLD AUTO: 0.01 THOUSAND/ΜL (ref 0–0.61)
EOSINOPHIL NFR BLD AUTO: 0 % (ref 0–6)
ERYTHROCYTE [DISTWIDTH] IN BLOOD BY AUTOMATED COUNT: 13.8 % (ref 11.6–15.1)
FLUAV RNA RESP QL NAA+PROBE: NEGATIVE
FLUBV RNA RESP QL NAA+PROBE: NEGATIVE
GFR SERPL CREATININE-BSD FRML MDRD: 7 ML/MIN/1.73SQ M
GLUCOSE SERPL-MCNC: 186 MG/DL (ref 65–140)
HCT VFR BLD AUTO: 36.1 % (ref 36.5–49.3)
HGB BLD-MCNC: 11.7 G/DL (ref 12–17)
IMM GRANULOCYTES # BLD AUTO: 0.02 THOUSAND/UL (ref 0–0.2)
IMM GRANULOCYTES NFR BLD AUTO: 1 % (ref 0–2)
INR PPP: 1 (ref 0.84–1.19)
LACTATE SERPL-SCNC: 1.6 MMOL/L (ref 0.5–2)
LYMPHOCYTES # BLD AUTO: 0.67 THOUSANDS/ΜL (ref 0.6–4.47)
LYMPHOCYTES NFR BLD AUTO: 16 % (ref 14–44)
MCH RBC QN AUTO: 28.7 PG (ref 26.8–34.3)
MCHC RBC AUTO-ENTMCNC: 32.4 G/DL (ref 31.4–37.4)
MCV RBC AUTO: 89 FL (ref 82–98)
MONOCYTES # BLD AUTO: 0.46 THOUSAND/ΜL (ref 0.17–1.22)
MONOCYTES NFR BLD AUTO: 11 % (ref 4–12)
NEUTROPHILS # BLD AUTO: 3.03 THOUSANDS/ΜL (ref 1.85–7.62)
NEUTS SEG NFR BLD AUTO: 72 % (ref 43–75)
NRBC BLD AUTO-RTO: 0 /100 WBCS
P AXIS: 63 DEGREES
PLATELET # BLD AUTO: 137 THOUSANDS/UL (ref 149–390)
PMV BLD AUTO: 10.5 FL (ref 8.9–12.7)
POTASSIUM SERPL-SCNC: 3.9 MMOL/L (ref 3.5–5.3)
PR INTERVAL: 134 MS
PROCALCITONIN SERPL-MCNC: 3.07 NG/ML
PROT SERPL-MCNC: 7.7 G/DL (ref 6.4–8.2)
PROTHROMBIN TIME: 13 SECONDS (ref 11.6–14.5)
QRS AXIS: 102 DEGREES
QRSD INTERVAL: 122 MS
QT INTERVAL: 428 MS
QTC INTERVAL: 493 MS
RBC # BLD AUTO: 4.07 MILLION/UL (ref 3.88–5.62)
RSV RNA RESP QL NAA+PROBE: NEGATIVE
SARS-COV-2 RNA RESP QL NAA+PROBE: POSITIVE
SODIUM SERPL-SCNC: 138 MMOL/L (ref 136–145)
T WAVE AXIS: 76 DEGREES
VENTRICULAR RATE: 80 BPM
WBC # BLD AUTO: 4.2 THOUSAND/UL (ref 4.31–10.16)

## 2022-01-08 PROCEDURE — 36415 COLL VENOUS BLD VENIPUNCTURE: CPT | Performed by: EMERGENCY MEDICINE

## 2022-01-08 PROCEDURE — 85610 PROTHROMBIN TIME: CPT | Performed by: EMERGENCY MEDICINE

## 2022-01-08 PROCEDURE — 99285 EMERGENCY DEPT VISIT HI MDM: CPT | Performed by: EMERGENCY MEDICINE

## 2022-01-08 PROCEDURE — 80053 COMPREHEN METABOLIC PANEL: CPT | Performed by: EMERGENCY MEDICINE

## 2022-01-08 PROCEDURE — 85730 THROMBOPLASTIN TIME PARTIAL: CPT | Performed by: EMERGENCY MEDICINE

## 2022-01-08 PROCEDURE — 93005 ELECTROCARDIOGRAM TRACING: CPT

## 2022-01-08 PROCEDURE — 83605 ASSAY OF LACTIC ACID: CPT | Performed by: EMERGENCY MEDICINE

## 2022-01-08 PROCEDURE — 84145 PROCALCITONIN (PCT): CPT | Performed by: EMERGENCY MEDICINE

## 2022-01-08 PROCEDURE — 85025 COMPLETE CBC W/AUTO DIFF WBC: CPT | Performed by: EMERGENCY MEDICINE

## 2022-01-08 PROCEDURE — 71045 X-RAY EXAM CHEST 1 VIEW: CPT

## 2022-01-08 PROCEDURE — 93010 ELECTROCARDIOGRAM REPORT: CPT | Performed by: INTERNAL MEDICINE

## 2022-01-08 PROCEDURE — 99285 EMERGENCY DEPT VISIT HI MDM: CPT

## 2022-01-08 PROCEDURE — 87040 BLOOD CULTURE FOR BACTERIA: CPT | Performed by: EMERGENCY MEDICINE

## 2022-01-08 PROCEDURE — 0241U HB NFCT DS VIR RESP RNA 4 TRGT: CPT | Performed by: EMERGENCY MEDICINE

## 2022-01-08 RX ORDER — ACETAMINOPHEN 325 MG/1
650 TABLET ORAL ONCE
Status: COMPLETED | OUTPATIENT
Start: 2022-01-08 | End: 2022-01-08

## 2022-01-08 RX ORDER — PANTOPRAZOLE SODIUM 40 MG/1
40 TABLET, DELAYED RELEASE ORAL 2 TIMES DAILY
Qty: 28 TABLET | Refills: 0 | Status: SHIPPED | OUTPATIENT
Start: 2022-01-08 | End: 2022-02-13 | Stop reason: HOSPADM

## 2022-01-08 RX ORDER — BISMUTH SUBSALICYLATE 262 MG/1
524 TABLET, CHEWABLE ORAL
Qty: 112 TABLET | Refills: 0 | Status: SHIPPED | OUTPATIENT
Start: 2022-01-08 | End: 2022-01-22

## 2022-01-08 RX ORDER — METRONIDAZOLE 250 MG/1
250 TABLET ORAL 4 TIMES DAILY
Qty: 56 TABLET | Refills: 0 | Status: SHIPPED | OUTPATIENT
Start: 2022-01-08 | End: 2022-01-22

## 2022-01-08 RX ORDER — TETRACYCLINE HYDROCHLORIDE 500 MG/1
500 CAPSULE ORAL 4 TIMES DAILY
Qty: 56 CAPSULE | Refills: 0 | Status: SHIPPED | OUTPATIENT
Start: 2022-01-08 | End: 2022-01-22

## 2022-01-08 RX ADMIN — ACETAMINOPHEN 650 MG: 325 TABLET, FILM COATED ORAL at 18:36

## 2022-01-09 ENCOUNTER — TELEPHONE (OUTPATIENT)
Dept: FAMILY MEDICINE CLINIC | Facility: CLINIC | Age: 57
End: 2022-01-09

## 2022-01-09 ENCOUNTER — TELEPHONE (OUTPATIENT)
Dept: OTHER | Facility: OTHER | Age: 57
End: 2022-01-09

## 2022-01-09 NOTE — TELEPHONE ENCOUNTER
----- Message from Darren Bower MD sent at 1/8/2022  7:47 PM EST -----  Regarding: MAB consideration  Diabetic dialysis patient

## 2022-01-09 NOTE — ED PROVIDER NOTES
History  Chief Complaint   Patient presents with    Weakness - Generalized     Generalized weakness, dry cough for several days   Numbness     Patient reports L sided numbness for 4 days, difficulty walking for same  History provided by:  Patient and significant other   used: No    Medical Problem - Major  Location:  Dry cough, generalized weakness 2-3 days  Fever only noted today in the emergency department  No chest pain  No nausea or vomiting  Gets dialysis Tuesday Thursday and Saturday and received his normal dialysis today  He is vaccinated for COVID x2, no booster  Severity:  Moderate  Onset quality:  Gradual  Duration: 2-3 days  Timing:  Constant  Progression:  Worsening  Chronicity:  New  Relieved by:  Nothing  Worsened by:  Rest  Ineffective treatments:  None tried  Associated symptoms: congestion and cough    Associated symptoms: no abdominal pain, no chest pain, no diarrhea, no fever, no nausea, no shortness of breath and no vomiting    Legally blind  Diabetic  Prior to Admission Medications   Prescriptions Last Dose Informant Patient Reported? Taking?    Blood Glucose Monitoring Suppl (ONE TOUCH ULTRA 2) w/Device KIT  Spouse/Significant Other No No   Sig: by Does not apply route 3 (three) times a day   Patient not taking: Reported on 12/10/2021    Carboxymethylcellul-Glycerin (Olav Duuns [place] [address] OP)  Spouse/Significant Other Yes Yes   Sig: Apply 2 drops to eye 2 (two) times a day   Continuous Blood Gluc  (FreeStyle Jessica 14 [address] [place]) LAUREN  Spouse/Significant Other No No   Sig: Use 1 each daily   Diclofenac Sodium (VOLTAREN) 1 %   No No   Sig: Apply 2 g topically 4 (four) times a day for 14 days   Ergocalciferol (VITAMIN D2 PO)  Spouse/Significant Other Yes Yes   Sig: Take 1 25 mg by mouth     LOKELMA 10 g PACK  Spouse/Significant Other Yes Yes   Lancets (freestyle) lancets  Spouse/Significant Other No No   Sig: Use as instructed   Patient not taking: Reported on 12/10/2021    NIFEdipine (ADALAT CC) 90 mg 24 hr tablet  Spouse/Significant Other No Yes   Sig: Take 1 tablet (90 mg total) by mouth daily   aspirin (Aspirin Low Dose) 81 mg EC tablet  Spouse/Significant Other No Yes   Sig: Take 1 tablet (81 mg total) by mouth daily   atorvastatin (LIPITOR) 40 mg tablet  Spouse/Significant Other No Yes   Sig: Take 1 tablet (40 mg total) by mouth daily   bismuth subsalicylate (PEPTO BISMOL) 262 MG chewable tablet   No Yes   Sig: Chew 2 tablets (524 mg total) 4 (four) times a day (before meals and at bedtime) for 14 days   brimonidine tartrate 0 2 % ophthalmic solution  Spouse/Significant Other Yes Yes   Sig: Administer 1 drop into the left eye 2 (two) times a day   calcium acetate (PHOSLO) capsule  Spouse/Significant Other Yes Yes   Sig: Take 667 mg by mouth 3 (three) times a day with meals   cinacalcet (SENSIPAR) 30 mg tablet Unknown at Unknown time Spouse/Significant Other Yes No   Sig: Take 30 mg by mouth daily   Patient not taking: Reported on 10/1/2021   cyclobenzaprine (FLEXERIL) 5 mg tablet  Spouse/Significant Other No Yes   Sig: Take 1 tablet (5 mg total) by mouth daily at bedtime as needed for muscle spasms   doxazosin (CARDURA) 4 mg tablet   No Yes   Sig: Take 1 tablet (4 mg total) by mouth daily at bedtime   furosemide (LASIX) 80 mg tablet  Spouse/Significant Other No Yes   Sig: Take 1 tablet (80 mg total) by mouth daily   insulin aspart (NovoLOG) 100 Units/mL injection pen  Spouse/Significant Other No Yes   Sig: 10U with breakfast and lunch, 10-14 U with dinner   Patient taking differently: 10U with breakfast, 10 U with dinner   insulin glargine (Basaglar KwikPen) 100 units/mL injection pen  Spouse/Significant Other No No   Sig: Inject 32 Units under the skin daily   isoniazid (NYDRAZID) 300 mg tablet  Spouse/Significant Other No Yes   Sig: Take 1 tablet (300 mg total) by mouth daily   labetalol (NORMODYNE) 200 mg tablet  Spouse/Significant Other No Yes   Sig: Take 1 5 tablets (300 mg total) by mouth every 12 (twelve) hours   metroNIDAZOLE (FLAGYL) 250 mg tablet Unknown at Unknown time  No No   Sig: Take 1 tablet (250 mg total) by mouth 4 (four) times a day for 14 days   pantoprazole (PROTONIX) 40 mg tablet   No Yes   Sig: Take 1 tablet (40 mg total) by mouth 2 (two) times a day for 14 days   polyethylene glycol (MIRALAX) 17 g packet  Spouse/Significant Other No Yes   Sig: Take 17 g by mouth daily   prednisoLONE acetate (PRED FORTE) 1 % ophthalmic suspension Unknown at Unknown time Spouse/Significant Other Yes No   Sig: Administer 1 drop into the left eye 2 (two) times a day    pyridoxine (VITAMIN B6) 50 mg tablet  Spouse/Significant Other No Yes   Sig: Take 1 tablet (50 mg total) by mouth daily   sevelamer carbonate (RENVELA) 800 mg tablet Unknown at Unknown time Spouse/Significant Other Yes No   Sig: Take 800 mg by mouth 3 (three) times a day with meals     Patient not taking: Reported on 1/8/2022    tetracycline (ACHROMYCIN,SUMYCIN) 500 MG capsule Not Taking at Unknown time  No No   Sig: Take 1 capsule (500 mg total) by mouth 4 (four) times a day for 14 days   Patient not taking: Reported on 1/8/2022       Facility-Administered Medications: None       Past Medical History:   Diagnosis Date    Cataract     Chronic kidney disease     Diabetes mellitus (HCC)      IDDM    Diabetic retinopathy ([facility] [address] )     Dizziness     occ    ESRD (end stage renal disease) ([facility] [address] [facility]     GERD (gastroesophageal reflux disease)     Headache     occ    Hyperlipidemia     Hypertension     Legally blind     LVH (left ventricular hypertrophy)     Preferred language is [place]     understands some English    Use of cane as ambulatory aid        Past Surgical History:   Procedure Laterality Date    INGUINAL HERNIA REPAIR Right     IR AV FISTULAGRAM/GRAFTOGRAM  4/30/2019    IR AV FISTULAGRAM/GRAFTOGRAM  6/14/2019    IR AV FISTULAGRAM/GRAFTOGRAM  6/24/2020    IR AV FISTULAGRAM/GRAFTOGRAM  2/26/2021    IR AV FISTULAGRAM/GRAFTOGRAM  9/3/2021    IR TUNNELED DIALYSIS CATHETER PLACEMENT  4/3/2019    AR ANASTOMOSIS,AV,ANY SITE Left 1/23/2019    Procedure: CREATION FISTULA ARTERIOVENOUS (AV); Surgeon: Micki Moran MD;  Location: [facility];  Service: Vascular       Family History   Problem Relation Age of Onset    Hypertension Mother     Diabetes Father     No Known Problems Sister     No Known Problems Brother     No Known Problems Maternal Aunt     No Known Problems Maternal Uncle     No Known Problems Paternal Aunt     No Known Problems Paternal Uncle     No Known Problems Maternal Grandmother     No Known Problems Maternal Grandfather     No Known Problems Paternal Grandmother     No Known Problems Paternal Grandfather      I have reviewed and agree with the history as documented  E-Cigarette/Vaping    E-Cigarette Use Never User      E-Cigarette/Vaping Substances    Nicotine No     THC No     CBD No     Flavoring No     Other No     Unknown No      Social History     Tobacco Use    Smoking status: Former Smoker     Packs/day: 0 25     Quit date: 2/1/2018     Years since quitting: 3 9    Smokeless tobacco: Never Used   Vaping Use    Vaping Use: Never used   Substance Use Topics    Alcohol use: Not Currently    Drug use: No       Review of Systems   Constitutional: Negative for appetite change and fever  HENT: Positive for congestion  Respiratory: Positive for cough  Negative for chest tightness and shortness of breath  Cardiovascular: Negative for chest pain  Gastrointestinal: Negative for abdominal pain, diarrhea, nausea and vomiting  Genitourinary:        He does urinate  There is no pain     Neurological: Positive for weakness  Generalized weakness  No focal  Had numbness to left side 4 days ago but has normal feeling now  All other systems reviewed and are negative        Physical Exam  Physical Exam  Vitals and nursing note reviewed  Constitutional:       General: He is not in acute distress  Appearance: Normal appearance  He is well-developed  He is not ill-appearing, toxic-appearing or diaphoretic  HENT:      Head: Normocephalic and atraumatic  Right Ear: Hearing normal  No drainage or swelling  Left Ear: Hearing normal  No drainage or swelling  Eyes:      General: Lids are normal    Neck:      Vascular: No JVD  Trachea: Trachea normal    Cardiovascular:      Rate and Rhythm: Normal rate and regular rhythm  Pulses: Normal pulses  Heart sounds: Normal heart sounds  No murmur heard  No friction rub  No gallop  Pulmonary:      Effort: Pulmonary effort is normal  No respiratory distress  Breath sounds: Normal breath sounds  No stridor  No wheezing or rales  Chest:      Chest wall: No tenderness  Abdominal:      Palpations: Abdomen is soft  Tenderness: There is no abdominal tenderness  There is no guarding or rebound  Musculoskeletal:         General: No tenderness  Normal range of motion  Cervical back: Normal range of motion  Right lower leg: No edema  Left lower leg: No edema  Comments: Fistula has normal thrill  Lymphadenopathy:      Cervical: No cervical adenopathy  Skin:     General: Skin is warm and dry  Coloration: Skin is not pale  Findings: No rash  Neurological:      General: No focal deficit present  Mental Status: He is alert  GCS: GCS eye subscore is 4  GCS verbal subscore is 5  GCS motor subscore is 6  Cranial Nerves: No cranial nerve deficit  Sensory: No sensory deficit  Motor: No abnormal muscle tone  Psychiatric:         Mood and Affect: Mood normal          Speech: Speech normal          Behavior: Behavior is cooperative           Vital Signs  ED Triage Vitals [01/08/22 1752]   Temperature Pulse Respirations Blood Pressure SpO2   (!) 100 8 °F (38 2 °C) 76 20 109/62 97 %      Temp Source Heart Rate Source Patient Position - Orthostatic VS BP Location FiO2 (%)   Oral Monitor Standing Right arm --      Pain Score       8           Vitals:    01/08/22 1752 01/08/22 2008 01/08/22 2013   BP: 109/62 140/63    Pulse: 76 80    Patient Position - Orthostatic VS: Standing  Lying         Visual Acuity      ED Medications  Medications   acetaminophen (TYLENOL) tablet 650 mg (650 mg Oral Given 1/8/22 1836)       Diagnostic Studies  Results Reviewed     Procedure Component Value Units Date/Time    COVID/FLU/RSV - 2 hour TAT [[id number]]  (Abnormal) Collected: 01/08/22 1834    Lab Status: Final result Specimen: Nares from Nasopharyngeal Swab Updated: 01/08/22 1922     SARS-CoV-2 Positive     INFLUENZA A PCR Negative     INFLUENZA B PCR Negative     RSV PCR Negative    Narrative:      FOR PEDIATRIC PATIENTS - copy/paste COVID Guidelines URL to browser: [email or web address]  [email or web address]    SARS-CoV-2 assay is a Nucleic Acid Amplification assay intended for the  qualitative detection of nucleic acid from SARS-CoV-2 in nasopharyngeal  swabs  Results are for the presumptive identification of SARS-CoV-2 RNA  Positive results are indicative of infection with SARS-CoV-2, the virus  causing COVID-19, but do not rule out bacterial infection or co-infection  with other viruses  Laboratories within the [place] and its  territories are required to report all positive results to the appropriate  public health authorities  Negative results do not preclude SARS-CoV-2  infection and should not be used as the sole basis for treatment or other  patient management decisions  Negative results must be combined with  clinical observations, patient history, and epidemiological information  This test has not been FDA cleared or approved  This test has been authorized by FDA under an Emergency Use Authorization  (EUA)   This test is only authorized for the duration of time the  declaration that circumstances exist justifying the authorization of the  emergency use of an in vitro diagnostic tests for detection of SARS-CoV-2  virus and/or diagnosis of COVID-19 infection under section 564(b)(1) of  the Act, 21 U  S C  054CLI-8(Z)(6), unless the authorization is terminated  or revoked sooner  The test has been validated but independent review by FDA  and CLIA is pending  Test performed using [employer] GeneXpert: This RT-PCR assay targets N2,  a region unique to SARS-CoV-2  A conserved region in the E-gene was chosen  for pan-Sarbecovirus detection which includes SARS-CoV-2  Lactic acid [[id number]]  (Normal) Collected: 01/08/22 1834    Lab Status: Final result Specimen: Blood from Arm, Right Updated: 01/08/22 1902     LACTIC ACID 1 6 mmol/L     Narrative:      Result may be elevated if tourniquet was used during collection      Comprehensive metabolic panel [[id number]]  (Abnormal) Collected: 01/08/22 1834    Lab Status: Final result Specimen: Blood from Arm, Right Updated: 01/08/22 1901     Sodium 138 mmol/L      Potassium 3 9 mmol/L      Chloride 95 mmol/L      CO2 33 mmol/L      ANION GAP 10 mmol/L      BUN 29 mg/dL      Creatinine 7 66 mg/dL      Glucose 186 mg/dL      Calcium 9 0 mg/dL      Corrected Calcium 9 6 mg/dL      AST 55 U/L      ALT 24 U/L      Alkaline Phosphatase 71 U/L      Total Protein 7 7 g/dL      Albumin 3 3 g/dL      Total Bilirubin 0 79 mg/dL      eGFR 7 ml/min/1 73sq m     Narrative:      National Kidney Disease Foundation guidelines for Chronic Kidney Disease (CKD):     Stage 1 with normal or high GFR (GFR > 90 mL/min/1 73 square meters)    Stage 2 Mild CKD (GFR = 60-89 mL/min/1 73 square meters)    Stage 3A Moderate CKD (GFR = 45-59 mL/min/1 73 square meters)    Stage 3B Moderate CKD (GFR = 30-44 mL/min/1 73 square meters)    Stage 4 Severe CKD (GFR = 15-29 mL/min/1 73 square meters)    Stage 5 End Stage CKD (GFR <15 mL/min/1 73 square meters)  Note: GFR calculation is accurate only with a steady state creatinine    Protime-INR [[id number]]  (Normal) Collected: 01/08/22 1834    Lab Status: Final result Specimen: Blood from Arm, Right Updated: 01/08/22 1856     Protime 13 0 seconds      INR 1 00    APTT [[id number]]  (Abnormal) Collected: 01/08/22 1834    Lab Status: Final result Specimen: Blood from Arm, Right Updated: 01/08/22 1856     PTT 54 seconds     CBC and differential [[id number]]  (Abnormal) Collected: 01/08/22 1834    Lab Status: Final result Specimen: Blood from Arm, Right Updated: 01/08/22 1851     WBC 4 20 Thousand/uL      RBC 4 07 Million/uL      Hemoglobin 11 7 g/dL      Hematocrit 36 1 %      MCV 89 fL      MCH 28 7 pg      MCHC 32 4 g/dL      RDW 13 8 %      MPV 10 5 fL      Platelets 889 Thousands/uL      nRBC 0 /100 WBCs      Neutrophils Relative 72 %      Immat GRANS % 1 %      Lymphocytes Relative 16 %      Monocytes Relative 11 %      Eosinophils Relative 0 %      Basophils Relative 0 %      Neutrophils Absolute 3 03 Thousands/µL      Immature Grans Absolute 0 02 Thousand/uL      Lymphocytes Absolute 0 67 Thousands/µL      Monocytes Absolute 0 46 Thousand/µL      Eosinophils Absolute 0 01 Thousand/µL      Basophils Absolute 0 01 Thousands/µL     Procalcitonin with AM Reflex [[id number]] Collected: 01/08/22 1834    Lab Status: In process Specimen: Blood from Arm, Right Updated: 01/08/22 1840    Blood culture #1 [[id number]] Collected: 01/08/22 1830    Lab Status: In process Specimen: Blood from Arm, Right Updated: 01/08/22 1839    Blood culture #2 [[id number]]     Lab Status: No result Specimen: Blood     UA w Reflex to Microscopic w Reflex to Culture [[id number]]     Lab Status: No result Specimen: Urine                  XR chest portable   ED Interpretation by Niki Queen MD (01/08 2025)   I have personally reviewed the x-ray and my findings are: no acute disease                     Procedures  ECG 12 Lead Documentation Only    Date/Time: 1/8/2022 7:20 PM  Performed by: Fuentes Haynes MD  Authorized by: Fuentes Haynes MD     Indications / Diagnosis:  Fever, not feeling well, cough  ECG reviewed by me, the ED Provider: yes    Patient location:  ED  Interpretation:     Interpretation: non-specific    Rate:     ECG rate:  80    ECG rate assessment: normal    Rhythm:     Rhythm: sinus rhythm    QRS:     QRS axis:  Right    QRS intervals: Wide  ST segments:     ST segments:  Non-specific (concavity upwars, not tombstoning)  T waves:     T waves: non-specific    Other findings:     Other findings: CHARLES and LVH with strain               ED Course  ED Course as of 01/08/22 2243   Sat Jan 08, 2022   1948 Message sent to nephrologists to acknowledged and they will take care of making sure he gets in to the cohort center for COVID positive patients  Patient made aware of COVID positivity  Oxygen saturation normal   Performing ambulatory pulse ox  At this time looks to be safe for discharge  Referral sent for monoclonal antibody therapy  Cath:  CORONARY VESSELS:   --  The coronary circulation is right dominant  --  Left main: Normal   --  Mid LAD: There was a discrete 30 % stenosis  --  Circumflex: Normal   --  RCA: Normal                                          MDM  Number of Diagnoses or Management Options  COVID-19 virus infection  ESRD (end stage renal disease) ([facility] [address] )  Diagnosis management comments: Patient is COVID positive  Ambulatory pulse ox 90 or greater  No chest pain  Low-grade fever here  Given Tylenol  May be candidate for monoclonal antibody so referral was placed  He has been vaccinated x2 I did tiger text Nephrology to inform of COVID positive and the potential need for getting dialysis that is being used for other COVID patients         Amount and/or Complexity of Data Reviewed  Clinical lab tests: ordered and reviewed  Tests in the radiology section of CPT®: ordered and reviewed  Tests in the medicine section of CPT®: ordered and reviewed  Independent visualization of images, tracings, or specimens: yes    Patient Progress  Patient progress: stable      Disposition  Final diagnoses:   COVID-19 virus infection   ESRD (end stage renal disease) ([facility] [address] )     Time reflects when diagnosis was documented in both MDM as applicable and the Disposition within this note     Time User Action Codes Description Comment    1/8/2022  8:12 PM Clovia Bookbinder Add [U07 1] COVID-19 virus infection     1/8/2022  8:12 PM Clovia Bookbinder Add [N18 6] ESRD (end stage renal disease) [facility])       ED Disposition     ED Disposition Condition Date/Time Comment    Discharge Stable Sat Jan 8, 2022  8:12 PM Paddy Zhong discharge to home/self care              Follow-up Information     Follow up With Specialties Details Why Contact Info    Merlin Duque,  Internal Medicine Schedule an appointment as soon as possible for a visit in 2 days  [address] [place] [id number] [phone]            Discharge Medication List as of 1/8/2022  8:15 PM      CONTINUE these medications which have NOT CHANGED    Details   aspirin (Aspirin Low Dose) 81 mg EC tablet Take 1 tablet (81 mg total) by mouth daily, Starting Mon 6/7/2021, Normal      atorvastatin (LIPITOR) 40 mg tablet Take 1 tablet (40 mg total) by mouth daily, Starting Mon 6/7/2021, Normal      bismuth subsalicylate (PEPTO BISMOL) 262 MG chewable tablet Chew 2 tablets (524 mg total) 4 (four) times a day (before meals and at bedtime) for 14 days, Starting Sat 1/8/2022, Until Sat 1/22/2022, Normal      brimonidine tartrate 0 2 % ophthalmic solution Administer 1 drop into the left eye 2 (two) times a day, Starting Wed 1/27/2021, Historical Med      calcium acetate (PHOSLO) capsule Take 667 mg by mouth 3 (three) times a day with meals, Historical Med      Carboxymethylcellul-Glycerin (CLEAR EYES FOR DRY EYES OP) Apply 2 drops to eye 2 (two) times a day, Historical Med cyclobenzaprine (FLEXERIL) 5 mg tablet Take 1 tablet (5 mg total) by mouth daily at bedtime as needed for muscle spasms, Starting Mon 6/15/2020, Normal      doxazosin (CARDURA) 4 mg tablet Take 1 tablet (4 mg total) by mouth daily at bedtime, Starting Sat 2/27/2021, Until Sat 1/8/2022, Normal      Ergocalciferol (VITAMIN D2 PO) Take 1 25 mg by mouth  , Historical Med      furosemide (LASIX) 80 mg tablet Take 1 tablet (80 mg total) by mouth daily, Starting Mon 6/7/2021, Normal      insulin aspart (NovoLOG) 100 Units/mL injection pen 10U with breakfast and lunch, 10-14 U with dinner, Normal      isoniazid (NYDRAZID) 300 mg tablet Take 1 tablet (300 mg total) by mouth daily, Starting Thu 1/21/2021, Until Sat 1/8/2022, Normal      labetalol (NORMODYNE) 200 mg tablet Take 1 5 tablets (300 mg total) by mouth every 12 (twelve) hours, Starting Mon 12/6/2021, Until Sat 1/8/2022, Normal      LOKELMA 10 g PACK Starting Tue 12/31/2019, Historical Med      NIFEdipine (ADALAT CC) 90 mg 24 hr tablet Take 1 tablet (90 mg total) by mouth daily, Starting Mon 12/6/2021, No Print      pantoprazole (PROTONIX) 40 mg tablet Take 1 tablet (40 mg total) by mouth 2 (two) times a day for 14 days, Starting Sat 1/8/2022, Until Sat 1/22/2022, Normal      polyethylene glycol (MIRALAX) 17 g packet Take 17 g by mouth daily, Starting Mon 9/23/2019, Normal      pyridoxine (VITAMIN B6) 50 mg tablet Take 1 tablet (50 mg total) by mouth daily, Starting Mon 3/1/2021, Normal      Blood Glucose Monitoring Suppl (ONE TOUCH ULTRA 2) w/Device KIT by Does not apply route 3 (three) times a day, Starting Fri 1/25/2019, Print      cinacalcet (SENSIPAR) 30 mg tablet Take 30 mg by mouth daily, Historical Med      Continuous Blood Gluc  (FreeStyle [facility] [place]) LAUREN Use 1 each daily, Starting Wed 7/7/2021, Print      Diclofenac Sodium (VOLTAREN) 1 % Apply 2 g topically 4 (four) times a day for 14 days, Starting Sat 6/26/2021, Until Mon 12/6/2021, Print      insulin glargine (Basaglar KwikPen) 100 units/mL injection pen Inject 32 Units under the skin daily, Starting Fri 10/1/2021, Normal      Lancets (freestyle) lancets Use as instructed, Normal      metroNIDAZOLE (FLAGYL) 250 mg tablet Take 1 tablet (250 mg total) by mouth 4 (four) times a day for 14 days, Starting Sat 1/8/2022, Until Sat 1/22/2022, Normal      prednisoLONE acetate (PRED FORTE) 1 % ophthalmic suspension Administer 1 drop into the left eye 2 (two) times a day , Starting Tue 6/25/2019, Historical Med      sevelamer carbonate (RENVELA) 800 mg tablet Take 800 mg by mouth 3 (three) times a day with meals  , Historical Med      tetracycline (ACHROMYCIN,SUMYCIN) 500 MG capsule Take 1 capsule (500 mg total) by mouth 4 (four) times a day for 14 days, Starting Sat 1/8/2022, Until Sat 1/22/2022, Normal             No discharge procedures on file      PDMP Review     None          ED Provider  Electronically Signed by           Reyna Echevarria MD  01/08/22 5090

## 2022-01-10 ENCOUNTER — TELEPHONE (OUTPATIENT)
Dept: GASTROENTEROLOGY | Facility: CLINIC | Age: 57
End: 2022-01-10

## 2022-01-10 ENCOUNTER — TELEMEDICINE (OUTPATIENT)
Dept: INTERNAL MEDICINE CLINIC | Facility: CLINIC | Age: 57
End: 2022-01-10
Payer: MEDICARE

## 2022-01-10 DIAGNOSIS — U07.1 COVID-19: Primary | ICD-10-CM

## 2022-01-10 PROCEDURE — G2012 BRIEF CHECK IN BY MD/QHP: HCPCS | Performed by: INTERNAL MEDICINE

## 2022-01-10 RX ORDER — MELATONIN
2000 DAILY
Qty: 14 TABLET | Refills: 0 | Status: SHIPPED | OUTPATIENT
Start: 2022-01-10

## 2022-01-10 RX ORDER — ZINC GLUCONATE 50 MG
50 TABLET ORAL DAILY
Qty: 14 TABLET | Refills: 0 | Status: SHIPPED | OUTPATIENT
Start: 2022-01-10 | End: 2022-07-22

## 2022-01-10 RX ORDER — MULTIVIT WITH MINERALS/LUTEIN
1000 TABLET ORAL DAILY
Qty: 14 TABLET | Refills: 0 | Status: SHIPPED | OUTPATIENT
Start: 2022-01-10 | End: 2022-07-22

## 2022-01-10 NOTE — TELEPHONE ENCOUNTER
Called patient, spoke to his sister, Lopez Mott, patient is COVID-19 positive  Canceled appointment  His sister, Mikayla Zhu, will call back and reschedule her brother's appointment

## 2022-01-10 NOTE — PROGRESS NOTES
COVID-19 Outpatient Progress Note    Assessment/Plan:    Symptoms appear to be stable  He did receive Pfizer vaccine previously  Significant comorbidities of ESRD and diabetes as below  Not a candidate for mab infusion due to vaccination status and he is out of the window period and I explained this  Recommend ongoing symptomatic management  Advised him to let me know if his symptoms worsen      Problem List Items Addressed This Visit     None      Visit Diagnoses     COVID-19    -  Primary         Paddy is seen today for Kiara visit  Medical history is remarkable for type 2 diabetes complicated by retinopathy and neuropathy, ESRD on hemodialysis, colon polyps, hypertension, pulmonary nodule, latent TB    His sister is also present on today's call and assists with history taking  Symptoms began Monday 1/3 with dry cough, generalized weakness  He presented to the ED on 01/08 and tested positive for COVID  Was discharged home  He had a temp in the ED of 100 8  He states he feels okay today  His coughing is intermittent at this point  Pulse ox readings have been in the mid 90s per the sister  He is staying hydrated, appetite is a little diminished  Major issue right now is that he is very fatigued and moving quite slowly with walking    COVID-19 Plan   Encounter provider Meriln Koch DO    Provider located at [facility] [id number]    Recent Visits  No visits were found meeting these conditions  Showing recent visits within past 7 days and meeting all other requirements  Future Appointments  No visits were found meeting these conditions    Showing future appointments within next 150 days and meeting all other requirements     COVID-19 HPI  Lab Results   Component Value Date    SARSCOV2 Positive (A) 01/08/2022     Past Medical History:   Diagnosis Date    Cataract     Chronic kidney disease     Diabetes mellitus ([facility] )      IDDM    Diabetic retinopathy [facility]     Dizziness     occ    ESRD (end stage renal disease) ([facility]     GERD (gastroesophageal reflux disease)     Headache     occ    Hyperlipidemia     Hypertension     Legally blind     LVH (left ventricular hypertrophy)     Preferred language is [place]     understands some English    Use of cane as ambulatory aid      Past Surgical History:   Procedure Laterality Date    INGUINAL HERNIA REPAIR Right     IR AV FISTULAGRAM/GRAFTOGRAM  4/30/2019    IR AV FISTULAGRAM/GRAFTOGRAM  6/14/2019    IR AV FISTULAGRAM/GRAFTOGRAM  6/24/2020    IR AV FISTULAGRAM/GRAFTOGRAM  2/26/2021    IR AV FISTULAGRAM/GRAFTOGRAM  9/3/2021    IR TUNNELED DIALYSIS CATHETER PLACEMENT  4/3/2019    CT ANASTOMOSIS,AV,ANY SITE Left 1/23/2019    Procedure: CREATION FISTULA ARTERIOVENOUS (AV);   Surgeon: Arline Hammonds MD;  Location: [facility];  Service: Vascular     Current Outpatient Medications   Medication Sig Dispense Refill    aspirin (Aspirin Low Dose) 81 mg EC tablet Take 1 tablet (81 mg total) by mouth daily 90 tablet 1    atorvastatin (LIPITOR) 40 mg tablet Take 1 tablet (40 mg total) by mouth daily 90 tablet 1    bismuth subsalicylate (PEPTO BISMOL) 262 MG chewable tablet Chew 2 tablets (524 mg total) 4 (four) times a day (before meals and at bedtime) for 14 days 112 tablet 0    Blood Glucose Monitoring Suppl (ONE TOUCH ULTRA 2) w/Device KIT by Does not apply route 3 (three) times a day (Patient not taking: Reported on 12/10/2021 ) 1 each 0    brimonidine tartrate 0 2 % ophthalmic solution Administer 1 drop into the left eye 2 (two) times a day      calcium acetate (PHOSLO) capsule Take 667 mg by mouth 3 (three) times a day with meals      Carboxymethylcellul-Glycerin (CLEAR EYES FOR DRY EYES OP) Apply 2 drops to eye 2 (two) times a day      cinacalcet (SENSIPAR) 30 mg tablet Take 30 mg by mouth daily (Patient not taking: Reported on 10/1/2021)      Continuous Blood Gluc  (FreeStyle Jessica 14 Day [place]) LAUREN Use 1 each daily 1 each 0    cyclobenzaprine (FLEXERIL) 5 mg tablet Take 1 tablet (5 mg total) by mouth daily at bedtime as needed for muscle spasms 30 tablet 1    Diclofenac Sodium (VOLTAREN) 1 % Apply 2 g topically 4 (four) times a day for 14 days 150 g 0    doxazosin (CARDURA) 4 mg tablet Take 1 tablet (4 mg total) by mouth daily at bedtime 30 tablet 0    Ergocalciferol (VITAMIN D2 PO) Take 1 25 mg by mouth        furosemide (LASIX) 80 mg tablet Take 1 tablet (80 mg total) by mouth daily 90 tablet 1    insulin aspart (NovoLOG) 100 Units/mL injection pen 10U with breakfast and lunch, 10-14 U with dinner (Patient taking differently: 10U with breakfast, 10 U with dinner) 5 pen 2    insulin glargine (Basaglar KwikPen) 100 units/mL injection pen Inject 32 Units under the skin daily 15 mL 2    isoniazid (NYDRAZID) 300 mg tablet Take 1 tablet (300 mg total) by mouth daily 30 tablet 8    labetalol (NORMODYNE) 200 mg tablet Take 1 5 tablets (300 mg total) by mouth every 12 (twelve) hours 90 tablet 0    Lancets (freestyle) lancets Use as instructed (Patient not taking: Reported on 12/10/2021 ) 100 each 1    LOKELMA 10 g PACK       metroNIDAZOLE (FLAGYL) 250 mg tablet Take 1 tablet (250 mg total) by mouth 4 (four) times a day for 14 days 56 tablet 0    NIFEdipine (ADALAT CC) 90 mg 24 hr tablet Take 1 tablet (90 mg total) by mouth daily 90 tablet 0    pantoprazole (PROTONIX) 40 mg tablet Take 1 tablet (40 mg total) by mouth 2 (two) times a day for 14 days 28 tablet 0    polyethylene glycol (MIRALAX) 17 g packet Take 17 g by mouth daily 30 each 1    prednisoLONE acetate (PRED FORTE) 1 % ophthalmic suspension Administer 1 drop into the left eye 2 (two) times a day   0    pyridoxine (VITAMIN B6) 50 mg tablet Take 1 tablet (50 mg total) by mouth daily 30 tablet 6    sevelamer carbonate (RENVELA) 800 mg tablet Take 800 mg by mouth 3 (three) times a day with meals   (Patient not taking: Reported on 1/8/2022 )      tetracycline (ACHROMYCIN,SUMYCIN) 500 MG capsule Take 1 capsule (500 mg total) by mouth 4 (four) times a day for 14 days (Patient not taking: Reported on 1/8/2022 ) 56 capsule 0     No current facility-administered medications for this visit  Allergies   Allergen Reactions    No Known Allergies        Review of Systems  Objective: There were no vitals filed for this visit  Physical Exam    VIRTUAL VISIT DISCLAIMER    Paddy Zhong verbally agrees to participate in [employer]  Pt is aware that [employer] could be limited without vital signs or the ability to perform a full hands-on physical exam  Paddy Zhong understands he or the provider may request at any time to terminate the video visit and request the patient to seek care or treatment in person

## 2022-01-13 LAB — BACTERIA BLD CULT: NORMAL

## 2022-02-07 ENCOUNTER — APPOINTMENT (OUTPATIENT)
Dept: DIALYSIS | Facility: HOSPITAL | Age: 57
DRG: 304 | End: 2022-02-07
Payer: MEDICARE

## 2022-02-07 ENCOUNTER — HOSPITAL ENCOUNTER (INPATIENT)
Facility: HOSPITAL | Age: 57
LOS: 6 days | Discharge: HOME HEALTH | DRG: 304 | End: 2022-02-13
Attending: EMERGENCY MEDICINE | Admitting: INTERNAL MEDICINE
Payer: MEDICARE

## 2022-02-07 ENCOUNTER — APPOINTMENT (EMERGENCY)
Dept: RADIOLOGY | Facility: HOSPITAL | Age: 57
DRG: 304 | End: 2022-02-07
Payer: MEDICARE

## 2022-02-07 DIAGNOSIS — N18.6 ESRD (END STAGE RENAL DISEASE) (HCC): ICD-10-CM

## 2022-02-07 DIAGNOSIS — IMO0002 UNCONTROLLED SECONDARY DIABETES MELLITUS WITH STAGE 5 CKD (GFR<15): ICD-10-CM

## 2022-02-07 DIAGNOSIS — R05.9 COUGH: ICD-10-CM

## 2022-02-07 DIAGNOSIS — E87.70 VOLUME OVERLOAD: Primary | ICD-10-CM

## 2022-02-07 DIAGNOSIS — Z99.2 DIALYSIS PATIENT (HCC): ICD-10-CM

## 2022-02-07 DIAGNOSIS — I12.0 BENIGN HYPERTENSION WITH ESRD (END-STAGE RENAL DISEASE) (HCC): ICD-10-CM

## 2022-02-07 DIAGNOSIS — N18.6 BENIGN HYPERTENSION WITH ESRD (END-STAGE RENAL DISEASE) (HCC): ICD-10-CM

## 2022-02-07 DIAGNOSIS — R09.02 HYPOXIA: ICD-10-CM

## 2022-02-07 PROBLEM — J96.01 ACUTE RESPIRATORY FAILURE WITH HYPOXIA (HCC): Status: ACTIVE | Noted: 2022-02-07

## 2022-02-07 PROBLEM — I10 ACCELERATED HYPERTENSION: Status: ACTIVE | Noted: 2022-02-07

## 2022-02-07 LAB
2HR DELTA HS TROPONIN: 3 NG/L
4HR DELTA HS TROPONIN: 9 NG/L
ANION GAP SERPL CALCULATED.3IONS-SCNC: 11 MMOL/L (ref 4–13)
APTT PPP: 39 SECONDS (ref 23–37)
ATRIAL RATE: 92 BPM
ATRIAL RATE: 92 BPM
ATRIAL RATE: 97 BPM
BASOPHILS # BLD AUTO: 0.03 THOUSANDS/ΜL (ref 0–0.1)
BASOPHILS NFR BLD AUTO: 0 % (ref 0–1)
BUN SERPL-MCNC: 47 MG/DL (ref 5–25)
CALCIUM SERPL-MCNC: 9.9 MG/DL (ref 8.3–10.1)
CARDIAC TROPONIN I PNL SERPL HS: 34 NG/L
CARDIAC TROPONIN I PNL SERPL HS: 37 NG/L
CARDIAC TROPONIN I PNL SERPL HS: 43 NG/L
CHLORIDE SERPL-SCNC: 100 MMOL/L (ref 100–108)
CO2 SERPL-SCNC: 26 MMOL/L (ref 21–32)
CREAT SERPL-MCNC: 9.65 MG/DL (ref 0.6–1.3)
EOSINOPHIL # BLD AUTO: 0.15 THOUSAND/ΜL (ref 0–0.61)
EOSINOPHIL NFR BLD AUTO: 2 % (ref 0–6)
ERYTHROCYTE [DISTWIDTH] IN BLOOD BY AUTOMATED COUNT: 16.8 % (ref 11.6–15.1)
GFR SERPL CREATININE-BSD FRML MDRD: 5 ML/MIN/1.73SQ M
GLUCOSE SERPL-MCNC: 107 MG/DL (ref 65–140)
GLUCOSE SERPL-MCNC: 139 MG/DL (ref 65–140)
HCT VFR BLD AUTO: 30.1 % (ref 36.5–49.3)
HGB BLD-MCNC: 9.4 G/DL (ref 12–17)
IMM GRANULOCYTES # BLD AUTO: 0.01 THOUSAND/UL (ref 0–0.2)
IMM GRANULOCYTES NFR BLD AUTO: 0 % (ref 0–2)
INR PPP: 1.05 (ref 0.84–1.19)
LYMPHOCYTES # BLD AUTO: 1.01 THOUSANDS/ΜL (ref 0.6–4.47)
LYMPHOCYTES NFR BLD AUTO: 14 % (ref 14–44)
MCH RBC QN AUTO: 28 PG (ref 26.8–34.3)
MCHC RBC AUTO-ENTMCNC: 31.2 G/DL (ref 31.4–37.4)
MCV RBC AUTO: 90 FL (ref 82–98)
MONOCYTES # BLD AUTO: 0.86 THOUSAND/ΜL (ref 0.17–1.22)
MONOCYTES NFR BLD AUTO: 12 % (ref 4–12)
NEUTROPHILS # BLD AUTO: 5.34 THOUSANDS/ΜL (ref 1.85–7.62)
NEUTS SEG NFR BLD AUTO: 72 % (ref 43–75)
NRBC BLD AUTO-RTO: 0 /100 WBCS
NT-PROBNP SERPL-MCNC: ABNORMAL PG/ML
P AXIS: 68 DEGREES
P AXIS: 75 DEGREES
P AXIS: 76 DEGREES
PLATELET # BLD AUTO: 294 THOUSANDS/UL (ref 149–390)
PMV BLD AUTO: 9.5 FL (ref 8.9–12.7)
POTASSIUM SERPL-SCNC: 4.5 MMOL/L (ref 3.5–5.3)
PR INTERVAL: 156 MS
PR INTERVAL: 162 MS
PR INTERVAL: 168 MS
PROTHROMBIN TIME: 13.4 SECONDS (ref 11.6–14.5)
QRS AXIS: 101 DEGREES
QRS AXIS: 103 DEGREES
QRS AXIS: 111 DEGREES
QRSD INTERVAL: 120 MS
QRSD INTERVAL: 120 MS
QRSD INTERVAL: 126 MS
QT INTERVAL: 388 MS
QT INTERVAL: 398 MS
QT INTERVAL: 402 MS
QTC INTERVAL: 492 MS
QTC INTERVAL: 492 MS
QTC INTERVAL: 497 MS
RBC # BLD AUTO: 3.36 MILLION/UL (ref 3.88–5.62)
SODIUM SERPL-SCNC: 137 MMOL/L (ref 136–145)
T WAVE AXIS: 24 DEGREES
T WAVE AXIS: 61 DEGREES
T WAVE AXIS: 71 DEGREES
VENTRICULAR RATE: 92 BPM
VENTRICULAR RATE: 92 BPM
VENTRICULAR RATE: 97 BPM
WBC # BLD AUTO: 7.4 THOUSAND/UL (ref 4.31–10.16)

## 2022-02-07 PROCEDURE — 99223 1ST HOSP IP/OBS HIGH 75: CPT | Performed by: INTERNAL MEDICINE

## 2022-02-07 PROCEDURE — 71045 X-RAY EXAM CHEST 1 VIEW: CPT

## 2022-02-07 PROCEDURE — 36415 COLL VENOUS BLD VENIPUNCTURE: CPT | Performed by: EMERGENCY MEDICINE

## 2022-02-07 PROCEDURE — 93005 ELECTROCARDIOGRAM TRACING: CPT

## 2022-02-07 PROCEDURE — 85025 COMPLETE CBC W/AUTO DIFF WBC: CPT | Performed by: EMERGENCY MEDICINE

## 2022-02-07 PROCEDURE — 93010 ELECTROCARDIOGRAM REPORT: CPT | Performed by: INTERNAL MEDICINE

## 2022-02-07 PROCEDURE — 80048 BASIC METABOLIC PNL TOTAL CA: CPT | Performed by: EMERGENCY MEDICINE

## 2022-02-07 PROCEDURE — 84484 ASSAY OF TROPONIN QUANT: CPT | Performed by: EMERGENCY MEDICINE

## 2022-02-07 PROCEDURE — 99285 EMERGENCY DEPT VISIT HI MDM: CPT | Performed by: EMERGENCY MEDICINE

## 2022-02-07 PROCEDURE — 99220 PR INITIAL OBSERVATION CARE/DAY 70 MINUTES: CPT | Performed by: INTERNAL MEDICINE

## 2022-02-07 PROCEDURE — 85610 PROTHROMBIN TIME: CPT | Performed by: EMERGENCY MEDICINE

## 2022-02-07 PROCEDURE — G0257 UNSCHED DIALYSIS ESRD PT HOS: HCPCS

## 2022-02-07 PROCEDURE — 82948 REAGENT STRIP/BLOOD GLUCOSE: CPT

## 2022-02-07 PROCEDURE — NC001 PR NO CHARGE: Performed by: INTERNAL MEDICINE

## 2022-02-07 PROCEDURE — 83880 ASSAY OF NATRIURETIC PEPTIDE: CPT | Performed by: EMERGENCY MEDICINE

## 2022-02-07 PROCEDURE — 99285 EMERGENCY DEPT VISIT HI MDM: CPT

## 2022-02-07 PROCEDURE — 85730 THROMBOPLASTIN TIME PARTIAL: CPT | Performed by: EMERGENCY MEDICINE

## 2022-02-07 RX ORDER — FUROSEMIDE 40 MG/1
80 TABLET ORAL DAILY
Status: DISCONTINUED | OUTPATIENT
Start: 2022-02-08 | End: 2022-02-13 | Stop reason: HOSPADM

## 2022-02-07 RX ORDER — INSULIN GLARGINE 100 [IU]/ML
30 INJECTION, SOLUTION SUBCUTANEOUS
Status: DISCONTINUED | OUTPATIENT
Start: 2022-02-07 | End: 2022-02-08

## 2022-02-07 RX ORDER — ASPIRIN 81 MG/1
81 TABLET ORAL DAILY
Status: DISCONTINUED | OUTPATIENT
Start: 2022-02-08 | End: 2022-02-13 | Stop reason: HOSPADM

## 2022-02-07 RX ORDER — CYCLOBENZAPRINE HCL 10 MG
5 TABLET ORAL
Status: DISCONTINUED | OUTPATIENT
Start: 2022-02-07 | End: 2022-02-13 | Stop reason: HOSPADM

## 2022-02-07 RX ORDER — BRIMONIDINE TARTRATE 2 MG/ML
1 SOLUTION/ DROPS OPHTHALMIC 2 TIMES DAILY
Status: DISCONTINUED | OUTPATIENT
Start: 2022-02-07 | End: 2022-02-13 | Stop reason: HOSPADM

## 2022-02-07 RX ORDER — CALCIUM ACETATE 667 MG/1
667 CAPSULE ORAL
Status: DISCONTINUED | OUTPATIENT
Start: 2022-02-08 | End: 2022-02-13 | Stop reason: HOSPADM

## 2022-02-07 RX ORDER — PREDNISOLONE ACETATE 10 MG/ML
1 SUSPENSION/ DROPS OPHTHALMIC 2 TIMES DAILY
Status: DISCONTINUED | OUTPATIENT
Start: 2022-02-07 | End: 2022-02-13 | Stop reason: HOSPADM

## 2022-02-07 RX ORDER — DOXAZOSIN MESYLATE 4 MG/1
4 TABLET ORAL
Status: DISCONTINUED | OUTPATIENT
Start: 2022-02-07 | End: 2022-02-13 | Stop reason: HOSPADM

## 2022-02-07 RX ORDER — MELATONIN
2000 DAILY
Status: DISCONTINUED | OUTPATIENT
Start: 2022-02-08 | End: 2022-02-13 | Stop reason: HOSPADM

## 2022-02-07 RX ORDER — HEPARIN SODIUM 5000 [USP'U]/ML
5000 INJECTION, SOLUTION INTRAVENOUS; SUBCUTANEOUS EVERY 12 HOURS SCHEDULED
Status: DISCONTINUED | OUTPATIENT
Start: 2022-02-07 | End: 2022-02-13 | Stop reason: HOSPADM

## 2022-02-07 RX ORDER — NIFEDIPINE 30 MG/1
90 TABLET, EXTENDED RELEASE ORAL DAILY
Status: DISCONTINUED | OUTPATIENT
Start: 2022-02-08 | End: 2022-02-13 | Stop reason: HOSPADM

## 2022-02-07 RX ORDER — ATORVASTATIN CALCIUM 40 MG/1
40 TABLET, FILM COATED ORAL EVERY EVENING
Status: DISCONTINUED | OUTPATIENT
Start: 2022-02-07 | End: 2022-02-13 | Stop reason: HOSPADM

## 2022-02-07 RX ORDER — NITROGLYCERIN 20 MG/100ML
5-200 INJECTION INTRAVENOUS
Status: DISCONTINUED | OUTPATIENT
Start: 2022-02-07 | End: 2022-02-09

## 2022-02-07 RX ORDER — LABETALOL 20 MG/4 ML (5 MG/ML) INTRAVENOUS SYRINGE
10 EVERY 6 HOURS PRN
Status: DISCONTINUED | OUTPATIENT
Start: 2022-02-07 | End: 2022-02-07

## 2022-02-07 RX ORDER — LABETALOL 20 MG/4 ML (5 MG/ML) INTRAVENOUS SYRINGE
10 ONCE
Status: COMPLETED | OUTPATIENT
Start: 2022-02-07 | End: 2022-02-07

## 2022-02-07 RX ORDER — CINACALCET 30 MG/1
30 TABLET, FILM COATED ORAL DAILY
Status: DISCONTINUED | OUTPATIENT
Start: 2022-02-08 | End: 2022-02-09

## 2022-02-07 RX ORDER — HEPARIN SODIUM 5000 [USP'U]/ML
5000 INJECTION, SOLUTION INTRAVENOUS; SUBCUTANEOUS EVERY 8 HOURS SCHEDULED
Status: DISCONTINUED | OUTPATIENT
Start: 2022-02-07 | End: 2022-02-07

## 2022-02-07 RX ADMIN — DOXAZOSIN 4 MG: 4 TABLET ORAL at 22:53

## 2022-02-07 RX ADMIN — HEPARIN SODIUM 5000 UNITS: 5000 INJECTION INTRAVENOUS; SUBCUTANEOUS at 22:52

## 2022-02-07 RX ADMIN — GLYCERIN, HYPROMELLOSE, POLYETHYLENE GLYCOL 2 DROP: .2; .2; 1 LIQUID OPHTHALMIC at 22:53

## 2022-02-07 RX ADMIN — INSULIN GLARGINE 30 UNITS: 100 INJECTION, SOLUTION SUBCUTANEOUS at 22:52

## 2022-02-07 RX ADMIN — NITROGLYCERIN 5 MCG/MIN: 20 INJECTION INTRAVENOUS at 21:00

## 2022-02-07 RX ADMIN — BRIMONIDINE TARTRATE 1 DROP: 2 SOLUTION/ DROPS OPHTHALMIC at 22:53

## 2022-02-07 RX ADMIN — PREDNISOLONE ACETATE 1 DROP: 10 SUSPENSION/ DROPS OPHTHALMIC at 22:53

## 2022-02-07 RX ADMIN — NITROGLYCERIN 40 MCG/MIN: 20 INJECTION INTRAVENOUS at 22:43

## 2022-02-07 RX ADMIN — LABETALOL HYDROCHLORIDE 300 MG: 100 TABLET, FILM COATED ORAL at 20:11

## 2022-02-07 RX ADMIN — ATORVASTATIN CALCIUM 40 MG: 40 TABLET, FILM COATED ORAL at 20:11

## 2022-02-07 RX ADMIN — LABETALOL HYDROCHLORIDE 10 MG: 5 INJECTION, SOLUTION INTRAVENOUS at 18:52

## 2022-02-07 RX ADMIN — LABETALOL HYDROCHLORIDE 10 MG: 5 INJECTION, SOLUTION INTRAVENOUS at 17:12

## 2022-02-07 NOTE — ED PROVIDER NOTES
History  Chief Complaint   Patient presents with    Shortness of Breath     Patient reports dx with covid in early January, reports since then he has had worsening cough and shortness of breath  Patient reports the last time he went to dialysis, "the [facility] wasn't working well so they only gave me a 30 minute treatment " Patient reports fluid build up in legs as well  A 25-year-old male with past medical history of ESRD on HD (Tues, Thurs, Sat), diabetes, hyperlipidemia & hypertension; presents with increasing shortness of breath, lower extremity swelling and a dry cough for the past several days  Patient states the shortness of breath is worse with any exertion  Patient does also complain of a chest pain, however states it mostly occurs with the cough  Patient otherwise denies fever, chills, abdominal pain, nausea, vomiting, diarrhea and rashes  Patient states he has been compliant with dialysis, receiving a full session yesterday, however his session was terminated early on Thursday due to a possible mechanical issue  Patient also reports having COVID in the beginning of January, however had completely recovered prior to onset of his current symptoms  A/P:  Dyspnea, associated with lower extremity edema and a dry cough  Patient noted to be hypoxic on arrival to the ED, improved when placed on supplemental oxygen (which he does not usually require)  Concern for volume overload and less likely infectious etiology  Will obtain lab work and chest x-ray for further evaluation  History provided by:  Patient, medical records and relative   used: Yes (family, primary language [place]    Pt speaks some English)    Shortness of Breath  Associated symptoms: chest pain and cough        Wt Readings from Last 3 Encounters:   02/07/22 80 8 kg (178 lb 2 1 oz)   12/10/21 79 4 kg (175 lb)   12/06/21 81 6 kg (180 lb)         Prior to Admission Medications   Prescriptions Last Dose Informant Patient Reported? Taking?    Ascorbic Acid (vitamin C) 1000 MG tablet   No No   Sig: Take 1 tablet (1,000 mg total) by mouth in the morning for 14 days   Blood Glucose Monitoring Suppl (ONE TOUCH ULTRA 2) w/Device KIT  Spouse/Significant Other No No   Sig: by Does not apply route 3 (three) times a day   Patient not taking: Reported on 12/10/2021    Carboxymethylcellul-Glycerin ([address] OP)  Spouse/Significant Other Yes No   Sig: Apply 2 drops to eye 2 (two) times a day   Continuous Blood Gluc  (FreeStyle Jessica 14 Day [place]) LAUREN  Spouse/Significant Other No No   Sig: Use 1 each daily   Diclofenac Sodium (VOLTAREN) 1 %   No No   Sig: Apply 2 g topically 4 (four) times a day for 14 days   Ergocalciferol (VITAMIN D2 PO)  Spouse/Significant Other Yes No   Sig: Take 1 25 mg by mouth     LOKELMA 10 g PACK  Spouse/Significant Other Yes No   Lancets (freestyle) lancets  Spouse/Significant Other No No   Sig: Use as instructed   Patient not taking: Reported on 12/10/2021    NIFEdipine (ADALAT CC) 90 mg 24 hr tablet  Spouse/Significant Other No No   Sig: Take 1 tablet (90 mg total) by mouth daily   aspirin (Aspirin Low Dose) 81 mg EC tablet  Spouse/Significant Other No No   Sig: Take 1 tablet (81 mg total) by mouth daily   atorvastatin (LIPITOR) 40 mg tablet  Spouse/Significant Other No No   Sig: Take 1 tablet (40 mg total) by mouth daily   brimonidine tartrate 0 2 % ophthalmic solution  Spouse/Significant Other Yes No   Sig: Administer 1 drop into the left eye 2 (two) times a day   calcium acetate (PHOSLO) capsule  Spouse/Significant Other Yes No   Sig: Take 667 mg by mouth 3 (three) times a day with meals   cholecalciferol (VITAMIN D3) 1,000 units tablet   No No   Sig: Take 2 tablets (2,000 Units total) by mouth daily   cinacalcet (SENSIPAR) 30 mg tablet  Spouse/Significant Other Yes No   Sig: Take 30 mg by mouth daily   Patient not taking: Reported on 10/1/2021   cyclobenzaprine (FLEXERIL) 5 mg tablet  Spouse/Significant Other No No   Sig: Take 1 tablet (5 mg total) by mouth daily at bedtime as needed for muscle spasms   doxazosin (CARDURA) 4 mg tablet   No No   Sig: Take 1 tablet (4 mg total) by mouth daily at bedtime   furosemide (LASIX) 80 mg tablet  Spouse/Significant Other No No   Sig: Take 1 tablet (80 mg total) by mouth daily   insulin aspart (NovoLOG) 100 Units/mL injection pen  Spouse/Significant Other No No   Sig: 10U with breakfast and lunch, 10-14 U with dinner   Patient taking differently: 10U with breakfast, 10 U with dinner   insulin glargine (Basaglar KwikPen) 100 units/mL injection pen  Spouse/Significant Other No No   Sig: Inject 32 Units under the skin daily   isoniazid (NYDRAZID) 300 mg tablet  Spouse/Significant Other No No   Sig: Take 1 tablet (300 mg total) by mouth daily   labetalol (NORMODYNE) 200 mg tablet  Spouse/Significant Other No No   Sig: Take 1 5 tablets (300 mg total) by mouth every 12 (twelve) hours   pantoprazole (PROTONIX) 40 mg tablet   No No   Sig: Take 1 tablet (40 mg total) by mouth 2 (two) times a day for 14 days   polyethylene glycol (MIRALAX) 17 g packet  Spouse/Significant Other No No   Sig: Take 17 g by mouth daily   prednisoLONE acetate (PRED FORTE) 1 % ophthalmic suspension  Spouse/Significant Other Yes No   Sig: Administer 1 drop into the left eye 2 (two) times a day    pyridoxine (VITAMIN B6) 50 mg tablet  Spouse/Significant Other No No   Sig: Take 1 tablet (50 mg total) by mouth daily   sevelamer carbonate (RENVELA) 800 mg tablet  Spouse/Significant Other Yes No   Sig: Take 800 mg by mouth 3 (three) times a day with meals     Patient not taking: Reported on 1/8/2022    zinc gluconate 50 mg tablet   No No   Sig: Take 1 tablet (50 mg total) by mouth daily      Facility-Administered Medications: None       Past Medical History:   Diagnosis Date    Cataract     Chronic kidney disease     Diabetes mellitus (HCC)      IDDM    Di[facility] retinopathy ([facility] [address] )     Dizziness     occ    ESRD (end stage renal disease) (HCC)     GERD (gastroesophageal reflux disease)     Headache     occ    Hyperlipidemia     Hypertension     Legally blind     LVH (left ventricular hypertrophy)     Preferred language is [place]     understands some English    Use of cane as ambulatory aid        Past Surgical History:   Procedure Laterality Date    INGUINAL HERNIA REPAIR Right     IR AV FISTULAGRAM/GRAFTOGRAM  2019    IR AV FISTULAGRAM/GRAFTOGRAM  2019    IR AV FISTULAGRAM/GRAFTOGRAM  2020    IR AV FISTULAGRAM/GRAFTOGRAM  2021    IR AV FISTULAGRAM/GRAFTOGRAM  9/3/2021    IR TUNNELED DIALYSIS CATHETER PLACEMENT  4/3/2019    NV ANASTOMOSIS,AV,ANY SITE Left 2019    Procedure: CREATION FISTULA ARTERIOVENOUS (AV); Surgeon: Nafisa Zapata MD;  Location: [facility] OR;  Service: Vascular       Family History   Problem Relation Age of Onset    Hypertension Mother     Diabetes Father     No Known Problems Sister     No Known Problems Brother     No Known Problems Maternal Aunt     No Known Problems Maternal Uncle     No Known Problems Paternal Aunt     No Known Problems Paternal Uncle     No Known Problems Maternal Grandmother     No Known Problems Maternal Grandfather     No Known Problems Paternal Grandmother     No Known Problems Paternal Grandfather      I have reviewed and agree with the history as documented  E-Cigarette/Vaping    E-Cigarette Use Never User      E-Cigarette/Vaping Substances    Nicotine No     THC No     CBD No     Flavoring No     Other No     Unknown No      Social History     Tobacco Use    Smoking status: Former Smoker     Packs/day: 0 25     Quit date: 2018     Years since quittin 0    Smokeless tobacco: Never Used   Vaping Use    Vaping Use: Never used   Substance Use Topics    Alcohol use: Not Currently    Drug use: No       Review of Systems   Respiratory: Positive for cough and shortness of breath  Cardiovascular: Positive for chest pain and leg swelling  All other systems reviewed and are negative  Physical Exam  Physical Exam  General Appearance: alert and oriented, appears uncomfortable  Skin:  Warm, dry, intact  HEENT: atraumatic, normocephalic  Neck: Supple, trachea midline  Cardiac: RRR; no murmurs, rub, gallops  Pulmonary:  Hypoxic at 84% on room air, improved when placed on 5L NC    Lungs CTAB; no wheezes, rales, rhonchi  Gastrointestinal: abdomen soft, nontender, nondistended; no guarding or rebound tenderness; good bowel sounds, no mass or bruits  Extremities:  2-3+ pitting edema to the bilateral lower extremities, 2+ pulses; no calf tenderness, no clubbing, no cyanosis  Neuro:  no focal motor or sensory deficits, CN 2-12 grossly intact  Psych:  Normal mood and affect, normal judgement and insight      Vital Signs  ED Triage Vitals [02/07/22 1513]   Temperature Pulse Respirations Blood Pressure SpO2   97 9 °F (36 6 °C) 102 20 (!) 208/95 (!) 84 %      Temp Source Heart Rate Source Patient Position - Orthostatic VS BP Location FiO2 (%)   Oral Monitor Sitting Right arm --      Pain Score       No Pain           Vitals:    02/07/22 1513 02/07/22 1704   BP: (!) 208/95 (!) 225/106   Pulse: 102 101   Patient Position - Orthostatic VS: Sitting Sitting         Visual Acuity      ED Medications  Medications   Labetalol HCl (NORMODYNE) injection 10 mg (has no administration in time range)       Diagnostic Studies  Results Reviewed     Procedure Component Value Units Date/Time    HS Troponin I 2hr [[id number]]     Lab Status: No result Specimen: Blood     HS Troponin I 4hr [[id number]]     Lab Status: No result Specimen: Blood     HS Troponin 0hr (reflex protocol) [[id number]]  (Normal) Collected: 02/07/22 1539    Lab Status: Final result Specimen: Blood from Arm, Right Updated: 02/07/22 1612     hs TnI 0hr 34 ng/L     Basic metabolic panel [[id number]]  (Abnormal) Collected: 02/07/22 1539    Lab Status: Final result Specimen: Blood from Arm, Right Updated: 02/07/22 1611     Sodium 137 mmol/L      Potassium 4 5 mmol/L      Chloride 100 mmol/L      CO2 26 mmol/L      ANION GAP 11 mmol/L      BUN 47 mg/dL      Creatinine 9 65 mg/dL      Glucose 139 mg/dL      Calcium 9 9 mg/dL      eGFR 5 ml/min/1 73sq m     Narrative:      National Kidney Disease Foundation guidelines for Chronic Kidney Disease (CKD):     Stage 1 with normal or high GFR (GFR > 90 mL/min/1 73 square meters)    Stage 2 Mild CKD (GFR = 60-89 mL/min/1 73 square meters)    Stage 3A Moderate CKD (GFR = 45-59 mL/min/1 73 square meters)    Stage 3B Moderate CKD (GFR = 30-44 mL/min/1 73 square meters)    Stage 4 Severe CKD (GFR = 15-29 mL/min/1 73 square meters)    Stage 5 End Stage CKD (GFR <15 mL/min/1 73 square meters)  Note: GFR calculation is accurate only with a steady state creatinine    NT-BNP PRO [[id number]]  (Abnormal) Collected: 02/07/22 1539    Lab Status: Final result Specimen: Blood from Arm, Right Updated: 02/07/22 1611     NT-proBNP 29,340 pg/mL     APTT [[id number]]  (Abnormal) Collected: 02/07/22 1539    Lab Status: Final result Specimen: Blood from Arm, Right Updated: 02/07/22 1602     PTT 39 seconds     Protime-INR [[id number]]  (Normal) Collected: 02/07/22 1539    Lab Status: Final result Specimen: Blood from Arm, Right Updated: 02/07/22 1602     Protime 13 4 seconds      INR 1 05    CBC and differential [[id number]]  (Abnormal) Collected: 02/07/22 1539    Lab Status: Final result Specimen: Blood from Arm, Right Updated: 02/07/22 1546     WBC 7 40 Thousand/uL      RBC 3 36 Million/uL      Hemoglobin 9 4 g/dL      Hematocrit 30 1 %      MCV 90 fL      MCH 28 0 pg      MCHC 31 2 g/dL      RDW 16 8 %      MPV 9 5 fL      Platelets 929 Thousands/uL      nRBC 0 /100 WBCs      Neutrophils Relative 72 %      Immat GRANS % 0 %      Lymphocytes Relative 14 %      Monocytes Relative 12 %      Eosinophils Relative 2 %      Basophils Relative 0 % Neutrophils Absolute 5 34 Thousands/µL      Immature Grans Absolute 0 01 Thousand/uL      Lymphocytes Absolute 1 01 Thousands/µL      Monocytes Absolute 0 86 Thousand/µL      Eosinophils Absolute 0 15 Thousand/µL      Basophils Absolute 0 03 Thousands/µL                  XR chest 1 view portable   ED Interpretation by Spike Patel DO (02/07 1620)   Increased vascular congestion, R>L                 Procedures  Procedures   ECG 12 Lead Documentation  Date/Time: today/date: 2/7/2022  Performed by: Sammi Ren    ECG reviewed by me, the ED Provider: yes    Patient location:  ED   Previous ECG:  Changes noted   Rate:  97  ECG rate assessment: normal    Rhythm: sinus rhythm    Ectopy:  none    QRS axis:  Normal  Intervals: normal   Q waves: None   ST segments:  Chronic elevations in V2-V4  New depressions in II, III, aVF, V5 and V6  T waves: normal      Impression: NSR with chronic appearing ST elevations anteriorly, and new ST depressions inferior-laterally          ED Course  ED Course as of 02/07/22 1712   Mon Feb 07, 2022   1611 NT-proBNP(!): 29,340   1621 CXR and work up consistent with likely volume overload  Electrolytes within normal limits  Spoke with nephrology, Dr Virginia Salazar, will determine timing for dialysis  [id number] [facility] with nephrology, plan for 2 hour dialysis session tonight with another session tomorrow  Pt updated and in agreement, will discuss with SLIM  SBIRT 20yo+      Most Recent Value   SBIRT (22 yo +)    In order to provide better care to our patients, we are screening all of our patients for alcohol and drug use  Would it be okay to ask you these screening questions?  No Filed at: 02/07/2022 1524                    MDM    Disposition  Final diagnoses:   ESRD (end stage renal disease) ([facility] [address] )   Volume overload   Hypoxia     Time reflects when diagnosis was documented in both MDM as applicable and the Disposition within this note     Time User Action Codes Description Comment    2/7/2022  3:47 PM Sofía Cesar Add [N18 6] ESRD (end stage renal disease) [facility]     2/7/2022  4:51 PM Sofía Cesar Add [E87 70] Volume overload     2/7/2022  4:52 PM Kianna Cesar Modify [N18 6] ESRD (end stage renal disease) ([facility]     2/7/2022  4:52 PM Leonor Cross Modify [E87 70] Volume overload     2/7/2022  4:52 PM Leonor Cross Add [R09 02] Hypoxia       ED Disposition     ED Disposition Condition Date/Time Comment    Admit Stable Mon Feb 7, 2022  4:51 PM Case was discussed with [facility] and the patient's admission status was agreed to be Admission Status: observation status to the service of Dr Shantel Gutiérrez   Follow-up Information    None         Patient's Medications   Discharge Prescriptions    No medications on file       No discharge procedures on file      PDMP Review     None          ED Provider  Electronically Signed by           Ciera Gifford DO  02/07/22 [id number]

## 2022-02-07 NOTE — ED NOTES
Patient states that all home medication should be updated in the computer       Bruna Baer RN  02/07/22 1161

## 2022-02-07 NOTE — CONSULTS
Consultation - Nephrology   Paddy Trevin 61 y o  male MRN: [id number]  Unit/Bed#: ED 10 Encounter: [id number]    ASSESSMENT AND PLAN:  Patient is 39-year-old male with significant medical issues of ESRD on HD, diabetes, hypertension, recently had COVID-19 infection presented with worsening shortness of breath, cough  We consulted for dialysis management  ESRD on HD on TTS schedule at [facility]  -patient had last dialysis on Saturday   -due to hypoxic respiratory failure, concern for pulmonary congestion, will do urgent ultrafiltration today and dialysis again tomorrow to keep him on TTS schedule   -obtain outpatient records for outpatient dry weight  -goal to challenge UF removal on dialysis    Hypoxic respiratory failure  -chest x-ray concerning for pulmonary congestion, official report pending  -currently requiring 2 to 3 L oxygen via nasal cannula  -plan for ultrafiltration as blood pressure tolerated on dialysis today  Plan for again dialysis tomorrow  -echo in early 2021 shows EF 55%, grade 1 diastolic dysfunction    CKD anemia, closely monitor hemoglobin, transfuse p r n  for hemoglobin less than seven  -obtain outpatient records regarding Epogen dosing    CKD BMD, continue to monitor phosphorus, PTH    Recent COVID-19 infection, management as per primary team     Uncontrolled hypertension, blood pressure remains above goal, challenge UF removal and monitor BP after dialysis  Discussed above plan in detail with the ER attending  HISTORY OF PRESENT ILLNESS:  Requesting Physician: Phyllis Sanchez DO  Reason for Consult:  ESRD on HD    Jalyn Alvarez is a 61y o  year old male who was admitted to [facility] after presenting with shortness of breath, cough  A renal consultation is requested today for assistance in the management of ESRD  Old medical records including prior lab values from [facility] records were reviewed    Patient recently had COVID-19 infection although since then his symptoms are resolved  More recently over last several days, patient started having worsening shortness of breath, along with cough  Patient had his full dialysis treatment on Saturday  Patient was found to hypoxic respiratory failure and desaturating on room air and eventually required oxygen  Currently remains on 2 to 3 L O2 via nasal cannula  Patient is primarily [place] [facility] speaking and as per his request, sister Denver Rubi who is present at bedside helped with translation  Denies nausea, vomiting    PAST MEDICAL HISTORY:  Past Medical History:   Diagnosis Date    Cataract     Chronic kidney disease     Diabetes mellitus (HCC)      IDDM    [facility] retinopathy ([facility] )     Dizziness     occ    ESRD (end stage renal disease) ([facility]     GERD (gastroesophageal reflux disease)     Headache     occ    Hyperlipidemia     Hypertension     Legally blind     LVH (left ventricular hypertrophy)     Preferred language is [place]     understands some English    Use of cane as ambulatory aid        PAST SURGICAL HISTORY:  Past Surgical History:   Procedure Laterality Date    INGUINAL HERNIA REPAIR Right     IR AV FISTULAGRAM/GRAFTOGRAM  4/30/2019    IR AV FISTULAGRAM/GRAFTOGRAM  6/14/2019    IR AV FISTULAGRAM/GRAFTOGRAM  6/24/2020    IR AV FISTULAGRAM/GRAFTOGRAM  2/26/2021    IR AV FISTULAGRAM/GRAFTOGRAM  9/3/2021    IR TUNNELED DIALYSIS CATHETER PLACEMENT  4/3/2019    VT ANASTOMOSIS,AV,ANY SITE Left 1/23/2019    Procedure: CREATION FISTULA ARTERIOVENOUS (AV);   Surgeon: Jose Collins MD;  Location: [facility];  Service: Vascular       ALLERGIES:  Allergies   Allergen Reactions    No Known Allergies        SOCIAL HISTORY:  Social History     Substance and Sexual Activity   Alcohol Use Not Currently     Social History     Substance and Sexual Activity   Drug Use No     Social History     Tobacco Use   Smoking Status Former Smoker    Packs/day: 0 25    Quit date: 2/1/2018    Years since quittin 0   Smokeless Tobacco Never Used       FAMILY HISTORY:  Family History   Problem Relation Age of Onset    Hypertension Mother     Diabetes Father     No Known Problems Sister     No Known Problems Brother     No Known Problems Maternal Aunt     No Known Problems Maternal Uncle     No Known Problems Paternal Aunt     No Known Problems Paternal Uncle     No Known Problems Maternal Grandmother     No Known Problems Maternal Grandfather     No Known Problems Paternal Grandmother     No Known Problems Paternal Grandfather        MEDICATIONS:  No current facility-administered medications for this encounter      Current Outpatient Medications:     Ascorbic Acid (vitamin C) 1000 MG tablet, Take 1 tablet (1,000 mg total) by mouth in the morning for 14 days, Disp: 14 tablet, Rfl: 0    aspirin (Aspirin Low Dose) 81 mg EC tablet, Take 1 tablet (81 mg total) by mouth daily, Disp: 90 tablet, Rfl: 1    atorvastatin (LIPITOR) 40 mg tablet, Take 1 tablet (40 mg total) by mouth daily, Disp: 90 tablet, Rfl: 1    Blood Glucose Monitoring Suppl (ONE TOUCH ULTRA 2) w/Device KIT, by Does not apply route 3 (three) times a day (Patient not taking: Reported on 12/10/2021 ), Disp: 1 each, Rfl: 0    brimonidine tartrate 0 2 % ophthalmic solution, Administer 1 drop into the left eye 2 (two) times a day, Disp: , Rfl:     calcium acetate (PHOSLO) capsule, Take 667 mg by mouth 3 (three) times a day with meals, Disp: , Rfl:     Carboxymethylcellul-Glycerin (CLEAR EYES FOR DRY EYES OP), Apply 2 drops to eye 2 (two) times a day, Disp: , Rfl:     cholecalciferol (VITAMIN D3) 1,000 units tablet, Take 2 tablets (2,000 Units total) by mouth daily, Disp: 14 tablet, Rfl: 0    cinacalcet (SENSIPAR) 30 mg tablet, Take 30 mg by mouth daily (Patient not taking: Reported on 10/1/2021), Disp: , Rfl:     Continuous Blood Gluc  (FreeStyle Jessica 14 Day [place]) LAUREN, Use 1 each daily, Disp: 1 each, Rfl: 0    cyclobenzaprine (FLEXERIL) 5 mg tablet, Take 1 tablet (5 mg total) by mouth daily at bedtime as needed for muscle spasms, Disp: 30 tablet, Rfl: 1    Diclofenac Sodium (VOLTAREN) 1 %, Apply 2 g topically 4 (four) times a day for 14 days, Disp: 150 g, Rfl: 0    doxazosin (CARDURA) 4 mg tablet, Take 1 tablet (4 mg total) by mouth daily at bedtime, Disp: 30 tablet, Rfl: 0    Ergocalciferol (VITAMIN D2 PO), Take 1 25 mg by mouth  , Disp: , Rfl:     furosemide (LASIX) 80 mg tablet, Take 1 tablet (80 mg total) by mouth daily, Disp: 90 tablet, Rfl: 1    insulin aspart (NovoLOG) 100 Units/mL injection pen, 10U with breakfast and lunch, 10-14 U with dinner (Patient taking differently: 10U with breakfast, 10 U with dinner), Disp: 5 pen, Rfl: 2    insulin glargine (Basaglar KwikPen) 100 units/mL injection pen, Inject 32 Units under the skin daily, Disp: 15 mL, Rfl: 2    isoniazid (NYDRAZID) 300 mg tablet, Take 1 tablet (300 mg total) by mouth daily, Disp: 30 tablet, Rfl: 8    labetalol (NORMODYNE) 200 mg tablet, Take 1 5 tablets (300 mg total) by mouth every 12 (twelve) hours, Disp: 90 tablet, Rfl: 0    Lancets (freestyle) lancets, Use as instructed (Patient not taking: Reported on 12/10/2021 ), Disp: 100 each, Rfl: 1    LOKELMA 10 g PACK, , Disp: , Rfl:     NIFEdipine (ADALAT CC) 90 mg 24 hr tablet, Take 1 tablet (90 mg total) by mouth daily, Disp: 90 tablet, Rfl: 0    pantoprazole (PROTONIX) 40 mg tablet, Take 1 tablet (40 mg total) by mouth 2 (two) times a day for 14 days, Disp: 28 tablet, Rfl: 0    polyethylene glycol (MIRALAX) 17 g packet, Take 17 g by mouth daily, Disp: 30 each, Rfl: 1    prednisoLONE acetate (PRED FORTE) 1 % ophthalmic suspension, Administer 1 drop into the left eye 2 (two) times a day , Disp: , Rfl: 0    pyridoxine (VITAMIN B6) 50 mg tablet, Take 1 tablet (50 mg total) by mouth daily, Disp: 30 tablet, Rfl: 6    sevelamer carbonate (RENVELA) 800 mg tablet, Take 800 mg by mouth 3 (three) times a day with meals   (Patient not taking: Reported on 1/8/2022 ), Disp: , Rfl:     zinc gluconate 50 mg tablet, Take 1 tablet (50 mg total) by mouth daily, Disp: 14 tablet, Rfl: 0    REVIEW OF SYSTEMS:  Complete 10 point review of systems were obtained and discussed in length with the patient  Complete review of systems were negative / unremarkable except mentioned in the HPI section       PHYSICAL EXAM:  Current Weight: Weight - Scale: 80 8 kg (178 lb 2 1 oz)  First Weight: Weight - Scale: 80 8 kg (178 lb 2 1 oz)  Vitals:    02/07/22 1513   BP: (!) 208/95   Pulse: 102   Resp: 20   Temp: 97 9 °F (36 6 °C)   SpO2: (!) 84%     No intake or output data in the 24 hours ending 02/07/22 1635  Wt Readings from Last 3 Encounters:   02/07/22 80 8 kg (178 lb 2 1 oz)   12/10/21 79 4 kg (175 lb)   12/06/21 81 6 kg (180 lb)     Temp Readings from Last 3 Encounters:   02/07/22 97 9 °F (36 6 °C) (Oral)   01/08/22 (!) 100 8 °F (38 2 °C) (Oral)   12/27/21 (!) 96 5 °F (35 8 °C) (Tympanic)     BP Readings from Last 3 Encounters:   02/07/22 (!) 208/95   01/08/22 140/63   12/27/21 (!) 174/85     Pulse Readings from Last 3 Encounters:   02/07/22 102   01/08/22 80   12/27/21 70        Physical Examination:  General:  Lying in bed, no acute distress  Eyes:  Mild conjunctival pallor present  ENT:  External examination of ears and nose unremarkable  Neck:  No obvious lymphadenopathy appreciated  Respiratory:  Bilateral coarse breath sound present  CVS:  S1, S2 present  GI:  Soft, nondistended  CNS:  Active alert oriented x3  Extremities:  Trace edema in both lower legs  Psych:  Conscious, coherent oriented  Skin:  No new rash in legs    Invasive Devices:      Lab Results:   Results from last 7 days   Lab Units 02/07/22  1539   WBC Thousand/uL 7 40   HEMOGLOBIN g/dL 9 4*   HEMATOCRIT % 30 1*   PLATELETS Thousands/uL 294   POTASSIUM mmol/L 4 5   CHLORIDE mmol/L 100   CO2 mmol/L 26   BUN mg/dL 47*   CREATININE mg/dL 9 65*   CALCIUM mg/dL 9 9       Other Studies:   XR chest 1 view portable   ED Interpretation by Pérez Valadez DO (02/07 1620)   Increased vascular congestion, R>L      Chest x-ray images personally reviewed which shows concern for pulmonary congestion  Official report pending    Portions of the record may have been created with voice recognition software  Occasional wrong word or "sound a like" substitutions may have occurred due to the inherent limitations of voice recognition software  Read the chart carefully and recognize, using context, where substitutions have occurred

## 2022-02-07 NOTE — ASSESSMENT & PLAN NOTE
· Secondary to volume overload and end-stage renal disease  · Plus-minus dietary indiscretion  · Give stat labetalol 10 mg IV times  · Expected improved blood pressure with dialysis and ultrafiltration  · Restart labetalol 300 mg p o  B i d   · Continue doxazosin 4 mg at bedtime, nifedipine 90 mg daily

## 2022-02-07 NOTE — ASSESSMENT & PLAN NOTE
· Secondary to pulmonary edema, associated hypertension, ESRD  · Currently on oxygen at 4 L  · He will undergo stat hemodialysis with ultrafiltration tonight and tomorrow  · Wean down oxygen as tolerated  · He does not use oxygen at baseline

## 2022-02-07 NOTE — ED NOTES
Patient placed on 3 L nasal cannula at this time  Oxygen improved to 93% at this time   Dr Ashlyn Crespo at bedside for patient evaluation      Shruthi Romeo RN  02/07/22 4597

## 2022-02-07 NOTE — H&P
[facility]  H&P- Paddy Trevin 1965, 61 y o  male MRN: [id number]  Unit/Bed#: ED 25 Encounter: [id number]  Primary Care Provider: Sarah Streeter [id number],    Date and time admitted to hospital: 2/7/2022  3:14 PM    * Acute respiratory failure with hypoxia [facility])  Assessment & Plan  · Secondary to pulmonary edema, associated hypertension, ESRD  · Currently on oxygen at 4 L  · He will undergo stat hemodialysis with ultrafiltration tonight and tomorrow  · Wean down oxygen as tolerated  · He does not use oxygen at baseline    Accelerated hypertension  Assessment & Plan  · Secondary to volume overload and end-stage renal disease  · Plus-minus dietary indiscretion  · Give stat labetalol 10 mg IV times  · Expected improved blood pressure with dialysis and ultrafiltration  · Restart labetalol 300 mg p o  B i d   · Continue doxazosin 4 mg at bedtime, nifedipine 90 mg daily    ESRD (end stage renal disease) ([facility])  Assessment & Plan  With volume overload on exam, imaging and blood work  Nephrology on board for hemodialysis    Type 2 diabetes mellitus with chronic kidney disease on chronic dialysis, with long-term current use of insulin ([facility])  Assessment & Plan  Lab Results   Component Value Date    HGBA1C 7 8 (A) 10/01/2021     Continue Lantus 30 units at bedtime and Humalog 10 units with meals    Hyperlipidemia  Assessment & Plan  · Continued Lipitor 20 mg      VTE Prophylaxis: Heparin  / sequential compression device   Code Status:  Full code  POLST: There is no POLST form on file for this patient (pre-hospital)  Discussion with family:  Spoke with sister at bedside    Anticipated Length of Stay:  Patient will be admitted on an Observation basis with an anticipated length of stay of  at least 1 midnight  Justification for Hospital Stay:  Fluid overload    Total Time for Visit, including Counseling / Coordination of Care: 45 minutes    Greater than 50% of this total time spent on direct patient counseling and coordination of care  Chief Complaint:   Shortness of breath    History of Present Illness:    Rafael Diamond is a 61 y o  male who presents with worsening shortness of breath since yesterday  He has known history of end-stage renal disease on dialysis for the past 1 and half years  He goes to the dialysis every Tuesday, Thursday and Saturday  According to his sister who provided most of the history, his dialysis on Thursday was shortened due to machine malfunction  He had unremarkable dialysis on Saturday  He started having shortness of breath on Sunday until today  Shortness of breath could be at rest and on exertion associated frequent dry cough with no fever or chills  She reported that he was drinking more liquids over the weekend and needs a salty meal yesterday  While in the ER he was noted to have a O2 saturation of 88% on room air  He required oxygen at 4 L via nasal cannula to maintain normal saturation  I tried to review the medications with his sister  She was not aware of his exact medications  "They are in the computer " I reviewed his old records and medication list through chart  Review of Systems:    Review of Systems   Constitutional: Negative  HENT: Negative  Eyes:          He is blind   Respiratory: Positive for cough and shortness of breath  Cardiovascular: Positive for leg swelling  Gastrointestinal: Negative  Genitourinary: Negative  Musculoskeletal: Negative  Skin: Negative  Neurological: Negative  Hematological: Negative  Psychiatric/Behavioral: Negative  All other systems reviewed and are negative        Past Medical and Surgical History:     Past Medical History:   Diagnosis Date    Cataract     Chronic kidney disease     Diabetes mellitus ([facility] )      IDDM    [facility]     Dizziness     occ    ESRD (end stage renal disease) (HCC)     GERD (gastroesophageal reflux disease)     Headache occ    Hyperlipidemia     Hypertension     Legally blind     LVH (left ventricular hypertrophy)     Preferred language is [place]     understands some English    Use of cane as ambulatory aid        Past Surgical History:   Procedure Laterality Date    INGUINAL HERNIA REPAIR Right     IR AV FISTULAGRAM/GRAFTOGRAM  4/30/2019    IR AV FISTULAGRAM/GRAFTOGRAM  6/14/2019    IR AV FISTULAGRAM/GRAFTOGRAM  6/24/2020    IR AV FISTULAGRAM/GRAFTOGRAM  2/26/2021    IR AV FISTULAGRAM/GRAFTOGRAM  9/3/2021    IR TUNNELED DIALYSIS CATHETER PLACEMENT  4/3/2019    AK ANASTOMOSIS,AV,ANY SITE Left 1/23/2019    Procedure: CREATION FISTULA ARTERIOVENOUS (AV); Surgeon: Darby Mcbride MD;  Location: [facility];  Service: Vascular       Meds/Allergies:    Prior to Admission medications    Medication Sig Start Date End Date Taking?  Authorizing Provider   Ascorbic Acid (vitamin C) 1000 MG tablet Take 1 tablet (1,000 mg total) by mouth in the morning for 14 days 1/10/22 1/24/22  Merlin Monterroso DO   aspirin (Aspirin Low Dose) 81 mg EC tablet Take 1 tablet (81 mg total) by mouth daily 6/7/21   Oumar Islas MD   atorvastatin (LIPITOR) 40 mg tablet Take 1 tablet (40 mg total) by mouth daily 6/7/21   Oumar Islas MD   Blood Glucose Monitoring Suppl (ONE TOUCH ULTRA 2) w/Device KIT by Does not apply route 3 (three) times a day  Patient not taking: Reported on 12/10/2021  1/25/19   Oumar Islas MD   brimonidine tartrate 0 2 % ophthalmic solution Administer 1 drop into the left eye 2 (two) times a day 1/27/21   Historical Provider, MD   calcium acetate (PHOSLO) capsule Take 667 mg by mouth 3 (three) times a day with meals    Historical Provider, MD   Carboxymethylcellul-Glycerin (CLEAR EYES FOR DRY EYES OP) Apply 2 drops to eye 2 (two) times a day    Historical Provider, MD   cholecalciferol (VITAMIN D3) 1,000 units tablet Take 2 tablets (2,000 Units total) by mouth daily 1/10/22   Merlin Monterroso DO   cinacalcet (SENSIPAR) 30 mg tablet Take 30 mg by mouth daily  Patient not taking: Reported on 10/1/2021    Historical Provider, MD   Continuous Blood Gluc  [facility] Day [place]) LAUREN Use 1 each daily 7/7/21   Tarik Whipple MD   cyclobenzaprine (FLEXERIL) 5 mg tablet Take 1 tablet (5 mg total) by mouth daily at bedtime as needed for muscle spasms 6/15/20   Goran York MD   Diclofenac Sodium (VOLTAREN) 1 % Apply 2 g topically 4 (four) times a day for 14 days 6/26/21 12/6/21  Da Kirkland DO   doxazosin (CARDURA) 4 mg tablet Take 1 tablet (4 mg total) by mouth daily at bedtime 2/27/21 1/8/22  Arline Barajas MD   Ergocalciferol (VITAMIN D2 PO) Take 1 25 mg by mouth      Historical Provider, MD   furosemide (LASIX) 80 mg tablet Take 1 tablet (80 mg total) by mouth daily 6/7/21   Goran York MD   insulin aspart (NovoLOG) 100 Units/mL injection pen 10U with breakfast and lunch, 10-14 U with dinner  Patient taking differently: 10U with breakfast, 10 U with dinner 6/7/21   Goran York MD   insulin glargine (Basaglar KwikPen) 100 units/mL injection pen Inject 32 Units under the skin daily 10/1/21   Merlin Burger DO   isoniazid (NYDRAZID) 300 mg tablet Take 1 tablet (300 mg total) by mouth daily 1/21/21 1/8/22  Kosta Meyers PA-C   labetalol (NORMODYNE) 200 mg tablet Take 1 5 tablets (300 mg total) by mouth every 12 (twelve) hours 12/6/21 1/8/22  Merlin Burger DO   Lancets (freestyle) lancets Use as instructed  Patient not taking: Reported on 12/10/2021  3/9/21   Goran York MD   LOKELMA 10 g PACK  12/31/19   Historical Provider, MD   NIFEdipine (ADALAT CC) 90 mg 24 hr tablet Take 1 tablet (90 mg total) by mouth daily 12/6/21   Merlin Burger DO   pantoprazole (PROTONIX) 40 mg tablet Take 1 tablet (40 mg total) by mouth 2 (two) times a day for 14 days 1/8/22 1/22/22  Jordin Cruz MD   polyethylene glycol (MIRALAX) 17 g packet Take 17 g by mouth daily 9/23/19   Goran York, MD   prednisoLONE acetate (PRED FORTE) 1 % ophthalmic suspension Administer 1 drop into the left eye 2 (two) times a day  19   Historical Provider, MD   pyridoxine (VITAMIN B6) 50 mg tablet Take 1 tablet (50 mg total) by mouth daily 3/1/21   Cindy Ramirez DO   sevelamer carbonate (RENVELA) 800 mg tablet Take 800 mg by mouth 3 (three) times a day with meals    Patient not taking: Reported on 2022     Historical Provider, MD   zinc gluconate 50 mg tablet Take 1 tablet (50 mg total) by mouth daily 1/10/22   Merlin Miranda DO     I have reviewed home medications with a medical source (PCP, Pharmacy, other)  Allergies:    Allergies   Allergen Reactions    No Known Allergies        Social History:     Marital Status: Legally    Occupation:  None  Patient Pre-hospital Living Situation:  Lives with family  Patient Pre-hospital Level of Mobility:  Uses a walker  Patient Pre-hospital Diet Restrictions:  Diabetic/renal  Substance Use History:   Social History     Substance and Sexual Activity   Alcohol Use Not Currently     Social History     Tobacco Use   Smoking Status Former Smoker    Packs/day: 0 25    Quit date: 2018    Years since quittin 0   Smokeless Tobacco Never Used     Social History     Substance and Sexual Activity   Drug Use No       Family History:    Family History   Problem Relation Age of Onset    Hypertension Mother     Diabetes Father     No Known Problems Sister     No Known Problems Brother     No Known Problems Maternal Aunt     No Known Problems Maternal Uncle     No Known Problems Paternal Aunt     No Known Problems Paternal Uncle     No Known Problems Maternal Grandmother     No Known Problems Maternal Grandfather     No Known Problems Paternal Grandmother     No Known Problems Paternal Grandfather        Physical Exam:     Vitals:   Blood Pressure: (!) 225/106 (SLIM at pt bedside and aware) (22 1704)  Pulse: 101 (22 1704)  Temperature: 97 9 °F (36 6 °C) (02/07/22 1513)  Temp Source: Oral (02/07/22 1513)  Respirations: 22 (02/07/22 1704)  Weight - Scale: 80 8 kg (178 lb 2 1 oz) (02/07/22 1509)  SpO2: 92 % (02/07/22 1704)    Physical Exam  Constitutional:       Appearance: He is ill-appearing  HENT:      Head: Normocephalic and atraumatic  Nose: No rhinorrhea  Eyes:      General: No scleral icterus  Cardiovascular:      Rate and Rhythm: Tachycardia present  Pulmonary:      Breath sounds: Rales present  Abdominal:      General: Abdomen is flat  Palpations: Abdomen is soft  Musculoskeletal:      Cervical back: Neck supple  Right lower leg: Edema present  Left lower leg: Edema present  Skin:     General: Skin is warm and dry  Neurological:      Mental Status: He is alert and oriented to person, place, and time  Psychiatric:         Mood and Affect: Mood normal          Behavior: Behavior normal      Additional Data:     Lab Results: I have personally reviewed pertinent reports        Results from last 7 days   Lab Units 02/07/22  1539   WBC Thousand/uL 7 40   HEMOGLOBIN g/dL 9 4*   HEMATOCRIT % 30 1*   PLATELETS Thousands/uL 294   NEUTROS PCT % 72   LYMPHS PCT % 14   MONOS PCT % 12   EOS PCT % 2     Results from last 7 days   Lab Units 02/07/22  1539   SODIUM mmol/L 137   POTASSIUM mmol/L 4 5   CHLORIDE mmol/L 100   CO2 mmol/L 26   BUN mg/dL 47*   CREATININE mg/dL 9 65*   ANION GAP mmol/L 11   CALCIUM mg/dL 9 9   GLUCOSE RANDOM mg/dL 139     Results from last 7 days   Lab Units 02/07/22  1539   INR  1 05                   Imaging: I have personally reviewed pertinent films in PACS    XR chest 1 view portable   ED Interpretation by Toni Gandhi DO (02/07 1620)   Increased vascular congestion, R>L      Bilateral vascular congestion    EKG, Pathology, and Other Studies Reviewed on Admission:   · EKG:  Normal sinus rhythm, biatrial enlargement    Allscripts / Epic Records Reviewed: Yes     ** Please Note: This note has been constructed using a voice recognition system   **

## 2022-02-07 NOTE — ASSESSMENT & PLAN NOTE
Lab Results   Component Value Date    HGBA1C 7 8 (A) 10/01/2021     Continue Lantus 30 units at bedtime and Humalog 10 units with meals

## 2022-02-07 NOTE — PLAN OF CARE
Post-Dialysis RN Treatment Note    Blood Pressure:  Pre 215/101 mm/Hg  Post 195/98 mmHg   EDW  n/a    Weight:  Pre 80 8 kg   Post None Reported kg   Mode of weight measurement: Other    Volume Removed  2000 ml    Treatment duration 120 minutes    NS given  No    Treatment shortened? No   Medications given during Rx Labetalol 10mg iv x 2 doses   Estimated Kt/V  Not Applicable   Access type: AV fistula   Access Issues: No    Report called to primary nurse   Yes Ha Cuellar RN    HD tx plan: 2 hrs uf only tx removing 2-2 5L as ashley

## 2022-02-08 ENCOUNTER — APPOINTMENT (INPATIENT)
Dept: DIALYSIS | Facility: HOSPITAL | Age: 57
DRG: 304 | End: 2022-02-08
Payer: MEDICARE

## 2022-02-08 ENCOUNTER — APPOINTMENT (INPATIENT)
Dept: NON INVASIVE DIAGNOSTICS | Facility: HOSPITAL | Age: 57
DRG: 304 | End: 2022-02-08
Payer: MEDICARE

## 2022-02-08 PROBLEM — E11.9 TYPE 2 DIABETES MELLITUS, WITH LONG-TERM CURRENT USE OF INSULIN (HCC): Status: ACTIVE | Noted: 2018-05-17

## 2022-02-08 PROBLEM — I50.30 DIASTOLIC HEART FAILURE (HCC): Status: ACTIVE | Noted: 2022-02-08

## 2022-02-08 PROBLEM — D63.8 ANEMIA OF CHRONIC DISEASE: Status: ACTIVE | Noted: 2022-02-08

## 2022-02-08 PROBLEM — I16.1 HYPERTENSIVE EMERGENCY: Status: ACTIVE | Noted: 2022-02-07

## 2022-02-08 LAB
ANION GAP SERPL CALCULATED.3IONS-SCNC: 10 MMOL/L (ref 4–13)
AORTIC ROOT: 3 CM
APICAL FOUR CHAMBER EJECTION FRACTION: 58 %
ASCENDING AORTA: 2.9 CM (ref 2.03–3.04)
ATRIAL RATE: 88 BPM
BASOPHILS # BLD AUTO: 0.02 THOUSANDS/ΜL (ref 0–0.1)
BASOPHILS NFR BLD AUTO: 0 % (ref 0–1)
BUN SERPL-MCNC: 44 MG/DL (ref 5–25)
CALCIUM SERPL-MCNC: 9.2 MG/DL (ref 8.3–10.1)
CHLORIDE SERPL-SCNC: 99 MMOL/L (ref 100–108)
CO2 SERPL-SCNC: 29 MMOL/L (ref 21–32)
CREAT SERPL-MCNC: 9.54 MG/DL (ref 0.6–1.3)
E WAVE DECELERATION TIME: 179 MS
EOSINOPHIL # BLD AUTO: 0.12 THOUSAND/ΜL (ref 0–0.61)
EOSINOPHIL NFR BLD AUTO: 2 % (ref 0–6)
ERYTHROCYTE [DISTWIDTH] IN BLOOD BY AUTOMATED COUNT: 16.6 % (ref 11.6–15.1)
FRACTIONAL SHORTENING: 32 % (ref 28–44)
GFR SERPL CREATININE-BSD FRML MDRD: 5 ML/MIN/1.73SQ M
GLUCOSE SERPL-MCNC: 110 MG/DL (ref 65–140)
GLUCOSE SERPL-MCNC: 185 MG/DL (ref 65–140)
GLUCOSE SERPL-MCNC: 79 MG/DL (ref 65–140)
GLUCOSE SERPL-MCNC: 80 MG/DL (ref 65–140)
HCT VFR BLD AUTO: 27 % (ref 36.5–49.3)
HGB BLD-MCNC: 8.6 G/DL (ref 12–17)
IMM GRANULOCYTES # BLD AUTO: 0.02 THOUSAND/UL (ref 0–0.2)
IMM GRANULOCYTES NFR BLD AUTO: 0 % (ref 0–2)
INTERVENTRICULAR SEPTUM IN DIASTOLE (PARASTERNAL SHORT AXIS VIEW): 1.3 CM (ref 0.53–1)
LAAS-AP2: 25.4 CM2
LAAS-AP4: 19.3 CM2
LEFT ATRIUM SIZE: 3.9 CM
LEFT INTERNAL DIMENSION IN SYSTOLE: 2.8 CM (ref 2.1–4)
LEFT VENTRICULAR INTERNAL DIMENSION IN DIASTOLE: 4.1 CM (ref 4.83–7.19)
LEFT VENTRICULAR POSTERIOR WALL IN END DIASTOLE: 1.7 CM (ref 0.52–0.99)
LEFT VENTRICULAR STROKE VOLUME: 46 ML
LYMPHOCYTES # BLD AUTO: 1.06 THOUSANDS/ΜL (ref 0.6–4.47)
LYMPHOCYTES NFR BLD AUTO: 19 % (ref 14–44)
MCH RBC QN AUTO: 29.1 PG (ref 26.8–34.3)
MCHC RBC AUTO-ENTMCNC: 31.9 G/DL (ref 31.4–37.4)
MCV RBC AUTO: 91 FL (ref 82–98)
MONOCYTES # BLD AUTO: 0.77 THOUSAND/ΜL (ref 0.17–1.22)
MONOCYTES NFR BLD AUTO: 14 % (ref 4–12)
MV E'TISSUE VEL-LAT: 9 CM/S
MV E'TISSUE VEL-SEP: 6 CM/S
MV PEAK A VEL: 0.62 M/S
MV PEAK E VEL: 95 CM/S
NEUTROPHILS # BLD AUTO: 3.65 THOUSANDS/ΜL (ref 1.85–7.62)
NEUTS SEG NFR BLD AUTO: 65 % (ref 43–75)
NRBC BLD AUTO-RTO: 0 /100 WBCS
P AXIS: 64 DEGREES
PA SYSTOLIC PRESSURE: 45 MMHG
PLATELET # BLD AUTO: 231 THOUSANDS/UL (ref 149–390)
PMV BLD AUTO: 8.8 FL (ref 8.9–12.7)
POTASSIUM SERPL-SCNC: 4.5 MMOL/L (ref 3.5–5.3)
PR INTERVAL: 142 MS
QRS AXIS: 61 DEGREES
QRSD INTERVAL: 121 MS
QT INTERVAL: 417 MS
QTC INTERVAL: 505 MS
RA PRESSURE ESTIMATED: 5 MMHG
RBC # BLD AUTO: 2.96 MILLION/UL (ref 3.88–5.62)
RIGHT ATRIAL 2D VOLUME: 36 ML
RIGHT ATRIUM AREA SYSTOLE A4C: 16.2 CM2
RIGHT VENTRICLE ID DIMENSION: 3.1 CM
RV PSP: 45 MMHG
SL CV LEFT ATRIUM LENGTH A2C: 6.6 CM
SL CV LV EF: 58
SL CV PED ECHO LEFT VENTRICLE DIASTOLIC VOLUME (MOD BIPLANE) 2D: 74 ML
SL CV PED ECHO LEFT VENTRICLE SYSTOLIC VOLUME (MOD BIPLANE) 2D: 28 ML
SODIUM SERPL-SCNC: 138 MMOL/L (ref 136–145)
T WAVE AXIS: 105 DEGREES
TR MAX PG: 40 MMHG
TR PEAK VELOCITY: 3.2 M/S
TRICUSPID ANNULAR PLANE SYSTOLIC EXCURSION: 2 CM
TRICUSPID VALVE PEAK REGURGITATION VELOCITY: 3.18 M/S
VENTRICULAR RATE: 88 BPM
WBC # BLD AUTO: 5.64 THOUSAND/UL (ref 4.31–10.16)
Z-SCORE OF ASCENDING AORTA: 1.42
Z-SCORE OF INTERVENTRICULAR SEPTUM IN END DIASTOLE: 4.48
Z-SCORE OF LEFT VENTRICULAR DIMENSION IN END SYSTOLE: -3.62
Z-SCORE OF LEFT VENTRICULAR POSTERIOR WALL IN END DIASTOLE: 7.95

## 2022-02-08 PROCEDURE — 99291 CRITICAL CARE FIRST HOUR: CPT | Performed by: PHYSICIAN ASSISTANT

## 2022-02-08 PROCEDURE — 99232 SBSQ HOSP IP/OBS MODERATE 35: CPT | Performed by: INTERNAL MEDICINE

## 2022-02-08 PROCEDURE — 93005 ELECTROCARDIOGRAM TRACING: CPT

## 2022-02-08 PROCEDURE — 85025 COMPLETE CBC W/AUTO DIFF WBC: CPT | Performed by: INTERNAL MEDICINE

## 2022-02-08 PROCEDURE — NC001 PR NO CHARGE: Performed by: PHYSICIAN ASSISTANT

## 2022-02-08 PROCEDURE — 82948 REAGENT STRIP/BLOOD GLUCOSE: CPT

## 2022-02-08 PROCEDURE — 93306 TTE W/DOPPLER COMPLETE: CPT

## 2022-02-08 PROCEDURE — 93306 TTE W/DOPPLER COMPLETE: CPT | Performed by: INTERNAL MEDICINE

## 2022-02-08 PROCEDURE — 80048 BASIC METABOLIC PNL TOTAL CA: CPT | Performed by: INTERNAL MEDICINE

## 2022-02-08 PROCEDURE — 93010 ELECTROCARDIOGRAM REPORT: CPT | Performed by: INTERNAL MEDICINE

## 2022-02-08 RX ORDER — INSULIN GLARGINE 100 [IU]/ML
25 INJECTION, SOLUTION SUBCUTANEOUS
Status: DISCONTINUED | OUTPATIENT
Start: 2022-02-08 | End: 2022-02-13 | Stop reason: HOSPADM

## 2022-02-08 RX ORDER — DOXERCALCIFEROL 2 UG/ML
2.5 INJECTION, SOLUTION INTRAVENOUS 3 TIMES WEEKLY
Status: DISCONTINUED | OUTPATIENT
Start: 2022-02-08 | End: 2022-02-13 | Stop reason: HOSPADM

## 2022-02-08 RX ADMIN — LABETALOL HYDROCHLORIDE 300 MG: 100 TABLET, FILM COATED ORAL at 21:28

## 2022-02-08 RX ADMIN — BRIMONIDINE TARTRATE 1 DROP: 2 SOLUTION/ DROPS OPHTHALMIC at 08:43

## 2022-02-08 RX ADMIN — LABETALOL HYDROCHLORIDE 300 MG: 100 TABLET, FILM COATED ORAL at 08:30

## 2022-02-08 RX ADMIN — CALCIUM ACETATE 667 MG: 667 CAPSULE ORAL at 11:53

## 2022-02-08 RX ADMIN — HEPARIN SODIUM 5000 UNITS: 5000 INJECTION INTRAVENOUS; SUBCUTANEOUS at 21:24

## 2022-02-08 RX ADMIN — NIFEDIPINE 90 MG: 30 TABLET, FILM COATED, EXTENDED RELEASE ORAL at 08:33

## 2022-02-08 RX ADMIN — DOXAZOSIN 4 MG: 4 TABLET ORAL at 21:23

## 2022-02-08 RX ADMIN — INSULIN LISPRO 10 UNITS: 100 INJECTION, SOLUTION INTRAVENOUS; SUBCUTANEOUS at 11:52

## 2022-02-08 RX ADMIN — BRIMONIDINE TARTRATE 1 DROP: 2 SOLUTION/ DROPS OPHTHALMIC at 21:25

## 2022-02-08 RX ADMIN — CALCIUM ACETATE 667 MG: 667 CAPSULE ORAL at 08:31

## 2022-02-08 RX ADMIN — GLYCERIN, HYPROMELLOSE, POLYETHYLENE GLYCOL 2 DROP: .2; .2; 1 LIQUID OPHTHALMIC at 18:31

## 2022-02-08 RX ADMIN — PREDNISOLONE ACETATE 1 DROP: 10 SUSPENSION/ DROPS OPHTHALMIC at 21:25

## 2022-02-08 RX ADMIN — CALCIUM ACETATE 667 MG: 667 CAPSULE ORAL at 18:30

## 2022-02-08 RX ADMIN — GLYCERIN, HYPROMELLOSE, POLYETHYLENE GLYCOL 2 DROP: .2; .2; 1 LIQUID OPHTHALMIC at 08:43

## 2022-02-08 RX ADMIN — PREDNISOLONE ACETATE 1 DROP: 10 SUSPENSION/ DROPS OPHTHALMIC at 08:43

## 2022-02-08 RX ADMIN — DOXERCALCIFEROL 2.5 MCG: 4 INJECTION, SOLUTION INTRAVENOUS at 16:15

## 2022-02-08 RX ADMIN — FUROSEMIDE 80 MG: 40 TABLET ORAL at 08:33

## 2022-02-08 RX ADMIN — Medication 2000 UNITS: at 08:33

## 2022-02-08 RX ADMIN — CINACALCET 30 MG: 30 TABLET, FILM COATED ORAL at 08:32

## 2022-02-08 RX ADMIN — HEPARIN SODIUM 5000 UNITS: 5000 INJECTION INTRAVENOUS; SUBCUTANEOUS at 08:35

## 2022-02-08 RX ADMIN — ATORVASTATIN CALCIUM 40 MG: 40 TABLET, FILM COATED ORAL at 18:16

## 2022-02-08 RX ADMIN — INSULIN GLARGINE 25 UNITS: 100 INJECTION, SOLUTION SUBCUTANEOUS at 21:23

## 2022-02-08 RX ADMIN — ASPIRIN 81 MG: 81 TABLET, COATED ORAL at 08:32

## 2022-02-08 NOTE — ASSESSMENT & PLAN NOTE
· Not on O2 at baseline  Likely due to pulmonary edema in the setting of volume overload and hypertensive emergency  · S/p urgent UF on admission (2/7) and inpatient HD on 2/8 with total of 5L of fluids removed with improved respiratory status  · Currently on 3L NC with SpO2 96%        Plan:  · Nephrology following - continue HD TTS, next HD duet Thurs 2/10  · Monitor O2 sat and wean as able to maintain SpO2 > 88%  · Continue home Lasix 80 mg daily

## 2022-02-08 NOTE — ASSESSMENT & PLAN NOTE
Lab Results   Component Value Date    EGFR 5 02/07/2022    EGFR 7 01/08/2022    EGFR 4 05/10/2021    CREATININE 9 65 (H) 02/07/2022    CREATININE 7 66 (H) 01/08/2022    CREATININE 13 97 (H) 05/10/2021     · With volume overload on exam, imaging and blood work  · S/p 2 hour UF session 2/7, plan for HD again today  · T/Th/Sa HD patient  · Continue home lasix 80mg PO daily

## 2022-02-08 NOTE — ASSESSMENT & PLAN NOTE
Lab Results   Component Value Date    HGBA1C 7 8 (A) 10/01/2021       Recent Labs     02/07/22  2248   POCGLU 107       Blood Sugar Average: Last 72 hrs:  (P) 107     · Continue Lantus 30 units at bedtime and Humalog 10 units with meals

## 2022-02-08 NOTE — ASSESSMENT & PLAN NOTE
Lab Results   Component Value Date    HGBA1C 7 8 (A) 10/01/2021       Recent Labs     02/08/22  0826 02/08/22  1151 02/08/22  1805 02/09/22  0819   POCGLU 79 185* 110 184*     Blood Sugar Average: Last 72 hrs:  (P) 133 BG levels trending on the lower end on home insulin regimen: Lantus 30 units QHS and Lispro 10 units tid with meals        · Decreased home Lantus from 30 to 25 units at bedtime and continue Lispro 10 units tid with meals  · Continue SSI correction and FSG checks 4 times daily with meals and at bedtime  · Adjust Insulin regimen as needed according to BG levels   · Hypoglycemic protocol

## 2022-02-08 NOTE — ASSESSMENT & PLAN NOTE
· Secondary to volume overload in the setting of ESRD with noncompliance to treatment contributing  · On home labetalol 300 mg BID, doxazosin 4 mg at bedtime, nifedipine 90 mg daily, furosemide 80 mg daily  · Transferred to ICU with persistent HTN requiring Nitroglycerin gtt  · S/p urgent UF with 2L of fluids removed  · Nephrology following  Received HD 2/8 with 3 L of fluids removed    · BP now improved 130s-160s/60s/80s, not at goal      Plan:  · Continue to monitor and optimize BP with goal /80   · Continue home antihypertensive regimen  · Discontinued NTG infusion as of 2/8   · Patient counseling on treatment adherence

## 2022-02-08 NOTE — ASSESSMENT & PLAN NOTE
· Secondary to pulmonary edema, associated hypertension, ESRD  · Currently on oxygen at 5 L  · HD as above  · Wean O2 for goal SpO2 > 92%  · Encourage IS, pulmonary toilet

## 2022-02-08 NOTE — ED NOTES
Critical care at bedside and states wants pt at 180's-190's before going upstairs     Brooklyn Tripp RN  02/07/22 8957

## 2022-02-08 NOTE — PROGRESS NOTES
[address]  Progress Note - Paddy Trevin 1965, 61 y o  male MRN: [id number]  Unit/Bed#: ICU 12 Encounter: [id number]  Primary Care Provider: Murray HEARN,    Date and time admitted to hospital: 2/7/2022  3:14 PM    * Accelerated hypertension  Assessment & Plan  · Secondary to volume overload, ESRD, some component of medical/dietary noncompliance  · S/p labetalol x 2 in ER  · On home labetalol 300 mg BID  · Continue doxazosin 4 mg at bedtime, nifedipine 90 mg daily  · Nitroglycerin gtt for goal BP < 180, currently at 20    ESRD (end stage renal disease) [facility])  Assessment & Plan  Lab Results   Component Value Date    EGFR 5 02/07/2022    EGFR 7 01/08/2022    EGFR 4 05/10/2021    CREATININE 9 65 (H) 02/07/2022    CREATININE 7 66 (H) 01/08/2022    CREATININE 13 97 (H) 05/10/2021     · With volume overload on exam, imaging and blood work  · S/p 2 hour UF session 2/7, plan for HD again today  · T/Th/Sa HD patient  · Continue home lasix 80mg PO daily    Acute respiratory failure with hypoxia (HCC)  Assessment & Plan  · Secondary to pulmonary edema, associated hypertension, ESRD  · Currently on oxygen at 5 L  · HD as above  · Wean O2 for goal SpO2 > 92%  · Encourage IS, pulmonary toilet    Type 2 diabetes mellitus with chronic kidney disease on chronic dialysis, with long-term current use of insulin [facility])  Assessment & Plan  Lab Results   Component Value Date    HGBA1C 7 8 (A) 10/01/2021       Recent Labs     02/07/22  2248   POCGLU 107       Blood Sugar Average: Last 72 hrs:  (P) 107     · Continue Lantus 30 units at bedtime and Humalog 10 units with meals    Hyperlipidemia  Assessment & Plan  · Continue home lipitor    Diastolic heart failure (HCC)  Assessment & Plan  Wt Readings from Last 3 Encounters:   02/07/22 80 8 kg (178 lb 2 1 oz)   12/10/21 79 4 kg (175 lb)   12/06/21 81 6 kg (180 lb)     · Prior echo from 2/2021 w/ normal EF, G1DD  · Repeat echo ordered  · Lasix as above  · Daily weights  · Strict I/Os        Anemia of chronic disease  Assessment & Plan  · Secondary to CKD  Baseline hemoglobin 10-12  · Trend daily  · Transfuse for < 7 0  · Prior hx of Epogen as an outpatient    ----------------------------------------------------------------------------------------  HPI/24hr events: Upgraded to SD1 for nitroglycerin gtt, on hold this AM  Remains on 5L NC  No acute overnight events    Patient appropriate for transfer out of the ICU today?: No  Disposition: Continue Stepdown Level 1 level of care - possible downgrade after HD if BP improves  Code Status: Level 1 - Full Code  ---------------------------------------------------------------------------------------  SUBJECTIVE  "I feel better"    Review of Systems  Review of systems was reviewed and negative unless stated above in HPI/24-hour events   ---------------------------------------------------------------------------------------  OBJECTIVE    Vitals   Vitals:    22 0300 22 0324 22 0330 22 0400   BP: (!) 182/93  (!) 185/100 166/88   Pulse: 88  92 88   Resp: 18  17 18   Temp:  97 7 °F (36 5 °C)     TempSrc:  Temporal     SpO2: 90%  90% 90%   Weight:         Temp (24hrs), Av 9 °F (36 6 °C), Min:97 7 °F (36 5 °C), Max:98 3 °F (36 8 °C)  Current: Temperature: 97 7 °F (36 5 °C)          Respiratory:  SpO2: SpO2: 90 %  Nasal Cannula O2 Flow Rate (L/min): 5 L/min    Invasive/non-invasive ventilation settings   Respiratory  Report   Lab Data (Last 4 hours)    None         O2/Vent Data (Last 4 hours)    None                Physical Exam  Vitals and nursing note reviewed  Constitutional:       General: He is not in acute distress  Appearance: Normal appearance  He is normal weight  He is not ill-appearing  Interventions: Nasal cannula in place  HENT:      Head: Normocephalic and atraumatic  Eyes:      Pupils: Pupils are equal, round, and reactive to light     Cardiovascular:      Rate and Rhythm: Normal rate and regular rhythm  Heart sounds: Normal heart sounds  Heart sounds not distant  No friction rub  No gallop  Pulmonary:      Effort: No tachypnea  Breath sounds: Rhonchi present  No decreased breath sounds  Abdominal:      General: There is no distension  Palpations: Abdomen is soft  Tenderness: There is no abdominal tenderness  Skin:     General: Skin is warm and dry  Neurological:      General: No focal deficit present  Mental Status: He is alert, oriented to person, place, and time and easily aroused  GCS: GCS eye subscore is 4  GCS verbal subscore is 5  GCS motor subscore is 6  Laboratory and Diagnostics:  Results from last 7 days   Lab Units 22  1539   WBC Thousand/uL 7 40   HEMOGLOBIN g/dL 9 4*   HEMATOCRIT % 30 1*   PLATELETS Thousands/uL 294   NEUTROS PCT % 72   MONOS PCT % 12     Results from last 7 days   Lab Units 22  1539   SODIUM mmol/L 137   POTASSIUM mmol/L 4 5   CHLORIDE mmol/L 100   CO2 mmol/L 26   ANION GAP mmol/L 11   BUN mg/dL 47*   CREATININE mg/dL 9 65*   CALCIUM mg/dL 9 9   GLUCOSE RANDOM mg/dL 139          Results from last 7 days   Lab Units 22  1539   INR  1 05   PTT seconds 39*        Imagin/7 CXR: Pulmonary vascular congestion I have personally reviewed pertinent reports  Intake and Output  I/O        07 07 07 07    I V  (mL/kg)  568 7 (7)    Total Intake(mL/kg)  568 7 (7)    Other  2500    Total Output  2500    Net  -1931 3                Height and Weights         Body mass index is 25 56 kg/m²    Weight (last 2 days)     Date/Time Weight    22 1509 80 8 (178 13)            Nutrition       Diet Orders   (From admission, onward)             Start     Ordered    22 2200  Diet Louis/CHO Controlled; Consistent Carbohydrate Diet Level 2 (5 carb servings/75 grams CHO/meal)  Diet effective now        References:    Nutrtion Support Algorithm Enteral vs  Parenteral   Question Answer Comment   Diet Type Louis/CHO Controlled    Louis/CHO Controlled Consistent Carbohydrate Diet Level 2 (5 carb servings/75 grams CHO/meal)    RD to adjust diet per protocol?  Yes        02/07/22 2200                  Active Medications  Scheduled Meds:  Current Facility-Administered Medications   Medication Dose Route Frequency Provider Last Rate    aspirin  81 mg Oral Daily Majorie Boxer, MD      atorvastatin  40 mg Oral QPM Majorie Boxer, MD      brimonidine tartrate  1 drop Left Eye BID Majorie Boxer, MD      calcium acetate  667 mg Oral TID With Meals Majorie Boxer, MD      cholecalciferol  2,000 Units Oral Daily Majorie Boxer, MD      cinacalcet  30 mg Oral Daily Majorie Boxer, MD      cyclobenzaprine  5 mg Oral HS PRN Majorie Boxer, MD      doxazosin  4 mg Oral HS Majorie Boxer, MD      furosemide  80 mg Oral Daily Majorie Boxer, MD      glycerin-hypromellose-  2 drop Both Eyes BID Majorie Boxer, MD      heparin (porcine)  5,000 Units Subcutaneous Q12H [address] Majorie Boxer, MD      insulin glargine  30 Units Subcutaneous HS Majorie Boxer, MD      insulin lispro  10 Units Subcutaneous TID With Meals Majorie Boxer, MD      labetalol  300 mg Oral Q12H [facility] Karuna Vallecillo MD      NIFEdipine  90 mg Oral Daily Majorie Boxer, MD      nitroGLYcerin  5-200 mcg/min Intravenous Titrated Arisaíasene ALMA DELIA Malone 20 mcg/min (02/08/22 0424)    prednisoLONE acetate  1 drop Left Eye BID Majorie Boxer, MD       Continuous Infusions:  nitroGLYcerin, 5-200 mcg/min, Last Rate: 20 mcg/min (02/08/22 0424)      PRN Meds:   cyclobenzaprine, 5 mg, HS PRN        Invasive Devices Review  Invasive Devices  Report    Peripheral Intravenous Line            Peripheral IV 02/07/22 Right Antecubital <1 day          Line            Hemodialysis AV Fistula 01/23/19 Left Other (Comment) 1112 days    Hemodialysis AV Fistula 01/20/21 Left 383 days              ---------------------------------------------------------------------------------------  Advance Directive and Living Will:      Power of :    POLST:    ---------------------------------------------------------------------------------------  Care Time Delivered:   No Critical Care time spent       Joana Duggan PA-C

## 2022-02-08 NOTE — ASSESSMENT & PLAN NOTE
Lab Results   Component Value Date    HGBA1C 7 8 (A) 10/01/2021       Recent Labs     02/07/22  2248 02/08/22  0826   POCGLU 107 79       Blood Sugar Average: Last 72 hrs:  (P) 93

## 2022-02-08 NOTE — ASSESSMENT & PLAN NOTE
61year old male with ESRD on HD presenting with acute respiratory failure with hypoxia and hypertensive emergency secondary to volume overload   Resolved  - Management per renal  - Anticipate discharge in 24-48 hours

## 2022-02-08 NOTE — PROGRESS NOTES
NEPHROLOGY PROGRESS NOTE   Paddy Trevin 61 y o  male MRN: [id number]  Unit/Bed#: ICU 12 Encounter: [id number]  Reason for Consult: ESRD    ASSESSMENT AND PLAN:  Patient is 70-year-old male with significant medical issues of ESRD on HD, diabetes, hypertension, recently had COVID-19 infection presented with worsening shortness of breath, cough  We consulted for dialysis management      ESRD on HD on TTS schedule at [address] 79 Kg  -had extra UF session yesterday  pre HD weight 80 8 kg with 2 L UF removal   Plan for dialysis again today with UF removal 3 5 L as BP tolerated  -goal to challenge UF removal on dialysis to challenge dry weight     Hypoxic respiratory failure  -chest x-ray concerning for pulmonary congestion  -currently requiring 4L oxygen via nasal cannula  -UF removal to challenge dry weight as mentioned above   -echo in early 2021 shows EF 90%, grade 1 diastolic dysfunction  -repeat echocardiogram this admission to follow      CKD anemia, closely monitor hemoglobin, transfuse p r n  for hemoglobin less than seven  -on epogen 800 units with dialysis as outpatient, will hold Epogen for time being until blood pressure improves      CKD BMD, continue to monitor phosphorus, PTH, continue phosphorus binders  Secondary hyperparathyroidism, Hectorol 2 5 mcg with dialysis as outpatient  Also currently on Sensipar daily  Continue same     Recent COVID-19 infection, management as per primary team      Uncontrolled hypertension  -currently remains on nitro drip   -current regimen includes labetalol 300 mg p o  b i d , nifedipine XL 90 mg daily, Lasix 80 mg daily, doxazosin 4 mg daily, blood pressure remains above goal, challenge UF removal and monitor BP after dialysis    -outpatient he remains on labetalol 400 mg b i d  and increase labetalol and continue same   -if BP remains above goal despite aggressive UF removal, consider increasing nifedipine XL to 60 mg b i d      Discussed above plan in detail with ICU team     SUBJECTIVE:  Patient seen and examined at bedside  He subjectively feels better with shortness of breath  Still remains on 4 L O2 via nasal cannula      OBJECTIVE:  Current Weight: Weight - Scale: 80 7 kg (178 lb)  Vitals:    02/08/22 0824   BP: (!) 175/90   Pulse: 86   Resp:    Temp:    SpO2:        Intake/Output Summary (Last 24 hours) at 2/8/2022 0906  Last data filed at 2/8/2022 0800  Gross per 24 hour   Intake 600 05 ml   Output 2500 ml   Net -1899 95 ml     Wt Readings from Last 3 Encounters:   02/08/22 80 7 kg (178 lb)   12/10/21 79 4 kg (175 lb)   12/06/21 81 6 kg (180 lb)     Temp Readings from Last 3 Encounters:   02/08/22 98 5 °F (36 9 °C) (Temporal)   01/08/22 (!) 100 8 °F (38 2 °C) (Oral)   12/27/21 (!) 96 5 °F (35 8 °C) (Tympanic)     BP Readings from Last 3 Encounters:   02/08/22 (!) 175/90   01/08/22 140/63   12/27/21 (!) 174/85     Pulse Readings from Last 3 Encounters:   02/08/22 86   01/08/22 80   12/27/21 70        Physical Examination:  General:  Lying in bed, no acute distress   Eyes:  Mild conjunctival pallor present  ENT:  External examination of ears and nose unremarkable  Neck:  No obvious lymphadenopathy appreciated  Respiratory:  Decreased breath sound at base with coarse breath sound present  CVS:  S1, S2 present  GI:  Soft, nondistended  CNS:  Active alert oriented x3  Skin:  No new rash in legs  Musculoskeletal:  No obvious new gross deformity noted    Medications:    Current Facility-Administered Medications:     aspirin (ECOTRIN LOW STRENGTH) EC tablet 81 mg, 81 mg, Oral, Daily, Olivia Claros MD, 81 mg at 02/08/22 1433    atorvastatin (LIPITOR) tablet 40 mg, 40 mg, Oral, QPM, Olivia Claros MD, 40 mg at 02/07/22 2011    brimonidine tartrate 0 2 % ophthalmic solution 1 drop, 1 drop, Left Eye, BID, Olivia Claros MD, 1 drop at 02/08/22 0843    calcium acetate (PHOSLO) capsule 667 mg, 667 mg, Oral, TID With Meals, Bridgette Gastelum MD, 667 mg at 02/08/22 0831    cholecalciferol (VITAMIN D3) tablet 2,000 Units, 2,000 Units, Oral, Daily, Bridgette Gastelum MD, 2,000 Units at 02/08/22 4727    cinacalcet (SENSIPAR) tablet 30 mg, 30 mg, Oral, Daily, Bridgette Gastelum MD, 30 mg at 02/08/22 8372    cyclobenzaprine (FLEXERIL) tablet 5 mg, 5 mg, Oral, HS PRN, Bridgette Gastelum MD    doxazosin (CARDURA) tablet 4 mg, 4 mg, Oral, HS, Bridgette Gastelum MD, 4 mg at 02/07/22 2253    furosemide (LASIX) tablet 80 mg, 80 mg, Oral, Daily, Bridgette Gastelum MD, 80 mg at 02/08/22 1574    glycerin-hypromellose- (ARTIFICIAL TEARS) ophthalmic solution 2 drop, 2 drop, Both Eyes, BID, Bridgette Gastelum MD, 2 drop at 02/08/22 0843    heparin (porcine) subcutaneous injection 5,000 Units, 5,000 Units, Subcutaneous, Q12H [facility], 5,000 Units at 02/08/22 0835 **AND** Platelet count, , , Once, Bridgette Gastelum MD    insulin glargine (LANTUS) subcutaneous injection 30 Units 0 3 mL, 30 Units, Subcutaneous, HS, Bridgette Gastelum MD, 30 Units at 02/07/22 2252    insulin lispro (HumaLOG) 100 units/mL subcutaneous injection 10 Units, 10 Units, Subcutaneous, TID With Meals, Bridgette Gastelum MD    labetalol (NORMODYNE) tablet 300 mg, 300 mg, Oral, Q12H [facility], Bridgette Gastelum MD, 300 mg at 02/08/22 0830    NIFEdipine (PROCARDIA XL) 24 hr tablet 90 mg, 90 mg, Oral, Daily, Bridgette Gastelum MD, 90 mg at 02/08/22 8744    nitroGLYcerin (TRIDIL) 50 mg in 250 mL infusion (premix), 5-200 mcg/min, Intravenous, Titrated, Myriam Lala PA-C, Last Rate: 3 mL/hr at 02/08/22 0502, 10 mcg/min at 02/08/22 0502    prednisoLONE acetate (PRED FORTE) 1 % ophthalmic suspension 1 drop, 1 drop, Left Eye, BID, Bridgette Gastelum MD, 1 drop at 02/08/22 5342    Laboratory Results:  Results from last 7 days   Lab Units 02/08/22  0804 02/07/22  1539   WBC Thousand/uL 5 64 7 40   HEMOGLOBIN g/dL 8 6* 9 4*   HEMATOCRIT % 27 0* 30 1*   PLATELETS Thousands/uL 231 294   SODIUM mmol/L 138 137   POTASSIUM mmol/L 4 5 4 5   CHLORIDE mmol/L 99* 100   CO2 mmol/L 29 26   BUN mg/dL 44* 47*   CREATININE mg/dL 9 54* 9 65*   CALCIUM mg/dL 9 2 9 9       XR chest 1 view portable   ED Interpretation by Jesus Johnson DO (02/07 1620)   Increased vascular congestion, R>L      Final Result by Yaneli Urbano MD (02/08 2446)      Congestive heart failure  Difficult to exclude superimposed groundglass infiltrates  Workstation performed: [id number]             Portions of the record may have been created with voice recognition software  Occasional wrong word or "sound a like" substitutions may have occurred due to the inherent limitations of voice recognition software  Read the chart carefully and recognize, using context, where substitutions have occurred

## 2022-02-08 NOTE — ASSESSMENT & PLAN NOTE
Lab Results   Component Value Date    HGBA1C 7 8 (A) 10/01/2021     Recent Labs     02/08/22  1151 02/08/22  1805 02/09/22  0819 02/09/22  1218 02/09/22  1735 02/10/22  1617   POCGLU 185* 110 184* 76 150* 77     Inpatient regimen: Lantus 25 units at bedtime and Humalog 10 units with meals

## 2022-02-08 NOTE — ASSESSMENT & PLAN NOTE
Recent Labs     02/07/22  1539 02/08/22  0804 02/09/22  0445   HGB 9 4* 8 6* 9 1*       · Chronic normocytic anemia  likely secondary to CKD with baseline hemoglobin 10-13 was receiving Epo in the outpatient setting  Hb around 9 and stable       Plan:  · F/u ferritin   · Trend Hb daily  · Transfuse for < 7 0

## 2022-02-08 NOTE — ASSESSMENT & PLAN NOTE
· Secondary to CKD   Baseline hemoglobin 10-12  · Trend daily  · Transfuse for < 7 0  · Prior hx of Epogen as an outpatient

## 2022-02-08 NOTE — ED NOTES
Per ED provider, RN to give pt PO labetalol early with goal -445 systolic to go up to floor   RN messaged pharmacy to verify medication early     hBavana Vaughan RN  02/07/22 5738

## 2022-02-08 NOTE — PLAN OF CARE
Problem: PAIN - ADULT  Goal: Verbalizes/displays adequate comfort level or baseline comfort level  Description: Interventions:  - Encourage patient to monitor pain and request assistance  - Assess pain using appropriate pain scale  - Administer analgesics based on type and severity of pain and evaluate response  - Implement non-pharmacological measures as appropriate and evaluate response  - Consider cultural and social influences on pain and pain management  - Notify physician/advanced practitioner if interventions unsuccessful or patient reports new pain  Outcome: Progressing     Problem: INFECTION - ADULT  Goal: Absence or prevention of progression during hospitalization  Description: INTERVENTIONS:  - Assess and monitor for signs and symptoms of infection  - Monitor lab/diagnostic results  - Monitor all insertion sites, i e  indwelling lines, tubes, and drains  - Monitor endotracheal if appropriate and nasal secretions for changes in amount and color  - [place] appropriate cooling/warming therapies per order  - Administer medications as ordered  - Instruct and encourage patient and family to use good hand hygiene technique  - Identify and instruct in appropriate isolation precautions for identified infection/condition  Outcome: Progressing  Goal: Absence of fever/infection during neutropenic period  Description: INTERVENTIONS:  - Monitor WBC    Outcome: Progressing     Problem: Nutrition/Hydration-ADULT  Goal: Nutrient/Hydration intake appropriate for improving, restoring or maintaining nutritional needs  Description: Monitor and assess patient's nutrition/hydration status for malnutrition  Collaborate with interdisciplinary team and initiate plan and interventions as ordered  Monitor patient's weight and dietary intake as ordered or per policy  Utilize nutrition screening tool and intervene as necessary  Determine patient's food preferences and provide high-protein, high-caloric foods as appropriate  INTERVENTIONS:  - Monitor oral intake, urinary output, labs, and treatment plans  - Assess nutrition and hydration status and recommend course of action  - Evaluate amount of meals eaten  - Assist patient with eating if necessary   - Allow adequate time for meals  - Recommend/ encourage appropriate diets, oral nutritional supplements, and vitamin/mineral supplements  - Order, calculate, and assess calorie counts as needed  - Recommend, monitor, and adjust tube feedings and TPN/PPN based on assessed needs  - Assess need for intravenous fluids  - Provide specific nutrition/hydration education as appropriate  - Include patient/family/caregiver in decisions related to nutrition  Outcome: Progressing     Problem: DISCHARGE PLANNING - CARE MANAGEMENT  Goal: Discharge to post-acute care or home with appropriate resources  Description: INTERVENTIONS:  - Conduct assessment to determine patient/family and health care team treatment goals, and need for post-acute services based on payer coverage, community resources, and patient preferences, and barriers to discharge  - Address psychosocial, clinical, and financial barriers to discharge as identified in assessment in conjunction with the patient/family and health care team  - Arrange appropriate level of post-acute services according to patients   needs and preference and payer coverage in collaboration with the physician and health care team  - Communicate with and update the patient/family, physician, and health care team regarding progress on the discharge plan  - Arrange appropriate transportation to post-acute venues  Outcome: Progressing

## 2022-02-08 NOTE — PROGRESS NOTES
Critical Care - Acceptance note   Paddy Zhong 61 y o  male MRN: [id number]  Unit/Bed#: ED 25 Encounter: [id number]        ----------------------------------------------------------------------------------------  HPI/24hr events:  41-year-old male with history of hypertension, ESRD on dialysis MWF, diabetes mellitus, presenting with progressive shortness of breath since Saturday  Had incomplete dialysis on Friday, however had an uneventful dialysis session on Saturday  On presentation he was found to have blood pressure of 208/95, and O2 sats 84% on room air  Initially admitted under Internal Medicine Service for hypertensive emergency  He was placed on 4 L nasal cannula, and given labetalol 10 mg x 2  Home medications were restarted  Nephrology was consulted, and received urgent hemodialysis today x 2 hours, removed 2L volume ongoing pulmonary edema, however blood pressure failed to improve  Labs significant for NT proBNP 81804, creatinine 9 65  EKG shows T wave inversions in aVL, V6 (old), and lead 3  Troponins negative x3  Chest x-ray shows evidence of pulmonary edema  Reports medication noncompliance, states that he missed his morning medications today     ---------------------------------------------------------------------------------------  SUBJECTIVE  Currently, complains of ongoing headaches, no visual disturbances (chronic right grainy vision, left-sided blindness)  No nausea vomiting  Denies chest pain  Shortness of breath has improved, however not resolved  Review of Systems   Constitutional: Positive for fatigue  Negative for activity change, appetite change, chills, fever and unexpected weight change  HENT: Negative  Negative for congestion, ear pain, hearing loss, nosebleeds, sinus pressure, tinnitus, trouble swallowing and voice change  Eyes: Positive for visual disturbance (chronic grainy vision in right eye, left eye blindness)  Negative for photophobia  Respiratory: Positive for shortness of breath  Negative for cough, chest tightness and wheezing  Cardiovascular: Positive for leg swelling  Negative for chest pain and palpitations  Gastrointestinal: Negative for abdominal distention, anal bleeding, blood in stool, constipation, diarrhea, nausea and vomiting  Endocrine: Negative  Genitourinary: Negative  Negative for dysuria, frequency and urgency  Musculoskeletal: Positive for arthralgias and back pain  Skin: Negative  Neurological: Positive for headaches  Negative for dizziness, seizures, syncope, facial asymmetry, speech difficulty and light-headedness  Psychiatric/Behavioral: Negative  All other systems reviewed and are negative  Review of systems was reviewed and negative unless stated above in HPI/24-hour events   ---------------------------------------------------------------------------------------  Assessment and Plan:    Neuro:    None      CV:    Hypertensive emergency:  POA, likely secondary to medication noncompliance  o Start nitroglycerin drip given ongoing pulmonary edema  o Goal blood pressure decreased by 25% in 1 hour, maintain between 108-190 mmHg  o Continue home medications:  P o  Labetalol 300 mg b i d   Nifedipine 90 mg daily  o Telemetry  o Would repeat EKG once blood pressure is better controlled  o Hemodialysis per Nephrology recommendations   Diastolic heart failure with possible flash pulmonary edema, s/p HD -2L volume removal, echo in 2/2021: preserved EF 68%, G1DD  o Nitroglycerine drip  o Will continue oral Lasix home dose 80 mg daily  o Repeat echocardiogram given recent viral infection, pulm edema, and murmur  o Monitor daily weights, I's and O's  o Hemodialysis per Nephrology recommendations  o Monitor electrolytes      Pulm:   Acute hypoxic respiratory failure secondary to pulmonary edema  o Continue supplementary oxygen, maintain SpO2 above 92%  o Management of pulmonary edema as outlined above      GI:    None      :    End-stage renal disease, ongoing renal transplant evaluation, MWF dialysis in [facility] nephrology recommendations  o Plan for hemodialysis tomorrow  o Repeat BMP a m   o Intake/output, daily weights   BPH  o Continue doxazosin      F/E/N:    Volume overload as outlined above      Heme/Onc:    Anemia of CKD, baseline creatinine appears to be 10-12, currently 9 4  o Per Nephrology, patient has been on Epogen outpatient, records to be obtained regarding Epogen dosing  o Monitor CBC  o Transfuse if hemoglobin is less than 7      Endo:    Type 2 diabetes mellitus 100 and blood glucose admission 139, with microvascular complications  o Continue insulin Lantus 30 units HS, Humalog 10 units t i d  With meals, and sliding scale  o Hypoglycemia protocol  o CHO control diet      ID:    None      MSK/Skin:    Chronic pain  o Can consider acetaminophen back pain worsens    Patient appropriate for transfer out of the ICU today?: No  Disposition: Admit to Stepdown Level 1  Code Status: Level 1 - Full Code  ---------------------------------------------------------------------------------------  ICU CORE MEASURES    Prophylaxis   VTE Pharmacologic Prophylaxis: Heparin  VTE Mechanical Prophylaxis: sequential compression device  Stress Ulcer Prophylaxis: Prophylaxis Not Indicated     ABCDE Protocol (if indicated)  Plan to perform spontaneous awakening trial today? Not applicable  Plan to perform spontaneous breathing trial today? Not applicable  Obvious barriers to extubation? Not applicable  CAM-ICU: Negative    Invasive Devices Review  Invasive Devices  Report    Peripheral Intravenous Line            Peripheral IV 02/07/22 Right Antecubital <1 day          Line            Hemodialysis AV Fistula 01/23/19 Left Other (Comment) 1111 days    Hemodialysis AV Fistula 01/20/21 Left 382 days              Can any invasive devices be discontinued today? No  ---------------------------------------------------------------------------------------  OBJECTIVE    Vitals   Vitals:    22 1915 22 1930 22   BP: (!) 198/91 (!) 195/98 (!) 229/108 (!) 220/100   BP Location:  Right arm Right arm Right arm   Pulse: 82 88 90 89   Resp:  18 16 16   Temp:  97 8 °F (36 6 °C)     TempSrc:  Oral     SpO2: 98% 94% 98% 98%   Weight:         Temp (24hrs), Av 9 °F (36 6 °C), Min:97 8 °F (36 6 °C), Max:98 °F (36 7 °C)  Current: Temperature: 97 8 °F (36 6 °C)  HR:  89  BP:  202/104  RR:  16  SpO2:  98%, 3 L    Respiratory:  SpO2: SpO2: 98 %, SpO2 Device: O2 Device: None (Room air)  Nasal Cannula O2 Flow Rate (L/min): 4 L/min    Invasive/non-invasive ventilation settings   Respiratory  Report   Lab Data (Last 4 hours)    None         O2/Vent Data (Last 4 hours)    None                Physical Exam  Vitals and nursing note reviewed  Constitutional:       General: He is not in acute distress  Appearance: Normal appearance  He is not toxic-appearing  HENT:      Head: Normocephalic  Mouth/Throat:      Mouth: Mucous membranes are moist    Eyes:      Extraocular Movements:      Right eye: Normal extraocular motion  Conjunctiva/sclera:      Right eye: Right conjunctiva is not injected  No hemorrhage  Comments: Left eye total blindness   Neck:      Comments: JVD+  Cardiovascular:      Rate and Rhythm: Normal rate and regular rhythm  Pulses: Normal pulses  Heart sounds: Murmur (Loud, holosystolic murmur) heard  No friction rub  Pulmonary:      Effort: Pulmonary effort is normal       Breath sounds: Rales ( bilateral, all lung fields) present  No wheezing  Comments: On 3 L oxygen via nasal cannula  Abdominal:      General: There is distension  Tenderness: There is no abdominal tenderness  There is no guarding  Musculoskeletal:      Right lower leg: Edema ( 1+) present  Left lower leg: Edema ( 1+) present  Skin:     Capillary Refill: Capillary refill takes less than 2 seconds  Neurological:      General: No focal deficit present  Mental Status: He is alert  Mental status is at baseline  Cranial Nerves: No cranial nerve deficit  Comments: Muscle power 5/5 bilateral   Psychiatric:         Mood and Affect: Mood normal          Laboratory and Diagnostics:  Results from last 7 days   Lab Units 02/07/22  1539   WBC Thousand/uL 7 40   HEMOGLOBIN g/dL 9 4*   HEMATOCRIT % 30 1*   PLATELETS Thousands/uL 294   NEUTROS PCT % 72   MONOS PCT % 12     Results from last 7 days   Lab Units 02/07/22  1539   SODIUM mmol/L 137   POTASSIUM mmol/L 4 5   CHLORIDE mmol/L 100   CO2 mmol/L 26   ANION GAP mmol/L 11   BUN mg/dL 47*   CREATININE mg/dL 9 65*   CALCIUM mg/dL 9 9   GLUCOSE RANDOM mg/dL 139          Results from last 7 days   Lab Units 02/07/22  1539   INR  1 05   PTT seconds 39*              ABG:    VBG:          Micro        EKG:  T-wave inversions in aVL, V6, lead 3  Imaging:  I have personally reviewed pertinent reports  and I have personally reviewed pertinent films in PACS    Intake and Output  I/O       02/06 0701 02/07 0700 02/07 0701 02/08 0700    I V  (mL/kg)  500 (6 2)    Total Intake(mL/kg)  500 (6 2)    Other  2500    Total Output  2500    Net  -2000                Height and Weights         Body mass index is 25 56 kg/m²  Weight (last 2 days)     Date/Time Weight    02/07/22 1509 80 8 (178 13)            Nutrition       Diet Orders   (From admission, onward)             Start     Ordered    02/07/22 1954  Diet Louis/CHO Controlled; Consistent Carbohydrate Diet Level 2 (5 carb servings/75 grams CHO/meal)  Diet effective now        References:    Nutrtion Support Algorithm Enteral vs  Parenteral   Question Answer Comment   Diet Type Louis/CHO Controlled    Louis/CHO Controlled Consistent Carbohydrate Diet Level 2 (5 carb servings/75 grams CHO/meal)    RD to adjust diet per protocol?  Yes        02/07/22 1953                  Active Medications  Scheduled Meds:  Current Facility-Administered Medications   Medication Dose Route Frequency Provider Last Rate    [START ON 2/8/2022] aspirin  81 mg Oral Daily Rom Cali MD      atorvastatin  40 mg Oral QPM Rom Cali MD      brimonidine tartrate  1 drop Left Eye BID MD Ramsey Shanks ON 2/8/2022] calcium acetate  667 mg Oral TID With Meals Rom Cali MD      [START ON 2/8/2022] cholecalciferol  2,000 Units Oral Daily Rom Cali MD      [START ON 2/8/2022] cinacalcet  30 mg Oral Daily Rom Cali MD      cyclobenzaprine  5 mg Oral HS PRN Rom Cali MD      doxazosin  4 mg Oral HS MD Ramsey Shanks ON 2/8/2022] furosemide  80 mg Oral Daily Rom Cali MD      glycerin-hypromellose-  2 drop Both Eyes BID Rmo Cali MD      heparin (porcine)  5,000 Units Subcutaneous Q12H [address] Rom Cali MD      insulin glargine  30 Units Subcutaneous HS MD Ramsey Shanks ON 2/8/2022] insulin lispro  10 Units Subcutaneous TID With Meals Rom Cali MD      labetalol  300 mg Oral Q12H [address] Rom Cali MD      Labetalol HCl  10 mg Intravenous Q6H PRN MD Ramsey Shanks ON 2/8/2022] NIFEdipine  90 mg Oral Daily Rom Cali MD      nitroGLYcerin  5-200 mcg/min Intravenous Titrated [place] 5 mcg/min (02/07/22 2100)    prednisoLONE acetate  1 drop Left Eye BID Rom Cali MD       Continuous Infusions:  nitroGLYcerin, 5-200 mcg/min, Last Rate: 5 mcg/min (02/07/22 2100)      PRN Meds:   cyclobenzaprine, 5 mg, HS PRN  Labetalol HCl, 10 mg, Q6H PRN        Allergies   Allergies   Allergen Reactions    No Known Allergies ---------------------------------------------------------------------------------------  Advance Directive and Living Will:      Power of :    POLST:    ---------------------------------------------------------------------------------------      Susan Winter MD      Portions of the record may have been created with voice recognition software  Occasional wrong word or "sound a like" substitutions may have occurred due to the inherent limitations of voice recognition software    Read the chart carefully and recognize, using context, where substitutions have occurred

## 2022-02-08 NOTE — ASSESSMENT & PLAN NOTE
Lab Results   Component Value Date    EGFR 7 02/09/2022    EGFR 5 02/08/2022    EGFR 5 02/07/2022    CREATININE 6 95 (H) 02/09/2022    CREATININE 9 54 (H) 02/08/2022    CREATININE 9 65 (H) 02/07/2022     · Dialysis on T/Th/Sa presented with volume overload likely due to incomplete HD session outpateint  · S/p 2 hour urgent UF session on 2/7 and inpatient HD on 2/8 with total 5 L of fluids removed       Plan:  · Continue HD while inpatient, next HD due Thurs 2/10  · Continue home lasix 80mg PO daily

## 2022-02-08 NOTE — ASSESSMENT & PLAN NOTE
Wt Readings from Last 3 Encounters:   02/07/22 80 8 kg (178 lb 2 1 oz)   12/10/21 79 4 kg (175 lb)   12/06/21 81 6 kg (180 lb)     · Prior echo from 2/2021 w/ normal EF, G1DD  · Repeat echo ordered  · Lasix as above  · Daily weights  · Strict I/Os

## 2022-02-08 NOTE — ASSESSMENT & PLAN NOTE
Wt Readings from Last 3 Encounters:   02/07/22 80 8 kg (178 lb 2 1 oz)   12/10/21 79 4 kg (175 lb)   12/06/21 81 6 kg (180 lb)       Intake/Output Summary (Last 24 hours) at 2/8/2022 1641  Last data filed at 2/8/2022 1530  Gross per 24 hour   Intake 805 7 ml   Output 2600 ml   Net -1794 3 ml     · POA: HD dependent volume status  Prior echo from 4/3740 w/ normal systolic function EF 53% and G1DD  · Repeat echo 2/8 showed normal systolic function with EF of 58%  Wall motion is normal and normal diastolic function with left atrial filling pressure at upper level of normal   · Nephrology following for inpatient HD  S/p urgent UF on 2/7 and HD on 2/8       Plan:  · Strict I/Os and daily weights  · Continue HD as scheduled TTS  · Continue home Lasix 80 mg daily

## 2022-02-08 NOTE — PLAN OF CARE
Chronic hemodialysis treatment planned for 180 minutes using a 3 K+ bath for potassium 4 5  Fluid goal 3000 ml as tolerated          Post-Dialysis RN Treatment Note    Blood Pressure:  Pre 119/62 mm/Hg  Post 138/70 mmHg   EDW 79  kg    Weight:  Pre 77 8 kg   Post 76 2 kg   Mode of weight measurement:   Bed scale   Volume Removed:  3001ml    Treatment duration 180 minutes    NS given:  none    Treatment shortened:  no   Medications given during Rx:  Hectorol 2 5 mcg   Estimated Kt/V:  none   Access type:   IRENE fistula    Access Issues:   Maintains 400 bfr   Report called to primary nurse:  Yes          Problem: METABOLIC, FLUID AND ELECTROLYTES - ADULT  Goal: Electrolytes maintained within normal limits  Description: INTERVENTIONS:  - Monitor labs and assess patient for signs and symptoms of electrolyte imbalances  - Administer electrolyte replacement as ordered  - Monitor response to electrolyte replacements, including repeat lab results as appropriate  - Instruct patient on fluid and nutrition as appropriate  Outcome: Progressing  Goal: Fluid balance maintained  Description: INTERVENTIONS:  - Monitor labs   - Monitor I/O and WT  - Instruct patient on fluid and nutrition as appropriate  - Assess for signs & symptoms of volume excess or deficit  Outcome: Progressing hard copy, drawn during this pregnancy

## 2022-02-09 PROBLEM — E83.51 HYPOCALCEMIA: Status: ACTIVE | Noted: 2022-02-09

## 2022-02-09 LAB
ANION GAP SERPL CALCULATED.3IONS-SCNC: 8 MMOL/L (ref 4–13)
BUN SERPL-MCNC: 30 MG/DL (ref 5–25)
CA-I BLD-SCNC: 1.02 MMOL/L (ref 1.12–1.32)
CALCIUM SERPL-MCNC: 8.8 MG/DL (ref 8.3–10.1)
CHLORIDE SERPL-SCNC: 101 MMOL/L (ref 100–108)
CO2 SERPL-SCNC: 29 MMOL/L (ref 21–32)
CREAT SERPL-MCNC: 6.95 MG/DL (ref 0.6–1.3)
ERYTHROCYTE [DISTWIDTH] IN BLOOD BY AUTOMATED COUNT: 16.7 % (ref 11.6–15.1)
FERRITIN SERPL-MCNC: 1072 NG/ML (ref 8–388)
GFR SERPL CREATININE-BSD FRML MDRD: 7 ML/MIN/1.73SQ M
GLUCOSE SERPL-MCNC: 102 MG/DL (ref 65–140)
GLUCOSE SERPL-MCNC: 150 MG/DL (ref 65–140)
GLUCOSE SERPL-MCNC: 184 MG/DL (ref 65–140)
GLUCOSE SERPL-MCNC: 76 MG/DL (ref 65–140)
HCT VFR BLD AUTO: 29 % (ref 36.5–49.3)
HGB BLD-MCNC: 9.1 G/DL (ref 12–17)
MAGNESIUM SERPL-MCNC: 2.1 MG/DL (ref 1.6–2.6)
MCH RBC QN AUTO: 29 PG (ref 26.8–34.3)
MCHC RBC AUTO-ENTMCNC: 31.4 G/DL (ref 31.4–37.4)
MCV RBC AUTO: 92 FL (ref 82–98)
PHOSPHATE SERPL-MCNC: 4 MG/DL (ref 2.7–4.5)
PLATELET # BLD AUTO: 255 THOUSANDS/UL (ref 149–390)
PMV BLD AUTO: 9.1 FL (ref 8.9–12.7)
POTASSIUM SERPL-SCNC: 4.6 MMOL/L (ref 3.5–5.3)
RBC # BLD AUTO: 3.14 MILLION/UL (ref 3.88–5.62)
SODIUM SERPL-SCNC: 138 MMOL/L (ref 136–145)
WBC # BLD AUTO: 5.59 THOUSAND/UL (ref 4.31–10.16)

## 2022-02-09 PROCEDURE — 82948 REAGENT STRIP/BLOOD GLUCOSE: CPT

## 2022-02-09 PROCEDURE — 84100 ASSAY OF PHOSPHORUS: CPT | Performed by: NURSE PRACTITIONER

## 2022-02-09 PROCEDURE — 82728 ASSAY OF FERRITIN: CPT

## 2022-02-09 PROCEDURE — 85027 COMPLETE CBC AUTOMATED: CPT | Performed by: NURSE PRACTITIONER

## 2022-02-09 PROCEDURE — 83735 ASSAY OF MAGNESIUM: CPT | Performed by: NURSE PRACTITIONER

## 2022-02-09 PROCEDURE — 82330 ASSAY OF CALCIUM: CPT | Performed by: NURSE PRACTITIONER

## 2022-02-09 PROCEDURE — 80048 BASIC METABOLIC PNL TOTAL CA: CPT

## 2022-02-09 PROCEDURE — 99232 SBSQ HOSP IP/OBS MODERATE 35: CPT | Performed by: INTERNAL MEDICINE

## 2022-02-09 PROCEDURE — 99232 SBSQ HOSP IP/OBS MODERATE 35: CPT | Performed by: PHYSICIAN ASSISTANT

## 2022-02-09 RX ORDER — CALCIUM GLUCONATE 20 MG/ML
1 INJECTION, SOLUTION INTRAVENOUS ONCE
Status: COMPLETED | OUTPATIENT
Start: 2022-02-09 | End: 2022-02-09

## 2022-02-09 RX ORDER — LABETALOL 100 MG/1
400 TABLET, FILM COATED ORAL EVERY 12 HOURS SCHEDULED
Status: DISCONTINUED | OUTPATIENT
Start: 2022-02-09 | End: 2022-02-13 | Stop reason: HOSPADM

## 2022-02-09 RX ADMIN — BRIMONIDINE TARTRATE 1 DROP: 2 SOLUTION/ DROPS OPHTHALMIC at 08:09

## 2022-02-09 RX ADMIN — GLYCERIN, HYPROMELLOSE, POLYETHYLENE GLYCOL 2 DROP: .2; .2; 1 LIQUID OPHTHALMIC at 17:36

## 2022-02-09 RX ADMIN — HEPARIN SODIUM 5000 UNITS: 5000 INJECTION INTRAVENOUS; SUBCUTANEOUS at 08:09

## 2022-02-09 RX ADMIN — HEPARIN SODIUM 5000 UNITS: 5000 INJECTION INTRAVENOUS; SUBCUTANEOUS at 22:24

## 2022-02-09 RX ADMIN — ATORVASTATIN CALCIUM 40 MG: 40 TABLET, FILM COATED ORAL at 17:36

## 2022-02-09 RX ADMIN — GLYCERIN, HYPROMELLOSE, POLYETHYLENE GLYCOL 2 DROP: .2; .2; 1 LIQUID OPHTHALMIC at 08:09

## 2022-02-09 RX ADMIN — Medication 2000 UNITS: at 08:05

## 2022-02-09 RX ADMIN — LABETALOL HYDROCHLORIDE 300 MG: 100 TABLET, FILM COATED ORAL at 08:07

## 2022-02-09 RX ADMIN — FUROSEMIDE 80 MG: 40 TABLET ORAL at 08:06

## 2022-02-09 RX ADMIN — CALCIUM ACETATE 667 MG: 667 CAPSULE ORAL at 17:36

## 2022-02-09 RX ADMIN — BRIMONIDINE TARTRATE 1 DROP: 2 SOLUTION/ DROPS OPHTHALMIC at 21:30

## 2022-02-09 RX ADMIN — LABETALOL HYDROCHLORIDE 400 MG: 100 TABLET, FILM COATED ORAL at 21:30

## 2022-02-09 RX ADMIN — DOXAZOSIN 4 MG: 4 TABLET ORAL at 22:24

## 2022-02-09 RX ADMIN — CALCIUM ACETATE 667 MG: 667 CAPSULE ORAL at 08:06

## 2022-02-09 RX ADMIN — CALCIUM GLUCONATE 1 G: 20 INJECTION, SOLUTION INTRAVENOUS at 12:03

## 2022-02-09 RX ADMIN — NIFEDIPINE 90 MG: 30 TABLET, FILM COATED, EXTENDED RELEASE ORAL at 08:05

## 2022-02-09 RX ADMIN — INSULIN LISPRO 10 UNITS: 100 INJECTION, SOLUTION INTRAVENOUS; SUBCUTANEOUS at 17:41

## 2022-02-09 RX ADMIN — CALCIUM ACETATE 667 MG: 667 CAPSULE ORAL at 11:59

## 2022-02-09 RX ADMIN — INSULIN LISPRO 10 UNITS: 100 INJECTION, SOLUTION INTRAVENOUS; SUBCUTANEOUS at 08:20

## 2022-02-09 RX ADMIN — PREDNISOLONE ACETATE 1 DROP: 10 SUSPENSION/ DROPS OPHTHALMIC at 21:30

## 2022-02-09 RX ADMIN — ASPIRIN 81 MG: 81 TABLET, COATED ORAL at 08:06

## 2022-02-09 RX ADMIN — PREDNISOLONE ACETATE 1 DROP: 10 SUSPENSION/ DROPS OPHTHALMIC at 08:09

## 2022-02-09 RX ADMIN — INSULIN GLARGINE 25 UNITS: 100 INJECTION, SOLUTION SUBCUTANEOUS at 22:24

## 2022-02-09 NOTE — ASSESSMENT & PLAN NOTE
Lab Results   Component Value Date    HGBA1C 7 8 (A) 10/01/2021       Recent Labs     02/08/22  1151 02/08/22  1805 02/09/22  0819 02/09/22  1218   POCGLU 185* 110 184* 76       Blood Sugar Average: Last 72 hrs:  (P) 123 5 BG levels trending on the lower end on home insulin regimen: Lantus 30 units QHS and Lispro 10 units tid with meals        · Decreased home Lantus from 30 to 25 units at bedtime and continue Lispro 10 units tid with meals  · Continue SSI correction and FSG checks 4 times daily with meals and at bedtime  · Adjust Insulin regimen as needed according to BG levels   · Hypoglycemic protocol

## 2022-02-09 NOTE — ASSESSMENT & PLAN NOTE
Recent Labs     02/07/22  1539 02/08/22  0804 02/09/22  0445   HGB 9 4* 8 6* 9 1*     · Chronic normocytic anemia  likely secondary to CKD with baseline hemoglobin 10-13  On Epogen with dialysis in the outpatient setting  Hb remains around 9 and stable       Plan:  · F/u ferritin   · Trend Hb daily  · Transfuse for < 7 0

## 2022-02-09 NOTE — PLAN OF CARE
Problem: PAIN - ADULT  Goal: Verbalizes/displays adequate comfort level or baseline comfort level  Description: Interventions:  - Encourage patient to monitor pain and request assistance  - Assess pain using appropriate pain scale  - Administer analgesics based on type and severity of pain and evaluate response  - Implement non-pharmacological measures as appropriate and evaluate response  - Consider cultural and social influences on pain and pain management  - Notify physician/advanced practitioner if interventions unsuccessful or patient reports new pain  Outcome: Progressing     Problem: INFECTION - ADULT  Goal: Absence or prevention of progression during hospitalization  Description: INTERVENTIONS:  - Assess and monitor for signs and symptoms of infection  - Monitor lab/diagnostic results  - Monitor all insertion sites, i e  indwelling lines, tubes, and drains  - Monitor endotracheal if appropriate and nasal secretions for changes in amount and color  - [place] appropriate cooling/warming therapies per order  - Administer medications as ordered  - Instruct and encourage patient and family to use good hand hygiene technique  - Identify and instruct in appropriate isolation precautions for identified infection/condition  Outcome: Progressing  Goal: Absence of fever/infection during neutropenic period  Description: INTERVENTIONS:  - Monitor WBC    Outcome: Progressing     Problem: DISCHARGE PLANNING  Goal: Discharge to home or other facility with appropriate resources  Description: INTERVENTIONS:  - Identify barriers to discharge w/patient and caregiver  - Arrange for needed discharge resources and transportation as appropriate  - Identify discharge learning needs (meds, wound care, etc )  - Arrange for interpretive services to assist at discharge as needed  - Refer to Case Management Department for coordinating discharge planning if the patient needs post-hospital services based on physician/advanced practitioner order or complex needs related to functional status, cognitive ability, or social support system  Outcome: Progressing     Problem: Nutrition/Hydration-ADULT  Goal: Nutrient/Hydration intake appropriate for improving, restoring or maintaining nutritional needs  Description: Monitor and assess patient's nutrition/hydration status for malnutrition  Collaborate with interdisciplinary team and initiate plan and interventions as ordered  Monitor patient's weight and dietary intake as ordered or per policy  Utilize nutrition screening tool and intervene as necessary  Determine patient's food preferences and provide high-protein, high-caloric foods as appropriate       INTERVENTIONS:  - Monitor oral intake, urinary output, labs, and treatment plans  - Assess nutrition and hydration status and recommend course of action  - Evaluate amount of meals eaten  - Assist patient with eating if necessary   - Allow adequate time for meals  - Recommend/ encourage appropriate diets, oral nutritional supplements, and vitamin/mineral supplements  - Order, calculate, and assess calorie counts as needed  - Recommend, monitor, and adjust tube feedings and TPN/PPN based on assessed needs  - Assess need for intravenous fluids  - Provide specific nutrition/hydration education as appropriate  - Include patient/family/caregiver in decisions related to nutrition  Outcome: Progressing     Problem: MOBILITY - ADULT  Goal: Maintain or return to baseline ADL function  Description: INTERVENTIONS:  -  Assess patient's ability to carry out ADLs; assess patient's baseline for ADL function and identify physical deficits which impact ability to perform ADLs (bathing, care of mouth/teeth, toileting, grooming, dressing, etc )  - Assess/evaluate cause of self-care deficits   - Assess range of motion  - Assess patient's mobility; develop plan if impaired  - Assess patient's need for assistive devices and provide as appropriate  - Encourage maximum independence but intervene and supervise when necessary  - Involve family in performance of ADLs  - Assess for home care needs following discharge   - Consider OT consult to assist with ADL evaluation and planning for discharge  - Provide patient education as appropriate  Outcome: Progressing    Problem: METABOLIC, FLUID AND ELECTROLYTES - ADULT  Goal: Electrolytes maintained within normal limits  Description: INTERVENTIONS:  - Monitor labs and assess patient for signs and symptoms of electrolyte imbalances  - Administer electrolyte replacement as ordered  - Monitor response to electrolyte replacements, including repeat lab results as appropriate  - Instruct patient on fluid and nutrition as appropriate  Outcome: Progressing  Goal: Fluid balance maintained  Description: INTERVENTIONS:  - Monitor labs   - Monitor I/O and WT  - Instruct patient on fluid and nutrition as appropriate  - Assess for signs & symptoms of volume excess or deficit  Outcome: Progressing

## 2022-02-09 NOTE — ASSESSMENT & PLAN NOTE
· Secondary to volume overload in the setting of ESRD with noncompliance to treatment contributing  · On home labetalol 300 mg BID, doxazosin 4 mg at bedtime, nifedipine 90 mg daily, furosemide 80 mg daily  · Transferred to ICU with persistent HTN requiring Nitroglycerin gtt  · S/p urgent UF with 2L of fluids removed  · Nephrology following  Received HD 2/8 with 3 L of fluids removed    · BP now improved 130s-160s/60s/80s, acceptable but not at goal      Plan:  · Continue to monitor and optimize BP with goal /80   · Continue home antihypertensive regimen with Labetalol increased from 300 mg to 400 mg bid per Nephro  · Discontinued NTG infusion as of 2/8   · Patient counseling on treatment adherence

## 2022-02-09 NOTE — ASSESSMENT & PLAN NOTE
Wt Readings from Last 3 Encounters:   02/08/22 80 7 kg (178 lb)   12/10/21 79 4 kg (175 lb)   12/06/21 81 6 kg (180 lb)     · POA: HD dependent volume status  Prior echo from 8/0909 w/ normal systolic function EF 69% and G1DD  · Repeat echo 2/8 showed normal systolic function with EF of 58%  Wall motion is normal and normal diastolic function with left atrial filling pressure at upper level of normal   · Nephrology following for inpatient HD  S/p urgent UF on 2/7 and HD on 2/8       Plan:  · Strict I/Os and daily weights  · Continue HD as scheduled TTS  · Continue home Lasix 80 mg daily

## 2022-02-09 NOTE — ASSESSMENT & PLAN NOTE
· Decreased ionized calcium 1 02  Repleted with IV calcium gluconate 1g x 1 dose       Plan:  · Hold home cinacalcet  · Continue to monitor calcium and replete as needed

## 2022-02-09 NOTE — PROGRESS NOTES
[address] [facility]  Progress Note - Paddy Trevin 1965, 61 y o  male MRN: [id number]  Unit/Bed#: ICU 12 Encounter: [id number]  Primary Care Provider: Lisa Wheeler [id number],    Date and time admitted to hospital: 2/7/2022  3:14 PM    * Hypertensive emergency  Assessment & Plan  · Secondary to volume overload in the setting of ESRD with noncompliance to treatment contributing  · On home labetalol 300 mg BID, doxazosin 4 mg at bedtime, nifedipine 90 mg daily, furosemide 80 mg daily  · Transferred to ICU with persistent HTN requiring Nitroglycerin gtt  · S/p urgent UF with 2L of fluids removed  · Nephrology following  Received HD 2/8 with 3 L of fluids removed  · BP now improved 130s-160s/60s/80s, not at goal      Plan:  · Continue to monitor and optimize BP with goal /80   · Continue home antihypertensive regimen  · Discontinued NTG infusion as of 2/8   · Patient counseling on treatment adherence     ESRD (end stage renal disease) [facility])  Assessment & Plan  Lab Results   Component Value Date    EGFR 7 02/09/2022    EGFR 5 02/08/2022    EGFR 5 02/07/2022    CREATININE 6 95 (H) 02/09/2022    CREATININE 9 54 (H) 02/08/2022    CREATININE 9 65 (H) 02/07/2022     · Dialysis on T/Th/Sa presented with volume overload likely due to incomplete HD session outpateint  · S/p 2 hour urgent UF session on 2/7 and inpatient HD on 2/8 with total 5 L of fluids removed  Plan:  · Continue HD while inpatient, next HD due Thurs 2/10  · Continue home lasix 80mg PO daily    Acute respiratory failure with hypoxia ([facility] [address] )  Assessment & Plan  · Not on O2 at baseline  Likely due to pulmonary edema in the setting of volume overload and hypertensive emergency  · S/p urgent UF on admission (2/7) and inpatient HD on 2/8 with total of 5L of fluids removed with improved respiratory status  · Currently on 3L NC with SpO2 96%      Plan:  · Nephrology following - continue HD TTS, next HD duet Thurs 2/10  · Monitor O2 sat and wean as able to maintain SpO2 > 88%  · Continue home Lasix 80 mg daily     Diastolic heart failure (HCC)  Assessment & Plan  Wt Readings from Last 3 Encounters:   02/08/22 80 7 kg (178 lb)   12/10/21 79 4 kg (175 lb)   12/06/21 81 6 kg (180 lb)       Intake/Output Summary (Last 24 hours) at 2/9/2022 1014  Last data filed at 2/9/2022 0401  Gross per 24 hour   Intake 980 ml   Output 3001 ml   Net -2021 ml     · POA: HD dependent volume status  Prior echo from 1/1303 w/ normal systolic function EF 59% and G1DD  · Repeat echo 2/8 showed normal systolic function with EF of 58%  Wall motion is normal and normal diastolic function with left atrial filling pressure at upper level of normal   · Nephrology following for inpatient HD  S/p urgent UF on 2/7 and HD on 2/8  Plan:  · Strict I/Os and daily weights  · Continue HD as scheduled TTS  · Continue home Lasix 80 mg daily     Type 2 diabetes mellitus, with long-term current use of insulin [facility])  Assessment & Plan  Lab Results   Component Value Date    HGBA1C 7 8 (A) 10/01/2021       Recent Labs     02/08/22  0826 02/08/22  1151 02/08/22  1805 02/09/22  0819   POCGLU 79 185* 110 184*     Blood Sugar Average: Last 72 hrs:  (P) 133 BG levels trending on the lower end on home insulin regimen: Lantus 30 units QHS and Lispro 10 units tid with meals   · Decreased home Lantus from 30 to 25 units at bedtime and continue Lispro 10 units tid with meals  · Continue SSI correction and FSG checks 4 times daily with meals and at bedtime  · Adjust Insulin regimen as needed according to BG levels   · Hypoglycemic protocol     Anemia of chronic disease  Assessment & Plan  Recent Labs     02/07/22  1539 02/08/22  0804 02/09/22  0445   HGB 9 4* 8 6* 9 1*       · Chronic normocytic anemia  likely secondary to CKD with baseline hemoglobin 10-13  On Epogen with dialysis in the outpatient setting  Hb remains around 9 and stable       Plan:  · F/u ferritin · Trend Hb daily  · Transfuse for < 7 0    Hypocalcemia  Assessment & Plan  Decreased ionized calcium 1 02  Repleted with IV calcium gluconate 1g x 1 dose  Plan:  · Hold home cinacalcet  · Continue to monitor calcium and replete as needed    Hyperlipidemia  Assessment & Plan  · Continue home atorvastatin 40 mg daily    ----------------------------------------------------------------------------------------  HPI/24hr events: No significant events overnight  Disposition: Transfer to Med-Surg   Code Status: Level 1 - Full Code  ---------------------------------------------------------------------------------------  SUBJECTIVE  Patient was seen and examined at bedside this a m  in no acute distress  He reports feeling better today and denies fever, chills, headache, lightheadedness/dizziness, chest pain, SOB at rest, abdominal pain or urinary symptoms  Patient remains hemodynamically stable with improved BP control and has no new complaints at this time       Review of Systems  Review of systems was reviewed and negative unless stated above in HPI/24-hour events  and/or in Subjective    ---------------------------------------------------------------------------------------  OBJECTIVE    Vitals   Vitals:    22 0600 22 0700 22 0800 22 0900   BP: 156/83 150/83 155/82 165/73   BP Location:   Right arm    Pulse: 78 76 78 80   Resp: 18 14 (!) 23 18   Temp: 98 5 °F (36 9 °C) 98 3 °F (36 8 °C)     TempSrc:  Temporal     SpO2: 96% 94% 97% 94%   Weight:       Height:         Temp (24hrs), Av 3 °F (36 8 °C), Min:98 °F (36 7 °C), Max:98 8 °F (37 1 °C)  Current: Temperature: 98 3 °F (36 8 °C)          Respiratory:  SpO2: SpO2: 94 %  Nasal Cannula O2 Flow Rate (L/min): 3 L/min    Invasive/non-invasive ventilation settings   Respiratory  Report   Lab Data (Last 4 hours)    None         O2/Vent Data (Last 4 hours)    None                Physical Exam  Constitutional:       General: He is not in acute distress  Appearance: He is not ill-appearing or toxic-appearing  HENT:      Head: Normocephalic and atraumatic  Nose: Nose normal  No rhinorrhea  Eyes:      Conjunctiva/sclera: Conjunctivae normal       Comments: POA Left eye blindness    Cardiovascular:      Pulses: Normal pulses  Heart sounds: Normal heart sounds  Pulmonary:      Effort: Pulmonary effort is normal  No respiratory distress  Breath sounds: Normal breath sounds  No wheezing, rhonchi or rales  Abdominal:      General: Bowel sounds are normal       Tenderness: There is no abdominal tenderness  Musculoskeletal:         General: Normal range of motion  Cervical back: Normal range of motion  Right lower leg: No edema  Left lower leg: No edema  Comments: Left forearm AV fistula x 2 for HD with dressing intact, clean and dry  Skin:     General: Skin is warm  Findings: No lesion or rash  Neurological:      General: No focal deficit present  Mental Status: He is alert and oriented to person, place, and time  Mental status is at baseline  Motor: No weakness     Psychiatric:         Mood and Affect: Mood normal          Behavior: Behavior normal          Laboratory and Diagnostics:  Results from last 7 days   Lab Units 02/09/22 0445 02/08/22  0804 02/07/22  1539   WBC Thousand/uL 5 59 5 64 7 40   HEMOGLOBIN g/dL 9 1* 8 6* 9 4*   HEMATOCRIT % 29 0* 27 0* 30 1*   PLATELETS Thousands/uL 255 231 294   NEUTROS PCT %  --  65 72   MONOS PCT %  --  14* 12     Results from last 7 days   Lab Units 02/09/22 0445 02/08/22  0804 02/07/22  1539   SODIUM mmol/L 138 138 137   POTASSIUM mmol/L 4 6 4 5 4 5   CHLORIDE mmol/L 101 99* 100   CO2 mmol/L 29 29 26   ANION GAP mmol/L 8 10 11   BUN mg/dL 30* 44* 47*   CREATININE mg/dL 6 95* 9 54* 9 65*   CALCIUM mg/dL 8 8 9 2 9 9   GLUCOSE RANDOM mg/dL 102 80 139     Results from last 7 days   Lab Units 02/09/22  0445   MAGNESIUM mg/dL 2 1   PHOSPHORUS mg/dL 4 0 Results from last 7 days   Lab Units 02/07/22  1539   INR  1 05   PTT seconds 39*              ABG:    VBG:          Micro        ECG: normal sinus rhythm  Imaging: I have personally reviewed pertinent reports  Intake and Output  I/O       02/07 0701  02/08 0700 02/08 0701  02/09 0700 02/09 0701  02/10 0700    P  O   480     I V  (mL/kg) 594 1 (7 4) 511 6 (6 3)     Total Intake(mL/kg) 594 1 (7 4) 991 6 (12 3)     Urine (mL/kg/hr)  100 (0 1)     Other 2500 3001     Total Output 2500 3101     Net -1905 9 -2109 4                  Height and Weights   Height: 5' 10" (177 8 cm)  IBW (Ideal Body Weight): 73 kg  Body mass index is 25 54 kg/m²  Weight (last 2 days)     Date/Time Weight    02/08/22 0824 80 7 (178)    02/08/22 0800 80 7 (178)    02/07/22 1509 80 8 (178 13)          Nutrition       Diet Orders   (From admission, onward)             Start     Ordered    02/07/22 2200  Diet Louis/CHO Controlled; Consistent Carbohydrate Diet Level 2 (5 carb servings/75 grams CHO/meal)  Diet effective now        References:    Nutrtion Support Algorithm Enteral vs  Parenteral   Question Answer Comment   Diet Type Louis/CHO Controlled    Louis/CHO Controlled Consistent Carbohydrate Diet Level 2 (5 carb servings/75 grams CHO/meal)    RD to adjust diet per protocol?  Yes        02/07/22 2200                Active Medications  Scheduled Meds:  Current Facility-Administered Medications   Medication Dose Route Frequency Provider Last Rate    aspirin  81 mg Oral Daily Fuad Gonzales MD      atorvastatin  40 mg Oral QPM Fuad Gonzales MD      brimonidine tartrate  1 drop Left Eye BID Fuad Gonzales MD      calcium acetate  667 mg Oral TID With Meals Fuad Gonzales MD      cholecalciferol  2,000 Units Oral Daily Fuad Gonzales MD      cinacalcet  30 mg Oral Daily Fuad Gonzales MD      cyclobenzaprine  5 mg Oral HS PRN Urvashi Moore MD      doxazosin  4 mg Oral HS Mandy Cassidy MD      doxercalciferol  2 5 mcg Intravenous Once per day on Tue Thu Sat Chen Taylor MD      furosemide  80 mg Oral Daily Mandy Cassidy MD      glycerin-hypromellose-  2 drop Both Eyes BID Mandy Cassidy MD      heparin (porcine)  5,000 Units Subcutaneous Q12H [address] Mandy Cassidy MD      insulin glargine  25 Units Subcutaneous HS Angel Keys MD      insulin lispro  10 Units Subcutaneous TID With Meals Mandy Cassidy MD      labetalol  300 mg Oral Q12H [facility] Jennifer Norwood MD      NIFEdipine  90 mg Oral Daily Mandy Cassidy MD      prednisoLONE acetate  1 drop Left Eye BID Mandy Cassidy MD       Continuous Infusions:     PRN Meds:   cyclobenzaprine, 5 mg, HS PRN        Invasive Devices Review  Invasive Devices  Report    Peripheral Intravenous Line            Peripheral IV 02/08/22 Right Antecubital 1 day          Line            Hemodialysis AV Fistula 01/23/19 Left Other (Comment) 1113 days    Hemodialysis AV Fistula 01/20/21 Left 384 days                Rationale for remaining devices: N/A   ---------------------------------------------------------------------------------------  Advance Directive and Living Will:      Power of :    POLST:    ---------------------------------------------------------------------------------------    Angel Keys MD    Portions of the record may have been created with voice recognition software  Occasional wrong word or "sound a like" substitutions may have occurred due to the inherent limitations of voice recognition software    Read the chart carefully and recognize, using context, where substitutions have occurred

## 2022-02-09 NOTE — PROGRESS NOTES
NEPHROLOGY PROGRESS NOTE   Paddy Trevin 61 y o  male MRN: [id number]  Unit/Bed#: ICU 12 Encounter: [id number]  Reason for Consult: ESRD    ASSESSMENT AND PLAN:  Patient is 53-year-old male with significant medical issues of ESRD on HD, diabetes, hypertension, recently had COVID-19 infection presented with worsening shortness of breath, cough   We consulted for dialysis management      ESRD on HD on TTS schedule at [facility]  -outpatient EDW 79 Kg  -tolerated dialysis well yesterday, post HD weight 76 2 kg below dry weight  Will change dry weight to 76 kg   -next HD tomorrow   -subjectively he feels better  -goal to challenge UF removal on dialysis to challenge dry weight     Hypoxic respiratory failure  -chest x-ray concerning for pulmonary congestion  -slowly improving, now on 2 L O2 via nasal cannula  -UF removal to challenge dry weight as mentioned above   -echo this admission shows EF 92%, diastolic function normal, mild-to-moderate MR, IVC normal, no pericardial effusion      CKD anemia, closely monitor hemoglobin, transfuse p r n  for hemoglobin less than 7  -on epogen 800 units with dialysis as outpatient, holding Epogen for time being until blood pressure improves      CKD BMD, continue to monitor phosphorus, PTH, continue phosphorus binders  Secondary hyperparathyroidism, Hectorol 2 5 mcg with dialysis as outpatient  Also currently on Sensipar daily  Continue same     Recent COVID-19 infection, management as per primary team      Hypertension  -initially uncontrolled BP now seems to have overall improved  Still remains slightly above goal   -remains off nitro drip  -recommend to increase labetalol to 400 mg p o  b i d , continue nifedipine XL 90 mg daily, Lasix 80 mg daily, doxazosin 4 mg daily   -if BP remains above goal,  consider increasing nifedipine XL to 60 mg b i d      Discussed above plan in detail with ICU team     SUBJECTIVE:  Patient seen and examined at bedside    Overall feels better, denies worsening shortness of breath  Remains on 2 L O2 via nasal cannula       OBJECTIVE:  Current Weight: Weight - Scale: 80 7 kg (178 lb)  Vitals:    02/09/22 0900   BP: 165/73   Pulse: 80   Resp: 18   Temp:    SpO2: 94%       Intake/Output Summary (Last 24 hours) at 2/9/2022 0910  Last data filed at 2/9/2022 0401  Gross per 24 hour   Intake 982 6 ml   Output 3101 ml   Net -2118 4 ml     Wt Readings from Last 3 Encounters:   02/08/22 80 7 kg (178 lb)   12/10/21 79 4 kg (175 lb)   12/06/21 81 6 kg (180 lb)     Temp Readings from Last 3 Encounters:   02/09/22 98 3 °F (36 8 °C) (Temporal)   01/08/22 (!) 100 8 °F (38 2 °C) (Oral)   12/27/21 (!) 96 5 °F (35 8 °C) (Tympanic)     BP Readings from Last 3 Encounters:   02/09/22 165/73   01/08/22 140/63   12/27/21 (!) 174/85     Pulse Readings from Last 3 Encounters:   02/09/22 80   01/08/22 80   12/27/21 70        Physical Examination:  General:  Lying in bed, no acute distress   Eyes:  Mild conjunctival pallor present  ENT:  External examination of ears and nose unremarkable  Neck:  No obvious lymphadenopathy appreciated  Respiratory:  Bilateral air entry present  CVS:  S1, S2 present  GI:  Soft, nondistended  CNS:  Active alert oriented x3  Skin:  No new rash in legs  Musculoskeletal:  No obvious new gross deformity noted    Medications:    Current Facility-Administered Medications:     aspirin (ECOTRIN LOW STRENGTH) EC tablet 81 mg, 81 mg, Oral, Daily, Theresa Carroll MD, 81 mg at 02/09/22 0806    atorvastatin (LIPITOR) tablet 40 mg, 40 mg, Oral, QPM, Theresa Carroll MD, 40 mg at 02/08/22 1816    brimonidine tartrate 0 2 % ophthalmic solution 1 drop, 1 drop, Left Eye, BID, Theresa Carroll MD, 1 drop at 02/09/22 0809    calcium acetate (PHOSLO) capsule 667 mg, 667 mg, Oral, TID With Meals, Theresa Carroll MD, 667 mg at 02/09/22 0806    cholecalciferol (VITAMIN D3) tablet 2,000 Units, 2,000 Units, Oral, Daily, Ravi Ferrera MD, 2,000 Units at 02/09/22 0805    cinacalcet (SENSIPAR) tablet 30 mg, 30 mg, Oral, Daily, Ravi Ferrera MD, 30 mg at 02/08/22 3708    cyclobenzaprine (FLEXERIL) tablet 5 mg, 5 mg, Oral, HS PRN, Ravi Ferrera MD    doxazosin (CARDURA) tablet 4 mg, 4 mg, Oral, HS, Ravi Ferrera MD, 4 mg at 02/08/22 2123    doxercalciferol (HECTOROL) injection 2 5 mcg, 2 5 mcg, Intravenous, Once per day on Tue Thu Sat, Dionne Schulte MD, 2 5 mcg at 02/08/22 1615    furosemide (LASIX) tablet 80 mg, 80 mg, Oral, Daily, Ravi Ferrera MD, 80 mg at 02/09/22 0806    glycerin-hypromellose- (ARTIFICIAL TEARS) ophthalmic solution 2 drop, 2 drop, Both Eyes, BID, Ravi Ferrera MD, 2 drop at 02/09/22 0809    heparin (porcine) subcutaneous injection 5,000 Units, 5,000 Units, Subcutaneous, Q12H [address], 5,000 Units at 02/09/22 0809 **AND** Platelet count, , , Once, Ravi Ferrera MD    insulin glargine (LANTUS) subcutaneous injection 25 Units 0 25 mL, 25 Units, Subcutaneous, HS, Nhi Selby MD, 25 Units at 02/08/22 2123    insulin lispro (HumaLOG) 100 units/mL subcutaneous injection 10 Units, 10 Units, Subcutaneous, TID With Meals, Ravi Ferrera MD, 10 Units at 02/09/22 0820    labetalol (NORMODYNE) tablet 300 mg, 300 mg, Oral, Q12H [address], Ravi Ferrera MD, 300 mg at 02/09/22 1877    NIFEdipine (PROCARDIA XL) 24 hr tablet 90 mg, 90 mg, Oral, Daily, Ravi Ferrera MD, 90 mg at 02/09/22 0805    prednisoLONE acetate (PRED FORTE) 1 % ophthalmic suspension 1 drop, 1 drop, Left Eye, BID, Ravi Ferrera MD, 1 drop at 02/09/22 0809    Laboratory Results:  Results from last 7 days   Lab Units 02/09/22  0445 02/08/22  0804 02/07/22  1539   WBC Thousand/uL 5 59 5 64 7 40   HEMOGLOBIN g/dL 9 1* 8 6* 9 4*   HEMATOCRIT % 29 0* 27 0* 30 1*   PLATELETS Thousands/uL 255 231 294   SODIUM mmol/L 138 138 137   POTASSIUM mmol/L 4 6 4 5 4 5   CHLORIDE mmol/L 101 99* 100   CO2 mmol/L 29 29 26   BUN mg/dL 30* 44* 47*   CREATININE mg/dL 6 95* 9 54* 9 65*   CALCIUM mg/dL 8 8 9 2 9 9   MAGNESIUM mg/dL 2 1  --   --    PHOSPHORUS mg/dL 4 0  --   --        XR chest 1 view portable   ED Interpretation by Trenton Alvarez DO (02/07 1620)   Increased vascular congestion, R>L      Final Result by Surendra Beth MD (02/08 1530)      Congestive heart failure  Difficult to exclude superimposed groundglass infiltrates  Workstation performed: [id number]             Portions of the record may have been created with voice recognition software  Occasional wrong word or "sound a like" substitutions may have occurred due to the inherent limitations of voice recognition software  Read the chart carefully and recognize, using context, where substitutions have occurred

## 2022-02-09 NOTE — ASSESSMENT & PLAN NOTE
Wt Readings from Last 3 Encounters:   02/08/22 80 7 kg (178 lb)   12/10/21 79 4 kg (175 lb)   12/06/21 81 6 kg (180 lb)       Intake/Output Summary (Last 24 hours) at 2/9/2022 1014  Last data filed at 2/9/2022 0401  Gross per 24 hour   Intake 980 ml   Output 3001 ml   Net -2021 ml     · POA: HD dependent volume status  Prior echo from 7/8443 w/ normal systolic function EF 63% and G1DD  · Repeat echo 2/8 showed normal systolic function with EF of 58%  Wall motion is normal and normal diastolic function with left atrial filling pressure at upper level of normal   · Nephrology following for inpatient HD  S/p urgent UF on 2/7 and HD on 2/8       Plan:  · Strict I/Os and daily weights  · Continue HD as scheduled TTS  · Continue home Lasix 80 mg daily

## 2022-02-10 ENCOUNTER — APPOINTMENT (INPATIENT)
Dept: DIALYSIS | Facility: HOSPITAL | Age: 57
DRG: 304 | End: 2022-02-10
Attending: INTERNAL MEDICINE
Payer: MEDICARE

## 2022-02-10 LAB
ANION GAP SERPL CALCULATED.3IONS-SCNC: 12 MMOL/L (ref 4–13)
BASOPHILS # BLD AUTO: 0.04 THOUSANDS/ΜL (ref 0–0.1)
BASOPHILS NFR BLD AUTO: 1 % (ref 0–1)
BUN SERPL-MCNC: 45 MG/DL (ref 5–25)
CA-I BLD-SCNC: 1.05 MMOL/L (ref 1.12–1.32)
CALCIUM SERPL-MCNC: 8.4 MG/DL (ref 8.3–10.1)
CHLORIDE SERPL-SCNC: 101 MMOL/L (ref 100–108)
CO2 SERPL-SCNC: 27 MMOL/L (ref 21–32)
CREAT SERPL-MCNC: 8.43 MG/DL (ref 0.6–1.3)
EOSINOPHIL # BLD AUTO: 0.2 THOUSAND/ΜL (ref 0–0.61)
EOSINOPHIL NFR BLD AUTO: 3 % (ref 0–6)
ERYTHROCYTE [DISTWIDTH] IN BLOOD BY AUTOMATED COUNT: 16.2 % (ref 11.6–15.1)
GFR SERPL CREATININE-BSD FRML MDRD: 6 ML/MIN/1.73SQ M
GLUCOSE SERPL-MCNC: 133 MG/DL (ref 65–140)
GLUCOSE SERPL-MCNC: 77 MG/DL (ref 65–140)
GLUCOSE SERPL-MCNC: 81 MG/DL (ref 65–140)
HCT VFR BLD AUTO: 26.5 % (ref 36.5–49.3)
HGB BLD-MCNC: 8.4 G/DL (ref 12–17)
IMM GRANULOCYTES # BLD AUTO: 0.01 THOUSAND/UL (ref 0–0.2)
IMM GRANULOCYTES NFR BLD AUTO: 0 % (ref 0–2)
LYMPHOCYTES # BLD AUTO: 1.23 THOUSANDS/ΜL (ref 0.6–4.47)
LYMPHOCYTES NFR BLD AUTO: 21 % (ref 14–44)
MAGNESIUM SERPL-MCNC: 2.1 MG/DL (ref 1.6–2.6)
MCH RBC QN AUTO: 29.3 PG (ref 26.8–34.3)
MCHC RBC AUTO-ENTMCNC: 31.7 G/DL (ref 31.4–37.4)
MCV RBC AUTO: 92 FL (ref 82–98)
MONOCYTES # BLD AUTO: 0.85 THOUSAND/ΜL (ref 0.17–1.22)
MONOCYTES NFR BLD AUTO: 14 % (ref 4–12)
NEUTROPHILS # BLD AUTO: 3.67 THOUSANDS/ΜL (ref 1.85–7.62)
NEUTS SEG NFR BLD AUTO: 61 % (ref 43–75)
NRBC BLD AUTO-RTO: 0 /100 WBCS
PHOSPHATE SERPL-MCNC: 4.5 MG/DL (ref 2.7–4.5)
PLATELET # BLD AUTO: 245 THOUSANDS/UL (ref 149–390)
PMV BLD AUTO: 9.2 FL (ref 8.9–12.7)
POTASSIUM SERPL-SCNC: 4.3 MMOL/L (ref 3.5–5.3)
RBC # BLD AUTO: 2.87 MILLION/UL (ref 3.88–5.62)
SODIUM SERPL-SCNC: 140 MMOL/L (ref 136–145)
WBC # BLD AUTO: 6 THOUSAND/UL (ref 4.31–10.16)

## 2022-02-10 PROCEDURE — 84100 ASSAY OF PHOSPHORUS: CPT

## 2022-02-10 PROCEDURE — 82948 REAGENT STRIP/BLOOD GLUCOSE: CPT

## 2022-02-10 PROCEDURE — 80048 BASIC METABOLIC PNL TOTAL CA: CPT

## 2022-02-10 PROCEDURE — 90935 HEMODIALYSIS ONE EVALUATION: CPT | Performed by: INTERNAL MEDICINE

## 2022-02-10 PROCEDURE — 99232 SBSQ HOSP IP/OBS MODERATE 35: CPT | Performed by: STUDENT IN AN ORGANIZED HEALTH CARE EDUCATION/TRAINING PROGRAM

## 2022-02-10 PROCEDURE — 83735 ASSAY OF MAGNESIUM: CPT

## 2022-02-10 PROCEDURE — 82330 ASSAY OF CALCIUM: CPT

## 2022-02-10 PROCEDURE — 85025 COMPLETE CBC W/AUTO DIFF WBC: CPT

## 2022-02-10 RX ADMIN — GLYCERIN, HYPROMELLOSE, POLYETHYLENE GLYCOL 2 DROP: .2; .2; 1 LIQUID OPHTHALMIC at 17:25

## 2022-02-10 RX ADMIN — NIFEDIPINE 90 MG: 30 TABLET, FILM COATED, EXTENDED RELEASE ORAL at 13:31

## 2022-02-10 RX ADMIN — EPOETIN ALFA 2000 UNITS: 2000 SOLUTION INTRAVENOUS; SUBCUTANEOUS at 10:21

## 2022-02-10 RX ADMIN — FUROSEMIDE 80 MG: 40 TABLET ORAL at 13:31

## 2022-02-10 RX ADMIN — INSULIN GLARGINE 25 UNITS: 100 INJECTION, SOLUTION SUBCUTANEOUS at 21:33

## 2022-02-10 RX ADMIN — Medication 2000 UNITS: at 08:57

## 2022-02-10 RX ADMIN — INSULIN LISPRO 10 UNITS: 100 INJECTION, SOLUTION INTRAVENOUS; SUBCUTANEOUS at 13:31

## 2022-02-10 RX ADMIN — DOXAZOSIN 4 MG: 4 TABLET ORAL at 21:33

## 2022-02-10 RX ADMIN — HEPARIN SODIUM 5000 UNITS: 5000 INJECTION INTRAVENOUS; SUBCUTANEOUS at 08:58

## 2022-02-10 RX ADMIN — PREDNISOLONE ACETATE 1 DROP: 10 SUSPENSION/ DROPS OPHTHALMIC at 08:59

## 2022-02-10 RX ADMIN — DOXERCALCIFEROL 2.5 MCG: 4 INJECTION, SOLUTION INTRAVENOUS at 08:58

## 2022-02-10 RX ADMIN — HEPARIN SODIUM 5000 UNITS: 5000 INJECTION INTRAVENOUS; SUBCUTANEOUS at 21:33

## 2022-02-10 RX ADMIN — ATORVASTATIN CALCIUM 40 MG: 40 TABLET, FILM COATED ORAL at 18:00

## 2022-02-10 RX ADMIN — CALCIUM ACETATE 667 MG: 667 CAPSULE ORAL at 16:23

## 2022-02-10 RX ADMIN — ASPIRIN 81 MG: 81 TABLET, COATED ORAL at 08:57

## 2022-02-10 RX ADMIN — LABETALOL HYDROCHLORIDE 400 MG: 100 TABLET, FILM COATED ORAL at 21:33

## 2022-02-10 RX ADMIN — BRIMONIDINE TARTRATE 1 DROP: 2 SOLUTION/ DROPS OPHTHALMIC at 21:40

## 2022-02-10 RX ADMIN — LABETALOL HYDROCHLORIDE 400 MG: 100 TABLET, FILM COATED ORAL at 13:30

## 2022-02-10 RX ADMIN — CALCIUM ACETATE 667 MG: 667 CAPSULE ORAL at 16:30

## 2022-02-10 RX ADMIN — ATORVASTATIN CALCIUM 40 MG: 40 TABLET, FILM COATED ORAL at 17:23

## 2022-02-10 RX ADMIN — PREDNISOLONE ACETATE 1 DROP: 10 SUSPENSION/ DROPS OPHTHALMIC at 21:40

## 2022-02-10 RX ADMIN — CALCIUM ACETATE 667 MG: 667 CAPSULE ORAL at 13:31

## 2022-02-10 RX ADMIN — INSULIN LISPRO 10 UNITS: 100 INJECTION, SOLUTION INTRAVENOUS; SUBCUTANEOUS at 09:00

## 2022-02-10 RX ADMIN — BRIMONIDINE TARTRATE 1 DROP: 2 SOLUTION/ DROPS OPHTHALMIC at 09:00

## 2022-02-10 RX ADMIN — GLYCERIN, HYPROMELLOSE, POLYETHYLENE GLYCOL 2 DROP: .2; .2; 1 LIQUID OPHTHALMIC at 09:00

## 2022-02-10 RX ADMIN — CALCIUM ACETATE 667 MG: 667 CAPSULE ORAL at 09:00

## 2022-02-10 NOTE — CASE MANAGEMENT
Case Management Assessment & Discharge Planning Note    Patient name Isidro Grant  Location [address] [place] [address] /E2 MS 1-* MRN [id number]  : 1965 Date 2/10/2022       Current Admission Date: 2022  Current Admission Diagnosis:Hypertensive emergency   Patient Active Problem List    Diagnosis Date Noted    Hypocalcemia 2022    Anemia of chronic disease     Diastolic heart failure ([place] [address] ) 2022    Acute respiratory failure with hypoxia ([place] [address] ) 2022    Hypertensive emergency 2022    Dialysis patient ([place] [address] ) 2021    Visual disorder 2021    Adenomatous polyp of colon 10/01/2021    GERD (gastroesophageal reflux disease) 2021    Abnormal EKG 2021    Pulmonary nodule 2021    Hypertensive urgency 2021    Long term (current) use of antibiotics 02/15/2021    TB lung, latent 2021    Positive QuantiFERON-TB Gold test 2021    Cough 2021    Onychomycosis 2020    Diabetic polyneuropathy associated with type 2 diabetes mellitus ([place] [address] ) 2020    Neutropenia (HCC) 2020    Slow transit constipation 2019    Hyperphosphatemia 2019    Benign hypertension with ESRD (end-stage renal disease) ([place] [address] )     Chronic kidney disease-mineral and bone disorder 2019    Azotemia 2019    Elevated troponin I level 2019    S/P arteriovenous (AV) fistula creation 2019    ESRD (end stage renal disease) ([place] [address] ) 2019    Chronic constipation 2018    Screening for colon cancer 2018    Hyperlipidemia 2018    Type 2 diabetes mellitus, with long-term current use of insulin ([place] [address] ) 2018    Diabetic retinopathy of both eyes associated with type 2 diabetes mellitus ([place] [address] ) 2018    LVH (left ventricular hypertrophy) 2018      LOS (days): 3  Geometric Mean LOS (GMLOS) (days): 2 90  Days to GMLOS:0     OBJECTIVE:    Risk of Unplanned Readmission Score: 24 Current admission status: Inpatient       Preferred Pharmacy:   [facility]  [address], [place] - [address]  [address] [address]  Phone: [phone] Fax: [address] - [facility] [address] ,  [address] ,  [facility] [address]   Phone: [phone] Fax: [phone]    Primary Care Provider: Merlin Monterroso DO    Primary Insurance: MEDICARE  Secondary Insurance: [id number] [facility]    ASSESSMENT:  Active Health Care Agents     Trevin, [address] Representative - Sister   Primary Phone: [phone] (Mobile)               Advance Directives  Does patient have a [address]?: No  Was patient offered paperwork?: Yes  Does patient currently have a Health Care decision maker?: Yes, please see Health Care Proxy section  Does patient have Advance Directives?: Yes  Advance Directives: Living will  Primary Contact: Sister              Patient Information  Admitted from[de-identified] Home  Mental Status: Alert  Home entry access options   Select all that apply : Stairs  Number of steps to enter home : 2  Do the steps have railings?: No  Type of Current Residence: 2 story home  Upon entering residence, is there a bedroom on the main floor (no further steps)?: No  A bedroom is located on the following floor levels of residence (select all that apply):: 2nd Floor  Upon entering residence, is there a bathroom on the main floor (no further steps)?: Yes  Number of steps to 2nd floor from main floor: One Flight  In the last 12 months, was there a time when you were not able to pay the mortgage or rent on time?: No  In the last 12 months, how many places have you lived?: 1  In the last 12 months, was there a time when you did not have a steady place to sleep or slept in a shelter (including now)?: No  Homeless/housing insecurity resource given?: N/A  Living Arrangements: Lives w/ Parent(s)  Is patient a ?: No    Activities of Daily Living Prior to Admission  Functional Status: Assistance  Ambulates independently?: No  Level of ambulatory dependence: Assistance (He is blind, uses sister to guide him, not 100% steady on his feet)  Does patient use assisted devices?: Yes  Assisted Devices (DME) used: Other (Comment) (cane (blindness))  Does patient currently own DME?: Yes  What DME does the patient currently own?: Other (Comment) (cane (blind))  Does patient have a history of Outpatient Therapy (PT/OT)?: Yes  Does the patient have a history of Short-Term Rehab?: No  Does patient have a history of HHC?: Yes  Does patient currently have [facility]?: Yes    Current Home Health Care  Type of Current Home Care Services:  Other (Comment) (ILIANA Elmore pays for 4 hours a day)  [address]] [place]    Patient Information Continued  Income Source: SSI/SSD  Does patient have prescription coverage?: Yes  Within the past 12 months, you worried that your food would run out before you got the money to buy more : Never true  Within the past 12 months, the food you bought just didnt last and you didnt have money to get more : Never true  Food insecurity resource given?: N/A  Does patient receive dialysis treatments?: Yes ([address] 10:00am)  Does patient have a history of substance abuse?: No  Does patient have a history of Mental Health Diagnosis?: No         Means of Transportation  Means of Transport to Appts[de-identified] Family transport (Sister)  In the past 12 months, has lack of transportation kept you from medical appointments or from getting medications?: No  In the past 12 months, has lack of transportation kept you from meetings, work, or from getting things needed for daily living?: No  Was application for public transport provided?: N/A        DISCHARGE DETAILS:    Discharge planning discussed with[de-identified] Patient and sister  Freedom of Choice: Yes  Comments - Freedom of Choice: Patient agreeable to going home pending medical clearance tomorrow  CM contacted family/caregiver?: Yes (Sister via phone)             Contacts  Patient Contacts: Sister  Relationship to Patient[de-identified] Family  Contact Method: Phone  Phone Number: [phone]  Reason/Outcome: Discharge [address]         Is the patient interested in [place] [address] at discharge?:  (Sister not sure if patient would benefit from more VNA services; PT/OT scheduled to see patient prior to dc   CM updated doctor and sent message to PT/OT to request that patient be a priority eval for tomorrow morning)  IMM Given (Date):: 02/10/22  IMM Given to[de-identified] Patient

## 2022-02-10 NOTE — PLAN OF CARE
Problem: METABOLIC, FLUID AND ELECTROLYTES - ADULT  Goal: Electrolytes maintained within normal limits  Description: INTERVENTIONS:  - Monitor labs and assess patient for signs and symptoms of electrolyte imbalances  - Administer electrolyte replacement as ordered  - Monitor response to electrolyte replacements, including repeat lab results as appropriate  - Instruct patient on fluid and nutrition as appropriate  Outcome: Progressing  Goal: Fluid balance maintained  Description: INTERVENTIONS:  - Monitor labs   - Monitor I/O and WT  - Instruct patient on fluid and nutrition as appropriate  - Assess for signs & symptoms of volume excess or deficit  Outcome: Progressing   Post-Dialysis RN Treatment Note    Blood Pressure:  Pre 157/74 mm/Hg  Post 154/76 mmHg   EDW  76 kg    Weight:  Pre 78 5 kg   Post 75 kg   Mode of weight measurement: Bed Scale   Volume Removed  3500 ml    Treatment duration 240 minutes    NS given  No    Treatment shortened?  No   Medications given during Rx Epogen 2000 units, Hectoral 2 5 mcg   Estimated Kt/V  None Reported   Access type: AV fistula   Access Issues: No    Report called to primary nurse   Yes Zion Salomon RN

## 2022-02-10 NOTE — PROGRESS NOTES
Thaddeus Dinh  Progress Note - Josephcari Trevin 1965, 61 y o  male MRN: [id number]  Unit/Bed#: E2 -01 Encounter: [id number]  Primary Care Provider: Leona Osgood, DO   Date and time admitted to hospital: 2/7/2022  3:14 PM    * Hypertensive emergency  Assessment & Plan  61year old male with ESRD on HD presenting with acute respiratory failure with hypoxia and hypertensive emergency secondary to volume overload  Resolved  - Management per renal  - Anticipate discharge in 24-48 hours    Acute respiratory failure with hypoxia [facility])  Assessment & Plan  Multifactorial: Pulmonary edema, hypertension, and ESRD  - Wean off as tolerated  Down to 2L NC  Type 2 diabetes mellitus, with long-term current use of insulin [facility])  Assessment & Plan  Lab Results   Component Value Date    HGBA1C 7 8 (A) 10/01/2021     Recent Labs     02/08/22  1151 02/08/22  1805 02/09/22  0819 02/09/22  1218 02/09/22  1735 02/10/22  1617   POCGLU 185* 110 184* 76 150* 77     Inpatient regimen: Lantus 25 units at bedtime and Humalog 10 units with meals    Anemia of chronic disease  Assessment & Plan  Stable  No indication for transfusion at this time  Recent Labs     02/08/22  0804 02/09/22  0445 02/10/22  0433   HGB 8 6* 9 1* 8 4*   MCV 91 92 92   RDW 16 6* 16 7* 16 2*   FERRITIN  --  1,072*  --          ESRD (end stage renal disease) (HCC)  Assessment & Plan  With volume overload on presentation   - HD / management per renal     Hyperlipidemia  Assessment & Plan  Continue statin      VTE Pharmacologic Prophylaxis:   Pharmacologic: Heparin  Mechanical VTE Prophylaxis in Place: Yes    Discussions with Specialists or Other Care Team Provider: nursing, cm, renal    Education and Discussions with Family / Patient: patient    Time Spent for Care: 30 minutes  More than 50% of total time spent on counseling and coordination of care as described above      Current Length of Stay: 3 day(s)    Current Patient Status: Inpatient   Certification Statement: The patient will continue to require additional inpatient hospital stay due to renal reevaluation, medication optimization    Discharge Plan: 24-48 hours    Code Status: Level 1 - Full Code      Subjective:   Patient seen and examined at bedside  Comfortable  No new complaints  Objective:     Vitals:   Temp (24hrs), Av °F (36 7 °C), Min:97 6 °F (36 4 °C), Max:98 3 °F (36 8 °C)    Temp:  [97 6 °F (36 4 °C)-98 3 °F (36 8 °C)] 97 6 °F (36 4 °C)  HR:  [76-84] 84  Resp:  [13-25] 21  BP: (132-171)/(63-88) 157/88  SpO2:  [88 %-97 %] 97 %  Body mass index is 25 54 kg/m²  Input and Output Summary (last 24 hours): Intake/Output Summary (Last 24 hours) at 2/10/2022 1648  Last data filed at 2/10/2022 1225  Gross per 24 hour   Intake 550 ml   Output 4000 ml   Net -3450 ml       Physical Exam:     Physical Exam  Vitals reviewed  Constitutional:       General: He is not in acute distress  HENT:      Head: Normocephalic  Nose: Nose normal       Mouth/Throat:      Mouth: Mucous membranes are moist    Eyes:      General: No scleral icterus  Cardiovascular:      Rate and Rhythm: Normal rate  Pulmonary:      Effort: Pulmonary effort is normal  No respiratory distress  Abdominal:      Palpations: Abdomen is soft  Tenderness: There is no abdominal tenderness  Skin:     General: Skin is warm  Neurological:      Mental Status: He is alert  Mental status is at baseline     Psychiatric:         Mood and Affect: Mood normal          Behavior: Behavior normal        Additional Data:     Labs:    Results from last 7 days   Lab Units 02/10/22  0433   WBC Thousand/uL 6 00   HEMOGLOBIN g/dL 8 4*   HEMATOCRIT % 26 5*   PLATELETS Thousands/uL 245   NEUTROS PCT % 61   LYMPHS PCT % 21   MONOS PCT % 14*   EOS PCT % 3     Results from last 7 days   Lab Units 02/10/22  0433   SODIUM mmol/L 140   POTASSIUM mmol/L 4 3   CHLORIDE mmol/L 101   CO2 mmol/L 27   BUN mg/dL 45* CREATININE mg/dL 8 43*   ANION GAP mmol/L 12   CALCIUM mg/dL 8 4   GLUCOSE RANDOM mg/dL 81     Results from last 7 days   Lab Units 02/07/22  1539   INR  1 05     Results from last 7 days   Lab Units 02/10/22  1617 02/09/22  1735 02/09/22  1218 02/09/22  0819 02/08/22  1805 02/08/22  1151 02/08/22  0826 02/07/22  2248   POC GLUCOSE mg/dl 77 150* 76 184* 110 185* 79 107                   * I Have Reviewed All Lab Data Listed Above  * Additional Pertinent Lab Tests Reviewed:  Svetlana 66 Admission Reviewed    Imaging:    Imaging Reports Reviewed Today Include: xr chest    Recent Cultures (last 7 days):           Last 24 Hours Medication List:   Current Facility-Administered Medications   Medication Dose Route Frequency Provider Last Rate    [[facility] Hold] aspirin  81 mg Oral Daily Herve Lowry MD      [facility] Hold] atorvastatin  40 mg Oral QPM Herve Lowry MD      [facility]] brimonidine tartrate  1 drop Left Eye BID Herve Lowry MD      [facility]] calcium acetate  667 mg Oral TID With Meals Herve Lowry MD      [facility]] cholecalciferol  2,000 Units Oral Daily Herve Lowry MD      [facility]] cyclobenzaprine  5 mg Oral HS PRN Herve Lowry MD      [facility]] doxazosin  4 mg Oral HS Herve Lowry MD      [facility] Hold] doxercalciferol  2 5 mcg Intravenous Once per day on Tue Thu Sat MD Jazmin Riley [facility] Hold] epoetin josé miguel  2,000 Units Intravenous Once per day on Tue Thu Sat Brooklyn Wilson MD      [facility]] furosemide  80 mg Oral Daily Herve Lowry MD      [facility]] glycerin-hypromellose-  2 drop Both Eyes BID Herve Lowry MD      [facility]] heparin (porcine)  5,000 Units Subcutaneous Q12H [address] Herve Lowry MD      [facility] Hold] insulin glargine  25 Units Subcutaneous HS Jeanette Stovall MD      [facility] Hold] insulin lispro  10 Units Subcutaneous TID With Meals Ravi Ferrera MD      [facility] Hold] labetalol  400 mg Oral Q12H [address] [facility] [address], [facility]] NIFEdipine  90 mg Oral Daily Ravi Ferrera MD      [facility] Hold] prednisoLONE acetate  1 drop Left Eye BID Ravi Ferrera MD          Today, Patient Was Seen By: Luis E Baker MD    ** Please Note: Dictation voice to text software may have been used in the creation of this document   **

## 2022-02-10 NOTE — ASSESSMENT & PLAN NOTE
Stable  No indication for transfusion at this time      Recent Labs     02/08/22  0804 02/09/22  0445 02/10/22  0433   HGB 8 6* 9 1* 8 4*   MCV 91 92 92   RDW 16 6* 16 7* 16 2*   FERRITIN  --  1,072*  --

## 2022-02-10 NOTE — PROGRESS NOTES
NEPHROLOGY PROGRESS NOTE   Paddy Trevin 61 y o  male MRN: [id number]  Unit/Bed#: ICU 12 Encounter: [id number]  Reason for Consult: ESRD    ASSESSMENT AND PLAN:  Patient is 69-year-old male with significant medical issues of ESRD on HD, diabetes, hypertension, recently had COVID-19 infection presented with worsening shortness of breath, cough   We consulted for dialysis management      ESRD on HD on TTS schedule at [address] 79 Kg, dry weight was reduced to 76 kg   -today pre HD weight 78 5 kg   UF removal 3 to 3 5 L as BP tolerated to further challenge dry weight  -HD today  -subjectively he feels better    I saw and examined patient during hemodialysis treatment at 9 AM on 2/10/2022  The patient was receiving hemodialysis for treatment of end stage renal disease  I also reviewed vital signs, intake and output, lab results and recent events, and agree with dialysis order  Tolerating HD  BP slightly above goal     Hypoxic respiratory failure  -chest x-ray concerning for pulmonary congestion  -overall much improving, now remains on room air  -UF removal to challenge dry weight as mentioned above   -echo this admission shows EF 75%, diastolic function normal, mild-to-moderate MR, IVC normal, no pericardial effusion      CKD anemia, closely monitor hemoglobin, transfuse p r n  for hemoglobin less than 7  -on epogen 800 units with dialysis as outpatient  -given overall slowly improving blood pressure, will restart Epogen 2000 units with each dialysis     CKD BMD, continue to monitor phosphorus, PTH, continue phosphorus binders  Secondary hyperparathyroidism, Hectorol 2 5 mcg with dialysis as outpatient   Also currently on Sensipar daily   Continue same     Recent COVID-19 infection, management as per primary team      Hypertension  -initially uncontrolled BP now seems to have overall improved    Still remains slightly above goal   Monitor with UF removal challenge on dialysis today   -remains off nitro drip   -continue nifedipine XL 90 mg daily, Lasix 80 mg daily, doxazosin 4 mg daily, labetalol 400 mg b i d  -if BP remains above goal,  consider increasing nifedipine XL to 60 mg b i d      Discussed above plan in detail with ICU team     SUBJECTIVE:  Patient seen and examined at bedside  Overall feels much better, denies any worsening shortness of breath, denies chest pain, nausea or vomiting    OBJECTIVE:  Current Weight: Weight - Scale: 80 7 kg (178 lb)  Vitals:    02/10/22 0900   BP: 161/75   Pulse: 78   Resp: 16   Temp:    SpO2: 93%       Intake/Output Summary (Last 24 hours) at 2/10/2022 1280  Last data filed at 2/10/2022 7258  Gross per 24 hour   Intake 250 ml   Output 0 ml   Net 250 ml     Wt Readings from Last 3 Encounters:   02/08/22 80 7 kg (178 lb)   12/10/21 79 4 kg (175 lb)   12/06/21 81 6 kg (180 lb)     Temp Readings from Last 3 Encounters:   02/10/22 98 3 °F (36 8 °C) (Temporal)   01/08/22 (!) 100 8 °F (38 2 °C) (Oral)   12/27/21 (!) 96 5 °F (35 8 °C) (Tympanic)     BP Readings from Last 3 Encounters:   02/10/22 161/75   01/08/22 140/63   12/27/21 (!) 174/85     Pulse Readings from Last 3 Encounters:   02/10/22 78   01/08/22 80   12/27/21 70        Physical Examination:  General:  Lying in bed, no acute distress   Eyes:  Mild conjunctival pallor present  ENT:  External examination of ears and nose unremarkable  Neck:  No obvious lymphadenopathy appreciated  Respiratory:  Bilateral air entry present  CVS:  S1, S2 present  GI:  Soft, nondistended  CNS:  Active alert oriented x3  Skin:  No new rash in legs  Musculoskeletal:  No obvious new gross deformity noted    Medications:    Current Facility-Administered Medications:     aspirin (ECOTRIN LOW STRENGTH) EC tablet 81 mg, 81 mg, Oral, Daily, Loren Garcia MD, 81 mg at 02/10/22 0857    atorvastatin (LIPITOR) tablet 40 mg, 40 mg, Oral, QPM, Loren Garcia MD, 40 mg at 02/09/22 4499   brimonidine tartrate 0 2 % ophthalmic solution 1 drop, 1 drop, Left Eye, BID, Chuck Ash MD, 1 drop at 02/10/22 0900    calcium acetate (PHOSLO) capsule 667 mg, 667 mg, Oral, TID With Meals, Chuck Ash MD, 667 mg at 02/10/22 0900    cholecalciferol (VITAMIN D3) tablet 2,000 Units, 2,000 Units, Oral, Daily, Chuck Ash MD, 2,000 Units at 02/10/22 0857    cyclobenzaprine (FLEXERIL) tablet 5 mg, 5 mg, Oral, HS PRN, Chuck Ash MD    doxazosin (CARDURA) tablet 4 mg, 4 mg, Oral, HS, Chuck Ash MD, 4 mg at 02/09/22 2224    doxercalciferol (HECTOROL) injection 2 5 mcg, 2 5 mcg, Intravenous, Once per day on Tue Thu Sat, Tera Escudero MD, 2 5 mcg at 02/10/22 0858    furosemide (LASIX) tablet 80 mg, 80 mg, Oral, Daily, Chuck Ash MD, 80 mg at 02/09/22 0806    glycerin-hypromellose- (ARTIFICIAL TEARS) ophthalmic solution 2 drop, 2 drop, Both Eyes, BID, Chuck Ash MD, 2 drop at 02/10/22 0900    heparin (porcine) subcutaneous injection 5,000 Units, 5,000 Units, Subcutaneous, Q12H [address], 5,000 Units at 02/10/22 0858 **AND** Platelet count, , , Once, Chuck Ash MD    insulin glargine (LANTUS) subcutaneous injection 25 Units 0 25 mL, 25 Units, Subcutaneous, HS, Jimbo Garibay MD, 25 Units at 02/09/22 2224    insulin lispro (HumaLOG) 100 units/mL subcutaneous injection 10 Units, 10 Units, Subcutaneous, TID With Meals, Chuck Ash MD, 10 Units at 02/10/22 0900    labetalol (NORMODYNE) tablet 400 mg, 400 mg, Oral, Q12H [address], DELBERT PrasadNP, 400 mg at 02/09/22 2130    NIFEdipine (PROCARDIA XL) 24 hr tablet 90 mg, 90 mg, Oral, Daily, Chuck Ash MD, 90 mg at 02/09/22 0805    prednisoLONE acetate (PRED FORTE) 1 % ophthalmic suspension 1 drop, 1 drop, Left Eye, BID, Chuck Ash MD, 1 drop at 02/10/22 0859    Laboratory Results:  Results from last 7 days   Lab Units 02/10/22  0433 02/09/22  0445 02/08/22  0804 02/07/22  1539   WBC Thousand/uL 6 00 5 59 5 64 7 40   HEMOGLOBIN g/dL 8 4* 9 1* 8 6* 9 4*   HEMATOCRIT % 26 5* 29 0* 27 0* 30 1*   PLATELETS Thousands/uL 245 255 231 294   SODIUM mmol/L 140 138 138 137   POTASSIUM mmol/L 4 3 4 6 4 5 4 5   CHLORIDE mmol/L 101 101 99* 100   CO2 mmol/L 27 29 29 26   BUN mg/dL 45* 30* 44* 47*   CREATININE mg/dL 8 43* 6 95* 9 54* 9 65*   CALCIUM mg/dL 8 4 8 8 9 2 9 9   MAGNESIUM mg/dL 2 1 2 1  --   --    PHOSPHORUS mg/dL 4 5 4 0  --   --        XR chest 1 view portable   ED Interpretation by Zahida Blood DO (02/07 1620)   Increased vascular congestion, R>L      Final Result by Ronny Estrada MD (02/08 0779)      Congestive heart failure  Difficult to exclude superimposed groundglass infiltrates  Workstation performed: [id number]             Portions of the record may have been created with voice recognition software  Occasional wrong word or "sound a like" substitutions may have occurred due to the inherent limitations of voice recognition software  Read the chart carefully and recognize, using context, where substitutions have occurred

## 2022-02-11 LAB
ANION GAP SERPL CALCULATED.3IONS-SCNC: 10 MMOL/L (ref 4–13)
BASOPHILS # BLD AUTO: 0.05 THOUSANDS/ΜL (ref 0–0.1)
BASOPHILS NFR BLD AUTO: 1 % (ref 0–1)
BUN SERPL-MCNC: 31 MG/DL (ref 5–25)
CALCIUM SERPL-MCNC: 9.4 MG/DL (ref 8.3–10.1)
CHLORIDE SERPL-SCNC: 98 MMOL/L (ref 100–108)
CO2 SERPL-SCNC: 29 MMOL/L (ref 21–32)
CREAT SERPL-MCNC: 6.6 MG/DL (ref 0.6–1.3)
EOSINOPHIL # BLD AUTO: 0.18 THOUSAND/ΜL (ref 0–0.61)
EOSINOPHIL NFR BLD AUTO: 3 % (ref 0–6)
ERYTHROCYTE [DISTWIDTH] IN BLOOD BY AUTOMATED COUNT: 16.4 % (ref 11.6–15.1)
GFR SERPL CREATININE-BSD FRML MDRD: 8 ML/MIN/1.73SQ M
GLUCOSE SERPL-MCNC: 111 MG/DL (ref 65–140)
GLUCOSE SERPL-MCNC: 123 MG/DL (ref 65–140)
GLUCOSE SERPL-MCNC: 169 MG/DL (ref 65–140)
GLUCOSE SERPL-MCNC: 238 MG/DL (ref 65–140)
GLUCOSE SERPL-MCNC: 64 MG/DL (ref 65–140)
HCT VFR BLD AUTO: 31.4 % (ref 36.5–49.3)
HGB BLD-MCNC: 9.7 G/DL (ref 12–17)
IMM GRANULOCYTES # BLD AUTO: 0.01 THOUSAND/UL (ref 0–0.2)
IMM GRANULOCYTES NFR BLD AUTO: 0 % (ref 0–2)
LYMPHOCYTES # BLD AUTO: 1.44 THOUSANDS/ΜL (ref 0.6–4.47)
LYMPHOCYTES NFR BLD AUTO: 24 % (ref 14–44)
MCH RBC QN AUTO: 28 PG (ref 26.8–34.3)
MCHC RBC AUTO-ENTMCNC: 30.9 G/DL (ref 31.4–37.4)
MCV RBC AUTO: 91 FL (ref 82–98)
MONOCYTES # BLD AUTO: 0.84 THOUSAND/ΜL (ref 0.17–1.22)
MONOCYTES NFR BLD AUTO: 14 % (ref 4–12)
NEUTROPHILS # BLD AUTO: 3.38 THOUSANDS/ΜL (ref 1.85–7.62)
NEUTS SEG NFR BLD AUTO: 58 % (ref 43–75)
NRBC BLD AUTO-RTO: 0 /100 WBCS
PLATELET # BLD AUTO: 294 THOUSANDS/UL (ref 149–390)
PMV BLD AUTO: 8.8 FL (ref 8.9–12.7)
POTASSIUM SERPL-SCNC: 4.8 MMOL/L (ref 3.5–5.3)
RBC # BLD AUTO: 3.47 MILLION/UL (ref 3.88–5.62)
SODIUM SERPL-SCNC: 137 MMOL/L (ref 136–145)
WBC # BLD AUTO: 5.9 THOUSAND/UL (ref 4.31–10.16)

## 2022-02-11 PROCEDURE — 85025 COMPLETE CBC W/AUTO DIFF WBC: CPT | Performed by: STUDENT IN AN ORGANIZED HEALTH CARE EDUCATION/TRAINING PROGRAM

## 2022-02-11 PROCEDURE — 97166 OT EVAL MOD COMPLEX 45 MIN: CPT

## 2022-02-11 PROCEDURE — 82948 REAGENT STRIP/BLOOD GLUCOSE: CPT

## 2022-02-11 PROCEDURE — 99232 SBSQ HOSP IP/OBS MODERATE 35: CPT | Performed by: INTERNAL MEDICINE

## 2022-02-11 PROCEDURE — 80048 BASIC METABOLIC PNL TOTAL CA: CPT | Performed by: STUDENT IN AN ORGANIZED HEALTH CARE EDUCATION/TRAINING PROGRAM

## 2022-02-11 PROCEDURE — 97163 PT EVAL HIGH COMPLEX 45 MIN: CPT

## 2022-02-11 PROCEDURE — 99232 SBSQ HOSP IP/OBS MODERATE 35: CPT | Performed by: STUDENT IN AN ORGANIZED HEALTH CARE EDUCATION/TRAINING PROGRAM

## 2022-02-11 RX ORDER — LABETALOL 200 MG/1
400 TABLET, FILM COATED ORAL EVERY 12 HOURS SCHEDULED
Qty: 120 TABLET | Refills: 0 | Status: SHIPPED | OUTPATIENT
Start: 2022-02-11 | End: 2022-07-22

## 2022-02-11 RX ORDER — BENZONATATE 100 MG/1
100 CAPSULE ORAL 3 TIMES DAILY PRN
Qty: 15 CAPSULE | Refills: 0 | Status: SHIPPED | OUTPATIENT
Start: 2022-02-11 | End: 2022-02-16

## 2022-02-11 RX ADMIN — HEPARIN SODIUM 5000 UNITS: 5000 INJECTION INTRAVENOUS; SUBCUTANEOUS at 08:04

## 2022-02-11 RX ADMIN — ASPIRIN 81 MG: 81 TABLET, COATED ORAL at 08:02

## 2022-02-11 RX ADMIN — CALCIUM ACETATE 667 MG: 667 CAPSULE ORAL at 08:01

## 2022-02-11 RX ADMIN — INSULIN GLARGINE 25 UNITS: 100 INJECTION, SOLUTION SUBCUTANEOUS at 21:25

## 2022-02-11 RX ADMIN — HEPARIN SODIUM 5000 UNITS: 5000 INJECTION INTRAVENOUS; SUBCUTANEOUS at 20:03

## 2022-02-11 RX ADMIN — CALCIUM ACETATE 667 MG: 667 CAPSULE ORAL at 11:29

## 2022-02-11 RX ADMIN — FUROSEMIDE 80 MG: 40 TABLET ORAL at 08:01

## 2022-02-11 RX ADMIN — BRIMONIDINE TARTRATE 1 DROP: 2 SOLUTION/ DROPS OPHTHALMIC at 20:03

## 2022-02-11 RX ADMIN — LABETALOL HYDROCHLORIDE 400 MG: 100 TABLET, FILM COATED ORAL at 20:03

## 2022-02-11 RX ADMIN — DOXAZOSIN 4 MG: 4 TABLET ORAL at 21:23

## 2022-02-11 RX ADMIN — NIFEDIPINE 90 MG: 30 TABLET, FILM COATED, EXTENDED RELEASE ORAL at 08:01

## 2022-02-11 RX ADMIN — PREDNISOLONE ACETATE 1 DROP: 10 SUSPENSION/ DROPS OPHTHALMIC at 20:03

## 2022-02-11 RX ADMIN — PREDNISOLONE ACETATE 1 DROP: 10 SUSPENSION/ DROPS OPHTHALMIC at 08:07

## 2022-02-11 RX ADMIN — INSULIN LISPRO 10 UNITS: 100 INJECTION, SOLUTION INTRAVENOUS; SUBCUTANEOUS at 11:30

## 2022-02-11 RX ADMIN — GLYCERIN, HYPROMELLOSE, POLYETHYLENE GLYCOL 2 DROP: .2; .2; 1 LIQUID OPHTHALMIC at 17:18

## 2022-02-11 RX ADMIN — LABETALOL HYDROCHLORIDE 400 MG: 100 TABLET, FILM COATED ORAL at 08:02

## 2022-02-11 RX ADMIN — CALCIUM ACETATE 667 MG: 667 CAPSULE ORAL at 16:24

## 2022-02-11 RX ADMIN — GLYCERIN, HYPROMELLOSE, POLYETHYLENE GLYCOL 2 DROP: .2; .2; 1 LIQUID OPHTHALMIC at 08:05

## 2022-02-11 RX ADMIN — BRIMONIDINE TARTRATE 1 DROP: 2 SOLUTION/ DROPS OPHTHALMIC at 08:06

## 2022-02-11 RX ADMIN — ATORVASTATIN CALCIUM 40 MG: 40 TABLET, FILM COATED ORAL at 17:17

## 2022-02-11 RX ADMIN — Medication 2000 UNITS: at 08:02

## 2022-02-11 NOTE — PHYSICAL THERAPY NOTE
PT EVALUATION 09:07-09:23 ( 16 minutes)    61 y o     [id number]    Shortness of breath [R06 02]  Hypoxia [R09 02]  ESRD (end stage renal disease) ([facility] ) [N18 6]  Volume overload [E87 70]    Past Medical History:   Diagnosis Date    Cataract     Chronic kidney disease     Diabetes mellitus ([facility] )      IDDM    Diabetic retinopathy ([facility] )     Dizziness     occ    ESRD (end stage renal disease) ([facility] )     GERD (gastroesophageal reflux disease)     Headache     occ    Hyperlipidemia     Hypertension     Legally blind     LVH (left ventricular hypertrophy)     Preferred language is [place]     understands some English    Use of cane as ambulatory aid          Past Surgical History:   Procedure Laterality Date    INGUINAL HERNIA REPAIR Right     IR AV FISTULAGRAM/GRAFTOGRAM  4/30/2019    IR AV FISTULAGRAM/GRAFTOGRAM  6/14/2019    IR AV FISTULAGRAM/GRAFTOGRAM  6/24/2020    IR AV FISTULAGRAM/GRAFTOGRAM  2/26/2021    IR AV FISTULAGRAM/GRAFTOGRAM  9/3/2021    IR TUNNELED DIALYSIS CATHETER PLACEMENT  4/3/2019    MT ANASTOMOSIS,AV,ANY SITE Left 1/23/2019    Procedure: CREATION FISTULA ARTERIOVENOUS (AV); Surgeon: Luke Choudhury MD;  Location: [facility];  Service: Vascular        02/11/22 0907   PT Last Visit   PT Visit Date 02/04/22   Note Type   Note type Evaluation   Pain Assessment   Pain Score No Pain   Restrictions/Precautions   Weight Bearing Precautions Per Order No   Other Precautions Fall Risk;Limb alert;Visual impairment  (legally blind)   Home Living   Type of [address] [place] [address] Two level;Bed/bath upstairs;1/2 bath on main level  (2 LUISA)   Bathroom Shower/Tub Tub/shower unit   Bathroom Toilet Standard   Bathroom Equipment Shower chair   Bathroom Accessibility Accessible   Home Equipment Cane   Additional Comments resides with sister who works nights  Prior Function   Level of [place] Needs assistance with IADLs; Needs assistance with ADLs and functional mobility   Lives With Other (Comment)  (sister)   Receives Help From Family   ADL Assistance Needs assistance   IADLs Needs assistance   Falls in the last 6 months 0   Comments Ambulates with cane and assist of sister related to blindness  General   Additional Pertinent History Pt is 62 y/o male admitted with increased SOB, HTN  PT consulted  Family/Caregiver Present No   Cognition   Overall Cognitive Status WFL   Arousal/Participation Alert   Attention Within functional limits   Orientation Level Oriented to person;Oriented to place;Oriented to time   Following Commands Follows multistep commands without difficulty   Comments Pleasant  Subjective   Subjective Agreeable to therapy   RUE Assessment   RUE Assessment WFL  (as observed with funtional reach/grasp  )   LUE Assessment   LUE Assessment WFL  (as observed with funtional reach/grasp  )   RLE Assessment   RLE Assessment WFL  (grossly 4/5)   LLE Assessment   LLE Assessment WFL  (groslsy 4/5)   Vision-Basic Assessment   Visual History Other (Comment)  (legally blind)   Light Touch   RLE Light Touch Grossly intact   LLE Light Touch Grossly intact   Bed Mobility   Supine to Sit 5  Supervision   Additional items Increased time required   Transfers   Sit to Stand 5  Supervision   Additional items Increased time required;Verbal cues   Stand to Sit 5  Supervision   Additional items Increased time required;Verbal cues   Additional Comments Increased time, cues for guidance 2* vision  Ambulation/Elevation   Gait pattern Improper Weight shift;Decreased foot clearance; Inconsistent lexus   Gait Assistance 4  Minimal assist   Additional items Assist x 1;Verbal cues; Tactile cues   Assistive Device [facility]   Distance Amb with cane + hand held assist for distances of 70 ft   Guidance needed related to visual impairments      Stair Management Assistance Not tested  (denies concerns regarding stair management  )   Balance   Static Sitting Good   Dynamic Sitting Good   Static Standing Fair + Dynamic Standing Fair   Ambulatory Fair -   Activity Tolerance   Activity Tolerance Patient tolerated treatment well   Medical Staff Made Aware Nurse, Alise Giraldo OT-Gagan   Nurse Made Aware yes   Assessment   Prognosis Fair   Problem List Decreased strength;Decreased endurance; Impaired balance;Decreased mobility; Impaired vision   Assessment Paddy Zhong is a 61 y o  male who presents with worsening shortness of breath  Pt with hx of ESRD with HD, DM, diabetic retinopathy, hyperlipidemia, HTN, legally blind, GERD, recent COVID infection 1/8/2022  Admitted with acute respiratory failure with hypoxia, pulmonary edema,  accelerated HTN  PT consulted  Up with assist orders  Prior to admission resides with sister in 2 story home  Requires assistance/guidance for mobility and ambulation related to visual impairments  Currently presents with functional limitaitons related to blindess, decreased activity tolerance compared to baseline, decreased strength compared to baseline, decreased balance, functional mobility and locomotion  Requires Kriss for ambulation using cane + hand held assistance for guidance related to visual impairments  Mild decrease from PLOF and will benefit from skilled PT in order to optimize outcomes  The patient's AM-PAC Basic Mobility Inpatient Short Form Raw Score is 20  A Raw score of greater than 16 suggests the patient may benefit from discharge to home  Please also refer to the recommendation of the Physical Therapist for safe discharge planning  Anticipate ability to return home with family support and continued PT in order to facilitate outcomes  Barriers to Discharge Inaccessible home environment   Barriers to Discharge Comments stairs   Goals   Patient Goals go home   STG Expiration Date 02/18/22   Short Term Goal #1 7 days: 1)  Pt will perform bed mobility with Emi demonstrating appropriate technique 100% of the time in order to improve function  2)  Perform all transfers with Emi demonstrating safe and appropriate technique 100% of the time in order to improve ability to negotiate safely in home environment  3) Amb with least restrictive AD > 150'x1 with Kriss in order to demonstrate ability to negotiate in home environment  4)  Improve overall strength and balance 1/2 grade in order to optimize ability to perform functional tasks and reduce fall risk  5) Increase activity tolerance to 45 minutes in order to improve endurance to functional tasks  6)  Negotiate stairs using most appropriate technique and Kriss in order to be able to negotiate safely in home environment  7) PT for ongoing patient and family/caregiver education, DME needs and d/c planning in order to promote highest level of function in least restrictive environment  Plan   Treatment/Interventions Functional transfer training;LE strengthening/ROM; Elevations; Therapeutic exercise; Endurance training;Patient/family training;Equipment eval/education; Bed mobility;Gait training; Compensatory technique education;Continued evaluation;Spoke to nursing;OT   PT Frequency 2-3x/wk   Recommendation   PT Discharge Recommendation Home with home health rehabilitation   [address] [place] [address] Mobility Inpatient   Turning in Bed Without Bedrails 4   Lying on Back to Sitting on Edge of Flat Bed 4   Moving Bed to Chair 3   Standing Up From Chair 3   Walk in Room 3   Climb 3-5 Stairs 3   Basic Mobility Inpatient Raw Score 20   Basic Mobility Standardized Score 43 99   Highest Level Of Mobility   JH-HLM Goal 6: Walk 10 steps or more   JH-HLM Highest Level of Mobility 7: Walk 25 feet or more   JH-HLM Goal Achieved Yes   End of Consult   Patient Position at End of Consult Seated edge of bed; All needs within reach     Hx/personal factors: co-morbidities, inaccessible home, use of AD, fall risk and assist w/ ADL's, blindness  Examination: dec mobility, dec balance, dec endurance, dec amb, risk for falls, assessed body system, balance, endurance, amb, D/C disposition & fall risk  Clinical: unpredictable (ongoing medical status and risk for falls), blindness requiring guidance, decreased activity tolerance compared to baseline)  Complexity: high    Marylen Cheek, PT

## 2022-02-11 NOTE — PROGRESS NOTES
NEPHROLOGY PROGRESS NOTE   Paddy Trevin 61 y o  male MRN: [id number]  Unit/Bed#: E2 -01 Encounter: [id number]  Reason for Consult: ESRD    ASSESSMENT AND PLAN:  Patient is 79-year-old male with significant medical issues of ESRD on HD, diabetes, hypertension, recently had COVID-19 infection presented with worsening shortness of breath, cough   We consulted for dialysis management      ESRD on HD on TTS schedule at [address] 79 Kg, post HD weight 75 kg yesterday  Will change dry weight to 75 kg  I have also personally spoke to outpatient HD nurse about new dry weight upon discharge   -subjectively he feels better     Hypoxic respiratory failure  -chest x-ray concerning for pulmonary congestion  -overall much improving, now remains on room air  -UF removal to challenge dry weight as mentioned above   -echo this admission shows EF 26%, diastolic function normal, mild-to-moderate MR, IVC normal, no pericardial effusion      CKD anemia, closely monitor hemoglobin, transfuse p r n  for hemoglobin less than 7  -on epogen 800 units with dialysis as outpatient  -restarted Epogen 2000 units on dialysis while inpatient      CKD BMD, continue to monitor phosphorus, PTH, continue phosphorus binders  Secondary hyperparathyroidism, Hectorol 2 5 mcg with dialysis as outpatient   Also currently on Sensipar daily   Continue same     Recent COVID-19 infection, management as per primary team      Hypertension  -initially uncontrolled BP now seems to have overall improved  Monitor with UF removal challenge on dialysis today   -continue nifedipine XL 90 mg daily, Lasix 80 mg daily, doxazosin 4 mg daily, labetalol 400 mg b i d      Discussed above plan in detail with primary team     SUBJECTIVE:  Patient seen and examined at bedside  He overall feels better, denies any shortness of breath, chest pain, nausea or vomiting    OBJECTIVE:  Current Weight: Weight - Scale: 80 7 kg (178 lb)  Vitals: 02/11/22 0722   BP: 151/72   Pulse: 77   Resp: 21   Temp: (!) 96 8 °F (36 °C)   SpO2: 96%       Intake/Output Summary (Last 24 hours) at 2/11/2022 1032  Last data filed at 2/10/2022 1225  Gross per 24 hour   Intake 300 ml   Output 4000 ml   Net -3700 ml     Wt Readings from Last 3 Encounters:   02/08/22 80 7 kg (178 lb)   12/10/21 79 4 kg (175 lb)   12/06/21 81 6 kg (180 lb)     Temp Readings from Last 3 Encounters:   02/11/22 (!) 96 8 °F (36 °C) (Temporal)   01/08/22 (!) 100 8 °F (38 2 °C) (Oral)   12/27/21 (!) 96 5 °F (35 8 °C) (Tympanic)     BP Readings from Last 3 Encounters:   02/11/22 151/72   01/08/22 140/63   12/27/21 (!) 174/85     Pulse Readings from Last 3 Encounters:   02/11/22 77   01/08/22 80   12/27/21 70        Physical Examination:  General:  Lying in bed, no acute distress   Eyes:  Mild conjunctival pallor present  ENT:  External examination of ears and nose unremarkable  Neck:  No obvious lymphadenopathy appreciated  Respiratory:  Decreased breath sound at bases  CVS:  S1, S2 present  GI:  Soft, nondistended  CNS:  Active alert oriented x3  Skin:  No new rash in legs  Musculoskeletal:  No obvious new gross deformity noted    Medications:    Current Facility-Administered Medications:     aspirin (ECOTRIN LOW STRENGTH) EC tablet 81 mg, 81 mg, Oral, Daily, Sheron Escamilla MD, 81 mg at 02/11/22 0802    atorvastatin (LIPITOR) tablet 40 mg, 40 mg, Oral, QPM, Sheron Escamilla MD, 40 mg at 02/10/22 1800    brimonidine tartrate 0 2 % ophthalmic solution 1 drop, 1 drop, Left Eye, BID, Sheron Escamilla MD, 1 drop at 02/11/22 0806    calcium acetate (PHOSLO) capsule 667 mg, 667 mg, Oral, TID With Meals, Sheron Escamilla MD, 667 mg at 02/11/22 0801    cholecalciferol (VITAMIN D3) tablet 2,000 Units, 2,000 Units, Oral, Daily, Sheron Escamilla MD, 2,000 Units at 02/11/22 0802    cyclobenzaprine (FLEXERIL) tablet 5 mg, 5 mg, Oral, HS PRN, Sheron Escamilla MD    doxazosin (CARDURA) tablet 4 mg, 4 mg, Oral, HS, Sheron Escamilla MD, 4 mg at 02/10/22 2133    doxercalciferol (HECTOROL) injection 2 5 mcg, 2 5 mcg, Intravenous, Once per day on Tue Thu Sat, Rizwan Vickers MD, 2 5 mcg at 02/10/22 5759    epoetin josé miguel (EPOGEN,PROCRIT) injection 2,000 Units, 2,000 Units, Intravenous, Once per day on Tue Thu Sat, Daisy العراقي MD, 2,000 Units at 02/10/22 1021    furosemide (LASIX) tablet 80 mg, 80 mg, Oral, Daily, Rizwan Vickers MD, 80 mg at 02/11/22 0801    glycerin-hypromellose- (ARTIFICIAL TEARS) ophthalmic solution 2 drop, 2 drop, Both Eyes, BID, Rizwan Vickers MD, 2 drop at 02/11/22 0805    heparin (porcine) subcutaneous injection 5,000 Units, 5,000 Units, Subcutaneous, Q12H [address], 5,000 Units at 02/11/22 0804 **AND** Platelet count, , , Once, Rizwan Vickers MD    insulin glargine (LANTUS) subcutaneous injection 25 Units 0 25 mL, 25 Units, Subcutaneous, HS, Rizwan Vickers MD, 25 Units at 02/10/22 2133    insulin lispro (HumaLOG) 100 units/mL subcutaneous injection 10 Units, 10 Units, Subcutaneous, TID With Meals, Rizwan Vickers MD, 10 Units at 02/10/22 1331    labetalol (NORMODYNE) tablet 400 mg, 400 mg, Oral, Q12H [address], Rizwan Vickers MD, 400 mg at 02/11/22 0802    NIFEdipine (PROCARDIA XL) 24 hr tablet 90 mg, 90 mg, Oral, Daily, Rizwan Vickers MD, 90 mg at 02/11/22 0801    prednisoLONE acetate (PRED FORTE) 1 % ophthalmic suspension 1 drop, 1 drop, Left Eye, BID, Rizwan Vickers MD, 1 drop at 02/11/22 9620    Laboratory Results:  Results from last 7 days   Lab Units 02/11/22  0547 02/10/22  0433 02/09/22  0445 02/08/22  0804 02/07/22  1539   WBC Thousand/uL 5 90 6 00 5 59 5 64 7 40   HEMOGLOBIN g/dL 9 7* 8 4* 9 1* 8 6* 9 4*   HEMATOCRIT % 31 4* 26 5* 29 0* 27 0* 30 1*   PLATELETS Thousands/uL 294 245 255 231 294   SODIUM mmol/L 137 140 138 138 137   POTASSIUM mmol/L 4 8 4 3 4 6 4 5 4 5   CHLORIDE mmol/L 98* 101 101 99* 100   CO2 mmol/L 29 27 29 29 26   BUN mg/dL 31* 45* 30* 44* 47*   CREATININE mg/dL 6 60* 8 43* 6 95* 9 54* 9 65*   CALCIUM mg/dL 9 4 8 4 8 8 9 2 9 9 MAGNESIUM mg/dL  --  2 1 2 1  --   --    PHOSPHORUS mg/dL  --  4 5 4 0  --   --        XR chest 1 view portable   ED Interpretation by Ciera Gifford DO (02/07 1260)   Increased vascular congestion, R>L      Final Result by Leslie Mahmood MD (02/08 4804)      Congestive heart failure  Difficult to exclude superimposed groundglass infiltrates  Workstation performed: [id number]             Portions of the record may have been created with voice recognition software  Occasional wrong word or "sound a like" substitutions may have occurred due to the inherent limitations of voice recognition software  Read the chart carefully and recognize, using context, where substitutions have occurred

## 2022-02-11 NOTE — PLAN OF CARE
Problem: PHYSICAL THERAPY ADULT  Goal: Performs mobility at highest level of function for planned discharge setting  See evaluation for individualized goals  Description: Treatment/Interventions: Functional transfer training,LE strengthening/ROM,Elevations,Therapeutic exercise,Endurance training,Patient/family training,Equipment eval/education,Bed mobility,Gait training,Compensatory technique education,Continued evaluation,Spoke to nursing,OT          See flowsheet documentation for full assessment, interventions and recommendations  Note: Prognosis: Fair  Problem List: Decreased strength,Decreased endurance,Impaired balance,Decreased mobility,Impaired vision  Assessment: Jenny Serrano is a 61 y o  male who presents with worsening shortness of breath  Pt with hx of ESRD with HD, DM, diabetic retinopathy, hyperlipidemia, HTN, legally blind, GERD, recent COVID infection 1/8/2022  Admitted with acute respiratory failure with hypoxia, pulmonary edema,  accelerated HTN  PT consulted  Up with assist orders  Prior to admission resides with sister in 2 story home  Requires assistance/guidance for mobility and ambulation related to visual impairments  Currently presents with functional limitaitons related to blindess, decreased activity tolerance compared to baseline, decreased strength compared to baseline, decreased balance, functional mobility and locomotion  Requires Kriss for ambulation using cane + hand held assistance for guidance related to visual impairments  Mild decrease from PLOF and will benefit from skilled PT in order to optimize outcomes  The patient's AM-PAC Basic Mobility Inpatient Short Form Raw Score is 20  A Raw score of greater than 16 suggests the patient may benefit from discharge to home  Please also refer to the recommendation of the Physical Therapist for safe discharge planning   Anticipate ability to return home with family support and continued PT in order to facilitate outcomes  Barriers to Discharge: Inaccessible home environment  Barriers to Discharge Comments: stairs     PT Discharge Recommendation: Home with home health rehabilitation          See flowsheet documentation for full assessment

## 2022-02-11 NOTE — ASSESSMENT & PLAN NOTE
Stable  No indication for transfusion at this time      Recent Labs     02/09/22  0445 02/10/22  0433 02/11/22  0547   HGB 9 1* 8 4* 9 7*   MCV 92 92 91   RDW 16 7* 16 2* 16 4*   FERRITIN 1,072*  --   --

## 2022-02-11 NOTE — DISCHARGE INSTRUCTIONS
Hypertensive Crisis   WHAT YOU NEED TO KNOW:   A hypertensive crisis is a sudden spike in blood pressure to 180/120 or higher  A normal blood pressure is 119/79 or lower  A hypertensive crisis is also known as acute hypertension  This is a medical emergency that could lead to organ damage or be life-threatening  DISCHARGE INSTRUCTIONS:   Call 911 for any of the following:   · You have chest pain  · You have back pain or shortness of breath  · You have weakness or numbness in your face, arms, or legs  · You cannot see or talk as well as usual     Seek care immediately if:   · You have a severe headache  Contact your healthcare provider if:   · Your blood pressure is 180/110 or higher but you have no other symptoms  · You have questions or concerns about your condition or care  Medicines: You may need any of the following:  · Blood pressure medicine  is given to lower your blood pressure  There are many different types of blood pressure medicine, and you may need more than one type  It is very important to take your blood pressure medicine exactly as directed  Skipped doses can lead to a hypertensive crisis  Talk to your healthcare provider if you are having side effects from your medicine  Do not  stop taking it without talking to your healthcare provider  · Diuretics  help decrease extra fluid that collects in your blood vessels  This lowers your blood pressure by reducing pressure in your arteries  Diuretics are often called water pills  You may urinate more often while you take this medicine  · Take your medicine as directed  Contact your healthcare provider if you think your medicine is not helping or if you have side effects  Tell him or her if you are allergic to any medicine  Keep a list of the medicines, vitamins, and herbs you take  Include the amounts, and when and why you take them  Bring the list or the pill bottles to follow-up visits   Carry your medicine list with you in case of an emergency  Follow up with your healthcare provider within 1 week or as directed: You will need to return to have your blood pressure checked and other tests  Your healthcare provider may also refer to you a cardiologist  Write down your questions so you remember to ask them during your visits  Prevent another hypertensive crisis:   · Check your blood pressure at home  Sit and rest for 5 minutes before you take your blood pressure  Extend your arm and support it on a flat surface  Your arm should be at the same level as your heart  Follow the directions that came with your blood pressure monitor  If possible, take at least 2 blood pressure readings each time  Take your blood pressure at least twice a day at the same times each day, such as morning and evening  Keep a record of your readings and bring it to your follow-up visits  Ask your healthcare provider what your blood pressure should be  · Manage any other health conditions you have  Health conditions such as diabetes can increase your risk for hypertension  Follow your healthcare provider's instructions and take all your medicines as directed  · Ask about all medicines  Certain medicines can increase your blood pressure  Examples include oral birth control pills, decongestants, herbal supplements, and NSAIDs, such as ibuprofen  Your healthcare provider can tell you which medicines are safe for you to take  This includes prescription and over-the-counter medicines  · Limit sodium (salt) as directed  Too much sodium can affect your fluid balance  Check labels to find low-sodium or no-salt-added foods  Some low-sodium foods use potassium salts for flavor  Too much potassium can also cause health problems  Your healthcare provider will tell you how much sodium and potassium are safe for you to have in a day  He or she may recommend that you limit sodium to 2,300 mg a day           · Follow the meal plan recommended by your healthcare provider  A dietitian or your provider can give you more information on low-sodium plans or the DASH (Dietary Approaches to Stop Hypertension) eating plan  The DASH plan is low in sodium, unhealthy fats, and total fat  It is high in potassium, calcium, and fiber  · Exercise to maintain a healthy weight  Exercise at least 30 minutes per day, on most days of the week  This will help decrease your blood pressure  Ask your healthcare provider about the best exercise plan for you  · Decrease stress  This may help lower your blood pressure  Learn ways to relax, such as deep breathing or listening to music  · Limit alcohol as directed  Alcohol can increase your blood pressure  A drink of alcohol is 12 ounces of beer, 5 ounces of wine, or 1½ ounces of liquor  · Do not smoke  Nicotine and other chemicals in cigarettes and cigars can increase your blood pressure and also cause lung damage  Ask your healthcare provider for information if you currently smoke and need help to quit  E-cigarettes or smokeless tobacco still contain nicotine  Talk to your healthcare provider before you use these products  For more information:   · [place] [address] [place] [address]   Phone: 1- 185 - 892-9944  Web Address: https://[employer]/  [address] 2021 Information is for End User's use only and may not be sold, redistributed or otherwise used for commercial purposes  All illustrations and images included in CareNotes® are the copyrighted property of [employer]  or [facility] Gianluca White   The above information is an  only  It is not intended as medical advice for individual conditions or treatments  Talk to your doctor, nurse or pharmacist before following any medical regimen to see if it is safe and effective for you

## 2022-02-11 NOTE — ASSESSMENT & PLAN NOTE
61year old male with ESRD on HD presenting with acute respiratory failure with hypoxia and hypertensive emergency secondary to volume overload  Resolved  - Cleared by renal for discharge  Continue Cardura, lasix, and Nifedipine

## 2022-02-11 NOTE — PROGRESS NOTES
[address]  Progress Note - Paddy Trevin 1965, 61 y o  male MRN: [id number]  Unit/Bed#: E2 -01 Encounter: [id number]  Primary Care Provider: Medadro Yoo DO   Date and time admitted to hospital: 2/7/2022  3:14 PM    * Hypertensive emergency  Assessment & Plan  61year old male with ESRD on HD presenting with acute respiratory failure with hypoxia and hypertensive emergency secondary to volume overload  Resolved  - Cleared by renal for discharge  Continue Cardura, lasix, and Nifedipine  Acute respiratory failure with hypoxia [facility])  Assessment & Plan  Multifactorial: Pulmonary edema, hypertension, and ESRD  - Resolved  On room air  However, requires oxygen on discharge  Sister is unable to be present this evening for the oxygen delivery since she will be going to work  Will plan for discharge zain am     Type 2 diabetes mellitus, with long-term current use of insulin [facility])  Assessment & Plan  Lab Results   Component Value Date    HGBA1C 7 8 (A) 10/01/2021     Recent Labs     02/09/22  1735 02/10/22  1617 02/10/22  2129 02/11/22  0757 02/11/22  1115 02/11/22  1620   POCGLU 150* 77 133 123 169* 111     While inpatient maintain at 25U HS  Continue home regimen on discharge  Glargine 32 U daily  Aspart 10U breakfast 10U Dinner  ESRD (end stage renal disease) ([facility] [address] )  Assessment & Plan  With volume overload on presentation   - HD / management per renal  Scheduled for T-Th-s      VTE Pharmacologic Prophylaxis:   Pharmacologic: Heparin  Mechanical VTE Prophylaxis in Place: Yes    Discussions with Specialists or Other Care Team Provider: nursing    Education and Discussions with Family / Patient: patient, sister    Time Spent for Care: 30 minutes  More than 50% of total time spent on counseling and coordination of care as described above      Current Length of Stay: 4 day(s)    Current Patient Status: Inpatient   Certification Statement: The patient will continue to require additional inpatient hospital stay due to oxygen delivery    Discharge Plan: 24 hours    Code Status: Level 1 - Full Code      Subjective:   Patient seen and examined at bedside  Cleared for discharge today  However, he requires home oxygen on discharge  Unfortunately, nobody will be available to receive his oxygen disease evening  Patient is unable to go down the steps since he is blind  Objective:     Vitals:   Temp (24hrs), Av 8 °F (36 °C), Min:96 8 °F (36 °C), Max:96 8 °F (36 °C)    Temp:  [96 8 °F (36 °C)] 96 8 °F (36 °C)  HR:  [77-83] 77  Resp:  [21] 21  BP: (146-151)/(72-79) 151/72  SpO2:  [96 %] 96 %  Body mass index is 25 54 kg/m²  Input and Output Summary (last 24 hours):     No intake or output data in the 24 hours ending 22 6935    Physical Exam:     Physical Exam  Vitals reviewed  Constitutional:       General: He is not in acute distress  HENT:      Head: Normocephalic  Nose: Nose normal       Mouth/Throat:      Mouth: Mucous membranes are moist    Eyes:      General: No scleral icterus  Cardiovascular:      Rate and Rhythm: Normal rate  Pulmonary:      Effort: Pulmonary effort is normal  No respiratory distress  Abdominal:      Palpations: Abdomen is soft  Skin:     General: Skin is warm  Neurological:      Mental Status: He is alert  Mental status is at baseline     Psychiatric:         Mood and Affect: Mood normal          Behavior: Behavior normal        Additional Data:     Labs:    Results from last 7 days   Lab Units 22  0547   WBC Thousand/uL 5 90   HEMOGLOBIN g/dL 9 7*   HEMATOCRIT % 31 4*   PLATELETS Thousands/uL 294   NEUTROS PCT % 58   LYMPHS PCT % 24   MONOS PCT % 14*   EOS PCT % 3     Results from last 7 days   Lab Units 22  0547   SODIUM mmol/L 137   POTASSIUM mmol/L 4 8   CHLORIDE mmol/L 98*   CO2 mmol/L 29   BUN mg/dL 31*   CREATININE mg/dL 6 60*   ANION GAP mmol/L 10   CALCIUM mg/dL 9 4   GLUCOSE RANDOM mg/dL 64*     Results from last 7 days   Lab Units 02/07/22  1539   INR  1 05     Results from last 7 days   Lab Units 02/11/22  1620 02/11/22  1115 02/11/22  0757 02/10/22  2129 02/10/22  1617 02/09/22  1735 02/09/22  1218 02/09/22  0819 02/08/22  1805 02/08/22  1151 02/08/22  0826 02/07/22  2248   POC GLUCOSE mg/dl 111 169* 123 133 77 150* 76 184* 110 185* 79 107                   * I Have Reviewed All Lab Data Listed Above  * Additional Pertinent Lab Tests Reviewed:  [address]vet[address] Admission Reviewed    Imaging:    Imaging Reports Reviewed Today Include: no new imaging    Recent Cultures (last 7 days):           Last 24 Hours Medication List:   Current Facility-Administered Medications   Medication Dose Route Frequency Provider Last Rate    aspirin  81 mg Oral Daily Nhi Selby MD      atorvastatin  40 mg Oral QPM Nhi Selby MD      brimonidine tartrate  1 drop Left Eye BID Nhi Selby MD      calcium acetate  667 mg Oral TID With 540 Shaina Griffiths MD      cholecalciferol  2,000 Units Oral Daily Nhi Selby MD      cyclobenzaprine  5 mg Oral HS PRN Nhi Selby MD      doxazosin  4 mg Oral HS Nhi Selby MD      doxercalciferol  2 5 mcg Intravenous Once per day on Tue Thu Sat Nhi Selby MD      epoetin josé miguel  2,000 Units Intravenous Once per day on Tue Thu Sat Jagjit Gamino MD      furosemide  80 mg Oral Daily Nhi Selby MD      glycerin-hypromellose-  2 drop Both Eyes BID Nhi Selby MD      heparin (porcine)  5,000 Units Subcutaneous Q12H Cornelius Conn MD      insulin glargine  25 Units Subcutaneous HS Nhi Selby MD      insulin lispro  10 Units Subcutaneous TID With 540 Shaina Griffiths MD      labetalol  400 mg Oral Q12H Cornelius Conn MD      NIFEdipine  90 mg Oral Daily Nhi Selby MD      prednisoLONE acetate  1 drop Left Eye BID Nhi Selby MD          Today, Patient Was Seen By: Luis E Baker MD    ** Please Note: Dictation voice to text software may have been used in the creation of this document   **

## 2022-02-11 NOTE — DISCHARGE SUMMARY
[facility]  Discharge- Paddy Trevin 1965, 61 y o  male MRN: [id number]  Unit/Bed#: E2 -01 Encounter: [id number]  Primary Care Provider: Catalina Magana DO   Date and time admitted to hospital: 2/7/2022  3:14 PM      * Hypertensive emergency  Assessment & Plan  61year old male with ESRD on HD presenting with acute respiratory failure with hypoxia and hypertensive emergency secondary to volume overload  Resolved  - Cleared by renal for discharge  Continue Cardura, lasix, and Nifedipine  Acute respiratory failure with hypoxia [facility])  Assessment & Plan  Multifactorial: Pulmonary edema, hypertension, and ESRD  - Resolved  On room air  But requires oxygen on ambulation  Qualifies for Home oxygen  3LPM on exertion  Type 2 diabetes mellitus, with long-term current use of insulin [facility])  Assessment & Plan  Lab Results   Component Value Date    HGBA1C 7 8 (A) 10/01/2021     Recent Labs     02/11/22  2118 02/12/22  1645 02/12/22  1706 02/12/22  2150 02/13/22  0705   POCGLU 238* 46* 85 218* 77     Continue home regimen on discharge  Anemia of chronic disease  Assessment & Plan  Stable  No indication for transfusion at this time      Recent Labs     02/09/22  0445 02/10/22  0433 02/11/22  0547   HGB 9 1* 8 4* 9 7*   MCV 92 92 91   RDW 16 7* 16 2* 16 4*   FERRITIN 1,072*  --   --          ESRD (end stage renal disease) ([facility] [address] )  Assessment & Plan  With volume overload on presentation   - HD / management per renal     Hyperlipidemia  Assessment & Plan  Continue statin      Discharging Physician / Practitioner: Henrietta Villarreal MD  PCP: Mansoor BENNETT DO  Admission Date:   Admission Orders (From admission, onward)     Ordered        02/07/22 2100  Inpatient Admission  Once            02/07/22 1653  Place in Observation  Once                      Discharge Date: 02/13/22    Medical Problems             Resolved Problems  Date Reviewed: 2/11/2022    None Consultations During Hospital Stay:  · nephrology    Procedures Performed:   · Hemodialysis    Significant Findings / Test Results:   Echo      Left Ventricle: Left ventricular cavity size is normal  Wall thickness is moderately increased  The left ventricular ejection fraction is 58% by single dimension measurement  Systolic function is normal  Wall motion is normal  There is moderate concentric hypertrophy  Diastolic function is normal   Left atrial filling pressure is at the upper level of normal     Left Atrium: The atrium is mildly dilated    Mitral Valve: There is mild annular calcification  There is mild to moderate regurgitation    Tricuspid Valve: Tricuspid valve structure is normal  There is mild regurgitation    Pulmonary Artery: The estimated pulmonary artery systolic pressure is 31 3 mmHg  The pulmonary artery systolic pressure is mild to moderately increased  XR Chest:    Congestive heart failure  Difficult to exclude superimposed groundglass infiltrates      Incidental Findings:   · As written above    Test Results Pending at Discharge (will require follow up): · None     Outpatient Tests Requested:  · PCP follow-up and renal  · CBC, BMP    Complications:  None    Reason for Admission: acute respiratory failure with hypoxia    Hospital Course:     Jack Baltazar is a 61 y o  male patient who originally presented to the hospital on 2/7/2022 due to shortness of breath    Patient is a 61year old male with ESRD on HD and hypertension  Upon presentation in our ED, he was volume overloaded and hypoxic  He required oxygen supplementation to maintain appropriate saturation levels  Renal was consulted  He underwent urgent hemodialysis  He is now back to room air  Home oxygen evaluation was performed which showed patient will require 3LPM on exertion  Oxygen ordered  Clinically improved and was cleared by renal for discharge today      Patient agrees to follow-up with his providers as scheduled and to take his medications as prescribed  All questions were addressed  he understood the need to seek immediate medical attention should he develop any chest pain, shortness of breath, severe pain, fever, chills, or any other concerning symptoms  Please see above list of diagnoses and related plan for additional information  Condition at Discharge: fair     Discharge Day Visit / Exam:     Subjective:  Patient seen and examined at bedside  Comfortable  No new complaints overnight  Vitals: Blood Pressure: 134/67 (02/13/22 0746)  Pulse: 73 (02/13/22 0746)  Temperature: (!) 96 9 °F (36 1 °C) (02/13/22 0746)  Temp Source: Tympanic (02/13/22 0746)  Respirations: 18 (02/13/22 0746)  Height: 5' 10" (177 8 cm) (02/08/22 0824)  Weight - Scale: 80 7 kg (178 lb) (02/08/22 0824)  SpO2: 100 % (02/13/22 0746)  Exam:   Physical Exam  Vitals reviewed  Constitutional:       General: He is not in acute distress  HENT:      Head: Normocephalic  Nose: Nose normal       Mouth/Throat:      Mouth: Mucous membranes are moist    Eyes:      General: No scleral icterus  Comments: Blind   Cardiovascular:      Rate and Rhythm: Normal rate  Pulmonary:      Effort: Pulmonary effort is normal  No respiratory distress  Abdominal:      Palpations: Abdomen is soft  Tenderness: There is no abdominal tenderness  Musculoskeletal:      Right lower leg: No edema  Left lower leg: No edema  Skin:     General: Skin is warm  Neurological:      Mental Status: He is alert  Mental status is at baseline  Psychiatric:         Mood and Affect: Mood normal          Behavior: Behavior normal        Discharge instructions/Information to patient and family:   See after visit summary for information provided to patient and family  Provisions for Follow-Up Care:  See after visit summary for information related to follow-up care and any pertinent home health orders        Disposition:     Home with VNA Services (Reminder: Complete face to face encounter)    For Discharges to [facility]:   · Not Applicable to this Patient - Not Applicable to this Patient    Planned Readmission: No     Discharge Statement:  I spent 37 minutes discharging the patient  This time was spent on the day of discharge  I had direct contact with the patient on the day of discharge  Greater than 50% of the total time was spent examining patient, answering all patient questions, arranging and discussing plan of care with patient as well as directly providing post-discharge instructions  Additional time then spent on discharge activities  Discharge Medications:  See after visit summary for reconciled discharge medications provided to patient and family        ** Please Note: This note has been constructed using a voice recognition system **

## 2022-02-11 NOTE — CASE MANAGEMENT
Case Management Discharge Planning Note    Patient name Suad Weinberg  Location [address] [address] /E2 MS 1-* MRN [id number]  : 1965 Date 2022       Current Admission Date: 2022  Current Admission Diagnosis:Hypertensive emergency   Patient Active Problem List    Diagnosis Date Noted    Hypocalcemia 2022    Anemia of chronic disease     Diastolic heart failure ([place] [address] ) 2022    Acute respiratory failure with hypoxia ([place] [address] ) 2022    Hypertensive emergency 2022    Dialysis patient ([place] [address] ) 2021    Visual disorder 2021    Adenomatous polyp of colon 10/01/2021    GERD (gastroesophageal reflux disease) 2021    Abnormal EKG 2021    Pulmonary nodule 2021    Hypertensive urgency 2021    Long term (current) use of antibiotics 02/15/2021    TB lung, latent 2021    Positive QuantiFERON-TB Gold test 2021    Cough 2021    Onychomycosis 2020    Diabetic polyneuropathy associated with type 2 diabetes mellitus ([place] [address] ) 2020    Neutropenia (HCC) 2020    Slow transit constipation 2019    Hyperphosphatemia 2019    Benign hypertension with ESRD (end-stage renal disease) ([place] [address] )     Chronic kidney disease-mineral and bone disorder 2019    Azotemia 2019    Elevated troponin I level 2019    S/P arteriovenous (AV) fistula creation 2019    ESRD (end stage renal disease) ([place] [address] ) 2019    Chronic constipation 2018    Screening for colon cancer 2018    Hyperlipidemia 2018    Type 2 diabetes mellitus, with long-term current use of insulin ([place] [address] ) 2018    Diabetic retinopathy of both eyes associated with type 2 diabetes mellitus ([place] [address] ) 2018    LVH (left ventricular hypertrophy) 2018      LOS (days): 4  Geometric Mean LOS (GMLOS) (days): 2 90  Days to GMLOS:-0 8     OBJECTIVE:  Risk of Unplanned Readmission Score: 25 Current admission status: Inpatient   Preferred Pharmacy:   [facility] - [address] [address]  Phone: [phone] Fax: [address] ,  [facility] [address] [place] [address]  Phone: [phone] Fax: [phone]    Primary Care Provider: Merlin Turpin DO    Primary Insurance: MEDICARE  Secondary Insurance: [facility]    DISCHARGE DETAILS:    Discharge planning discussed with[de-identified] Sister        CM contacted family/caregiver?: Yes (Sister via phone)  Were Treatment Team discharge recommendations reviewed with patient/caregiver?: Yes  Did patient/caregiver verbalize understanding of patient care needs?: Yes  Were patient/caregiver advised of the risks associated with not following Treatment Team discharge recommendations?: Yes    Contacts  Patient Contacts: Ottomide (sister)  Relationship to Patient[de-identified] Family  Contact Method: Phone  Phone Number: [phone]  Reason/Outcome: Discharge Planning,Continuity of [address] [place] [address]         Is the patient interested in [address] at discharge?: Yes  [address] requested[de-identified] [facility] Name[de-identified] Other ([address] [place] [address])  [facility] Provider[de-identified] PCP  Home Health Services Needed[de-identified] Evaluate Functional Status and Safety,Gait/ADL Training,Strengthening/Theraputic Exercises to Improve Function  Homebound Criteria Met[de-identified] Requires the Assistance of Another Person for Safe Ambulation or to Leave the Home,Immunosuppressed  Supporting Clincal Findings[de-identified] Limited Endurance,Fatigues Easliy in Short Distances                   Treatment Team Recommendation: Home with [address] [place] [address]  Discharge Destination Plan[de-identified] Home with Carlotta at Discharge : [facility] Additional Comments: Agreeable to home health for PT/OT  [address] [address] [facility] is able to accept for PT/OT  Sister will be picking the pt up when ready for discharge  Waiting for RT to complete O2 study to determine if there is an oxygen need

## 2022-02-11 NOTE — ASSESSMENT & PLAN NOTE
Lab Results   Component Value Date    HGBA1C 7 8 (A) 10/01/2021     Recent Labs     02/09/22  0819 02/09/22  1218 02/09/22  1735 02/10/22  1617 02/10/22  2129 02/11/22  0757   POCGLU 184* 76 150* 77 133 123     Continue home regimen on discharge  Glargine 32 U daily  Aspart 10U breakfast 10U Dinner

## 2022-02-11 NOTE — ASSESSMENT & PLAN NOTE
Lab Results   Component Value Date    HGBA1C 7 8 (A) 10/01/2021     Recent Labs     02/09/22  1735 02/10/22  1617 02/10/22  2129 02/11/22  0757 02/11/22  1115 02/11/22  1620   POCGLU 150* 77 133 123 169* 111     While inpatient maintain at 25U HS  Continue home regimen on discharge  Glargine 32 U daily  Aspart 10U breakfast 10U Dinner

## 2022-02-11 NOTE — OCCUPATIONAL THERAPY NOTE
Occupational Therapy Evaluation(time=0905-0925)     Patient Name: Meg Carbone  Today's Date: 2/11/2022  Problem List  Principal Problem:    Hypertensive emergency  Active Problems:    Type 2 diabetes mellitus, with long-term current use of insulin (HCC)    Hyperlipidemia    ESRD (end stage renal disease) ([facility] [address] )    Acute respiratory failure with hypoxia (HCC)    Anemia of chronic disease    Diastolic heart failure (HCC)    Hypocalcemia    Past Medical History  Past Medical History:   Diagnosis Date    Cataract     Chronic kidney disease     Diabetes mellitus ([facility] [address] )      IDDM    Diabetic retinopathy ([facility] [address] )     Dizziness     occ    ESRD (end stage renal disease) ([facility] [address] )     GERD (gastroesophageal reflux disease)     Headache     occ    Hyperlipidemia     Hypertension     Legally blind     LVH (left ventricular hypertrophy)     Preferred language is [place]     understands some English    Use of cane as ambulatory aid      Past Surgical History  Past Surgical History:   Procedure Laterality Date    INGUINAL HERNIA REPAIR Right     IR AV FISTULAGRAM/GRAFTOGRAM  4/30/2019    IR AV FISTULAGRAM/GRAFTOGRAM  6/14/2019    IR AV FISTULAGRAM/GRAFTOGRAM  6/24/2020    IR AV FISTULAGRAM/GRAFTOGRAM  2/26/2021    IR AV FISTULAGRAM/GRAFTOGRAM  9/3/2021    IR TUNNELED DIALYSIS CATHETER PLACEMENT  4/3/2019    WI ANASTOMOSIS,AV,ANY SITE Left 1/23/2019    Procedure: CREATION FISTULA ARTERIOVENOUS (AV);   Surgeon: Mirza Avalos MD;  Location: [facility];  Service: Vascular             02/11/22 2564   Note Type   Note type Evaluation   Restrictions/Precautions   Weight Bearing Precautions Per Order No   Other Precautions Fall Risk;Visual impairment   Pain Assessment   Pain Assessment Tool 0-10   Pain Score No Pain   Home Living   Type of [address] [place] [address] Two level;1/2 bath on main level  (2 kristy)   Bathroom Shower/Tub Tub/shower unit   Bathroom Toilet Standard   Bathroom Equipment Shower chair   Home Equipment Cane   Prior Function   Lives With Other (Comment)  (sister)   ADL Assistance Needs assistance   Falls in the last 6 months 1 to 4   Lifestyle   Autonomy PTA pt states independence with his ADLs, assistance with transfers/ambulation(w/o device) 2* blindness   Reciprocal Relationships supportive sister   Service to Others currently unemployed   Intrinsic Gratification listening to music   Psychosocial   Psychosocial (WDL) WDL   Subjective   Subjective "Someone stoled my cane for the visual impaired "   ADL   Where Assessed Edge of bed   Eating Assistance 5  Supervision/Setup   Grooming Assistance 5  Supervision/Setup   UB Bathing Assistance 5  Supervision/Setup   LB Bathing Assistance 5  Supervision/Setup   UB Dressing Assistance 5  Supervision/Setup   LB Dressing Assistance 5  Supervision/Setup   Bed Mobility   Rolling R 5  Supervision   Rolling L 5  Supervision   Supine to Sit 5  Supervision   Transfers   Sit to Stand 5  Supervision   Additional items Verbal cues; Increased time required   Stand to Sit 5  Supervision   Additional items Verbal cues; Increased time required   Functional Mobility   Functional Mobility 4  Minimal assistance   Additional Comments x1   Additional items SPC; Hand hold assistance   Balance   Static Sitting Good   Dynamic Sitting Good   Static Standing Fair +   Dynamic Standing Fair   Activity Tolerance   Activity Tolerance Patient tolerated treatment well   Medical Staff Made Aware nsg, P T , CM   RUE Assessment   RUE Assessment WFL   RUE Strength   RUE Overall Strength Within Functional Limits - strength 5/5   LUE Assessment   LUE Assessment WFL   LUE Strength   LUE Overall Strength Within Functional Limits - strength 5/5   Hand Function   Gross Motor Coordination Functional   Fine Motor Coordination Functional   Sensation   Light Touch No apparent deficits   Proprioception   Proprioception No apparent deficits   Vision-Basic Assessment   Visual History   (hx blindness)   Vision - Complex Assessment   Acuity   (poor)   Cognition   Overall Cognitive Status WFL   Arousal/Participation Alert   Attention Within functional limits   Orientation Level Oriented X4   Memory Within functional limits   Following Commands Follows one step commands without difficulty   Comments able to speak english, but not his primary language; speaks [place]   Assessment   Limitation Decreased endurance;Decreased high-level ADLs   Prognosis Good   Assessment Pt is a 56y/o male admitted to the hospital 2* symptoms of SOB  Pt noted with HTN emergency, acute respiratory failure, and volume overload  Pt with PMH CKD, DM, diabetic retinopathy, ESRD--dialysis, HTN, legally blind, and LVH  PTA pt states independence with his ADLs, assistance with transfers/ambulation(w/o device) 2* blindness  During initial eval, pt demonstrated slight deficits with his functional balance, functional mobility, and activity tolerance  Pt was able to demonstrate good ADL status and states no concerns about going directly home  Pt would benefit from continued P T  to improve his overall endurance, balance, and mobility  Acute OT tx not indicated at this time 2* limited ADLs deficits      Goals   Patient Goals "to go home"   Plan   OT Frequency Eval only   Recommendation   OT Discharge Recommendation Home with home health rehabilitation  (home P T  )   AM-PAC Daily Activity Inpatient   Lower Body Dressing 3   Bathing 3   Toileting 3   Upper Body Dressing 3   Grooming 3   Eating 3   Daily Activity Raw Score 18   Daily Activity Standardized Score (Calc for Raw Score >=11) 38 66   AM-PAC Applied Cognition Inpatient   Following a Speech/Presentation 3   Understanding Ordinary Conversation 3   Taking Medications 3   Remembering Where Things Are Placed or Put Away 3   Remembering List of 4-5 Errands 3   Taking Care of Complicated Tasks 3   Applied Cognition Raw Score 18   Applied Cognition Standardized Score 38 07   Tabitha Erazo, OT

## 2022-02-11 NOTE — RESPIRATORY THERAPY NOTE
Home Oxygen Qualifying Test     Patient name: Kiesha Raymond        : 1965   Date of Test:  2022  Diagnosis:    Home Oxygen Test:    **Medicare Guidelines require item(s) 1-5 on all ambulatory patients or 1 and 2 on non-ambulatory patients  1  Baseline SPO2 on Room Air at rest 100 %   a  If <= 88% on Room Air add O2 via NC to obtain SpO2 >=88%  If LPM needed, document LPM  needed to reach =>88%    2  SPO2 during exertion on Room Air 86 %  a  During exertion monitor SPO2  If SPO2 increases >=89%, do not add supplemental oxygen    3  SPO2 on Oxygen at Rest n/a % at n/a LPM    4  SPO2 during exertion on Oxygen 91 % at 3 LPM    Test performed during exertion activity  Pt walked with cane at good pace with extra therapist holding pt's left hand due to being partially blind  6 min 's total walk  Pursed-lipped breathing instructed   [x]  Supplemental Home Oxygen is indicated  []  Client does not qualify for home oxygen      Respiratory Additional Notes-     Fatimah Seay, RT

## 2022-02-11 NOTE — ASSESSMENT & PLAN NOTE
Multifactorial: Pulmonary edema, hypertension, and ESRD  - Resolved  On room air  However, requires oxygen on discharge  Sister is unable to be present this evening for the oxygen delivery since she will be going to work   Will plan for discharge zain am

## 2022-02-12 ENCOUNTER — APPOINTMENT (INPATIENT)
Dept: DIALYSIS | Facility: HOSPITAL | Age: 57
DRG: 304 | End: 2022-02-12
Attending: INTERNAL MEDICINE
Payer: MEDICARE

## 2022-02-12 LAB
DME PARACHUTE DELIVERY DATE ACTUAL: NORMAL
DME PARACHUTE DELIVERY DATE EXPECTED: NORMAL
DME PARACHUTE DELIVERY DATE REQUESTED: NORMAL
DME PARACHUTE ITEM DESCRIPTION: NORMAL
DME PARACHUTE ORDER STATUS: NORMAL
DME PARACHUTE SUPPLIER NAME: NORMAL
DME PARACHUTE SUPPLIER PHONE: NORMAL
GLUCOSE SERPL-MCNC: 218 MG/DL (ref 65–140)
GLUCOSE SERPL-MCNC: 46 MG/DL (ref 65–140)
GLUCOSE SERPL-MCNC: 85 MG/DL (ref 65–140)

## 2022-02-12 PROCEDURE — 90935 HEMODIALYSIS ONE EVALUATION: CPT | Performed by: STUDENT IN AN ORGANIZED HEALTH CARE EDUCATION/TRAINING PROGRAM

## 2022-02-12 PROCEDURE — 99232 SBSQ HOSP IP/OBS MODERATE 35: CPT | Performed by: STUDENT IN AN ORGANIZED HEALTH CARE EDUCATION/TRAINING PROGRAM

## 2022-02-12 PROCEDURE — 82948 REAGENT STRIP/BLOOD GLUCOSE: CPT

## 2022-02-12 PROCEDURE — 5A1D70Z PERFORMANCE OF URINARY FILTRATION, INTERMITTENT, LESS THAN 6 HOURS PER DAY: ICD-10-PCS | Performed by: STUDENT IN AN ORGANIZED HEALTH CARE EDUCATION/TRAINING PROGRAM

## 2022-02-12 RX ADMIN — DOXERCALCIFEROL 2.5 MCG: 4 INJECTION, SOLUTION INTRAVENOUS at 17:12

## 2022-02-12 RX ADMIN — HEPARIN SODIUM 5000 UNITS: 5000 INJECTION INTRAVENOUS; SUBCUTANEOUS at 21:11

## 2022-02-12 RX ADMIN — CALCIUM ACETATE 667 MG: 667 CAPSULE ORAL at 07:39

## 2022-02-12 RX ADMIN — GLYCERIN, HYPROMELLOSE, POLYETHYLENE GLYCOL 2 DROP: .2; .2; 1 LIQUID OPHTHALMIC at 17:10

## 2022-02-12 RX ADMIN — HEPARIN SODIUM 5000 UNITS: 5000 INJECTION INTRAVENOUS; SUBCUTANEOUS at 10:00

## 2022-02-12 RX ADMIN — FUROSEMIDE 80 MG: 40 TABLET ORAL at 09:34

## 2022-02-12 RX ADMIN — BRIMONIDINE TARTRATE 1 DROP: 2 SOLUTION/ DROPS OPHTHALMIC at 21:11

## 2022-02-12 RX ADMIN — LABETALOL HYDROCHLORIDE 400 MG: 100 TABLET, FILM COATED ORAL at 21:11

## 2022-02-12 RX ADMIN — Medication 2000 UNITS: at 09:33

## 2022-02-12 RX ADMIN — ATORVASTATIN CALCIUM 40 MG: 40 TABLET, FILM COATED ORAL at 17:03

## 2022-02-12 RX ADMIN — EPOETIN ALFA 2000 UNITS: 2000 SOLUTION INTRAVENOUS; SUBCUTANEOUS at 17:12

## 2022-02-12 RX ADMIN — CALCIUM ACETATE 667 MG: 667 CAPSULE ORAL at 11:53

## 2022-02-12 RX ADMIN — NIFEDIPINE 90 MG: 30 TABLET, FILM COATED, EXTENDED RELEASE ORAL at 10:32

## 2022-02-12 RX ADMIN — DOXAZOSIN 4 MG: 4 TABLET ORAL at 21:11

## 2022-02-12 RX ADMIN — GLYCERIN, HYPROMELLOSE, POLYETHYLENE GLYCOL 2 DROP: .2; .2; 1 LIQUID OPHTHALMIC at 09:29

## 2022-02-12 RX ADMIN — CALCIUM ACETATE 667 MG: 667 CAPSULE ORAL at 16:58

## 2022-02-12 RX ADMIN — PREDNISOLONE ACETATE 1 DROP: 10 SUSPENSION/ DROPS OPHTHALMIC at 21:10

## 2022-02-12 RX ADMIN — ASPIRIN 81 MG: 81 TABLET, COATED ORAL at 09:33

## 2022-02-12 RX ADMIN — BRIMONIDINE TARTRATE 1 DROP: 2 SOLUTION/ DROPS OPHTHALMIC at 09:29

## 2022-02-12 RX ADMIN — LABETALOL HYDROCHLORIDE 400 MG: 100 TABLET, FILM COATED ORAL at 10:32

## 2022-02-12 RX ADMIN — PREDNISOLONE ACETATE 1 DROP: 10 SUSPENSION/ DROPS OPHTHALMIC at 09:29

## 2022-02-12 RX ADMIN — INSULIN GLARGINE 25 UNITS: 100 INJECTION, SOLUTION SUBCUTANEOUS at 21:13

## 2022-02-12 RX ADMIN — INSULIN LISPRO 10 UNITS: 100 INJECTION, SOLUTION INTRAVENOUS; SUBCUTANEOUS at 07:39

## 2022-02-12 RX ADMIN — INSULIN LISPRO 10 UNITS: 100 INJECTION, SOLUTION INTRAVENOUS; SUBCUTANEOUS at 11:54

## 2022-02-12 NOTE — ASSESSMENT & PLAN NOTE
61year old male with ESRD on HD presenting with acute respiratory failure with hypoxia and hypertensive emergency secondary to volume overload  Resolved  - Cleared by renal for discharge  Continue Cardura, lasix, and Nifedipine  For dialysis today  - Sister unable to care for him today  Pickup scheduled for zain  Patient is blind

## 2022-02-12 NOTE — PROGRESS NOTES
[address] [place] [address]  Progress Note - Paddy Trevin 1965, 61 y o  male MRN: [id number]  Unit/Bed#: E2 -01 Encounter: [id number]  Primary Care Provider: Tim Hughes DO   Date and time admitted to hospital: 2/7/2022  3:14 PM    * Hypertensive emergency  Assessment & Plan  61year old male with ESRD on HD presenting with acute respiratory failure with hypoxia and hypertensive emergency secondary to volume overload  Resolved  - Cleared by renal for discharge  Continue Cardura, lasix, and Nifedipine  For dialysis today  - Sister unable to care for him today  Pickup scheduled for zain  Patient is blind  Acute respiratory failure with hypoxia [facility])  Assessment & Plan  Multifactorial: Pulmonary edema, hypertension, and ESRD  - Resolved  On room air  Requires oxygen on exertion  Type 2 diabetes mellitus, with long-term current use of insulin [facility])  Assessment & Plan  Lab Results   Component Value Date    HGBA1C 7 8 (A) 10/01/2021     Recent Labs     02/10/22  1617 02/10/22  2129 02/11/22  0757 02/11/22  1115 02/11/22  1620 02/11/22  2118   POCGLU 77 133 123 169* 111 238*     While inpatient maintain at 25U HS  Continue home regimen on discharge  Glargine 32 U daily  Aspart 10U breakfast 10U Dinner  Anemia of chronic disease  Assessment & Plan  Stable  No indication for transfusion at this time  Recent Labs     02/10/22  0433 02/11/22  0547   HGB 8 4* 9 7*   MCV 92 91   RDW 16 2* 16 4*         ESRD (end stage renal disease) ([facility])  Assessment & Plan  With volume overload on presentation   - HD / management per renal  Scheduled for T-Th-s    Hyperlipidemia  Assessment & Plan  Continue statin      VTE Pharmacologic Prophylaxis:   Pharmacologic: Heparin  Mechanical VTE Prophylaxis in Place: Yes    Discussions with Specialists or Other Care Team Provider: nursing    Education and Discussions with Family / Patient: patient    Time Spent for Care: 30 minutes    More than 50% of total time spent on counseling and coordination of care as described above  Current Length of Stay: 5 day(s)    Current Patient Status: Inpatient   Certification Statement: The patient will continue to require additional inpatient hospital stay due to hemodialysis today, then discharge zain  No caregiver available today  Discharge Plan: 24 hours    Code Status: Level 1 - Full Code      Subjective:   Patient seen and examined at bedside  Comfortable  No new complaints  Objective:     Vitals:   Temp (24hrs), Av 3 °F (36 3 °C), Min:96 9 °F (36 1 °C), Max:97 8 °F (36 6 °C)    Temp:  [96 9 °F (36 1 °C)-97 8 °F (36 6 °C)] 96 9 °F (36 1 °C)  HR:  [74-98] 76  Resp:  [18-20] 18  BP: (139-151)/(68-76) 139/68  SpO2:  [94 %-96 %] 96 %  Body mass index is 25 54 kg/m²  Input and Output Summary (last 24 hours):     No intake or output data in the 24 hours ending 22 1032    Physical Exam:     Physical Exam  Vitals reviewed  Constitutional:       General: He is not in acute distress  HENT:      Head: Normocephalic  Nose: Nose normal       Mouth/Throat:      Mouth: Mucous membranes are moist    Eyes:      General: No scleral icterus  Cardiovascular:      Rate and Rhythm: Normal rate  Pulmonary:      Effort: Pulmonary effort is normal  No respiratory distress  Abdominal:      Palpations: Abdomen is soft  Tenderness: There is no abdominal tenderness  Skin:     General: Skin is warm  Neurological:      Mental Status: He is alert  Mental status is at baseline     Psychiatric:         Mood and Affect: Mood normal          Behavior: Behavior normal        Additional Data:     Labs:    Results from last 7 days   Lab Units 22  0547   WBC Thousand/uL 5 90   HEMOGLOBIN g/dL 9 7*   HEMATOCRIT % 31 4*   PLATELETS Thousands/uL 294   NEUTROS PCT % 58   LYMPHS PCT % 24   MONOS PCT % 14*   EOS PCT % 3     Results from last 7 days   Lab Units 22  0547   SODIUM mmol/L 137   POTASSIUM mmol/L 4 8 CHLORIDE mmol/L 98*   CO2 mmol/L 29   BUN mg/dL 31*   CREATININE mg/dL 6 60*   ANION GAP mmol/L 10   CALCIUM mg/dL 9 4   GLUCOSE RANDOM mg/dL 64*     Results from last 7 days   Lab Units 02/07/22  1539   INR  1 05     Results from last 7 days   Lab Units 02/11/22  2118 02/11/22  1620 02/11/22  1115 02/11/22  0757 02/10/22  2129 02/10/22  1617 02/09/22  1735 02/09/22  1218 02/09/22  0819 02/08/22  1805 02/08/22  1151 02/08/22  0826   POC GLUCOSE mg/dl 238* 111 169* 123 133 77 150* 76 184* 110 185* 79                   * I Have Reviewed All Lab Data Listed Above  * Additional Pertinent Lab Tests Reviewed:  Svetlana 65 Admission Reviewed    Imaging:    Imaging Reports Reviewed Today Include: no new imaging    Recent Cultures (last 7 days):           Last 24 Hours Medication List:   Current Facility-Administered Medications   Medication Dose Route Frequency Provider Last Rate    aspirin  81 mg Oral Daily Oral Marie MD      atorvastatin  40 mg Oral QPM Oral Marie MD      brimonidine tartrate  1 drop Left Eye BID Oral Marie MD      calcium acetate  667 mg Oral TID With 540 Shaina Griffiths MD      cholecalciferol  2,000 Units Oral Daily Oral Marie MD      cyclobenzaprine  5 mg Oral HS PRN Oral Marie MD      doxazosin  4 mg Oral HS Oral Marie MD      doxercalciferol  2 5 mcg Intravenous Once per day on Tue Thu Sat Oral Marie MD      epoetin josé miguel  2,000 Units Intravenous Once per day on Tue Thu Sat Seretha Ahumada, MD      furosemide  80 mg Oral Daily Oral Marie MD      glycerin-hypromellose-  2 drop Both Eyes BID Oral Marie MD      heparin (porcine)  5,000 Units Subcutaneous Q12H Cornelius Conn MD      insulin glargine  25 Units Subcutaneous HS Oral Marie MD      insulin lispro  10 Units Subcutaneous TID With 540 Shaina Griffiths MD      labetalol  400 mg Oral Q12H Cornelius Conn MD      NIFEdipine  90 mg Oral Daily Oral Marie MD      prednisoLONE acetate  1 drop Left Eye BID Ladi Todd MD          Today, Patient Was Seen By: Patti Dwyer MD    ** Please Note: Dictation voice to text software may have been used in the creation of this document   **

## 2022-02-12 NOTE — PROGRESS NOTES
Progress note- Nephrology   Paddy Trevin 61 y o  male MRN: [id number]  Unit/Bed#: E2 -01 Encounter: [id number]    79-year-old male with history of ER SD on HD, hypertension, diabetes, recent COVID-19 infection who initially presented with worsening shortness of breath and cough  Nephrology consulted for dialysis management    HEMODIALYSIS PROCEDURE NOTE  The patient was seen and examined on hemodialysis  Time: 3hours  Sodium: 138 Blood flow:  400   Dialyzer: F160 Potassium: 2 Dialysate flow:  500   Access:  AV fistula Bicarbonate: 35 Ultrafiltration goal: 0 5-1 0   Medications on HD:  Epogen       ASSESSMENT/PLAN:  End-stage renal disease:   Assessment:   On maintenance hemod[facility] every TTS at [facility]   EDW:  Decreased to 75 kg yesterday-new baseline   Receiving hemodialysis today for 3 hours  Plan:   Next treatment will be as outpatient   Plan discharge tomorrow per primary team   Avoid nephrotoxins, adjust meds to appropriate GFR   Will continue to follow I/O, labs and volume status   Per Fabian Bradford note from yesterday-new dry weight was related to outpatient HD nurse personally    Access:   Assessment and Plan:   AV fistula-positive bruit and thrill-continue well on hemodialysis    Hypertension:  Assessment and Plan:   Current BP acceptable   Avoid variations in blood pressure   Current medications include:  Nifedipine XL 90 mg daily, Lasix 80 mg daily, doxazosin 4 mg daily, labetalol 400 mg b i d   Will UF as blood pressure allows    Anemia likely Chronic Kidney Disease:  Assessment and Plan:   On Epogen 2000 units on HD while admitted   Current hemoglobin 9 7   Transfuse for hemoglobin less than 7 0    Bone mineral disease of Chronic Kidney Disease:  Assessment and Plan:   Hyperphosphatemia:  On calcium acetate one tablet t i d  With meals   Secondary hyperparathyroidism:  On Hectorol 2 5 mcg with dialysis, Sensipar daily   Recommend renal diet   Check PTH and phosphorus as outpatient    Electrolytes/Acid Base:  Assessment:  · Currently within normal limits  Plan:   Sodium, phosphorus, potassium, and acidosis to be corrected with dialysis   Will continue to monitor     Hypoxic respiratory failure:  Assessment and plan:  · Recent chest x-ray concerning for pulmonary congestion  · UF was challenge and dry weight decreased  · Echocardiogram revealed EF of 50% with mild-to-moderate MR, IVC was normal no pericardial effusion      Disposition:  Patient stable from a dialysis standpoint  Okay for discharge when medically ready per primary team    SUBJECTIVE:  Patient seen and examined at bedside on dialysis  Denies chest pain or shortness of breath, dizziness or lightheadedness    Overall feels well    OBJECTIVE:  Current Weight: Weight - Scale: 80 7 kg (178 lb)  Vitals:    02/11/22 0722 02/11/22 1700 02/11/22 2300 02/12/22 0811   BP: 151/72 148/76 151/72 139/68   BP Location: Right arm Right arm Right arm Right arm   Pulse: 77 74 98 76   Resp: 21 20 20 18   Temp: (!) 96 8 °F (36 °C) (!) 97 2 °F (36 2 °C) 97 8 °F (36 6 °C) (!) 96 9 °F (36 1 °C)   TempSrc: Temporal Temporal Tympanic Tympanic   SpO2: 96% 96% 94% 96%   Weight:       Height:         No intake or output data in the 24 hours ending 02/12/22 1455  General:  No acute distress, cooperative, lying in bed  Skin:  Warm and dry without rash  ENMT:  Mucous membranes moist, sclera anicteric  Respiratory:  Essentially clear on auscultation without crackles, rhonchi, wheezes  Cardiac:  Regular rate and rhythm without rub, or murmur  GI:  Soft, nontender, no distention, active bowel sounds  Neuro:  Alert oriented and awake, no gross deficits  Psych:  Appropriate affect  Muscle skeletal:  Left AV fistula positive bruit and thrill, no deformities    Medications:    Current Facility-Administered Medications:     aspirin (ECOTRIN LOW STRENGTH) EC tablet 81 mg, 81 mg, Oral, Daily, Toi Brown MD, 81 mg at 02/12/22 9211   atorvastatin (LIPITOR) tablet 40 mg, 40 mg, Oral, QPM, Farzad Leon MD, 40 mg at 02/11/22 1717    brimonidine tartrate 0 2 % ophthalmic solution 1 drop, 1 drop, Left Eye, BID, Farzad Leon MD, 1 drop at 02/12/22 0929    calcium acetate (PHOSLO) capsule 667 mg, 667 mg, Oral, TID With Meals, Farzad Leon MD, 667 mg at 02/12/22 1153    cholecalciferol (VITAMIN D3) tablet 2,000 Units, 2,000 Units, Oral, Daily, Farzad Leon MD, 2,000 Units at 02/12/22 0933    cyclobenzaprine (FLEXERIL) tablet 5 mg, 5 mg, Oral, HS PRN, Farzad Leon MD    doxazosin (CARDURA) tablet 4 mg, 4 mg, Oral, HS, Farzad Leon MD, 4 mg at 02/11/22 2123    doxercalciferol (HECTOROL) injection 2 5 mcg, 2 5 mcg, Intravenous, Once per day on Tue Thu Sat, Farzad Leon MD, 2 5 mcg at 02/10/22 8794    epoetin josé miguel (EPOGEN,PROCRIT) injection 2,000 Units, 2,000 Units, Intravenous, Once per day on Tue Thu Sat, Gerardo Santacruz MD, 2,000 Units at 02/10/22 1021    furosemide (LASIX) tablet 80 mg, 80 mg, Oral, Daily, Farzad Leon MD, 80 mg at 02/12/22 0934    glycerin-hypromellose- (ARTIFICIAL TEARS) ophthalmic solution 2 drop, 2 drop, Both Eyes, BID, Farzad Leon MD, 2 drop at 02/12/22 0929    heparin (porcine) subcutaneous injection 5,000 Units, 5,000 Units, Subcutaneous, Q12H [address], 5,000 Units at 02/12/22 1000 **AND** Platelet count, , , Once, Farzad Leon MD    insulin glargine (LANTUS) subcutaneous injection 25 Units 0 25 mL, 25 Units, Subcutaneous, HS, Farzad Leon MD, 25 Units at 02/11/22 2125    insulin lispro (HumaLOG) 100 units/mL subcutaneous injection 10 Units, 10 Units, Subcutaneous, TID With Meals, Farzad Leon MD, 10 Units at 02/12/22 1154    labetalol (NORMODYNE) tablet 400 mg, 400 mg, Oral, Q12H [address], Farzad Leon MD, 400 mg at 02/12/22 1032    NIFEdipine (PROCARDIA XL) 24 hr tablet 90 mg, 90 mg, Oral, Daily, Farzad Leon MD, 90 mg at 02/12/22 1032    prednisoLONE acetate (PRED FORTE) 1 % ophthalmic suspension 1 drop, 1 drop, Left Eye, BID, Renee Casillas MD, 1 drop at 02/12/22 6307    Laboratory Results:  Results from last 7 days   Lab Units 02/11/22  0547 02/10/22  0433 02/09/22  0445 02/08/22  0804 02/07/22  1539   WBC Thousand/uL 5 90 6 00 5 59 5 64 7 40   HEMOGLOBIN g/dL 9 7* 8 4* 9 1* 8 6* 9 4*   HEMATOCRIT % 31 4* 26 5* 29 0* 27 0* 30 1*   PLATELETS Thousands/uL 294 245 255 231 294   SODIUM mmol/L 137 140 138 138 137   POTASSIUM mmol/L 4 8 4 3 4 6 4 5 4 5   CHLORIDE mmol/L 98* 101 101 99* 100   CO2 mmol/L 29 27 29 29 26   BUN mg/dL 31* 45* 30* 44* 47*   CREATININE mg/dL 6 60* 8 43* 6 95* 9 54* 9 65*   CALCIUM mg/dL 9 4 8 4 8 8 9 2 9 9   MAGNESIUM mg/dL  --  2 1 2 1  --   --    PHOSPHORUS mg/dL  --  4 5 4 0  --   --

## 2022-02-12 NOTE — ASSESSMENT & PLAN NOTE
Multifactorial: Pulmonary edema, hypertension, and ESRD  - Resolved  On room air  Requires oxygen on exertion

## 2022-02-12 NOTE — PLAN OF CARE
Problem: METABOLIC, FLUID AND ELECTROLYTES - ADULT  Goal: Electrolytes maintained within normal limits  Description: INTERVENTIONS:  - Monitor labs and assess patient for signs and symptoms of electrolyte imbalances  - Administer electrolyte replacement as ordered  - Monitor response to electrolyte replacements, including repeat lab results as appropriate  - Instruct patient on fluid and nutrition as appropriate  Outcome: Progressing  Goal: Fluid balance maintained  Description: INTERVENTIONS:  - Monitor labs   - Monitor I/O and WT  - Instruct patient on fluid and nutrition as appropriate  - Assess for signs & symptoms of volume excess or deficit  Outcome: Progressing  Goal: Glucose maintained within target range  Description: INTERVENTIONS:  - Monitor Blood Glucose as ordered  - Assess for signs and symptoms of hyperglycemia and hypoglycemia  - Administer ordered medications to maintain glucose within target range  - Assess nutritional intake and initiate nutrition service referral as needed  Outcome: Progressing     Problem: DISCHARGE PLANNING  Goal: Discharge to home or other facility with appropriate resources  Description: INTERVENTIONS:  - Identify barriers to discharge w/patient and caregiver  - Arrange for needed discharge resources and transportation as appropriate  - Identify discharge learning needs (meds, wound care, etc )  - Arrange for interpretive services to assist at discharge as needed  - Refer to Case Management Department for coordinating discharge planning if the patient needs post-hospital services based on physician/advanced practitioner order or complex needs related to functional status, cognitive ability, or social support system  Outcome: Progressing

## 2022-02-12 NOTE — PLAN OF CARE
Post-Dialysis RN Treatment Note    Blood Pressure:  Pre-171/88  mm/Hg  Post-128/70 mmHg   EDW-75 kg    Weight:  Pre-78 7 kg   Post-76 5 kg   Mode of weight measurement: standing scale    Volume Removed-2000 ml    Treatment duration-180 minutes    NS given- NO   Treatment shortened? NO   Medications given during Rx- Hectorol 2 5 mcg, Epogen 2,000 u   Estimated Kt/V- NA   Access type: L AV fistula using 15 g needles   Access Issues: None noted   Report called to primary nurse- Twin Patton RN    Goal- remove 2-3 L as tolerated using 2 K + bath over 3 hr hd tx      Problem: METABOLIC, FLUID AND ELECTROLYTES - ADULT  Goal: Electrolytes maintained within normal limits  Description: INTERVENTIONS:  - Monitor labs and assess patient for signs and symptoms of electrolyte imbalances  - Administer electrolyte replacement as ordered  - Monitor response to electrolyte replacements, including repeat lab results as appropriate  - Instruct patient on fluid and nutrition as appropriate  Outcome: Progressing

## 2022-02-12 NOTE — ASSESSMENT & PLAN NOTE
Stable  No indication for transfusion at this time      Recent Labs     02/10/22  0433 02/11/22  0547   HGB 8 4* 9 7*   MCV 92 91   RDW 16 2* 16 4*

## 2022-02-12 NOTE — PLAN OF CARE
Problem: SAFETY ADULT  Goal: Patient will remain free of falls  Description: INTERVENTIONS:  - Educate patient/family on patient safety including physical limitations  - Instruct patient to call for assistance with activity   - Consult OT/PT to assist with strengthening/mobility   - Keep Call bell within reach  - Keep bed low and locked with side rails adjusted as appropriate  - Keep care items and personal belongings within reach  - Initiate and maintain comfort rounds  - Make Fall Risk Sign visible to staff  - Apply yellow socks and bracelet for high fall risk patients  - Consider moving patient to room near nurses station  Outcome: Progressing     Problem: DISCHARGE PLANNING  Goal: Discharge to home or other facility with appropriate resources  Description: INTERVENTIONS:  - Identify barriers to discharge w/patient and caregiver  - Arrange for needed discharge resources and transportation as appropriate  - Identify discharge learning needs (meds, wound care, etc )  - Arrange for interpretive services to assist at discharge as needed  - Refer to Case Management Department for coordinating discharge planning if the patient needs post-hospital services based on physician/advanced practitioner order or complex needs related to functional status, cognitive ability, or social support system  Outcome: Progressing     Problem: Knowledge Deficit  Goal: Patient/family/caregiver demonstrates understanding of disease process, treatment plan, medications, and discharge instructions  Description: Complete learning assessment and assess knowledge base    Interventions:  - Provide teaching at level of understanding  - Provide teaching via preferred learning methods  Outcome: Progressing

## 2022-02-12 NOTE — ASSESSMENT & PLAN NOTE
Lab Results   Component Value Date    HGBA1C 7 8 (A) 10/01/2021     Recent Labs     02/10/22  1617 02/10/22  2129 02/11/22  0757 02/11/22  1115 02/11/22  1620 02/11/22 2118   POCGLU 77 133 123 169* 111 238*     While inpatient maintain at 25U HS  Continue home regimen on discharge  Glargine 32 U daily  Aspart 10U breakfast 10U Dinner

## 2022-02-13 VITALS
HEIGHT: 70 IN | RESPIRATION RATE: 18 BRPM | SYSTOLIC BLOOD PRESSURE: 134 MMHG | OXYGEN SATURATION: 100 % | BODY MASS INDEX: 25.48 KG/M2 | DIASTOLIC BLOOD PRESSURE: 67 MMHG | TEMPERATURE: 96.9 F | WEIGHT: 178 LBS | HEART RATE: 73 BPM

## 2022-02-13 LAB — GLUCOSE SERPL-MCNC: 77 MG/DL (ref 65–140)

## 2022-02-13 PROCEDURE — 99239 HOSP IP/OBS DSCHRG MGMT >30: CPT | Performed by: STUDENT IN AN ORGANIZED HEALTH CARE EDUCATION/TRAINING PROGRAM

## 2022-02-13 PROCEDURE — 82948 REAGENT STRIP/BLOOD GLUCOSE: CPT

## 2022-02-13 PROCEDURE — NC001 PR NO CHARGE: Performed by: NURSE PRACTITIONER

## 2022-02-13 RX ADMIN — ASPIRIN 81 MG: 81 TABLET, COATED ORAL at 08:21

## 2022-02-13 RX ADMIN — LABETALOL HYDROCHLORIDE 400 MG: 100 TABLET, FILM COATED ORAL at 08:21

## 2022-02-13 RX ADMIN — BRIMONIDINE TARTRATE 1 DROP: 2 SOLUTION/ DROPS OPHTHALMIC at 08:24

## 2022-02-13 RX ADMIN — Medication 2000 UNITS: at 08:21

## 2022-02-13 RX ADMIN — HEPARIN SODIUM 5000 UNITS: 5000 INJECTION INTRAVENOUS; SUBCUTANEOUS at 08:21

## 2022-02-13 RX ADMIN — GLYCERIN, HYPROMELLOSE, POLYETHYLENE GLYCOL 2 DROP: .2; .2; 1 LIQUID OPHTHALMIC at 08:24

## 2022-02-13 RX ADMIN — PREDNISOLONE ACETATE 1 DROP: 10 SUSPENSION/ DROPS OPHTHALMIC at 08:24

## 2022-02-13 RX ADMIN — NIFEDIPINE 90 MG: 30 TABLET, FILM COATED, EXTENDED RELEASE ORAL at 08:21

## 2022-02-13 RX ADMIN — CALCIUM ACETATE 667 MG: 667 CAPSULE ORAL at 08:21

## 2022-02-13 RX ADMIN — FUROSEMIDE 80 MG: 40 TABLET ORAL at 08:21

## 2022-02-13 NOTE — PROGRESS NOTES
Nephrology following for ER SD on HD every TTS  Patient status post hemodialysis yesterday for -2 L UF  No acute events overnight  Nephrology will not see today  If patient remains hospitalized will see Monday for full review  Please call if we can be of further assistance

## 2022-02-13 NOTE — PLAN OF CARE
Problem: DISCHARGE PLANNING  Goal: Discharge to home or other facility with appropriate resources  Description: INTERVENTIONS:  - Identify barriers to discharge w/patient and caregiver  - Arrange for needed discharge resources and transportation as appropriate  - Identify discharge learning needs (meds, wound care, etc )  - Arrange for interpretive services to assist at discharge as needed  - Refer to Case Management Department for coordinating discharge planning if the patient needs post-hospital services based on physician/advanced practitioner order or complex needs related to functional status, cognitive ability, or social support system  Outcome: Progressing     Problem: Knowledge Deficit  Goal: Patient/family/caregiver demonstrates understanding of disease process, treatment plan, medications, and discharge instructions  Description: Complete learning assessment and assess knowledge base    Interventions:  - Provide teaching at level of understanding  - Provide teaching via preferred learning methods  Outcome: Progressing     Problem: METABOLIC, FLUID AND ELECTROLYTES - ADULT  Goal: Electrolytes maintained within normal limits  Description: INTERVENTIONS:  - Monitor labs and assess patient for signs and symptoms of electrolyte imbalances  - Administer electrolyte replacement as ordered  - Monitor response to electrolyte replacements, including repeat lab results as appropriate  - Instruct patient on fluid and nutrition as appropriate  Outcome: Progressing  Goal: Fluid balance maintained  Description: INTERVENTIONS:  - Monitor labs   - Monitor I/O and WT  - Instruct patient on fluid and nutrition as appropriate  - Assess for signs & symptoms of volume excess or deficit  Outcome: Progressing  Goal: Glucose maintained within target range  Description: INTERVENTIONS:  - Monitor Blood Glucose as ordered  - Assess for signs and symptoms of hyperglycemia and hypoglycemia  - Administer ordered medications to maintain glucose within target range  - Assess nutritional intake and initiate nutrition service referral as needed  Outcome: Progressing

## 2022-02-13 NOTE — NURSING NOTE
AVS reviewed with patient and sister  No questions at this time  Patient discharged via wheelchair accompanied by sister and RN with all belongings  2

## 2022-02-13 NOTE — PLAN OF CARE
Problem: METABOLIC, FLUID AND ELECTROLYTES - ADULT  Goal: Electrolytes maintained within normal limits  Description: INTERVENTIONS:  - Monitor labs and assess patient for signs and symptoms of electrolyte imbalances  - Administer electrolyte replacement as ordered  - Monitor response to electrolyte replacements, including repeat lab results as appropriate  - Instruct patient on fluid and nutrition as appropriate  Outcome: Progressing  Goal: Fluid balance maintained  Description: INTERVENTIONS:  - Monitor labs   - Monitor I/O and WT  - Instruct patient on fluid and nutrition as appropriate  - Assess for signs & symptoms of volume excess or deficit  Outcome: Progressing  Goal: Glucose maintained within target range  Description: INTERVENTIONS:  - Monitor Blood Glucose as ordered  - Assess for signs and symptoms of hyperglycemia and hypoglycemia  - Administer ordered medications to maintain glucose within target range  - Assess nutritional intake and initiate nutrition service referral as needed  Outcome: Progressing     Problem: SAFETY ADULT  Goal: Patient will remain free of falls  Description: INTERVENTIONS:  - Educate patient/family on patient safety including physical limitations  - Instruct patient to call for assistance with activity   - Consult OT/PT to assist with strengthening/mobility   - Keep Call bell within reach  - Keep bed low and locked with side rails adjusted as appropriate  - Keep care items and personal belongings within reach  - Initiate and maintain comfort rounds  - Make Fall Risk Sign visible to staff  - Offer Toileting every 2 Hours, in advance of need  - Initiate/Maintain bed alarm  - Obtain necessary fall risk management equipment  - Apply yellow socks and bracelet for high fall risk patients  - Consider moving patient to room near nurses station  Outcome: Progressing  Goal: Maintain or return to baseline ADL function  Description: INTERVENTIONS:  -  Assess patient's ability to carry out ADLs; assess patient's baseline for ADL function and identify physical deficits which impact ability to perform ADLs (bathing, care of mouth/teeth, toileting, grooming, dressing, etc )  - Assess/evaluate cause of self-care deficits   - Assess range of motion  - Assess patient's mobility; develop plan if impaired  - Assess patient's need for assistive devices and provide as appropriate  - Encourage maximum independence but intervene and supervise when necessary  - Involve family in performance of ADLs  - Assess for home care needs following discharge   - Consider OT consult to assist with ADL evaluation and planning for discharge  - Provide patient education as appropriate  Outcome: Progressing  Goal: Maintains/Returns to pre admission functional level  Description: INTERVENTIONS:  - Perform BMAT or MOVE assessment daily    - Set and communicate daily mobility goal to care team and patient/family/caregiver  - Collaborate with rehabilitation services on mobility goals if consulted  - Perform Range of Motion 2 times a day  - Reposition patient every 2 hours    - Dangle patient 2 times a day  - Stand patient 2 times a day  - Ambulate patient 2 times a day  - Out of bed to chair 2 times a day   - Out of bed for meals 2 times a day  - Out of bed for toileting  - Record patient progress and toleration of activity level   Outcome: Progressing     Problem: DISCHARGE PLANNING  Goal: Discharge to home or other facility with appropriate resources  Description: INTERVENTIONS:  - Identify barriers to discharge w/patient and caregiver  - Arrange for needed discharge resources and transportation as appropriate  - Identify discharge learning needs (meds, wound care, etc )  - Arrange for interpretive services to assist at discharge as needed  - Refer to Case Management Department for coordinating discharge planning if the patient needs post-hospital services based on physician/advanced practitioner order or complex needs related to functional status, cognitive ability, or social support system  Outcome: Progressing     Problem: Knowledge Deficit  Goal: Patient/family/caregiver demonstrates understanding of disease process, treatment plan, medications, and discharge instructions  Description: Complete learning assessment and assess knowledge base    Interventions:  - Provide teaching at level of understanding  - Provide teaching via preferred learning methods  Outcome: Progressing

## 2022-02-14 ENCOUNTER — TRANSITIONAL CARE MANAGEMENT (OUTPATIENT)
Dept: INTERNAL MEDICINE CLINIC | Facility: CLINIC | Age: 57
End: 2022-02-14

## 2022-02-21 ENCOUNTER — OFFICE VISIT (OUTPATIENT)
Dept: INTERNAL MEDICINE CLINIC | Facility: CLINIC | Age: 57
End: 2022-02-21
Payer: MEDICARE

## 2022-02-21 VITALS
WEIGHT: 176.8 LBS | OXYGEN SATURATION: 98 % | RESPIRATION RATE: 16 BRPM | HEIGHT: 70 IN | SYSTOLIC BLOOD PRESSURE: 100 MMHG | HEART RATE: 69 BPM | BODY MASS INDEX: 25.31 KG/M2 | TEMPERATURE: 97.8 F | DIASTOLIC BLOOD PRESSURE: 60 MMHG

## 2022-02-21 DIAGNOSIS — IMO0002 UNCONTROLLED SECONDARY DIABETES MELLITUS WITH STAGE 5 CKD (GFR<15): ICD-10-CM

## 2022-02-21 DIAGNOSIS — E11.22 TYPE 2 DIABETES MELLITUS WITH CHRONIC KIDNEY DISEASE ON CHRONIC DIALYSIS, WITH LONG-TERM CURRENT USE OF INSULIN (HCC): ICD-10-CM

## 2022-02-21 DIAGNOSIS — H26.9 CATARACT, UNSPECIFIED CATARACT TYPE, UNSPECIFIED LATERALITY: ICD-10-CM

## 2022-02-21 DIAGNOSIS — N18.6 TYPE 2 DIABETES MELLITUS WITH CHRONIC KIDNEY DISEASE ON CHRONIC DIALYSIS, WITH LONG-TERM CURRENT USE OF INSULIN (HCC): ICD-10-CM

## 2022-02-21 DIAGNOSIS — M54.2 NECK PAIN: ICD-10-CM

## 2022-02-21 DIAGNOSIS — Z79.4 TYPE 2 DIABETES MELLITUS WITH CHRONIC KIDNEY DISEASE ON CHRONIC DIALYSIS, WITH LONG-TERM CURRENT USE OF INSULIN (HCC): ICD-10-CM

## 2022-02-21 DIAGNOSIS — I16.1 HYPERTENSIVE EMERGENCY: Primary | ICD-10-CM

## 2022-02-21 DIAGNOSIS — Z99.2 TYPE 2 DIABETES MELLITUS WITH CHRONIC KIDNEY DISEASE ON CHRONIC DIALYSIS, WITH LONG-TERM CURRENT USE OF INSULIN (HCC): ICD-10-CM

## 2022-02-21 DIAGNOSIS — J96.01 ACUTE RESPIRATORY FAILURE WITH HYPOXIA (HCC): ICD-10-CM

## 2022-02-21 LAB — SL AMB POCT HEMOGLOBIN AIC: 6.7 (ref ?–6.5)

## 2022-02-21 PROCEDURE — 99495 TRANSJ CARE MGMT MOD F2F 14D: CPT | Performed by: INTERNAL MEDICINE

## 2022-02-21 PROCEDURE — 83036 HEMOGLOBIN GLYCOSYLATED A1C: CPT | Performed by: INTERNAL MEDICINE

## 2022-02-21 RX ORDER — ERGOCALCIFEROL 1.25 MG/1
CAPSULE ORAL
COMMUNITY
Start: 2021-12-17

## 2022-02-21 RX ORDER — METHOCARBAMOL 500 MG/1
500 TABLET, FILM COATED ORAL 2 TIMES DAILY PRN
Qty: 30 TABLET | Refills: 0 | Status: SHIPPED | OUTPATIENT
Start: 2022-02-21

## 2022-02-21 RX ORDER — INSULIN GLARGINE 100 [IU]/ML
28 INJECTION, SOLUTION SUBCUTANEOUS DAILY
Qty: 15 ML | Refills: 2 | Status: SHIPPED | OUTPATIENT
Start: 2022-02-21

## 2022-02-21 RX ORDER — TRIPROLIDINE/PSEUDOEPHEDRINE 2.5MG-60MG
TABLET ORAL
COMMUNITY
Start: 2022-01-21

## 2022-02-21 RX ORDER — ERYTHROMYCIN 5 MG/G
OINTMENT OPHTHALMIC
COMMUNITY
Start: 2022-01-20

## 2022-02-21 NOTE — PROGRESS NOTES
INTERNAL MEDICINE OFFICE VISIT  [facility] Internal Medicine- [place]    NAME: Paddy Trevin  AGE: 61 y o  SEX: male    DATE OF ENCOUNTER: 2/21/2022    Assessment and Plan     1  Hypertensive emergency  -hospital course as below  Initial /56 here in the office, 100/60 on repeat  He has not had any lightheadedness, headaches  Pressures have been somewhat labile based upon prior readings here in the office  -continue with current regimen for now - Cardura, Lasix, labetalol, nifedipine  -encouraged to continue to monitor BP closely at home    2  Type 2 diabetes mellitus with chronic kidney disease on chronic dialysis, with long-term current use of insulin ([facility])  -repeat A1c 6 7 today  Since discharge there have been some episodes of hypoglycemia, BG was 58 this morning  Sugar somewhat labile at home  -recommend decreasing glargine to 28 units at bedtime    - Microalbumin / creatinine urine ratio  - POCT hemoglobin A1c  - Hemoglobin A1C; Future    3  Uncontrolled secondary diabetes mellitus with stage 5 CKD (GFR<15) ([facility] [address] )  -continue follow-up with Nephrology, dialysis as scheduled  - insulin glargine (Basaglar KwikPen) 100 units/mL injection pen; Inject 28 Units under the skin daily  Dispense: 15 mL; Refill: 2    4  Neck pain  -will trial Robaxin  - methocarbamol (ROBAXIN) 500 mg tablet; Take 1 tablet (500 mg total) by mouth 2 (two) times a day as needed for muscle spasms  Dispense: 30 tablet; Refill: 0    5  Cataract, unspecified cataract type, unspecified laterality  -recently had cataract procedure  He is established with Ophthalmology at [facility]    6  Acute respiratory failure with hypoxia  -patient was evaluated by respiratory therapy on 02/11/2022 while inpatient  Qualifies for home oxygen with exertion           Paddy comes in today for TCM appointment   Medical history of insulin-dependent type 2 diabetes complicated by retinopathy and neuropathy, ESRD on HD, legally blind, chronic constipation, former smoking, latent TB    He initially presented on 02/07 due to shortness of breath, peripheral edema, hypoxia measured on home pulse ox  In the ER, found to be volume overloaded and hypoxic requiring O2 supplementation  Patient was followed by Nephrology and underwent urgent hemodialysis  He was transferred to the ICU for hypertensive emergency requiring nitroglycerin infusion  BP improved and he was able to be weaned back to room air            No orders of the defined types were placed in this encounter  Chief Complaint     No chief complaint on file  TCM Call (since 1/21/2022)     Date and time call was made  2/14/2022  5:20 PM    Patient was hospitialized at  [facility] [place] - CLOSED        Date of Admission  02/07/22    Date of discharge  02/13/22    Diagnosis  Hypertensive emergency    Disposition  Home    Were the patients medications reviewed and updated  Yes    Current Symptoms  None      TCM Call (since 1/21/2022)     Post hospital issues  None    Should patient be enrolled in anticoag monitoring? No    Scheduled for follow up?   Yes    Did you obtain your prescribed medications  Yes    Do you need help managing your prescriptions or medications  No    Is transportation to your appointment needed  No    I have advised the patient to call PCP with any new or worsening symptoms  Tom Robertson MA          History of Present Illness       The following portions of the patient's history were reviewed and updated as appropriate: allergies, current medications, past family history, past medical history, past social history, past surgical history and problem list     Review of Systems     10 point ROS negative except per HPI    Active Problem List     Patient Active Problem List   Diagnosis    Type 2 diabetes mellitus, with long-term current use of insulin ([address] )    Diabetic retinopathy of both eyes associated with type 2 diabetes mellitus ([address] )    LVH (left ventricular hypertrophy)    Hyperlipidemia    Chronic constipation    Screening for colon cancer    ESRD (end stage renal disease) ([place] [address] )    S/P arteriovenous (AV) fistula creation    Elevated troponin I level    Azotemia    Chronic kidney disease-mineral and bone disorder    Hyperphosphatemia    Benign hypertension with ESRD (end-stage renal disease) (HCC)    Slow transit constipation    Neutropenia (HCC)    Onychomycosis    Diabetic polyneuropathy associated with type 2 diabetes mellitus (HCC)    Positive QuantiFERON-TB Gold test    Cough    TB lung, latent    Long term (current) use of antibiotics    Hypertensive urgency    Abnormal EKG    Pulmonary nodule    GERD (gastroesophageal reflux disease)    Adenomatous polyp of colon    Visual disorder    Dialysis patient ([place] [address] )    Acute respiratory failure with hypoxia ([place] [address] )    Hypertensive emergency    Anemia of chronic disease    Diastolic heart failure ([facility])    Hypocalcemia       Objective     There were no vitals taken for this visit  Physical Exam  Constitutional:       Appearance: Normal appearance  He is not ill-appearing  HENT:      Head: Normocephalic and atraumatic  Eyes:      General: No scleral icterus  Right eye: No discharge  Left eye: No discharge  Cardiovascular:      Rate and Rhythm: Normal rate and regular rhythm  Heart sounds: No murmur heard  No friction rub  Pulmonary:      Effort: Pulmonary effort is normal       Breath sounds: Normal breath sounds  No wheezing or rales  Abdominal:      General: Abdomen is flat  There is no distension  Palpations: Abdomen is soft  Tenderness: There is no abdominal tenderness  Musculoskeletal:         General: No swelling or tenderness  Skin:     General: Skin is warm and dry  Findings: No erythema  Neurological:      Mental Status: He is alert  Mental status is at baseline  Motor: No weakness     Psychiatric:         Mood and Affect: Mood normal          Behavior: Behavior normal          Pertinent Laboratory/Diagnostic Studies:  XR chest 1 view portable    Result Date: 2/8/2022  Impression: Congestive heart failure  Difficult to exclude superimposed groundglass infiltrates   Workstation performed: [id number]       Images and diagnostics reviewed     Current Medications     Current Outpatient Medications:     Ascorbic Acid (vitamin C) 1000 MG tablet, Take 1 tablet (1,000 mg total) by mouth in the morning for 14 days, Disp: 14 tablet, Rfl: 0    aspirin (Aspirin Low Dose) 81 mg EC tablet, Take 1 tablet (81 mg total) by mouth daily, Disp: 90 tablet, Rfl: 1    atorvastatin (LIPITOR) 40 mg tablet, Take 1 tablet (40 mg total) by mouth daily, Disp: 90 tablet, Rfl: 1    Blood Glucose Monitoring Suppl (ONE TOUCH ULTRA 2) w/Device KIT, by Does not apply route 3 (three) times a day (Patient not taking: Reported on 12/10/2021 ), Disp: 1 each, Rfl: 0    brimonidine tartrate 0 2 % ophthalmic solution, Administer 1 drop into the left eye 2 (two) times a day, Disp: , Rfl:     calcium acetate (PHOSLO) capsule, Take 667 mg by mouth 3 (three) times a day with meals, Disp: , Rfl:     Carboxymethylcellul-Glycerin (CLEAR EYES FOR DRY EYES OP), Apply 2 drops to eye 2 (two) times a day, Disp: , Rfl:     cholecalciferol (VITAMIN D3) 1,000 units tablet, Take 2 tablets (2,000 Units total) by mouth daily, Disp: 14 tablet, Rfl: 0    Continuous Blood Gluc  (FreeStyle Jessica 14 Day Sunflower) LAUREN, Use 1 each daily (Patient not taking: Reported on 2/9/2022 ), Disp: 1 each, Rfl: 0    cyclobenzaprine (FLEXERIL) 5 mg tablet, Take 1 tablet (5 mg total) by mouth daily at bedtime as needed for muscle spasms, Disp: 30 tablet, Rfl: 1    doxazosin (CARDURA) 4 mg tablet, Take 1 tablet (4 mg total) by mouth daily at bedtime, Disp: 30 tablet, Rfl: 0    furosemide (LASIX) 80 mg tablet, Take 1 tablet (80 mg total) by mouth daily, Disp: 90 tablet, Rfl: 1    insulin aspart (NovoLOG) 100 Units/mL injection pen, 10U with breakfast and lunch, 10-14 U with dinner (Patient taking differently: 10U with breakfast, 10 U with dinner), Disp: 5 pen, Rfl: 2    insulin glargine (Basaglar KwikPen) 100 units/mL injection pen, Inject 32 Units under the skin daily, Disp: 15 mL, Rfl: 2    isoniazid (NYDRAZID) 300 mg tablet, Take 1 tablet (300 mg total) by mouth daily, Disp: 30 tablet, Rfl: 8    labetalol (NORMODYNE) 200 mg tablet, Take 2 tablets (400 mg total) by mouth every 12 (twelve) hours, Disp: 120 tablet, Rfl: 0    Lancets (freestyle) lancets, Use as instructed (Patient not taking: Reported on 12/10/2021 ), Disp: 100 each, Rfl: 1    NIFEdipine (ADALAT CC) 90 mg 24 hr tablet, Take 1 tablet (90 mg total) by mouth daily, Disp: 90 tablet, Rfl: 0    polyethylene glycol (MIRALAX) 17 g packet, Take 17 g by mouth daily, Disp: 30 each, Rfl: 1    prednisoLONE acetate (PRED FORTE) 1 % ophthalmic suspension, Administer 1 drop into the left eye 2 (two) times a day , Disp: , Rfl: 0    pyridoxine (VITAMIN B6) 50 mg tablet, Take 1 tablet (50 mg total) by mouth daily, Disp: 30 tablet, Rfl: 6    zinc gluconate 50 mg tablet, Take 1 tablet (50 mg total) by mouth daily, Disp: 14 tablet, Rfl: 0    Health Maintenance     Health Maintenance   Topic Date Due    DM Eye Exam  Never done    DTaP,Tdap,and Td Vaccines (1 - Tdap) Never done    PT PLAN OF CARE  05/25/2019    Pneumococcal Vaccine: Pediatrics (0 to 5 Years) and At-Risk Patients (6 to 64 Years) (3 of 4 - PCV13) 06/14/2019    URINE MICROALBUMIN  06/27/2019    Medicare Annual Wellness Visit (AWV)  03/16/2021    COVID-19 Vaccine (2 - Pfizer 3-dose series) 04/22/2021    HEMOGLOBIN A1C  04/01/2022    Depression Screening  06/07/2022    BMI: Followup Plan  10/01/2022    Diabetic Foot Exam  10/01/2022    BMI: Adult  02/08/2023    Colorectal Cancer Screening  12/25/2031    HIV Screening  Completed    Hepatitis C Screening  Completed    Influenza Vaccine  Completed    HIB Vaccine  Aged Out    Hepatitis B Vaccine  Aged Out    IPV Vaccine  Aged Out    Hepatitis A Vaccine  Aged Out    Meningococcal ACWY Vaccine  Aged Out    HPV Vaccine  Aged Out     Immunization History   Administered Date(s) Administered    COVID-19 PFIZER VACCINE 0 3 ML IM 04/01/2021    Hep B, adult 04/23/2019, 04/23/2019, 06/22/2019, 06/22/2019, 08/22/2019, 08/22/2019, 11/23/2019, 11/23/2019    INFLUENZA 11/11/2009, 11/11/2009, 10/06/2014, 10/06/2014, 08/04/2017, 08/04/2017, 10/01/2018, 01/02/2019, 10/17/2019, 09/18/2020, 09/18/2020    Influenza Quadrivalent 3 years and older 10/01/2018, 10/01/2018, 10/17/2019, 10/17/2019, 10/21/2021    Influenza, recombinant, quadrivalent,injectable, preservative free 01/02/2019, 09/09/2019, 09/16/2020    Pneumococcal Polysaccharide PPV23 11/11/2009, 11/11/2009, 06/14/2018, 06/14/2018    Tuberculin Skin Test-PPD Intradermal 04/09/2019, 04/09/2019, 01/09/2020, 01/09/2020, 01/12/2021, 01/12/2021       MONTSE Allison  Internal Medicine [facility]  [address]  [place], [address] [facility]  Office: [phone]

## 2022-02-25 DIAGNOSIS — R05.9 COUGH: Primary | ICD-10-CM

## 2022-02-25 RX ORDER — BENZONATATE 100 MG/1
100 CAPSULE ORAL 3 TIMES DAILY PRN
Qty: 30 CAPSULE | Refills: 1 | Status: SHIPPED | OUTPATIENT
Start: 2022-02-25

## 2022-03-02 ENCOUNTER — TELEPHONE (OUTPATIENT)
Dept: INTERNAL MEDICINE CLINIC | Facility: CLINIC | Age: 57
End: 2022-03-02

## 2022-03-02 NOTE — TELEPHONE ENCOUNTER
Negrita from home care nurse called asking for a new order for oxygen for young's needs to state conserving device evaluating for M6 tank needs 1 to 2 litter flow via nasal canal so medicare will pay  need to be a amount of time mount 1 year need Dr Willow Aceves and spoke to chacorta in the office and she stated that the refused the traveling tank on 2/17/2021    Called negrita explained what happened with [facility] and she will speak to pts sister and call me back

## 2022-03-09 ENCOUNTER — OFFICE VISIT (OUTPATIENT)
Dept: URGENT CARE | Age: 57
End: 2022-03-09
Payer: MEDICARE

## 2022-03-09 VITALS
WEIGHT: 175 LBS | BODY MASS INDEX: 25.05 KG/M2 | DIASTOLIC BLOOD PRESSURE: 60 MMHG | HEIGHT: 70 IN | TEMPERATURE: 96.4 F | SYSTOLIC BLOOD PRESSURE: 130 MMHG | RESPIRATION RATE: 20 BRPM | OXYGEN SATURATION: 98 % | HEART RATE: 73 BPM

## 2022-03-09 DIAGNOSIS — N18.6 ESRD (END STAGE RENAL DISEASE) (HCC): Primary | ICD-10-CM

## 2022-03-09 DIAGNOSIS — S91.109A OPEN WOUND OF TOE, INITIAL ENCOUNTER: Primary | ICD-10-CM

## 2022-03-09 DIAGNOSIS — J96.01 ACUTE RESPIRATORY FAILURE WITH HYPOXIA (HCC): ICD-10-CM

## 2022-03-09 PROCEDURE — 99213 OFFICE O/P EST LOW 20 MIN: CPT

## 2022-03-09 PROCEDURE — G0463 HOSPITAL OUTPT CLINIC VISIT: HCPCS

## 2022-03-09 RX ORDER — SULFAMETHOXAZOLE AND TRIMETHOPRIM 800; 160 MG/1; MG/1
1 TABLET ORAL EVERY 12 HOURS SCHEDULED
Qty: 14 TABLET | Refills: 0 | Status: SHIPPED | OUTPATIENT
Start: 2022-03-09 | End: 2022-03-16

## 2022-03-09 NOTE — PROGRESS NOTES
[address]        NAME: Kiesha Raymond is a 61 y o  male  : 1965    MRN: [id number]  DATE: 2022  TIME: 5:16 PM    Assessment and Plan   Open wound of toe, initial encounter [S91 109A]  1  Open wound of toe, initial encounter  sulfamethoxazole-trimethoprim (BACTRIM DS) 800-160 mg per tablet    Ambulatory Referral to Podiatry         Patient Instructions     Antibiotic as directed  Referral to podiatry  Follow up with PCP in 3-5 days  Proceed to ER if symptoms worsen  Chief Complaint     Chief Complaint   Patient presents with    Toe Injury     Pt's sister states pt banged his left foot into bed frame approx one week ago  Pt has swelling, pus drainage and redness on left great toe  C/o mild pain  Pt is diabetic  Bandate applied to area  History of Present Illness     61 y o  M presents with complaint of L toe wound x 1 week  Sister present with patient  States he banged his left foot into the bed frame 1 week ago  Complains of swelling, drainage and redness on L great toe  Hx DM  Not taking anything OTC  Denies fevers or chills  Denies numbness or tingling  Does not follow with Podiatry  Review of Systems   Review of Systems   Constitutional: Negative for chills, fatigue and fever  HENT: Negative for congestion, ear discharge, ear pain, facial swelling, hearing loss, rhinorrhea, sinus pain, sore throat and trouble swallowing  Eyes: Negative for photophobia and visual disturbance  Respiratory: Negative for cough, chest tightness, shortness of breath and wheezing  Cardiovascular: Negative for chest pain, palpitations and leg swelling  Gastrointestinal: Negative for abdominal pain, diarrhea, nausea and vomiting  Genitourinary: Negative for dysuria and flank pain  Musculoskeletal: Negative for arthralgias, back pain, gait problem and neck pain  Skin: Positive for wound  Negative for pallor     Neurological: Negative for dizziness, syncope, weakness, numbness and headaches  Psychiatric/Behavioral: Negative for sleep disturbance           Current Medications       Current Outpatient Medications:     aspirin (Aspirin Low Dose) 81 mg EC tablet, Take 1 tablet (81 mg total) by mouth daily, Disp: 90 tablet, Rfl: 1    atorvastatin (LIPITOR) 40 mg tablet, Take 1 tablet (40 mg total) by mouth daily, Disp: 90 tablet, Rfl: 1    benzonatate (TESSALON PERLES) 100 mg capsule, Take 1 capsule (100 mg total) by mouth 3 (three) times a day as needed for cough, Disp: 30 capsule, Rfl: 1    brimonidine tartrate 0 2 % ophthalmic solution, Administer 1 drop into the left eye 2 (two) times a day, Disp: , Rfl:     calcium acetate (PHOSLO) capsule, Take 667 mg by mouth 3 (three) times a day with meals, Disp: , Rfl:     Carboxymethylcellul-Glycerin (CLEAR EYES FOR DRY EYES OP), Apply 2 drops to eye 2 (two) times a day, Disp: , Rfl:     cholecalciferol (VITAMIN D3) 1,000 units tablet, Take 2 tablets (2,000 Units total) by mouth daily, Disp: 14 tablet, Rfl: 0    ergocalciferol (VITAMIN D2) 50,000 units, take 1 capsule by mouth every week for 6 MONTHS, Disp: , Rfl:     erythromycin (ILOTYCIN) ophthalmic ointment, APPLY A SMALL AMOUNT into right eye three times a day, Disp: , Rfl:     furosemide (LASIX) 80 mg tablet, Take 1 tablet (80 mg total) by mouth daily, Disp: 90 tablet, Rfl: 1    insulin aspart (NovoLOG) 100 Units/mL injection pen, 10U with breakfast and lunch, 10-14 U with dinner (Patient taking differently: 10U with breakfast, 10 U with dinner), Disp: 5 pen, Rfl: 2    insulin glargine (Basaglar KwikPen) 100 units/mL injection pen, Inject 28 Units under the skin daily, Disp: 15 mL, Rfl: 2    labetalol (NORMODYNE) 200 mg tablet, Take 2 tablets (400 mg total) by mouth every 12 (twelve) hours, Disp: 120 tablet, Rfl: 0    methocarbamol (ROBAXIN) 500 mg tablet, Take 1 tablet (500 mg total) by mouth 2 (two) times a day as needed for muscle spasms, Disp: 30 tablet, Rfl: 0   polyethylene glycol (MIRALAX) 17 g packet, Take 17 g by mouth daily, Disp: 30 each, Rfl: 1    prednisoLONE acetate (PRED FORTE) 1 % ophthalmic suspension, Administer 1 drop into the left eye 2 (two) times a day , Disp: , Rfl: 0    Ascorbic Acid (vitamin C) 1000 MG tablet, Take 1 tablet (1,000 mg total) by mouth in the morning for 14 days, Disp: 14 tablet, Rfl: 0    Blood Glucose Monitoring Suppl (ONE TOUCH ULTRA 2) w/Device KIT, by Does not apply route 3 (three) times a day (Patient not taking: Reported on 12/10/2021 ), Disp: 1 each, Rfl: 0    Continuous Blood Gluc  (FreeStyle Jessica 14 Day Phelps) LAUREN, Use 1 each daily (Patient not taking: Reported on 2/9/2022 ), Disp: 1 each, Rfl: 0    doxazosin (CARDURA) 4 mg tablet, Take 1 tablet (4 mg total) by mouth daily at bedtime, Disp: 30 tablet, Rfl: 0    Durezol 0 05 % EMUL, instill 1 drop TO OPERATIVE EYE twice a day for 2 weeks STARTING      (REFER TO PRESCRIPTION NOTES)  , Disp: , Rfl:     isoniazid (NYDRAZID) 300 mg tablet, Take 1 tablet (300 mg total) by mouth daily, Disp: 30 tablet, Rfl: 8    Lancets (freestyle) lancets, Use as instructed (Patient not taking: Reported on 12/10/2021 ), Disp: 100 each, Rfl: 1    NIFEdipine (ADALAT CC) 90 mg 24 hr tablet, Take 1 tablet (90 mg total) by mouth daily (Patient not taking: Reported on 3/9/2022 ), Disp: 90 tablet, Rfl: 0    pyridoxine (VITAMIN B6) 50 mg tablet, Take 1 tablet (50 mg total) by mouth daily (Patient not taking: Reported on 3/9/2022 ), Disp: 30 tablet, Rfl: 6    sulfamethoxazole-trimethoprim (BACTRIM DS) 800-160 mg per tablet, Take 1 tablet by mouth every 12 (twelve) hours for 7 days, Disp: 14 tablet, Rfl: 0    zinc gluconate 50 mg tablet, Take 1 tablet (50 mg total) by mouth daily (Patient not taking: Reported on 3/9/2022 ), Disp: 14 tablet, Rfl: 0    Current Allergies     Allergies as of 03/09/2022 - Reviewed 03/09/2022   Allergen Reaction Noted    No known allergies  06/15/2020 The following portions of the patient's history were reviewed and updated as appropriate: allergies, current medications, past family history, past medical history, past social history, past surgical history and problem list      Past Medical History:   Diagnosis Date    Cataract     Chronic kidney disease     Diabetes mellitus ([facility] )      IDDM    Diabetic retinopathy ([facility] )     Dizziness     occ    ESRD (end stage renal disease) ([facility]     GERD (gastroesophageal reflux disease)     Headache     occ    Hyperlipidemia     Hypertension     Legally blind     LVH (left ventricular hypertrophy)     Preferred language is [place]     understands some English    Use of cane as ambulatory aid        Past Surgical History:   Procedure Laterality Date    INGUINAL HERNIA REPAIR Right     IR AV FISTULAGRAM/GRAFTOGRAM  4/30/2019    IR AV FISTULAGRAM/GRAFTOGRAM  6/14/2019    IR AV FISTULAGRAM/GRAFTOGRAM  6/24/2020    IR AV FISTULAGRAM/GRAFTOGRAM  2/26/2021    IR AV FISTULAGRAM/GRAFTOGRAM  9/3/2021    IR TUNNELED DIALYSIS CATHETER PLACEMENT  4/3/2019    NM ANASTOMOSIS,AV,ANY SITE Left 1/23/2019    Procedure: CREATION FISTULA ARTERIOVENOUS (AV); Surgeon: Lotus Parra MD;  [facility];  Service: Vascular       Family History   Problem Relation Age of Onset    Hypertension Mother     Diabetes Father     No Known Problems Sister     No Known Problems Brother     No Known Problems Maternal Aunt     No Known Problems Maternal Uncle     No Known Problems Paternal Aunt     No Known Problems Paternal Uncle     No Known Problems Maternal Grandmother     No Known Problems Maternal Grandfather     No Known Problems Paternal Grandmother     No Known Problems Paternal Grandfather          Medications have been verified          Objective   /60   Pulse 73   Temp (!) 96 4 °F (35 8 °C)   Resp 20   Ht 5' 10" (1 778 m)   Wt 79 4 kg (175 lb)   SpO2 98%   BMI 25 11 kg/m²   No LMP for male patient  Physical Exam     Physical Exam  Vitals reviewed  Constitutional:       General: He is not in acute distress  Appearance: He is normal weight  He is not ill-appearing or toxic-appearing  HENT:      Head: Normocephalic  Right Ear: Tympanic membrane normal  No middle ear effusion  Tympanic membrane is not erythematous  Left Ear: Tympanic membrane normal   No middle ear effusion  Tympanic membrane is not erythematous  Nose: Nose normal       Right Sinus: No maxillary sinus tenderness or frontal sinus tenderness  Left Sinus: No maxillary sinus tenderness or frontal sinus tenderness  Mouth/Throat:      Mouth: Mucous membranes are moist       Pharynx: No oropharyngeal exudate or posterior oropharyngeal erythema  Tonsils: No tonsillar exudate or tonsillar abscesses  Eyes:      Pupils: Pupils are equal, round, and reactive to light  Cardiovascular:      Rate and Rhythm: Normal rate and regular rhythm  Pulses: Normal pulses  Heart sounds: Normal heart sounds  Pulmonary:      Effort: Pulmonary effort is normal  No respiratory distress  Breath sounds: Normal breath sounds  No wheezing or rales  Abdominal:      General: Bowel sounds are normal       Palpations: Abdomen is soft  Musculoskeletal:         General: Normal range of motion  Cervical back: Normal range of motion and neck supple  Lymphadenopathy:      Cervical: No cervical adenopathy  Skin:     General: Skin is warm and dry  Capillary Refill: Capillary refill takes less than 2 seconds  Comments:   Chronic ulcer between 1st and 2nd toes    Wound to pad of great toe with erythema and light brown drainage     Neurological:      General: No focal deficit present  Mental Status: He is alert  Cranial Nerves: No cranial nerve deficit

## 2022-03-09 NOTE — PATIENT INSTRUCTIONS
Diabetic Foot Ulcers     Antibiotic as directed  Follow up with Podiatrist   Follow up with your PCP in 3-5 days  Proceed to ER if symptoms worsen  AMBULATORY CARE:   A diabetic foot ulcer  can be redness over a bony area or an open sore  The ulcer can develop anywhere on your foot or toes  Ulcers usually develop on the bottom of the foot  You may not know you have an ulcer until you notice drainage on your sock  Drainage is fluid that may be yellow, brown, or red  The fluid may also contain pus or blood  Call your local emergency number (911 in the [address] [place] [address],[address]) if:  · You have a fever with chills  · You begin vomiting  · You feel faint or become confused  Call your doctor if:   · You see new drainage on your sock  · Your foot becomes red, warm, and swollen  · Your foot ulcer has a bad smell or is draining pus  · You feel pain in a foot that used to have little or no feeling  · You see black or dead tissue in or around your ulcer  · Your ulcer becomes bigger, deeper, or does not heal      · You have questions or concerns about your condition or care  Treatment  may include a hospital stay and any of the following:  · Debridement (removal) of dead tissue  may be done in and around your foot ulcer  This procedure may be done in your hospital room  · A bandage  help keep your wound area moist and free from infection  The bandage may contain medicines to help your ulcer heal and prevent growth of unhealthy tissue  · Offloading (taking the pressure off) the area  can help it heal  Healthcare providers may use cushions or braces, or custom foot wear may be ordered  You may be asked to stay in bed or use a wheelchair or crutches  These devices help decrease the amount of weight and pressure placed on your foot  · Surgery  may be needed if you have an infection and decreased blood flow to your foot  Care for your wound as directed:  A bandage will be put on your ulcer   Your healthcare provider will give you instructions on changing your bandage  You may need to clean the wound and change the bandage daily  The bandage may contain medicines to help your ulcer heal  You may be asked to put medicine on your foot ulcer before putting on the bandage  The medicine may also prevent growth of tissue that is not healthy  You may need to cover your wound with a plastic bag while you bathe  Ask your healthcare provider for instructions on bathing until your foot heals  Prevent diabetic foot ulcers:  Good foot care may help prevent ulcers, or keep them from getting worse  Ask someone to help you if you are not able to check your feet by yourself  You or another person may need to do any of the following:  · Keep your blood sugar levels under control  Continue the plan for your diabetes that you and your healthcare provider have discussed  Healthy food choices and taking your medicines as directed may help control blood sugars  Contact your healthcare provider if your blood sugar levels are higher than directed  · Wash your feet each day with soap and warm water  Do not use hot water, because this can injure your foot  Dry your feet gently with a towel after you wash them  Dry between and under your toes  · Apply lotion or a moisturizer on your dry feet  Ask your healthcare provider what lotions are best to use  Do not put lotion or moisturizer between your toes  Moisture between your toes could lead to skin breakdown  · Check your feet each day  Look at your whole foot, including the bottom, and between and under your toes  Check for wounds, corns, and calluses  Feel your feet by running your hands along the tops, bottoms, sides, and between your toes  Use a nonbreakable mirror to check your feet if you have trouble seeing the bottoms  Do not try to remove corns or calluses yourself  File or cut your toenails straight across  · Protect your feet    Do not walk barefoot or wear your shoes without socks  Check your shoes for rocks or other objects that can hurt your feet  Wear cotton socks to help keep your feet dry  Wear socks without toe seams, or wear them with the seams inside out  Change your socks each day  Do not wear socks that are dirty or damp  · Wear shoes that fit well  Wear shoes that do not rub against any area of your feet  Your shoes should be ½ to ¾ inch (1 to 2 centimeters) longer than your feet  Your shoes should also have extra space around the widest part of your feet  Walking or athletic shoes with laces or straps that adjust are best  Ask your healthcare provider for help to choose shoes that fit you best  Ask him or her if you need to wear an insert, orthotic, or bandage on your feet  · Do not smoke  Nicotine can cause damage to your blood vessels and increases your risk for foot ulcers  Do not use e-cigarettes or smokeless tobacco in place of cigarettes or to help you quit  They still contain nicotine  Ask your healthcare provider for information if you currently smoke and need help quitting  · Know the risks if you choose to drink alcohol  Alcohol can cause your blood sugar levels to be low if you use insulin  Alcohol can cause high blood sugar levels and weight gain if you drink too much  A drink of alcohol is 12 ounces of beer, 5 ounces of wine, or 1½ ounces of liquor  · Maintain a healthy weight  Ask your healthcare provider how much you should weigh  A healthy weight can help you control your diabetes  Ask him or her to help you create a weight loss plan if you are overweight  Even a 10 to 15 pound weight loss can help you better manage your blood sugar level  Follow up with your healthcare provider or foot specialist as directed: You may need to return often to have your wound checked  Your wound may be measured to see if it is getting smaller   Bring any offloading devices or footwear to your follow-up visits so your healthcare provider or specialist can check them  Write down your questions so you remember to ask them during your visits  © Copyright [employer] 2022 Information is for End User's use only and may not be sold, redistributed or otherwise used for commercial purposes  All illustrations and images included in CareNotes® are the copyrighted property of [employer]  or Arlen Cody  The above information is an  only  It is not intended as medical advice for individual conditions or treatments  Talk to your doctor, nurse or pharmacist before following any medical regimen to see if it is safe and effective for you

## 2022-03-10 NOTE — TELEPHONE ENCOUNTER
Juan called to state the patient will have to be re evaluated for the Home Oxygen with portability patient was called and given an appointment  The patient refused the last order and would now like to have one  New order will be needed

## 2022-03-14 ENCOUNTER — OFFICE VISIT (OUTPATIENT)
Dept: INTERNAL MEDICINE CLINIC | Facility: CLINIC | Age: 57
End: 2022-03-14
Payer: MEDICARE

## 2022-03-14 VITALS
SYSTOLIC BLOOD PRESSURE: 146 MMHG | TEMPERATURE: 97.5 F | HEIGHT: 70 IN | HEART RATE: 92 BPM | DIASTOLIC BLOOD PRESSURE: 76 MMHG | WEIGHT: 175 LBS | OXYGEN SATURATION: 81 % | BODY MASS INDEX: 25.05 KG/M2

## 2022-03-14 DIAGNOSIS — I10 PRIMARY HYPERTENSION: ICD-10-CM

## 2022-03-14 DIAGNOSIS — N18.6 ESRD (END STAGE RENAL DISEASE) (HCC): ICD-10-CM

## 2022-03-14 DIAGNOSIS — N18.6 TYPE 2 DIABETES MELLITUS WITH CHRONIC KIDNEY DISEASE ON CHRONIC DIALYSIS, WITH LONG-TERM CURRENT USE OF INSULIN (HCC): ICD-10-CM

## 2022-03-14 DIAGNOSIS — Z99.2 TYPE 2 DIABETES MELLITUS WITH CHRONIC KIDNEY DISEASE ON CHRONIC DIALYSIS, WITH LONG-TERM CURRENT USE OF INSULIN (HCC): ICD-10-CM

## 2022-03-14 DIAGNOSIS — E11.22 TYPE 2 DIABETES MELLITUS WITH CHRONIC KIDNEY DISEASE ON CHRONIC DIALYSIS, WITH LONG-TERM CURRENT USE OF INSULIN (HCC): ICD-10-CM

## 2022-03-14 DIAGNOSIS — R09.02 HYPOXIA: Primary | ICD-10-CM

## 2022-03-14 DIAGNOSIS — Z79.4 TYPE 2 DIABETES MELLITUS WITH CHRONIC KIDNEY DISEASE ON CHRONIC DIALYSIS, WITH LONG-TERM CURRENT USE OF INSULIN (HCC): ICD-10-CM

## 2022-03-14 PROCEDURE — 99213 OFFICE O/P EST LOW 20 MIN: CPT | Performed by: INTERNAL MEDICINE

## 2022-03-15 NOTE — PROGRESS NOTES
INTERNAL MEDICINE OFFICE VISIT  [facility] [place]    NAME: Paddy Trevin  AGE: 61 y o  SEX: male    DATE OF ENCOUNTER: 3/14/2022    Assessment and Plan     1  Hypoxia  -Paddy comes in today for follow-up to determine if he needs oxygen at home  He was recently hospitalized in early February with shortness of breath, peripheral edema, hypoxia in the setting of acute volume overload and hypertensive emergency  Later in his hospitalization course, he was evaluated by respiratory therapy and was found to be a candidate for home oxygen  Was advised to utilize 3 liters/minute on exertion  Per sister, they do have tank and supplies at home, but have not utilized since hospitalization  -Paddy is in no respiratory distress today  He denies any significant shortness of breath at home at rest or with exertion  No coughing or wheezing  Lungs are clear to auscultation today    Plan:  -We completed ambulatory pulse ox during short walk here in the office  O2 saturation did not drop below 94%  O2 saturation of 97-98% at rest  -Paddy does not appear to qualify for O2 at this time and I do not think he has a need for supplemental O2 any longer  Advised patient and sister of this  Asked that they let me know if he has any difficulties breathing at home    2  Primary hypertension  -elevated today, slightly improved on recheck  -continue with current regimen for now    3  ESRD (end stage renal disease) ([address] )  -continue with dialysis as scheduled    4  Type 2 diabetes mellitus with chronic kidney disease on chronic dialysis, with long-term current use of insulin ([facility])  -A1c at goal   Continue with regimen as outlined at prior appointment            No orders of the defined types were placed in this encounter        Chief Complaint     Chief Complaint   Patient presents with    Follow-up     Oxygen       History of Present Illness       The following portions of the patient's history were reviewed and updated as appropriate: allergies, current medications, past family history, past medical history, past social history, past surgical history and problem list     Review of Systems     10 point ROS negative except per HPI    Active Problem List     Patient Active Problem List   Diagnosis    Type 2 diabetes mellitus, with long-term current use of insulin ([place] [address] )    Diabetic retinopathy of both eyes associated with type 2 diabetes mellitus ([place] [address] )    LVH (left ventricular hypertrophy)    Hyperlipidemia    Chronic constipation    Screening for colon cancer    ESRD (end stage renal disease) ([place] [address] )    S/P arteriovenous (AV) fistula creation    Elevated troponin I level    Azotemia    Chronic kidney disease-mineral and bone disorder    Hyperphosphatemia    Benign hypertension with ESRD (end-stage renal disease) ([place] [address] )    Slow transit constipation    Neutropenia ([place] [address] )    Onychomycosis    Diabetic polyneuropathy associated with type 2 diabetes mellitus (HCC)    Positive QuantiFERON-TB Gold test    Cough    TB lung, latent    Long term (current) use of antibiotics    Hypertensive urgency    Abnormal EKG    Pulmonary nodule    GERD (gastroesophageal reflux disease)    Adenomatous polyp of colon    Visual disorder    Dialysis patient ([place] [address] )    Acute respiratory failure with hypoxia ([place] [address] )    Hypertensive emergency    Anemia of chronic disease    Diastolic heart failure (HCC)    Hypocalcemia       Objective     /76   Pulse 92   Temp 97 5 °F (36 4 °C)   Ht 5' 10" (1 778 m)   Wt 79 4 kg (175 lb)   SpO2 (!) 81%   BMI 25 11 kg/m²     Physical Exam    Pertinent Laboratory/Diagnostic Studies:  XR chest 1 view portable    Result Date: 2/8/2022  Impression: Congestive heart failure  Difficult to exclude superimposed groundglass infiltrates   Workstation performed: [id number]       Images and diagnostics reviewed     Current Medications     Current Outpatient Medications:    atorvastatin (LIPITOR) 40 mg tablet, Take 1 tablet (40 mg total) by mouth daily, Disp: 90 tablet, Rfl: 1    benzonatate (TESSALON PERLES) 100 mg capsule, Take 1 capsule (100 mg total) by mouth 3 (three) times a day as needed for cough, Disp: 30 capsule, Rfl: 1    brimonidine tartrate 0 2 % ophthalmic solution, Administer 1 drop into the left eye 2 (two) times a day, Disp: , Rfl:     calcium acetate (PHOSLO) capsule, Take 667 mg by mouth 3 (three) times a day with meals, Disp: , Rfl:     Carboxymethylcellul-Glycerin (CLEAR EYES FOR DRY EYES OP), Apply 2 drops to eye 2 (two) times a day, Disp: , Rfl:     cholecalciferol (VITAMIN D3) 1,000 units tablet, Take 2 tablets (2,000 Units total) by mouth daily, Disp: 14 tablet, Rfl: 0    Continuous Blood Gluc  (FreeStyle Jessica 14 Day [place]) LAUREN, Use 1 each daily, Disp: 1 each, Rfl: 0    doxazosin (CARDURA) 4 mg tablet, Take 1 tablet (4 mg total) by mouth daily at bedtime, Disp: 30 tablet, Rfl: 0    Durezol 0 05 % EMUL, instill 1 drop TO OPERATIVE EYE twice a day for 2 weeks STARTING      (REFER TO PRESCRIPTION NOTES)  , Disp: , Rfl:     ergocalciferol (VITAMIN D2) 50,000 units, take 1 capsule by mouth every week for 6 MONTHS, Disp: , Rfl:     erythromycin (ILOTYCIN) ophthalmic ointment, APPLY A SMALL AMOUNT into right eye three times a day, Disp: , Rfl:     furosemide (LASIX) 80 mg tablet, Take 1 tablet (80 mg total) by mouth daily, Disp: 90 tablet, Rfl: 1    insulin aspart (NovoLOG) 100 Units/mL injection pen, 10U with breakfast and lunch, 10-14 U with dinner (Patient taking differently: 10U with breakfast, 10 U with dinner), Disp: 5 pen, Rfl: 2    insulin glargine (Basaglar KwikPen) 100 units/mL injection pen, Inject 28 Units under the skin daily, Disp: 15 mL, Rfl: 2    labetalol (NORMODYNE) 200 mg tablet, Take 2 tablets (400 mg total) by mouth every 12 (twelve) hours, Disp: 120 tablet, Rfl: 0    methocarbamol (ROBAXIN) 500 mg tablet, Take 1 tablet (500 mg total) by mouth 2 (two) times a day as needed for muscle spasms, Disp: 30 tablet, Rfl: 0    polyethylene glycol (MIRALAX) 17 g packet, Take 17 g by mouth daily, Disp: 30 each, Rfl: 1    prednisoLONE acetate (PRED FORTE) 1 % ophthalmic suspension, Administer 1 drop into the left eye 2 (two) times a day , Disp: , Rfl: 0    RA Aspirin EC 81 MG EC tablet, take 1 tablet by mouth once daily, Disp: 90 tablet, Rfl: 0    sulfamethoxazole-trimethoprim (BACTRIM DS) 800-160 mg per tablet, Take 1 tablet by mouth every 12 (twelve) hours for 7 days, Disp: 14 tablet, Rfl: 0    Ascorbic Acid (vitamin C) 1000 MG tablet, Take 1 tablet (1,000 mg total) by mouth in the morning for 14 days, Disp: 14 tablet, Rfl: 0    Blood Glucose Monitoring Suppl (ONE TOUCH ULTRA 2) w/Device KIT, by Does not apply route 3 (three) times a day (Patient not taking: Reported on 12/10/2021 ), Disp: 1 each, Rfl: 0    isoniazid (NYDRAZID) 300 mg tablet, Take 1 tablet (300 mg total) by mouth daily, Disp: 30 tablet, Rfl: 8    Lancets (freestyle) lancets, Use as instructed (Patient not taking: Reported on 12/10/2021 ), Disp: 100 each, Rfl: 1    NIFEdipine (ADALAT CC) 90 mg 24 hr tablet, Take 1 tablet (90 mg total) by mouth daily (Patient not taking: Reported on 3/9/2022 ), Disp: 90 tablet, Rfl: 0    pyridoxine (VITAMIN B6) 50 mg tablet, Take 1 tablet (50 mg total) by mouth daily (Patient not taking: Reported on 3/9/2022 ), Disp: 30 tablet, Rfl: 6    zinc gluconate 50 mg tablet, Take 1 tablet (50 mg total) by mouth daily (Patient not taking: Reported on 3/9/2022 ), Disp: 14 tablet, Rfl: 0    Health Maintenance     Health Maintenance   Topic Date Due    DTaP,Tdap,and Td Vaccines (1 - Tdap) Never done    PT PLAN OF CARE  05/25/2019    Pneumococcal Vaccine: Pediatrics (0 to 5 Years) and At-Risk Patients (6 to 64 Years) (3 of 4 - PCV13) 06/14/2019    URINE MICROALBUMIN  06/27/2019    Medicare Annual Wellness Visit (AWV)  03/16/2021    COVID-19 Vaccine (3 - Booster for MyRoll series) 09/22/2021    DM Eye Exam  03/14/2023 (Originally 4/7/1975)    HEMOGLOBIN A1C  08/21/2022    BMI: Followup Plan  10/01/2022    Diabetic Foot Exam  10/01/2022    Depression Screening  02/21/2023    BMI: Adult  03/14/2023    Colorectal Cancer Screening  12/25/2031    HIV Screening  Completed    Hepatitis C Screening  Completed    Influenza Vaccine  Completed    HIB Vaccine  Aged Out    Hepatitis B Vaccine  Aged Out    IPV Vaccine  Aged Out    Hepatitis A Vaccine  Aged Out    Meningococcal ACWY Vaccine  Aged Out    HPV Vaccine  Aged Out     Immunization History   Administered Date(s) Administered    COVID-19 PFIZER VACCINE 0 3 ML IM 04/01/2021, 04/22/2021    Hep B, adult 04/23/2019, 04/23/2019, 06/22/2019, 06/22/2019, 08/22/2019, 08/22/2019, 11/23/2019, 11/23/2019    INFLUENZA 11/11/2009, 11/11/2009, 10/06/2014, 10/06/2014, 08/04/2017, 08/04/2017, 10/01/2018, 01/02/2019, 10/17/2019, 09/18/2020, 09/18/2020    Influenza Quadrivalent 3 years and older 10/01/2018, 10/01/2018, 10/17/2019, 10/17/2019, 10/21/2021    Influenza, recombinant, quadrivalent,injectable, preservative free 01/02/2019, 09/09/2019, 09/16/2020    Pneumococcal Polysaccharide PPV23 11/11/2009, 11/11/2009, 06/14/2018, 06/14/2018    Tuberculin Skin Test-PPD Intradermal 04/09/2019, 04/09/2019, 01/09/2020, 01/09/2020, 01/12/2021, 01/12/2021, 01/18/2022       MONTSE Rush  Internal Medicine - [address]  [facility], [address]   Office: [phone]

## 2022-03-16 ENCOUNTER — APPOINTMENT (OUTPATIENT)
Dept: RADIOLOGY | Facility: CLINIC | Age: 57
End: 2022-03-16
Payer: MEDICARE

## 2022-03-16 ENCOUNTER — OFFICE VISIT (OUTPATIENT)
Dept: PODIATRY | Facility: CLINIC | Age: 57
End: 2022-03-16
Payer: MEDICARE

## 2022-03-16 VITALS
HEART RATE: 70 BPM | DIASTOLIC BLOOD PRESSURE: 59 MMHG | HEIGHT: 70 IN | BODY MASS INDEX: 25.11 KG/M2 | SYSTOLIC BLOOD PRESSURE: 121 MMHG

## 2022-03-16 DIAGNOSIS — L97.522 SKIN ULCER OF TOE OF LEFT FOOT WITH FAT LAYER EXPOSED (HCC): ICD-10-CM

## 2022-03-16 DIAGNOSIS — E11.42 DIABETIC POLYNEUROPATHY ASSOCIATED WITH TYPE 2 DIABETES MELLITUS (HCC): Primary | ICD-10-CM

## 2022-03-16 DIAGNOSIS — S91.109A OPEN WOUND OF TOE, INITIAL ENCOUNTER: ICD-10-CM

## 2022-03-16 DIAGNOSIS — I73.9 PERIPHERAL ARTERIAL DISEASE (HCC): ICD-10-CM

## 2022-03-16 DIAGNOSIS — L03.116 CELLULITIS OF FOOT, LEFT: ICD-10-CM

## 2022-03-16 PROCEDURE — 87205 SMEAR GRAM STAIN: CPT | Performed by: PODIATRIST

## 2022-03-16 PROCEDURE — 87075 CULTR BACTERIA EXCEPT BLOOD: CPT | Performed by: PODIATRIST

## 2022-03-16 PROCEDURE — 87186 SC STD MICRODIL/AGAR DIL: CPT | Performed by: PODIATRIST

## 2022-03-16 PROCEDURE — 73630 X-RAY EXAM OF FOOT: CPT

## 2022-03-16 PROCEDURE — 11042 DBRDMT SUBQ TIS 1ST 20SQCM/<: CPT | Performed by: PODIATRIST

## 2022-03-16 PROCEDURE — 87185 SC STD ENZYME DETCJ PER NZM: CPT | Performed by: PODIATRIST

## 2022-03-16 PROCEDURE — 87077 CULTURE AEROBIC IDENTIFY: CPT | Performed by: PODIATRIST

## 2022-03-16 PROCEDURE — 87070 CULTURE OTHR SPECIMN AEROBIC: CPT | Performed by: PODIATRIST

## 2022-03-16 PROCEDURE — 99214 OFFICE O/P EST MOD 30 MIN: CPT | Performed by: PODIATRIST

## 2022-03-16 PROCEDURE — 87076 CULTURE ANAEROBE IDENT EACH: CPT | Performed by: PODIATRIST

## 2022-03-16 RX ORDER — CIPROFLOXACIN 500 MG/1
500 TABLET, FILM COATED ORAL EVERY 12 HOURS SCHEDULED
Qty: 20 TABLET | Refills: 0 | Status: SHIPPED | OUTPATIENT
Start: 2022-03-16 | End: 2022-03-26

## 2022-03-16 NOTE — PATIENT INSTRUCTIONS
Diabetic Foot Ulcers   AMBULATORY CARE:   A diabetic foot ulcer  can be redness over a bony area or an open sore  The ulcer can develop anywhere on your foot or toes  Ulcers usually develop on the bottom of the foot  You may not know you have an ulcer until you notice drainage on your sock  Drainage is fluid that may be yellow, brown, or red  The fluid may also contain pus or blood  Call your local emergency number (911 in the [address] [facility]) if:  · You have a fever with chills  · You begin vomiting  · You feel faint or become confused  Call your doctor if:   · You see new drainage on your sock  · Your foot becomes red, warm, and swollen  · Your foot ulcer has a bad smell or is draining pus  · You feel pain in a foot that used to have little or no feeling  · You see black or dead tissue in or around your ulcer  · Your ulcer becomes bigger, deeper, or does not heal      · You have questions or concerns about your condition or care  Treatment  may include a hospital stay and any of the following:  · Debridement (removal) of dead tissue  may be done in and around your foot ulcer  This procedure may be done in your hospital room  · A bandage  help keep your wound area moist and free from infection  The bandage may contain medicines to help your ulcer heal and prevent growth of unhealthy tissue  · Offloading (taking the pressure off) the area  can help it heal  Healthcare providers may use cushions or braces, or custom foot wear may be ordered  You may be asked to stay in bed or use a wheelchair or crutches  These devices help decrease the amount of weight and pressure placed on your foot  · Surgery  may be needed if you have an infection and decreased blood flow to your foot  Care for your wound as directed:  A bandage will be put on your ulcer  Your healthcare provider will give you instructions on changing your bandage  You may need to clean the wound and change the bandage daily  The bandage may contain medicines to help your ulcer heal  You may be asked to put medicine on your foot ulcer before putting on the bandage  The medicine may also prevent growth of tissue that is not healthy  You may need to cover your wound with a plastic bag while you bathe  Ask your healthcare provider for instructions on bathing until your foot heals  Prevent diabetic foot ulcers:  Good foot care may help prevent ulcers, or keep them from getting worse  Ask someone to help you if you are not able to check your feet by yourself  You or another person may need to do any of the following:  · Keep your blood sugar levels under control  Continue the plan for your diabetes that you and your healthcare provider have discussed  Healthy food choices and taking your medicines as directed may help control blood sugars  Contact your healthcare provider if your blood sugar levels are higher than directed  · Wash your feet each day with soap and warm water  Do not use hot water, because this can injure your foot  Dry your feet gently with a towel after you wash them  Dry between and under your toes  · Apply lotion or a moisturizer on your dry feet  Ask your healthcare provider what lotions are best to use  Do not put lotion or moisturizer between your toes  Moisture between your toes could lead to skin breakdown  · Check your feet each day  Look at your whole foot, including the bottom, and between and under your toes  Check for wounds, corns, and calluses  Feel your feet by running your hands along the tops, bottoms, sides, and between your toes  Use a nonbreakable mirror to check your feet if you have trouble seeing the bottoms  Do not try to remove corns or calluses yourself  File or cut your toenails straight across  · Protect your feet  Do not walk barefoot or wear your shoes without socks  Check your shoes for rocks or other objects that can hurt your feet   Wear cotton socks to help keep your feet dry  Wear socks without toe seams, or wear them with the seams inside out  Change your socks each day  Do not wear socks that are dirty or damp  · Wear shoes that fit well  Wear shoes that do not rub against any area of your feet  Your shoes should be ½ to ¾ inch (1 to 2 centimeters) longer than your feet  Your shoes should also have extra space around the widest part of your feet  Walking or athletic shoes with laces or straps that adjust are best  Ask your healthcare provider for help to choose shoes that fit you best  Ask him or her if you need to wear an insert, orthotic, or bandage on your feet  · Do not smoke  Nicotine can cause damage to your blood vessels and increases your risk for foot ulcers  Do not use e-cigarettes or smokeless tobacco in place of cigarettes or to help you quit  They still contain nicotine  Ask your healthcare provider for information if you currently smoke and need help quitting  · Know the risks if you choose to drink alcohol  Alcohol can cause your blood sugar levels to be low if you use insulin  Alcohol can cause high blood sugar levels and weight gain if you drink too much  A drink of alcohol is 12 ounces of beer, 5 ounces of wine, or 1½ ounces of liquor  · Maintain a healthy weight  Ask your healthcare provider how much you should weigh  A healthy weight can help you control your diabetes  Ask him or her to help you create a weight loss plan if you are overweight  Even a 10 to 15 pound weight loss can help you better manage your blood sugar level  Follow up with your healthcare provider or foot specialist as directed: You may need to return often to have your wound checked  Your wound may be measured to see if it is getting smaller  Bring any offloading devices or footwear to your follow-up visits so your healthcare provider or specialist can check them  Write down your questions so you remember to ask them during your visits     © Copyright [employer] 2022 Information is for End User's use only and may not be sold, redistributed or otherwise used for commercial purposes  All illustrations and images included in CareNotes® are the copyrighted property of A D A M , Inc  or Arlen Cody  The above information is an  only  It is not intended as medical advice for individual conditions or treatments  Talk to your doctor, nurse or pharmacist before following any medical regimen to see if it is safe and effective for you

## 2022-03-16 NOTE — PROGRESS NOTES
HISTORY AND PHYSICAL EXAM  - [facility]    PATIENT:  Paddy Zhong    1965      Assessment/Plan     Problem List Items Addressed This Visit        Endocrine    Diabetic polyneuropathy associated with type 2 diabetes mellitus ([address] ) - Primary      Other Visit Diagnoses     Open wound of toe, initial encounter        Relevant Orders    X-ray foot left 3+ views    Peripheral arterial disease ([address] )        Skin ulcer of toe of left foot with fat layer exposed ([address] )        Relevant Orders    Post Op Shoe    Anaerobic culture and Gram stain    Wound culture and Gram stain    Cellulitis of foot, left        Relevant Medications    ciprofloxacin (CIPRO) 500 mg tablet           Diagnoses and all orders for this visit:    Diabetic polyneuropathy associated with type 2 diabetes mellitus ([address] )    Open wound of toe, initial encounter  -     Ambulatory Referral to Podiatry  -     X-ray foot left 3+ views; Future    Peripheral arterial disease (HCC)    Skin ulcer of toe of left foot with fat layer exposed ([address] )  -     Cancel: Wound culture and Gram stain; Future  -     Cancel: Anaerobic culture and Gram stain; Future  -     Post Op Shoe  -     Anaerobic culture and Gram stain; Future  -     Wound culture and Gram stain; Future    Cellulitis of foot, left  -     ciprofloxacin (CIPRO) 500 mg tablet; Take 1 tablet (500 mg total) by mouth every 12 (twelve) hours for 10 days     - clinical images were compared from previous visit on 03/09 2 today, there has been significant interval tissue loss, and will obtain vascular studies to rule out stenosis and check healing potential of the left foot  - he will also need nail care and was advised to come back for separate visit for this    - cultures aerobic and anaerobic were taken of the area  - there is some evidence of malodor, and does smell somewhat Gram-negative, will give course of Cipro  - patient is return to clinic in 1 week and he was educated as well as his caretaker to be vigilant for signs infection including increased drainage increased smell redness and increase of blood sugars  - patient is weight-bearing as tolerated in surgical shoe to the left foot  -x-rays three views left foot were taken reviewed and do show no subcutaneous emphysema or cortical breakthrough to indicate osteomyelitis, there is some soft tissue swelling  A formal timeout including patient identification, laterality and existing allergies using [facility] protocol was conducted  Procedure Performed: Debridement of subcutaneous tissue- <20 sq cm ([address]63)  Anesthetic used as needed to reduce discomfort  Under aseptic technique, the wound was thoroughly explored for undermining, deep sinus tracts and bone involvement  Sterile instrumentation including scalpel used to excise the clearly delineated devitalized subcutaneous tissue exposing viable tissue  Bleeding controlled with gentle pressure  Patient tolerated debridement without complications  The wound was dressed  Wound dressing technique and frequency described to patient  I am to be called if any signs of infection develop such as erythema, increased wound size or significant change in appearance of wound, odor, pain, increased or change in color of drainage, swelling, fever, chills, nightsweats  I reviewed these signs with the patient  - advised and demonstrated daily coverage with Betadine, DSD to the left hallux  History of Present Illness   Paddy Zhong is a 61 y o  male who presents with ulceration on his left big toe this started approximately 2 weeks ago  He states that he cannot see, and history was also obtained from his caretaker  The nose post between the 1st and 2nd digits,, and was started on antibiotics by urgent care  His was started on 03/09    Review of Systems   Constitutional: Negative for chills and fever  HENT: Negative for ear pain and sore throat      Eyes: Positive for visual disturbance  Negative for pain  Respiratory: Negative for cough and shortness of breath  Cardiovascular: Negative for chest pain and palpitations  Gastrointestinal: Negative for abdominal pain and vomiting  Genitourinary: Negative for dysuria and hematuria  Musculoskeletal: Negative for arthralgias and back pain  Skin: Positive for wound  Negative for color change and rash  Neurological: Negative for seizures and syncope  All other systems reviewed and are negative  Historical Information   Past Medical History:   Diagnosis Date    Cataract     Chronic kidney disease     Diabetes mellitus (HCC)      IDDM    Diabetic retinopathy [facility] )     Dizziness     occ    ESRD (end stage renal disease) ([facility] )     GERD (gastroesophageal reflux disease)     Headache     occ    Hyperlipidemia     Hypertension     Legally blind     LVH (left ventricular hypertrophy)     Preferred language is [place]     understands some English    Use of cane as ambulatory aid      Past Surgical History:   Procedure Laterality Date    INGUINAL HERNIA REPAIR Right     IR AV FISTULAGRAM/GRAFTOGRAM  2019    IR AV FISTULAGRAM/GRAFTOGRAM  2019    IR AV FISTULAGRAM/GRAFTOGRAM  2020    IR AV FISTULAGRAM/GRAFTOGRAM  2021    IR AV FISTULAGRAM/GRAFTOGRAM  9/3/2021    IR TUNNELED DIALYSIS CATHETER PLACEMENT  4/3/2019    KS ANASTOMOSIS,AV,ANY SITE Left 2019    Procedure: CREATION FISTULA ARTERIOVENOUS (AV);   Surgeon: Sherry Wang MD;  Location: [facility];  Service: Vascular     Social History   Social History     Substance and Sexual Activity   Alcohol Use Not Currently     Social History     Substance and Sexual Activity   Drug Use No     Social History     Tobacco Use   Smoking Status Former Smoker    Packs/day: 0 25    Quit date: 2018    Years since quittin 1   Smokeless Tobacco Never Used     Family History: non-contributory    Meds/Allergies   all medications and allergies reviewed  Allergies   Allergen Reactions    No Known Allergies        Objective   Vitals: Blood pressure 121/59, pulse 70, height 5' 10" (1 778 m)  ,Body mass index is 25 11 kg/m²  Physical Exam  Constitutional:       Appearance: Normal appearance  HENT:      Head: Normocephalic  Eyes:      Comments: Blind   Cardiovascular:      Rate and Rhythm: Normal rate  Pulses:           Dorsalis pedis pulses are 1+ on the left side  Posterior tibial pulses are 1+ on the right side and 1+ on the left side  Pulmonary:      Effort: Pulmonary effort is normal    Musculoskeletal:         General: Signs of injury present  No tenderness  Left lower leg: Edema present  Feet:      Left foot:      Skin integrity: Ulcer, skin breakdown and warmth present  Comments: There is ulceration on the distal tip of the left 2nd toe with roof intact, there is an ulceration of the left lateral distal hallux measuring 2 5 x 1 x 0 1 pre debridement with a mix of fibrous and necrotic tissue, 100%, new posterior measurements were 2 5 x 1 x 0 2 with 85% fiber necrotic base, with improved malodor  There is no ascending cellulitis, there is no deep probe, there is no wound in the interspace  Skin:     Capillary Refill: Capillary refill takes more than 3 seconds  Neurological:      General: No focal deficit present  Mental Status: He is alert  Psychiatric:         Mood and Affect: Mood normal          Thought Content:  Thought content normal          Ortho Exam

## 2022-03-19 LAB
BACTERIA SPEC ANAEROBE CULT: ABNORMAL
BACTERIA WND AEROBE CULT: ABNORMAL
GRAM STN SPEC: ABNORMAL

## 2022-03-23 ENCOUNTER — OFFICE VISIT (OUTPATIENT)
Dept: PODIATRY | Facility: CLINIC | Age: 57
End: 2022-03-23
Payer: MEDICARE

## 2022-03-23 VITALS
DIASTOLIC BLOOD PRESSURE: 76 MMHG | SYSTOLIC BLOOD PRESSURE: 130 MMHG | HEART RATE: 78 BPM | HEIGHT: 70 IN | WEIGHT: 175 LBS | BODY MASS INDEX: 25.05 KG/M2

## 2022-03-23 DIAGNOSIS — L03.116 CELLULITIS OF FOOT, LEFT: ICD-10-CM

## 2022-03-23 DIAGNOSIS — S91.109A OPEN WOUND OF TOE, INITIAL ENCOUNTER: Primary | ICD-10-CM

## 2022-03-23 DIAGNOSIS — E11.42 DIABETIC POLYNEUROPATHY ASSOCIATED WITH TYPE 2 DIABETES MELLITUS (HCC): ICD-10-CM

## 2022-03-23 DIAGNOSIS — I73.9 PERIPHERAL ARTERIAL DISEASE (HCC): ICD-10-CM

## 2022-03-23 DIAGNOSIS — B35.1 ONYCHOMYCOSIS: ICD-10-CM

## 2022-03-23 DIAGNOSIS — L97.522 SKIN ULCER OF TOE OF LEFT FOOT WITH FAT LAYER EXPOSED (HCC): ICD-10-CM

## 2022-03-23 PROCEDURE — 99213 OFFICE O/P EST LOW 20 MIN: CPT | Performed by: PODIATRIST

## 2022-03-23 PROCEDURE — 11721 DEBRIDE NAIL 6 OR MORE: CPT | Performed by: PODIATRIST

## 2022-03-23 NOTE — PROGRESS NOTES
Assessment/Plan:    No problem-specific Assessment & Plan notes found for this encounter  Diagnoses and all orders for this visit:    Open wound of toe, initial encounter    Cellulitis of foot, left    Skin ulcer of toe of left foot with fat layer exposed ([address] )    Onychomycosis    Peripheral arterial disease ([address] )    Diabetic polyneuropathy associated with type 2 diabetes mellitus ([address] )      - The ulceration of the left hallux has not significantly worsened  There has not been significant further interval tissue loss  - surgical shoe given to offload the area  -vascular study should be next week, they are to return following this further evaluation   -cellulitis is resolved of the left hallux  They are to continue and finish their course of Cipro they have knows that the malodor and resolved as well   -nails times 10 were debrided with reduction in length and thickness greater than 1 mm, Q 8 findings      Subjective:      Patient ID: Bailey Lyons is a 61 y o  male  Presents to follow-up on his ulceration of the left hallux, that stepping with Betadine every day and did take antibiotics as directed  Notes that resolution of malodor and no further deterioration of the area, he is in dialysis and does have to wear shoes, they are requesting a postop shoe to offload the toe  Vascular studies are scheduled for next week  The following portions of the patient's history were reviewed and updated as appropriate: allergies, current medications, past family history, past medical history, past social history, past surgical history and problem list     Review of Systems   Constitutional: Negative for chills and fever  HENT: Negative for ear pain and sore throat  Eyes: Negative for pain  Respiratory: Negative for cough and shortness of breath  Cardiovascular: Negative for chest pain and palpitations  Gastrointestinal: Negative for abdominal pain and vomiting     Genitourinary: Negative for dysuria and hematuria  Musculoskeletal: Negative for arthralgias and back pain  Skin: Positive for wound  Negative for color change and rash  Neurological: Negative for seizures and syncope  All other systems reviewed and are negative  Objective:      /76   Pulse 78   Ht 5' 10" (1 778 m)   Wt 79 4 kg (175 lb)   BMI 25 11 kg/m²          Physical Exam  Vitals reviewed  HENT:      Head: Normocephalic and atraumatic  Nose: Nose normal    Eyes:      Pupils: Pupils are equal, round, and reactive to light  Cardiovascular:      Rate and Rhythm: Normal rate  Pulmonary:      Effort: Pulmonary effort is normal    Musculoskeletal:         General: Signs of injury present  Normal range of motion  Right lower leg: Edema present  Left lower leg: Edema present  Comments: Cellulitis and warmth to the left hallux has resolved, there was no further malodor, there is minimal drainage, the ulceration, as is the distal tip of the toe, and does not appear infected with minimal signs of cellulitis  Pulses remain weak to nonpalpable  Skin is cool to touch, nails 1 through 5 bilaterally are thickened elongated with notable subungual debris  Skin:     Capillary Refill: Capillary refill takes more than 3 seconds  Neurological:      General: No focal deficit present  Mental Status: He is oriented to person, place, and time     Psychiatric:         Mood and Affect: Mood normal          Behavior: Behavior normal

## 2022-03-23 NOTE — PATIENT INSTRUCTIONS
Diabetic Foot Ulcers   AMBULATORY CARE:   A diabetic foot ulcer  can be redness over a bony area or an open sore  The ulcer can develop anywhere on your foot or toes  Ulcers usually develop on the bottom of the foot  You may not know you have an ulcer until you notice drainage on your sock  Drainage is fluid that may be yellow, brown, or red  The fluid may also contain pus or blood  Call your local emergency number (911 in the [address] [facility]) if:  · You have a fever with chills  · You begin vomiting  · You feel faint or become confused  Call your doctor if:   · You see new drainage on your sock  · Your foot becomes red, warm, and swollen  · Your foot ulcer has a bad smell or is draining pus  · You feel pain in a foot that used to have little or no feeling  · You see black or dead tissue in or around your ulcer  · Your ulcer becomes bigger, deeper, or does not heal      · You have questions or concerns about your condition or care  Treatment  may include a hospital stay and any of the following:  · Debridement (removal) of dead tissue  may be done in and around your foot ulcer  This procedure may be done in your hospital room  · A bandage  help keep your wound area moist and free from infection  The bandage may contain medicines to help your ulcer heal and prevent growth of unhealthy tissue  · Offloading (taking the pressure off) the area  can help it heal  Healthcare providers may use cushions or braces, or custom foot wear may be ordered  You may be asked to stay in bed or use a wheelchair or crutches  These devices help decrease the amount of weight and pressure placed on your foot  · Surgery  may be needed if you have an infection and decreased blood flow to your foot  Care for your wound as directed:  A bandage will be put on your ulcer  Your healthcare provider will give you instructions on changing your bandage  You may need to clean the wound and change the bandage daily  The bandage may contain medicines to help your ulcer heal  You may be asked to put medicine on your foot ulcer before putting on the bandage  The medicine may also prevent growth of tissue that is not healthy  You may need to cover your wound with a plastic bag while you bathe  Ask your healthcare provider for instructions on bathing until your foot heals  Prevent diabetic foot ulcers:  Good foot care may help prevent ulcers, or keep them from getting worse  Ask someone to help you if you are not able to check your feet by yourself  You or another person may need to do any of the following:  · Keep your blood sugar levels under control  Continue the plan for your diabetes that you and your healthcare provider have discussed  Healthy food choices and taking your medicines as directed may help control blood sugars  Contact your healthcare provider if your blood sugar levels are higher than directed  · Wash your feet each day with soap and warm water  Do not use hot water, because this can injure your foot  Dry your feet gently with a towel after you wash them  Dry between and under your toes  · Apply lotion or a moisturizer on your dry feet  Ask your healthcare provider what lotions are best to use  Do not put lotion or moisturizer between your toes  Moisture between your toes could lead to skin breakdown  · Check your feet each day  Look at your whole foot, including the bottom, and between and under your toes  Check for wounds, corns, and calluses  Feel your feet by running your hands along the tops, bottoms, sides, and between your toes  Use a nonbreakable mirror to check your feet if you have trouble seeing the bottoms  Do not try to remove corns or calluses yourself  File or cut your toenails straight across  · Protect your feet  Do not walk barefoot or wear your shoes without socks  Check your shoes for rocks or other objects that can hurt your feet   Wear cotton socks to help keep your feet dry  Wear socks without toe seams, or wear them with the seams inside out  Change your socks each day  Do not wear socks that are dirty or damp  · Wear shoes that fit well  Wear shoes that do not rub against any area of your feet  Your shoes should be ½ to ¾ inch (1 to 2 centimeters) longer than your feet  Your shoes should also have extra space around the widest part of your feet  Walking or athletic shoes with laces or straps that adjust are best  Ask your healthcare provider for help to choose shoes that fit you best  Ask him or her if you need to wear an insert, orthotic, or bandage on your feet  · Do not smoke  Nicotine can cause damage to your blood vessels and increases your risk for foot ulcers  Do not use e-cigarettes or smokeless tobacco in place of cigarettes or to help you quit  They still contain nicotine  Ask your healthcare provider for information if you currently smoke and need help quitting  · Know the risks if you choose to drink alcohol  Alcohol can cause your blood sugar levels to be low if you use insulin  Alcohol can cause high blood sugar levels and weight gain if you drink too much  A drink of alcohol is 12 ounces of beer, 5 ounces of wine, or 1½ ounces of liquor  · Maintain a healthy weight  Ask your healthcare provider how much you should weigh  A healthy weight can help you control your diabetes  Ask him or her to help you create a weight loss plan if you are overweight  Even a 10 to 15 pound weight loss can help you better manage your blood sugar level  Follow up with your healthcare provider or foot specialist as directed: You may need to return often to have your wound checked  Your wound may be measured to see if it is getting smaller  Bring any offloading devices or footwear to your follow-up visits so your healthcare provider or specialist can check them  Write down your questions so you remember to ask them during your visits     © Copyright [employer] 2022 Information is for End User's use only and may not be sold, redistributed or otherwise used for commercial purposes  All illustrations and images included in CareNotes® are the copyrighted property of [employer]  or Arlen Cody  The above information is an  only  It is not intended as medical advice for individual conditions or treatments  Talk to your doctor, nurse or pharmacist before following any medical regimen to see if it is safe and effective for you

## 2022-03-28 DIAGNOSIS — I10 ESSENTIAL HYPERTENSION: ICD-10-CM

## 2022-03-28 RX ORDER — FUROSEMIDE 80 MG
TABLET ORAL
Qty: 90 TABLET | Refills: 1 | Status: SHIPPED | OUTPATIENT
Start: 2022-03-28

## 2022-03-30 ENCOUNTER — OFFICE VISIT (OUTPATIENT)
Dept: PODIATRY | Facility: CLINIC | Age: 57
End: 2022-03-30
Payer: MEDICARE

## 2022-03-30 VITALS
SYSTOLIC BLOOD PRESSURE: 120 MMHG | BODY MASS INDEX: 25.05 KG/M2 | WEIGHT: 175 LBS | HEIGHT: 70 IN | DIASTOLIC BLOOD PRESSURE: 64 MMHG | HEART RATE: 67 BPM

## 2022-03-30 DIAGNOSIS — I73.9 PERIPHERAL ARTERIAL DISEASE (HCC): ICD-10-CM

## 2022-03-30 DIAGNOSIS — L03.116 CELLULITIS OF FOOT, LEFT: ICD-10-CM

## 2022-03-30 DIAGNOSIS — L97.522 SKIN ULCER OF TOE OF LEFT FOOT WITH FAT LAYER EXPOSED (HCC): Primary | ICD-10-CM

## 2022-03-30 PROCEDURE — 99213 OFFICE O/P EST LOW 20 MIN: CPT | Performed by: PODIATRIST

## 2022-03-30 NOTE — PROGRESS NOTES
Assessment/Plan:    No problem-specific Assessment & Plan notes found for this encounter  Diagnoses and all orders for this visit:    Skin ulcer of toe of left foot with fat layer exposed ([address] )    Cellulitis of foot, left    Peripheral arterial disease ([address] )      -there is no been no further significant tissue loss to the left 1st and 2nd toes  There is minimal drainage emanating from the left hallux, advised coverage with Band-Aid and continue Betadine paint to the left hallux and left 2nd toe   -they return as soon as they get their vascular studies hopefully within 24-48 hours to review results and they will likely need to be referred to vascular for intervention   -thankfully this area has remained stable, continue offloading in surgical shoe, I discussed at length the risk of conversion into wet gangrene, they are to call or seek help in the ER if there are any malodor or other further signs of infection     -cellulitis is 100% resolved, wound is now hard, stable with no evidence of bogginess or malodor  Subjective:      Patient ID: Chelle Whitaker is a 61 y o  male  Patient presents for follow-up on wound and eschar of the left toe and also peripheral vascular disease  And concerning blister on his left 2nd toe which the roof did remain intact  Has gotten smaller  There is no drainage from the left foot, they have been painting Betadine and keeping this uncovered      The following portions of the patient's history were reviewed and updated as appropriate: allergies, current medications, past family history, past medical history, past social history, past surgical history and problem list     Review of Systems   Constitutional: Negative for chills and fever  HENT: Negative for ear pain and sore throat  Eyes: Positive for visual disturbance  Negative for pain  Respiratory: Negative for cough and shortness of breath  Cardiovascular: Negative for chest pain and palpitations  Gastrointestinal: Negative for abdominal pain and vomiting  Genitourinary: Negative for dysuria and hematuria  Musculoskeletal: Positive for gait problem  Negative for arthralgias and back pain  Skin: Positive for wound  Negative for color change and rash  Neurological: Negative for seizures and syncope  All other systems reviewed and are negative  Objective:      /64   Pulse 67   Ht 5' 10" (1 778 m)   Wt 79 4 kg (175 lb)   BMI 25 11 kg/m²          Physical Exam  Vitals reviewed  Constitutional:       Appearance: Normal appearance  HENT:      Head: Normocephalic  Eyes:      Pupils: Pupils are equal, round, and reactive to light  Pulmonary:      Effort: Pulmonary effort is normal    Musculoskeletal:      Comments: The ulceration of the left hallux remains stable, it measures 1 x 0 5 by approximately 0 2 with a 100% dried eschar base  There is also a blister with roof intact to the left 2nd toe on the plantar aspect, there is no drainage this, is hard to touch  The foot is cool to touch, there is also a small eschar on the distal tip of the right hallux which has no drainage which is intact  Neurovascular status is at baseline   Skin:     Capillary Refill: Capillary refill takes more than 3 seconds  Neurological:      General: No focal deficit present  Mental Status: He is alert and oriented to person, place, and time     Psychiatric:         Mood and Affect: Mood normal          Behavior: Behavior normal

## 2022-04-04 ENCOUNTER — HOSPITAL ENCOUNTER (OUTPATIENT)
Dept: NON INVASIVE DIAGNOSTICS | Facility: CLINIC | Age: 57
Discharge: HOME | End: 2022-04-04
Payer: MEDICARE

## 2022-04-04 DIAGNOSIS — I73.9 PERIPHERAL ARTERIAL DISEASE (HCC): ICD-10-CM

## 2022-04-04 PROCEDURE — 93925 LOWER EXTREMITY STUDY: CPT | Performed by: SURGERY

## 2022-04-04 PROCEDURE — 93925 LOWER EXTREMITY STUDY: CPT

## 2022-04-04 PROCEDURE — 93923 UPR/LXTR ART STDY 3+ LVLS: CPT

## 2022-04-04 PROCEDURE — 93922 UPR/L XTREMITY ART 2 LEVELS: CPT | Performed by: SURGERY

## 2022-04-08 ENCOUNTER — HOSPITAL ENCOUNTER (OUTPATIENT)
Dept: CT IMAGING | Facility: HOSPITAL | Age: 57
Discharge: HOME | End: 2022-04-08
Attending: INTERNAL MEDICINE
Payer: MEDICARE

## 2022-04-08 DIAGNOSIS — I16.0 HYPERTENSIVE URGENCY: ICD-10-CM

## 2022-04-08 DIAGNOSIS — R76.12 NONSPECIFIC REACTION TO CELL MEDIATED IMMUNITY MEASUREMENT OF GAMMA INTERFERON ANTIGEN RESPONSE WITHOUT ACTIVE TUBERCULOSIS: ICD-10-CM

## 2022-04-08 DIAGNOSIS — E11.311: ICD-10-CM

## 2022-04-08 DIAGNOSIS — R94.31 ABNORMAL ELECTROCARDIOGRAM (ECG) (EKG): ICD-10-CM

## 2022-04-08 DIAGNOSIS — Z99.2 DEPENDENCE ON RENAL DIALYSIS (HCC): ICD-10-CM

## 2022-04-08 DIAGNOSIS — M89.9 DISORDER OF BONE, UNSPECIFIED: ICD-10-CM

## 2022-04-08 DIAGNOSIS — N18.9 CHRONIC KIDNEY DISEASE, UNSPECIFIED: ICD-10-CM

## 2022-04-08 DIAGNOSIS — Z79.4 LONG TERM (CURRENT) USE OF INSULIN (HCC): ICD-10-CM

## 2022-04-08 DIAGNOSIS — E11.22 TYPE 2 DIABETES MELLITUS WITH DIABETIC CHRONIC KIDNEY DISEASE (HCC): ICD-10-CM

## 2022-04-08 DIAGNOSIS — E83.9 DISORDER OF MINERAL METABOLISM, UNSPECIFIED: ICD-10-CM

## 2022-04-08 DIAGNOSIS — E78.5 HYPERLIPIDEMIA, UNSPECIFIED: ICD-10-CM

## 2022-04-08 DIAGNOSIS — N18.6 END STAGE RENAL DISEASE (HCC): ICD-10-CM

## 2022-04-08 PROCEDURE — 74178 CT ABD&PLV WO CNTR FLWD CNTR: CPT

## 2022-04-08 PROCEDURE — G1004 CDSM NDSC: HCPCS

## 2022-04-08 RX ADMIN — IOHEXOL 100 ML: 350 INJECTION, SOLUTION INTRAVENOUS at 17:21

## 2022-05-11 ENCOUNTER — OFFICE VISIT (OUTPATIENT)
Dept: PODIATRY | Facility: CLINIC | Age: 57
End: 2022-05-11
Payer: MEDICARE

## 2022-05-11 VITALS
OXYGEN SATURATION: 98 % | DIASTOLIC BLOOD PRESSURE: 64 MMHG | BODY MASS INDEX: 25.05 KG/M2 | HEART RATE: 67 BPM | HEIGHT: 70 IN | WEIGHT: 175 LBS | SYSTOLIC BLOOD PRESSURE: 120 MMHG

## 2022-05-11 DIAGNOSIS — E11.42 DIABETIC POLYNEUROPATHY ASSOCIATED WITH TYPE 2 DIABETES MELLITUS (HCC): Primary | ICD-10-CM

## 2022-05-11 DIAGNOSIS — I70.202 STENOSIS OF ARTERY OF LEFT LOWER EXTREMITY (HCC): ICD-10-CM

## 2022-05-11 DIAGNOSIS — B35.1 ONYCHOMYCOSIS: ICD-10-CM

## 2022-05-11 PROCEDURE — 11721 DEBRIDE NAIL 6 OR MORE: CPT | Performed by: PODIATRIST

## 2022-05-11 PROCEDURE — 99213 OFFICE O/P EST LOW 20 MIN: CPT | Performed by: PODIATRIST

## 2022-05-11 NOTE — PROGRESS NOTES
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 ([facility] )      -patient's arterial studies from 04/04 reviewed with patient and also wife  -the arterial studies do show normal healing potential bilateral hallux is but a significant 75% stenosis of the left SFA artery  -we discussed intervention at length, a patient has no symptoms of intermittent claudication and does have a normal healing potential of his digits  We will defer a vascular referral at this time   -we discussed the symptoms of intermittent claudication length and was advised that if he began having impacted his ADLs and also symptoms intermittent claudication he is to return for further evaluation and possible are vascular referral  -is also return in 9 weeks for continued nail care    Paddy Zhong  1965  AT RISK FOOT CARE    1  Diabetic polyneuropathy associated with type 2 diabetes mellitus ([facility] )     2  Onychomycosis     3  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 [facility])         Patient presents for at-risk foot care  Patient has no acute concerns today  Patient has significant lower extremity risk due to diminished pulses in the feet and trophic skin changes to the lower extremity including thick toenail, atrophic skin, and decreased hair growth  On exam patient has thickened, hypertrophic, discolored, brittle toenails with subungual debris and tenderness x10   Callus: 0  Patient has diminished pedal pulses and decreased perfusion to the lower extremities  Patient has significant trophic changes to the skin including thick toenails, decreased pedal hair and atrophic skin  Today's treatment includes:  Debridement of toenails   Using nail nipper, juanis, and curette, nails were sharply debrided, reduced in thickness and length  Devitalized nail tissue and fungal debris excised and removed  Patient tolerated well  Discussed proper shoe gear, daily inspections of feet, and general foot health with patient  Patient has Q8  findings and is recommended for at risk foot care every 9-10 weeks  Patients most recent complete clinical foot exam was on: 3/16      Subjective:      Patient ID: Caroline Lord is a 61 y o  male  Patient presents for elongated painful toenails he is unable to bend down and cut himself, he had a previous ulceration on his left hallux which has since resolved  The eschar is gone with no drainage  They know no new pedal issues to bilateral feet  Also here to review their arterial studies and these were completed on April 4th      The following portions of the patient's history were reviewed and updated as appropriate: allergies, current medications, past family history, past medical history, past social history, past surgical history and problem list     Review of Systems   Constitutional: Negative for chills and fever  HENT: Negative for ear pain and sore throat  Eyes: Negative for pain and visual disturbance  Respiratory: Negative for cough and shortness of breath  Cardiovascular: Negative for chest pain and palpitations  Gastrointestinal: Negative for abdominal pain and vomiting  Genitourinary: Negative for dysuria and hematuria  Musculoskeletal: Negative for arthralgias and back pain  Skin: Negative for color change and rash  Neurological: Negative for seizures and syncope  All other systems reviewed and are negative  Objective:      /64 (BP Location: Left arm, Patient Position: Sitting, Cuff Size: Adult)   Pulse 67   Ht 5' 10" (1 778 m)   Wt 79 4 kg (175 lb)   SpO2 98%   BMI 25 11 kg/m²          Physical Exam  Vitals reviewed  Constitutional:       Appearance: Normal appearance  HENT:      Head: Normocephalic and atraumatic        Nose: Nose normal    Eyes:      Comments: Blind   Musculoskeletal:         General: Swelling present  No tenderness  Comments: No open lesions, skin is well moistened, skin is shiny and atrophic  There is pitting edema to bilateral lower extremities  Skin:     Capillary Refill: Capillary refill takes more than 3 seconds  Neurological:      General: No focal deficit present  Mental Status: He is alert and oriented to person, place, and time     Psychiatric:         Mood and Affect: Mood normal          Behavior: Behavior normal

## 2022-06-10 ENCOUNTER — TELEPHONE (OUTPATIENT)
Dept: NEPHROLOGY | Facility: CLINIC | Age: 57
End: 2022-06-10

## 2022-06-10 PROBLEM — Z99.2 ESRD ON DIALYSIS (HCC): Status: ACTIVE | Noted: 2019-01-09

## 2022-06-10 NOTE — TELEPHONE ENCOUNTER
Appointment Confirmation   Person confirmed appointment with  If not patient, name of the person error    Date and time of appointment error    Patient acknowledged and will be at appointment? no    Did you advise the patient that they will need a urine sample if they are a new patient?  No    Did you advise the patient to bring their current medications for verification? (including any OTC) No    Additional Information

## 2022-06-19 DIAGNOSIS — E11.65 UNCONTROLLED TYPE 2 DIABETES MELLITUS WITH HYPERGLYCEMIA, WITH LONG-TERM CURRENT USE OF INSULIN (HCC): ICD-10-CM

## 2022-06-19 DIAGNOSIS — Z79.4 UNCONTROLLED TYPE 2 DIABETES MELLITUS WITH HYPERGLYCEMIA, WITH LONG-TERM CURRENT USE OF INSULIN (HCC): ICD-10-CM

## 2022-06-19 DIAGNOSIS — I10 ESSENTIAL HYPERTENSION: ICD-10-CM

## 2022-06-19 DIAGNOSIS — N18.4 CKD (CHRONIC KIDNEY DISEASE) STAGE 4, GFR 15-29 ML/MIN (HCC): ICD-10-CM

## 2022-06-20 RX ORDER — ASPIRIN 81 MG/1
TABLET, COATED ORAL
Qty: 90 TABLET | Refills: 0 | Status: SHIPPED | OUTPATIENT
Start: 2022-06-20

## 2022-06-28 DIAGNOSIS — E78.5 HYPERLIPIDEMIA, UNSPECIFIED HYPERLIPIDEMIA TYPE: ICD-10-CM

## 2022-06-28 RX ORDER — ATORVASTATIN CALCIUM 40 MG/1
TABLET, FILM COATED ORAL
Qty: 90 TABLET | Refills: 0 | Status: SHIPPED | OUTPATIENT
Start: 2022-06-28

## 2022-07-15 ENCOUNTER — RA CDI HCC (OUTPATIENT)
Dept: OTHER | Facility: HOSPITAL | Age: 57
End: 2022-07-15

## 2022-07-15 NOTE — PROGRESS NOTES
Z99 2 is on the problem list and already billed this year     [facility]  coding opportunities          Chart Reviewed number of suggestions sent to Provider: 1     Patients Insurance

## 2022-07-22 ENCOUNTER — OFFICE VISIT (OUTPATIENT)
Dept: INTERNAL MEDICINE CLINIC | Facility: CLINIC | Age: 57
End: 2022-07-22
Payer: MEDICARE

## 2022-07-22 VITALS
WEIGHT: 168.5 LBS | SYSTOLIC BLOOD PRESSURE: 120 MMHG | RESPIRATION RATE: 18 BRPM | BODY MASS INDEX: 24.12 KG/M2 | OXYGEN SATURATION: 98 % | DIASTOLIC BLOOD PRESSURE: 78 MMHG | HEIGHT: 70 IN | TEMPERATURE: 97.1 F | HEART RATE: 59 BPM

## 2022-07-22 DIAGNOSIS — E11.29 TYPE 2 DIABETES MELLITUS WITH OTHER DIABETIC KIDNEY COMPLICATION, WITH LONG-TERM CURRENT USE OF INSULIN (HCC): Primary | ICD-10-CM

## 2022-07-22 DIAGNOSIS — Z79.4 TYPE 2 DIABETES MELLITUS WITH OTHER DIABETIC KIDNEY COMPLICATION, WITH LONG-TERM CURRENT USE OF INSULIN (HCC): Primary | ICD-10-CM

## 2022-07-22 DIAGNOSIS — E11.22 TYPE 2 DIABETES MELLITUS WITH CHRONIC KIDNEY DISEASE ON CHRONIC DIALYSIS, WITH LONG-TERM CURRENT USE OF INSULIN (HCC): ICD-10-CM

## 2022-07-22 DIAGNOSIS — Z99.2 TYPE 2 DIABETES MELLITUS WITH CHRONIC KIDNEY DISEASE ON CHRONIC DIALYSIS, WITH LONG-TERM CURRENT USE OF INSULIN (HCC): ICD-10-CM

## 2022-07-22 DIAGNOSIS — Z79.4 TYPE 2 DIABETES MELLITUS WITH CHRONIC KIDNEY DISEASE ON CHRONIC DIALYSIS, WITH LONG-TERM CURRENT USE OF INSULIN (HCC): ICD-10-CM

## 2022-07-22 DIAGNOSIS — I73.9 PERIPHERAL ARTERIAL DISEASE (HCC): ICD-10-CM

## 2022-07-22 DIAGNOSIS — I12.0 BENIGN HYPERTENSION WITH ESRD (END-STAGE RENAL DISEASE) (HCC): ICD-10-CM

## 2022-07-22 DIAGNOSIS — Z99.2 ESRD ON DIALYSIS (HCC): ICD-10-CM

## 2022-07-22 DIAGNOSIS — N18.6 TYPE 2 DIABETES MELLITUS WITH CHRONIC KIDNEY DISEASE ON CHRONIC DIALYSIS, WITH LONG-TERM CURRENT USE OF INSULIN (HCC): ICD-10-CM

## 2022-07-22 DIAGNOSIS — Z23 NEED FOR PNEUMOCOCCAL VACCINATION: ICD-10-CM

## 2022-07-22 DIAGNOSIS — N18.6 ESRD ON DIALYSIS (HCC): ICD-10-CM

## 2022-07-22 DIAGNOSIS — N18.6 BENIGN HYPERTENSION WITH ESRD (END-STAGE RENAL DISEASE) (HCC): ICD-10-CM

## 2022-07-22 DIAGNOSIS — E78.5 HYPERLIPIDEMIA, UNSPECIFIED HYPERLIPIDEMIA TYPE: ICD-10-CM

## 2022-07-22 PROCEDURE — 90677 PCV20 VACCINE IM: CPT | Performed by: INTERNAL MEDICINE

## 2022-07-22 PROCEDURE — G0009 ADMIN PNEUMOCOCCAL VACCINE: HCPCS | Performed by: INTERNAL MEDICINE

## 2022-07-22 PROCEDURE — G0439 PPPS, SUBSEQ VISIT: HCPCS | Performed by: INTERNAL MEDICINE

## 2022-07-22 PROCEDURE — 99214 OFFICE O/P EST MOD 30 MIN: CPT | Performed by: INTERNAL MEDICINE

## 2022-07-22 RX ORDER — DORZOLAMIDE HCL 20 MG/ML
SOLUTION/ DROPS OPHTHALMIC
COMMUNITY
Start: 2022-05-04

## 2022-07-22 NOTE — PATIENT INSTRUCTIONS
Medicare Preventive Visit Patient Instructions  Thank you for completing your Welcome to Medicare Visit or Medicare Annual Wellness Visit today  Your next wellness visit will be due in one year (7/23/2023)  The screening/preventive services that you may require over the next 5-10 years are detailed below  Some tests may not apply to you based off risk factors and/or age  Screening tests ordered at today's visit but not completed yet may show as past due  Also, please note that scanned in results may not display below  Preventive Screenings:  Service Recommendations Previous Testing/Comments   Colorectal Cancer Screening  · Colonoscopy    · Fecal Occult Blood Test (FOBT)/Fecal Immunochemical Test (FIT)  · Fecal DNA/Cologuard Test  · Flexible Sigmoidoscopy Age: 54-65 years old   Colonoscopy: every 10 years (May be performed more frequently if at higher risk)  OR  FOBT/FIT: every 1 year  OR  Cologuard: every 3 years  OR  Sigmoidoscopy: every 5 years  Screening may be recommended earlier than age 48 if at higher risk for colorectal cancer  Also, an individualized decision between you and your healthcare provider will decide whether screening between the ages of 74-80 would be appropriate   Colonoscopy: 12/27/2021  FOBT/FIT: Not on file  Cologuard: Not on file  Sigmoidoscopy: Not on file    Screening Current     Prostate Cancer Screening Individualized decision between patient and health care provider in men between ages of 53-78   Medicare will cover every 12 months beginning on the day after your 50th birthday PSA: 0 6 ng/mL           Hepatitis C Screening Once for adults born between 1945 and 1965  More frequently in patients at high risk for Hepatitis C Hep C Antibody: 02/12/2021    Screening Current   Diabetes Screening 1-2 times per year if you're at risk for diabetes or have pre-diabetes Fasting glucose: 113 mg/dL   A1C: 6 7    Screening Not Indicated  History Diabetes   Cholesterol Screening Once every 5 years if you don't have a lipid disorder  May order more often based on risk factors  Lipid panel: 04/20/2022    Screening Not Indicated  History Lipid Disorder      Other Preventive Screenings Covered by Medicare:  1  Abdominal Aortic Aneurysm (AAA) Screening: covered once if your at risk  You're considered to be at risk if you have a family history of AAA or a male between the age of 73-68 who smoking at least 100 cigarettes in your lifetime  2  Lung Cancer Screening: covers low dose CT scan once per year if you meet all of the following conditions: (1) Age 50-69; (2) No signs or symptoms of lung cancer; (3) Current smoker or have quit smoking within the last 15 years; (4) You have a tobacco smoking history of at least 30 pack years (packs per day x number of years you smoked); (5) You get a written order from a healthcare provider  3  Glaucoma Screening: covered annually if you're considered high risk: (1) You have diabetes OR (2) Family history of glaucoma OR (3)  aged 48 and older OR (3)  American aged 72 and older  3  Osteoporosis Screening: covered every 2 years if you meet one of the following conditions: (1) Have a vertebral abnormality; (2) On glucocorticoid therapy for more than 3 months; (3) Have primary hyperparathyroidism; (4) On osteoporosis medications and need to assess response to drug therapy  5  HIV Screening: covered annually if you're between the age of 12-76  Also covered annually if you are younger than 13 and older than 72 with risk factors for HIV infection  For pregnant patients, it is covered up to 3 times per pregnancy      Immunizations:  Immunization Recommendations   Influenza Vaccine Annual influenza vaccination during flu season is recommended for all persons aged >= 6 months who do not have contraindications   Pneumococcal Vaccine (Prevnar and Pneumovax)  * Prevnar = PCV13  * Pneumovax = PPSV23 Adults 25-60 years old: 1-3 doses may be recommended based on certain risk factors  Adults 72 years old: Prevnar (PCV13) vaccine recommended followed by Pneumovax (PPSV23) vaccine  If already received PPSV23 since turning 65, then PCV13 recommended at least one year after PPSV23 dose  Hepatitis B Vaccine 3 dose series if at intermediate or high risk (ex: diabetes, end stage renal disease, liver disease)   Tetanus (Td) Vaccine - COST NOT COVERED BY MEDICARE PART B Following completion of primary series, a booster dose should be given every 10 years to maintain immunity against tetanus  Td may also be given as tetanus wound prophylaxis  Tdap Vaccine - COST NOT COVERED BY MEDICARE PART B Recommended at least once for all adults  For pregnant patients, recommended with each pregnancy  Shingles Vaccine (Shingrix) - COST NOT COVERED BY MEDICARE PART B  2 shot series recommended in those aged 48 and above     Health Maintenance Due:      Topic Date Due    Colorectal Cancer Screening  12/25/2031    HIV Screening  Completed    Hepatitis C Screening  Completed     Immunizations Due:      Topic Date Due    Pneumococcal Vaccine: Pediatrics (0 to 5 Years) and At-Risk Patients (6 to 59 Years) (3 - PCV) 06/14/2019    COVID-19 Vaccine (3 - Booster for Pfizer series) 09/22/2021    Influenza Vaccine (1) 09/01/2022     Advance Directives   What are advance directives? Advance directives are legal documents that state your wishes and plans for medical care  These plans are made ahead of time in case you lose your ability to make decisions for yourself  Advance directives can apply to any medical decision, such as the treatments you want, and if you want to donate organs  What are the types of advance directives? There are many types of advance directives, and each state has rules about how to use them  You may choose a combination of any of the following:  · Living will: This is a written record of the treatment you want   You can also choose which treatments you do not want, which to limit, and which to stop at a certain time  This includes surgery, medicine, IV fluid, and tube feedings  · Durable power of  for healthcare [place] [facility]): This is a written record that states who you want to make healthcare choices for you when you are unable to make them for yourself  This person, called a proxy, is usually a family member or a friend  You may choose more than 1 proxy  · Do not resuscitate (DNR) order:  A DNR order is used in case your heart stops beating or you stop breathing  It is a request not to have certain forms of treatment, such as CPR  A DNR order may be included in other types of advance directives  · Medical directive: This covers the care that you want if you are in a coma, near death, or unable to make decisions for yourself  You can list the treatments you want for each condition  Treatment may include pain medicine, surgery, blood transfusions, dialysis, IV or tube feedings, and a ventilator (breathing machine)  · Values history: This document has questions about your views, beliefs, and how you feel and think about life  This information can help others choose the care that you would choose  Why are advance directives important? An advance directive helps you control your care  Although spoken wishes may be used, it is better to have your wishes written down  Spoken wishes can be misunderstood, or not followed  Treatments may be given even if you do not want them  An advance directive may make it easier for your family to make difficult choices about your care  © Copyright [address] 2018 Information is for End User's use only and may not be sold, redistributed or otherwise used for commercial purposes   All illustrations and images included in CareNotes® are the copyrighted property of A D A M , Inc  or [facility] audience

## 2022-07-22 NOTE — PROGRESS NOTES
INTERNAL MEDICINE OFFICE VISIT  [facility] Internal Medicine- [place]    NAME: Paddy Zhong  AGE: 61 y o  SEX: male    DATE OF ENCOUNTER: 7/22/2022    Assessment and Plan     1  Type 2 diabetes mellitus with other diabetic kidney complication, with long-term current use of insulin [facility])  Assessment & Plan:  HbA1c:  6 1 April 2022  Current medications:  glargine 28 units daily, NovoLog 10 units with breakfast, 10 units with dinner  Statin and ACE-inhibitor:  atorvastatin  Foot exam:   up-to-date, follows with Podiatry  Eye Exam:  Follows with Dr Willie Joyner, he is legally blind    Well controlled  Continue current regimen        2  ESRD on dialysis [facility])  Assessment & Plan:  - follows with Nephrology  Goes [facility] HD at [facility]  -currently undergoing transplant evaluation         3  Hyperlipidemia, unspecified hyperlipidemia type  Assessment & Plan:  Well controlled  -most recent lipid panel April 2022  -on statin      4  Benign hypertension with ESRD (end-stage renal disease) ([address] )  Assessment & Plan:  Well controlled today  -current regimen of labetalol, Lasix, nifedipine, doxazosin      5  Peripheral arterial disease [facility])  Assessment & Plan:  Arterial duplex completed April 2022 showed 50-75% stenosis of the mid/distal left SFA  KARL normal at 1 20, no occlusive disease in the right lower limb  -patient asymptomatic, does not endorse any recent symptoms of claudication  Podiatry I discussed duplex findings with patient  Vascular referral deferred      6  Type 2 diabetes mellitus with chronic kidney disease on chronic dialysis, with long-term current use of insulin [facility])  Assessment & Plan:  HbA1c:  6 1 April 2022  Current medications:  glargine 28 units daily, NovoLog 10 units with breakfast, 10 units with dinner  Statin and ACE-inhibitor:  atorvastatin  Foot exam:   up-to-date, follows with Podiatry  Eye Exam:  Follows with Dr Willie Joyner, he is legally blind    Well controlled    Continue current regimen      Orders:  -     insulin aspart (NovoLOG) 100 Units/mL injection pen; 10U with breakfast, 10 U with dinner    7  Need for pneumococcal vaccination  -     Pneumococcal Conjugate Vaccine 20-valent (Pcv20)                  Orders Placed This Encounter   Procedures    Pneumococcal Conjugate Vaccine 20-valent (Pcv20)       Chief Complaint     Chief Complaint   Patient presents with    Medicare Wellness Visit    Follow-up     4 months       History of Present Illness     Paddy is here today for follow-up/annual wellness visit  Medical history of insulin-dependent type 2 diabetes complicated by retinopathy and neuropathy, ESRD on HD, legal blindness, chronic constipation, former smoking, latent TB    He is here with his sister    He does not have any concerns today      The following portions of the patient's history were reviewed and updated as appropriate: allergies, current medications, past family history, past medical history, past social history, past surgical history and problem list     Review of Systems     10 point ROS negative except per HPI    Active Problem List     Patient Active Problem List   Diagnosis    Type 2 diabetes mellitus, with long-term current use of insulin ([facility]    Diabetic retinopathy of both eyes associated with type 2 diabetes mellitus ([address] )    LVH (left ventricular hypertrophy)    Hyperlipidemia    Chronic constipation    Screening for colon cancer    ESRD on dialysis ([address] )    S/P arteriovenous (AV) fistula creation    Elevated troponin I level    Azotemia    Hyperphosphatemia    Benign hypertension with ESRD (end-stage renal disease) ([address] )    Slow transit constipation    Neutropenia ([address] )    Onychomycosis    Diabetic polyneuropathy associated with type 2 diabetes mellitus ([address] )    Positive QuantiFERON-TB Gold test    Cough    TB lung, latent    Long term (current) use of antibiotics    Hypertensive urgency    Abnormal EKG    Pulmonary nodule    GERD (gastroesophageal reflux disease)    Adenomatous polyp of colon    Visual disorder    Dialysis patient ([address] )    Hypertensive emergency    Anemia of chronic disease    Diastolic heart failure (HCC)    Hypocalcemia    Peripheral arterial disease (HCC)       Objective     /78 (BP Location: Right arm, Patient Position: Sitting, Cuff Size: Standard)   Pulse 59   Temp (!) 97 1 °F (36 2 °C)   Resp 18   Ht 5' 10" (1 778 m)   Wt 76 4 kg (168 lb 8 oz)   SpO2 98%   BMI 24 18 kg/m²     Physical Exam  Constitutional:       Appearance: Normal appearance  He is not ill-appearing  HENT:      Head: Normocephalic and atraumatic  Eyes:      General: No scleral icterus  Right eye: No discharge  Left eye: No discharge  Cardiovascular:      Rate and Rhythm: Normal rate and regular rhythm  Heart sounds: No murmur heard  No friction rub  Pulmonary:      Effort: Pulmonary effort is normal       Breath sounds: Normal breath sounds  No wheezing or rales  Abdominal:      General: Abdomen is flat  There is no distension  Palpations: Abdomen is soft  Tenderness: There is no abdominal tenderness  Musculoskeletal:         General: No swelling or tenderness  Skin:     General: Skin is warm and dry  Findings: No erythema  Neurological:      Mental Status: He is alert  Mental status is at baseline  Motor: No weakness  Psychiatric:         Mood and Affect: Mood normal          Behavior: Behavior normal          Pertinent Laboratory/Diagnostic Studies:  CT abdomen pelvis w wo contrast    Result Date: 4/15/2022  Impression: No significant interval change since prior examination  Cortical renal atrophy bilaterally is consistent with the patient's clinical history of chronic kidney disease  Tiny renal cysts are noted bilaterally without suspicious lesion   Workstation performed: [id number]       Images and diagnostics reviewed     Current Medications     Current Outpatient Medications:     Aspirin Low Dose 81 MG EC tablet, take 1 tablet by mouth once daily, Disp: 90 tablet, Rfl: 0    atorvastatin (LIPITOR) 40 mg tablet, take 1 tablet by mouth once daily, Disp: 90 tablet, Rfl: 0    benzonatate (TESSALON PERLES) 100 mg capsule, Take 1 capsule (100 mg total) by mouth 3 (three) times a day as needed for cough, Disp: 30 capsule, Rfl: 1    Blood Glucose Monitoring Suppl (ONE TOUCH ULTRA 2) w/Device KIT, by Does not apply route 3 (three) times a day, Disp: 1 each, Rfl: 0    brimonidine tartrate 0 2 % ophthalmic solution, Administer 1 drop into the left eye 2 (two) times a day, Disp: , Rfl:     calcium acetate (PHOSLO) capsule, Take 667 mg by mouth 3 (three) times a day with meals, Disp: , Rfl:     Carboxymethylcellul-Glycerin (CLEAR EYES FOR DRY EYES OP), Apply 2 drops to eye 2 (two) times a day, Disp: , Rfl:     cholecalciferol (VITAMIN D3) 1,000 units tablet, Take 2 tablets (2,000 Units total) by mouth daily, Disp: 14 tablet, Rfl: 0    Continuous Blood Gluc  (FreeStyle Jessica 14 Day [place]) LAUREN, Use 1 each daily, Disp: 1 each, Rfl: 0    dorzolamide (TRUSOPT) 2 % ophthalmic solution, instill 1 drop into left eye three times a day, Disp: , Rfl:     doxazosin (CARDURA) 4 mg tablet, Take 1 tablet (4 mg total) by mouth daily at bedtime, Disp: 30 tablet, Rfl: 0    Durezol 0 05 % EMUL, instill 1 drop TO OPERATIVE EYE twice a day for 2 weeks STARTING      (REFER TO PRESCRIPTION NOTES)  , Disp: , Rfl:     ergocalciferol (VITAMIN D2) 50,000 units, take 1 capsule by mouth every week for 6 MONTHS, Disp: , Rfl:     erythromycin (ILOTYCIN) ophthalmic ointment, APPLY A SMALL AMOUNT into right eye three times a day, Disp: , Rfl:     furosemide (LASIX) 80 mg tablet, take 1 tablet by mouth once daily, Disp: 90 tablet, Rfl: 1    insulin aspart (NovoLOG) 100 Units/mL injection pen, 10U with breakfast, 10 U with dinner, Disp: , Rfl:     insulin glargine (Basaglar KwikPen) 100 units/mL injection pen, Inject 28 Units under the skin daily, Disp: 15 mL, Rfl: 2    isoniazid (NYDRAZID) 300 mg tablet, Take 1 tablet (300 mg total) by mouth daily, Disp: 30 tablet, Rfl: 8    labetalol (NORMODYNE) 200 mg tablet, Take 2 tablets (400 mg total) by mouth every 12 (twelve) hours, Disp: 120 tablet, Rfl: 0    Lancets (freestyle) lancets, Use as instructed, Disp: 100 each, Rfl: 1    methocarbamol (ROBAXIN) 500 mg tablet, Take 1 tablet (500 mg total) by mouth 2 (two) times a day as needed for muscle spasms, Disp: 30 tablet, Rfl: 0    NIFEdipine (ADALAT CC) 90 mg 24 hr tablet, Take 1 tablet (90 mg total) by mouth daily, Disp: 90 tablet, Rfl: 0    polyethylene glycol (MIRALAX) 17 g packet, Take 17 g by mouth daily, Disp: 30 each, Rfl: 1    prednisoLONE acetate (PRED FORTE) 1 % ophthalmic suspension, Administer 1 drop into the left eye 2 (two) times a day , Disp: , Rfl: 0    pyridoxine (VITAMIN B6) 50 mg tablet, Take 1 tablet (50 mg total) by mouth daily (Patient not taking: Reported on 7/22/2022), Disp: 30 tablet, Rfl: 6    Health Maintenance     Health Maintenance   Topic Date Due    PT PLAN OF CARE  05/25/2019    URINE MICROALBUMIN  06/27/2019    COVID-19 Vaccine (3 - Booster for Pfizer series) 09/22/2021    Influenza Vaccine (1) 09/01/2022    Diabetic Foot Exam  10/01/2022    HEMOGLOBIN A1C  10/20/2022    DM Eye Exam  03/14/2023 (Originally 4/7/1975)    Depression Screening  07/22/2023    Medicare Annual Wellness Visit (AWV)  07/22/2023    BMI: Adult  07/22/2023    Colorectal Cancer Screening  12/25/2031    HIV Screening  Completed    Hepatitis C Screening  Completed    Pneumococcal Vaccine: Pediatrics (0 to 5 Years) and At-Risk Patients (6 to 59 Years)  Completed    HIB Vaccine  Aged Out    Hepatitis B Vaccine  Aged Out    IPV Vaccine  Aged Out    Hepatitis A Vaccine  Aged Out    Meningococcal ACWY Vaccine  Aged Out    HPV Vaccine  Aged [employer] History Administered Date(s) Administered    COVID-19 PFIZER VACCINE 0 3 ML IM 04/01/2021, 04/22/2021    Hep B, adult 04/23/2019, 04/23/2019, 06/22/2019, 06/22/2019, 08/22/2019, 08/22/2019, 11/23/2019, 11/23/2019    INFLUENZA 11/11/2009, 11/11/2009, 10/06/2014, 10/06/2014, 08/04/2017, 08/04/2017, 10/01/2018, 01/02/2019, 10/17/2019, 09/18/2020, 09/18/2020    Influenza Quadrivalent 3 years and older 10/01/2018, 10/01/2018, 10/17/2019, 10/17/2019, 10/21/2021    Influenza, recombinant, quadrivalent,injectable, preservative free 01/02/2019, 09/09/2019, 09/16/2020    Pneumococcal Conjugate Vaccine 20-valent (Pcv20), Polysace 07/22/2022    Pneumococcal Polysaccharide PPV23 11/11/2009, 11/11/2009, 06/14/2018, 06/14/2018    Tuberculin Skin Test-PPD Intradermal 04/09/2019, 04/09/2019, 01/09/2020, 01/09/2020, 01/12/2021, 01/12/2021, 01/18/2022       MONTSE Carlos  Internal Medicine [facility]  [address], [facility] [address]  [place], [address]   Office: [phone]

## 2022-07-22 NOTE — ASSESSMENT & PLAN NOTE
- follows with Nephrology    Goes to HD at [facility]  -currently undergoing transplant evaluation

## 2022-07-22 NOTE — ASSESSMENT & PLAN NOTE
HbA1c:  6 1 April 2022  Current medications:  glargine 28 units daily, NovoLog 10 units with breakfast, 10 units with dinner  Statin and ACE-inhibitor:  atorvastatin  Foot exam:   up-to-date, follows with Podiatry  Eye Exam:  Follows with Dr Deep Monaco, he is legally blind    Well controlled    Continue current regimen

## 2022-07-22 NOTE — ASSESSMENT & PLAN NOTE
Arterial duplex completed April 2022 showed 50-75% stenosis of the mid/distal left SFA  KARL normal at 1 20, no occlusive disease in the right lower limb  -patient asymptomatic, does not endorse any recent symptoms of claudication  Podiatry I discussed duplex findings with patient    Vascular referral deferred

## 2022-07-22 NOTE — PROGRESS NOTES
Assessment and Plan:     Problem List Items Addressed This Visit        Endocrine    Type 2 diabetes mellitus, with long-term current use of insulin ([facility] ) - Primary     HbA1c:  6 1 April 2022  Current medications:  glargine 28 units daily, NovoLog 10 units with breakfast, 10 units with dinner  Statin and ACE-inhibitor:  atorvastatin  Foot exam:   up-to-date, follows with Podiatry  Eye Exam:  Follows with Dr Almendarez Friend, he is legally blind    Well controlled  Continue current regimen           Relevant Medications    insulin aspart (NovoLOG) 100 Units/mL injection pen       Cardiovascular and Mediastinum    Benign hypertension with ESRD (end-stage renal disease) ([facility] )     Well controlled today  -current regimen of labetalol, Lasix, nifedipine, doxazosin         Peripheral arterial disease ([facility] )     Arterial duplex completed April 2022 showed 50-75% stenosis of the mid/distal left SFA  KARL normal at 1 20, no occlusive disease in the right lower limb  -patient asymptomatic, does not endorse any recent symptoms of claudication  Podiatry I discussed duplex findings with patient  Vascular referral deferred            Genitourinary    ESRD on dialysis [facility])     - follows with Nephrology  Goes [facility] HD at [facility]  -currently undergoing transplant evaluation               Other    Hyperlipidemia     Well controlled  -most recent lipid panel April 2022  -on statin           Other Visit Diagnoses     Need for pneumococcal vaccination        Relevant Orders    Pneumococcal Conjugate Vaccine 20-valent (Pcv20) (Completed)           Preventive health issues were discussed with patient, and age appropriate screening tests were ordered as noted in patient's After Visit Summary  Personalized health advice and appropriate referrals for health education or preventive services given if needed, as noted in patient's After Visit Summary       History of Present Illness:     Patient presents for a Medicare Wellness Visit    HPI Patient Care Team:  Merlin Hernandez DO as PCP - General (Internal Medicine)  Bk Chavez MD (Vascular Surgery)     Review of Systems:     Review of Systems     Problem List:     Patient Active Problem List   Diagnosis    Type 2 diabetes mellitus, with long-term current use of insulin ([place] [address] )    Diabetic retinopathy of both eyes associated with type 2 diabetes mellitus ([place] [address] )    LVH (left ventricular hypertrophy)    Hyperlipidemia    Chronic constipation    Screening for colon cancer    ESRD on dialysis ([place] [address] )    S/P arteriovenous (AV) fistula creation    Elevated troponin I level    Azotemia    Hyperphosphatemia    Benign hypertension with ESRD (end-stage renal disease) ([place] [address] )    Slow transit constipation    Neutropenia ([place] [address] )    Onychomycosis    Diabetic polyneuropathy associated with type 2 diabetes mellitus (HCC)    Positive QuantiFERON-TB Gold test    Cough    TB lung, latent    Long term (current) use of antibiotics    Hypertensive urgency    Abnormal EKG    Pulmonary nodule    GERD (gastroesophageal reflux disease)    Adenomatous polyp of colon    Visual disorder    Dialysis patient ([place] [address] )    Hypertensive emergency    Anemia of chronic disease    Diastolic heart failure (HCC)    Hypocalcemia    Peripheral arterial disease (HCC)      Past Medical and Surgical History:     Past Medical History:   Diagnosis Date    Cataract     Dizziness     occ    GERD (gastroesophageal reflux disease)     Headache     occ    Hyperlipidemia     Legally blind     LVH (left ventricular hypertrophy)     Preferred language is [place]     understands some English    Use of cane as ambulatory aid      Past Surgical History:   Procedure Laterality Date    INGUINAL HERNIA REPAIR Right     IR AV FISTULAGRAM/GRAFTOGRAM  4/30/2019    IR AV FISTULAGRAM/GRAFTOGRAM  6/14/2019    IR AV FISTULAGRAM/GRAFTOGRAM  6/24/2020    IR AV FISTULAGRAM/GRAFTOGRAM  2/26/2021    IR AV FISTULAGRAM/GRAFTOGRAM 9/3/2021    IR TUNNELED DIALYSIS CATHETER PLACEMENT  4/3/2019    MA ANASTOMOSIS,AV,ANY SITE Left 2019    Procedure: CREATION FISTULA ARTERIOVENOUS (AV); Surgeon: Jamie Kay MD;  Location: [facility];  Service: Vascular      Family History:     Family History   Problem Relation Age of Onset    Hypertension Mother     Diabetes Father     No Known Problems Sister     No Known Problems Brother     No Known Problems Maternal Aunt     No Known Problems Maternal Uncle     No Known Problems Paternal Aunt     No Known Problems Paternal Uncle     No Known Problems Maternal Grandmother     No Known Problems Maternal Grandfather     No Known Problems Paternal Grandmother     No Known Problems Paternal Grandfather       Social History:     Social History     Socioeconomic History    Marital status: Legally      Spouse name: None    Number of children: None    Years of education: None    Highest education level: None   Occupational History    Occupation: DISABLED   Tobacco Use    Smoking status: Former Smoker     Packs/day: 0 25     Quit date: 2018     Years since quittin 4    Smokeless tobacco: Never Used   Vaping Use    Vaping Use: Never used   Substance and Sexual Activity    Alcohol use: Not Currently    Drug use: No    Sexual activity: Not Currently   Other Topics Concern    None   Social History Narrative    None     Social Determinants of Health     Financial Resource Strain: Not on file   Food Insecurity: No Food Insecurity    Worried About Running Out of Food in the Last Year: Never true    Heidi of Food in the Last Year: Never true   Transportation Needs: No Transportation Needs    Lack of Transportation (Medical): No    Lack of Transportation (Non-Medical):  No   Physical Activity: Not on file   Stress: Not on file   Social Connections: Not on file   Intimate Partner Violence: Not on file   Housing Stability: Low Risk     Unable to Pay for Housing in the Last Year: No    Number of Places Lived in the Last Year: 1    Unstable Housing in the Last Year: No      Medications and Allergies:     Current Outpatient Medications   Medication Sig Dispense Refill    Aspirin Low Dose 81 MG EC tablet take 1 tablet by mouth once daily 90 tablet 0    atorvastatin (LIPITOR) 40 mg tablet take 1 tablet by mouth once daily 90 tablet 0    benzonatate (TESSALON PERLES) 100 mg capsule Take 1 capsule (100 mg total) by mouth 3 (three) times a day as needed for cough 30 capsule 1    Blood Glucose Monitoring Suppl (ONE TOUCH ULTRA 2) w/Device KIT by Does not apply route 3 (three) times a day 1 each 0    brimonidine tartrate 0 2 % ophthalmic solution Administer 1 drop into the left eye 2 (two) times a day      calcium acetate (PHOSLO) capsule Take 667 mg by mouth 3 (three) times a day with meals      Carboxymethylcellul-Glycerin (CLEAR EYES FOR DRY EYES OP) Apply 2 drops to eye 2 (two) times a day      cholecalciferol (VITAMIN D3) 1,000 units tablet Take 2 tablets (2,000 Units total) by mouth daily 14 tablet 0    Continuous Blood Gluc  (FreeStyle Jessica 14 Day [place]) LAUREN Use 1 each daily 1 each 0    dorzolamide (TRUSOPT) 2 % ophthalmic solution instill 1 drop into left eye three times a day      doxazosin (CARDURA) 4 mg tablet Take 1 tablet (4 mg total) by mouth daily at bedtime 30 tablet 0    Durezol 0 05 % EMUL instill 1 drop TO OPERATIVE EYE twice a day for 2 weeks STARTING      (REFER TO PRESCRIPTION NOTES)        ergocalciferol (VITAMIN D2) 50,000 units take 1 capsule by mouth every week for 6 MONTHS      erythromycin (ILOTYCIN) ophthalmic ointment APPLY A SMALL AMOUNT into right eye three times a day      furosemide (LASIX) 80 mg tablet take 1 tablet by mouth once daily 90 tablet 1    insulin aspart (NovoLOG) 100 Units/mL injection pen 10U with breakfast, 10 U with dinner      insulin glargine (Basaglar KwikPen) 100 units/mL injection pen Inject 28 Units under the skin daily 15 mL 2    isoniazid (NYDRAZID) 300 mg tablet Take 1 tablet (300 mg total) by mouth daily 30 tablet 8    labetalol (NORMODYNE) 200 mg tablet Take 2 tablets (400 mg total) by mouth every 12 (twelve) hours 120 tablet 0    Lancets (freestyle) lancets Use as instructed 100 each 1    methocarbamol (ROBAXIN) 500 mg tablet Take 1 tablet (500 mg total) by mouth 2 (two) times a day as needed for muscle spasms 30 tablet 0    NIFEdipine (ADALAT CC) 90 mg 24 hr tablet Take 1 tablet (90 mg total) by mouth daily 90 tablet 0    polyethylene glycol (MIRALAX) 17 g packet Take 17 g by mouth daily 30 each 1    prednisoLONE acetate (PRED FORTE) 1 % ophthalmic suspension Administer 1 drop into the left eye 2 (two) times a day   0    pyridoxine (VITAMIN B6) 50 mg tablet Take 1 tablet (50 mg total) by mouth daily (Patient not taking: Reported on 7/22/2022) 30 tablet 6     No current facility-administered medications for this visit       Allergies   Allergen Reactions    No Known Allergies       Immunizations:     Immunization History   Administered Date(s) Administered    COVID-19 PFIZER VACCINE 0 3 ML IM 04/01/2021, 04/22/2021    Hep B, adult 04/23/2019, 04/23/2019, 06/22/2019, 06/22/2019, 08/22/2019, 08/22/2019, 11/23/2019, 11/23/2019    INFLUENZA 11/11/2009, 11/11/2009, 10/06/2014, 10/06/2014, 08/04/2017, 08/04/2017, 10/01/2018, 01/02/2019, 10/17/2019, 09/18/2020, 09/18/2020    Influenza Quadrivalent 3 years and older 10/01/2018, 10/01/2018, 10/17/2019, 10/17/2019, 10/21/2021    Influenza, recombinant, quadrivalent,injectable, preservative free 01/02/2019, 09/09/2019, 09/16/2020    Pneumococcal Conjugate Vaccine 20-valent (Pcv20), Polysace 07/22/2022    Pneumococcal Polysaccharide PPV23 11/11/2009, 11/11/2009, 06/14/2018, 06/14/2018    Tuberculin Skin Test-PPD Intradermal 04/09/2019, 04/09/2019, 01/09/2020, 01/09/2020, 01/12/2021, 01/12/2021, 01/18/2022      Health Maintenance:         Topic Date Due    Colorectal Cancer Screening  12/25/2031    HIV Screening  Completed    Hepatitis C Screening  Completed         Topic Date Due    COVID-19 Vaccine (3 - Booster for Pfizer series) 09/22/2021    Influenza Vaccine (1) 09/01/2022      Medicare Screening Tests and Risk Assessments:     Mellisa Gaytan is here for his Subsequent Wellness visit  Health Risk Assessment:   Patient rates overall health as fair  Patient feels that their physical health rating is same  Patient is satisfied with their life  Eyesight was rated as same  Hearing was rated as same  Patient feels that their emotional and mental health rating is same  Patients states they are never, rarely angry  Patient states they are sometimes unusually tired/fatigued  Pain experienced in the last 7 days has been none  Patient states that he has experienced no weight loss or gain in last 6 months  Fall Risk Screening: In the past year, patient has experienced: no history of falling in past year      Home Safety:  Patient does not have trouble with stairs inside or outside of their home  Patient has working smoke alarms and has working carbon monoxide detector  Home safety hazards include: none  Nutrition:   Current diet is Low Saturated Fat and Low Carb  Medications:   Patient is not currently taking any over-the-counter supplements  Patient is able to manage medications  Activities of Daily Living (ADLs)/Instrumental Activities of Daily Living (IADLs):   Walk and transfer into and out of bed and chair?: Yes  Dress and groom yourself?: Yes    Bathe or shower yourself?: Yes    Feed yourself?  Yes  Do your laundry/housekeeping?: No  Manage your money, pay your bills and track your expenses?: No  Make your own meals?: No    Do your own shopping?: No    Previous Hospitalizations:   Any hospitalizations or ED visits within the last 12 months?: Yes    How many hospitalizations have you had in the last year?: 1-2    Advance Care Planning:   Living will: No    Durable POA for healthcare: No    Advanced directive: No      PREVENTIVE SCREENINGS      Cardiovascular Screening:    General: Screening Not Indicated and History Lipid Disorder      Diabetes Screening:     General: Screening Not Indicated and History Diabetes      Colorectal Cancer Screening:     General: Screening Current      Abdominal Aortic Aneurysm (AAA) Screening:    Risk factors include: tobacco use        Lung Cancer Screening:     General: Screening Not Indicated      Hepatitis C Screening:    General: Screening Current    Screening, Brief Intervention, and Referral to Treatment (SBIRT)    Screening  Typical number of drinks in a day: 0  Typical number of drinks in a week: 0  Interpretation: Low risk drinking behavior      Single Item Drug Screening:  How often have you used an illegal drug (including marijuana) or a prescription medication for non-medical reasons in the past year? never    Single Item Drug Screen Score: 0  Interpretation: Negative screen for possible drug use disorder    No exam data present     Physical Exam:     /78 (BP Location: Right arm, Patient Position: Sitting, Cuff Size: Standard)   Pulse 59   Temp (!) 97 1 °F (36 2 °C)   Resp 18   Ht 5' 10" (1 778 m)   Wt 76 4 kg (168 lb 8 oz)   SpO2 98%   BMI 24 18 kg/m²     Physical Exam     Merlin Duque DO

## 2022-07-27 ENCOUNTER — OFFICE VISIT (OUTPATIENT)
Dept: CARDIOLOGY CLINIC | Facility: CLINIC | Age: 57
End: 2022-07-27
Payer: MEDICARE

## 2022-07-27 VITALS
HEIGHT: 70 IN | BODY MASS INDEX: 23.91 KG/M2 | SYSTOLIC BLOOD PRESSURE: 102 MMHG | HEART RATE: 61 BPM | WEIGHT: 167 LBS | DIASTOLIC BLOOD PRESSURE: 48 MMHG

## 2022-07-27 DIAGNOSIS — N18.6 BENIGN HYPERTENSION WITH ESRD (END-STAGE RENAL DISEASE) (HCC): ICD-10-CM

## 2022-07-27 DIAGNOSIS — E11.29 TYPE 2 DIABETES MELLITUS WITH OTHER DIABETIC KIDNEY COMPLICATION, WITH LONG-TERM CURRENT USE OF INSULIN (HCC): ICD-10-CM

## 2022-07-27 DIAGNOSIS — I51.7 LVH (LEFT VENTRICULAR HYPERTROPHY): Primary | ICD-10-CM

## 2022-07-27 DIAGNOSIS — Z79.4 TYPE 2 DIABETES MELLITUS WITH OTHER DIABETIC KIDNEY COMPLICATION, WITH LONG-TERM CURRENT USE OF INSULIN (HCC): ICD-10-CM

## 2022-07-27 DIAGNOSIS — I73.9 PERIPHERAL ARTERIAL DISEASE (HCC): ICD-10-CM

## 2022-07-27 DIAGNOSIS — N18.6 ESRD ON DIALYSIS (HCC): ICD-10-CM

## 2022-07-27 DIAGNOSIS — E78.5 HYPERLIPIDEMIA, UNSPECIFIED HYPERLIPIDEMIA TYPE: ICD-10-CM

## 2022-07-27 DIAGNOSIS — I50.30 DIASTOLIC HEART FAILURE, UNSPECIFIED HF CHRONICITY (HCC): ICD-10-CM

## 2022-07-27 DIAGNOSIS — Z01.810 PREOP CARDIOVASCULAR EXAM: ICD-10-CM

## 2022-07-27 DIAGNOSIS — Z99.2 ESRD ON DIALYSIS (HCC): ICD-10-CM

## 2022-07-27 DIAGNOSIS — I12.0 BENIGN HYPERTENSION WITH ESRD (END-STAGE RENAL DISEASE) (HCC): ICD-10-CM

## 2022-07-27 PROCEDURE — 93000 ELECTROCARDIOGRAM COMPLETE: CPT

## 2022-07-27 PROCEDURE — 99214 OFFICE O/P EST MOD 30 MIN: CPT

## 2022-07-27 NOTE — PROGRESS NOTES
Cardiology   MD Jesus Garcia MD Adele Blanks, DO, [facility], [facility]  MD Alexx Espitia DO, Andreea Rai DO, [facility]  -------------------------------------------------------------------  [facility]  [address], [place] [id number] [phone]  [phone]  07/27/22  Frirayocari Trevin  YOB: 1965   MRN: [id number]      Referring Physician: [facility] Jose Rivera DO  [address] [facility]  [address]  [place] [address]  [address] [place] [address]     HPI: Robret Metzger is a 61 y o  male with:   Minimal CAD by Cath - discrete 30% mid LAD stenosis noted  Hypertension   End-stage renal disease on hemodialysis, transplant workup in progress   Uncontrolled type 2 diabetes   Diabetic retinopathy, now blind   Latent tuberculosis   Abnormal electrocardiogram  Nonischemic nuclear stress November 2020  Left ventricular hypertrophy by echo November 2020    He presents today for follow-up with Cardiology  He is about to undergo eye surgery and was requested for preoperative clearance  He has no chest pain or angina type symptoms  He has minimal coronary disease by catheterization  Blood pressure is controlled  His ECG is unchanged over the past several years  He does have findings consistent left ventricular hypertrophy on ECG and this is confirmed by echo as well  Likely hypertensive heart disease  Review of Systems   Constitutional: Negative for chills and fever  HENT: Negative for facial swelling and sore throat  Eyes: Negative for visual disturbance  Respiratory: Negative for cough, chest tightness, shortness of breath and wheezing  Cardiovascular: Negative for chest pain, palpitations and leg swelling  Gastrointestinal: Negative for abdominal pain, blood in stool, constipation, diarrhea, nausea and vomiting  Endocrine: Negative for cold intolerance and heat intolerance     Genitourinary: Negative for decreased urine volume, difficulty urinating, dysuria and hematuria  Musculoskeletal: Negative for arthralgias, back pain and myalgias  Skin: Negative for rash  Neurological: Negative for dizziness, syncope, weakness and numbness  Psychiatric/Behavioral: Negative for agitation, behavioral problems and confusion  The patient is not nervous/anxious  OBJECTIVE  Vitals:    07/27/22 1137   BP: (!) 102/48   Pulse: 61       Physical Exam  Constitutional: awake, alert and oriented, in no acute distress, no obvious deformities  Head: Normocephalic, without obvious abnormality, atraumatic  Eyes: conjunctivae clear and moist  Sclera anicteric  No xanthelasmas  Pupils equal bilaterally  Extraocular motions are full  Ear nose mouth and throat: ears are symmetrical bilaterally, hearing appears to be equal bilaterally, no nasal discharge or epistaxis, oropharynx is clear with moist mucous membranes  Neck:  Trachea is midline, neck is supple, no thyromegaly or significant lymphadenopathy, there is full range of motion  Lungs: clear to auscultation bilaterally, no wheezes, no rales, no rhonchi, no accessory muscle use, breathing is nonlabored  Heart: regular rate and rhythm, S1, S2 normal, no murmur, no click, no rub and no gallop, no lower extremity edema  Abdomen: soft, non-tender; bowel sounds normal; no masses,  no organomegaly  Psychiatric:  Patient is oriented to time, place, person, mood/affect is negative for depression, anxiety, agitation, appears to have appropriate insight  Skin: Skin is warm, dry, intact  No obvious rashes or lesions on exposed extremities  Nail beds are pink with no cyanosis or clubbing      EKG:  Results for orders placed or performed in visit on 07/27/22   POCT ECG    Impression    Normal sinus rhythm with left atrial enlargement, LVH with repolarization abnormalities and possible age undetermined anteroseptal infarct pattern        IMPRESSION:  Minimal CAD by Cath - discrete 30% mid LAD stenosis noted  Hypertension   End-stage renal disease on hemodialysis, transplant workup in progress   Uncontrolled type 2 diabetes   Diabetic retinopathy, now blind   Latent tuberculosis   Abnormal electrocardiogram  Nonischemic nuclear stress 2020  Left ventricular hypertrophy by echo 2020    DISCUSSION/RECOMMENDATIONS:  From cardiac standpoint he appears to be stable  Blood pressure is controlled  ECG is unchanged  He has no angina type symptoms  He has minimal coronary disease by catheterization  He is on aspirin Lipitor, aspirin can be held as needed for eye surgery with plan to resume when safe thereafter  Would continue all of his medications at current doses without alteration today  May proceed with surgery without further cardiac diagnostic imaging or tests at this time, will be low risk from cardiovascular standpoint      Stormy Ramirez DO, CÉSAR, [facility]  --------------------------------------------------------------------------------  TREADMILL STRESS  No results found for this or any previous visit      ----------------------------------------------------------------------------------------------  NUCLEAR STRESS TEST: Results for orders placed during the hospital encounter of 20    NM myocardial perfusion spect (stress and/or rest)    [address]    Rest/Stress Gated SPECT Myocardial Perfusion Imaging After Regadenoson    Patient: Ryaray Christiansen  MR number: [id number]  Account number: [de-identified]  : 1965  Age: 63 years  Gender: Male  Status: Outpatient  Location: [facility]  Height: 70 in  Weight: 171 lb  BP: 134/ 71 mmHg    Diagnosis: E78 5 - Hyperlipidemia, unspecified, I11 9 - Hypertensive heart disease without heart failure, N18 6 - End stage renal disease    Primary Physician:  Jax Marie MD  RN:  Prashanth Pal RN  Technician:  Jessica Cullen  Group:  [facility]  Report Prepared By[de-identified]  Juan R Maier  Interpreting Physician:  Franny Black MD    INDICATIONS: Coronary artery disease  HISTORY: The patient is a 63year old  male  Chest pain status: no chest pain  Coronary artery disease risk factors: dyslipidemia, hypertension, and diabetes mellitus  Cardiovascular history: none significant  Co-morbidity:  history of end stage renal disease  Medications: a lipid lowering agent, alpha blocker and diabetic medications  Previous test results: abnormal ECG  PHYSICAL EXAM: Baseline physical exam screening: normal     REST ECG: Normal sinus rhythm at a rate of 67 beats per minute  Right axis deviation  Left ventricular hypertrophy  Anterior lateral T-wave inversions which may represent left ventricular strain pattern  PROCEDURE: The study was performed in the the [facility]  A regadenoson infusion pharmacologic stress test was performed  Gated SPECT myocardial perfusion imaging was performed after stress and at rest  Systolic blood pressure was  134 mmHg, at the start of the study  Diastolic blood pressure was 71 mmHg, at the start of the study  The heart rate was 66 bpm, at the start of the study  Regadenoson protocol:  HR bpm SBP mmHg DBP mmHg Symptoms  Baseline 66 134 71 none  1 min 75 -- -- none  2 min 82 113 59 none  3 min 79 129 60 none  4 min 78 -- -- none  5 min 77 -- -- none  6 min 78 127 64 none  No medications or fluids given  STRESS SUMMARY: Duration of pharmacologic stress was 3 min and 0 sec  Maximal work rate was 1 METs  Maximal heart rate during stress was 84 bpm  The heart rate response to stress was normal  There was normal resting blood pressure with an  appropriate response to stress  The rate-pressure product for the peak heart rate and blood pressure was 06647  There was no chest pain during stress  The stress test was terminated due to protocol completion  Pre oxygen saturation: 98 %    Peak oxygen saturation: 100 %  With pharmacologic stress using intravenous Lexiscan, there were no electrocardiographic changes over baseline  There were no arrhythmias  ISOTOPE ADMINISTRATION:  The radiopharmaceutical was injected at the peak effect of pharmacologic stress  MYOCARDIAL PERFUSION IMAGING:  Tomographic perfusion series at rest demonstrated uniformly normal uptake in activity  Following pharmacologic stress using intravenous Lexiscan, there was no change  GATED SPECT:  Normal left ventricular systolic function, EF 71%  Normal left ventricular cavity size  Normal left ventricular wall motion  SUMMARY:  -  Stress results: There was no chest pain during stress  IMPRESSIONS: 1  Abnormal resting EKG  2  Negative pharmacologic stress test with intravenous Lexiscan for angina pectoris or electrocardiographic changes of ischemia over baseline abnormalities noted  3  Normal left ventricular systolic function, EF 57%  4  Normal tomographic perfusion series  Prepared and signed by    Rober Reed MD  Signed 2020 08:17:41    No results found for this or any previous visit       --------------------------------------------------------------------------------  CATH:  No results found for this or any previous visit     --------------------------------------------------------------------------------  ECHO:   Results for orders placed during the hospital encounter of 21    Echo complete with contrast if indicated    Narrative  Critical access hospital0 [facility]    [place], [address] [facility]  [phone]    Transthoracic Echocardiogram  2D, M-mode, Doppler, and Color Doppler    Study date:  2021    Patient: Princess Magana  MR number: [id number]  Account number: [de-identified]  : 1965  Age: 63 years  Gender: Male  Status: Inpatient  Location: Bedside  Height: 70 in  Weight: 165 lb  BP: 142/ 76 mmHg    Indications: Chest Pain    Diagnoses: R07 9 - Chest pain, unspecified    Sonographer:  Pavithra Luther RDCS  Primary Physician:  Joan Chairez MD  Referring Physician:  Shelley Hernandez PA-C  Group:  [facility]  Interpreting Physician:  Tana Elder MD    SUMMARY    LEFT VENTRICLE:  Normal left ventricular systolic function, EF 98%  Moderate concentric left ventricular hypertrophy  Normal left ventricular cavity size  Normal left ventricular wall motion without regional wall motion abnormalities  Grade 1 left  ventricular diastolic dysfunction  Normal left atrial pressure  MITRAL VALVE:  There was trace regurgitation  AORTIC VALVE:  Tricuspid aortic valve with aortic sclerosis  No stenosis or regurgitation  TRICUSPID VALVE:  There was trace regurgitation  PULMONIC VALVE:  There was trace regurgitation  PERICARDIUM:  Small hemodynamically insignificant concentric pericardial effusion    SUMMARY MEASUREMENTS  2D measurements:  Unspecified Anatomy:   %FS was 38 8 %  Ao Diam was 3 3 cm  Ao asc was 2 9 cm   EDV(Teich) was 51 9 ml   EF(Teich) was 70 2 %  ESV(Teich) was 15 5 ml  IVSd was 1 5 cm  LA Diam was 3 3 cm  LAAs A4C was 11 3 cm2  LAESV A-L A4C was 21 2  ml  LAESV MOD A4C was 20 ml  LALs A4C was 5 1 cm  LVEDV MOD A4C was 20 6 ml  LVEF MOD A4C was 68 %  LVESV MOD A4C was 6 6 ml  LVIDd was 3 5 cm  LVIDs was 2 2 cm  LVLd A4C was 7 8 cm  LVLs A4C was 7 cm  LVPWd was 1 5 cm  RAAs A4C  was 17 6 cm2  RAESV A-L was 49 6 ml   RAESV MOD was 47 2 ml  RALs was 5 3 cm  RVIDd was 3 cm  SV MOD A4C was 14 ml   SV(Teich) was 36 5 ml   CW measurements:  Unspecified Anatomy:   AV Env  Ti was 266 4 ms   AV VTI was 22 9 cm   AV Vmax was 1 3 m/s  AV Vmean was 0 9 m/s  AV maxPG was 6 5 mmHg  AV meanPG was 3 3 mmHg  TR Vmax was 1 9 m/s   TR maxPG was 14 9 mmHg  MM measurements:  Unspecified Anatomy:   TAPSE was 2 1 cm  PW measurements:  Unspecified Anatomy:   DVI was 1 1   E' Sept was 0 1 m/s  E/E' Sept was 11 2     LVOT Env Ti was 304 5 ms  LVOT VTI was 25 4 cm  LVOT Vmax was 1 2 m/s  LVOT Vmean was 0 8 m/s  LVOT maxPG was 6 mmHg  LVOT meanPG was 3 2 mmHg  MV A  Lonnie was 0 8 m/s  MV Dec Harris was 2 1 m/s2  MV DecT was 294 5 ms   MV E Lonnie was 0 6 m/s  MV E/A Ratio was 0 7   MV PHT was 85 4 ms  MVA By PHT was 2 6 cm2  HISTORY: PRIOR HISTORY: Hypercholesterolemia, Hypertension, ESRD, DM    PROCEDURE: The procedure was performed at the bedside  This was a routine study  The transthoracic approach was used  The study included complete 2D imaging, M-mode, complete spectral Doppler, and color Doppler  The heart rate was 70 bpm,  at the start of the study  Images were obtained from the parasternal, apical, subcostal, and suprasternal notch acoustic windows  Image quality was adequate  LEFT VENTRICLE: Normal left ventricular systolic function, EF 12%  Moderate concentric left ventricular hypertrophy  Normal left ventricular cavity size  Normal left ventricular wall motion without regional wall motion abnormalities  Grade  1 left ventricular diastolic dysfunction  Normal left atrial pressure  RIGHT VENTRICLE: The size was normal  Systolic function was normal  Wall thickness was normal     LEFT ATRIUM: Size was normal     RIGHT ATRIUM: Size was normal     MITRAL VALVE: Valve structure was normal  There was normal leaflet separation  DOPPLER: The transmitral velocity was within the normal range  There was no evidence for stenosis  There was trace regurgitation  AORTIC VALVE: Tricuspid aortic valve with aortic sclerosis  No stenosis or regurgitation  TRICUSPID VALVE: The valve structure was normal  There was normal leaflet separation  DOPPLER: The transtricuspid velocity was within the normal range  There was no evidence for stenosis  There was trace regurgitation  PULMONIC VALVE: Leaflets exhibited normal thickness, no calcification, and normal cuspal separation   DOPPLER: The transpulmonic velocity was within the normal range  There was trace regurgitation  PERICARDIUM: Small hemodynamically insignificant concentric pericardial effusion    AORTA: The root exhibited normal size  PULMONARY ARTERY: The size was normal  DOPPLER: Systolic pressure was within the normal range  SYSTEM MEASUREMENT TABLES    2D  %FS: 38 8 %  Ao Diam: 3 3 cm  Ao asc: 2 9 cm  EDV(Teich): 51 9 ml  EF(Teich): 70 2 %  ESV(Teich): 15 5 ml  IVSd: 1 5 cm  LA Diam: 3 3 cm  LAAs A4C: 11 3 cm2  LAESV A-L A4C: 21 2 ml  LAESV MOD A4C: 20 ml  LALs A4C: 5 1 cm  LVEDV MOD A4C: 20 6 ml  LVEF MOD A4C: 68 %  LVESV MOD A4C: 6 6 ml  LVIDd: 3 5 cm  LVIDs: 2 2 cm  LVLd A4C: 7 8 cm  LVLs A4C: 7 cm  LVPWd: 1 5 cm  RAAs A4C: 17 6 cm2  RAESV A-L: 49 6 ml  RAESV MOD: 47 2 ml  RALs: 5 3 cm  RVIDd: 3 cm  SV MOD A4C: 14 ml  SV(Teich): 36 5 ml    CW  AV Env  Ti: 266 4 ms  AV VTI: 22 9 cm  AV Vmax: 1 3 m/s  AV Vmean: 0 9 m/s  AV maxP 5 mmHg  AV meanPG: 3 3 mmHg  TR Vmax: 1 9 m/s  TR maxP 9 mmHg    MM  TAPSE: 2 1 cm    PW  DVI: 1 1  E' Sept: 0 1 m/s  E/E' Sept: 11 2  LVOT Env  Ti: 304 5 ms  LVOT VTI: 25 4 cm  LVOT Vmax: 1 2 m/s  LVOT Vmean: 0 8 m/s  LVOT maxP mmHg  LVOT meanPG: 3 2 mmHg  MV A Lonnie: 0 8 m/s  MV Dec Hawkins: 2 1 m/s2  MV DecT: 294 5 ms  MV E Lonnie: 0 6 m/s  MV E/A Ratio: 0 7  MV PHT: 85 4 ms  MVA By PHT: 2 6 cm2    [facility] Accredited Echocardiography Laboratory    Prepared and electronically signed by    Epifanio Starks MD  Signed 2021 16:36:19    No results found for this or any previous visit     --------------------------------------------------------------------------------  HOLTER  No results found for this or any previous visit      No results found for this or any previous visit     --------------------------------------------------------------------------------  CAROTIDS  Results for orders placed during the hospital encounter of 10/23/20    VAS carotid complete study    Narrative  [address] REPORT  CLINICAL:  Indications:  Patient presents for a general health evaluation secondary to future kidney  transplant  Patient is asymptomatic at this time  Operative History:  2019-01-23 Left brachio cephalic AVF  Risk Factors  The patient has history of CKD  He has no history of Obesity  FINDINGS:    Segment      Rig                     Left  PSV  EDV (cm/s)  Ratio  PSV  EDV (cm/s)  Ratio  Dist  ICA    122          28   1 02  112          33   0 92  Mid  ICA     108          27   0 90   98          29   0 80  Prox  ICA     88          18   0 73   82          21   0 67  Dist CCA      87          14          99          15  Mid CCA      120               0 70  122          20   0 79  Prox CCA     171                     155          21  Ext Carotid  148               1 23  147               1 20  Prox Vert     76          16          48           7  Subclavian   137                     191        CONCLUSION:  Impression  RIGHT:  There is <50% stenosis noted in the internal carotid artery  Plaque is  heterogenous  Vertebral artery flow is antegrade  There is no significant subclavian artery  disease  LEFT:  There is <50% stenosis noted in the internal carotid artery  Plaque is  heterogenous  Vertebral artery flow is antegrade  There is no significant subclavian artery  disease  Internal carotid artery stenosis determination by consensus criteria from:  Josh Higginbotham et al  Carotid Artery Stenosis: Gray-Scale and Doppler US Diagnosis  - Society of Radiologists in [address] [place] [address], Radiology 2003;  [phone]      SIGNATURE:  Electronically Signed by: Gm Lombardi MD, RPVI on 2020-10-25 08:35:32 PM     --------------------------------------------------------------------------------  Diagnoses and all orders for this visit:    LVH (left ventricular hypertrophy)    Diastolic heart failure, unspecified HF chronicity ([facility] )    Peripheral arterial disease ([facility] )    ESRD on dialysis ([facility] )    Benign hypertension with ESRD (end-stage renal disease) ([facility] )    Hyperlipidemia, unspecified hyperlipidemia type    Type 2 diabetes mellitus with other diabetic kidney complication, with long-term current use of insulin ([facility])    Preop cardiovascular exam  -     POCT ECG     ======================================================    Past Medical History:   Diagnosis Date    Cataract     Dizziness     occ    GERD (gastroesophageal reflux disease)     Headache     occ    Hyperlipidemia     Legally blind     LVH (left ventricular hypertrophy)     Preferred language is [place]     understands some English    Use of cane as ambulatory aid      Past Surgical History:   Procedure Laterality Date    INGUINAL HERNIA REPAIR Right     IR AV FISTULAGRAM/GRAFTOGRAM  4/30/2019    IR AV FISTULAGRAM/GRAFTOGRAM  6/14/2019    IR AV FISTULAGRAM/GRAFTOGRAM  6/24/2020    IR AV FISTULAGRAM/GRAFTOGRAM  2/26/2021    IR AV FISTULAGRAM/GRAFTOGRAM  9/3/2021    IR TUNNELED DIALYSIS CATHETER PLACEMENT  4/3/2019    TN ANASTOMOSIS,AV,ANY SITE Left 1/23/2019    Procedure: CREATION FISTULA ARTERIOVENOUS (AV);   Surgeon: Emelia Mendez MD;  Location: [facility];  Service: Vascular         Medications  Current Outpatient Medications   Medication Sig Dispense Refill    Aspirin Low Dose 81 MG EC tablet take 1 tablet by mouth once daily 90 tablet 0    atorvastatin (LIPITOR) 40 mg tablet take 1 tablet by mouth once daily 90 tablet 0    benzonatate (TESSALON PERLES) 100 mg capsule Take 1 capsule (100 mg total) by mouth 3 (three) times a day as needed for cough 30 capsule 1    Blood Glucose Monitoring Suppl (ONE TOUCH ULTRA 2) w/Device KIT by Does not apply route 3 (three) times a day 1 each 0    brimonidine tartrate 0 2 % ophthalmic solution Administer 1 drop into the left eye 2 (two) times a day      calcium acetate (PHOSLO) capsule Take 667 mg by mouth 3 (three) times a day with meals      Carboxymethylcellul-Glycerin (CLEAR EYES FOR DRY EYES OP) Apply 2 drops to eye 2 (two) times a day      cholecalciferol (VITAMIN D3) 1,000 units tablet Take 2 tablets (2,000 Units total) by mouth daily 14 tablet 0    Continuous Blood Gluc  (FreeStyle Jessica 14 Day [place]) LAUREN Use 1 each daily 1 each 0    dorzolamide (TRUSOPT) 2 % ophthalmic solution instill 1 drop into left eye three times a day      Durezol 0 05 % EMUL instill 1 drop TO OPERATIVE EYE twice a day for 2 weeks STARTING      (REFER TO PRESCRIPTION NOTES)        ergocalciferol (VITAMIN D2) 50,000 units take 1 capsule by mouth every week for 6 MONTHS      erythromycin (ILOTYCIN) ophthalmic ointment APPLY A SMALL AMOUNT into right eye three times a day      furosemide (LASIX) 80 mg tablet take 1 tablet by mouth once daily 90 tablet 1    insulin aspart (NovoLOG) 100 Units/mL injection pen 10U with breakfast, 10 U with dinner      insulin glargine (Basaglar KwikPen) 100 units/mL injection pen Inject 28 Units under the skin daily 15 mL 2    Lancets (freestyle) lancets Use as instructed 100 each 1    methocarbamol (ROBAXIN) 500 mg tablet Take 1 tablet (500 mg total) by mouth 2 (two) times a day as needed for muscle spasms 30 tablet 0    NIFEdipine (ADALAT CC) 90 mg 24 hr tablet Take 1 tablet (90 mg total) by mouth daily 90 tablet 0    polyethylene glycol (MIRALAX) 17 g packet Take 17 g by mouth daily 30 each 1    prednisoLONE acetate (PRED FORTE) 1 % ophthalmic suspension Administer 1 drop into the left eye 2 (two) times a day   0    doxazosin (CARDURA) 4 mg tablet Take 1 tablet (4 mg total) by mouth daily at bedtime 30 tablet 0    isoniazid (NYDRAZID) 300 mg tablet Take 1 tablet (300 mg total) by mouth daily 30 tablet 8    labetalol (NORMODYNE) 200 mg tablet Take 2 tablets (400 mg total) by mouth every 12 (twelve) hours 120 tablet 0    pyridoxine (VITAMIN B6) 50 mg tablet Take 1 tablet (50 mg total) by mouth daily (Patient not taking: No sig reported) 30 tablet 6     No current facility-administered medications for this visit  Allergies   Allergen Reactions    No Known Allergies        Social History     Socioeconomic History    Marital status: Legally      Spouse name: Not on file    Number of children: Not on file    Years of education: Not on file    Highest education level: Not on file   Occupational History    Occupation: DISABLED   Tobacco Use    Smoking status: Former Smoker     Packs/day: 0 25     Quit date: 2018     Years since quittin 4    Smokeless tobacco: Never Used   Vaping Use    Vaping Use: Never used   Substance and Sexual Activity    Alcohol use: Not Currently    Drug use: No    Sexual activity: Not Currently   Other Topics Concern    Not on file   Social History Narrative    Not on file     Social Determinants of Health     Financial Resource Strain: Not on file   Food Insecurity: No Food Insecurity    Worried About [address] in the Last Year: Never true    [address] Consult in the Last Year: Never true   Transportation Needs: No Transportation Needs    Lack of Transportation (Medical): No    Lack of Transportation (Non-Medical):  No   Physical Activity: Not on file   Stress: Not on file   Social Connections: Not on file   Intimate Partner Violence: Not on file   Housing Stability: Low Risk     Unable to Pay for Housing in the Last Year: No    Number of Places Lived in the Last Year: 1    Unstable Housing in the Last Year: No        Family History   Problem Relation Age of Onset    Hypertension Mother     Diabetes Father     No Known Problems Sister     No Known Problems Brother     No Known Problems Maternal Aunt     No Known Problems Maternal Uncle     No Known Problems Paternal Aunt     No Known Problems Paternal Uncle     No Known Problems Maternal Grandmother     No Known Problems Maternal Grandfather     No Known Problems Paternal Grandmother     No Known Problems Paternal Grandfather Lab Results   Component Value Date    WBC 5 90 02/11/2022    HGB 9 7 (L) 02/11/2022    HCT 31 4 (L) 02/11/2022    MCV 91 02/11/2022     02/11/2022      Lab Results   Component Value Date    SODIUM 137 02/11/2022    K 4 8 02/11/2022    CL 98 (L) 02/11/2022    CO2 29 02/11/2022    BUN 31 (H) 02/11/2022    CREATININE 6 60 (H) 02/11/2022    GLUC 64 (L) 02/11/2022    CALCIUM 9 4 02/11/2022      Lab Results   Component Value Date    HGBA1C 6 7 (A) 02/21/2022      No results found for: CHOL  Lab Results   Component Value Date    HDL 54 01/18/2021    HDL 50 04/27/2020    HDL 54 08/30/2019     Lab Results   Component Value Date    LDLCALC 58 01/18/2021    LDLCALC 57 04/27/2020    LDLCALC 49 08/30/2019     Lab Results   Component Value Date    TRIG 95 01/18/2021    TRIG 103 04/27/2020    TRIG 87 08/30/2019     No results found for: CHOLHDL   Lab Results   Component Value Date    INR 1 05 02/07/2022    INR 1 00 01/08/2022    INR 1 12 05/03/2021    PROTIME 13 4 02/07/2022    PROTIME 13 0 01/08/2022    PROTIME 14 4 05/03/2021          Patient Active Problem List    Diagnosis Date Noted    Abnormal EKG 02/25/2021    Pulmonary nodule 02/25/2021    Hypertensive urgency 02/24/2021    Long term (current) use of antibiotics 02/15/2021    TB lung, latent 01/21/2021    Positive QuantiFERON-TB Gold test 01/20/2021    Cough 01/20/2021    Onychomycosis 12/02/2020    Diabetic polyneuropathy associated with type 2 diabetes mellitus ([facility] ) 12/02/2020    Neutropenia (HCC) 03/16/2020    Slow transit constipation 07/12/2019    Hyperphosphatemia 04/07/2019    Benign hypertension with ESRD (end-stage renal disease) ([facility] )     Azotemia 04/04/2019    Elevated troponin I level 04/03/2019    S/P arteriovenous (AV) fistula creation 02/05/2019    ESRD on dialysis ([facility] ) 01/09/2019    Chronic constipation 07/12/2018    Screening for colon cancer 07/12/2018    Hyperlipidemia 06/22/2018    Type 2 diabetes mellitus, with long-term current use of insulin ([facility] ) 05/17/2018    Diabetic retinopathy of both eyes associated with type 2 diabetes mellitus ([place] [address] ) 05/17/2018    LVH (left ventricular hypertrophy) 05/17/2018    Peripheral arterial disease ([place] [address] ) 07/22/2022    Hypocalcemia 02/09/2022    Anemia of chronic disease 34/08/8447    Diastolic heart failure ([place] [address] ) 02/08/2022    Hypertensive emergency 02/07/2022    Dialysis patient ([place] [address] ) 12/27/2021    Visual disorder 12/23/2021    Adenomatous polyp of colon 10/01/2021    GERD (gastroesophageal reflux disease) 08/16/2021       Portions of the record may have been created with voice recognition software  Occasional wrong word or "sound a like" substitutions may have occurred due to the inherent limitations of voice recognition software  Read the chart carefully and recognize, using context, where substitutions have occurred      Ever Crow DO, [facility]  7/27/2022 11:57 AM

## 2022-09-11 DIAGNOSIS — I10 ESSENTIAL HYPERTENSION: ICD-10-CM

## 2022-09-12 RX ORDER — FUROSEMIDE 80 MG
TABLET ORAL
Qty: 90 TABLET | Refills: 1 | Status: SHIPPED | OUTPATIENT
Start: 2022-09-12 | End: 2022-09-30 | Stop reason: SDUPTHER

## 2022-09-20 ENCOUNTER — TELEPHONE (OUTPATIENT)
Dept: NEPHROLOGY | Facility: CLINIC | Age: 57
End: 2022-09-20

## 2022-09-20 DIAGNOSIS — I10 ESSENTIAL HYPERTENSION: ICD-10-CM

## 2022-09-20 DIAGNOSIS — E11.65 UNCONTROLLED TYPE 2 DIABETES MELLITUS WITH HYPERGLYCEMIA, WITH LONG-TERM CURRENT USE OF INSULIN (HCC): ICD-10-CM

## 2022-09-20 DIAGNOSIS — N18.4 CKD (CHRONIC KIDNEY DISEASE) STAGE 4, GFR 15-29 ML/MIN (HCC): ICD-10-CM

## 2022-09-20 DIAGNOSIS — Z79.4 UNCONTROLLED TYPE 2 DIABETES MELLITUS WITH HYPERGLYCEMIA, WITH LONG-TERM CURRENT USE OF INSULIN (HCC): ICD-10-CM

## 2022-09-20 RX ORDER — ASPIRIN 81 MG/1
81 TABLET ORAL DAILY
Qty: 90 TABLET | Refills: 1 | Status: SHIPPED | OUTPATIENT
Start: 2022-09-20 | End: 2022-09-30 | Stop reason: SDUPTHER

## 2022-09-20 NOTE — TELEPHONE ENCOUNTER
Appointment Confirmation   Person confirmed appointment with  If not patient, name of the person lm    Date and time of appointment 09/21   Patient acknowledged and will be at appointment? no   Did you advise the patient that they will need a urine sample if they are a new patient? no   Did you advise the patient to bring their current medications for verification? (including any OTC) yes   Additional Information

## 2022-09-21 ENCOUNTER — TELEPHONE (OUTPATIENT)
Dept: NEPHROLOGY | Facility: CLINIC | Age: 57
End: 2022-09-21

## 2022-09-21 ENCOUNTER — OFFICE VISIT (OUTPATIENT)
Dept: NEPHROLOGY | Facility: CLINIC | Age: 57
End: 2022-09-21
Payer: MEDICARE

## 2022-09-21 VITALS
SYSTOLIC BLOOD PRESSURE: 110 MMHG | WEIGHT: 170.2 LBS | HEIGHT: 70 IN | HEART RATE: 70 BPM | BODY MASS INDEX: 24.37 KG/M2 | DIASTOLIC BLOOD PRESSURE: 58 MMHG

## 2022-09-21 DIAGNOSIS — R91.1 LUNG NODULE: ICD-10-CM

## 2022-09-21 DIAGNOSIS — Z01.818 PRE-TRANSPLANT EVALUATION FOR END STAGE RENAL DISEASE: Primary | ICD-10-CM

## 2022-09-21 PROCEDURE — 99215 OFFICE O/P EST HI 40 MIN: CPT | Performed by: INTERNAL MEDICINE

## 2022-09-21 NOTE — PATIENT INSTRUCTIONS
1) We will review your case at the transplant committee meeting and will review our decision with you in approximately 4 weeks over the phone  2) If you do not receive a call from [facility] within 4 weeks, please call our local Nephrology office  3) From a renal transplant evaluation purpose, we will see you once a year in our local office for medical follow-up

## 2022-09-21 NOTE — PROGRESS NOTES
NEPHROLOGY OFFICE VISIT   Paddy Zhong 61 y o  male MRN: [id number]  9/21/2022    Reason for Visit: yearly f/u pre transplant    ASSESSMENT and PLAN:    I had the pleasure of seeing Mr Jane Swartz today in the renal clinic for the continued management of yearly f/u pre transplant evaluation  Paddy Zhong is a 61 y  o  male [place] referred by Dr Shira Johnston, with PMHx of ESRD on HD at [place], [place]), DM (diagnosed 2002), retinopathy, legally blind, neuropathy, , history of chronic idiopathic constipation, former worked in [employer], former smoker quit in 2016 (smoked since teenage years), no ETOH, no drugs,seen in the Nephrology Clinic for pre-transplant kidney evaluation      ESRD presumed to be due [address] HD since 4/2019 7/2019 - admission for staph epidermidis bacteremia in setting of dialyssi catheter       7/2019 - ECHO EF 65; mod to severe concentric LVH;      January 2021-patient was admitted due to finding of positive QuantiFERON gold test with suspicion of latent TB due to history  During pre evaluation workup  Patient was started on treatment for 9 months      February 2021-patient admitted for chest pain and hypertensive urgency  Troponins unrevealing      May 2021-patient completed cardiac catheterization-no significant coronary    December 2021-patient completed EGD and colonoscopy  EGD with moderate erosive gastritis that was biopsied  Moderate duodenitis  Irregular Z-line which was biopsied   -colonoscopy with small internal hemorrhoids otherwise normal   Repeat screening in 10 years  February 2022-patient was admitted to the hospital with respiratory failure with hypoxia and hypertensive urgency secondary to volume overload  Patient underwent dialysis with ultrafiltration and improvement  Echocardiogram February 2022-EF 58%, mild-to-moderate mitral regurgitation, mild tricuspid regurgitation, mild-to-moderate pulmonary elevated systolic pressures      CT scan April 2022 of abdomen and pelvis-no significant interval changes  Cortical renal atrophy bilaterally consistent with chronic kidney disease and renal cysts without suspicious lesions     Plan   1  Patient's case will be reviewed with the transplant committee at the next transplant committee meeting  2   intestinal metaplasia and positive for H pylori  Patient was treated per prior notes  3  Patient will need yearly CT scan regarding incidental pulmonary nodule finding in February 2021  I have message patient's primary care physician regarding follow-up CT scan and have placed order  4  Carotid artery ultrasound as patient is due toward the end of this year (had less than 50% stenosis bilaterally prior)  5  Endocrine - history of DM; 60 units total daily; BMI 23 7  6  Patient had cardiac catheterization completed with 30% mid LAD stenosis noted  Otherwise did not require intervention  From the transplant standpoint, if the patient does not have transplant by 2024, will likely need repeat catheterization  7  Latent TB-patient completed 9 month course of treatment  8  DRVVT screen elevated slightly  Will review with team  9  Will investigate what medications pt is receiving on dialysis  Pt states he is receiving three medications during HD (pills) but not sure which    No problem-specific Assessment & Plan notes found for this encounter  HPI:    Patient denies complaints  No fevers, chills, nausea, vomiting  PATIENT INSTRUCTIONS:    Patient Instructions   1) We will review your case at the transplant committee meeting and will review our decision with you in approximately 4 weeks over the phone  2) If you do not receive a call from [facility] within 4 weeks, please call our local Nephrology office  3) From a renal transplant evaluation purpose, we will see you once a year in our local office for medical follow-up            OBJECTIVE:  Current Weight: Weight - Scale: 77 2 kg (170 lb 3 2 oz)  Vitals:    09/21/22 1335   BP: 110/58   BP Location: Right arm   Patient Position: Sitting   Cuff Size: Standard   Pulse: 70   Weight: 77 2 kg (170 lb 3 2 oz)   Height: 5' 10" (1 778 m)    Body mass index is 24 42 kg/m²  REVIEW OF SYSTEMS:    Review of Systems   Constitutional: Negative  Negative for appetite change and fatigue  HENT: Negative  Eyes: Negative  Respiratory: Negative  Negative for shortness of breath  Cardiovascular: Negative  Negative for leg swelling  Gastrointestinal: Negative  Endocrine: Negative  Genitourinary: Negative  Negative for difficulty urinating  Musculoskeletal: Negative  Allergic/Immunologic: Negative  Neurological: Negative  Hematological: Negative  Psychiatric/Behavioral: Negative  All other systems reviewed and are negative  PHYSICAL EXAM:      Physical Exam  Vitals and nursing note reviewed  Constitutional:       General: He is not in acute distress  Appearance: He is well-developed  He is not diaphoretic  HENT:      Head: Normocephalic and atraumatic  Eyes:      General: No scleral icterus  Right eye: No discharge  Left eye: No discharge  Conjunctiva/sclera: Conjunctivae normal    Neck:      Vascular: No JVD  Cardiovascular:      Rate and Rhythm: Normal rate and regular rhythm  Heart sounds: Normal heart sounds  No murmur heard  No friction rub  No gallop  Pulmonary:      Effort: Pulmonary effort is normal  No respiratory distress  Breath sounds: Normal breath sounds  No wheezing or rales  Chest:      Chest wall: No tenderness  Abdominal:      General: Bowel sounds are normal  There is no distension  Palpations: Abdomen is soft  Tenderness: There is no abdominal tenderness  There is no rebound  Musculoskeletal:         General: No tenderness or deformity  Normal range of motion  Cervical back: Normal range of motion and neck supple     Skin:     General: Skin is warm and dry  Coloration: Skin is not pale  Findings: No erythema or rash  Neurological:      Mental Status: He is alert and oriented to person, place, and time  Cranial Nerves: No cranial nerve deficit  Coordination: Coordination normal       Deep Tendon Reflexes: Reflexes are normal and symmetric  Psychiatric:         Behavior: Behavior normal          Thought Content: Thought content normal          Judgment: Judgment normal          Medications:    Current Outpatient Medications:     aspirin (Aspirin Low Dose) 81 mg EC tablet, Take 1 tablet (81 mg total) by mouth daily, Disp: 90 tablet, Rfl: 1    atorvastatin (LIPITOR) 40 mg tablet, take 1 tablet by mouth once daily, Disp: 90 tablet, Rfl: 0    benzonatate (TESSALON PERLES) 100 mg capsule, Take 1 capsule (100 mg total) by mouth 3 (three) times a day as needed for cough, Disp: 30 capsule, Rfl: 1    Blood Glucose Monitoring Suppl (ONE TOUCH ULTRA 2) w/Device KIT, by Does not apply route 3 (three) times a day, Disp: 1 each, Rfl: 0    brimonidine tartrate 0 2 % ophthalmic solution, Administer 1 drop into the left eye 2 (two) times a day, Disp: , Rfl:     calcium acetate (PHOSLO) capsule, Take 667 mg by mouth 3 (three) times a day with meals, Disp: , Rfl:     Carboxymethylcellul-Glycerin (CLEAR EYES FOR DRY EYES OP), Apply 2 drops to eye 2 (two) times a day, Disp: , Rfl:     cholecalciferol (VITAMIN D3) 1,000 units tablet, Take 2 tablets (2,000 Units total) by mouth daily, Disp: 14 tablet, Rfl: 0    Continuous Blood Gluc  ([employer] 14 Day [place]) LAUREN, Use 1 each daily, Disp: 1 each, Rfl: 0    dorzolamide (TRUSOPT) 2 % ophthalmic solution, instill 1 drop into left eye three times a day, Disp: , Rfl:     Durezol 0 05 % EMUL, instill 1 drop TO OPERATIVE EYE twice a day for 2 weeks STARTING      (REFER TO PRESCRIPTION NOTES)  , Disp: , Rfl:     ergocalciferol (VITAMIN D2) 50,000 units, take 1 capsule by mouth every week for 6 MONTHS, Disp: , Rfl:     erythromycin (ILOTYCIN) ophthalmic ointment, APPLY A SMALL AMOUNT into right eye three times a day, Disp: , Rfl:     furosemide (LASIX) 80 mg tablet, take 1 tablet by mouth once daily, Disp: 90 tablet, Rfl: 1    insulin aspart (NovoLOG) 100 Units/mL injection pen, 10U with breakfast, 10 U with dinner, Disp: , Rfl:     insulin glargine (Basaglar KwikPen) 100 units/mL injection pen, Inject 28 Units under the skin daily, Disp: 15 mL, Rfl: 2    labetalol (NORMODYNE) 200 mg tablet, Take 2 tablets (400 mg total) by mouth every 12 (twelve) hours, Disp: 120 tablet, Rfl: 0    Lancets (freestyle) lancets, Use as instructed, Disp: 100 each, Rfl: 1    methocarbamol (ROBAXIN) 500 mg tablet, Take 1 tablet (500 mg total) by mouth 2 (two) times a day as needed for muscle spasms, Disp: 30 tablet, Rfl: 0    NIFEdipine (ADALAT CC) 90 mg 24 hr tablet, Take 1 tablet (90 mg total) by mouth daily, Disp: 90 tablet, Rfl: 0    polyethylene glycol (MIRALAX) 17 g packet, Take 17 g by mouth daily, Disp: 30 each, Rfl: 1    prednisoLONE acetate (PRED FORTE) 1 % ophthalmic suspension, Administer 1 drop into the left eye 2 (two) times a day , Disp: , Rfl: 0    doxazosin (CARDURA) 4 mg tablet, Take 1 tablet (4 mg total) by mouth daily at bedtime (Patient not taking: Reported on 9/21/2022), Disp: 30 tablet, Rfl: 0    isoniazid (NYDRAZID) 300 mg tablet, Take 1 tablet (300 mg total) by mouth daily (Patient not taking: Reported on 9/21/2022), Disp: 30 tablet, Rfl: 8    pyridoxine (VITAMIN B6) 50 mg tablet, Take 1 tablet (50 mg total) by mouth daily (Patient not taking: No sig reported), Disp: 30 tablet, Rfl: 6    Laboratory Results:        Invalid input(s): ALBUMIN    Results for orders placed or performed in visit on 03/16/22   Anaerobic culture and Gram stain   Result Value Ref Range    Anaerobic Culture 2+ Growth of Prevotella disiens (A)     Anaerobic Culture 2+ Growth of Prevotella species (A) Anaerobic Culture 3 colonies Clostridium innocuum (A)        Susceptibility    Prevotella disiens - KIM     ZID Performed Yes      Prevotella disiens -  (no method available)     Beta Lactamase Positive      Prevotella species - KIM     ZID Performed Yes      Prevotella species -  (no method available)     Beta Lactamase Positive      Clostridium innocuum - KIM     ZID Performed Yes     Wound culture and Gram stain    Specimen: Wound   Result Value Ref Range    Wound Culture (A)      Growth in Broth culture only Enterobacter cloacae complex    Wound Culture Growth in Broth culture only Klebsiella pneumoniae (A)     Wound Culture 1+ Growth of      Gram Stain Result No polys seen (A)     Gram Stain Result 1+ Gram positive cocci in pairs (A)     Gram Stain Result 1+ Gram positive rods (A)        Susceptibility    Klebsiella pneumoniae - KIM     ZID Performed Yes       Amoxicillin + Clavulanate <=8/4 Susceptible ug/ml     Ampicillin ($$) >16 00 Resistant ug/ml     Ampicillin + Sulbactam ($) 16/8 Intermediate ug/ml     Aztreonam ($$$)  <=4 Susceptible ug/ml     Cefazolin ($) <=2 00 Susceptible ug/ml     Ciprofloxacin ($)  <=0 25 Susceptible ug/ml     Ertapenem ($$$) <=0 5 Susceptible ug/ml     Gentamicin ($$) <=2 Susceptible ug/ml     Levofloxacin ($) <=0 50 Susceptible ug/ml     Piperacillin + Tazobactam ($$$) <=8 Susceptible ug/ml     Tetracycline <=4 Susceptible ug/ml     Tobramycin ($) <=2 Susceptible ug/ml     Trimethoprim + Sulfamethoxazole ($$$) >2/38 Resistant ug/ml    Enterobacter cloacae complex - KIM     ZID Performed Yes       Amoxicillin + Clavulanate >16/8 Resistant ug/ml     Ampicillin ($$) >16 00 Resistant ug/ml     Ampicillin + Sulbactam ($) >16/8 Resistant ug/ml     Aztreonam ($$$)  <=4 Susceptible ug/ml     Cefazolin ($) >16 00 Resistant ug/ml     Ceftazidime ($$) <=1 Susceptible ug/ml     Ceftriaxone ($$) <=1 00 Susceptible ug/ml     Cefuroxime ($$) >16 Resistant ug/ml     Ciprofloxacin ($)  <=0 25 Susceptible ug/ml     Ertapenem ($$$) <=0 5 Susceptible ug/ml     Gentamicin ($$) <=2 Susceptible ug/ml     Levofloxacin ($) <=0 50 Susceptible ug/ml     Piperacillin + Tazobactam ($$$) <=8 Susceptible ug/ml     Tetracycline <=4 Susceptible ug/ml     Tobramycin ($) <=2 Susceptible ug/ml     Trimethoprim + Sulfamethoxazole ($$$) >2/38 Resistant ug/ml

## 2022-09-21 NOTE — LETTER
September 21, 2022     Merlin Burger, DO  [address] [facility]  [address]  [place]  [id number]    Patient: Bailey Lyons   YOB: 1965   Date of Visit: 9/21/2022       Dear Dr Samson Mathur:    Thank you for referring Bailey Lyons to me for evaluation  Below are my notes for this consultation  If you have questions, please do not hesitate to call me  I look forward to following your patient along with you  Sincerely,        Asad Miller MD        CC: No Recipients  Asad Miller MD  9/21/2022  1:55 PM  Sign when Signing Visit  NEPHROLOGY OFFICE VISIT   Yosi Zhong 61 y o  male MRN: [id number]  9/21/2022    Reason for Visit: yearly f/u pre transplant    ASSESSMENT and PLAN:    I had the pleasure of seeing Mr Yazmin Tamez today in the renal clinic for the continued management of yearly f/u pre transplant evaluation  Paddy Zhong is a 61 y  o  male [place] referred by Dr Stormy Lin, with PMHx of ESRD on HD at [place], [place]), DM (diagnosed 2002), retinopathy, legally blind, neuropathy, , history of chronic idiopathic constipation, former worked in [employer], former smoker quit in 2016 (smoked since teenage years), no ETOH, no drugs,seen in the Nephrology Clinic for pre-transplant kidney evaluation      ESRD presumed to be due [address] HD since 4/2019 7/2019 - admission for staph epidermidis bacteremia in setting of dialyssi catheter       7/2019 - ECHO EF 65; mod to severe concentric LVH;      January 2021-patient was admitted due to finding of positive QuantiFERON gold test with suspicion of latent TB due to history  During pre evaluation workup  Patient was started on treatment for 9 months      February 2021-patient admitted for chest pain and hypertensive urgency  Troponins unrevealing      May 2021-patient completed cardiac catheterization-no significant coronary    December 2021-patient completed EGD and colonoscopy    EGD with moderate erosive gastritis that was biopsied  Moderate duodenitis  Irregular Z-line which was biopsied   -colonoscopy with small internal hemorrhoids otherwise normal   Repeat screening in 10 years  February 2022-patient was admitted to the hospital with respiratory failure with hypoxia and hypertensive urgency secondary to volume overload  Patient underwent dialysis with ultrafiltration and improvement  Echocardiogram February 2022-EF 58%, mild-to-moderate mitral regurgitation, mild tricuspid regurgitation, mild-to-moderate pulmonary elevated systolic pressures  CT scan April 2022 of abdomen and pelvis-no significant interval changes  Cortical renal atrophy bilaterally consistent with chronic kidney disease and renal cysts without suspicious lesions     Plan   1  Patient's case will be reviewed with the transplant committee at the next transplant committee meeting  2   intestinal metaplasia and positive for H pylori  Patient was treated per prior notes  3  Patient will need yearly CT scan regarding incidental pulmonary nodule finding in February 2021  I have message patient's primary care physician regarding follow-up CT scan and have placed order  4  Carotid artery ultrasound as patient is due toward the end of this year (had less than 50% stenosis bilaterally prior)  5  Endocrine - history of DM; 60 units total daily; BMI 23 7  6  Patient had cardiac catheterization completed with 30% mid LAD stenosis noted  Otherwise did not require intervention  From the transplant standpoint, if the patient does not have transplant by 2024, will likely need repeat catheterization  7  Latent TB-patient completed 9 month course of treatment  8  DRVVT screen elevated slightly  Will review with team  9  Will investigate what medications pt is receiving on dialysis   Pt states he is receiving three medications during HD (pills) but not sure which    No problem-specific Assessment & Plan notes found for this encounter  HPI:    Patient denies complaints  No fevers, chills, nausea, vomiting  PATIENT INSTRUCTIONS:    Patient Instructions   1) We will review your case at the transplant committee meeting and will review our decision with you in approximately 4 weeks over the phone  2) If you do not receive a call from [facility] within 4 weeks, please call our local Nephrology office  3) From a renal transplant evaluation purpose, we will see you once a year in our local office for medical follow-up  OBJECTIVE:  Current Weight: Weight - Scale: 77 2 kg (170 lb 3 2 oz)  Vitals:    09/21/22 1335   BP: 110/58   BP Location: Right arm   Patient Position: Sitting   Cuff Size: Standard   Pulse: 70   Weight: 77 2 kg (170 lb 3 2 oz)   Height: 5' 10" (1 778 m)    Body mass index is 24 42 kg/m²  REVIEW OF SYSTEMS:    Review of Systems   Constitutional: Negative  Negative for appetite change and fatigue  HENT: Negative  Eyes: Negative  Respiratory: Negative  Negative for shortness of breath  Cardiovascular: Negative  Negative for leg swelling  Gastrointestinal: Negative  Endocrine: Negative  Genitourinary: Negative  Negative for difficulty urinating  Musculoskeletal: Negative  Allergic/Immunologic: Negative  Neurological: Negative  Hematological: Negative  Psychiatric/Behavioral: Negative  All other systems reviewed and are negative  PHYSICAL EXAM:      Physical Exam  Vitals and nursing note reviewed  Constitutional:       General: He is not in acute distress  Appearance: He is well-developed  He is not diaphoretic  HENT:      Head: Normocephalic and atraumatic  Eyes:      General: No scleral icterus  Right eye: No discharge  Left eye: No discharge  Conjunctiva/sclera: Conjunctivae normal    Neck:      Vascular: No JVD  Cardiovascular:      Rate and Rhythm: Normal rate and regular rhythm  Heart sounds: Normal heart sounds  No murmur heard  No friction rub  No gallop  Pulmonary:      Effort: Pulmonary effort is normal  No respiratory distress  Breath sounds: Normal breath sounds  No wheezing or rales  Chest:      Chest wall: No tenderness  Abdominal:      General: Bowel sounds are normal  There is no distension  Palpations: Abdomen is soft  Tenderness: There is no abdominal tenderness  There is no rebound  Musculoskeletal:         General: No tenderness or deformity  Normal range of motion  Cervical back: Normal range of motion and neck supple  Skin:     General: Skin is warm and dry  Coloration: Skin is not pale  Findings: No erythema or rash  Neurological:      Mental Status: He is alert and oriented to person, place, and time  Cranial Nerves: No cranial nerve deficit  Coordination: Coordination normal       Deep Tendon Reflexes: Reflexes are normal and symmetric  Psychiatric:         Behavior: Behavior normal          Thought Content:  Thought content normal          Judgment: Judgment normal          Medications:    Current Outpatient Medications:     aspirin (Aspirin Low Dose) 81 mg EC tablet, Take 1 tablet (81 mg total) by mouth daily, Disp: 90 tablet, Rfl: 1    atorvastatin (LIPITOR) 40 mg tablet, take 1 tablet by mouth once daily, Disp: 90 tablet, Rfl: 0    benzonatate (TESSALON PERLES) 100 mg capsule, Take 1 capsule (100 mg total) by mouth 3 (three) times a day as needed for cough, Disp: 30 capsule, Rfl: 1    Blood Glucose Monitoring Suppl (ONE TOUCH ULTRA 2) w/Device KIT, by Does not apply route 3 (three) times a day, Disp: 1 each, Rfl: 0    brimonidine tartrate 0 2 % ophthalmic solution, Administer 1 drop into the left eye 2 (two) times a day, Disp: , Rfl:     calcium acetate (PHOSLO) capsule, Take 667 mg by mouth 3 (three) times a day with meals, Disp: , Rfl:     Carboxymethylcellul-Glycerin (CLEAR EYES FOR DRY EYES OP), Apply 2 drops to eye 2 (two) times a day, Disp: , Rfl:     cholecalciferol (VITAMIN D3) 1,000 units tablet, Take 2 tablets (2,000 Units total) by mouth daily, Disp: 14 tablet, Rfl: 0    Continuous Blood Gluc  (FreeStyle Jessica 14 Day [place]) LAUREN, Use 1 each daily, Disp: 1 each, Rfl: 0    dorzolamide (TRUSOPT) 2 % ophthalmic solution, instill 1 drop into left eye three times a day, Disp: , Rfl:     Durezol 0 05 % EMUL, instill 1 drop TO OPERATIVE EYE twice a day for 2 weeks STARTING      (REFER TO PRESCRIPTION NOTES)  , Disp: , Rfl:     ergocalciferol (VITAMIN D2) 50,000 units, take 1 capsule by mouth every week for 6 MONTHS, Disp: , Rfl:     erythromycin (ILOTYCIN) ophthalmic ointment, APPLY A SMALL AMOUNT into right eye three times a day, Disp: , Rfl:     furosemide (LASIX) 80 mg tablet, take 1 tablet by mouth once daily, Disp: 90 tablet, Rfl: 1    insulin aspart (NovoLOG) 100 Units/mL injection pen, 10U with breakfast, 10 U with dinner, Disp: , Rfl:     insulin glargine (Basaglar KwikPen) 100 units/mL injection pen, Inject 28 Units under the skin daily, Disp: 15 mL, Rfl: 2    labetalol (NORMODYNE) 200 mg tablet, Take 2 tablets (400 mg total) by mouth every 12 (twelve) hours, Disp: 120 tablet, Rfl: 0    Lancets (freestyle) lancets, Use as instructed, Disp: 100 each, Rfl: 1    methocarbamol (ROBAXIN) 500 mg tablet, Take 1 tablet (500 mg total) by mouth 2 (two) times a day as needed for muscle spasms, Disp: 30 tablet, Rfl: 0    NIFEdipine (ADALAT CC) 90 mg 24 hr tablet, Take 1 tablet (90 mg total) by mouth daily, Disp: 90 tablet, Rfl: 0    polyethylene glycol (MIRALAX) 17 g packet, Take 17 g by mouth daily, Disp: 30 each, Rfl: 1    prednisoLONE acetate (PRED FORTE) 1 % ophthalmic suspension, Administer 1 drop into the left eye 2 (two) times a day , Disp: , Rfl: 0    doxazosin (CARDURA) 4 mg tablet, Take 1 tablet (4 mg total) by mouth daily at bedtime (Patient not taking: Reported on 9/21/2022), Disp: 30 tablet, Rfl: 0    isoniazid (NYDRAZID) 300 mg tablet, Take 1 tablet (300 mg total) by mouth daily (Patient not taking: Reported on 9/21/2022), Disp: 30 tablet, Rfl: 8    pyridoxine (VITAMIN B6) 50 mg tablet, Take 1 tablet (50 mg total) by mouth daily (Patient not taking: No sig reported), Disp: 30 tablet, Rfl: 6    Laboratory Results:        Invalid input(s): ALBUMIN    Results for orders placed or performed in visit on 03/16/22   Anaerobic culture and Gram stain   Result Value Ref Range    Anaerobic Culture 2+ Growth of Prevotella disiens (A)     Anaerobic Culture 2+ Growth of Prevotella species (A)     Anaerobic Culture 3 colonies Clostridium innocuum (A)        Susceptibility    Prevotella disiens - KIM     ZID Performed Yes      Prevotella disiens -  (no method available)     Beta Lactamase Positive      Prevotella species - KIM     ZID Performed Yes      Prevotella species -  (no method available)     Beta Lactamase Positive      Clostridium innocuum - KIM     ZID Performed Yes     Wound culture and Gram stain    Specimen: Wound   Result Value Ref Range    Wound Culture (A)      Growth in Broth culture only Enterobacter cloacae complex    Wound Culture Growth in Broth culture only Klebsiella pneumoniae (A)     Wound Culture 1+ Growth of      Gram Stain Result No polys seen (A)     Gram Stain Result 1+ Gram positive cocci in pairs (A)     Gram Stain Result 1+ Gram positive rods (A)        Susceptibility    Klebsiella pneumoniae - KIM     ZID Performed Yes       Amoxicillin + Clavulanate <=8/4 Susceptible ug/ml     Ampicillin ($$) >16 00 Resistant ug/ml     Ampicillin + Sulbactam ($) 16/8 Intermediate ug/ml     Aztreonam ($$$)  <=4 Susceptible ug/ml     Cefazolin ($) <=2 00 Susceptible ug/ml     Ciprofloxacin ($)  <=0 25 Susceptible ug/ml     Ertapenem ($$$) <=0 5 Susceptible ug/ml     Gentamicin ($$) <=2 Susceptible ug/ml     Levofloxacin ($) <=0 50 Susceptible ug/ml     Piperacillin + Tazobactam ($$$) <=8 Susceptible ug/ml Tetracycline <=4 Susceptible ug/ml     Tobramycin ($) <=2 Susceptible ug/ml     Trimethoprim + Sulfamethoxazole ($$$) >2/38 Resistant ug/ml    Enterobacter cloacae complex - KIM     ZID Performed Yes       Amoxicillin + Clavulanate >16/8 Resistant ug/ml     Ampicillin ($$) >16 00 Resistant ug/ml     Ampicillin + Sulbactam ($) >16/8 Resistant ug/ml     Aztreonam ($$$)  <=4 Susceptible ug/ml     Cefazolin ($) >16 00 Resistant ug/ml     Ceftazidime ($$) <=1 Susceptible ug/ml     Ceftriaxone ($$) <=1 00 Susceptible ug/ml     Cefuroxime ($$) >16 Resistant ug/ml     Ciprofloxacin ($)  <=0 25 Susceptible ug/ml     Ertapenem ($$$) <=0 5 Susceptible ug/ml     Gentamicin ($$) <=2 Susceptible ug/ml     Levofloxacin ($) <=0 50 Susceptible ug/ml     Piperacillin + Tazobactam ($$$) <=8 Susceptible ug/ml     Tetracycline <=4 Susceptible ug/ml     Tobramycin ($) <=2 Susceptible ug/ml     Trimethoprim + Sulfamethoxazole ($$$) >2/38 Resistant ug/ml

## 2022-09-21 NOTE — TELEPHONE ENCOUNTER
Received a call from patients sister asking what the address is to Dr Jaclyn Santillan office  All questions were answered

## 2022-09-27 DIAGNOSIS — E78.5 HYPERLIPIDEMIA, UNSPECIFIED HYPERLIPIDEMIA TYPE: ICD-10-CM

## 2022-09-27 RX ORDER — ATORVASTATIN CALCIUM 40 MG/1
TABLET, FILM COATED ORAL
Qty: 90 TABLET | Refills: 0 | Status: SHIPPED | OUTPATIENT
Start: 2022-09-27 | End: 2022-09-30 | Stop reason: SDUPTHER

## 2022-09-30 DIAGNOSIS — E78.5 HYPERLIPIDEMIA, UNSPECIFIED HYPERLIPIDEMIA TYPE: ICD-10-CM

## 2022-09-30 DIAGNOSIS — E11.65 UNCONTROLLED TYPE 2 DIABETES MELLITUS WITH HYPERGLYCEMIA, WITH LONG-TERM CURRENT USE OF INSULIN (HCC): ICD-10-CM

## 2022-09-30 DIAGNOSIS — Z79.4 UNCONTROLLED TYPE 2 DIABETES MELLITUS WITH HYPERGLYCEMIA, WITH LONG-TERM CURRENT USE OF INSULIN (HCC): ICD-10-CM

## 2022-09-30 DIAGNOSIS — N18.4 CKD (CHRONIC KIDNEY DISEASE) STAGE 4, GFR 15-29 ML/MIN (HCC): ICD-10-CM

## 2022-09-30 DIAGNOSIS — I10 ESSENTIAL HYPERTENSION: ICD-10-CM

## 2022-09-30 RX ORDER — ASPIRIN 81 MG/1
81 TABLET ORAL DAILY
Qty: 90 TABLET | Refills: 1 | Status: SHIPPED | OUTPATIENT
Start: 2022-09-30 | End: 2023-03-16 | Stop reason: SDUPTHER

## 2022-09-30 RX ORDER — ATORVASTATIN CALCIUM 40 MG/1
40 TABLET, FILM COATED ORAL DAILY
Qty: 90 TABLET | Refills: 1 | Status: SHIPPED | OUTPATIENT
Start: 2022-09-30 | End: 2023-03-16 | Stop reason: SDUPTHER

## 2022-09-30 RX ORDER — FUROSEMIDE 80 MG
80 TABLET ORAL DAILY
Qty: 90 TABLET | Refills: 1 | Status: SHIPPED | OUTPATIENT
Start: 2022-09-30 | End: 2023-05-19

## 2022-10-03 ENCOUNTER — HOSPITAL ENCOUNTER (OUTPATIENT)
Dept: CT IMAGING | Facility: HOSPITAL | Age: 57
Discharge: HOME | End: 2022-10-03
Attending: INTERNAL MEDICINE
Payer: MEDICARE

## 2022-10-03 DIAGNOSIS — Z01.818 PRE-TRANSPLANT EVALUATION FOR END STAGE RENAL DISEASE: ICD-10-CM

## 2022-10-03 DIAGNOSIS — R91.1 LUNG NODULE: ICD-10-CM

## 2022-10-03 PROCEDURE — G1004 CDSM NDSC: HCPCS

## 2022-10-03 PROCEDURE — 71250 CT THORAX DX C-: CPT

## 2022-10-12 PROBLEM — R05.9 COUGH: Status: RESOLVED | Noted: 2021-01-20 | Resolved: 2022-10-12

## 2022-12-07 ENCOUNTER — PATIENT OUTREACH (OUTPATIENT)
Dept: INTERNAL MEDICINE CLINIC | Facility: CLINIC | Age: 57
End: 2022-12-07

## 2022-12-07 ENCOUNTER — EPISODE CHANGES (OUTPATIENT)
Dept: CASE MANAGEMENT | Facility: OTHER | Age: 57
End: 2022-12-07

## 2022-12-07 NOTE — PROGRESS NOTES
Chart review completed for the following sections:  • Recent Vital Signs  • Allergies/Contradictions  • Medication Review   • History   • SDOH   • Problem List  • Immunizations  • Past hospitalizations and major procedures, including surgery  • Significant past illnesses and treatment history including: History and Physical, Other provider notes and Medications/Infusions/Transfusions  • Relevant past medications related to the patient's condition    Patient does not meet medical criteria for NCQA complex care management program

## 2022-12-16 ENCOUNTER — PREP FOR PROCEDURE (OUTPATIENT)
Dept: INTERVENTIONAL RADIOLOGY/VASCULAR | Facility: CLINIC | Age: 57
End: 2022-12-16

## 2022-12-16 DIAGNOSIS — N18.6 ESRD (END STAGE RENAL DISEASE) (HCC): Primary | ICD-10-CM

## 2022-12-20 DIAGNOSIS — E11.21 TYPE 2 DIABETES MELLITUS WITH DIABETIC NEPHROPATHY, WITH LONG-TERM CURRENT USE OF INSULIN (HCC): ICD-10-CM

## 2022-12-20 DIAGNOSIS — Z79.4 TYPE 2 DIABETES MELLITUS WITH DIABETIC NEPHROPATHY, WITH LONG-TERM CURRENT USE OF INSULIN (HCC): ICD-10-CM

## 2022-12-20 RX ORDER — INSULIN GLARGINE 100 [IU]/ML
28 INJECTION, SOLUTION SUBCUTANEOUS DAILY
Qty: 15 ML | Refills: 2 | Status: SHIPPED | OUTPATIENT
Start: 2022-12-20

## 2022-12-21 ENCOUNTER — TELEPHONE (OUTPATIENT)
Dept: RADIOLOGY | Facility: HOSPITAL | Age: 57
End: 2022-12-21

## 2022-12-22 ENCOUNTER — TELEPHONE (OUTPATIENT)
Dept: INTERVENTIONAL RADIOLOGY/VASCULAR | Facility: HOSPITAL | Age: 57
End: 2022-12-22

## 2022-12-28 ENCOUNTER — HOSPITAL ENCOUNTER (OUTPATIENT)
Dept: INTERVENTIONAL RADIOLOGY/VASCULAR | Facility: HOSPITAL | Age: 57
Discharge: HOME | End: 2022-12-28
Attending: RADIOLOGY

## 2022-12-28 VITALS
SYSTOLIC BLOOD PRESSURE: 128 MMHG | HEIGHT: 69 IN | OXYGEN SATURATION: 96 % | RESPIRATION RATE: 20 BRPM | HEART RATE: 60 BPM | BODY MASS INDEX: 25.18 KG/M2 | TEMPERATURE: 97.7 F | WEIGHT: 170 LBS | DIASTOLIC BLOOD PRESSURE: 66 MMHG

## 2022-12-28 DIAGNOSIS — N18.6 ESRD (END STAGE RENAL DISEASE) (HCC): ICD-10-CM

## 2022-12-28 LAB — GLUCOSE SERPL-MCNC: 181 MG/DL (ref 65–140)

## 2022-12-28 RX ORDER — LIDOCAINE HYDROCHLORIDE 10 MG/ML
INJECTION, SOLUTION EPIDURAL; INFILTRATION; INTRACAUDAL; PERINEURAL AS NEEDED
Status: COMPLETED | OUTPATIENT
Start: 2022-12-28 | End: 2022-12-28

## 2022-12-28 RX ORDER — FENTANYL CITRATE 50 UG/ML
INJECTION, SOLUTION INTRAMUSCULAR; INTRAVENOUS AS NEEDED
Status: COMPLETED | OUTPATIENT
Start: 2022-12-28 | End: 2022-12-28

## 2022-12-28 RX ORDER — MIDAZOLAM HYDROCHLORIDE 2 MG/2ML
INJECTION, SOLUTION INTRAMUSCULAR; INTRAVENOUS AS NEEDED
Status: COMPLETED | OUTPATIENT
Start: 2022-12-28 | End: 2022-12-28

## 2022-12-28 RX ADMIN — LIDOCAINE HYDROCHLORIDE 5 ML: 10 INJECTION, SOLUTION EPIDURAL; INFILTRATION; INTRACAUDAL; PERINEURAL at 12:45

## 2022-12-28 RX ADMIN — MIDAZOLAM 1 MG: 1 INJECTION INTRAMUSCULAR; INTRAVENOUS at 12:51

## 2022-12-28 RX ADMIN — FENTANYL CITRATE 50 MCG: 50 INJECTION, SOLUTION INTRAMUSCULAR; INTRAVENOUS at 12:51

## 2022-12-28 RX ADMIN — IOHEXOL 15 ML: 350 INJECTION, SOLUTION INTRAVENOUS at 12:45

## 2022-12-28 NOTE — BRIEF OP NOTE (RAD/CATH)
INTERVENTIONAL RADIOLOGY PROCEDURE NOTE    Date: 12/28/2022    Procedure:   Procedure Summary     Date: 12/28/22 Room / Location: [facility] Interventional Radiology    Anesthesia Start:  Anesthesia Stop:     Procedure: IR AV FISTULAGRAM/GRAFTOGRAM Diagnosis:       ESRD (end stage renal disease) ([facility] [address] )      (prolonged bleeding, hyperpulsatile)    Scheduled Providers:  Responsible Provider:     Anesthesia Type: Not recorded ASA Status: Not recorded          Preoperative diagnosis:   1  [facility]         Postoperative diagnosis: Same  Surgeon: Shane Bryan MD     Assistant: None  No qualified resident was available  Blood loss: Minimal    Specimens: None     Findings:     Recurrent 50% stenosis proximal to cephalic arch stent construct: 8mm Conquest and 8mm Lutonix DCB, no residual stenosis post intervention  Recurrent 25% in-stent stenosis at central margin of cephalic stent construct: 8mm Conquest and 8mm Lutonix DCB, no residual stenosis post intervention  The anastomosis is patent on US and reflux fistulagram    Cannulation zone irregularity without stenosis  Pulsatility still present at completion, slightly improved  This may be due to the fact that at this point in the maturity of the fistula, the existing cephalic arch stents are relatively undersized  Complications: None immediate      Anesthesia: conscious sedation

## 2022-12-28 NOTE — H&P
Interventional Radiology  History and Physical 12/28/2022     Paddy Trevin   1965   [id number]    Assessment/Plan:    61year old male with a left upper arm AVF, presenting with prolonged bleeding/high venous pressure  Last intervention in September 8695, where cephalic arch stents were treated with 8mm angioplasty + DCB  On exam, diffuse pulsatility is noted  Plan for fistulagram with possible interventions including angioplasty, possible stent placement  Procedure, risks, benefits and alternatives were discussed with the patient  Consent obtained at bedside  Ht 5' 9" (1 753 m)   Wt 77 1 kg (170 lb)   BMI 25 10 kg/m²         Problem List Items Addressed This Visit    None  Visit Diagnoses     ESRD (end stage renal disease) ([facility] [address] )        Relevant Orders    IR AV fistulagram/graftogram             Subjective: Normal state of health, no acute complaints  Patient ID: Tavia Carvajal is a 61 y o  male  History of Present Illness  See A/P above  Review of Systems   Respiratory: Negative  Cardiovascular: Negative  Past Medical History:   Diagnosis Date   • Cataract    • Diabetes mellitus ([facility] [address] )    • Dizziness     occ   • GERD (gastroesophageal reflux disease)    • Headache     occ   • Hyperlipidemia    • Legally blind    • LVH (left ventricular hypertrophy)    • Preferred language is [place]     understands some English   • Use of cane as ambulatory aid         Past Surgical History:   Procedure Laterality Date   • INGUINAL HERNIA REPAIR Right    • IR AV FISTULAGRAM/GRAFTOGRAM  4/30/2019   • IR AV FISTULAGRAM/GRAFTOGRAM  6/14/2019   • IR AV FISTULAGRAM/GRAFTOGRAM  6/24/2020   • IR AV FISTULAGRAM/GRAFTOGRAM  2/26/2021   • IR AV FISTULAGRAM/GRAFTOGRAM  9/3/2021   • IR TUNNELED DIALYSIS CATHETER PLACEMENT  4/3/2019   • CO ARTERIOVENOUS ANASTOMOSIS OPEN DIRECT Left 1/23/2019    Procedure: CREATION FISTULA ARTERIOVENOUS (AV);   Surgeon: Giovanni Barnes MD; Location: [facility];  Service: Vascular        Social History     Tobacco Use   Smoking Status Former   • Packs/day: 0 25   • Types: Cigarettes   • Quit date: 2018   • Years since quittin 9   Smokeless Tobacco Never        Social History     Substance and Sexual Activity   Alcohol Use Not Currently        Social History     Substance and Sexual Activity   Drug Use No        Allergies   Allergen Reactions   • No Known Allergies        Current Outpatient Medications   Medication Sig Dispense Refill   • aspirin (Aspirin Low Dose) 81 mg EC tablet Take 1 tablet (81 mg total) by mouth daily 90 tablet 1   • atorvastatin (LIPITOR) 40 mg tablet Take 1 tablet (40 mg total) by mouth daily 90 tablet 1   • calcium acetate (PHOSLO) capsule Take 667 mg by mouth 3 (three) times a day with meals     • doxazosin (CARDURA) 4 mg tablet Take 1 tablet (4 mg total) by mouth daily at bedtime 30 tablet 0   • furosemide (LASIX) 80 mg tablet Take 1 tablet (80 mg total) by mouth daily 90 tablet 1   • insulin aspart (NovoLOG) 100 Units/mL injection pen 10U with breakfast, 10 U with dinner     • Insulin Glargine Solostar (Basaglar KwikPen) 100 UNIT/ML SOPN Inject 0 28 mL (28 Units total) under the skin daily 15 mL 2   • isoniazid (NYDRAZID) 300 mg tablet Take 1 tablet (300 mg total) by mouth daily 30 tablet 8   • labetalol (NORMODYNE) 200 mg tablet Take 2 tablets (400 mg total) by mouth every 12 (twelve) hours 120 tablet 0   • NIFEdipine (ADALAT CC) 90 mg 24 hr tablet Take 1 tablet (90 mg total) by mouth daily 90 tablet 0   • benzonatate (TESSALON PERLES) 100 mg capsule Take 1 capsule (100 mg total) by mouth 3 (three) times a day as needed for cough 30 capsule 1   • Blood Glucose Monitoring Suppl (ONE TOUCH ULTRA 2) w/Device KIT by Does not apply route 3 (three) times a day 1 each 0   • brimonidine tartrate 0 2 % ophthalmic solution Administer 1 drop into the left eye 2 (two) times a day     • Carboxymethylcellul-Glycerin (CLEAR EYES FOR DRY EYES OP) Apply 2 drops to eye 2 (two) times a day     • Continuous Blood Gluc  (FreeStyle Jessica 14 Day [place]) LAUREN Use 1 each daily 1 each 0   • dorzolamide (TRUSOPT) 2 % ophthalmic solution instill 1 drop into left eye three times a day     • Durezol 0 05 % EMUL instill 1 drop TO OPERATIVE EYE twice a day for 2 weeks STARTING      (REFER TO PRESCRIPTION NOTES)  • erythromycin (ILOTYCIN) ophthalmic ointment APPLY A SMALL AMOUNT into right eye three times a day     • Lancets (freestyle) lancets Use as instructed 100 each 1   • methocarbamol (ROBAXIN) 500 mg tablet Take 1 tablet (500 mg total) by mouth 2 (two) times a day as needed for muscle spasms 30 tablet 0   • polyethylene glycol (MIRALAX) 17 g packet Take 17 g by mouth daily 30 each 1   • prednisoLONE acetate (PRED FORTE) 1 % ophthalmic suspension Administer 1 drop into the left eye 2 (two) times a day   0     No current facility-administered medications for this encounter  Objective:    Vitals:    12/28/22 1154   Weight: 77 1 kg (170 lb)   Height: 5' 9" (1 753 m)        Physical Exam  Cardiovascular:      Rate and Rhythm: Normal rate and regular rhythm  Pulmonary:      Effort: Pulmonary effort is normal            No results found for: BNP   Lab Results   Component Value Date    WBC 5 90 02/11/2022    HGB 9 7 (L) 02/11/2022    HCT 31 4 (L) 02/11/2022    MCV 91 02/11/2022     02/11/2022     Lab Results   Component Value Date    INR 1 05 02/07/2022    INR 1 00 01/08/2022    INR 1 12 05/03/2021    PROTIME 13 4 02/07/2022    PROTIME 13 0 01/08/2022    PROTIME 14 4 05/03/2021     Lab Results   Component Value Date    PTT 39 (H) 02/07/2022         I have personally reviewed pertinent imaging and laboratory results  Code Status: Prior  Advance Directive and Living Will:      Power of :    POLST:      This text is generated with voice recognition software  There may be translation, syntax,  or grammatical errors   If you have any questions, please contact the dictating provider

## 2023-01-01 NOTE — ASSESSMENT & PLAN NOTE
· Secondary to volume overload, ESRD, some component of medical/dietary noncompliance  · S/p labetalol x 2 in ER  · On home labetalol 300 mg BID  · Continue doxazosin 4 mg at bedtime, nifedipine 90 mg daily  · Nitroglycerin gtt for goal BP < 180, currently at [phone]

## 2023-01-03 DIAGNOSIS — I10 HYPERTENSION: ICD-10-CM

## 2023-01-03 DIAGNOSIS — I10 ESSENTIAL HYPERTENSION: ICD-10-CM

## 2023-01-03 RX ORDER — NIFEDIPINE 90 MG/1
90 TABLET, FILM COATED, EXTENDED RELEASE ORAL DAILY
Qty: 90 TABLET | Refills: 0 | Status: SHIPPED | OUTPATIENT
Start: 2023-01-03

## 2023-01-03 RX ORDER — DOXAZOSIN 2 MG/1
2 TABLET ORAL 2 TIMES DAILY
Qty: 180 TABLET | Refills: 4 | Status: SHIPPED | OUTPATIENT
Start: 2023-01-03

## 2023-01-20 ENCOUNTER — RA CDI HCC (OUTPATIENT)
Dept: OTHER | Facility: HOSPITAL | Age: 58
End: 2023-01-20

## 2023-01-20 NOTE — PROGRESS NOTES
Destinee [facility]  coding opportunities          Chart Reviewed number of suggestions sent to Provider: 5     Patients Insurance     Medicare Insurance: Medicare        [id number]  [id number]  [id number]  [id number]  [id number]

## 2023-01-26 DIAGNOSIS — I10 HYPERTENSION: ICD-10-CM

## 2023-01-26 DIAGNOSIS — N25.81 SECONDARY HYPERPARATHYROIDISM OF RENAL ORIGIN (HCC): Primary | ICD-10-CM

## 2023-01-26 RX ORDER — DOXAZOSIN 2 MG/1
2 TABLET ORAL
Qty: 180 TABLET | Refills: 4
Start: 2023-01-26

## 2023-01-26 RX ORDER — CINACALCET 30 MG/1
30 TABLET, FILM COATED ORAL DAILY
Qty: 90 TABLET | Refills: 4
Start: 2023-01-26 | End: 2023-02-10

## 2023-02-20 ENCOUNTER — APPOINTMENT (OUTPATIENT)
Dept: LAB | Facility: HOSPITAL | Age: 58
End: 2023-02-20

## 2023-02-20 ENCOUNTER — OFFICE VISIT (OUTPATIENT)
Dept: INTERNAL MEDICINE CLINIC | Facility: CLINIC | Age: 58
End: 2023-02-20

## 2023-02-20 VITALS
TEMPERATURE: 97.1 F | HEIGHT: 69 IN | SYSTOLIC BLOOD PRESSURE: 150 MMHG | OXYGEN SATURATION: 96 % | DIASTOLIC BLOOD PRESSURE: 66 MMHG | WEIGHT: 171 LBS | RESPIRATION RATE: 18 BRPM | HEART RATE: 78 BPM | BODY MASS INDEX: 25.33 KG/M2

## 2023-02-20 DIAGNOSIS — E78.5 HYPERLIPIDEMIA, UNSPECIFIED HYPERLIPIDEMIA TYPE: ICD-10-CM

## 2023-02-20 DIAGNOSIS — I50.30 DIASTOLIC HEART FAILURE, UNSPECIFIED HF CHRONICITY (HCC): ICD-10-CM

## 2023-02-20 DIAGNOSIS — Z99.2 ESRD ON DIALYSIS (HCC): ICD-10-CM

## 2023-02-20 DIAGNOSIS — E11.29 TYPE 2 DIABETES MELLITUS WITH OTHER DIABETIC KIDNEY COMPLICATION, WITH LONG-TERM CURRENT USE OF INSULIN (HCC): Primary | ICD-10-CM

## 2023-02-20 DIAGNOSIS — Z79.4 TYPE 2 DIABETES MELLITUS WITH OTHER DIABETIC KIDNEY COMPLICATION, WITH LONG-TERM CURRENT USE OF INSULIN (HCC): Primary | ICD-10-CM

## 2023-02-20 DIAGNOSIS — D70.9 NEUTROPENIA, UNSPECIFIED TYPE (HCC): ICD-10-CM

## 2023-02-20 DIAGNOSIS — N18.6 BENIGN HYPERTENSION WITH ESRD (END-STAGE RENAL DISEASE) (HCC): ICD-10-CM

## 2023-02-20 DIAGNOSIS — I73.9 PERIPHERAL ARTERIAL DISEASE (HCC): ICD-10-CM

## 2023-02-20 DIAGNOSIS — Z12.5 SCREENING FOR PROSTATE CANCER: ICD-10-CM

## 2023-02-20 DIAGNOSIS — I12.0 BENIGN HYPERTENSION WITH ESRD (END-STAGE RENAL DISEASE) (HCC): ICD-10-CM

## 2023-02-20 DIAGNOSIS — R33.9 URINARY RETENTION: ICD-10-CM

## 2023-02-20 DIAGNOSIS — N18.6 ESRD ON DIALYSIS (HCC): ICD-10-CM

## 2023-02-20 DIAGNOSIS — R91.1 PULMONARY NODULE: ICD-10-CM

## 2023-02-20 DIAGNOSIS — L97.522 SKIN ULCER OF TOE OF LEFT FOOT WITH FAT LAYER EXPOSED (HCC): ICD-10-CM

## 2023-02-20 DIAGNOSIS — N25.81 SECONDARY HYPERPARATHYROIDISM OF RENAL ORIGIN (HCC): ICD-10-CM

## 2023-02-20 LAB — PSA SERPL-MCNC: 0.5 NG/ML (ref 0–4)

## 2023-02-20 NOTE — ASSESSMENT & PLAN NOTE
CT chest completed October 2022 showed unchanged benign-appearing right upper lobe nodular opacity likely representing focus of linear scarring    This will need to be followed annually

## 2023-02-20 NOTE — ASSESSMENT & PLAN NOTE
- follows with Nephrology    Goes to HD at [facility]  -currently undergoing transplant evaluation

## 2023-02-20 NOTE — ASSESSMENT & PLAN NOTE
Arterial duplex completed April 2022 showed 50-75% stenosis of the mid/distal left SFA  KARL normal at 1 20, no occlusive disease in the right lower limb  -Podiatry discussed duplex findings with patient    Vascular referral deferred  -Does not Dors any symptoms of claudication today

## 2023-02-20 NOTE — ASSESSMENT & PLAN NOTE
HbA1c:  6 3 February 2023, 6 1 April 2022  Current medications:  glargine 28 units daily, NovoLog 10 units with breakfast, 10 units with dinner  Statin and ACE-inhibitor:  atorvastatin  Foot exam:  Due, follows with Podiatry  Eye Exam:  Follows with Dr Toyin Villela, he is legally blind    A1c remains at goal   Continue current regimen

## 2023-02-20 NOTE — ASSESSMENT & PLAN NOTE
Wt Readings from Last 3 Encounters:   02/20/23 77 6 kg (171 lb)   12/28/22 77 1 kg (170 lb)   09/21/22 77 2 kg (170 lb 3 2 oz)     Weight is stable  Patient appears euvolemic today    He does still produce some urine at home    Lasix 80 mg daily  2D echo completed February 2022 EF 76%, normal diastolic function moderately increased pulmonary artery pressure,

## 2023-02-20 NOTE — ASSESSMENT & PLAN NOTE
Patient reports intermittent issues with urinary retention with sensation of incomplete emptying  for the past 7 months or so  He does have history of ESRD but does still produce some urine at home and is on daily Lasix  Not aware of any hematuria, does not endorse any flank pain today    We will check urinalysis, PSA, ultrasound of the kidneys and bladder with postvoid residual   Refer to urology  Continue with Cardura at current dose for now    Could consider eventual dose increase

## 2023-02-20 NOTE — PROGRESS NOTES
INTERNAL MEDICINE OFFICE VISIT  [facility] Medicine- [place]    NAME: Paddy Zhong  AGE: 61 y o  SEX: male    DATE OF ENCOUNTER: 2/20/2023    Assessment and Plan/History of Present Illness     Here today for follow-up  Medical history of insulin-dependent type 2 diabetes complicated by retinopathy and neuropathy, ESRD on HD, legal blindness, chronic constipation, former smoking, latent TB, pulmonary nodule    1  Type 2 diabetes mellitus with other diabetic kidney complication, with long-term current use of insulin [facility])  Assessment & Plan:  HbA1c:  6 3 February 2023, 6 1 April 2022  Current medications:  glargine 28 units daily, NovoLog 10 units with breakfast, 10 units with dinner  Statin and ACE-inhibitor:  atorvastatin  Foot exam:  Due, follows with Podiatry  Eye Exam:  Follows with Dr Gypsy Tierney, he is legally blind    A1c remains at goal   Continue current regimen      2  Benign hypertension with ESRD (end-stage renal disease) ([facility] [address] )  Assessment & Plan:  Slightly elevated systolic pressure today  Continue current regimen      3  ESRD on dialysis [facility])  Assessment & Plan:  - follows with Nephrology  Goes to HD at [facility]  -currently undergoing transplant evaluation         4  Hyperlipidemia, unspecified hyperlipidemia type  Assessment & Plan:  Continue statin      5  Pulmonary nodule  Assessment & Plan:  CT chest completed October 2022 showed unchanged benign-appearing right upper lobe nodular opacity likely representing focus of linear scarring  This will need to be followed annually      6  Screening for prostate cancer  -     PSA, Total Screen; Future    7  Urinary retention  Assessment & Plan:  Patient reports intermittent issues with urinary retention with sensation of incomplete emptying  for the past 7 months or so  He does have history of ESRD but does still produce some urine at home and is on daily Lasix    Not aware of any hematuria, does not endorse any flank pain today    We will check urinalysis, PSA, ultrasound of the kidneys and bladder with postvoid residual   Refer to urology  Continue with Cardura at current dose for now  Could consider eventual dose increase    Orders:  -     Urinalysis with microscopic  -     US kidney and bladder with pvr; Future; Expected date: 02/20/2023  -     Ambulatory Referral to Urology; Future    8  Skin ulcer of toe of left foot with fat layer exposed ([facility] )  Assessment & Plan: We will readdress at next appointment      9  Secondary hyperparathyroidism of renal origin [facility])  Assessment & Plan:  Check PTH, vitamin D, calcium with next of labs      10  Neutropenia, unspecified type ([facility] [address] )    11  Diastolic heart failure, unspecified HF chronicity ([facility])  Assessment & Plan:  Wt Readings from Last 3 Encounters:   02/20/23 77 6 kg (171 lb)   12/28/22 77 1 kg (170 lb)   09/21/22 77 2 kg (170 lb 3 2 oz)     Weight is stable  Patient appears euvolemic today  He does still produce some urine at home    Lasix 80 mg daily  2D echo completed February 2022 EF 72%, normal diastolic function moderately increased pulmonary artery pressure,         12  Peripheral arterial disease ([facility] [address] )  Assessment & Plan:  Arterial duplex completed April 2022 showed 50-75% stenosis of the mid/distal left SFA  KARL normal at 1 20, no occlusive disease in the right lower limb  -Podiatry discussed duplex findings with patient  Vascular referral deferred  -Does not Dors any symptoms of claudication today                BMI Counseling: Body mass index is 25 25 kg/m²  The BMI is above normal  Nutrition recommendations include decreasing portion sizes, encouraging healthy choices of fruits and vegetables, moderation in carbohydrate intake and increasing intake of lean protein  Exercise recommendations include moderate physical activity 150 minutes/week  Rationale for BMI follow-up plan is due to patient being overweight or obese            Orders Placed This Encounter   Procedures   • US kidney and bladder with pvr   • PSA, Total Screen   • Urinalysis with microscopic   • Ambulatory Referral to Urology       Chief Complaint     Chief Complaint   Patient presents with   • Follow-up     4 month        Review of Systems     10 point ROS negative except per HPI    The following portions of the patient's history were reviewed and updated as appropriate: allergies, current medications, past family history, past medical history, past social history, past surgical history and problem list     Active Problem List     Patient Active Problem List   Diagnosis   • Type 2 diabetes mellitus, with long-term current use of insulin ([place] [address] )   • Diabetic retinopathy of both eyes associated with type 2 diabetes mellitus ([facility] )   • LVH (left ventricular hypertrophy)   • Hyperlipidemia   • Chronic constipation   • Screening for colon cancer   • ESRD on dialysis ([facility] )   • S/P arteriovenous (AV) fistula creation   • Azotemia   • Hyperphosphatemia   • Benign hypertension with ESRD (end-stage renal disease) ([facility] )   • Slow transit constipation   • Onychomycosis   • Diabetic polyneuropathy associated with type 2 diabetes mellitus ([facility] )   • Positive QuantiFERON-TB Gold test   • TB lung, latent   • Long term (current) use of antibiotics   • Abnormal EKG   • Pulmonary nodule   • Adenomatous polyp of colon   • Visual disorder   • Anemia of chronic disease   • Diastolic heart failure ([facility] )   • Hypocalcemia   • Peripheral arterial disease ([facility] )   • Skin ulcer of toe of left foot with fat layer exposed ([facility] )   • Secondary hyperparathyroidism of renal origin ([facility] )   • Urinary retention       Objective     /66   Pulse 78   Temp (!) 97 1 °F (36 2 °C)   Resp 18   Ht 5' 9" (1 753 m)   Wt 77 6 kg (171 lb)   SpO2 96%   BMI 25 25 kg/m²     Physical Exam  Constitutional:       Appearance: Normal appearance  He is not ill-appearing  HENT:      Head: Normocephalic and atraumatic  Eyes:      General: No scleral icterus          Right eye: No discharge  Left eye: No discharge  Cardiovascular:      Rate and Rhythm: Normal rate and regular rhythm  Heart sounds: No murmur heard  No friction rub  Pulmonary:      Effort: Pulmonary effort is normal       Breath sounds: Normal breath sounds  No wheezing or rales  Abdominal:      General: Abdomen is flat  There is no distension  Palpations: Abdomen is soft  Tenderness: There is no abdominal tenderness  Musculoskeletal:         General: No swelling or tenderness  Skin:     General: Skin is warm and dry  Findings: No erythema  Neurological:      Mental Status: He is alert  Mental status is at baseline  Motor: No weakness  Psychiatric:         Mood and Affect: Mood normal          Behavior: Behavior normal          Pertinent Laboratory/Diagnostic Studies:  IR AV fistulagram/graftogram    Result Date: 1/3/2023  Impression: Dialysis fistulagram and angioplasty   Workstation performed: [id number]       Images and diagnostics reviewed     Current Medications     Current Outpatient Medications:   •  aspirin (Aspirin Low Dose) 81 mg EC tablet, Take 1 tablet (81 mg total) by mouth daily, Disp: 90 tablet, Rfl: 1  •  atorvastatin (LIPITOR) 40 mg tablet, Take 1 tablet (40 mg total) by mouth daily, Disp: 90 tablet, Rfl: 1  •  Blood Glucose Monitoring Suppl (ONE TOUCH ULTRA 2) w/Device KIT, by Does not apply route 3 (three) times a day, Disp: 1 each, Rfl: 0  •  brimonidine tartrate 0 2 % ophthalmic solution, Administer 1 drop into the left eye 2 (two) times a day, Disp: , Rfl:   •  Carboxymethylcellul-Glycerin (CLEAR EYES FOR DRY EYES OP), Apply 2 drops to eye 2 (two) times a day, Disp: , Rfl:   •  Continuous Blood Gluc  (FreeStyle Jessica 14 Day Vidalia) LAUREN, Use 1 each daily, Disp: 1 each, Rfl: 0  •  dorzolamide (TRUSOPT) 2 % ophthalmic solution, instill 1 drop into left eye three times a day, Disp: , Rfl:   •  doxazosin (CARDURA) 2 mg tablet, Take 1 tablet (2 mg total) by mouth daily at bedtime, Disp: 180 tablet, Rfl: 4  •  furosemide (LASIX) 80 mg tablet, Take 1 tablet (80 mg total) by mouth daily, Disp: 90 tablet, Rfl: 1  •  insulin aspart (NovoLOG) 100 Units/mL injection pen, 10U with breakfast, 10 U with dinner, Disp: , Rfl:   •  Insulin Glargine Solostar (Basaglar KwikPen) 100 UNIT/ML SOPN, Inject 0 28 mL (28 Units total) under the skin daily, Disp: 15 mL, Rfl: 2  •  labetalol (NORMODYNE) 200 mg tablet, Take 2 tablets (400 mg total) by mouth every 12 (twelve) hours, Disp: 120 tablet, Rfl: 0  •  Lancets (freestyle) lancets, Use as instructed, Disp: 100 each, Rfl: 1  •  methocarbamol (ROBAXIN) 500 mg tablet, Take 1 tablet (500 mg total) by mouth 2 (two) times a day as needed for muscle spasms, Disp: 30 tablet, Rfl: 0  •  polyethylene glycol (MIRALAX) 17 g packet, Take 17 g by mouth daily, Disp: 30 each, Rfl: 1  •  isoniazid (NYDRAZID) 300 mg tablet, Take 1 tablet (300 mg total) by mouth daily, Disp: 30 tablet, Rfl: 8  •  NIFEdipine (ADALAT CC) 90 mg 24 hr tablet, Take 1 tablet (90 mg total) by mouth daily (Patient not taking: Reported on 2/20/2023), Disp: 90 tablet, Rfl: 0    Health Maintenance     Health Maintenance   Topic Date Due   • PT PLAN OF CARE  05/25/2019   • COVID-19 Vaccine (3 - Booster for Pfizer series) 06/17/2021   • Diabetic Foot Exam  10/01/2022   • HEMOGLOBIN A1C  10/20/2022   • Depression Screening  07/22/2023   • DM Eye Exam  03/14/2023 (Originally 4/7/1975)   • Medicare Annual Wellness Visit (AWV)  07/22/2023   • BMI: Followup Plan  02/20/2024   • BMI: Adult  02/20/2024   • Colorectal Cancer Screening  12/25/2031   • HIV Screening  Completed   • Hepatitis C Screening  Completed   • Pneumococcal Vaccine: Pediatrics (0 to 5 Years) and At-Risk Patients (6 to 59 Years)  Completed   • Influenza Vaccine  Completed   • HIB Vaccine  Aged Out   • IPV Vaccine  Aged Out   • Hepatitis A Vaccine  Aged Out   • Meningococcal ACWY Vaccine  Aged Out   • HPV Vaccine  Aged Out     Immunization History   Administered Date(s) Administered   • COVID-19 PFIZER VACCINE 0 3 ML IM 04/01/2021, 04/22/2021   • Hep B, adult 04/23/2019, 04/23/2019, 06/22/2019, 06/22/2019, 08/22/2019, 08/22/2019, 11/23/2019, 11/23/2019   • INFLUENZA 11/11/2009, 11/11/2009, 10/06/2014, 10/06/2014, 08/04/2017, 08/04/2017, 10/01/2018, 01/02/2019, 10/17/2019, 09/18/2020, 09/18/2020, 09/18/2022   • Influenza Quadrivalent 3 years and older 10/01/2018, 10/01/2018, 10/17/2019, 10/17/2019, 10/21/2021   • Influenza, recombinant, quadrivalent,injectable, preservative free 01/02/2019, 09/09/2019, 09/16/2020   • Pneumococcal Conjugate Vaccine 20-valent (Pcv20), Polysace 07/22/2022   • Pneumococcal Polysaccharide PPV23 11/11/2009, 11/11/2009, 06/14/2018, 06/14/2018   • Tuberculin Skin Test-PPD Intradermal 04/09/2019, 04/09/2019, 01/09/2020, 01/09/2020, 01/12/2021, 01/12/2021, 01/18/2022       MONTSE Montaño  Internal Medicine - [place] [address] [address], [facility] [address]  [facility], [address]   Office: [phone]  Fax: [phone]

## 2023-02-20 NOTE — PATIENT INSTRUCTIONS
For your phlegm production you can try several over-the-counter remedies  Would suggest trying combination of daily nasal spray such as Flonase in combination with an antihistamine such as Claritin, Zyrtec, Allegra    If this is not effective, you could also try a dose of antacid at bedtime, such as Pepcid

## 2023-02-21 ENCOUNTER — TELEPHONE (OUTPATIENT)
Dept: UROLOGY | Facility: MEDICAL CENTER | Age: 58
End: 2023-02-21

## 2023-02-21 NOTE — TELEPHONE ENCOUNTER
Please Triage  New Patient    What is the reason for the patient’s appointment? Urinary retention  What office location does the patient prefer? [facility]  Imaging/Lab Results:    Do we accept the patient's insurance or is the patient Self-Pay? Insurance Provider: medicare   Plan Type/Number:  Member ID#: Has the patient had any previous Urologist(s)? No   Have patient records been requested? If not are records showing in Epic:     Has the patient had any outside testing done?in epic     Does the patient have a personal history of cancer?  No

## 2023-02-23 ENCOUNTER — APPOINTMENT (OUTPATIENT)
Dept: LAB | Facility: HOSPITAL | Age: 58
End: 2023-02-23

## 2023-02-23 DIAGNOSIS — R82.81 BACTERIURIA WITH PYURIA: ICD-10-CM

## 2023-02-23 DIAGNOSIS — R82.71 BACTERIURIA WITH PYURIA: ICD-10-CM

## 2023-02-23 DIAGNOSIS — R82.81 PYURIA: Primary | ICD-10-CM

## 2023-02-23 LAB
AMORPH PHOS CRY URNS QL MICRO: ABNORMAL /HPF
BACTERIA UR QL AUTO: ABNORMAL /HPF
BILIRUB UR QL STRIP: NEGATIVE
CLARITY UR: ABNORMAL
COLOR UR: YELLOW
GLUCOSE UR STRIP-MCNC: ABNORMAL MG/DL
HGB UR QL STRIP.AUTO: ABNORMAL
KETONES UR STRIP-MCNC: NEGATIVE MG/DL
LEUKOCYTE ESTERASE UR QL STRIP: ABNORMAL
MUCOUS THREADS UR QL AUTO: ABNORMAL
NITRITE UR QL STRIP: NEGATIVE
NON-SQ EPI CELLS URNS QL MICRO: ABNORMAL /HPF
PH UR STRIP.AUTO: 8.5 [PH]
PROT UR STRIP-MCNC: ABNORMAL MG/DL
RBC #/AREA URNS AUTO: ABNORMAL /HPF
SP GR UR STRIP.AUTO: 1.01 (ref 1–1.03)
UROBILINOGEN UR QL STRIP.AUTO: 0.2 E.U./DL
WBC #/AREA URNS AUTO: ABNORMAL /HPF

## 2023-02-23 RX ORDER — CEFPODOXIME PROXETIL 200 MG/1
200 TABLET, FILM COATED ORAL DAILY
Qty: 7 TABLET | Refills: 0 | Status: SHIPPED | OUTPATIENT
Start: 2023-02-23 | End: 2023-03-02

## 2023-02-24 ENCOUNTER — HOSPITAL ENCOUNTER (OUTPATIENT)
Dept: ULTRASOUND IMAGING | Facility: HOSPITAL | Age: 58
Discharge: HOME | End: 2023-02-24
Attending: INTERNAL MEDICINE

## 2023-02-24 ENCOUNTER — DOCUMENTATION (OUTPATIENT)
Dept: NEPHROLOGY | Facility: CLINIC | Age: 58
End: 2023-02-24

## 2023-02-24 DIAGNOSIS — I10 ESSENTIAL HYPERTENSION: ICD-10-CM

## 2023-02-24 DIAGNOSIS — R33.9 URINARY RETENTION: ICD-10-CM

## 2023-02-24 RX ORDER — NIFEDIPINE 90 MG/1
90 TABLET, FILM COATED, EXTENDED RELEASE ORAL DAILY
Qty: 90 TABLET | Refills: 1 | Status: SHIPPED | OUTPATIENT
Start: 2023-02-24

## 2023-02-24 RX ORDER — NIFEDIPINE 90 MG/1
90 TABLET, FILM COATED, EXTENDED RELEASE ORAL DAILY
Qty: 90 TABLET | Refills: 0 | Status: SHIPPED | OUTPATIENT
Start: 2023-02-24 | End: 2023-02-24 | Stop reason: SDUPTHER

## 2023-02-27 ENCOUNTER — TELEPHONE (OUTPATIENT)
Dept: INTERNAL MEDICINE CLINIC | Facility: CLINIC | Age: 58
End: 2023-02-27

## 2023-02-27 NOTE — TELEPHONE ENCOUNTER
----- [facility] sent at 2/23/2023  6:35 PM EST -----  PSA normal

## 2023-02-27 NOTE — TELEPHONE ENCOUNTER
----- [facility] sent at 2/23/2023  6:41 PM EST -----  Urinalysis shows presence of white blood cells, bacteria  Would treat for potential urinary tract infection with 7 day course of antibiotic  Prescription for cefpodoxime sent to pharmacy    Patient has follow-up with urology arranged and should keep that appointment

## 2023-03-02 ENCOUNTER — TELEPHONE (OUTPATIENT)
Dept: INTERNAL MEDICINE CLINIC | Facility: CLINIC | Age: 58
End: 2023-03-02

## 2023-03-02 DIAGNOSIS — R05.9 COUGH, UNSPECIFIED TYPE: Primary | ICD-10-CM

## 2023-03-02 NOTE — TELEPHONE ENCOUNTER
pt's sister called she is requesting benzonatate 100 mg but I don't see it in pt's chart please advise

## 2023-03-06 RX ORDER — BENZONATATE 100 MG/1
100 CAPSULE ORAL 3 TIMES DAILY PRN
Qty: 20 CAPSULE | Refills: 0 | Status: SHIPPED | OUTPATIENT
Start: 2023-03-06

## 2023-03-07 ENCOUNTER — TELEPHONE (OUTPATIENT)
Dept: INTERNAL MEDICINE CLINIC | Facility: CLINIC | Age: 58
End: 2023-03-07

## 2023-03-07 NOTE — TELEPHONE ENCOUNTER
Per pts sister he is not using any devices like dexcon ect    for diabetes except the free style lakhwinder only

## 2023-03-09 ENCOUNTER — APPOINTMENT (INPATIENT)
Dept: DIALYSIS | Facility: HOSPITAL | Age: 58
End: 2023-03-09

## 2023-03-09 ENCOUNTER — APPOINTMENT (EMERGENCY)
Dept: RADIOLOGY | Facility: HOSPITAL | Age: 58
End: 2023-03-09

## 2023-03-09 ENCOUNTER — HOSPITAL ENCOUNTER (INPATIENT)
Facility: HOSPITAL | Age: 58
LOS: 2 days | Discharge: HOME HEALTH | End: 2023-03-11
Attending: EMERGENCY MEDICINE | Admitting: STUDENT IN AN ORGANIZED HEALTH CARE EDUCATION/TRAINING PROGRAM

## 2023-03-09 DIAGNOSIS — R07.9 CHEST PAIN: ICD-10-CM

## 2023-03-09 DIAGNOSIS — N18.6 ESRD (END STAGE RENAL DISEASE) (HCC): ICD-10-CM

## 2023-03-09 DIAGNOSIS — I16.0 HYPERTENSIVE URGENCY: ICD-10-CM

## 2023-03-09 DIAGNOSIS — R06.00 DYSPNEA: ICD-10-CM

## 2023-03-09 DIAGNOSIS — J81.0 ACUTE PULMONARY EDEMA (HCC): Primary | ICD-10-CM

## 2023-03-09 PROBLEM — J96.01 ACUTE RESPIRATORY FAILURE WITH HYPOXIA (HCC): Status: ACTIVE | Noted: 2023-03-09

## 2023-03-09 PROBLEM — I16.1 HYPERTENSIVE EMERGENCY: Status: ACTIVE | Noted: 2023-03-09

## 2023-03-09 PROBLEM — I50.22 CHRONIC SYSTOLIC HEART FAILURE (HCC): Status: ACTIVE | Noted: 2022-02-08

## 2023-03-09 LAB
2HR DELTA HS TROPONIN: -2 NG/L
4HR DELTA HS TROPONIN: -6 NG/L
ALBUMIN SERPL BCP-MCNC: 3.9 G/DL (ref 3.5–5)
ALP SERPL-CCNC: 57 U/L (ref 34–104)
ALT SERPL W P-5'-P-CCNC: 7 U/L (ref 7–52)
ANION GAP SERPL CALCULATED.3IONS-SCNC: 10 MMOL/L (ref 4–13)
ANION GAP SERPL CALCULATED.3IONS-SCNC: 10 MMOL/L (ref 4–13)
APTT PPP: 38 SECONDS (ref 23–37)
AST SERPL W P-5'-P-CCNC: 9 U/L (ref 13–39)
ATRIAL RATE: 69 BPM
ATRIAL RATE: 73 BPM
ATRIAL RATE: 78 BPM
BASOPHILS # BLD AUTO: 0.04 THOUSANDS/ÂΜL (ref 0–0.1)
BASOPHILS NFR BLD AUTO: 1 % (ref 0–1)
BILIRUB SERPL-MCNC: 0.9 MG/DL (ref 0.2–1)
BNP SERPL-MCNC: 2071 PG/ML (ref 0–100)
BUN SERPL-MCNC: 31 MG/DL (ref 5–25)
BUN SERPL-MCNC: 32 MG/DL (ref 5–25)
CALCIUM SERPL-MCNC: 10.2 MG/DL (ref 8.4–10.2)
CALCIUM SERPL-MCNC: 9.5 MG/DL (ref 8.4–10.2)
CARDIAC TROPONIN I PNL SERPL HS: 26 NG/L
CARDIAC TROPONIN I PNL SERPL HS: 30 NG/L
CARDIAC TROPONIN I PNL SERPL HS: 32 NG/L
CHLORIDE SERPL-SCNC: 98 MMOL/L (ref 96–108)
CHLORIDE SERPL-SCNC: 99 MMOL/L (ref 96–108)
CO2 SERPL-SCNC: 30 MMOL/L (ref 21–32)
CO2 SERPL-SCNC: 33 MMOL/L (ref 21–32)
CREAT SERPL-MCNC: 9.37 MG/DL (ref 0.6–1.3)
CREAT SERPL-MCNC: 9.71 MG/DL (ref 0.6–1.3)
EOSINOPHIL # BLD AUTO: 0.17 THOUSAND/ÂΜL (ref 0–0.61)
EOSINOPHIL NFR BLD AUTO: 4 % (ref 0–6)
ERYTHROCYTE [DISTWIDTH] IN BLOOD BY AUTOMATED COUNT: 14.9 % (ref 11.6–15.1)
ERYTHROCYTE [DISTWIDTH] IN BLOOD BY AUTOMATED COUNT: 15 % (ref 11.6–15.1)
FLUAV RNA RESP QL NAA+PROBE: NEGATIVE
FLUBV RNA RESP QL NAA+PROBE: NEGATIVE
GFR SERPL CREATININE-BSD FRML MDRD: 5 ML/MIN/1.73SQ M
GFR SERPL CREATININE-BSD FRML MDRD: 5 ML/MIN/1.73SQ M
GLUCOSE SERPL-MCNC: 177 MG/DL (ref 65–140)
GLUCOSE SERPL-MCNC: 197 MG/DL (ref 65–140)
GLUCOSE SERPL-MCNC: 202 MG/DL (ref 65–140)
GLUCOSE SERPL-MCNC: 43 MG/DL (ref 65–140)
GLUCOSE SERPL-MCNC: 48 MG/DL (ref 65–140)
GLUCOSE SERPL-MCNC: 87 MG/DL (ref 65–140)
GLUCOSE SERPL-MCNC: 93 MG/DL (ref 65–140)
GLUCOSE SERPL-MCNC: 95 MG/DL (ref 65–140)
HCT VFR BLD AUTO: 26 % (ref 36.5–49.3)
HCT VFR BLD AUTO: 30.2 % (ref 36.5–49.3)
HGB BLD-MCNC: 8.2 G/DL (ref 12–17)
HGB BLD-MCNC: 9.5 G/DL (ref 12–17)
IMM GRANULOCYTES # BLD AUTO: 0.01 THOUSAND/UL (ref 0–0.2)
IMM GRANULOCYTES NFR BLD AUTO: 0 % (ref 0–2)
INR PPP: 1.14 (ref 0.84–1.19)
LYMPHOCYTES # BLD AUTO: 0.93 THOUSANDS/ÂΜL (ref 0.6–4.47)
LYMPHOCYTES NFR BLD AUTO: 19 % (ref 14–44)
MAGNESIUM SERPL-MCNC: 2.5 MG/DL (ref 1.9–2.7)
MCH RBC QN AUTO: 28.4 PG (ref 26.8–34.3)
MCH RBC QN AUTO: 28.6 PG (ref 26.8–34.3)
MCHC RBC AUTO-ENTMCNC: 31.5 G/DL (ref 31.4–37.4)
MCHC RBC AUTO-ENTMCNC: 31.5 G/DL (ref 31.4–37.4)
MCV RBC AUTO: 90 FL (ref 82–98)
MCV RBC AUTO: 91 FL (ref 82–98)
MONOCYTES # BLD AUTO: 0.56 THOUSAND/ÂΜL (ref 0.17–1.22)
MONOCYTES NFR BLD AUTO: 12 % (ref 4–12)
NEUTROPHILS # BLD AUTO: 3.18 THOUSANDS/ÂΜL (ref 1.85–7.62)
NEUTS SEG NFR BLD AUTO: 64 % (ref 43–75)
NRBC BLD AUTO-RTO: 0 /100 WBCS
P AXIS: 81 DEGREES
P AXIS: 82 DEGREES
P AXIS: 86 DEGREES
PHOSPHATE SERPL-MCNC: 3 MG/DL (ref 2.7–4.5)
PLATELET # BLD AUTO: 209 THOUSANDS/UL (ref 149–390)
PLATELET # BLD AUTO: 234 THOUSANDS/UL (ref 149–390)
PMV BLD AUTO: 10.1 FL (ref 8.9–12.7)
PMV BLD AUTO: 9.4 FL (ref 8.9–12.7)
POTASSIUM SERPL-SCNC: 3.8 MMOL/L (ref 3.5–5.3)
POTASSIUM SERPL-SCNC: 3.9 MMOL/L (ref 3.5–5.3)
PR INTERVAL: 142 MS
PR INTERVAL: 166 MS
PROT SERPL-MCNC: 6.6 G/DL (ref 6.4–8.4)
PROTHROMBIN TIME: 14.6 SECONDS (ref 11.6–14.5)
QRS AXIS: 84 DEGREES
QRS AXIS: 90 DEGREES
QRS AXIS: 90 DEGREES
QRSD INTERVAL: 104 MS
QRSD INTERVAL: 96 MS
QRSD INTERVAL: 98 MS
QT INTERVAL: 416 MS
QT INTERVAL: 444 MS
QT INTERVAL: 462 MS
QTC INTERVAL: 470 MS
QTC INTERVAL: 489 MS
QTC INTERVAL: 495 MS
RBC # BLD AUTO: 2.87 MILLION/UL (ref 3.88–5.62)
RBC # BLD AUTO: 3.35 MILLION/UL (ref 3.88–5.62)
RSV RNA RESP QL NAA+PROBE: NEGATIVE
SARS-COV-2 RNA RESP QL NAA+PROBE: NEGATIVE
SODIUM SERPL-SCNC: 139 MMOL/L (ref 135–147)
SODIUM SERPL-SCNC: 141 MMOL/L (ref 135–147)
T WAVE AXIS: -27 DEGREES
T WAVE AXIS: 18 DEGREES
T WAVE AXIS: 2 DEGREES
VENTRICULAR RATE: 69 BPM
VENTRICULAR RATE: 73 BPM
VENTRICULAR RATE: 77 BPM
WBC # BLD AUTO: 4.89 THOUSAND/UL (ref 4.31–10.16)
WBC # BLD AUTO: 9.16 THOUSAND/UL (ref 4.31–10.16)

## 2023-03-09 PROCEDURE — 5A1D70Z PERFORMANCE OF URINARY FILTRATION, INTERMITTENT, LESS THAN 6 HOURS PER DAY: ICD-10-PCS | Performed by: INTERNAL MEDICINE

## 2023-03-09 RX ORDER — INSULIN LISPRO 100 [IU]/ML
1-6 INJECTION, SOLUTION INTRAVENOUS; SUBCUTANEOUS
Status: DISCONTINUED | OUTPATIENT
Start: 2023-03-09 | End: 2023-03-11 | Stop reason: HOSPADM

## 2023-03-09 RX ORDER — NITROGLYCERIN 20 MG/100ML
5-200 INJECTION INTRAVENOUS
Status: DISCONTINUED | OUTPATIENT
Start: 2023-03-09 | End: 2023-03-10

## 2023-03-09 RX ORDER — DOXAZOSIN 2 MG/1
2 TABLET ORAL
Status: DISCONTINUED | OUTPATIENT
Start: 2023-03-09 | End: 2023-03-11 | Stop reason: HOSPADM

## 2023-03-09 RX ORDER — ACETAMINOPHEN 325 MG/1
650 TABLET ORAL EVERY 6 HOURS PRN
Status: DISCONTINUED | OUTPATIENT
Start: 2023-03-09 | End: 2023-03-11 | Stop reason: HOSPADM

## 2023-03-09 RX ORDER — INSULIN LISPRO 100 [IU]/ML
10 INJECTION, SOLUTION INTRAVENOUS; SUBCUTANEOUS
Status: DISCONTINUED | OUTPATIENT
Start: 2023-03-09 | End: 2023-03-11 | Stop reason: HOSPADM

## 2023-03-09 RX ORDER — ASPIRIN 81 MG/1
81 TABLET ORAL DAILY
Status: DISCONTINUED | OUTPATIENT
Start: 2023-03-09 | End: 2023-03-11 | Stop reason: HOSPADM

## 2023-03-09 RX ORDER — NIFEDIPINE 30 MG/1
90 TABLET, EXTENDED RELEASE ORAL DAILY
Status: DISCONTINUED | OUTPATIENT
Start: 2023-03-09 | End: 2023-03-11 | Stop reason: HOSPADM

## 2023-03-09 RX ORDER — INSULIN GLARGINE 100 [IU]/ML
28 INJECTION, SOLUTION SUBCUTANEOUS EVERY MORNING
Status: DISCONTINUED | OUTPATIENT
Start: 2023-03-09 | End: 2023-03-09

## 2023-03-09 RX ORDER — NITROGLYCERIN 20 MG/100ML
100 INJECTION INTRAVENOUS
Status: DISCONTINUED | OUTPATIENT
Start: 2023-03-09 | End: 2023-03-09

## 2023-03-09 RX ORDER — HEPARIN SODIUM 5000 [USP'U]/ML
5000 INJECTION, SOLUTION INTRAVENOUS; SUBCUTANEOUS EVERY 8 HOURS SCHEDULED
Status: DISCONTINUED | OUTPATIENT
Start: 2023-03-09 | End: 2023-03-09

## 2023-03-09 RX ORDER — DEXTROSE MONOHYDRATE 25 G/50ML
50 INJECTION, SOLUTION INTRAVENOUS ONCE
Status: COMPLETED | OUTPATIENT
Start: 2023-03-09 | End: 2023-03-09

## 2023-03-09 RX ORDER — FUROSEMIDE 40 MG/1
80 TABLET ORAL DAILY
Status: DISCONTINUED | OUTPATIENT
Start: 2023-03-09 | End: 2023-03-11 | Stop reason: HOSPADM

## 2023-03-09 RX ORDER — INSULIN GLARGINE 100 [IU]/ML
15 INJECTION, SOLUTION SUBCUTANEOUS EVERY MORNING
Status: DISCONTINUED | OUTPATIENT
Start: 2023-03-10 | End: 2023-03-11 | Stop reason: HOSPADM

## 2023-03-09 RX ORDER — MAGNESIUM HYDROXIDE/ALUMINUM HYDROXICE/SIMETHICONE 120; 1200; 1200 MG/30ML; MG/30ML; MG/30ML
30 SUSPENSION ORAL EVERY 6 HOURS PRN
Status: DISCONTINUED | OUTPATIENT
Start: 2023-03-09 | End: 2023-03-11 | Stop reason: HOSPADM

## 2023-03-09 RX ORDER — ATORVASTATIN CALCIUM 40 MG/1
40 TABLET, FILM COATED ORAL
Status: DISCONTINUED | OUTPATIENT
Start: 2023-03-09 | End: 2023-03-11 | Stop reason: HOSPADM

## 2023-03-09 RX ORDER — HEPARIN SODIUM 5000 [USP'U]/ML
5000 INJECTION, SOLUTION INTRAVENOUS; SUBCUTANEOUS EVERY 8 HOURS SCHEDULED
Status: DISCONTINUED | OUTPATIENT
Start: 2023-03-09 | End: 2023-03-11 | Stop reason: HOSPADM

## 2023-03-09 RX ORDER — ONDANSETRON 2 MG/ML
4 INJECTION INTRAMUSCULAR; INTRAVENOUS EVERY 6 HOURS PRN
Status: DISCONTINUED | OUTPATIENT
Start: 2023-03-09 | End: 2023-03-11 | Stop reason: HOSPADM

## 2023-03-09 RX ORDER — LABETALOL 200 MG/1
400 TABLET, FILM COATED ORAL EVERY 12 HOURS SCHEDULED
Status: DISCONTINUED | OUTPATIENT
Start: 2023-03-09 | End: 2023-03-11 | Stop reason: HOSPADM

## 2023-03-09 RX ORDER — POLYETHYLENE GLYCOL 3350 17 G/17G
17 POWDER, FOR SOLUTION ORAL DAILY
Status: DISCONTINUED | OUTPATIENT
Start: 2023-03-09 | End: 2023-03-11 | Stop reason: HOSPADM

## 2023-03-09 RX ADMIN — DEXTROSE MONOHYDRATE 50 ML: 25 INJECTION, SOLUTION INTRAVENOUS at 04:36

## 2023-03-09 RX ADMIN — NIFEDIPINE 90 MG: 30 TABLET, FILM COATED, EXTENDED RELEASE ORAL at 16:21

## 2023-03-09 RX ADMIN — ACETAMINOPHEN 325MG 650 MG: 325 TABLET ORAL at 17:49

## 2023-03-09 RX ADMIN — ASPIRIN 81 MG: 81 TABLET, COATED ORAL at 16:21

## 2023-03-09 RX ADMIN — INSULIN GLARGINE 15 UNITS: 100 INJECTION, SOLUTION SUBCUTANEOUS at 10:13

## 2023-03-09 RX ADMIN — DOXAZOSIN 2 MG: 2 TABLET ORAL at 21:52

## 2023-03-09 RX ADMIN — EPOETIN ALFA 2400 UNITS: 4000 SOLUTION INTRAVENOUS; SUBCUTANEOUS at 13:46

## 2023-03-09 RX ADMIN — HEPARIN SODIUM 5000 UNITS: 5000 INJECTION INTRAVENOUS; SUBCUTANEOUS at 14:56

## 2023-03-09 RX ADMIN — NITROGLYCERIN 70 MCG/MIN: 20 INJECTION INTRAVENOUS at 12:42

## 2023-03-09 RX ADMIN — NITROGLYCERIN 50 MCG/MIN: 20 INJECTION INTRAVENOUS at 03:50

## 2023-03-09 RX ADMIN — HEPARIN SODIUM 5000 UNITS: 5000 INJECTION INTRAVENOUS; SUBCUTANEOUS at 06:35

## 2023-03-09 RX ADMIN — FUROSEMIDE 80 MG: 40 TABLET ORAL at 16:21

## 2023-03-09 RX ADMIN — HEPARIN SODIUM 5000 UNITS: 5000 INJECTION INTRAVENOUS; SUBCUTANEOUS at 21:52

## 2023-03-09 RX ADMIN — LABETALOL HYDROCHLORIDE 400 MG: 200 TABLET, FILM COATED ORAL at 21:53

## 2023-03-09 RX ADMIN — NITROGLYCERIN 50 MCG/MIN: 20 INJECTION INTRAVENOUS at 22:37

## 2023-03-09 RX ADMIN — ATORVASTATIN CALCIUM 40 MG: 40 TABLET, FILM COATED ORAL at 17:48

## 2023-03-09 RX ADMIN — IRON SUCROSE 50 MG: 20 INJECTION, SOLUTION INTRAVENOUS at 11:51

## 2023-03-09 RX ADMIN — INSULIN LISPRO 1 UNITS: 100 INJECTION, SOLUTION INTRAVENOUS; SUBCUTANEOUS at 07:49

## 2023-03-09 RX ADMIN — INSULIN LISPRO 10 UNITS: 100 INJECTION, SOLUTION INTRAVENOUS; SUBCUTANEOUS at 08:34

## 2023-03-09 RX ADMIN — INSULIN LISPRO 10 UNITS: 100 INJECTION, SOLUTION INTRAVENOUS; SUBCUTANEOUS at 18:34

## 2023-03-09 NOTE — ASSESSMENT & PLAN NOTE
Lab Results   Component Value Date    EGFR 5 03/09/2023    EGFR 8 02/11/2022    EGFR 6 02/10/2022    CREATININE 9 37 (H) 03/09/2023    CREATININE 6 60 (H) 02/11/2022    CREATININE 8 43 (H) 02/10/2022     Follows with outpatient nephrology, ongoing transplant follow-ups  Dialysis TThS, reports compliance with dialysis/medications   Nephrology consulted

## 2023-03-09 NOTE — ED NOTES
Per provider, Nitroglycerin drip to be continued at 100 mcg/min for the time being   Will continue to closely monitor pt's BP     Achilles CURTIS Pena  03/09/23 7701

## 2023-03-09 NOTE — ASSESSMENT & PLAN NOTE
Wt Readings from Last 3 Encounters:   02/20/23 77 6 kg (171 lb)   12/28/22 77 1 kg (170 lb)   09/21/22 77 2 kg (170 lb 3 2 oz)     Chronic systolic heart failure in setting of hypertensive disease, without LE edema, on 2L NC respirations unlabored  Asymptomatic without chest pain, shortness of breath on time of admission     TTE 02/08/2022: LV size normal increased wall thickness, LVEF 58%, wall motion normal, concentric hypertrophy, dilastolic function normal, LA dilation, MV calcification/mild regurg, TV mild regurg, moderately increased pulmonary artery systolic pressures    Plan:  • EDW 75kg, up to ~77 6kg 2 weeks ago, update weight   • BNP 2071, troponin WNL, EKG with findings of LVH seen similar on prior   • Maintain PTA medications as noted above   • Update TTE  • Nephrology consulted to assist with volume management   Monitor daily weight, I/Os, cardiac diet (sodium restriction), monitor K>4, Mg>2

## 2023-03-09 NOTE — ED NOTES
Attempted to call report to S2  No answer  Will try again in few minutes       Jean Monaco RN  03/09/23 2651

## 2023-03-09 NOTE — PROGRESS NOTES
HEMODIALYSIS NOTE    Patient seen and examined during dialysis treatment currently, this is a late entry, his AV fistula is cannulated with blood flow, he blood pressure is improving on nitro drip, continue with UF on dialysis try to remove 3 kg as tolerated, patient reports his breathing is improving  His dry weight will need to be adjusted, will reassess tomorrow in case he will need an extra dialysis treatment

## 2023-03-09 NOTE — ASSESSMENT & PLAN NOTE
Presents with chest pain/SOB x1 day  Hx of ESRD on dialysis TThS denies missing session, denies missing medications  Reports compliance with diet  On admission denies chest pain, shortness of breath, abdominal pain, severe headache, loss of consciousness  Admit VS/Labs:  trending down 200s, on 2LNC, afebrile  CBC without leukocytosis Hgb 9 2, BMP K 3 8, BUN/Cr at baseline   Troponin 32, BNP 2071  Imaging:   • EKG NSR HR 77, LVH with strain similar to prior  • CXR bilateral pulmonary edema, cardiomegaly    Plan:  • Admit to SD2 for hypertensive emergency w/ tele  • Maintain nitroglycerin gtt, BP came down 25% within 1st hour, continue nitro gtt with goal BP no less than 160/100 within the next 5h, hold parameters in place  • Continue PTA antihypertensives: doxazosin 2mg HS, furosemide 80mg daily, labetalol 400mg twice daily, nifedipine 90mg daily  • Titrate off o2 as possible  • Consults: nephrology

## 2023-03-09 NOTE — CONSULTS
[address] 61 y o  male MRN: [id number]  Unit/Bed#: ED-27 Encounter: [id number]    ASSESSMENT and PLAN:  1  ESRD on HD:   · Receives treatment at [facility]   · Treatment days: TTS  · Last treatment appropriately on 3/7  · Target weight 75 5 kg the left treatment on 3/7 at 75 kg, below target  · Consideration for weight loss which may have prompted volume overload if target weight was not adjusted  · Plan for treatment today  · Challenge target weight  HEMODIALYSIS PROCEDURE NOTE  The patient was seen and examined on hemodialysis  Time: Increase time to 4 hours hours  Sodium: 138 Blood flow: 400   Dialyzer: F160 Potassium: 3 Dialysate flow: 500   Access: AV fistula Bicarbonate: 35 Ultrafiltration goal: 3 to 4 L as tolerated   Medications on HD: Epogen and Venofer  We will hold Epogen if blood pressure remains elevated     2  Access:   · Left arm AV fistula  No recent issues  · Good bruit and thrill  3  Hypertension/volume status/chronic systolic CHF:  · From review of outpatient records  Blood pressure variable at the beginning of treatment sometimes high and sometimes rather low  · Recently blood pressure has been acceptable  At last treatment on 3/7 blood pressure was near 263 systolic at the beginning of treatment  · Per outpatient records on labetalol 400 mg twice a day, doxazosin 2 mg in the evening, Lasix 80 mg daily, nifedipine 90 mg at bedtime  Continue current medications and monitor  Volume removal will help with blood pressure also  · Volume status:   · Chest x-ray personally reviewed  Volume overload/vascular congestion noted  · Ejection fraction 58%, concentric hypertrophy, diastolic function normal   Mild MR  Mild TR  Moderately increased PAP  · Blood pressure 263/123 on admission exacerbated by volume overload  · Volume overload: Plan for hemodialysis today  We will challenge as tolerated  4   Anemia:   · Hemoglobin below target: Target 10-11  Current hemoglobin 8 2  May be somewhat dilutional  · For management of chronic anemia on Epogen 2400 units with each hemodialysis treatment  · Weekly Venofer 50 mg  · On the active transplant list   Please avoid transfusion but if needed administer only leukoreduced CMV negative blood  · Plan: Continue Epogen and Venofer  We will hold Epogen today blood pressure remains elevated  5  Mineral and bone disease: Continue outpatient medications, renal diet    SUMMARY OF RECOMMENDATIONS:  • Hemodialysis this morning    HISTORY OF PRESENT ILLNESS:  Requesting Physician: Jennifer Machado MD  Reason for Consult: ESRD on HD    Edwina Gonzales is a 61 y o  male with a past medical history of ESRD on hemodialysis, diabetes mellitus type 2, hypertensive cardiomyopathy, legally blind/diabetic retinopathy, latent TB treated with isoniazid, anemia of chronic disease who was admitted to [facility] after presenting with shortness of breath and chest discomfort  Blood pressure accelerated on admission with evidence of volume overload  At the end of last treatment which was appropriately on 3/7 he was actually below target  Blood pressure was near 988 systolic at the beginning and end of treatment  A renal consultation is requested today for assistance in the management of ESRD  We arranged for immediate hemodialysis  Patient seen on treatment  He is feeling better already and denies shortness of breath  No chest pain at this time  He is lying flat comfortably his only complaint is that he is chilly/cold  Edwina Gonzales is a known ESRD patient who undergoes maintenance hemodialysis at [facility] on TTS      PAST MEDICAL HISTORY:  Past Medical History:   Diagnosis Date   • Cataract    • Dizziness     occ   • GERD (gastroesophageal reflux disease)    • Hyperlipidemia    • Legally blind    • LVH (left ventricular hypertrophy)    • Preferred language is [place]     understands some English   • Use of cane as ambulatory aid        PAST SURGICAL HISTORY:  Past Surgical History:   Procedure Laterality Date   • INGUINAL HERNIA REPAIR Right    • IR AV FISTULAGRAM/GRAFTOGRAM  2019   • IR AV FISTULAGRAM/GRAFTOGRAM  2019   • IR AV FISTULAGRAM/GRAFTOGRAM  2020   • IR AV FISTULAGRAM/GRAFTOGRAM  2021   • IR AV FISTULAGRAM/GRAFTOGRAM  9/3/2021   • IR AV FISTULAGRAM/GRAFTOGRAM  2022   • IR TUNNELED DIALYSIS CATHETER PLACEMENT  4/3/2019   • VA ARTERIOVENOUS ANASTOMOSIS OPEN DIRECT Left 2019    Procedure: CREATION FISTULA ARTERIOVENOUS (AV);   Surgeon: Roger Corbett MD;  Location: [facility];  Service: Vascular       ALLERGIES:  Allergies   Allergen Reactions   • No Known Allergies        SOCIAL HISTORY:  Social History     Substance and Sexual Activity   Alcohol Use Not Currently     Social History     Substance and Sexual Activity   Drug Use No     Social History     Tobacco Use   Smoking Status Former   • Packs/day: 0 25   • Types: Cigarettes   • Quit date: 2018   • Years since quittin 1   Smokeless Tobacco Never       FAMILY HISTORY:  Family History   Problem Relation Age of Onset   • Hypertension Mother    • Diabetes Father    • No Known Problems Sister    • No Known Problems Brother    • No Known Problems Maternal Aunt    • No Known Problems Maternal Uncle    • No Known Problems Paternal Aunt    • No Known Problems Paternal Uncle    • No Known Problems Maternal Grandmother    • No Known Problems Maternal Grandfather    • No Known Problems Paternal Grandmother    • No Known Problems Paternal Grandfather        MEDICATIONS:    Current Facility-Administered Medications:   •  acetaminophen (TYLENOL) tablet 650 mg, 650 mg, Oral, Q6H PRN, Collins Current, PA-C  •  aluminum-magnesium hydroxide-simethicone (MYLANTA) oral suspension 30 mL, 30 mL, Oral, Q6H PRN, Collins Current, PA-C  •  aspirin (ECOTRIN LOW STRENGTH) EC tablet 81 mg, 81 mg, Oral, Daily, Collins Current, PA-C  •  atorvastatin (LIPITOR) tablet 40 mg, 40 mg, Oral, After Dinner, Janell Nugent PA-C  •  doxazosin (CARDURA) tablet 2 mg, 2 mg, Oral, HS, Janell Nugent PA-C  •  furosemide (LASIX) tablet 80 mg, 80 mg, Oral, Daily, Janell Nugent PA-C  •  heparin (porcine) subcutaneous injection 5,000 Units, 5,000 Units, Subcutaneous, Q8H [address], 5,000 Units at 03/09/23 0635 **AND** [CANCELED] Platelet count, , , Once, Janell Nugent PA-C  •  insulin glargine (LANTUS) subcutaneous injection 28 Units 0 28 mL, 28 Units, Subcutaneous, QAM, Janell Nugent PA-C  •  insulin lispro (HumaLOG) 100 units/mL subcutaneous injection 1-6 Units, 1-6 Units, Subcutaneous, 4x Daily (AC & HS), 1 Units at 03/09/23 0749 **AND** Fingerstick Glucose (POCT), , , 4x Daily AC and at bedtime, Janell Nugent PA-C  •  insulin lispro (HumaLOG) 100 units/mL subcutaneous injection 10 Units, 10 Units, Subcutaneous, Daily With Breakfast, Janell Nugent PA-C  •  insulin lispro (HumaLOG) 100 units/mL subcutaneous injection 10 Units, 10 Units, Subcutaneous, Daily With Dinner, Janell Nugent PA-C  •  labetalol (NORMODYNE) tablet 400 mg, 400 mg, Oral, Q12H [address], Janell Nugent PA-C  •  NIFEdipine (PROCARDIA XL) 24 hr tablet 90 mg, 90 mg, Oral, Daily, Janell Nugent PA-C  •  nitroGLYcerin (TRIDIL) 50 mg in 250 mL infusion (premix), 100 mcg/min, Intravenous, Titrated, Janell Nugent PA-C, Last Rate: 30 mL/hr at 03/09/23 0435, 100 mcg/min at 03/09/23 0435  •  ondansetron (ZOFRAN) injection 4 mg, 4 mg, Intravenous, Q6H PRN, Janell Nugent PA-C  •  polyethylene glycol (MIRALAX) packet 17 g, 17 g, Oral, Daily, Janell Nugent PA-C    Current Outpatient Medications:   •  aspirin (Aspirin Low Dose) 81 mg EC tablet, Take 1 tablet (81 mg total) by mouth daily, Disp: 90 tablet, Rfl: 1  •  atorvastatin (LIPITOR) 40 mg tablet, Take 1 tablet (40 mg total) by mouth daily, Disp: 90 tablet, Rfl: 1  •  Blood Glucose Monitoring Suppl (ONE TOUCH ULTRA 2) w/Device KIT, by Does not apply route 3 (three) times a day, Disp: 1 each, Rfl: 0  •  brimonidine tartrate 0 2 % ophthalmic solution, Administer 1 drop into the left eye 2 (two) times a day, Disp: , Rfl:   •  Carboxymethylcellul-Glycerin (CLEAR EYES FOR DRY EYES OP), Apply 2 drops to eye 2 (two) times a day, Disp: , Rfl:   •  dorzolamide (TRUSOPT) 2 % ophthalmic solution, instill 1 drop into left eye three times a day, Disp: , Rfl:   •  doxazosin (CARDURA) 2 mg tablet, Take 1 tablet (2 mg total) by mouth daily at bedtime, Disp: 180 tablet, Rfl: 4  •  furosemide (LASIX) 80 mg tablet, Take 1 tablet (80 mg total) by mouth daily, Disp: 90 tablet, Rfl: 1  •  NIFEdipine (ADALAT CC) 90 mg 24 hr tablet, Take 1 tablet (90 mg total) by mouth daily, Disp: 90 tablet, Rfl: 1  •  benzonatate (TESSALON PERLES) 100 mg capsule, Take 1 capsule (100 mg total) by mouth 3 (three) times a day as needed for cough, Disp: 20 capsule, Rfl: 0  •  Continuous Blood Gluc  (FreeStyle [employer]) LAUREN, Use 1 each daily, Disp: 1 each, Rfl: 0  •  insulin aspart (NovoLOG) 100 Units/mL injection pen, 10U with breakfast, 10 U with dinner, Disp: , Rfl:   •  Insulin Glargine Solostar (Basaglar KwikPen) 100 UNIT/ML SOPN, Inject 0 28 mL (28 Units total) under the skin daily, Disp: 15 mL, Rfl: 2  •  isoniazid (NYDRAZID) 300 mg tablet, Take 1 tablet (300 mg total) by mouth daily, Disp: 30 tablet, Rfl: 8  •  labetalol (NORMODYNE) 200 mg tablet, Take 2 tablets (400 mg total) by mouth every 12 (twelve) hours, Disp: 120 tablet, Rfl: 0  •  Lancets (freestyle) lancets, Use as instructed, Disp: 100 each, Rfl: 1  •  methocarbamol (ROBAXIN) 500 mg tablet, Take 1 tablet (500 mg total) by mouth 2 (two) times a day as needed for muscle spasms, Disp: 30 tablet, Rfl: 0  •  polyethylene glycol (MIRALAX) 17 g packet, Take 17 g by mouth daily, Disp: 30 each, Rfl: 1    REVIEW OF SYSTEMS:  Constitutional: Mild fatigue    No fevers or chills reported  HENT: Negative for postnasal drip  Eyes: Legally blind  Respiratory: Negative for cough, shortness of breath and wheezing  Cardiovascular: Positive for mild chest discomfort  No palpitations and no leg swelling  Gastrointestinal: Negative for abdominal pain, constipation, diarrhea, nausea and vomiting  Genitourinary: No dysuria, hematuria  Musculoskeletal: Negative for unusual arthralgias, back pain and joint swelling  Skin: Negative for rash  Neurological: Negative for focal weakness  Hematological: Negative for bleeding  Psychiatric/Behavioral: Negative for confusion   All the systems were reviewed and were negative except as documented on the HPI  PHYSICAL EXAM:  Current Weight:    First Weight:    Vitals:    03/09/23 0715 03/09/23 0730 03/09/23 0800 03/09/23 0815   BP: (!) 187/100 (!) 201/98 (!) 199/111 (!) 204/121   BP Location:       Pulse: 74 74 76 82   Resp: 17 13 19    Temp:       TempSrc:       SpO2: 98% 98% 99% (!) 87%     No intake or output data in the 24 hours ending 03/09/23 0824  Physical Exam  Constitutional:       General: He is not in acute distress  Appearance: Normal appearance  He is well-developed  He is not toxic-appearing or diaphoretic  HENT:      Head: Normocephalic and atraumatic  Nose: No congestion  Mouth/Throat:      Pharynx: No oropharyngeal exudate  Eyes:      General: No scleral icterus  Right eye: No discharge  Left eye: No discharge  Extraocular Movements: Extraocular movements intact  Neck:      Thyroid: No thyromegaly  Vascular: No carotid bruit or JVD  Trachea: No tracheal deviation  Cardiovascular:      Rate and Rhythm: Normal rate and regular rhythm  Heart sounds: Murmur heard  Systolic murmur is present with a grade of 2/6  No friction rub  No gallop  Pulmonary:      Effort: Pulmonary effort is normal       Comments: Coarse breath sounds  Abdominal:      General: Bowel sounds are normal  There is no distension  Palpations: Abdomen is soft  There is no mass        Tenderness: There is no abdominal tenderness  There is no guarding or rebound  Musculoskeletal:         General: No swelling, tenderness or signs of injury  Normal range of motion  Cervical back: Normal range of motion and neck supple  No rigidity or tenderness  Right lower leg: No edema  Left lower leg: No edema  Skin:     General: Skin is warm and dry  Capillary Refill: Capillary refill takes less than 2 seconds  Coloration: Skin is not jaundiced or pale  Findings: No erythema or rash  Neurological:      Mental Status: He is alert and oriented to person, place, and time  Psychiatric:         Mood and Affect: Mood normal          Behavior: Behavior normal          Thought Content:  Thought content normal          Judgment: Judgment normal            Invasive Devices:      Lab Results:   Results from last 7 days   Lab Units 03/09/23  0547 03/09/23  0346   WBC Thousand/uL 9 16 4 89   HEMOGLOBIN g/dL 8 2* 9 5*   HEMATOCRIT % 26 0* 30 2*   PLATELETS Thousands/uL 209 234   POTASSIUM mmol/L 3 9 3 8   CHLORIDE mmol/L 99 98   CO2 mmol/L 30 33*   BUN mg/dL 32* 31*   CREATININE mg/dL 9 71* 9 37*   CALCIUM mg/dL 9 5 10 2   MAGNESIUM mg/dL  --  2 5   PHOSPHORUS mg/dL  --  3 0   ALK PHOS U/L 57  --    ALT U/L 7  --    AST U/L 9*  --      Lab Results   Component Value Date     7 (H) 03/27/2019    CALCIUM 9 5 03/09/2023    PHOS 3 0 03/09/2023     Other Studies:

## 2023-03-09 NOTE — PLAN OF CARE
Post-Dialysis RN Treatment Note    Blood Pressure:  Pre 170/90 mm/Hg  Post 141/85 mmHg   EDW  75 5 kg    Weight:  Pre 75 7 kg   Post 71 4 kg   Mode of weight measurement: Bed Scale   Volume Removed  4005 ml    Treatment duration 240 minutes    NS given  No    Treatment shortened? No   Medications given during Rx Venofer 50 mg, Epogen 2400 units   Estimated Kt/V  None Reported   Access type: AV fistula   Access Issues: No    Report called to primary nurse   Yes Nandini Wong RN    Patient was on Nitro drip for duration of treatment which was titrated and monitored by the primary RN  Current hemodialysis plan of care is to remove a total of 4500 ml of fluid over a 4 hour treatment for a net of 4 liters as tolerated  Monitor vital signs every 15 minutes while on treatment for patient safety  Utilize a 4 K+ bath for serum potassium of 3 9 to maintain electrolyte balance  Report received from Nandini Wong RN  Plan reviewed with Dr Prashant Moreno at bedside        Problem: METABOLIC, FLUID AND ELECTROLYTES - ADULT  Goal: Electrolytes maintained within normal limits  Description: INTERVENTIONS:  - Monitor labs and assess patient for signs and symptoms of electrolyte imbalances  - Administer electrolyte replacement as ordered  - Monitor response to electrolyte replacements, including repeat lab results as appropriate  - Instruct patient on fluid and nutrition as appropriate  Outcome: Progressing  Goal: Fluid balance maintained  Description: INTERVENTIONS:  - Monitor labs   - Monitor I/O and WT  - Instruct patient on fluid and nutrition as appropriate  - Assess for signs & symptoms of volume excess or deficit  Outcome: Progressing

## 2023-03-09 NOTE — ED NOTES
Attempted to call report to S2  No answer  Will try again in 5 min       Lotus Graham RN  03/09/23 5923

## 2023-03-09 NOTE — H&P
[address] [facility]  H&P- Paddy Trevin 1965, 61 y o  male MRN: [id number]  Unit/Bed#: ED-27 Encounter: [id number]  Primary Care Provider: Devon YORK DO   Date and time admitted to hospital: 3/9/2023  3:26 AM    * Hypertensive emergency  Assessment & Plan  Presents with chest pain/SOB x1 day  Hx of ESRD on dialysis TThS denies missing session, denies missing medications  Reports compliance with diet  On admission denies chest pain, shortness of breath, abdominal pain, severe headache, loss of consciousness  Admit VS/Labs:  trending down 200s, on 2LNC, afebrile  CBC without leukocytosis Hgb 9 2, BMP K 3 8, BUN/Cr at baseline  Troponin 32, BNP 2071  Imaging:   • EKG NSR HR 77, LVH with strain similar to prior  • CXR bilateral pulmonary edema, cardiomegaly    Plan:  • Admit to SD2 for hypertensive emergency w/ tele  • Maintain nitroglycerin gtt, BP came down 25% within 1st hour, continue nitro gtt with goal BP no less than 160/100 within the next 5h, hold parameters in place  • Continue PTA antihypertensives: doxazosin 2mg HS, furosemide 80mg daily, labetalol 400mg twice daily, nifedipine 90mg daily  • Titrate off o2 as possible  • Consults: nephrology    Acute respiratory failure with hypoxia ([address] )  Assessment & Plan  Secondary to pulmonary edema, no oxygen at baseline, monitor and titrate off oxygen as appropriate    Chronic systolic heart failure (HCC)  Assessment & Plan  Wt Readings from Last 3 Encounters:   02/20/23 77 6 kg (171 lb)   12/28/22 77 1 kg (170 lb)   09/21/22 77 2 kg (170 lb 3 2 oz)     Chronic systolic heart failure in setting of hypertensive disease, without LE edema, on 2L NC respirations unlabored  Asymptomatic without chest pain, shortness of breath on time of admission     TTE 02/08/2022: LV size normal increased wall thickness, LVEF 58%, wall motion normal, concentric hypertrophy, dilastolic function normal, LA dilation, MV calcification/mild regurg, TV mild regurg, moderately increased pulmonary artery systolic pressures    Plan:  • EDW 75kg, up to ~77 6kg 2 weeks ago, update weight   • BNP 2071, troponin WNL, EKG with findings of LVH seen similar on prior   • Maintain PTA medications as noted above   • Update TTE  • Nephrology consulted to assist with volume management   Monitor daily weight, I/Os, cardiac diet (sodium restriction), monitor K>4, Mg>2     Anemia of chronic disease  Assessment & Plan  In the setting of ESRD without overt bleeding, Hgb 9 5>8 2, continue to trend    TB lung, latent  Assessment & Plan  S/p 9 month course of isoniazid    ESRD on dialysis [facility])  Assessment & Plan  Lab Results   Component Value Date    EGFR 5 03/09/2023    EGFR 8 02/11/2022    EGFR 6 02/10/2022    CREATININE 9 37 (H) 03/09/2023    CREATININE 6 60 (H) 02/11/2022    CREATININE 8 43 (H) 02/10/2022     Follows with outpatient nephrology, ongoing transplant follow-ups  Dialysis TThS, reports compliance with dialysis/medications  Nephrology consulted        Diabetic retinopathy of both eyes associated with type 2 diabetes mellitus ([facility] [address] )  Assessment & Plan  Legally blind       VTE Pharmacologic Prophylaxis: VTE Score: 3 Moderate Risk (Score 3-4) - Pharmacological DVT Prophylaxis Ordered: heparin  Code Status: Level 1 - Full Code   Discussion with family: update in aM  Anticipated Length of Stay: Patient will be admitted on an inpatient basis with an anticipated length of stay of greater than 2 midnights secondary to hypertension  Total Time Spent on Date of Encounter in care of patient: 65 minutes This time was spent on one or more of the following: performing physical exam; counseling and coordination of care; obtaining or reviewing history; documenting in the medical record; reviewing/ordering tests, medications or procedures; communicating with other healthcare professionals and discussing with patient's family/caregivers      Chief Complaint: chest pain    History of Present Illness:  Mike Hernandez is a 61 y o  male with a PMH of T2DM, ESRD on dialysis TThS, hypertensive cardiomyopathy, legally blind others noted below who presents with chest pain  Patient presents this morning with chest pain and shortness of breath  He was sleeping in bed when he awoke from sleep acutely short of breath with some chest discomfort  EMS found him hypoxic on room air with no oxygen use at baseline  He states that he has had issues with his blood pressure in the past requiring admission  Denies changes in medications, reports compliance  He continues to go to dialysis every TThS and he has not missed an appointment  Denies weight gain  On admission his symptoms were improving he states he feels no pain currently  He is legally blind at baseline visual loss  Denies headache, chest pain/palpitations, abdominal pain, nausea/vomiting/diarrhea, leg swelling  No recent sick contacts  Review of Systems:  Review of Systems   Constitutional: Negative for chills and fever  HENT: Negative for ear pain and sore throat  Eyes: Negative for pain and visual disturbance  Respiratory: Negative for cough and shortness of breath  Cardiovascular: Negative for chest pain and palpitations  Gastrointestinal: Negative for abdominal pain and vomiting  Genitourinary: Negative for dysuria and hematuria  Musculoskeletal: Negative for arthralgias and back pain  Skin: Negative for color change and rash  Neurological: Negative for seizures and syncope  All other systems reviewed and are negative        Past Medical and Surgical History:   Past Medical History:   Diagnosis Date   • Cataract    • Dizziness     occ   • GERD (gastroesophageal reflux disease)    • Hyperlipidemia    • Legally blind    • LVH (left ventricular hypertrophy)    • Preferred language is [place]     understands some English   • Use of cane as ambulatory aid        Past Surgical History:   Procedure Laterality Date   • INGUINAL HERNIA REPAIR Right    • IR AV FISTULAGRAM/GRAFTOGRAM  4/30/2019   • IR AV FISTULAGRAM/GRAFTOGRAM  6/14/2019   • IR AV FISTULAGRAM/GRAFTOGRAM  6/24/2020   • IR AV FISTULAGRAM/GRAFTOGRAM  2/26/2021   • IR AV FISTULAGRAM/GRAFTOGRAM  9/3/2021   • IR AV FISTULAGRAM/GRAFTOGRAM  12/28/2022   • IR TUNNELED DIALYSIS CATHETER PLACEMENT  4/3/2019   • AL ARTERIOVENOUS ANASTOMOSIS OPEN DIRECT Left 1/23/2019    Procedure: CREATION FISTULA ARTERIOVENOUS (AV); Surgeon: Kaylan Yao MD;  Location: [facility];  Service: Vascular       Meds/Allergies:  Prior to Admission medications    Medication Sig Start Date End Date Taking?  Authorizing Provider   aspirin (Aspirin Low Dose) 81 mg EC tablet Take 1 tablet (81 mg total) by mouth daily 9/30/22  Yes Max Delfin Dubin, DO   atorvastatin (LIPITOR) 40 mg tablet Take 1 tablet (40 mg total) by mouth daily 9/30/22  Yes Max Delfin Dubin, DO   Blood Glucose Monitoring Suppl (ONE TOUCH ULTRA 2) w/Device KIT by Does not apply route 3 (three) times a day 1/25/19  Yes Sunil Burks MD   brimonidine tartrate 0 2 % ophthalmic solution Administer 1 drop into the left eye 2 (two) times a day 1/27/21  Yes Historical Provider, MD   Carboxymethylcellul-Glycerin ([address] OP) Apply 2 drops to eye 2 (two) times a day   Yes Historical Provider, MD   dorzolamide (TRUSOPT) 2 % ophthalmic solution instill 1 drop into left eye three times a day 5/4/22  Yes Historical Provider, MD   doxazosin (CARDURA) 2 mg tablet Take 1 tablet (2 mg total) by mouth daily at bedtime 1/26/23  Yes Margaret Nunez MD   furosemide (LASIX) 80 mg tablet Take 1 tablet (80 mg total) by mouth daily 9/30/22  Yes Max Delfin Dubin, DO   NIFEdipine (ADALAT CC) 90 mg 24 hr tablet Take 1 tablet (90 mg total) by mouth daily 2/24/23  Yes Max Delfin Dubin, DO   benzonatate (TESSALON PERLES) 100 mg capsule Take 1 capsule (100 mg total) by mouth 3 (three) times a day as needed for cough 3/6/23 Merlin Rosas DO   Continuous Blood Gluc  [facility] 14 Day [place]) LAUREN Use 1 each daily 21   Lizzy Figueroa MD   insulin aspart (NovoLOG) 100 Units/mL injection pen 10U with breakfast, 10 U with dinner 22   Merlin Rosas DO   Insulin Glargine Solostar (Basaglar KwikPen) 100 UNIT/ML SOPN Inject 0 28 mL (28 Units total) under the skin daily 22   Merlin Rosas DO   isoniazid (NYDRAZID) 300 mg tablet Take 1 tablet (300 mg total) by mouth daily 21  Xuan Haynes PA-C   labetalol (NORMODYNE) 200 mg tablet Take 2 tablets (400 mg total) by mouth every 12 (twelve) hours 22  Ninfa Boas, MD   Lancets (freestyle) lancets Use as instructed 3/9/21   Jaye Yousif MD   methocarbamol (ROBAXIN) 500 mg tablet Take 1 tablet (500 mg total) by mouth 2 (two) times a day as needed for muscle spasms 22   Merlin Rosas DO   polyethylene glycol (MIRALAX) 17 g packet Take 17 g by mouth daily 19   Jaye Yousif MD   calcium acetate (PHOSLO) capsule Take 667 mg by mouth 3 (three) times a day with meals  23  Historical Provider, MD   cinacalcet (SENSIPAR) 30 mg tablet Take 1 tablet (30 mg total) by mouth daily 1/26/23 2/10/23  Derik Iverson MD     I have reviewed home medications using recent Epic encounter  Allergies:    Allergies   Allergen Reactions   • No Known Allergies        Social History:  Marital Status: Legally    Occupation:   Patient Pre-hospital Living Situation: Home  Patient Pre-hospital Level of Mobility: walks with cane  Patient Pre-hospital Diet Restrictions:   Substance Use History:   Social History     Substance and Sexual Activity   Alcohol Use Not Currently     Social History     Tobacco Use   Smoking Status Former   • Packs/day: 0 25   • Types: Cigarettes   • Quit date: 2018   • Years since quittin 1   Smokeless Tobacco Never     Social History     Substance and Sexual Activity   Drug Use No Family History:  Family History   Problem Relation Age of Onset   • Hypertension Mother    • Diabetes Father    • No Known Problems Sister    • No Known Problems Brother    • No Known Problems Maternal Aunt    • No Known Problems Maternal Uncle    • No Known Problems Paternal Aunt    • No Known Problems Paternal Uncle    • No Known Problems Maternal Grandmother    • No Known Problems Maternal Grandfather    • No Known Problems Paternal Grandmother    • No Known Problems Paternal Grandfather        Physical Exam:     Vitals:   Blood Pressure: (!) 203/101 (03/09/23 0600)  Pulse: 74 (03/09/23 0600)  Temperature: (!) 97 3 °F (36 3 °C) (03/09/23 0334)  Temp Source: Oral (03/09/23 0334)  Respirations: 17 (03/09/23 0600)  SpO2: 97 % (03/09/23 0600)    Physical Exam  Vitals and nursing note reviewed  Constitutional:       General: He is not in acute distress  Appearance: He is well-developed  He is ill-appearing (chronic)  HENT:      Head: Normocephalic and atraumatic  Mouth/Throat:      Mouth: Mucous membranes are dry  Eyes:      Conjunctiva/sclera: Conjunctivae normal    Cardiovascular:      Rate and Rhythm: Normal rate and regular rhythm  Heart sounds: Murmur heard  Pulmonary:      Effort: Pulmonary effort is normal  No respiratory distress  Breath sounds: Rhonchi present  Abdominal:      Palpations: Abdomen is soft  Tenderness: There is no abdominal tenderness  Musculoskeletal:         General: No swelling  Cervical back: Neck supple  Right lower leg: No edema  Left lower leg: No edema  Skin:     General: Skin is warm and dry  Capillary Refill: Capillary refill takes less than 2 seconds  Neurological:      Mental Status: He is alert and oriented to person, place, and time     Psychiatric:         Mood and Affect: Mood normal           Additional Data:     Lab Results:  Results from last 7 days   Lab Units 03/09/23  0547 03/09/23  0346   WBC Thousand/uL 9  16 4 89   HEMOGLOBIN g/dL 8 2* 9 5*   HEMATOCRIT % 26 0* 30 2*   PLATELETS Thousands/uL 209 234   NEUTROS PCT %  --  64   LYMPHS PCT %  --  19   MONOS PCT %  --  12   EOS PCT %  --  4     Results from last 7 days   Lab Units 03/09/23  0547   SODIUM mmol/L 139   POTASSIUM mmol/L 3 9   CHLORIDE mmol/L 99   CO2 mmol/L 30   BUN mg/dL 32*   CREATININE mg/dL 9 71*   ANION GAP mmol/L 10   CALCIUM mg/dL 9 5   ALBUMIN g/dL 3 9   TOTAL BILIRUBIN mg/dL 0 90   ALK PHOS U/L 57   ALT U/L 7   AST U/L 9*   GLUCOSE RANDOM mg/dL 197*     Results from last 7 days   Lab Units 03/09/23  0346   INR  1 14     Results from last 7 days   Lab Units 03/09/23  0532   POC GLUCOSE mg/dl 202*               Lines/Drains:  Invasive Devices     Peripheral Intravenous Line  Duration           Peripheral IV 03/09/23 Right Antecubital <1 day    Peripheral IV 03/09/23 Right Hand <1 day          Line  Duration           Hemodialysis AV Fistula 01/23/19 Left Other (Comment) 1506 days    Hemodialysis AV Fistula 01/20/21 Left 777 days                    Imaging: Reviewed radiology reports from this admission including: chest xray and Personally reviewed the following imaging: chest xray  XR chest 1 view portable   ED Interpretation by Jennie Shannon MD (03/09 4343)   Diffuse vascular congestion consistent with pulmonary edema as interpreted by myself  EKG and Other Studies Reviewed on Admission:   · EKG: NSR  HR 77     ** Please Note: This note has been constructed using a voice recognition system   **

## 2023-03-09 NOTE — ED PROVIDER NOTES
History  Chief Complaint   Patient presents with   • Chest Pain     Pt artived via EMS  Per EMS, Pt awoke from sleep about 2 hours ago with sob  Per EMS, pt was at 60% rm air and then went up to 99% on 6L O2  Pt has a hx of diabetes and dialysis  Pt was given 1 mg of glucagon IM and 324mg of Aspirin PTA  Pt c/o sob, and cramping pain to left side of chest     • Shortness of Breath     This is a 50-year-old male with a history of end-stage renal disease on dialysis [facility], hypertension, hyperlipidemia who presents with chest pain and shortness of breath  States that this has been ongoing over the past 2 days  Pain is worse at night  States that he has not missed any dialysis sessions  Denies any weight gain  Prior to Admission Medications   Prescriptions Last Dose Informant Patient Reported? Taking?    Blood Glucose Monitoring Suppl (ONE TOUCH ULTRA 2) w/Device KIT   No Yes   Sig: by Does not apply route 3 (three) times a day   Carboxymethylcellul-Glycerin (CLEAR EYES FOR DRY EYES OP)   Yes Yes   Sig: Apply 2 drops to eye 2 (two) times a day   Continuous Blood Gluc  (FreeStyle Jessica 14 Day [place]) LAUREN   No No   Sig: Use 1 each daily   Insulin Glargine Solostar (Basaglar KwikPen) 100 UNIT/ML SOPN   No No   Sig: Inject 0 28 mL (28 Units total) under the skin daily   Lancets (freestyle) lancets   No No   Sig: Use as instructed   NIFEdipine (ADALAT CC) 90 mg 24 hr tablet   No Yes   Sig: Take 1 tablet (90 mg total) by mouth daily   aspirin (Aspirin Low Dose) 81 mg EC tablet   No Yes   Sig: Take 1 tablet (81 mg total) by mouth daily   atorvastatin (LIPITOR) 40 mg tablet   No Yes   Sig: Take 1 tablet (40 mg total) by mouth daily   benzonatate (TESSALON PERLES) 100 mg capsule   No No   Sig: Take 1 capsule (100 mg total) by mouth 3 (three) times a day as needed for cough   brimonidine tartrate 0 2 % ophthalmic solution   Yes Yes   Sig: Administer 1 drop into the left eye 2 (two) times a day   dorzolamide (TRUSOPT) 2 % ophthalmic solution   Yes Yes   Sig: instill 1 drop into left eye three times a day   doxazosin (CARDURA) 2 mg tablet   No Yes   Sig: Take 1 tablet (2 mg total) by mouth daily at bedtime   furosemide (LASIX) 80 mg tablet   No Yes   Sig: Take 1 tablet (80 mg total) by mouth daily   insulin aspart (NovoLOG) 100 Units/mL injection pen   No No   Sig: 10U with breakfast, 10 U with dinner   isoniazid (NYDRAZID) 300 mg tablet   No No   Sig: Take 1 tablet (300 mg total) by mouth daily   labetalol (NORMODYNE) 200 mg tablet   No No   Sig: Take 2 tablets (400 mg total) by mouth every 12 (twelve) hours   methocarbamol (ROBAXIN) 500 mg tablet   No No   Sig: Take 1 tablet (500 mg total) by mouth 2 (two) times a day as needed for muscle spasms   polyethylene glycol (MIRALAX) 17 g packet   No No   Sig: Take 17 g by mouth daily      Facility-Administered Medications: None       Past Medical History:   Diagnosis Date   • Cataract    • Dizziness     occ   • GERD (gastroesophageal reflux disease)    • Hyperlipidemia    • Legally blind    • LVH (left ventricular hypertrophy)    • Preferred language is [place]     understands some English   • Use of cane as ambulatory aid        Past Surgical History:   Procedure Laterality Date   • INGUINAL HERNIA REPAIR Right    • IR AV FISTULAGRAM/GRAFTOGRAM  4/30/2019   • IR AV FISTULAGRAM/GRAFTOGRAM  6/14/2019   • IR AV FISTULAGRAM/GRAFTOGRAM  6/24/2020   • IR AV FISTULAGRAM/GRAFTOGRAM  2/26/2021   • IR AV FISTULAGRAM/GRAFTOGRAM  9/3/2021   • IR AV FISTULAGRAM/GRAFTOGRAM  12/28/2022   • IR TUNNELED DIALYSIS CATHETER PLACEMENT  4/3/2019   • AZ ARTERIOVENOUS ANASTOMOSIS OPEN DIRECT Left 1/23/2019    Procedure: CREATION FISTULA ARTERIOVENOUS (AV);   Surgeon: Naila Hoffman MD;  Location: [facility];  Service: Vascular       Family History   Problem Relation Age of Onset   • Hypertension Mother    • Diabetes Father    • No Known Problems Sister    • No Known Problems Brother    • No Known Problems Maternal Aunt    • No Known Problems Maternal Uncle    • No Known Problems Paternal Aunt    • No Known Problems Paternal Uncle    • No Known Problems Maternal Grandmother    • No Known Problems Maternal Grandfather    • No Known Problems Paternal Grandmother    • No Known Problems Paternal Grandfather      I have reviewed and agree with the history as documented  E-Cigarette/Vaping   • E-Cigarette Use Never User      E-Cigarette/Vaping Substances   • Nicotine No    • THC No    • CBD No    • Flavoring No    • Other No    • Unknown No      Social History     Tobacco Use   • Smoking status: Former     Packs/day: 0 25     Types: Cigarettes     Quit date: 2018     Years since quittin 1   • Smokeless tobacco: Never   Vaping Use   • Vaping Use: Never used   Substance Use Topics   • Alcohol use: Not Currently   • Drug use: No       Review of Systems   Constitutional: Negative for chills, fatigue and fever  HENT: Negative for rhinorrhea, sore throat and trouble swallowing  Eyes: Negative for photophobia and visual disturbance  Respiratory: Positive for shortness of breath  Negative for cough and chest tightness  Cardiovascular: Positive for chest pain  Negative for palpitations and leg swelling  Gastrointestinal: Negative for abdominal pain, blood in stool, diarrhea, nausea and vomiting  Endocrine: Negative for polyuria  Genitourinary: Negative for dysuria, flank pain and hematuria  Musculoskeletal: Negative for back pain and neck pain  Skin: Negative for color change and rash  Allergic/Immunologic: Negative for immunocompromised state  Neurological: Negative for dizziness, weakness, light-headedness, numbness and headaches  All other systems reviewed and are negative  Physical Exam  Physical Exam  Vitals and nursing note reviewed  Constitutional:       General: He is not in acute distress  Appearance: He is well-developed     HENT:      Head: Normocephalic and atraumatic  Mouth/Throat:      Lips: Pink  Mouth: Mucous membranes are moist    Eyes:      General: Lids are normal       Extraocular Movements: Extraocular movements intact  Conjunctiva/sclera: Conjunctivae normal       Pupils: Pupils are equal, round, and reactive to light  Cardiovascular:      Rate and Rhythm: Normal rate and regular rhythm  Heart sounds: Murmur heard  Systolic murmur is present with a grade of 4/6  Pulmonary:      Effort: Pulmonary effort is normal       Breath sounds: Rales present  Abdominal:      General: There is no distension  Palpations: Abdomen is soft  Tenderness: There is no abdominal tenderness  There is no guarding or rebound  Musculoskeletal:         General: No swelling  Cervical back: Full passive range of motion without pain, normal range of motion and neck supple  Comments: No pitting edema bilateral lower extremities  Left upper extremity fistula with palpable thrill  Skin:     General: Skin is warm  Capillary Refill: Capillary refill takes less than 2 seconds  Findings: No rash  Neurological:      General: No focal deficit present  Mental Status: He is alert     Psychiatric:         Mood and Affect: Mood normal          Speech: Speech normal          Behavior: Behavior normal          Vital Signs  ED Triage Vitals   Temperature Pulse Respirations Blood Pressure SpO2   03/09/23 0334 03/09/23 0329 03/09/23 0329 03/09/23 0329 03/09/23 0331   (!) 97 3 °F (36 3 °C) 78 (!) 24 (!) 263/123 92 %      Temp Source Heart Rate Source Patient Position - Orthostatic VS BP Location FiO2 (%)   03/09/23 0334 03/09/23 0329 03/09/23 0329 03/09/23 0329 --   Oral Monitor Lying Right arm       Pain Score       --                  Vitals:    03/09/23 0415 03/09/23 0430 03/09/23 0445 03/09/23 0500   BP: (!) 200/96 (!) 204/99 (!) 208/101 (!) 200/102   Pulse:  70 68 70   Patient Position - Orthostatic VS:  Lying Lying Lying Visual Acuity      ED Medications  Medications   nitroGLYcerin (TRIDIL) 50 mg in 250 mL infusion (premix) (100 mcg/min Intravenous Rate/Dose Change 3/9/23 0435)   glucagon (FOR EMS ONLY) (GLUCAGEN) injection 1 mg (0 mg Does not apply Given to EMS 3/9/23 0343)   dextrose 50 % IV solution 50 mL (50 mL Intravenous Given 3/9/23 0436)       Diagnostic Studies  Results Reviewed     Procedure Component Value Units Date/Time    HS Troponin 0hr (reflex protocol) [[id number]]  (Normal) Collected: 03/09/23 0346    Lab Status: Final result Specimen: Blood from Arm, Right Updated: 03/09/23 0423     hs TnI 0hr 32 ng/L     HS Troponin I 2hr [[id number]]     Lab Status: No result Specimen: Blood     HS Troponin I 4hr [[id number]]     Lab Status: No result Specimen: Blood     B-Type Natriuretic Peptide(BNP) [[id number]]  (Abnormal) Collected: 03/09/23 0346    Lab Status: Final result Specimen: Blood from Arm, Right Updated: 03/09/23 0420     BNP 2,071 pg/mL     Basic metabolic panel [[id number]]  (Abnormal) Collected: 03/09/23 0346    Lab Status: Final result Specimen: Blood from Arm, Right Updated: 03/09/23 0413     Sodium 141 mmol/L      Potassium 3 8 mmol/L      Chloride 98 mmol/L      CO2 33 mmol/L      ANION GAP 10 mmol/L      BUN 31 mg/dL      Creatinine 9 37 mg/dL      Glucose 48 mg/dL      Calcium 10 2 mg/dL      eGFR 5 ml/min/1 73sq m     Narrative:      [place] guidelines for Chronic Kidney Disease (CKD):   •  Stage 1 with normal or high GFR (GFR > 90 mL/min/1 73 square meters)  •  Stage 2 Mild CKD (GFR = 60-89 mL/min/1 73 square meters)  •  Stage 3A Moderate CKD (GFR = 45-59 mL/min/1 73 square meters)  •  Stage 3B Moderate CKD (GFR = 30-44 mL/min/1 73 square meters)  •  Stage 4 Severe CKD (GFR = 15-29 mL/min/1 73 square meters)  •  Stage 5 End Stage CKD (GFR <15 mL/min/1 73 square meters)  Note: GFR calculation is accurate only with a steady state creatinine    Magnesium [[id number]]  (Normal) Collected: 03/09/23 0346    Lab Status: Final result Specimen: Blood from Arm, Right Updated: 03/09/23 0413     Magnesium 2 5 mg/dL     Phosphorus [[id number]]  (Normal) Collected: 03/09/23 0346    Lab Status: Final result Specimen: Blood from Arm, Right Updated: 03/09/23 0413     Phosphorus 3 0 mg/dL     Protime-INR [[id number]]  (Abnormal) Collected: 03/09/23 0346    Lab Status: Final result Specimen: Blood from Arm, Right Updated: 03/09/23 0410     Protime 14 6 seconds      INR 1 14    APTT [[id number]]  (Abnormal) Collected: 03/09/23 0346    Lab Status: Final result Specimen: Blood from Arm, Right Updated: 03/09/23 0410     PTT 38 seconds     CBC and differential [[id number]]  (Abnormal) Collected: 03/09/23 0346    Lab Status: Final result Specimen: Blood from Arm, Right Updated: 03/09/23 0353     WBC 4 89 Thousand/uL      RBC 3 35 Million/uL      Hemoglobin 9 5 g/dL      Hematocrit 30 2 %      MCV 90 fL      MCH 28 4 pg      MCHC 31 5 g/dL      RDW 15 0 %      MPV 9 4 fL      Platelets 355 Thousands/uL      nRBC 0 /100 WBCs      Neutrophils Relative 64 %      Immat GRANS % 0 %      Lymphocytes Relative 19 %      Monocytes Relative 12 %      Eosinophils Relative 4 %      Basophils Relative 1 %      Neutrophils Absolute 3 18 Thousands/µL      Immature Grans Absolute 0 01 Thousand/uL      Lymphocytes Absolute 0 93 Thousands/µL      Monocytes Absolute 0 56 Thousand/µL      Eosinophils Absolute 0 17 Thousand/µL      Basophils Absolute 0 04 Thousands/µL                  XR chest 1 view portable   ED Interpretation by Buster Whiting MD (03/09 0403)   Diffuse vascular congestion consistent with pulmonary edema as interpreted by myself                   Procedures  ECG 12 Lead Documentation Only    Date/Time: 3/9/2023 3:47 AM  Performed by: Buster Whiting MD  Authorized by: Buster Whiting MD     ECG reviewed by me, the ED Provider: yes    Patient location:  ED  Previous ECG:     Previous ECG:  Compared to current Comparison ECG info:  2/8/22    Similarity:  No change    Comparison to cardiac monitor: Yes    Interpretation:     Interpretation: abnormal    Rate:     ECG rate:  77    ECG rate assessment: normal    Rhythm:     Rhythm: sinus rhythm    Ectopy:     Ectopy: none    QRS:     QRS axis:  Normal    QRS intervals:  Normal  Conduction:     Conduction: abnormal      Abnormal conduction: non-specific intraventricular conduction delay    ST segments:     ST segments:  Non-specific  T waves:     T waves: non-specific    Other findings:     Other findings: LVH with strain      CriticalCare Time    Date/Time: 3/9/2023 5:03 AM  Performed by: Elvia Colon MD  Authorized by: Elvia Colon MD     Critical care provider statement:     Critical care time (minutes):  45    Critical care time was exclusive of:  Separately billable procedures and treating other patients    Critical care was necessary to treat or prevent imminent or life-threatening deterioration of the following conditions:  Circulatory failure and respiratory failure    Critical care was time spent personally by me on the following activities:  Obtaining history from patient or surrogate, development of treatment plan with patient or surrogate, evaluation of patient's response to treatment, examination of patient, review of old charts, re-evaluation of patient's condition, ordering and review of radiographic studies and ordering and review of laboratory studies    I assumed direction of critical care for this patient from another provider in my specialty: no               ED Course  ED Course as of 03/09/23 0504   Thu Mar 09, 2023   0423 Glucose, Random(!): 48  Will give d50                               SBIRT 20yo+    Flowsheet Row Most Recent Value   SBIRT (25 yo +)    In order to provide better care to our patients, we are screening all of our patients for alcohol and drug use  Would it be okay to ask you these screening questions?  Unable to answer at this time Filed at: 03/09/2023 5479                    Medical Decision Making  Chest pain and shortness of breath likely related to pulmonary edema related to hypertensive urgency  Patient exquisitely hypertensive on arrival   We will start nitroglycerin drip  CBC to evaluate for evidence of infection or anemia as a cause of the shortness of breath  BMP to evaluate for electrolyte abnormalities in the setting of end-stage renal disease which can cause arrhythmias  Troponin and EKG to evaluate for ACS  BNP and chest x-ray to evaluate for heart failure  Patient will require admission  Acute pulmonary edema ([facility] [address] ): complicated acute illness or injury with systemic symptoms that poses a threat to life or bodily functions  Chest pain: acute illness or injury  Dyspnea: acute illness or injury  ESRD (end stage renal disease) ([facility] chronic illness or injury with exacerbation, progression, or side effects of treatment  Hypertensive urgency: complicated acute illness or injury with systemic symptoms that poses a threat to life or bodily functions  Amount and/or Complexity of Data Reviewed  Labs: ordered  Decision-making details documented in ED Course  Radiology: ordered and independent interpretation performed  Risk  Prescription drug management  Decision regarding hospitalization            Disposition  Final diagnoses:   Acute pulmonary edema (HCC)   Chest pain   Dyspnea   ESRD (end stage renal disease) ([facility] )   Hypertensive urgency     Time reflects when diagnosis was documented in both MDM as applicable and the Disposition within this note     Time User Action Codes Description Comment    3/9/2023  5:01 AM Iver Douglas P Add [J81 0] Acute pulmonary edema ([facility] [address] )     3/9/2023  5:01 AM Iver Douglas P Add [R07 9] Chest pain     3/9/2023  5:01 AM Iver Douglas P Remove [R07 9] Chest pain     3/9/2023  5:01 AM Iver Douglas P Add [R07 9] Chest pain     3/9/2023  5:01 AM Sima Redd P Add [R06 00] Dyspnea 3/9/2023  5:02 AM Ele LAW Add [N18 6] ESRD (end stage renal disease) ([facility] [address] )     3/9/2023  5:02 AM Claudia Ramos Add [I16 0] Hypertensive urgency       ED Disposition     ED Disposition   Admit    Condition   Stable    Date/Time   Thu Mar 9, 2023  5:00 AM    Comment              Follow-up Information    None         Patient's Medications   Discharge Prescriptions    No medications on file       No discharge procedures on file      PDMP Review     None          ED Provider  Electronically Signed by           Keena Webber MD  03/09/23 0338

## 2023-03-10 ENCOUNTER — APPOINTMENT (INPATIENT)
Dept: NON INVASIVE DIAGNOSTICS | Facility: HOSPITAL | Age: 58
End: 2023-03-10

## 2023-03-10 ENCOUNTER — TELEPHONE (OUTPATIENT)
Dept: INTERNAL MEDICINE CLINIC | Facility: CLINIC | Age: 58
End: 2023-03-10

## 2023-03-10 PROBLEM — I50.32 CHRONIC DIASTOLIC HEART FAILURE (HCC): Status: ACTIVE | Noted: 2022-02-08

## 2023-03-10 LAB
ANION GAP SERPL CALCULATED.3IONS-SCNC: 8 MMOL/L (ref 4–13)
AORTIC ROOT: 3.2 CM
AORTIC VALVE MEAN VELOCITY: 9.7 M/S
APICAL FOUR CHAMBER EJECTION FRACTION: 60 %
AV AREA BY CONTINUOUS VTI: 2.4 CM2
AV AREA PEAK VELOCITY: 2.5 CM2
AV LVOT MEAN GRADIENT: 3 MMHG
AV LVOT PEAK GRADIENT: 5 MMHG
AV MEAN GRADIENT: 4 MMHG
AV PEAK GRADIENT: 9 MMHG
AV VALVE AREA: 2.44 CM2
AV VELOCITY RATIO: 0.8
BASOPHILS # BLD AUTO: 0.06 THOUSANDS/ÂΜL (ref 0–0.1)
BASOPHILS NFR BLD AUTO: 1 % (ref 0–1)
BUN SERPL-MCNC: 21 MG/DL (ref 5–25)
CALCIUM SERPL-MCNC: 9.5 MG/DL (ref 8.4–10.2)
CHLORIDE SERPL-SCNC: 99 MMOL/L (ref 96–108)
CO2 SERPL-SCNC: 31 MMOL/L (ref 21–32)
CREAT SERPL-MCNC: 6.55 MG/DL (ref 0.6–1.3)
DOP CALC AO PEAK VEL: 1.47 M/S
DOP CALC AO VTI: 32.75 CM
DOP CALC LVOT AREA: 3.14 CM2
DOP CALC LVOT DIAMETER: 2 CM
DOP CALC LVOT PEAK VEL VTI: 25.41 CM
DOP CALC LVOT PEAK VEL: 1.17 M/S
DOP CALC LVOT STROKE INDEX: 42 ML/M2
DOP CALC LVOT STROKE VOLUME: 79.79
E WAVE DECELERATION TIME: 162 MS
EOSINOPHIL # BLD AUTO: 0.14 THOUSAND/ÂΜL (ref 0–0.61)
EOSINOPHIL NFR BLD AUTO: 2 % (ref 0–6)
ERYTHROCYTE [DISTWIDTH] IN BLOOD BY AUTOMATED COUNT: 14.9 % (ref 11.6–15.1)
FRACTIONAL SHORTENING: 36 (ref 28–44)
GFR SERPL CREATININE-BSD FRML MDRD: 8 ML/MIN/1.73SQ M
GLUCOSE SERPL-MCNC: 109 MG/DL (ref 65–140)
GLUCOSE SERPL-MCNC: 112 MG/DL (ref 65–140)
GLUCOSE SERPL-MCNC: 126 MG/DL (ref 65–140)
GLUCOSE SERPL-MCNC: 131 MG/DL (ref 65–140)
GLUCOSE SERPL-MCNC: 72 MG/DL (ref 65–140)
GLUCOSE SERPL-MCNC: 93 MG/DL (ref 65–140)
GLUCOSE SERPL-MCNC: <20 MG/DL (ref 65–140)
HBV CORE AB SER QL: NORMAL
HBV CORE IGM SER QL: NORMAL
HBV SURFACE AB SER-ACNC: 10.27 MIU/ML
HBV SURFACE AG SER QL: NORMAL
HCT VFR BLD AUTO: 27.3 % (ref 36.5–49.3)
HCV AB SER QL: NORMAL
HGB BLD-MCNC: 8.5 G/DL (ref 12–17)
IMM GRANULOCYTES # BLD AUTO: 0.02 THOUSAND/UL (ref 0–0.2)
IMM GRANULOCYTES NFR BLD AUTO: 0 % (ref 0–2)
INTERVENTRICULAR SEPTUM IN DIASTOLE (PARASTERNAL SHORT AXIS VIEW): 1.3 CM
INTERVENTRICULAR SEPTUM: 1.3 CM (ref 0.6–1.1)
LAAS-AP2: 27.2 CM2
LAAS-AP4: 23.9 CM2
LEFT ATRIUM AREA SYSTOLE SINGLE PLANE A4C: 24.2 CM2
LEFT ATRIUM SIZE: 3 CM
LEFT INTERNAL DIMENSION IN SYSTOLE: 2.8 CM (ref 2.1–4)
LEFT VENTRICULAR INTERNAL DIMENSION IN DIASTOLE: 4.4 CM (ref 3.5–6)
LEFT VENTRICULAR POSTERIOR WALL IN END DIASTOLE: 1.4 CM
LEFT VENTRICULAR STROKE VOLUME: 58 ML
LVSV (TEICH): 58 ML
LYMPHOCYTES # BLD AUTO: 1.31 THOUSANDS/ÂΜL (ref 0.6–4.47)
LYMPHOCYTES NFR BLD AUTO: 18 % (ref 14–44)
MAGNESIUM SERPL-MCNC: 2.1 MG/DL (ref 1.9–2.7)
MCH RBC QN AUTO: 28.2 PG (ref 26.8–34.3)
MCHC RBC AUTO-ENTMCNC: 31.1 G/DL (ref 31.4–37.4)
MCV RBC AUTO: 91 FL (ref 82–98)
MONOCYTES # BLD AUTO: 0.97 THOUSAND/ÂΜL (ref 0.17–1.22)
MONOCYTES NFR BLD AUTO: 13 % (ref 4–12)
MV E'TISSUE VEL-LAT: 10 CM/S
MV E'TISSUE VEL-SEP: 7 CM/S
MV PEAK A VEL: 0.57 M/S
MV PEAK E VEL: 106 CM/S
MV STENOSIS PRESSURE HALF TIME: 47 MS
MV VALVE AREA P 1/2 METHOD: 4.68
NEUTROPHILS # BLD AUTO: 4.81 THOUSANDS/ÂΜL (ref 1.85–7.62)
NEUTS SEG NFR BLD AUTO: 66 % (ref 43–75)
NRBC BLD AUTO-RTO: 0 /100 WBCS
PLATELET # BLD AUTO: 245 THOUSANDS/UL (ref 149–390)
PMV BLD AUTO: 10.3 FL (ref 8.9–12.7)
POTASSIUM SERPL-SCNC: 4.3 MMOL/L (ref 3.5–5.3)
RBC # BLD AUTO: 3.01 MILLION/UL (ref 3.88–5.62)
RIGHT ATRIUM AREA SYSTOLE A4C: 19.5 CM2
RIGHT VENTRICLE ID DIMENSION: 3.9 CM
SL CV LEFT ATRIUM LENGTH A2C: 5.8 CM
SL CV LV EF: 55
SL CV PED ECHO LEFT VENTRICLE DIASTOLIC VOLUME (MOD BIPLANE) 2D: 88 ML
SL CV PED ECHO LEFT VENTRICLE SYSTOLIC VOLUME (MOD BIPLANE) 2D: 30 ML
SODIUM SERPL-SCNC: 138 MMOL/L (ref 135–147)
TR MAX PG: 37 MMHG
TR PEAK VELOCITY: 3 M/S
TRICUSPID ANNULAR PLANE SYSTOLIC EXCURSION: 1.8 CM
TRICUSPID VALVE PEAK REGURGITATION VELOCITY: 3.04 M/S
WBC # BLD AUTO: 7.31 THOUSAND/UL (ref 4.31–10.16)

## 2023-03-10 RX ORDER — DEXTROSE MONOHYDRATE 25 G/50ML
25 INJECTION, SOLUTION INTRAVENOUS ONCE
Status: COMPLETED | OUTPATIENT
Start: 2023-03-10 | End: 2023-03-10

## 2023-03-10 RX ORDER — DEXTROSE MONOHYDRATE 25 G/50ML
INJECTION, SOLUTION INTRAVENOUS
Status: COMPLETED
Start: 2023-03-10 | End: 2023-03-10

## 2023-03-10 RX ADMIN — LABETALOL HYDROCHLORIDE 400 MG: 200 TABLET, FILM COATED ORAL at 08:35

## 2023-03-10 RX ADMIN — HEPARIN SODIUM 5000 UNITS: 5000 INJECTION INTRAVENOUS; SUBCUTANEOUS at 05:52

## 2023-03-10 RX ADMIN — DEXTROSE MONOHYDRATE 25 ML: 25 INJECTION, SOLUTION INTRAVENOUS at 19:45

## 2023-03-10 RX ADMIN — LABETALOL HYDROCHLORIDE 400 MG: 200 TABLET, FILM COATED ORAL at 22:28

## 2023-03-10 RX ADMIN — ASPIRIN 81 MG: 81 TABLET, COATED ORAL at 08:35

## 2023-03-10 RX ADMIN — DOXAZOSIN 2 MG: 2 TABLET ORAL at 22:28

## 2023-03-10 RX ADMIN — ATORVASTATIN CALCIUM 40 MG: 40 TABLET, FILM COATED ORAL at 16:57

## 2023-03-10 RX ADMIN — FUROSEMIDE 80 MG: 40 TABLET ORAL at 08:35

## 2023-03-10 RX ADMIN — INSULIN LISPRO 10 UNITS: 100 INJECTION, SOLUTION INTRAVENOUS; SUBCUTANEOUS at 08:35

## 2023-03-10 RX ADMIN — INSULIN LISPRO 10 UNITS: 100 INJECTION, SOLUTION INTRAVENOUS; SUBCUTANEOUS at 16:57

## 2023-03-10 RX ADMIN — HEPARIN SODIUM 5000 UNITS: 5000 INJECTION INTRAVENOUS; SUBCUTANEOUS at 22:28

## 2023-03-10 RX ADMIN — HEPARIN SODIUM 5000 UNITS: 5000 INJECTION INTRAVENOUS; SUBCUTANEOUS at 14:25

## 2023-03-10 RX ADMIN — INSULIN GLARGINE 15 UNITS: 100 INJECTION, SOLUTION SUBCUTANEOUS at 08:35

## 2023-03-10 RX ADMIN — NIFEDIPINE 90 MG: 30 TABLET, FILM COATED, EXTENDED RELEASE ORAL at 08:35

## 2023-03-10 NOTE — ASSESSMENT & PLAN NOTE
Lab Results   Component Value Date    EGFR 8 03/10/2023    EGFR 5 03/09/2023    EGFR 5 03/09/2023    CREATININE 6 55 (H) 03/10/2023    CREATININE 9 71 (H) 03/09/2023    CREATININE 9 37 (H) 03/09/2023     Continue TT dialysis as scheduled    Management per renal

## 2023-03-10 NOTE — PLAN OF CARE
Problem: METABOLIC, FLUID AND ELECTROLYTES - ADULT  Goal: Electrolytes maintained within normal limits  Description: INTERVENTIONS:  - Monitor labs and assess patient for signs and symptoms of electrolyte imbalances  - Administer electrolyte replacement as ordered  - Monitor response to electrolyte replacements, including repeat lab results as appropriate  - Instruct patient on fluid and nutrition as appropriate  Outcome: Progressing  Goal: Fluid balance maintained  Description: INTERVENTIONS:  - Monitor labs   - Monitor I/O and WT  - Instruct patient on fluid and nutrition as appropriate  - Assess for signs & symptoms of volume excess or deficit  Outcome: Progressing     Problem: INFECTION - ADULT  Goal: Absence or prevention of progression during hospitalization  Description: INTERVENTIONS:  - Assess and monitor for signs and symptoms of infection  - Monitor lab/diagnostic results  - Monitor all insertion sites, i e  indwelling lines, tubes, and drains  - Monitor endotracheal if appropriate and nasal secretions for changes in amount and color  - [place] appropriate cooling/warming therapies per order  - Administer medications as ordered  - Instruct and encourage patient and family to use good hand hygiene technique  - Identify and instruct in appropriate isolation precautions for identified infection/condition  Outcome: Progressing

## 2023-03-10 NOTE — PROGRESS NOTES
[address] [place] [address]  Progress Note - Paddy Trevin 1965, 61 y o  male MRN: [id number]  Unit/Bed#: [facility] [address] [id number] Encounter: [id number]  Primary Care Provider: Merlin De La Torre DO   Date and time admitted to hospital: 3/9/2023  3:26 AM    * Hypertensive emergency  Assessment & Plan  50-year-old male with ESRD on TTH S dialysis and CHF presented to institution due to shortness of breath secondary to fluid overload  · Required nitroglycerin drip  Discontinued it today  · Continue additional treatment per nephrology   · Continue carvedilol, nifedipine, and labetalol   · Clinically improving  · Anticipate probable discharge tomorrow  Chronic diastolic heart failure (HCC)  Assessment & Plan  Wt Readings from Last 3 Encounters:   03/10/23 72 1 kg (159 lb)   02/20/23 77 6 kg (171 lb)   12/28/22 77 1 kg (170 lb)     Chronic diastolic heart failure in setting of hypertensive disease  Euvolemic to mildly hypervolemic  Plan:  • Possible additional treatment tomorrow by nephrology  Anticipate discharge tomorrow if clinically improved  Repeat echo pending    Acute respiratory failure with hypoxia ([address] )  Assessment & Plan  Secondary to pulmonary edema, no oxygen at baseline, monitor and titrate off oxygen as appropriate  Home oxygen evaluation on discharge    Anemia of chronic disease  Assessment & Plan  In the setting of ESRD  No evidence of acute bleeding at this time  Continue monitoring  Recent Labs     03/09/23  0346 03/09/23  0547 03/10/23  0547   HGB 9 5* 8 2* 8 5*   MCV 90 91 91   RDW 15 0 14 9 14 9         ESRD on dialysis [facility])  Assessment & Plan  Lab Results   Component Value Date    EGFR 8 03/10/2023    EGFR 5 03/09/2023    EGFR 5 03/09/2023    CREATININE 6 55 (H) 03/10/2023    CREATININE 9 71 (H) 03/09/2023    CREATININE 9 37 (H) 03/09/2023     Continue TThS dialysis as scheduled    Management per renal     Diabetic retinopathy of both eyes associated with type 2 diabetes mellitus ([facility] [address] )  Assessment & Plan  Legally blind     VTE Pharmacologic Prophylaxis:   Pharmacologic: Heparin  Mechanical VTE Prophylaxis in Place: Yes    Discussions with Specialists or Other Care Team Provider: nursing    Education and Discussions with Family / Patient: patient    Current Length of Stay: 1 day(s)    Current Patient Status: Inpatient   Certification Statement: The patient will continue to require additional inpatient hospital stay due to hypertensive management, fluid overload, additional treatment, nephrology eval    Discharge Plan: 24 hours    Code Status: Level 1 - Full Code      Subjective:   Patient seen and examined at bedside  Reports that he is feeling much better today, but still slightly short of breath  Objective:     Vitals:   Temp (24hrs), Av 5 °F (36 9 °C), Min:98 °F (36 7 °C), Max:99 5 °F (37 5 °C)    Temp:  [98 °F (36 7 °C)-99 5 °F (37 5 °C)] 98 °F (36 7 °C)  HR:  [65-89] 65  Resp:  [17-20] 17  BP: (110-181)/() 133/64  SpO2:  [86 %-100 %] 98 %  Body mass index is 23 48 kg/m²  Input and Output Summary (last 24 hours): Intake/Output Summary (Last 24 hours) at 3/10/2023 1321  Last data filed at 3/10/2023 1229  Gross per 24 hour   Intake 780 ml   Output 4505 ml   Net -3725 ml       Physical Exam:     Physical Exam  Vitals reviewed  Constitutional:       General: He is not in acute distress  HENT:      Head: Normocephalic  Nose: Nose normal       Mouth/Throat:      Mouth: Mucous membranes are moist    Eyes:      Comments: Legally blind   Cardiovascular:      Rate and Rhythm: Normal rate  Pulmonary:      Effort: Pulmonary effort is normal  No respiratory distress  Abdominal:      General: There is no distension  Palpations: Abdomen is soft  Tenderness: There is no abdominal tenderness  Skin:     General: Skin is warm  Neurological:      Mental Status: He is alert and oriented to person, place, and time     Psychiatric: Mood and Affect: Mood normal          Behavior: Behavior normal        Additional Data:     Labs:    Results from last 7 days   Lab Units 03/10/23  0547   WBC Thousand/uL 7 31   HEMOGLOBIN g/dL 8 5*   HEMATOCRIT % 27 3*   PLATELETS Thousands/uL 245   NEUTROS PCT % 66   LYMPHS PCT % 18   MONOS PCT % 13*   EOS PCT % 2     Results from last 7 days   Lab Units 03/10/23  0547 03/09/23  0547   SODIUM mmol/L 138 139   POTASSIUM mmol/L 4 3 3 9   CHLORIDE mmol/L 99 99   CO2 mmol/L 31 30   BUN mg/dL 21 32*   CREATININE mg/dL 6 55* 9 71*   ANION GAP mmol/L 8 10   CALCIUM mg/dL 9 5 9 5   ALBUMIN g/dL  --  3 9   TOTAL BILIRUBIN mg/dL  --  0 90   ALK PHOS U/L  --  57   ALT U/L  --  7   AST U/L  --  9*   GLUCOSE RANDOM mg/dL 126 197*     Results from last 7 days   Lab Units 03/09/23  0346   INR  1 14     Results from last 7 days   Lab Units 03/10/23  1113 03/10/23  0734 03/09/23  2135 03/09/23  2057 03/09/23  1626 03/09/23  1201 03/09/23  0742 03/09/23  0532   POC GLUCOSE mg/dl 72 93 87 43* 95 93 177* 202*                   * I Have Reviewed All Lab Data Listed Above  * Additional Pertinent Lab Tests Reviewed:  [address] Admission Reviewed      Lines:   Invasive Devices     Peripheral Intravenous Line  Duration           Peripheral IV 03/09/23 Right Antecubital 1 day    Peripheral IV 03/09/23 Right Hand 1 day          Line  Duration           Hemodialysis AV Fistula 01/23/19 Left Other (Comment) 1507 days                   Imaging:    Imaging Reports Reviewed Today Include: xr chest    Recent Cultures (last 7 days):           Last 24 Hours Medication List:   Current Facility-Administered Medications   Medication Dose Route Frequency Provider Last Rate   • acetaminophen  650 mg Oral Q6H PRN Yamila Hopkins PA-C     • aluminum-magnesium hydroxide-simethicone  30 mL Oral Q6H PRN Rodrigok ALMA DELIA Hopkins     • aspirin  81 mg Oral Daily Yamila Hopkins PA-C     • atorvastatin  40 mg Oral After Dinner Yamila Hopkins PA-C     • doxazosin  2 mg Oral HS Kelsey Mendiola PA-C     • epoetin josé miguel  2,400 Units Intravenous Once per day on Mon Wed Fri KRISTI German     • furosemide  80 mg Oral Daily Kelsey Mendiola PA-C     • heparin (porcine)  5,000 Units Subcutaneous [facility] Kelsey Mendiola PA-C     • insulin glargine  15 Units Subcutaneous TREYM Ninfa Boas, MD     • insulin lispro  1-6 Units Subcutaneous 4x Daily (AC & HS) Kelsey Mendiola PA-C     • insulin lispro  10 Units Subcutaneous Daily With Breakfast Kelsey Mendiola PA-C     • insulin lispro  10 Units Subcutaneous Daily With Dinner Kelsey Mendiola PA-C     • iron sucrose  50 mg Intravenous Weekly KRISTI Hardy Stopped (03/09/23 1300)   • labetalol  400 mg Oral Q12H [facility] Kelsey Mendiola PA-C     • NIFEdipine  90 mg Oral Daily Kelsey Mendiola PA-C     • ondansetron  4 mg Intravenous Q6H PRN Kelsey Mendiola PA-C     • polyethylene glycol  17 g Oral Daily Kelsey Mendiola PA-C          Today, Patient Was Seen By: Sumi Campos MD    ** Please Note: Dictation voice to text software may have been used in the creation of this document   **

## 2023-03-10 NOTE — ASSESSMENT & PLAN NOTE
59-year-old male with ESRD on TTH S dialysis and CHF presented to institution due to shortness of breath secondary to fluid overload  · Required nitroglycerin drip  Discontinued it today  · Continue additional treatment per nephrology   · Continue carvedilol, nifedipine, and labetalol   · Clinically improving  · Anticipate probable discharge tomorrow

## 2023-03-10 NOTE — ASSESSMENT & PLAN NOTE
Secondary to pulmonary edema, no oxygen at baseline, monitor and titrate off oxygen as appropriate  Home oxygen evaluation on discharge

## 2023-03-10 NOTE — ASSESSMENT & PLAN NOTE
In the setting of ESRD  No evidence of acute bleeding at this time  Continue monitoring        Recent Labs     03/09/23  0346 03/09/23  0547 03/10/23  0547   HGB 9 5* 8 2* 8 5*   MCV 90 91 91   RDW 15 0 14 9 14 9

## 2023-03-10 NOTE — PROGRESS NOTES
NEPHROLOGY PROGRESS NOTE   Paddy Trevin 61 y o  male MRN: [id number]  Unit/Bed#: [facility] [address] 222[address]01 Encounter: [id number]  Reason for Consult: ESRD on HD  25-year-old gentleman with history of ESRD on HD on TTS schedule at [address] in [facility], hypertension, CHF, hypertensive cardiomyopathy, legally blind, diabetes, who presents to the hospital with reports of shortness of breath and chest discomfort  Nephrology is consulted for management of ESRD on HD  ASSESSMENT/PLAN:  ESRD on HD:   -receiving maintenance dialysis on Tuesday Thursday Saturday schedule at [address] in [facility]   -continue with HD as scheduled  Next hemodialysis treatment planned for tomorrow  -EDW: 75 5 kg  Likely will need to be decreased as outpatient  Access: Left upper extremity aVF  + bruit/thrill  Blood pressure: Blood pressure is variable  Currently acceptable   -continue UF with HD as BP tolerates  -OP medications: Labetalol 400 mg twice daily, doxazosin 2 mg in the evening, Lasix 80 mg daily, nifedipine 90 mg at bedtime   -currently maintained on: Cardura 2 mg at bedtime, Lasix 80 mg daily, nifedipine 90 mg daily, labetalol 400 mg every 12 hours  Anemia in CKD:   -Hgb currently 8 5   -COLETTE: Epogen 2400 units with hemodialysis  -Receiving weekly Venofer as an outpatient   -goal Hgb 10-12 g/dL  -continue to monitor and transfuse as needed for Hgb <7 0  Recommend CMV negative leukoreduced irradiated blood if transfusion is required  MBD in CKD:   -continue to monitor PTH, phos, and Mg as OP    -Notes recent phosphorus level 3 0, mag level 2 1   -recommend renal diet  Volume status: Presenting with chest discomfort and shortness of breath, suspect volume overload  Most recent echo with EF of 58%  -Chest x-ray shows CHF and pulmonary edema   -continue UF as BP allows  -recommend fluid restriction  Electrolytes: Stable and acceptable   -continue to monitor and trend with HD       Other: Latent TB     Disposition: Okay to discharge from renal once medically cleared  SUBJECTIVE:  Patient is sitting out of bed in his chair  He denies any further chest discomfort or shortness of breath  He denies nausea, vomiting, diarrhea  He reports having normal appetite  OBJECTIVE:  Current Weight: Weight - Scale: 72 1 kg (159 lb)  Vitals:    03/10/23 0947 03/10/23 1002 03/10/23 1017 03/10/23 1113   BP: 122/64 130/62 149/69 133/64   BP Location:    Right arm   Pulse: 75 70 71 65   Resp:    17   Temp:    98 °F (36 7 °C)   TempSrc:    Oral   SpO2: 96% 98% 95% 98%   Weight:   72 1 kg (159 lb)    Height:   5' 9" (1 753 m)        Intake/Output Summary (Last 24 hours) at 3/10/2023 1210  Last data filed at 3/9/2023 1344  Gross per 24 hour   Intake 300 ml   Output 4505 ml   Net -4205 ml     General: NAD  Skin: warm, dry, intact, no rash  HEENT: Moist mucous membranes, sclera anicteric, normocephalic, atraumatic  Neck: No apparent JVD appreciated  Chest: lung sounds clear B/L, on RA   CVS:Regular rate and rhythm, no murmer   Abdomen: Soft, round, non-tender, +BS  Extremities: No B/L LE edema present, left upper extremity aVF + bruit/thrill    Neuro: alert and oriented, legally blind  Psych: appropriate mood and affect     Medications:    Current Facility-Administered Medications:   •  acetaminophen (TYLENOL) tablet 650 mg, 650 mg, Oral, Q6H PRN, [place] Mac, PA-C, 650 mg at 03/09/23 0369  •  aluminum-magnesium hydroxide-simethicone (MYLANTA) oral suspension 30 mL, 30 mL, Oral, Q6H PRN, [place] Mac, PA-C  •  aspirin (ECOTRIN LOW STRENGTH) EC tablet 81 mg, 81 mg, Oral, Daily, [place] Mac, PA-C, 81 mg at 03/10/23 0049  •  atorvastatin (LIPITOR) tablet 40 mg, 40 mg, Oral, After Dinner, [place] Mac, PA-C, 40 mg at 03/09/23 5892  •  doxazosin (CARDURA) tablet 2 mg, 2 mg, Oral, HS, [place] Mac, PA-C, 2 mg at 03/09/23 1918  •  epoetin josé miguel (EPOGEN,PROCRIT) injection 2,400 Units, 2,400 Units, Intravenous, Once per day on Mon Wed Chelsea Aponte CRNP, 2,400 Units at 03/09/23 1346  •  furosemide (LASIX) tablet 80 mg, 80 mg, Oral, Daily, Jeronimo Ramires PA-C, 80 mg at 03/10/23 6625  •  heparin (porcine) subcutaneous injection 5,000 Units, 5,000 Units, Subcutaneous, Q8H [address], 5,000 Units at 03/10/23 0552 **AND** [CANCELED] Platelet count, , , Once, Jeronimo Ramires PA-C  •  insulin glargine (LANTUS) subcutaneous injection 15 Units 0 15 mL, 15 Units, Subcutaneous, QAM, Bernie Swenson MD, 15 Units at 03/10/23 0835  •  insulin lispro (HumaLOG) 100 units/mL subcutaneous injection 1-6 Units, 1-6 Units, Subcutaneous, 4x Daily (AC & HS), 1 Units at 03/09/23 0749 **AND** Fingerstick Glucose (POCT), , , 4x Daily AC and at bedtime, Jeronimo Ramires PA-C  •  insulin lispro (HumaLOG) 100 units/mL subcutaneous injection 10 Units, 10 Units, Subcutaneous, Daily With Breakfast, Jeronimo Ramires PA-C, 10 Units at 03/10/23 0835  •  insulin lispro (HumaLOG) 100 units/mL subcutaneous injection 10 Units, 10 Units, Subcutaneous, Daily With Dinner, Jeronimo Ramires PA-C, 10 Units at 03/09/23 1834  •  iron sucrose (VENOFER) 50 mg in sodium chloride 0 9 % 50 mL IVPB, 50 mg, Intravenous, Weekly, KRISTI Quinones, Stopped at 03/09/23 1300  •  labetalol (NORMODYNE) tablet 400 mg, 400 mg, Oral, Q12H [address], Jeronimo Ramires PA-C, 400 mg at 03/10/23 2624  •  NIFEdipine (PROCARDIA XL) 24 hr tablet 90 mg, 90 mg, Oral, Daily, Jeronimo Ramires PA-C, 90 mg at 03/10/23 3279  •  nitroGLYcerin (TRIDIL) 50 mg in 250 mL infusion (premix), 5-200 mcg/min, Intravenous, Titrated, Eula Baumgarten, PA-C, Stopped at 03/10/23 1849  •  ondansetron (ZOFRAN) injection 4 mg, 4 mg, Intravenous, Q6H PRN, Jeronimo Ramires PA-C  •  polyethylene glycol (MIRALAX) packet 17 g, 17 g, Oral, Daily, Jeronimo Ramires PA-C    Laboratory Results:  Results from last 7 days   Lab Units 03/10/23  0547 03/09/23  0547 03/09/23  0346   WBC Thousand/uL 7 31 9 16 4 89   HEMOGLOBIN g/dL 8 5* 8 2* 9 5*   HEMATOCRIT % 27 3* 26 0* 30 2*   PLATELETS Thousands/uL 245 209 234   SODIUM mmol/L 138 139 141   POTASSIUM mmol/L 4 3 3 9 3 8   CHLORIDE mmol/L 99 99 98   CO2 mmol/L 31 30 33*   BUN mg/dL 21 32* 31*   CREATININE mg/dL 6 55* 9 71* 9 37*   CALCIUM mg/dL 9 5 9 5 10 2   MAGNESIUM mg/dL 2 1  --  2 5   PHOSPHORUS mg/dL  --   --  3 0   ALK PHOS U/L  --  57  --    ALT U/L  --  7  --    AST U/L  --  9*  --

## 2023-03-10 NOTE — TELEPHONE ENCOUNTER
----- [facility] sent at 3/5/2023  5:48 PM EST -----  No findings of significant concern on renal ultrasound  Both kidneys are small, which is an expected finding in dialysis patients  There are a few benign-appearing cysts in both kidneys as well which likely do not require any follow-up imaging from my standpoint    Patient has upcoming appointment with urology and can review with them as well

## 2023-03-11 ENCOUNTER — APPOINTMENT (INPATIENT)
Dept: DIALYSIS | Facility: HOSPITAL | Age: 58
End: 2023-03-11

## 2023-03-11 VITALS
OXYGEN SATURATION: 98 % | HEART RATE: 72 BPM | BODY MASS INDEX: 23.09 KG/M2 | TEMPERATURE: 98.8 F | RESPIRATION RATE: 18 BRPM | SYSTOLIC BLOOD PRESSURE: 155 MMHG | DIASTOLIC BLOOD PRESSURE: 72 MMHG | HEIGHT: 69 IN | WEIGHT: 155.87 LBS

## 2023-03-11 LAB
ANION GAP SERPL CALCULATED.3IONS-SCNC: 10 MMOL/L (ref 4–13)
BUN SERPL-MCNC: 45 MG/DL (ref 5–25)
CALCIUM SERPL-MCNC: 9.6 MG/DL (ref 8.4–10.2)
CHLORIDE SERPL-SCNC: 98 MMOL/L (ref 96–108)
CO2 SERPL-SCNC: 32 MMOL/L (ref 21–32)
CREAT SERPL-MCNC: 9.6 MG/DL (ref 0.6–1.3)
GFR SERPL CREATININE-BSD FRML MDRD: 5 ML/MIN/1.73SQ M
GLUCOSE SERPL-MCNC: 105 MG/DL (ref 65–140)
GLUCOSE SERPL-MCNC: 146 MG/DL (ref 65–140)
GLUCOSE SERPL-MCNC: 95 MG/DL (ref 65–140)
MAGNESIUM SERPL-MCNC: 2.1 MG/DL (ref 1.9–2.7)
MRSA NOSE QL CULT: NORMAL
POTASSIUM SERPL-SCNC: 5 MMOL/L (ref 3.5–5.3)
SODIUM SERPL-SCNC: 140 MMOL/L (ref 135–147)

## 2023-03-11 PROCEDURE — 5A1D70Z PERFORMANCE OF URINARY FILTRATION, INTERMITTENT, LESS THAN 6 HOURS PER DAY: ICD-10-PCS | Performed by: INTERNAL MEDICINE

## 2023-03-11 RX ADMIN — FUROSEMIDE 80 MG: 40 TABLET ORAL at 13:46

## 2023-03-11 RX ADMIN — LABETALOL HYDROCHLORIDE 400 MG: 200 TABLET, FILM COATED ORAL at 13:46

## 2023-03-11 RX ADMIN — EPOETIN ALFA 2400 UNITS: 3000 SOLUTION INTRAVENOUS; SUBCUTANEOUS at 12:00

## 2023-03-11 RX ADMIN — POLYETHYLENE GLYCOL 3350 17 G: 17 POWDER, FOR SOLUTION ORAL at 09:07

## 2023-03-11 RX ADMIN — HEPARIN SODIUM 5000 UNITS: 5000 INJECTION INTRAVENOUS; SUBCUTANEOUS at 05:18

## 2023-03-11 RX ADMIN — INSULIN GLARGINE 15 UNITS: 100 INJECTION, SOLUTION SUBCUTANEOUS at 09:07

## 2023-03-11 RX ADMIN — ASPIRIN 81 MG: 81 TABLET, COATED ORAL at 13:46

## 2023-03-11 RX ADMIN — NIFEDIPINE 90 MG: 30 TABLET, FILM COATED, EXTENDED RELEASE ORAL at 13:46

## 2023-03-11 NOTE — PROGRESS NOTES
Home Oxygen Qualifying Test     Patient name: Jon Le        : 1965   Date of Test:  2023  Diagnosis:    Home Oxygen Test:    **Medicare Guidelines require item(s) 1-5 on all ambulatory patients or 1 and 2 on non-ambulatory patients  1  Baseline SPO2 on Room Air at rest 98 %   a  If <= 88% on Room Air add O2 via NC to obtain SpO2 >=88%  If LPM needed, document LPM n/a needed to reach =>88%    2  SPO2 during exertion on Room Air 96 %  a  During exertion monitor SPO2  If SPO2 increases >=89%, do not add supplemental oxygen    3  SPO2 on Oxygen at Rest n/a % at n/a LPM    4  SPO2 during exertion on Oxygen n/a % at n/a LPM    5  Test performed during exertion activity  Ambulation  []  Supplemental Home Oxygen is indicated  [x]  Client does not qualify for home oxygen  Respiratory Additional Notes- Patient ambulated in room on a moderate pace assisted by RT due to visual impairment  Patient denies any shortness of breath during ambulation      Adarsh Soares, RT

## 2023-03-11 NOTE — NURSING NOTE
Patient discharged  Safely  Home health referral and discharge instructions given to patient and patient sister  All questions answered  Importance of follow up education reviewed  All belongings brought home with patient

## 2023-03-11 NOTE — PLAN OF CARE
Goal is to UF 2-4 L as tolerated on a 2 K bath for a serum K+ result of 5 0 from this mornings labs; 3 5 hrTX  Dr Prashant Moreno at bedside with initiation of dialysis  Dr Prashant Moreno is aqare and agreeable of goal, bath and   As per Dr Prashant Moreno we are challenging the pt's current EDW of 75 5 kg and the pt's ending weight today will be the new EDW that will be reported to the outpt unit  Problem: METABOLIC, FLUID AND ELECTROLYTES - ADULT  Goal: Electrolytes maintained within normal limits  Description: INTERVENTIONS:  - Monitor labs and assess patient for signs and symptoms of electrolyte imbalances  - Administer electrolyte replacement as ordered  - Monitor response to electrolyte replacements, including repeat lab results as appropriate  - Instruct patient on fluid and nutrition as appropriate  Outcome: Progressing  Goal: Fluid balance maintained  Description: INTERVENTIONS:  - Monitor labs   - Monitor I/O and WT  - Instruct patient on fluid and nutrition as appropriate  - Assess for signs & symptoms of volume excess or deficit  Outcome: Progressing     Post-Dialysis RN Treatment Note    Blood Pressure:  Pre 153/79 mm/Hg  Post 155/72 mmHg   EDW  75 5 kg    Weight:  Pre 74 7 kg   Post 70 7 kg   Mode of weight measurement: Standing Scale   Volume Removed  4000 ml net    Treatment duration 210 minutes    NS given  No    Treatment shortened?  No   Medications given during Rx Epogen 2400 units given at the end of [address] on venous return   Estimated Kt/V  None Reported   Access type: AV fistula   Access Issues: No    Report called to primary nurse   Yes Patricia oRsario RN

## 2023-03-11 NOTE — PROGRESS NOTES
NEPHROLOGY PROGRESS NOTE   Paddy Trevin 61 y o  male MRN: [id number]  Unit/Bed#: [facility] [address] 222[address]01 Encounter: [id number]      ASSESSMENT & PLAN:  1 ESRD on HD YURI Conley  Patient seen and examined during dialysis treatment  Previous EDW was 75 5 kg, weight was significantly challenged during this admission, continue with UF today on dialysis will try 3 to 4 kg as tolerated  We will contact outpatient unit with new dry weight after    2 acute hypoxemic respiratory failure in the setting of volume overload, hypertensive emergency, status post HD treatment on admission with 4 kg removed, plan for dialysis today we will attempt 3 to 4 kg as tolerated  Dry weight will be reduced appropriately    #3 hypertensive emergency in the setting of volume overload, blood pressure improving, continue blood pressure medication and UF on dialysis    4 anemia of chronic kidney disease, continue with EPO      5   Access, AV fistula cannulated with good blood flow    SUBJECTIVE:  Patient seen and examined during dialysis treatment, feeling better, chest pain or shortness of breath resolved, denies any complaints, no abdominal pain, no nausea, no vomiting      OBJECTIVE:  Current Weight: Weight - Scale: 74 7 kg (164 lb 10 9 oz)  Vitals:    03/11/23 0845   BP: 158/80   Pulse: 75   Resp: 18   Temp:    SpO2: 95%       Intake/Output Summary (Last 24 hours) at 3/11/2023 0855  Last data filed at 3/11/2023 6873  Gross per 24 hour   Intake 680 ml   Output --   Net 680 ml       General: conscious, cooperative, in not acute distress  Eyes: conjunctivae pink, anicteric sclerae  ENT: lips and mucous membranes moist  Neck: supple, no JVD  Chest: clear breath sounds bilateral, no crackles, ronchus or wheezings  CVS: distinct S1 & S2, normal rate, regular rhythm  Abdomen: soft, non-tender, non-distended, normoactive bowel sounds  Extremities: no edema of both legs AV fistula cannulated with good blood flow  Skin: no rash  Neuro: awake, alert, oriented        Medications:    Current Facility-Administered Medications:   •  acetaminophen (TYLENOL) tablet 650 mg, 650 mg, Oral, Q6H PRN, Hershal Carrel, PA-C, 650 mg at 03/09/23 1749  •  aluminum-magnesium hydroxide-simethicone (MYLANTA) oral suspension 30 mL, 30 mL, Oral, Q6H PRN, Hershal Carrel, PA-C  •  aspirin (ECOTRIN LOW STRENGTH) EC tablet 81 mg, 81 mg, Oral, Daily, Hershal Carrel, PA-C, 81 mg at 03/10/23 1327  •  atorvastatin (LIPITOR) tablet 40 mg, 40 mg, Oral, After Dinner, Hershal Carrel, PA-C, 40 mg at 03/10/23 1657  •  doxazosin (CARDURA) tablet 2 mg, 2 mg, Oral, HS, Hershal Carrel, PA-C, 2 mg at 03/10/23 2228  •  epoetin josé miguel (EPOGEN,PROCRIT) injection 2,400 Units, 2,400 Units, Intravenous, Once per day on Mon Wed Fri, KRISTI Zafar, 2,400 Units at 03/09/23 1346  •  furosemide (LASIX) tablet 80 mg, 80 mg, Oral, Daily, Hershal Carrel, PA-C, 80 mg at 03/10/23 4922  •  heparin (porcine) subcutaneous injection 5,000 Units, 5,000 Units, Subcutaneous, Q8H [address], 5,000 Units at 03/11/23 0518 **AND** [CANCELED] Platelet count, , , Once, Hershal Carrel, PA-C  •  insulin glargine (LANTUS) subcutaneous injection 15 Units 0 15 mL, 15 Units, Subcutaneous, QAM, Chuckie Mcgrath MD, 15 Units at 03/10/23 0835  •  insulin lispro (HumaLOG) 100 units/mL subcutaneous injection 1-6 Units, 1-6 Units, Subcutaneous, 4x Daily (AC & HS), 1 Units at 03/09/23 0749 **AND** Fingerstick Glucose (POCT), , , 4x Daily AC and at bedtime, Hershal Carrel, PA-C  •  insulin lispro (HumaLOG) 100 units/mL subcutaneous injection 10 Units, 10 Units, Subcutaneous, Daily With Breakfast, Hershal Carrel, PA-C, 10 Units at 03/10/23 0835  •  insulin lispro (HumaLOG) 100 units/mL subcutaneous injection 10 Units, 10 Units, Subcutaneous, Daily With Dinner, Hershal Carrel, PA-C, 10 Units at 03/10/23 1657  •  iron sucrose (VENOFER) 50 mg in sodium chloride 0 9 % 50 mL IVPB, 50 mg, Intravenous, Weekly, KRISTI Zafar, Stopped at 03/09/23 1300  •  labetalol (NORMODYNE) tablet 400 mg, 400 mg, Oral, Q12H [facility], Valeda Boas, PA-C, 400 mg at 03/10/23 2228  •  NIFEdipine (PROCARDIA XL) 24 hr tablet 90 mg, 90 mg, Oral, Daily, Valeda Boas, PA-C, 90 mg at 03/10/23 5072  •  ondansetron [facility]) injection 4 mg, 4 mg, Intravenous, Q6H PRN, Valeda Boas, PA-C  •  polyethylene glycol (MIRALAX) packet 17 g, 17 g, Oral, Daily, Valeda Boas, PA-C    Invasive Devices:        Lab Results:   Results from last 7 days   Lab Units 03/11/23  0524 03/10/23  0547 03/09/23  0547 03/09/23  0346   WBC Thousand/uL  --  7 31 9 16 4 89   HEMOGLOBIN g/dL  --  8 5* 8 2* 9 5*   HEMATOCRIT %  --  27 3* 26 0* 30 2*   PLATELETS Thousands/uL  --  245 209 234   SODIUM mmol/L 140 138 139 141   POTASSIUM mmol/L 5 0 4 3 3 9 3 8   CHLORIDE mmol/L 98 99 99 98   CO2 mmol/L 32 31 30 33*   BUN mg/dL 45* 21 32* 31*   CREATININE mg/dL 9 60* 6 55* 9 71* 9 37*   CALCIUM mg/dL 9 6 9 5 9 5 10 2   MAGNESIUM mg/dL 2 1 2 1  --  2 5   PHOSPHORUS mg/dL  --   --   --  3 0   ALK PHOS U/L  --   --  57  --    ALT U/L  --   --  7  --    AST U/L  --   --  9*  --        Previous work up:  See previous notes      Portions of the record may have been created with voice recognition software  Occasional wrong word or "sound a like" substitutions may have occurred due to the inherent limitations of voice recognition software  Read the chart carefully and recognize, using context, where substitutions have occurred  If you have any questions, please contact the dictating provider

## 2023-03-11 NOTE — ASSESSMENT & PLAN NOTE
Acute respiratory failure with hypoxia secondary to pulmonary edema    · No oxygen requirements at baseline  · Currently on room air  · Home oxygen evaluation on discharge today to confirm no oxygen required

## 2023-03-11 NOTE — ASSESSMENT & PLAN NOTE
Wt Readings from Last 3 Encounters:   03/11/23 74 7 kg (164 lb 10 9 oz)   02/20/23 77 6 kg (171 lb)   12/28/22 77 1 kg (170 lb)     Chronic diastolic heart failure in setting of hypertensive disease    Euvolemic    Plan:  Discharge today

## 2023-03-11 NOTE — DISCHARGE SUMMARY
[address] [facility]  Discharge- Paddy Trevin 1965, 61 y o  male MRN: [id number]  Unit/Bed#: [facility] 68 2 [address] [id number] Encounter: [id number]  Primary Care Provider: Ya Skinner FQNAMZDO SANG   Date and time admitted to hospital: 3/9/2023  3:26 AM    Chronic diastolic heart failure [facility])  Assessment & Plan  Wt Readings from Last 3 Encounters:   03/11/23 74 7 kg (164 lb 10 9 oz)   02/20/23 77 6 kg (171 lb)   12/28/22 77 1 kg (170 lb)     Chronic diastolic heart failure in setting of hypertensive disease  Euvolemic    Plan:  Discharge today    Acute respiratory failure with hypoxia [facility])  Assessment & Plan  Acute respiratory failure with hypoxia secondary to pulmonary edema  · No oxygen requirements at baseline  · Currently on room air  · Home oxygen evaluation on discharge today to confirm no oxygen required          Anemia of chronic disease  Assessment & Plan  In the setting of ESRD  No evidence of acute bleeding at this time  Continue monitoring  Recent Labs     03/09/23  0346 03/09/23  0547 03/10/23  0547   HGB 9 5* 8 2* 8 5*   MCV 90 91 91   RDW 15 0 14 9 14 9         ESRD on dialysis [facility])  Assessment & Plan  Lab Results   Component Value Date    EGFR 5 03/11/2023    EGFR 8 03/10/2023    EGFR 5 03/09/2023    CREATININE 9 60 (H) 03/11/2023    CREATININE 6 55 (H) 03/10/2023    CREATININE 9 71 (H) 03/09/2023     Continue TThS dialysis as scheduled    Management per renal     Diabetic retinopathy of both eyes associated with type 2 diabetes mellitus [facility])  Assessment & Plan  Legally blind       Discharging Physician / Practitioner: Jah Blandon MD  PCP: Ya Skinner [id number]  Admission Date:   Admission Orders (From admission, onward)     Ordered        03/09/23 0502  INPATIENT ADMISSION  Once                      Discharge Date: 03/11/23    Medical Problems     Resolved Problems  Date Reviewed: 3/11/2023   None         Consultations During Hospital Stay:  · nephrology    Procedures Performed:   · Dialysis    Significant Findings / Test Results:   · Imaging  XR Chest:    Findings consistent with CHF and pulmonary edema  · Echo      Left Ventricle: Left ventricular cavity size is normal  Wall thickness is moderately increased  There is moderate concentric hypertrophy  The left ventricular ejection fraction is 55%  Systolic function is low normal  Wall motion is normal  Diastolic function is normal   •  Right Ventricle: Wall thickness is increased  •  Left Atrium: The atrium is mildly dilated  •  Right Atrium: The atrium is mildly dilated  •  Aortic Valve: The aortic valve is trileaflet  The leaflets are not thickened  The leaflets are not calcified  The leaflets exhibit normal mobility  •  Pericardium: There is a small pericardial effusion circumferential to the heart, appearing moderate superior to the right atrium  There is no echocardiographic evidence of tamponade  •  Prior TTE study available for comparison  Prior study date: 2/8/2022  No significant changes noted compared to the prior study  Outpatient Tests Requested:  · PCP, nephrology  · Cbc, bmp    Complications:  none    Reason for Admission: Acute hypoxic respiratory failure/hypertensive emergency    Hospital Course:     Grace Fleming is a 61 y o  male patient who originally presented to the hospital on 3/9/2023 due to shortness of breath  68-year-old male with ESRD, anemia of chronic disease, and legal blindness as a result of diabetic retinopathy presented to our institution due to severe shortness of breath  He had hypertensive emergency and acute respiratory failure with hypoxia on admission requiring nitroglycerin drip and oxygen supplementation  He was clinically volume overloaded  He underwent dialysis with the help of nephrology  He is currently weaned off back to room air  No oxygen requirements on discharge  He is doing much better and was cleared by nephrology       Patient agrees to follow-up with his providers as scheduled and to take his medications as prescribed  All questions were addressed  he understood the need to seek immediate medical attention should he develop any chest pain, shortness of breath, severe pain, fever, chills, or any other concerning symptoms  Please see above list of diagnoses and related plan for additional information  Condition at Discharge: fair     Discharge Day Visit / Exam:     Subjective: Patient seen and examined at bedside  Comfortable  Breathing much better  Vitals: Blood Pressure: 155/73 (03/11/23 1145)  Pulse: 74 (03/11/23 1145)  Temperature: 98 8 °F (37 1 °C) (03/11/23 0833)  Temp Source: Oral (03/11/23 7165)  Respirations: 18 (03/11/23 1145)  Height: 5' 9" (175 3 cm) (03/10/23 1017)  Weight - Scale: 74 7 kg (164 lb 10 9 oz) (03/11/23 0833)  SpO2: 98 % (03/11/23 1145)  Exam:   Physical Exam  Vitals reviewed  Constitutional:       General: He is not in acute distress  HENT:      Head: Normocephalic  Nose: Nose normal       Mouth/Throat:      Mouth: Mucous membranes are moist    Eyes:      Comments: Legally blind   Cardiovascular:      Rate and Rhythm: Normal rate and regular rhythm  Pulmonary:      Effort: Pulmonary effort is normal  No respiratory distress  Abdominal:      General: There is no distension  Palpations: Abdomen is soft  Tenderness: There is no abdominal tenderness  Skin:     General: Skin is warm  Neurological:      Mental Status: He is alert  Mental status is at baseline  Psychiatric:         Mood and Affect: Mood normal          Behavior: Behavior normal        Discharge instructions/Information to patient and family:   See after visit summary for information provided to patient and family  Provisions for Follow-Up Care:  See after visit summary for information related to follow-up care and any pertinent home health orders        Disposition:     Home with [facility] Services (Reminder: Complete face to face encounter)    Planned Readmission: No     Discharge Statement:  I spent 40 minutes discharging the patient  This time was spent on the day of discharge  I had direct contact with the patient on the day of discharge  Greater than 50% of the total time was spent examining patient, answering all patient questions, arranging and discussing plan of care with patient as well as directly providing post-discharge instructions  Additional time then spent on discharge activities  Discharge Medications:  See after visit summary for reconciled discharge medications provided to patient and family        ** Please Note: This note has been constructed using a voice recognition system **

## 2023-03-11 NOTE — ASSESSMENT & PLAN NOTE
Lab Results   Component Value Date    EGFR 5 03/11/2023    EGFR 8 03/10/2023    EGFR 5 03/09/2023    CREATININE 9 60 (H) 03/11/2023    CREATININE 6 55 (H) 03/10/2023    CREATININE 9 71 (H) 03/09/2023     Continue TT dialysis as scheduled    Management per renal

## 2023-03-13 ENCOUNTER — TELEPHONE (OUTPATIENT)
Dept: INTERNAL MEDICINE CLINIC | Facility: CLINIC | Age: 58
End: 2023-03-13

## 2023-03-13 ENCOUNTER — TRANSITIONAL CARE MANAGEMENT (OUTPATIENT)
Dept: INTERNAL MEDICINE CLINIC | Facility: CLINIC | Age: 58
End: 2023-03-13

## 2023-03-16 DIAGNOSIS — Z79.4 UNCONTROLLED TYPE 2 DIABETES MELLITUS WITH HYPERGLYCEMIA, WITH LONG-TERM CURRENT USE OF INSULIN (HCC): ICD-10-CM

## 2023-03-16 DIAGNOSIS — N18.4 CKD (CHRONIC KIDNEY DISEASE) STAGE 4, GFR 15-29 ML/MIN (HCC): ICD-10-CM

## 2023-03-16 DIAGNOSIS — E11.65 UNCONTROLLED TYPE 2 DIABETES MELLITUS WITH HYPERGLYCEMIA, WITH LONG-TERM CURRENT USE OF INSULIN (HCC): ICD-10-CM

## 2023-03-16 DIAGNOSIS — I10 ESSENTIAL HYPERTENSION: ICD-10-CM

## 2023-03-16 DIAGNOSIS — E78.5 HYPERLIPIDEMIA, UNSPECIFIED HYPERLIPIDEMIA TYPE: ICD-10-CM

## 2023-03-16 RX ORDER — ASPIRIN 81 MG/1
81 TABLET ORAL DAILY
Qty: 90 TABLET | Refills: 0 | Status: SHIPPED | OUTPATIENT
Start: 2023-03-16

## 2023-03-16 RX ORDER — ATORVASTATIN CALCIUM 40 MG/1
40 TABLET, FILM COATED ORAL DAILY
Qty: 90 TABLET | Refills: 0 | Status: SHIPPED | OUTPATIENT
Start: 2023-03-16

## 2023-03-17 ENCOUNTER — OFFICE VISIT (OUTPATIENT)
Dept: UROLOGY | Facility: CLINIC | Age: 58
End: 2023-03-17

## 2023-03-17 VITALS
SYSTOLIC BLOOD PRESSURE: 110 MMHG | HEIGHT: 69 IN | WEIGHT: 163 LBS | BODY MASS INDEX: 24.14 KG/M2 | DIASTOLIC BLOOD PRESSURE: 60 MMHG

## 2023-03-17 DIAGNOSIS — R33.9 URINARY RETENTION: ICD-10-CM

## 2023-03-17 DIAGNOSIS — I10 HYPERTENSION: ICD-10-CM

## 2023-03-17 RX ORDER — NIFEDIPINE 90 MG/1
TABLET, EXTENDED RELEASE ORAL
COMMUNITY
Start: 2023-03-15 | End: 2023-03-17

## 2023-03-17 RX ORDER — DOXAZOSIN MESYLATE 4 MG/1
4 TABLET ORAL
Qty: 30 TABLET | Refills: 1 | Status: SHIPPED | OUTPATIENT
Start: 2023-03-17 | End: 2023-04-16

## 2023-03-17 NOTE — PROGRESS NOTES
3/17/2023      No chief complaint on file  Assessment and Plan    61 y o  male managed by NEW Patient    1  Slow urinary flow    Unclear cause of his slow flow subjectively his description sounds urethral point of obstruction  He feels reassured of pvr 29ml  Perhaps he has low flow because of low volume voids  No reported stone or catheter history but urethral stricture should be evaluated for  Temporarily increase doxazosin to 4mg at bedtime to see if that improves flow  Return for uroflow, pvr and office cystourethroscopy    History of Present Illness  Paddy Zhong is a 61 y o  male here with his sister for evaluation of urinary trouble for about 6 months  He feels like when he urinates it gets stuck in the urethra  Gets to a certain point then slows down  No stones or hematuria or dysuria  He has diabetes and HTN with retinopathy blindness as result and ESRD on hemodialysis T/Th/Sat  He does still make some low volume of urine each day twice per day once in morning once in afternoon  Recent urinalysis shows protein leukocytes mucus and epithelial bacterial cells no blood  No culture with that but he did take 7 days of cefpodoxime for that  Has never had hematuria or UTI prior  Does not recall any prior brown catheters but feels he probably did at some point in the hospital  A renal bladder US shows medical renal disease with cortical atrophy few simple renal cysts around 1cm  No stones  Normal appearing bladder with PVR 29ml  Prostate measures 2 9x4 1x3 7 He uses cardura 2mg for HTN  Review of Systems   Constitutional: Negative  Respiratory: Negative  Cardiovascular: Negative  Genitourinary: Positive for difficulty urinating  Negative for decreased urine volume, dysuria, flank pain, frequency, hematuria, scrotal swelling, testicular pain and urgency  Musculoskeletal: Negative                   Vitals  Vitals:    03/17/23 1020   BP: 110/60   Weight: 73 9 kg (163 lb)   Height: 5' 9" (1 753 m)       Physical Exam  Vitals and nursing note reviewed  Constitutional:       General: He is not in acute distress  Appearance: Normal appearance  He is well-developed  He is not diaphoretic  HENT:      Head: Normocephalic and atraumatic  Pulmonary:      Effort: Pulmonary effort is normal       Comments: No cough or audible wheeze  Abdominal:      General: There is no distension  Tenderness: There is no abdominal tenderness  There is no right CVA tenderness or left CVA tenderness  Genitourinary:     Comments: uncircumcised penis, normal phallus, orthotopic patent meatus  Testes smooth descended bilaterally into the scrotum nontender with no palpable mass  Digital rectal exam smooth prostate, without appreciable nodule, induration or asymmetry  Musculoskeletal:      Right lower leg: No edema  Left lower leg: No edema  Skin:     General: Skin is warm and dry  Neurological:      Mental Status: He is alert and oriented to person, place, and time        Gait: Gait normal    Psychiatric:         Speech: Speech normal          Behavior: Behavior normal            Past History  Past Medical History:   Diagnosis Date   • Cataract    • Dizziness     occ   • GERD (gastroesophageal reflux disease)    • Hyperlipidemia    • Legally blind    • LVH (left ventricular hypertrophy)    • Preferred language is [place]     understands some English   • Use of cane as ambulatory aid      Social History     Socioeconomic History   • Marital status: Legally      Spouse name: None   • Number of children: None   • Years of education: None   • Highest education level: None   Occupational History   • Occupation: DISABLED   Tobacco Use   • Smoking status: Former     Packs/day: 0 25     Types: Cigarettes     Quit date: 2018     Years since quittin 1   • Smokeless tobacco: Never   Vaping Use   • Vaping Use: Never used   Substance and Sexual Activity   • Alcohol use: Not Currently   • Drug use: No   • Sexual activity: Not Currently   Other Topics Concern   • None   Social History Narrative   • None     Social Determinants of Health     Financial Resource Strain: Not on file   Food Insecurity: No Food Insecurity   • Worried About Running Out of Food in the Last Year: Never true   • Ran Out of Food in the Last Year: Never true   Transportation Needs: No Transportation Needs   • Lack of Transportation (Medical): No   • Lack of Transportation (Non-Medical):  No   Physical Activity: Not on file   Stress: Not on file   Social Connections: Not on file   Intimate Partner Violence: Not on file   Housing Stability: Low Risk    • Unable to Pay for Housing in the Last Year: No   • Number of Places Lived in the Last Year: 1   • Unstable Housing in the Last Year: No     Social History     Tobacco Use   Smoking Status Former   • Packs/day: 0 25   • Types: Cigarettes   • Quit date: 2018   • Years since quittin 1   Smokeless Tobacco Never     Family History   Problem Relation Age of Onset   • Hypertension Mother    • Diabetes Father    • No Known Problems Sister    • No Known Problems Brother    • No Known Problems Maternal Aunt    • No Known Problems Maternal Uncle    • No Known Problems Paternal Aunt    • No Known Problems Paternal Uncle    • No Known Problems Maternal Grandmother    • No Known Problems Maternal Grandfather    • No Known Problems Paternal Grandmother    • No Known Problems Paternal Grandfather        The following portions of the patient's history were reviewed and updated as appropriate: allergies, current medications, past medical history, past social history, past surgical history and problem list     Results  No results found for this or any previous visit (from the past 1 hour(s)) ]  Lab Results   Component Value Date    PSA 0 5 2023    PSA 0 6 2021    PSA 0 5 2020    PSA 0 5 2019     Lab Results   Component Value Date    CALCIUM 9 6 2023    K 5 0 2023 CO2 32 03/11/2023    CL 98 03/11/2023    BUN 45 (H) 03/11/2023    CREATININE 9 60 (H) 03/11/2023     Lab Results   Component Value Date    WBC 7 31 03/10/2023    HGB 8 5 (L) 03/10/2023    HCT 27 3 (L) 03/10/2023    MCV 91 03/10/2023     03/10/2023

## 2023-04-04 DIAGNOSIS — N18.6 BENIGN HYPERTENSION WITH ESRD (END-STAGE RENAL DISEASE) (HCC): ICD-10-CM

## 2023-04-04 DIAGNOSIS — I12.0 BENIGN HYPERTENSION WITH ESRD (END-STAGE RENAL DISEASE) (HCC): ICD-10-CM

## 2023-04-04 RX ORDER — LABETALOL 300 MG/1
600 TABLET, FILM COATED ORAL EVERY 12 HOURS SCHEDULED
Qty: 180 TABLET | Refills: 3 | Status: SHIPPED | OUTPATIENT
Start: 2023-04-04 | End: 2023-05-04

## 2023-04-25 DIAGNOSIS — I10 HYPERTENSION: ICD-10-CM

## 2023-04-25 RX ORDER — DOXAZOSIN 2 MG/1
4 TABLET ORAL 2 TIMES DAILY
Qty: 180 TABLET | Refills: 4 | Status: SHIPPED | OUTPATIENT
Start: 2023-04-25 | End: 2023-05-25

## 2023-05-19 DIAGNOSIS — I10 ESSENTIAL HYPERTENSION: ICD-10-CM

## 2023-05-19 RX ORDER — FUROSEMIDE 80 MG
TABLET ORAL
Qty: 90 TABLET | Refills: 1 | Status: SHIPPED | OUTPATIENT
Start: 2023-05-19

## 2023-06-15 ENCOUNTER — RA CDI HCC (OUTPATIENT)
Dept: OTHER | Facility: HOSPITAL | Age: 58
End: 2023-06-15

## 2023-06-15 NOTE — PROGRESS NOTES
[facility]  coding opportunities          Chart Reviewed number of suggestions sent to Provider: 3     Patients Insurance     Medicare Insurance: Medicare        [id number]  [id number]  [id number]

## 2023-06-26 DIAGNOSIS — E11.21 TYPE 2 DIABETES MELLITUS WITH DIABETIC NEPHROPATHY, WITH LONG-TERM CURRENT USE OF INSULIN (HCC): ICD-10-CM

## 2023-06-26 DIAGNOSIS — Z79.4 TYPE 2 DIABETES MELLITUS WITH DIABETIC NEPHROPATHY, WITH LONG-TERM CURRENT USE OF INSULIN (HCC): ICD-10-CM

## 2023-06-26 RX ORDER — INSULIN GLARGINE 100 [IU]/ML
28 INJECTION, SOLUTION SUBCUTANEOUS DAILY
Qty: 15 ML | Refills: 2 | Status: SHIPPED | OUTPATIENT
Start: 2023-06-26 | End: 2023-06-28 | Stop reason: SDUPTHER

## 2023-06-27 DIAGNOSIS — I10 HYPERTENSION, UNSPECIFIED TYPE: Primary | ICD-10-CM

## 2023-06-27 RX ORDER — NIFEDIPINE 90 MG/1
TABLET, EXTENDED RELEASE ORAL
Qty: 90 TABLET | Refills: 0 | Status: SHIPPED | OUTPATIENT
Start: 2023-06-27

## 2023-06-28 ENCOUNTER — OFFICE VISIT (OUTPATIENT)
Dept: INTERNAL MEDICINE CLINIC | Facility: CLINIC | Age: 58
End: 2023-06-28
Payer: MEDICARE

## 2023-06-28 VITALS
WEIGHT: 156 LBS | BODY MASS INDEX: 23.11 KG/M2 | HEIGHT: 69 IN | DIASTOLIC BLOOD PRESSURE: 60 MMHG | TEMPERATURE: 97.3 F | SYSTOLIC BLOOD PRESSURE: 130 MMHG | RESPIRATION RATE: 18 BRPM | OXYGEN SATURATION: 97 % | HEART RATE: 68 BPM

## 2023-06-28 DIAGNOSIS — Z99.2 TYPE 2 DIABETES MELLITUS WITH CHRONIC KIDNEY DISEASE ON CHRONIC DIALYSIS, WITH LONG-TERM CURRENT USE OF INSULIN (HCC): ICD-10-CM

## 2023-06-28 DIAGNOSIS — M54.2 NECK PAIN: ICD-10-CM

## 2023-06-28 DIAGNOSIS — Z79.4 TYPE 2 DIABETES MELLITUS WITH DIABETIC NEPHROPATHY, WITH LONG-TERM CURRENT USE OF INSULIN (HCC): ICD-10-CM

## 2023-06-28 DIAGNOSIS — Z22.7 TB LUNG, LATENT: ICD-10-CM

## 2023-06-28 DIAGNOSIS — N18.6 TYPE 2 DIABETES MELLITUS WITH CHRONIC KIDNEY DISEASE ON CHRONIC DIALYSIS, WITH LONG-TERM CURRENT USE OF INSULIN (HCC): ICD-10-CM

## 2023-06-28 DIAGNOSIS — E11.21 TYPE 2 DIABETES MELLITUS WITH DIABETIC NEPHROPATHY, WITH LONG-TERM CURRENT USE OF INSULIN (HCC): ICD-10-CM

## 2023-06-28 DIAGNOSIS — E11.22 TYPE 2 DIABETES MELLITUS WITH CHRONIC KIDNEY DISEASE ON CHRONIC DIALYSIS, WITH LONG-TERM CURRENT USE OF INSULIN (HCC): ICD-10-CM

## 2023-06-28 DIAGNOSIS — N18.6 BENIGN HYPERTENSION WITH ESRD (END-STAGE RENAL DISEASE) (HCC): ICD-10-CM

## 2023-06-28 DIAGNOSIS — M54.50 ACUTE BILATERAL LOW BACK PAIN WITHOUT SCIATICA: Primary | ICD-10-CM

## 2023-06-28 DIAGNOSIS — N18.6 ESRD ON DIALYSIS (HCC): ICD-10-CM

## 2023-06-28 DIAGNOSIS — I12.0 BENIGN HYPERTENSION WITH ESRD (END-STAGE RENAL DISEASE) (HCC): ICD-10-CM

## 2023-06-28 DIAGNOSIS — Z99.2 ESRD ON DIALYSIS (HCC): ICD-10-CM

## 2023-06-28 DIAGNOSIS — Z79.4 TYPE 2 DIABETES MELLITUS WITH CHRONIC KIDNEY DISEASE ON CHRONIC DIALYSIS, WITH LONG-TERM CURRENT USE OF INSULIN (HCC): ICD-10-CM

## 2023-06-28 PROCEDURE — 99214 OFFICE O/P EST MOD 30 MIN: CPT | Performed by: INTERNAL MEDICINE

## 2023-06-28 RX ORDER — ACETAMINOPHEN 500 MG
500-1000 TABLET ORAL EVERY 8 HOURS PRN
Qty: 20 TABLET | Refills: 0 | Status: SHIPPED | OUTPATIENT
Start: 2023-06-28

## 2023-06-28 RX ORDER — METHOCARBAMOL 500 MG/1
500 TABLET, FILM COATED ORAL 2 TIMES DAILY PRN
Qty: 10 TABLET | Refills: 0 | Status: SHIPPED | OUTPATIENT
Start: 2023-06-28

## 2023-06-28 RX ORDER — INSULIN GLARGINE 100 [IU]/ML
28 INJECTION, SOLUTION SUBCUTANEOUS DAILY
Qty: 15 ML | Refills: 2 | Status: SHIPPED | OUTPATIENT
Start: 2023-06-28

## 2023-06-28 NOTE — ASSESSMENT & PLAN NOTE
Woke up this morning with low back pain  Nonradiating  8 out of 10 at its worst improved at the moment  Not having any paresthesias or sensory disturbances  Denies any new onset bowel or bladder incontinence  -Continue with supportive care    Sent prescription for Tylenol and muscle relaxant as needed to pharmacy

## 2023-06-28 NOTE — ASSESSMENT & PLAN NOTE
HbA1c: 7 2 June 2023, 6 3 February 2023, 6 1 April 2022  Current medications:  glargine 28 units daily, NovoLog 10 units with breakfast, 10 units with dinner  Statin and ACE-inhibitor:  atorvastatin  Foot exam:  Up-to-date, follows with podiatry  Eye Exam:  Follows with Dr Natalia Lozano, he is legally blind    Slight increase in A1c noted, just above goal   We will continue with current regimen for now

## 2023-06-28 NOTE — PROGRESS NOTES
INTERNAL MEDICINE OFFICE VISIT  [facility] Internal Medicine- [place]    NAME: Paddy Zhong  AGE: 60 y o  SEX: male    DATE OF ENCOUNTER: 6/28/2023    Assessment and Plan/History of Present Illness     Here today for follow-up  Medical history of insulin-dependent type 2 diabetes complicated by retinopathy and neuropathy, ESRD on HD, legal blindness, chronic constipation, former smoking, latent TB, pulmonary nodule      1  Acute bilateral low back pain without sciatica  Assessment & Plan:  Woke up this morning with low back pain  Nonradiating  8 out of 10 at its worst improved at the moment  Not having any paresthesias or sensory disturbances  Denies any new onset bowel or bladder incontinence  -Continue with supportive care  Sent prescription for Tylenol and muscle relaxant as needed to pharmacy    Orders:  -     acetaminophen (TYLENOL) 500 mg tablet; Take 1-2 tablets (500-1,000 mg total) by mouth every 8 (eight) hours as needed for moderate pain  -     methocarbamol (ROBAXIN) 500 mg tablet; Take 1 tablet (500 mg total) by mouth 2 (two) times a day as needed for muscle spasms    2  Neck pain    3  Type 2 diabetes mellitus with diabetic nephropathy, with long-term current use of insulin [facility])  Assessment & Plan:  HbA1c: 7 2 June 2023, 6 3 February 2023, 6 1 April 2022  Current medications:  glargine 28 units daily, NovoLog 10 units with breakfast, 10 units with dinner  Statin and ACE-inhibitor:  atorvastatin  Foot exam:  Up-to-date, follows with podiatry  Eye Exam:  Follows with Dr Karyna Freedman, he is legally blind    Slight increase in A1c noted, just above goal   We will continue with current regimen for now    Orders:  -     Insulin Glargine Solostar (Basaglar KwikPen) 100 UNIT/ML SOPN; Inject 0 28 mL (28 Units total) under the skin daily    4   Type 2 diabetes mellitus with chronic kidney disease on chronic dialysis, with long-term current use of insulin [facility])  Assessment & Plan:  HbA1c: 7 2 June 2023, 6 3 February 2023, 6 1 April 2022  Current medications:  glargine 28 units daily, NovoLog 10 units with breakfast, 10 units with dinner  Statin and ACE-inhibitor:  atorvastatin  Foot exam:  Up-to-date, follows with podiatry  Eye Exam:  Follows with Dr Mayra Wilder, he is legally blind    Slight increase in A1c noted, just above goal   We will continue with current regimen for now    Orders:  -     insulin aspart (NovoLOG) 100 Units/mL injection pen; 10U with breakfast, 10 U with dinner  -     Basic metabolic panel; Future; Expected date: 10/26/2023  -     Hemoglobin A1C; Future; Expected date: 10/26/2023  -     CBC and differential; Future; Expected date: 10/26/2023  -     Lipid Panel with Direct LDL reflex; Future; Expected date: 10/26/2023    5  TB lung, latent  Assessment & Plan:  Status post treatment with isoniazid      6  Benign hypertension with ESRD (end-stage renal disease) ([facility] )  Assessment & Plan:  Adequately controlled today  Continue current regimen      7  ESRD on dialysis [facility])  Assessment & Plan:  - follows with Nephrology    Goes to HD at [address] This Encounter   Procedures   • Basic metabolic panel   • Hemoglobin A1C   • CBC and differential   • Lipid Panel with Direct LDL reflex         Chief Complaint     Chief Complaint   Patient presents with   • Follow-up     Please put some labs for next appt        Review of Systems     10 point ROS negative except per HPI    The following portions of the patient's history were reviewed and updated as appropriate: allergies, current medications, past family history, past medical history, past social history, past surgical history and problem list     Active Problem List     Patient Active Problem List   Diagnosis   • Type 2 diabetes mellitus, with long-term current use of insulin ([facility] )   • Diabetic retinopathy of both eyes associated with type 2 diabetes mellitus ([facility] )   • LVH (left ventricular hypertrophy)   • Hyperlipidemia "  • Chronic constipation   • Screening for colon cancer   • ESRD on dialysis ([email or web address] )   • S/P arteriovenous (AV) fistula creation   • Azotemia   • Hyperphosphatemia   • Benign hypertension with ESRD (end-stage renal disease) (HCC)   • Slow transit constipation   • Onychomycosis   • Diabetic polyneuropathy associated with type 2 diabetes mellitus (HCC)   • Positive QuantiFERON-TB Gold test   • TB lung, latent   • Long term (current) use of antibiotics   • Abnormal EKG   • Pulmonary nodule   • Adenomatous polyp of colon   • Visual disorder   • Anemia of chronic disease   • Chronic diastolic heart failure (HCC)   • Hypocalcemia   • Peripheral arterial disease (HCC)   • Skin ulcer of toe of left foot with fat layer exposed ([place] [address] )   • Secondary hyperparathyroidism of renal origin ([place] [address] )   • Urinary retention   • Hypertensive emergency   • Acute respiratory failure with hypoxia ([place] [address] )   • Acute bilateral low back pain without sciatica       Objective     /60 (BP Location: Right arm, Patient Position: Sitting, Cuff Size: Standard)   Pulse 68   Temp (!) 97 3 °F (36 3 °C)   Resp 18   Ht 5' 9\" (1 753 m)   Wt 70 8 kg (156 lb)   SpO2 97%   BMI 23 04 kg/m²     Physical Exam  Cardiovascular:      Pulses: Pulses are weak  Dorsalis pedis pulses are 1+ on the right side and 1+ on the left side  Posterior tibial pulses are 2+ on the right side and 2+ on the left side  Feet:      Right foot:      Skin integrity: No ulcer, skin breakdown, erythema, warmth, callus or dry skin  Left foot:      Skin integrity: No ulcer, skin breakdown, erythema, warmth, callus or dry skin  Diabetic Foot Exam    Patient's shoes and socks removed  Right Foot/Ankle   Right Foot Inspection  Skin Exam: skin normal and skin intact  No dry skin, no warmth, no callus, no erythema, no maceration, no abnormal color, no pre-ulcer, no ulcer and no callus  Toe Exam: ROM and strength within normal limits       Sensory " Vibration: intact  Monofilament testing: diminished    Vascular  The right DP pulse is 1+  The right PT pulse is 2+  Left Foot/Ankle  Left Foot Inspection  Skin Exam: skin normal and skin intact  No dry skin, no warmth, no erythema, no maceration, normal color, no pre-ulcer, no ulcer and no callus  Toe Exam: ROM and strength within normal limits  Sensory   Vibration: diminished  Monofilament testing: diminished    Vascular  The left DP pulse is 1+  The left PT pulse is 2+  Assign Risk Category  No deformity present  No loss of protective sensation  Weak pulses  Risk: 1    Pertinent Laboratory/Diagnostic Studies:  XR chest 1 view portable    Result Date: 3/9/2023  Impression: Findings consistent with CHF and pulmonary edema  The findings were observed and documented by the referring physician in the patient's Epic notes   Workstation performed: [id number]       Images and diagnostics reviewed     Current Medications     Current Outpatient Medications:   •  acetaminophen (TYLENOL) 500 mg tablet, Take 1-2 tablets (500-1,000 mg total) by mouth every 8 (eight) hours as needed for moderate pain, Disp: 20 tablet, Rfl: 0  •  aspirin (Aspirin Low Dose) 81 mg EC tablet, Take 1 tablet (81 mg total) by mouth daily, Disp: 90 tablet, Rfl: 0  •  atorvastatin (LIPITOR) 40 mg tablet, Take 1 tablet (40 mg total) by mouth daily, Disp: 90 tablet, Rfl: 0  •  brimonidine tartrate 0 2 % ophthalmic solution, Administer 1 drop into the left eye 2 (two) times a day, Disp: , Rfl:   •  Carboxymethylcellul-Glycerin (CLEAR EYES FOR DRY EYES OP), Apply 2 drops to eye 2 (two) times a day, Disp: , Rfl:   •  Continuous Blood Gluc  (FreeStyle Jessica 2 [place]) LAUREN, Check blood sugars multiple times per day, Disp: 1 each, Rfl: 0  •  Continuous Blood Gluc Sensor (FreeStyle Jessica 2 Sensor) MISC, Check blood sugars multiple times per day, Disp: 6 each, Rfl: 3  •  dorzolamide (TRUSOPT) 2 % ophthalmic solution, instill 1 drop into left eye three times a day, Disp: , Rfl:   •  furosemide (LASIX) 80 mg tablet, take 1 tablet by mouth daily, Disp: 90 tablet, Rfl: 1  •  insulin aspart (NovoLOG) 100 Units/mL injection pen, 10U with breakfast, 10 U with dinner, Disp: 15 mL, Rfl: 2  •  Insulin Glargine Solostar (Basaglar KwikPen) 100 UNIT/ML SOPN, Inject 0 28 mL (28 Units total) under the skin daily, Disp: 15 mL, Rfl: 2  •  labetalol (NORMODYNE) 300 mg tablet, Take 2 tablets (600 mg total) by mouth every 12 (twelve) hours, Disp: 180 tablet, Rfl: 3  •  Lancets (freestyle) lancets, Use as instructed, Disp: 100 each, Rfl: 1  •  methocarbamol (ROBAXIN) 500 mg tablet, Take 1 tablet (500 mg total) by mouth 2 (two) times a day as needed for muscle spasms, Disp: 10 tablet, Rfl: 0  •  NIFEdipine (PROCARDIA XL) 90 mg 24 hr tablet, TAKE 1 TABLET BY MOUTH DAILY, Disp: 90 tablet, Rfl: 0  •  polyethylene glycol (MIRALAX) 17 g packet, Take 17 g by mouth daily, Disp: 30 each, Rfl: 1  •  Blood Glucose Monitoring Suppl (ONE TOUCH ULTRA 2) w/Device KIT, by Does not apply route 3 (three) times a day (Patient not taking: Reported on 6/28/2023), Disp: 1 each, Rfl: 0  •  doxazosin (CARDURA) 2 mg tablet, Take 2 tablets (4 mg total) by mouth 2 (two) times a day, Disp: 180 tablet, Rfl: 4    Health Maintenance     Health Maintenance   Topic Date Due   • DM Eye Exam  Never done   • PT PLAN OF CARE  05/25/2019   • COVID-19 Vaccine (3 - Pfizer series) 06/17/2021   • HEMOGLOBIN A1C  10/20/2022   • Depression Screening  07/22/2023   • Medicare Annual Wellness Visit (AWV)  07/22/2023   • BMI: Adult  06/28/2024   • Diabetic Foot Exam  06/28/2024   • Colorectal Cancer Screening  12/25/2031   • HIV Screening  Completed   • Hepatitis C Screening  Completed   • Pneumococcal Vaccine: Pediatrics (0 to 5 Years) and At-Risk Patients (6 to 59 Years)  Completed   • Influenza Vaccine  Completed   • HIB Vaccine  Aged Out   • IPV Vaccine  Aged Out   • Hepatitis A Vaccine  Aged Out   • Meningococcal ACWY Vaccine  Aged Out   • HPV Vaccine  Aged Out     Immunization History   Administered Date(s) Administered   • COVID-19 PFIZER VACCINE 0 3 ML IM 04/01/2021, 04/22/2021   • Hep B, adult 04/23/2019, 04/23/2019, 06/22/2019, 06/22/2019, 08/22/2019, 08/22/2019, 11/23/2019, 11/23/2019   • INFLUENZA 11/11/2009, 11/11/2009, 10/06/2014, 10/06/2014, 08/04/2017, 08/04/2017, 10/01/2018, 01/02/2019, 10/17/2019, 09/18/2020, 09/18/2020, 09/18/2022, 10/22/2022   • Influenza Quadrivalent 3 years and older 10/01/2018, 10/01/2018, 10/17/2019, 10/17/2019, 10/21/2021   • Influenza, recombinant, quadrivalent,injectable, preservative free 01/02/2019, 09/09/2019, 09/16/2020   • Pneumococcal Conjugate Vaccine 20-valent (Pcv20), Polysace 07/22/2022   • Pneumococcal Polysaccharide PPV23 11/11/2009, 11/11/2009, 06/14/2018, 06/14/2018, 06/14/2020   • Tuberculin Skin Test-PPD Intradermal 04/09/2019, 04/09/2019, 01/09/2020, 01/09/2020, 01/12/2021, 01/12/2021, 01/18/2022, 01/10/2023       MONTSE Sloan    [facility] Internal Medicine [facility]  [address], [address]  [place], [address]  Office: [phone]  Fax: [phone]

## 2023-07-10 DIAGNOSIS — E11.21 TYPE 2 DIABETES MELLITUS WITH DIABETIC NEPHROPATHY, WITH LONG-TERM CURRENT USE OF INSULIN (HCC): ICD-10-CM

## 2023-07-10 DIAGNOSIS — Z79.4 TYPE 2 DIABETES MELLITUS WITH DIABETIC NEPHROPATHY, WITH LONG-TERM CURRENT USE OF INSULIN (HCC): ICD-10-CM

## 2023-07-10 RX ORDER — INSULIN GLARGINE 100 [IU]/ML
28 INJECTION, SOLUTION SUBCUTANEOUS DAILY
Qty: 15 ML | Refills: 2 | Status: SHIPPED | OUTPATIENT
Start: 2023-07-10

## 2023-07-21 ENCOUNTER — HOSPITAL ENCOUNTER (OUTPATIENT)
Dept: NON INVASIVE DIAGNOSTICS | Facility: HOSPITAL | Age: 58
Discharge: HOME | End: 2023-07-21
Attending: INTERNAL MEDICINE
Payer: MEDICARE

## 2023-07-21 ENCOUNTER — PATIENT OUTREACH (OUTPATIENT)
Dept: CASE MANAGEMENT | Facility: OTHER | Age: 58
End: 2023-07-21

## 2023-07-21 DIAGNOSIS — R91.1 PULMONARY NODULE: ICD-10-CM

## 2023-07-21 DIAGNOSIS — N25.81 SECONDARY HYPERPARATHYROIDISM OF RENAL ORIGIN (HCC): ICD-10-CM

## 2023-07-21 DIAGNOSIS — H54.8 LEGAL BLINDNESS, AS DEFINED IN USA: ICD-10-CM

## 2023-07-21 DIAGNOSIS — R77.8 ELEVATED TROPONIN I LEVEL: ICD-10-CM

## 2023-07-21 DIAGNOSIS — D63.8 ANEMIA IN OTHER CHRONIC DISEASES CLASSIFIED ELSEWHERE: ICD-10-CM

## 2023-07-21 DIAGNOSIS — Z71.89 COMPLEX CARE COORDINATION: Primary | ICD-10-CM

## 2023-07-21 DIAGNOSIS — R33.9 RETENTION OF URINE, UNSPECIFIED: ICD-10-CM

## 2023-07-21 DIAGNOSIS — I51.7 LEFT VENTRICULAR HYPERTROPHY: ICD-10-CM

## 2023-07-21 DIAGNOSIS — Z01.818 ENCOUNTER FOR PRE-TRANSPLANT EVALUATION FOR KIDNEY AND PANCREAS TRANSPLANT: ICD-10-CM

## 2023-07-21 DIAGNOSIS — E78.5 HYPERLIPIDEMIA, UNSPECIFIED: ICD-10-CM

## 2023-07-21 DIAGNOSIS — E11.311: ICD-10-CM

## 2023-07-21 DIAGNOSIS — I50.32 CHRONIC DIASTOLIC (CONGESTIVE) HEART FAILURE (HCC): ICD-10-CM

## 2023-07-21 DIAGNOSIS — E11.22 TYPE 2 DIABETES MELLITUS WITH DIABETIC CHRONIC KIDNEY DISEASE (HCC): ICD-10-CM

## 2023-07-21 DIAGNOSIS — N18.6 END STAGE RENAL DISEASE (HCC): ICD-10-CM

## 2023-07-21 DIAGNOSIS — I73.9 PERIPHERAL VASCULAR DISEASE, UNSPECIFIED (HCC): ICD-10-CM

## 2023-07-21 PROCEDURE — 93880 EXTRACRANIAL BILAT STUDY: CPT | Performed by: SURGERY

## 2023-07-21 PROCEDURE — 93970 EXTREMITY STUDY: CPT

## 2023-07-21 PROCEDURE — 93880 EXTRACRANIAL BILAT STUDY: CPT

## 2023-07-21 PROCEDURE — 93971 EXTREMITY STUDY: CPT | Performed by: SURGERY

## 2023-07-21 NOTE — PROGRESS NOTES
Email escalation received from [id number] [place]  for patient receiving dialysis Tu/Th/Sa @ [facility]. Report states:  Team working with patient to implement strategies for consistently taking BP meds. Review of chart shows patient with mod-high risk for admission score of 74% and upward trend of HbA1c: 7.2 June 2023, 6.3 February 2023, 6.1 April 2022. Patient referred to complex care management. In basket sent to [address] [place] [address]. regarding the same.

## 2023-07-24 ENCOUNTER — HOSPITAL ENCOUNTER (OUTPATIENT)
Dept: NUCLEAR MEDICINE | Facility: HOSPITAL | Age: 58
Discharge: HOME | End: 2023-07-24
Attending: INTERNAL MEDICINE
Payer: MEDICARE

## 2023-07-24 ENCOUNTER — HOSPITAL ENCOUNTER (OUTPATIENT)
Dept: NON INVASIVE DIAGNOSTICS | Facility: HOSPITAL | Age: 58
Discharge: HOME | End: 2023-07-24
Attending: INTERNAL MEDICINE
Payer: MEDICARE

## 2023-07-24 VITALS — HEIGHT: 69 IN | WEIGHT: 156 LBS | BODY MASS INDEX: 23.11 KG/M2

## 2023-07-24 DIAGNOSIS — N25.81 SECONDARY HYPERPARATHYROIDISM OF RENAL ORIGIN (HCC): ICD-10-CM

## 2023-07-24 DIAGNOSIS — R91.1 PULMONARY NODULE: ICD-10-CM

## 2023-07-24 DIAGNOSIS — I50.32 CHRONIC DIASTOLIC (CONGESTIVE) HEART FAILURE (HCC): ICD-10-CM

## 2023-07-24 DIAGNOSIS — E78.5 HYPERLIPIDEMIA, UNSPECIFIED: ICD-10-CM

## 2023-07-24 DIAGNOSIS — E11.22 TYPE 2 DIABETES MELLITUS WITH DIABETIC CHRONIC KIDNEY DISEASE (HCC): ICD-10-CM

## 2023-07-24 DIAGNOSIS — H54.8 LEGAL BLINDNESS, AS DEFINED IN USA: ICD-10-CM

## 2023-07-24 DIAGNOSIS — D63.8 ANEMIA IN OTHER CHRONIC DISEASES CLASSIFIED ELSEWHERE: ICD-10-CM

## 2023-07-24 DIAGNOSIS — Z01.818 ENCOUNTER FOR PRE-TRANSPLANT EVALUATION FOR KIDNEY AND PANCREAS TRANSPLANT: ICD-10-CM

## 2023-07-24 DIAGNOSIS — R77.8 ELEVATED TROPONIN I LEVEL: ICD-10-CM

## 2023-07-24 DIAGNOSIS — I51.7 LEFT VENTRICULAR HYPERTROPHY: ICD-10-CM

## 2023-07-24 DIAGNOSIS — I73.9 PERIPHERAL VASCULAR DISEASE, UNSPECIFIED (HCC): ICD-10-CM

## 2023-07-24 DIAGNOSIS — N18.6 END STAGE RENAL DISEASE (HCC): ICD-10-CM

## 2023-07-24 DIAGNOSIS — R33.9 RETENTION OF URINE, UNSPECIFIED: ICD-10-CM

## 2023-07-24 DIAGNOSIS — E11.311: ICD-10-CM

## 2023-07-24 LAB
CHEST PAIN STATEMENT: NORMAL
MAX DIASTOLIC BP: 64 MMHG
MAX HEART RATE: 67 BPM
MAX HR PERCENT: 41 %
MAX HR: 67 BPM
MAX PREDICTED HEART RATE: 161 BPM
MAX. SYSTOLIC BP: 132 MMHG
NUC STRESS EJECTION FRACTION: 61 %
PROTOCOL NAME: NORMAL
RATE PRESSURE PRODUCT: 6566
REASON FOR TERMINATION: NORMAL
SL CV REST NUCLEAR ISOTOPE DOSE: 11 MCI
SL CV STRESS NUCLEAR ISOTOPE DOSE: 31.3 MCI
SL CV STRESS RECOVERY BP: NORMAL MMHG
SL CV STRESS RECOVERY HR: 62 BPM
SL CV STRESS RECOVERY O2 SAT: 100 %
STRESS ANGINA INDEX: 0
STRESS BASELINE BP: NORMAL MMHG
STRESS BASELINE HR: 52 BPM
STRESS O2 SAT REST: 100 %
STRESS PEAK HR: 67 BPM
STRESS POST ESTIMATED WORKLOAD: 1 METS
STRESS POST O2 SAT PEAK: 100 %
STRESS POST PEAK BP: 98 MMHG
STRESS/REST PERFUSION RATIO: 1.07
TARGET HR FORMULA: NORMAL
TEST INDICATION: NORMAL
TIME IN EXERCISE PHASE: NORMAL

## 2023-07-24 PROCEDURE — 93017 CV STRESS TEST TRACING ONLY: CPT

## 2023-07-24 PROCEDURE — 78452 HT MUSCLE IMAGE SPECT MULT: CPT | Performed by: STUDENT IN AN ORGANIZED HEALTH CARE EDUCATION/TRAINING PROGRAM

## 2023-07-24 PROCEDURE — 93018 CV STRESS TEST I&R ONLY: CPT | Performed by: STUDENT IN AN ORGANIZED HEALTH CARE EDUCATION/TRAINING PROGRAM

## 2023-07-24 PROCEDURE — 78452 HT MUSCLE IMAGE SPECT MULT: CPT

## 2023-07-24 PROCEDURE — 93016 CV STRESS TEST SUPVJ ONLY: CPT | Performed by: STUDENT IN AN ORGANIZED HEALTH CARE EDUCATION/TRAINING PROGRAM

## 2023-07-24 PROCEDURE — A9502 TC99M TETROFOSMIN: HCPCS

## 2023-07-24 RX ORDER — REGADENOSON 0.08 MG/ML
0.4 INJECTION, SOLUTION INTRAVENOUS ONCE
Status: COMPLETED | OUTPATIENT
Start: 2023-07-24 | End: 2023-07-24

## 2023-07-24 RX ADMIN — REGADENOSON 0.4 MG: 0.08 INJECTION, SOLUTION INTRAVENOUS at 10:50

## 2023-07-25 ENCOUNTER — PATIENT OUTREACH (OUTPATIENT)
Dept: CASE MANAGEMENT | Facility: OTHER | Age: 58
End: 2023-07-25

## 2023-07-25 NOTE — PROGRESS NOTES
Outreach TC to patient, identified as a Mau Young for initial care management assessment. I spoke with patient's sister, Megan Sánchez, who is also patient's primary CG. CG reports that patient is feeling well. Patient denies any s/sx of HTN including headaches, sudden changes to vision or nosebleeds. . CG reports that patient is independent with ADL's. Patient does not need assist with IADLs. CG does report that there is a language barrier. Patient resides with his sister  who assists as needed. . Patient did complete all his cardiac testing on 7/24/23 and has scheduled  a f/u with cardiology on 9/14/23. I reviewed medications including dose, schedule, purpose & side effects of  Insulin glargine, acetaminophen, methocarbamol, insulin aspart, nifedipine, furosemide, doxazosin, labetalol, low dose ASA, atorvastatin and eye drops with CG, who verbalizes understanding. CG reports that all medications are delivered in blister packaging. CG dispenses the AM doses and the patient takes the PM doses independently. I reviewed future appointments, s/sx to report and how/when to report symptoms to provider vs. 911. Patient verbalizes understanding. CG educated on role of CM and contact information for CM. CG declines additional calls.

## 2023-08-31 DIAGNOSIS — E11.65 UNCONTROLLED TYPE 2 DIABETES MELLITUS WITH HYPERGLYCEMIA, WITH LONG-TERM CURRENT USE OF INSULIN (HCC): ICD-10-CM

## 2023-08-31 DIAGNOSIS — I10 ESSENTIAL HYPERTENSION: ICD-10-CM

## 2023-08-31 DIAGNOSIS — I12.0 BENIGN HYPERTENSION WITH ESRD (END-STAGE RENAL DISEASE) (HCC): Primary | ICD-10-CM

## 2023-08-31 DIAGNOSIS — N18.4 CKD (CHRONIC KIDNEY DISEASE) STAGE 4, GFR 15-29 ML/MIN (HCC): ICD-10-CM

## 2023-08-31 DIAGNOSIS — Z79.4 UNCONTROLLED TYPE 2 DIABETES MELLITUS WITH HYPERGLYCEMIA, WITH LONG-TERM CURRENT USE OF INSULIN (HCC): ICD-10-CM

## 2023-08-31 DIAGNOSIS — N18.6 BENIGN HYPERTENSION WITH ESRD (END-STAGE RENAL DISEASE) (HCC): Primary | ICD-10-CM

## 2023-08-31 DIAGNOSIS — E78.5 HYPERLIPIDEMIA, UNSPECIFIED HYPERLIPIDEMIA TYPE: ICD-10-CM

## 2023-08-31 RX ORDER — FUROSEMIDE 80 MG
TABLET ORAL
Qty: 90 TABLET | Refills: 0 | Status: SHIPPED | OUTPATIENT
Start: 2023-08-31

## 2023-08-31 RX ORDER — ATORVASTATIN CALCIUM 40 MG/1
40 TABLET, FILM COATED ORAL DAILY
Qty: 90 TABLET | Refills: 0 | Status: SHIPPED | OUTPATIENT
Start: 2023-08-31

## 2023-08-31 RX ORDER — LABETALOL 200 MG/1
600 TABLET, FILM COATED ORAL EVERY 12 HOURS
Qty: 120 TABLET | Refills: 0 | Status: SHIPPED | OUTPATIENT
Start: 2023-08-31

## 2023-08-31 RX ORDER — ASPIRIN 81 MG/1
81 TABLET, COATED ORAL DAILY
Qty: 90 TABLET | Refills: 0 | Status: SHIPPED | OUTPATIENT
Start: 2023-08-31

## 2023-09-13 ENCOUNTER — OFFICE VISIT (OUTPATIENT)
Dept: NEPHROLOGY | Facility: CLINIC | Age: 58
End: 2023-09-13

## 2023-09-13 VITALS
BODY MASS INDEX: 23.4 KG/M2 | DIASTOLIC BLOOD PRESSURE: 70 MMHG | SYSTOLIC BLOOD PRESSURE: 140 MMHG | HEIGHT: 69 IN | WEIGHT: 158 LBS

## 2023-09-13 DIAGNOSIS — Z01.818 PRE-TRANSPLANT EVALUATION FOR ESRD (END STAGE RENAL DISEASE): Primary | ICD-10-CM

## 2023-09-13 DIAGNOSIS — K59.00 CONSTIPATION, UNSPECIFIED CONSTIPATION TYPE: Primary | ICD-10-CM

## 2023-09-13 RX ORDER — LACTULOSE 20 G/30ML
20 SOLUTION ORAL 2 TIMES DAILY PRN
Qty: 237 ML | Refills: 1 | Status: SHIPPED | OUTPATIENT
Start: 2023-09-13

## 2023-09-13 NOTE — PROGRESS NOTES
NEPHROLOGY OFFICE VISIT   Paddy Zhong 60 y.o. male MRN: [id number]  9/13/2023    Reason for Visit: pre renal transplant evaluation f/u    ASSESSMENT and PLAN:    I had the pleasure of seeing Mr Malena Camargo today in the renal clinic for the continued management of pre renal transplant evaluation f/u    Paddy Zhong is a 60 y. o. male [place] referred by Dr Fernandez Crespo, with PMHx of ESRD on HD at [facility] (TTS), [facility] (1509), DM (diagnosed 2002), retinopathy, legally blind, neuropathy, history of chronic idiopathic constipation, former worked in [employer], former smoker quit in 2016 (smoked since teenage years), no ETOH, no drugs,seen in the Nephrology Clinic for pre-transplant kidney evaluation.     ESRD presumed to be due [facility] HD since 4/2019.     7/2019 - admission for staph epidermidis bacteremia in setting of dialyssi catheter.      7/2019 - ECHO EF 65; mod to severe concentric LVH;      January 2021-patient was admitted due to finding of positive QuantiFERON gold test with suspicion of latent TB due to history.  During pre evaluation workup.  Patient was started on treatment for 9 months.     February 2021-patient admitted for chest pain and hypertensive urgency.  Troponins unrevealing.     May 2021-patient completed cardiac catheterization-no significant coronary     December 2021-patient completed EGD and colonoscopy. EGD with moderate erosive gastritis that was biopsied. Moderate duodenitis. Irregular Z-line which was biopsied.  -colonoscopy with small internal hemorrhoids otherwise normal.  Repeat screening in 10 years.     February 2022-patient was admitted to the hospital with respiratory failure with hypoxia and hypertensive urgency secondary to volume overload.   Patient underwent dialysis with ultrafiltration and improvement.     Echocardiogram February 2022-EF 58%, mild-to-moderate mitral regurgitation, mild tricuspid regurgitation, mild-to-moderate pulmonary elevated systolic pressures.     CT scan April 2022 of abdomen and pelvis-no significant interval changes. Cortical renal atrophy bilaterally consistent with chronic kidney disease and renal cysts without suspicious lesions    October 2022- CT scan with unchanged benign appearing right upper lobe nodule opacity which appears to be linear scarring. Parenchyma is otherwise unclear. March 2023- patient was admitted to the hospital with shortness of breath. Had hypertensive emergency. Was also volume overloaded and was dialyzed. Was subsequently able to be weaned to room air. Echocardiogram was repeated with EF 24%, normal diastolic dysfunction, small pericardial effusion with no evidence of tamponade, no significant changes to prior echo    7/2023 - stress test - nl nuclear pharmacologic stress test     Plan   1. Patient's case will be reviewed with the transplant committee at the next transplant committee meeting. No changes needed for now from renal transplant eval standpoint. All care continues per his chronic physicians. Pt remains active on transplant list currently. Just completed AOT visit also  2.  intestinal metaplasia and positive for H pylori. Patient was treated per prior notes. 3. Patient will need yearly CT scan regarding incidental pulmonary nodule finding in February 2021 which was repeated in 2022 and appears to be benign upper lobe nodular opacity likely focus of linear scarring. 4. Carotid artery ultrasound patient completed yearly follow-up with vascular team with ultrasound of carotid July 2023 with less than 50% stenoses bilaterally. 5. Endocrine - history of DM; approx 40-50 units total daily; BMI 23  6. Patient had cardiac catheterization completed with 30% mid LAD stenosis noted.  Otherwise did not require intervention.  From the transplant standpoint, if the patient does not have transplant by 2024, will likely need repeat catheterization. Had stress test in 7/2023 unrevealing.    7. Latent TB-patient completed 9 month course of treatment  8. DRVVT screen elevated slightly. Will review with team       No problem-specific Assessment & Plan notes found for this encounter. HPI:    No recent fevers, chills, nausea, vomiting. Tolerating dialysis. No issues with syncope on dialysis. PATIENT INSTRUCTIONS:    There are no Patient Instructions on file for this visit. OBJECTIVE:  Current Weight:    There were no vitals filed for this visit. There is no height or weight on file to calculate BMI. REVIEW OF SYSTEMS:    Review of Systems   Constitutional: Negative. Negative for appetite change and fatigue. HENT: Negative. Eyes: Negative. Respiratory: Negative. Negative for shortness of breath. Cardiovascular: Negative. Negative for leg swelling. Gastrointestinal: Negative. Endocrine: Negative. Genitourinary: Negative. Negative for difficulty urinating. Musculoskeletal: Negative. Allergic/Immunologic: Negative. Neurological: Negative. Hematological: Negative. Psychiatric/Behavioral: Negative. All other systems reviewed and are negative. PHYSICAL EXAM:      Physical Exam  Vitals and nursing note reviewed. Constitutional:       General: He is not in acute distress. Appearance: He is well-developed. He is not diaphoretic. HENT:      Head: Normocephalic and atraumatic. Eyes:      General: No scleral icterus. Right eye: No discharge. Left eye: No discharge. Conjunctiva/sclera: Conjunctivae normal.   Cardiovascular:      Rate and Rhythm: Normal rate and regular rhythm. Heart sounds: Normal heart sounds. No murmur heard. No friction rub. No gallop. Pulmonary:      Effort: Pulmonary effort is normal. No respiratory distress. Breath sounds: Normal breath sounds. No wheezing or rales. Chest:      Chest wall: No tenderness. Abdominal:      General: Bowel sounds are normal. There is no distension.       Palpations: Abdomen is soft. Tenderness: There is no abdominal tenderness. There is no rebound. Musculoskeletal:         General: No tenderness or deformity. Normal range of motion. Cervical back: Normal range of motion and neck supple. Comments: SAGEE DYLON good thrill/bruit   Skin:     General: Skin is warm and dry. Coloration: Skin is not pale. Findings: No erythema or rash. Neurological:      Mental Status: He is alert and oriented to person, place, and time. Cranial Nerves: No cranial nerve deficit. Coordination: Coordination normal.   Psychiatric:         Behavior: Behavior normal.         Thought Content:  Thought content normal.         Judgment: Judgment normal.         Medications:    Current Outpatient Medications:   •  acetaminophen (TYLENOL) 500 mg tablet, Take 1-2 tablets (500-1,000 mg total) by mouth every 8 (eight) hours as needed for moderate pain, Disp: 20 tablet, Rfl: 0  •  Aspirin Low Dose 81 MG EC tablet, TAKE 1 TABLET (81 MG TOTAL) BY MOUTH DAILY, Disp: 90 tablet, Rfl: 0  •  atorvastatin (LIPITOR) 40 mg tablet, TAKE 1 TABLET (40 MG TOTAL) BY MOUTH DAILY, Disp: 90 tablet, Rfl: 0  •  Blood Glucose Monitoring Suppl (ONE TOUCH ULTRA 2) w/Device KIT, by Does not apply route 3 (three) times a day (Patient not taking: Reported on 6/28/2023), Disp: 1 each, Rfl: 0  •  brimonidine tartrate 0.2 % ophthalmic solution, Administer 1 drop into the left eye 2 (two) times a day, Disp: , Rfl:   •  Carboxymethylcellul-Glycerin (CLEAR EYES FOR DRY EYES OP), Apply 2 drops to eye 2 (two) times a day, Disp: , Rfl:   •  Continuous Blood Gluc  (FreeStyle Jessica 2 [place]) LAUREN, Check blood sugars multiple times per day, Disp: 1 each, Rfl: 0  •  Continuous Blood Gluc Sensor (FreeStyle Jessica 2 Sensor) MISC, Check blood sugars multiple times per day, Disp: 6 each, Rfl: 3  •  dorzolamide (TRUSOPT) 2 % ophthalmic solution, instill 1 drop into left eye three times a day, Disp: , Rfl:   • doxazosin (CARDURA) 2 mg tablet, Take 2 tablets (4 mg total) by mouth 2 (two) times a day, Disp: 180 tablet, Rfl: 4  •  furosemide (LASIX) 80 mg tablet, TAKE 1 TABLET BY MOUTH DAILY, Disp: 90 tablet, Rfl: 0  •  insulin aspart (NovoLOG) 100 Units/mL injection pen, 10U with breakfast, 10 U with dinner, Disp: 15 mL, Rfl: 2  •  Insulin Glargine Solostar (Basaglar KwikPen) 100 UNIT/ML SOPN, Inject 0.28 mL (28 Units total) under the skin daily, Disp: 15 mL, Rfl: 2  •  labetalol (NORMODYNE) 200 mg tablet, Take 3 tablets (600 mg total) by mouth every 12 (twelve) hours, Disp: 120 tablet, Rfl: 0  •  lactulose 20 g/30 mL, Take 30 mL (20 g total) by mouth 2 (two) times a day as needed (for constipation), Disp: 237 mL, Rfl: 1  •  Lancets (freestyle) lancets, Use as instructed, Disp: 100 each, Rfl: 1  •  methocarbamol (ROBAXIN) 500 mg tablet, Take 1 tablet (500 mg total) by mouth 2 (two) times a day as needed for muscle spasms, Disp: 10 tablet, Rfl: 0  •  NIFEdipine (PROCARDIA XL) 90 mg 24 hr tablet, TAKE 1 TABLET BY MOUTH DAILY, Disp: 90 tablet, Rfl: 0  •  polyethylene glycol (MIRALAX) 17 g packet, Take 17 g by mouth daily, Disp: 30 each, Rfl: 1    Laboratory Results:        Invalid input(s): "ALBUMIN"    Results for orders placed or performed during the hospital encounter of 07/24/23   Stress strip   Result Value Ref Range    Protocol Name [address] SIT     Time In Exercise Phase 00:03:00     MAX.  SYSTOLIC  mmHg    Max Diastolic Bp 64 mmHg    Max Heart Rate 67 BPM    Max Predicted Heart Rate 161 BPM    Reason for Termination Test Complete     Test Indication Kidney Transplant Eval.     Target Hr Formular (220 - Age)*85%     Arrhy During Ex      ECG Interp Before Ex      ECG Interp during Ex      Ex Summary Comment      Chest Pain Statement none     Overall Hr Response To Exercise      Overall BP Response To Exercise

## 2023-09-13 NOTE — PATIENT INSTRUCTIONS
1) We will review your case at the transplant committee meeting and will review our decision with you in approximately 4 weeks over the phone. 2) If you do not receive a call from Jules Lamb within 4 weeks, please call our local Nephrology office. 3) From a renal transplant evaluation purpose, we will see you once a year in our local office for medical follow-up. 4) Once we call you with our decision regarding further evaluation, you will receive further instruction over the phone and in the mail regarding the next steps to complete the transplant evaluation. 5) All of your non transplant evaluation follow up regarding your regular medical follow ups, your renal care follow ups, and any other specialists visits you are following with should continue as you are advised by those respective physicians and continue to follow with their management and care.

## 2023-09-13 NOTE — LETTER
September 13, 2023     Merlin Perera DO  [facility] [address]  [address]  [address] [place] [id number]    Patient: Karthikeyan Johnson   YOB: 1965   Date of Visit: 9/13/2023       Dear Dr. Jordi Escobar:    Thank you for referring Karthikeyan Johnson to me for evaluation. Below are my notes for this consultation. If you have questions, please do not hesitate to call me. I look forward to following your patient along with you. Sincerely,        Kike Ortega MD        CC: MD Kike ESCALERA MD  9/13/2023  2:14 PM  Sign when Signing Visit  NEPHROLOGY OFFICE VISIT   Vanessa Zhong 60 y.o. male MRN: [id number]  9/13/2023    Reason for Visit: pre renal transplant evaluation f/u    ASSESSMENT and PLAN:    I had the pleasure of seeing Mr Alea Sotelo today in the renal clinic for the continued management of pre renal transplant evaluation f/u    Karthikeyan Johnson is a 60 y.o. male [place] referred by Dr Clay Hanks, with PMHx of ESRD on HD at [facility]), HTN (2011), DM (diagnosed 2002), retinopathy, legally blind, neuropathy, history of chronic idiopathic constipation, former worked in security, former smoker quit in 2016 (smoked since teenage years), no ETOH, no drugs,seen in the Nephrology Clinic for pre-transplant kidney evaluation. ESRD presumed to be due to DM. On HD since 4/2019.     7/2019 - admission for staph epidermidis bacteremia in setting of dialyssi catheter. 7/2019 - ECHO EF 72; mod to severe concentric LVH;      January 2021-patient was admitted due to finding of positive QuantiFERON gold test with suspicion of latent TB due to history. During pre evaluation workup. Patient was started on treatment for 9 months. February 2021-patient admitted for chest pain and hypertensive urgency. Troponins unrevealing.      May 2021-patient completed cardiac catheterization-no significant coronary     December 2021-patient completed EGD and colonoscopy. EGD with moderate erosive gastritis that was biopsied. Moderate duodenitis. Irregular Z-line which was biopsied.  -colonoscopy with small internal hemorrhoids otherwise normal.  Repeat screening in 10 years. February 2022-patient was admitted to the hospital with respiratory failure with hypoxia and hypertensive urgency secondary to volume overload. Patient underwent dialysis with ultrafiltration and improvement. Echocardiogram February 2022-EF 58%, mild-to-moderate mitral regurgitation, mild tricuspid regurgitation, mild-to-moderate pulmonary elevated systolic pressures. CT scan April 2022 of abdomen and pelvis-no significant interval changes. Cortical renal atrophy bilaterally consistent with chronic kidney disease and renal cysts without suspicious lesions    October 2022- CT scan with unchanged benign appearing right upper lobe nodule opacity which appears to be linear scarring. Parenchyma is otherwise unclear. March 2023- patient was admitted to the hospital with shortness of breath. Had hypertensive emergency. Was also volume overloaded and was dialyzed. Was subsequently able to be weaned to room air. Echocardiogram was repeated with EF 21%, normal diastolic dysfunction, small pericardial effusion with no evidence of tamponade, no significant changes to prior echo    7/2023 - stress test - nl nuclear pharmacologic stress test     Plan   1. Patient's case will be reviewed with the transplant committee at the next transplant committee meeting. No changes needed for now from renal transplant eval standpoint. All care continues per his chronic physicians. Pt remains active on transplant list currently. Just completed AOT visit also  2. intestinal metaplasia and positive for H pylori. Patient was treated per prior notes.   3. Patient will need yearly CT scan regarding incidental pulmonary nodule finding in February 2021 which was repeated in 2022 and appears to be benign upper lobe nodular opacity likely focus of linear scarring. 4. Carotid artery ultrasound patient completed yearly follow-up with vascular team with ultrasound of carotid July 2023 with less than 50% stenoses bilaterally. 5. Endocrine - history of DM; approx 40-50 units total daily; BMI 23  6. Patient had cardiac catheterization completed with 30% mid LAD stenosis noted. Otherwise did not require intervention. From the transplant standpoint, if the patient does not have transplant by 2024, will likely need repeat catheterization. Had stress test in 7/2023 unrevealing. 7. Latent TB-patient completed 9 month course of treatment  8. DRVVT screen elevated slightly. Will review with team       No problem-specific Assessment & Plan notes found for this encounter. HPI:    No recent fevers, chills, nausea, vomiting. Tolerating dialysis. No issues with syncope on dialysis. PATIENT INSTRUCTIONS:    There are no Patient Instructions on file for this visit. OBJECTIVE:  Current Weight:    There were no vitals filed for this visit. There is no height or weight on file to calculate BMI. REVIEW OF SYSTEMS:    Review of Systems   Constitutional: Negative. Negative for appetite change and fatigue. HENT: Negative. Eyes: Negative. Respiratory: Negative. Negative for shortness of breath. Cardiovascular: Negative. Negative for leg swelling. Gastrointestinal: Negative. Endocrine: Negative. Genitourinary: Negative. Negative for difficulty urinating. Musculoskeletal: Negative. Allergic/Immunologic: Negative. Neurological: Negative. Hematological: Negative. Psychiatric/Behavioral: Negative. All other systems reviewed and are negative. PHYSICAL EXAM:      Physical Exam  Vitals and nursing note reviewed. Constitutional:       General: He is not in acute distress. Appearance: He is well-developed. He is not diaphoretic. HENT:      Head: Normocephalic and atraumatic.    Eyes: General: No scleral icterus. Right eye: No discharge. Left eye: No discharge. Conjunctiva/sclera: Conjunctivae normal.   Cardiovascular:      Rate and Rhythm: Normal rate and regular rhythm. Heart sounds: Normal heart sounds. No murmur heard. No friction rub. No gallop. Pulmonary:      Effort: Pulmonary effort is normal. No respiratory distress. Breath sounds: Normal breath sounds. No wheezing or rales. Chest:      Chest wall: No tenderness. Abdominal:      General: Bowel sounds are normal. There is no distension. Palpations: Abdomen is soft. Tenderness: There is no abdominal tenderness. There is no rebound. Musculoskeletal:         General: No tenderness or deformity. Normal range of motion. Cervical back: Normal range of motion and neck supple. Comments: SAGEE AVF good thrill/bruit   Skin:     General: Skin is warm and dry. Coloration: Skin is not pale. Findings: No erythema or rash. Neurological:      Mental Status: He is alert and oriented to person, place, and time. Cranial Nerves: No cranial nerve deficit. Coordination: Coordination normal.   Psychiatric:         Behavior: Behavior normal.         Thought Content:  Thought content normal.         Judgment: Judgment normal.         Medications:    Current Outpatient Medications:   •  acetaminophen (TYLENOL) 500 mg tablet, Take 1-2 tablets (500-1,000 mg total) by mouth every 8 (eight) hours as needed for moderate pain, Disp: 20 tablet, Rfl: 0  •  Aspirin Low Dose 81 MG EC tablet, TAKE 1 TABLET (81 MG TOTAL) BY MOUTH DAILY, Disp: 90 tablet, Rfl: 0  •  atorvastatin (LIPITOR) 40 mg tablet, TAKE 1 TABLET (40 MG TOTAL) BY MOUTH DAILY, Disp: 90 tablet, Rfl: 0  •  Blood Glucose Monitoring Suppl (ONE TOUCH ULTRA 2) w/Device KIT, by Does not apply route 3 (three) times a day (Patient not taking: Reported on 6/28/2023), Disp: 1 each, Rfl: 0  •  brimonidine tartrate 0.2 % ophthalmic solution, Administer 1 drop into the left eye 2 (two) times a day, Disp: , Rfl:   •  Carboxymethylcellul-Glycerin (CLEAR EYES FOR DRY EYES OP), Apply 2 drops to eye 2 (two) times a day, Disp: , Rfl:   •  Continuous Blood Gluc  (FreeStyle Jessica 2 [place]) LAUREN, Check blood sugars multiple times per day, Disp: 1 each, Rfl: 0  •  Continuous Blood Gluc Sensor (FreeStyle Jessica 2 Sensor) MISC, Check blood sugars multiple times per day, Disp: 6 each, Rfl: 3  •  dorzolamide (TRUSOPT) 2 % ophthalmic solution, instill 1 drop into left eye three times a day, Disp: , Rfl:   •  doxazosin (CARDURA) 2 mg tablet, Take 2 tablets (4 mg total) by mouth 2 (two) times a day, Disp: 180 tablet, Rfl: 4  •  furosemide (LASIX) 80 mg tablet, TAKE 1 TABLET BY MOUTH DAILY, Disp: 90 tablet, Rfl: 0  •  insulin aspart (NovoLOG) 100 Units/mL injection pen, 10U with breakfast, 10 U with dinner, Disp: 15 mL, Rfl: 2  •  Insulin Glargine Solostar (Basaglar KwikPen) 100 UNIT/ML SOPN, Inject 0.28 mL (28 Units total) under the skin daily, Disp: 15 mL, Rfl: 2  •  labetalol (NORMODYNE) 200 mg tablet, Take 3 tablets (600 mg total) by mouth every 12 (twelve) hours, Disp: 120 tablet, Rfl: 0  •  lactulose 20 g/30 mL, Take 30 mL (20 g total) by mouth 2 (two) times a day as needed (for constipation), Disp: 237 mL, Rfl: 1  •  Lancets (freestyle) lancets, Use as instructed, Disp: 100 each, Rfl: 1  •  methocarbamol (ROBAXIN) 500 mg tablet, Take 1 tablet (500 mg total) by mouth 2 (two) times a day as needed for muscle spasms, Disp: 10 tablet, Rfl: 0  •  NIFEdipine (PROCARDIA XL) 90 mg 24 hr tablet, TAKE 1 TABLET BY MOUTH DAILY, Disp: 90 tablet, Rfl: 0  •  polyethylene glycol (MIRALAX) 17 g packet, Take 17 g by mouth daily, Disp: 30 each, Rfl: 1    Laboratory Results:        Invalid input(s): "ALBUMIN"    Results for orders placed or performed during the hospital encounter of 07/24/23   Stress strip   Result Value Ref Range    Protocol Name [address] SIT     Time In Exercise Phase 00:03:00     MAX.  SYSTOLIC  mmHg    Max Diastolic Bp 64 mmHg    Max Heart Rate 67 BPM    Max Predicted Heart Rate 161 BPM    Reason for Termination Test Complete     Test Indication Kidney Transplant Eval.     Target Hr Formular (220 - Age)*85%     Arrhy During Ex      ECG Interp Before Ex      ECG Interp during Ex      Ex Summary Comment      Chest Pain Statement none     Overall Hr Response To Exercise      Overall BP Response To Exercise

## 2023-10-17 DIAGNOSIS — I10 HYPERTENSION, UNSPECIFIED TYPE: ICD-10-CM

## 2023-10-17 DIAGNOSIS — I12.0 BENIGN HYPERTENSION WITH ESRD (END-STAGE RENAL DISEASE) (HCC): ICD-10-CM

## 2023-10-17 DIAGNOSIS — N18.6 BENIGN HYPERTENSION WITH ESRD (END-STAGE RENAL DISEASE) (HCC): ICD-10-CM

## 2023-10-17 RX ORDER — NIFEDIPINE 90 MG/1
TABLET, EXTENDED RELEASE ORAL
Qty: 90 TABLET | Refills: 0 | Status: SHIPPED | OUTPATIENT
Start: 2023-10-17

## 2023-10-17 RX ORDER — LABETALOL 200 MG/1
600 TABLET, FILM COATED ORAL EVERY 12 HOURS
Qty: 120 TABLET | Refills: 0 | Status: SHIPPED | OUTPATIENT
Start: 2023-10-17

## 2023-10-24 DIAGNOSIS — K59.00 CONSTIPATION, UNSPECIFIED CONSTIPATION TYPE: ICD-10-CM

## 2023-10-24 RX ORDER — LACTULOSE 20 G/30ML
20 SOLUTION ORAL 2 TIMES DAILY PRN
Qty: 946 ML | Refills: 2 | Status: SHIPPED | OUTPATIENT
Start: 2023-10-24

## 2023-10-30 DIAGNOSIS — I12.0 BENIGN HYPERTENSION WITH ESRD (END-STAGE RENAL DISEASE) (HCC): ICD-10-CM

## 2023-10-30 DIAGNOSIS — N18.6 BENIGN HYPERTENSION WITH ESRD (END-STAGE RENAL DISEASE) (HCC): ICD-10-CM

## 2023-10-30 RX ORDER — LABETALOL 200 MG/1
600 TABLET, FILM COATED ORAL EVERY 12 HOURS
Qty: 120 TABLET | Refills: 6 | Status: SHIPPED | OUTPATIENT
Start: 2023-10-30

## 2023-11-08 DIAGNOSIS — Z79.4 TYPE 2 DIABETES MELLITUS WITH DIABETIC NEPHROPATHY, WITH LONG-TERM CURRENT USE OF INSULIN (HCC): Primary | ICD-10-CM

## 2023-11-08 DIAGNOSIS — E11.21 TYPE 2 DIABETES MELLITUS WITH DIABETIC NEPHROPATHY, WITH LONG-TERM CURRENT USE OF INSULIN (HCC): Primary | ICD-10-CM

## 2023-11-08 RX ORDER — PEN NEEDLE, DIABETIC 32GX 5/32"
NEEDLE, DISPOSABLE MISCELLANEOUS
Qty: 90 EACH | Refills: 1 | Status: SHIPPED | OUTPATIENT
Start: 2023-11-08

## 2023-11-23 NOTE — ASSESSMENT & PLAN NOTE
Lab Results   Component Value Date    HGBA1C 9 0 (H) 01/04/2019       Recent Labs      01/23/19   0623  01/23/19   0935   POCGLU  162*  172*       Blood Sugar Average: Last 72 hrs:  unfortunately not monitored yet  Will try to get new glucometer  Alert and oriented to person, place and time/Awake

## 2023-12-05 DIAGNOSIS — I10 HYPERTENSION: ICD-10-CM

## 2023-12-05 RX ORDER — DOXAZOSIN 2 MG/1
4 TABLET ORAL 2 TIMES DAILY
Qty: 180 TABLET | Refills: 4 | Status: SHIPPED | OUTPATIENT
Start: 2023-12-05 | End: 2024-03-04

## 2023-12-19 ENCOUNTER — RA CDI HCC (OUTPATIENT)
Dept: OTHER | Facility: HOSPITAL | Age: 58
End: 2023-12-19

## 2023-12-19 NOTE — PROGRESS NOTES
HCC coding opportunities          Chart Reviewed number of suggestions sent to Provider: 3     Patients Insurance     Medicare Insurance: Medicare        I13.2  E11.51  E11.319

## 2023-12-27 ENCOUNTER — OFFICE VISIT (OUTPATIENT)
Dept: INTERNAL MEDICINE CLINIC | Facility: CLINIC | Age: 58
End: 2023-12-27
Payer: MEDICARE

## 2023-12-27 VITALS
HEIGHT: 69 IN | RESPIRATION RATE: 18 BRPM | DIASTOLIC BLOOD PRESSURE: 60 MMHG | SYSTOLIC BLOOD PRESSURE: 100 MMHG | HEART RATE: 73 BPM | WEIGHT: 159 LBS | BODY MASS INDEX: 23.55 KG/M2 | OXYGEN SATURATION: 96 % | TEMPERATURE: 96 F

## 2023-12-27 DIAGNOSIS — N18.6 ESRD ON DIALYSIS (HCC): ICD-10-CM

## 2023-12-27 DIAGNOSIS — Z79.4 TYPE 2 DIABETES MELLITUS WITH OTHER DIABETIC KIDNEY COMPLICATION, WITH LONG-TERM CURRENT USE OF INSULIN (HCC): Primary | ICD-10-CM

## 2023-12-27 DIAGNOSIS — Z00.00 MEDICARE ANNUAL WELLNESS VISIT, SUBSEQUENT: ICD-10-CM

## 2023-12-27 DIAGNOSIS — N18.6 BENIGN HYPERTENSION WITH ESRD (END-STAGE RENAL DISEASE) (HCC): ICD-10-CM

## 2023-12-27 DIAGNOSIS — E11.29 TYPE 2 DIABETES MELLITUS WITH OTHER DIABETIC KIDNEY COMPLICATION, WITH LONG-TERM CURRENT USE OF INSULIN (HCC): Primary | ICD-10-CM

## 2023-12-27 DIAGNOSIS — I12.0 BENIGN HYPERTENSION WITH ESRD (END-STAGE RENAL DISEASE) (HCC): ICD-10-CM

## 2023-12-27 DIAGNOSIS — R91.1 PULMONARY NODULE: ICD-10-CM

## 2023-12-27 DIAGNOSIS — Z99.2 ESRD ON DIALYSIS (HCC): ICD-10-CM

## 2023-12-27 DIAGNOSIS — B34.9 ACUTE VIRAL SYNDROME: ICD-10-CM

## 2023-12-27 LAB — SL AMB POCT HEMOGLOBIN AIC: 7.4 (ref ?–6.5)

## 2023-12-27 PROCEDURE — 83036 HEMOGLOBIN GLYCOSYLATED A1C: CPT | Performed by: INTERNAL MEDICINE

## 2023-12-27 PROCEDURE — 99214 OFFICE O/P EST MOD 30 MIN: CPT | Performed by: INTERNAL MEDICINE

## 2023-12-27 PROCEDURE — G0439 PPPS, SUBSEQ VISIT: HCPCS | Performed by: INTERNAL MEDICINE

## 2023-12-27 RX ORDER — BENZONATATE 100 MG/1
100-200 CAPSULE ORAL 3 TIMES DAILY PRN
Qty: 30 CAPSULE | Refills: 0 | Status: SHIPPED | OUTPATIENT
Start: 2023-12-27

## 2023-12-27 NOTE — ASSESSMENT & PLAN NOTE
HbA1c: 7.4 December 2023, 7.2 June 2023  Current medications:  glargine 28 units daily, NovoLog 10 units with breakfast, 10 units with dinner  Statin and ACE-inhibitor:  atorvastatin  Foot exam:  Up-to-date, follows with podiatry  Eye Exam:  Follows with Dr. Reis, he is legally blind    A1c relatively well-controlled.  We will continue with current regimen for now

## 2023-12-27 NOTE — ASSESSMENT & PLAN NOTE
CT chest completed October 2022 showed unchanged benign-appearing right upper lobe nodular opacity likely representing focus of linear scarring.  This will need to be followed annually per nephrology  -Repeat CT of the chest ordered today

## 2023-12-27 NOTE — PATIENT INSTRUCTIONS
Medicare Preventive Visit Patient Instructions  Thank you for completing your Welcome to Medicare Visit or Medicare Annual Wellness Visit today. Your next wellness visit will be due in one year (12/27/2024).  The screening/preventive services that you may require over the next 5-10 years are detailed below. Some tests may not apply to you based off risk factors and/or age. Screening tests ordered at today's visit but not completed yet may show as past due. Also, please note that scanned in results may not display below.  Preventive Screenings:  Service Recommendations Previous Testing/Comments   Colorectal Cancer Screening  Colonoscopy    Fecal Occult Blood Test (FOBT)/Fecal Immunochemical Test (FIT)  Fecal DNA/Cologuard Test  Flexible Sigmoidoscopy Age: 45-75 years old   Colonoscopy: every 10 years (May be performed more frequently if at higher risk)  OR  FOBT/FIT: every 1 year  OR  Cologuard: every 3 years  OR  Sigmoidoscopy: every 5 years  Screening may be recommended earlier than age 45 if at higher risk for colorectal cancer. Also, an individualized decision between you and your healthcare provider will decide whether screening between the ages of 76-85 would be appropriate. Colonoscopy: 12/27/2021  FOBT/FIT: Not on file  Cologuard: Not on file  Sigmoidoscopy: Not on file    Screening Current     Prostate Cancer Screening Individualized decision between patient and health care provider in men between ages of 55-69   Medicare will cover every 12 months beginning on the day after your 50th birthday PSA: 0.5 ng/mL     Screening Current     Hepatitis C Screening Once for adults born between 1945 and 1965  More frequently in patients at high risk for Hepatitis C Hep C Antibody: 03/10/2023    Screening Current   Diabetes Screening 1-2 times per year if you're at risk for diabetes or have pre-diabetes Fasting glucose: 113 mg/dL (5/10/2021)  A1C: 6.7 (2/21/2022)  Screening Not Indicated  History Diabetes   Cholesterol  Screening Once every 5 years if you don't have a lipid disorder. May order more often based on risk factors. Lipid panel: 04/20/2022  Screening Not Indicated  History Lipid Disorder      Other Preventive Screenings Covered by Medicare:  Abdominal Aortic Aneurysm (AAA) Screening: covered once if your at risk. You're considered to be at risk if you have a family history of AAA or a male between the age of 65-75 who smoking at least 100 cigarettes in your lifetime.  Lung Cancer Screening: covers low dose CT scan once per year if you meet all of the following conditions: (1) Age 55-77; (2) No signs or symptoms of lung cancer; (3) Current smoker or have quit smoking within the last 15 years; (4) You have a tobacco smoking history of at least 20 pack years (packs per day x number of years you smoked); (5) You get a written order from a healthcare provider.  Glaucoma Screening: covered annually if you're considered high risk: (1) You have diabetes OR (2) Family history of glaucoma OR (3)  aged 50 and older OR (4)  American aged 65 and older  Osteoporosis Screening: covered every 2 years if you meet one of the following conditions: (1) Have a vertebral abnormality; (2) On glucocorticoid therapy for more than 3 months; (3) Have primary hyperparathyroidism; (4) On osteoporosis medications and need to assess response to drug therapy.  HIV Screening: covered annually if you're between the age of 15-65. Also covered annually if you are younger than 15 and older than 65 with risk factors for HIV infection. For pregnant patients, it is covered up to 3 times per pregnancy.    Immunizations:  Immunization Recommendations   Influenza Vaccine Annual influenza vaccination during flu season is recommended for all persons aged >= 6 months who do not have contraindications   Pneumococcal Vaccine   * Pneumococcal conjugate vaccine = PCV13 (Prevnar 13), PCV15 (Vaxneuvance), PCV20 (Prevnar 20)  * Pneumococcal  polysaccharide vaccine = PPSV23 (Pneumovax) Adults 19-63 yo with certain risk factors or if 65+ yo  If never received any pneumonia vaccine: recommend Prevnar 20 (PCV20)  Give PCV20 if previously received 1 dose of PCV13 or PPSV23   Hepatitis B Vaccine 3 dose series if at intermediate or high risk (ex: diabetes, end stage renal disease, liver disease)   Respiratory syncytial virus (RSV) Vaccine - COVERED BY MEDICARE PART D  * RSVPreF3 (Arexvy) CDC recommends that adults 60 years of age and older may receive a single dose of RSV vaccine using shared clinical decision-making (SCDM)   Tetanus (Td) Vaccine - COST NOT COVERED BY MEDICARE PART B Following completion of primary series, a booster dose should be given every 10 years to maintain immunity against tetanus. Td may also be given as tetanus wound prophylaxis.   Tdap Vaccine - COST NOT COVERED BY MEDICARE PART B Recommended at least once for all adults. For pregnant patients, recommended with each pregnancy.   Shingles Vaccine (Shingrix) - COST NOT COVERED BY MEDICARE PART B  2 shot series recommended in those 19 years and older who have or will have weakened immune systems or those 50 years and older     Health Maintenance Due:      Topic Date Due   • Colorectal Cancer Screening  12/25/2031   • HIV Screening  Completed   • Hepatitis C Screening  Completed     Immunizations Due:      Topic Date Due   • Influenza Vaccine (1) 09/01/2023   • COVID-19 Vaccine (3 - 2023-24 season) 09/01/2023     Advance Directives   What are advance directives?  Advance directives are legal documents that state your wishes and plans for medical care. These plans are made ahead of time in case you lose your ability to make decisions for yourself. Advance directives can apply to any medical decision, such as the treatments you want, and if you want to donate organs.   What are the types of advance directives?  There are many types of advance directives, and each state has rules about how  to use them. You may choose a combination of any of the following:  Living will:  This is a written record of the treatment you want. You can also choose which treatments you do not want, which to limit, and which to stop at a certain time. This includes surgery, medicine, IV fluid, and tube feedings.   Durable power of  for healthcare (DPAHC):  This is a written record that states who you want to make healthcare choices for you when you are unable to make them for yourself. This person, called a proxy, is usually a family member or a friend. You may choose more than 1 proxy.  Do not resuscitate (DNR) order:  A DNR order is used in case your heart stops beating or you stop breathing. It is a request not to have certain forms of treatment, such as CPR. A DNR order may be included in other types of advance directives.  Medical directive:  This covers the care that you want if you are in a coma, near death, or unable to make decisions for yourself. You can list the treatments you want for each condition. Treatment may include pain medicine, surgery, blood transfusions, dialysis, IV or tube feedings, and a ventilator (breathing machine).  Values history:  This document has questions about your views, beliefs, and how you feel and think about life. This information can help others choose the care that you would choose.  Why are advance directives important?  An advance directive helps you control your care. Although spoken wishes may be used, it is better to have your wishes written down. Spoken wishes can be misunderstood, or not followed. Treatments may be given even if you do not want them. An advance directive may make it easier for your family to make difficult choices about your care.       © Copyright [employer] 2018 Information is for End User's use only and may not be sold, redistributed or otherwise used for commercial purposes. All illustrations and images included in CareNotes® are the  copyrighted property of [email or web address] or [employer]

## 2023-12-27 NOTE — ASSESSMENT & PLAN NOTE
- follows with Nephrology.  Goes to HD at [facility]  -Currently on transplant list

## 2023-12-27 NOTE — PROGRESS NOTES
Assessment and Plan:      Medical history of insulin-dependent type 2 diabetes complicated by retinopathy and neuropathy, ESRD on HD, legal blindness, chronic constipation, former smoking, latent TB, pulmonary nodule     Patient reports cough for 1 week.  No significant shortness of breath, dyspnea on exertion.  He does feel weak.  He did not test for COVID.  Lungs are clear to auscultation today.  Suspect acute viral syndrome, likely limited utility to test for COVID at this time as would not   -Continue supportive care, prescription for Tessalon Perles as needed sent to pharmacy      Problem List Items Addressed This Visit     Type 2 diabetes mellitus, with long-term current use of insulin ([facility]) - Primary     HbA1c: 7.4 December 2023, 7.2 June 2023  Current medications:  glargine 28 units daily, NovoLog 10 units with breakfast, 10 units with dinner  Statin and ACE-inhibitor:  atorvastatin  Foot exam:  Up-to-date, follows with podiatry  Eye Exam:  Follows with Dr. Reis, he is legally blind    A1c relatively well-controlled.  We will continue with current regimen for now         Relevant Orders    POCT hemoglobin A1c (Completed)    ESRD on dialysis ([facility])     - follows with Nephrology.  Goes to HD at [facility]  -Currently on transplant list           Benign hypertension with ESRD (end-stage renal disease) ([facility])     Adequately controlled today  Continue current regimen         Pulmonary nodule     CT chest completed October 2022 showed unchanged benign-appearing right upper lobe nodular opacity likely representing focus of linear scarring.  This will need to be followed annually per nephrology  -Repeat CT of the chest ordered today         Relevant Medications    benzonatate (TESSALON PERLES) 100 mg capsule    Other Relevant Orders    CT chest wo contrast   Other Visit Diagnoses     Acute viral syndrome        Relevant Medications    benzonatate (TESSALON PERLES) 100 mg capsule    Medicare  annual wellness visit, subsequent              Depression Screening and Follow-up Plan: Patient was screened for depression during today's encounter. They screened negative with a PHQ-2 score of 2.      Preventive health issues were discussed with patient, and age appropriate screening tests were ordered as noted in patient's After Visit Summary.  Personalized health advice and appropriate referrals for health education or preventive services given if needed, as noted in patient's After Visit Summary.     History of Present Illness:     Patient presents for a Medicare Wellness Visit    HPI   Patient Care Team:  Merlin Correa DO as PCP - General (Internal Medicine)  Matt De Anda MD (Vascular Surgery)  Bridgett Traore MD (Nephrology)     Review of Systems:     Review of Systems     Problem List:     Patient Active Problem List   Diagnosis   • Type 2 diabetes mellitus, with long-term current use of insulin ([facility])   • Diabetic retinopathy of both eyes associated with type 2 diabetes mellitus ([facility])   • LVH (left ventricular hypertrophy)   • Hyperlipidemia   • Chronic constipation   • ESRD on dialysis ([facility])   • S/P arteriovenous (AV) fistula creation   • Azotemia   • Hyperphosphatemia   • Benign hypertension with ESRD (end-stage renal disease) ([facility])   • Slow transit constipation   • Onychomycosis   • Diabetic polyneuropathy associated with type 2 diabetes mellitus ([facility])   • Positive QuantiFERON-TB Gold test   • TB lung, latent   • Long term (current) use of antibiotics   • Pulmonary nodule   • Adenomatous polyp of colon   • Visual disorder   • Anemia of chronic disease   • Chronic diastolic heart failure ([facility])   • Hypocalcemia   • Peripheral arterial disease ([facility])   • Skin ulcer of toe of left foot with fat layer exposed ([facility])   • Secondary hyperparathyroidism of renal origin ([facility])   • Urinary retention   • Acute respiratory failure with hypoxia ([facility])   • Acute bilateral low back pain without sciatica       Past Medical and Surgical History:     Past Medical History:   Diagnosis Date   • Cataract    • Dizziness     occ   • GERD (gastroesophageal reflux disease)    • Hyperlipidemia    • Legally blind    • LVH (left ventricular hypertrophy)    • Preferred language is Creole     understands some English   • Use of cane as ambulatory aid      Past Surgical History:   Procedure Laterality Date   • INGUINAL HERNIA REPAIR Right    • IR AV FISTULAGRAM/GRAFTOGRAM  2019   • IR AV FISTULAGRAM/GRAFTOGRAM  2019   • IR AV FISTULAGRAM/GRAFTOGRAM  2020   • IR AV FISTULAGRAM/GRAFTOGRAM  2021   • IR AV FISTULAGRAM/GRAFTOGRAM  9/3/2021   • IR AV FISTULAGRAM/GRAFTOGRAM  2022   • IR TUNNELED DIALYSIS CATHETER PLACEMENT  4/3/2019   • DC ARTERIOVENOUS ANASTOMOSIS OPEN DIRECT Left 2019    Procedure: CREATION FISTULA ARTERIOVENOUS (AV);  Surgeon: Matt De Anda MD;  Location: [facility] OR;  Service: Vascular      Family History:     Family History   Problem Relation Age of Onset   • Hypertension Mother    • Diabetes Father    • No Known Problems Sister    • No Known Problems Brother    • No Known Problems Maternal Aunt    • No Known Problems Maternal Uncle    • No Known Problems Paternal Aunt    • No Known Problems Paternal Uncle    • No Known Problems Maternal Grandmother    • No Known Problems Maternal Grandfather    • No Known Problems Paternal Grandmother    • No Known Problems Paternal Grandfather       Social History:     Social History     Socioeconomic History   • Marital status: Legally      Spouse name: None   • Number of children: None   • Years of education: None   • Highest education level: None   Occupational History   • Occupation: DISABLED   Tobacco Use   • Smoking status: Former     Current packs/day: 0.00     Types: Cigarettes     Quit date: 2018     Years since quittin.9   • Smokeless tobacco: Never   Vaping Use   • Vaping status: Never Used   Substance and Sexual Activity   •  Alcohol use: Not Currently   • Drug use: No   • Sexual activity: Not Currently   Other Topics Concern   • None   Social History Narrative   • None     Social Determinants of Health     Financial Resource Strain: High Risk (12/27/2023)    Overall Financial Resource Strain (CARDIA)    • Difficulty of Paying Living Expenses: Very hard   Food Insecurity: No Food Insecurity (3/10/2023)    Hunger Vital Sign    • Worried About Running Out of Food in the Last Year: Never true    • Ran Out of Food in the Last Year: Never true   Transportation Needs: No Transportation Needs (12/27/2023)    PRAPARE - Transportation    • Lack of Transportation (Medical): No    • Lack of Transportation (Non-Medical): No   Physical Activity: Not on file   Stress: Not on file   Social Connections: Not on file   Intimate Partner Violence: Not on file   Housing Stability: Low Risk  (3/10/2023)    Housing Stability Vital Sign    • Unable to Pay for Housing in the Last Year: No    • Number of Places Lived in the Last Year: 1    • Unstable Housing in the Last Year: No      Medications and Allergies:     Current Outpatient Medications   Medication Sig Dispense Refill   • acetaminophen (TYLENOL) 500 mg tablet Take 1-2 tablets (500-1,000 mg total) by mouth every 8 (eight) hours as needed for moderate pain 20 tablet 0   • aspirin (Aspirin Low Dose) 81 mg EC tablet TAKE 1 TABLET (81 MG TOTAL) BY MOUTH DAILY 90 tablet 0   • atorvastatin (LIPITOR) 40 mg tablet TAKE 1 TABLET (40 MG TOTAL) BY MOUTH DAILY 90 tablet 0   • benzonatate (TESSALON PERLES) 100 mg capsule Take 1-2 capsules (100-200 mg total) by mouth 3 (three) times a day as needed for cough 30 capsule 0   • brimonidine tartrate 0.2 % ophthalmic solution Administer 1 drop into the left eye 2 (two) times a day     • Carboxymethylcellul-Glycerin (CLEAR EYES FOR DRY EYES OP) Apply 2 drops to eye 2 (two) times a day     • Continuous Blood Gluc  (FreeStyle Jessica 2 Young) LAUREN Check blood sugars  multiple times per day 1 each 0   • Continuous Blood Gluc Sensor (FreeStyle Jessica 2 Sensor) MISC Check blood sugars multiple times per day 6 each 3   • dorzolamide (TRUSOPT) 2 % ophthalmic solution instill 1 drop into left eye three times a day     • doxazosin (CARDURA) 2 mg tablet Take 2 tablets (4 mg total) by mouth 2 (two) times a day 180 tablet 4   • furosemide (LASIX) 80 mg tablet TAKE 1 TABLET BY MOUTH DAILY 90 tablet 0   • Insulin Glargine Solostar (Basaglar KwikPen) 100 UNIT/ML SOPN Inject 0.28 mL (28 Units total) under the skin daily 15 mL 2   • Insulin Pen Needle (BD Pen Needle Tiffany 2nd Gen) 32G X 4 MM MISC USE AS DIRECTED 90 each 1   • labetalol (NORMODYNE) 200 mg tablet TAKE 3 TABLETS (600 MG TOTAL) BY MOUTH EVERY 12 (TWELVE) HOURS 120 tablet 6   • lactulose 20 g/30 mL Take 30 mL (20 g total) by mouth 2 (two) times a day as needed (for constipation) 946 mL 2   • Lancets (freestyle) lancets Use as instructed 100 each 1   • methocarbamol (ROBAXIN) 500 mg tablet Take 1 tablet (500 mg total) by mouth 2 (two) times a day as needed for muscle spasms 10 tablet 0   • NIFEdipine (PROCARDIA XL) 90 mg 24 hr tablet TAKE 1 TABLET BY MOUTH DAILY 90 tablet 0   • polyethylene glycol (MIRALAX) 17 g packet Take 17 g by mouth daily 30 each 1   • Blood Glucose Monitoring Suppl (ONE TOUCH ULTRA 2) w/Device KIT by Does not apply route 3 (three) times a day (Patient not taking: Reported on 6/28/2023) 1 each 0   • insulin aspart (NovoLOG) 100 Units/mL injection pen 10U with breakfast, 10 U with dinner 15 mL 2     No current facility-administered medications for this visit.     Allergies   Allergen Reactions   • No Known Allergies       Immunizations:     Immunization History   Administered Date(s) Administered   • COVID-19 PFIZER VACCINE 0.3 ML IM 04/01/2021, 04/22/2021   • Hep B, adult 04/23/2019, 04/23/2019, 06/22/2019, 06/22/2019, 08/22/2019, 08/22/2019, 11/23/2019, 11/23/2019   • INFLUENZA 11/11/2009, 11/11/2009,  10/06/2014, 10/06/2014, 08/04/2017, 08/04/2017, 10/01/2018, 01/02/2019, 10/17/2019, 09/18/2020, 09/18/2020, 09/18/2022, 10/22/2022   • Influenza Quadrivalent 3 years and older 10/01/2018, 10/01/2018, 10/17/2019, 10/17/2019, 10/21/2021   • Influenza, recombinant, quadrivalent,injectable, preservative free 01/02/2019, 09/09/2019, 09/16/2020   • Pneumococcal Conjugate Vaccine 20-valent (Pcv20), Polysace 07/22/2022   • Pneumococcal Polysaccharide PPV23 11/11/2009, 11/11/2009, 06/14/2018, 06/14/2018, 06/14/2020   • Tuberculin Skin Test-PPD Intradermal 04/09/2019, 04/09/2019, 01/09/2020, 01/09/2020, 01/12/2021, 01/12/2021, 01/18/2022, 01/10/2023      Health Maintenance:         Topic Date Due   • Colorectal Cancer Screening  12/25/2031   • HIV Screening  Completed   • Hepatitis C Screening  Completed         Topic Date Due   • Influenza Vaccine (1) 09/01/2023   • COVID-19 Vaccine (3 - 2023-24 season) 09/01/2023      Medicare Screening Tests and Risk Assessments:     Paddy is here for his Subsequent Wellness visit.     Health Risk Assessment:   Patient rates overall health as fair. Patient feels that their physical health rating is slightly worse. Patient is satisfied with their life. Eyesight was rated as much worse. Hearing was rated as slightly worse. Patient feels that their emotional and mental health rating is slightly worse. Patients states they are never, rarely angry. Patient states they are sometimes unusually tired/fatigued. Pain experienced in the last 7 days has been some. Patient's pain rating has been 6/10. Patient states that he has experienced no weight loss or gain in last 6 months.     Depression Screening:   PHQ-2 Score: 2      Fall Risk Screening:   In the past year, patient has experienced: no history of falling in past year      Home Safety:  Patient has trouble with stairs inside or outside of their home. Patient has working smoke alarms and has working carbon monoxide detector. Home safety  hazards include: none.     Nutrition:   Current diet is Regular.     Medications:   Patient is not currently taking any over-the-counter supplements. Patient is not able to manage medications. Sister helps with medications     Activities of Daily Living (ADLs)/Instrumental Activities of Daily Living (IADLs):   Walk and transfer into and out of bed and chair?: Yes  Dress and groom yourself?: Yes    Bathe or shower yourself?: Yes    Feed yourself? Yes  Do your laundry/housekeeping?: No  Manage your money, pay your bills and track your expenses?: No  Make your own meals?: No    Do your own shopping?: No    ADL comments: With the help of his sister     Previous Hospitalizations:   Any hospitalizations or ED visits within the last 12 months?: No      Advance Care Planning:   Living will: No    Durable POA for healthcare: Yes    Advanced directive: No      PREVENTIVE SCREENINGS      Cardiovascular Screening:    General: Screening Not Indicated and History Lipid Disorder      Diabetes Screening:     General: Screening Not Indicated and History Diabetes      Colorectal Cancer Screening:     General: Screening Current      Prostate Cancer Screening:    General: Screening Current      Abdominal Aortic Aneurysm (AAA) Screening:    Risk factors include: tobacco use        Lung Cancer Screening:     General: Screening Not Indicated      Hepatitis C Screening:    General: Screening Current    Screening, Brief Intervention, and Referral to Treatment (SBIRT)    Screening  Typical number of drinks in a day: 0  Typical number of drinks in a week: 0  Interpretation: Low risk drinking behavior.    Single Item Drug Screening:  How often have you used an illegal drug (including marijuana) or a prescription medication for non-medical reasons in the past year? never    Single Item Drug Screen Score: 0  Interpretation: Negative screen for possible drug use disorder    No results found.     Physical Exam:     /60 (BP Location: Left  "arm, Patient Position: Sitting, Cuff Size: Standard)   Pulse 73   Temp (!) 96 °F (35.6 °C)   Resp 18   Ht 5' 9\" (1.753 m)   Wt 72.1 kg (159 lb)   SpO2 96%   BMI 23.48 kg/m²     Physical Exam     Max Willian Correa DO  "

## 2024-01-05 ENCOUNTER — OFFICE VISIT (OUTPATIENT)
Dept: CARDIOLOGY CLINIC | Facility: CLINIC | Age: 59
End: 2024-01-05
Payer: MEDICARE

## 2024-01-05 VITALS
SYSTOLIC BLOOD PRESSURE: 126 MMHG | BODY MASS INDEX: 23.78 KG/M2 | DIASTOLIC BLOOD PRESSURE: 60 MMHG | WEIGHT: 161 LBS | HEART RATE: 62 BPM

## 2024-01-05 DIAGNOSIS — N18.6 BENIGN HYPERTENSION WITH ESRD (END-STAGE RENAL DISEASE) (HCC): Primary | ICD-10-CM

## 2024-01-05 DIAGNOSIS — Z01.810 PREOP CARDIOVASCULAR EXAM: ICD-10-CM

## 2024-01-05 DIAGNOSIS — Z99.2 ESRD ON DIALYSIS (HCC): ICD-10-CM

## 2024-01-05 DIAGNOSIS — E78.5 HYPERLIPIDEMIA, UNSPECIFIED HYPERLIPIDEMIA TYPE: ICD-10-CM

## 2024-01-05 DIAGNOSIS — I12.0 BENIGN HYPERTENSION WITH ESRD (END-STAGE RENAL DISEASE) (HCC): Primary | ICD-10-CM

## 2024-01-05 DIAGNOSIS — J96.01 ACUTE RESPIRATORY FAILURE WITH HYPOXIA (HCC): ICD-10-CM

## 2024-01-05 DIAGNOSIS — E11.42 DIABETIC POLYNEUROPATHY ASSOCIATED WITH TYPE 2 DIABETES MELLITUS (HCC): ICD-10-CM

## 2024-01-05 DIAGNOSIS — I73.9 PERIPHERAL ARTERIAL DISEASE (HCC): ICD-10-CM

## 2024-01-05 DIAGNOSIS — N18.6 ESRD ON DIALYSIS (HCC): ICD-10-CM

## 2024-01-05 PROCEDURE — 93000 ELECTROCARDIOGRAM COMPLETE: CPT

## 2024-01-05 PROCEDURE — 99214 OFFICE O/P EST MOD 30 MIN: CPT

## 2024-01-05 NOTE — PROGRESS NOTES
Cardiology   MD Js Parr MD, [facility]  Lisandro Tadeo DO, [facility] FACP Ather Mansoor, MD Rujul Patel, DO, [facility]  Uriah De DO, [facility]  -------------------------------------------------------------------  [facility]  [address] [place] [address]  Phone: [phone]  Fax: [phone]  01/05/24  Paddy Zhong  YOB: 1965   MRN: [id number]      Referring Physician: Merlin Correa DO  [address] [facility] [address]  [place] [address]     HPI: Paddy Zhong is a 58 y.o. male with:   Minimal CAD by Cath - discrete 30% mid LAD stenosis noted.  Hypertension   End-stage renal disease on hemodialysis, transplant workup in progress   Uncontrolled type 2 diabetes   Diabetic retinopathy, now blind   Latent tuberculosis   Abnormal electrocardiogram  Nonischemic nuclear stress November 2020  Left ventricular hypertrophy by echo November 2020    He presents today for follow-up with cardiology.  He is doing well from a heart standpoint, denies any anginal sounding chest pain, does have some atypical musculoskeletal sounding chest pain located left lateral chest, worse with coughing.  She had a stress test 6 months ago that was nonischemic.  Blood pressure has been controlled    Review of Systems   Constitutional:  Negative for chills and fever.   HENT:  Negative for facial swelling and sore throat.    Eyes:  Negative for visual disturbance.   Respiratory:  Negative for cough, chest tightness, shortness of breath and wheezing.    Cardiovascular:  Negative for chest pain, palpitations and leg swelling.   Gastrointestinal:  Negative for abdominal pain, blood in stool, constipation, diarrhea, nausea and vomiting.   Endocrine: Negative for cold intolerance and heat intolerance.   Genitourinary:  Negative for decreased urine volume, difficulty urinating, dysuria and hematuria.   Musculoskeletal:  Negative for arthralgias,  back pain and myalgias.   Skin:  Negative for rash.   Neurological:  Negative for dizziness, syncope, weakness and numbness.   Psychiatric/Behavioral:  Negative for agitation, behavioral problems and confusion. The patient is not nervous/anxious.         OBJECTIVE  Vitals:    01/05/24 1257   BP: 126/60   Pulse: 62       Physical Exam  Constitutional: awake, alert and oriented, in no acute distress, no obvious deformities  Head: Normocephalic, without obvious abnormality, atraumatic  Eyes: conjunctivae clear and moist. Sclera anicteric.  No xanthelasmas. Pupils equal bilaterally.  Extraocular motions are full.  Ear nose mouth and throat: ears are symmetrical bilaterally, hearing appears to be equal bilaterally, no nasal discharge or epistaxis, oropharynx is clear with moist mucous membranes  Neck:  Trachea is midline, neck is supple, no thyromegaly or significant lymphadenopathy, there is full range of motion.  Lungs: clear to auscultation bilaterally, no wheezes, no rales, no rhonchi, no accessory muscle use, breathing is nonlabored  Heart: regular rate and rhythm, S1, S2 normal, no murmur, no click, rub or gallop, no lower extremity edema  Abdomen: soft, non-tender; bowel sounds normal; no masses,  no organomegaly  Psychiatric:  Patient is oriented to time, place, person, mood/affect is negative for depression, anxiety, agitation, appears to have appropriate insight  Skin: Skin is warm, dry, intact. No obvious rashes or lesions on exposed extremities.  Nail beds are pink with no cyanosis or clubbing.    EKG:  Results for orders placed or performed in visit on 01/05/24   POCT ECG    Impression    Sinus rhythm with sinus arrhythmia left atrial abnormality and LVH with repolarization abnormalities.  Poor anterior R wave progression cannot rule out anterior septal infarct age undetermined        IMPRESSION:  Minimal CAD by Cath - discrete 30% mid LAD stenosis noted.  Hypertension   End-stage renal disease on  hemodialysis, transplant workup in progress   Uncontrolled type 2 diabetes   Diabetic retinopathy, now blind   Latent tuberculosis   Abnormal electrocardiogram  Nonischemic nuclear stress 2020  Left ventricular hypertrophy by echo 2020    DISCUSSION/RECOMMENDATIONS:  Continues to wait for transplant for his end-stage renal disease  No cardiac chest pain type symptoms  Blood pressure has been controlled  Stress test 6 months ago was nonischemic  Continue with current doses of aspirin 81 Lipitor 40, he does have prior history of CAD with 30% LAD narrowing seen on cath in the past.  Continue with Lasix 80 mg daily, labetalol for blood pressure, nifedipine for blood pressure    Uriah De DO, [facility], MICHEL  --------------------------------------------------------------------------------  TREADMILL STRESS  No results found for this or any previous visit.     ----------------------------------------------------------------------------------------------  NUCLEAR STRESS TEST: Results for orders placed during the hospital encounter of 20    NM myocardial perfusion spect (stress and/or rest)    [address] [address]    Rest/Stress Gated SPECT Myocardial Perfusion Imaging After Regadenoson    Patient: PERLA SALINAS  MR number: [id number]  Account number: [id number]  : 1965  Age: 55 years  Gender: Male  Status: Outpatient  Location: [facility]  Height: 70 in  Weight: 171 lb  BP: 134/ 71 mmHg    Diagnosis: E78.5 - Hyperlipidemia, unspecified, I11.9 - Hypertensive heart disease without heart failure, N18.6 - End stage renal disease    Primary Physician:  Bety Mcintosh MD  RN:  Zahra Laura RN  Technician:  James Pinto  Group:  [facility]  Report Prepared By::  James Pinto  Interpreting Physician:  Tim Mendez MD    INDICATIONS: Coronary artery disease.    HISTORY: The  patient is a 55 year old  male. Chest pain status: no chest pain. Coronary artery disease risk factors: dyslipidemia, hypertension, and diabetes mellitus. Cardiovascular history: none significant. Co-morbidity:  history of end stage renal disease. Medications: a lipid lowering agent, alpha blocker and diabetic medications. Previous test results: abnormal ECG.    PHYSICAL EXAM: Baseline physical exam screening: normal.    REST ECG: Normal sinus rhythm at a rate of 67 beats per minute. Right axis deviation. Left ventricular hypertrophy. Anterior lateral T-wave inversions which may represent left ventricular strain pattern.    PROCEDURE: The study was performed in the [facility]. A regadenoson infusion pharmacologic stress test was performed. Gated SPECT myocardial perfusion imaging was performed after stress and at rest. Systolic blood pressure was  134 mmHg, at the start of the study. Diastolic blood pressure was 71 mmHg, at the start of the study. The heart rate was 66 bpm, at the start of the study.  Regadenoson protocol:  HR bpm SBP mmHg DBP mmHg Symptoms  Baseline 66 134 71 none  1 min 75 -- -- none  2 min 82 113 59 none  3 min 79 129 60 none  4 min 78 -- -- none  5 min 77 -- -- none  6 min 78 127 64 none  No medications or fluids given.    STRESS SUMMARY: Duration of pharmacologic stress was 3 min and 0 sec. Maximal work rate was 1 METs. Maximal heart rate during stress was 84 bpm. The heart rate response to stress was normal. There was normal resting blood pressure with an  appropriate response to stress. The rate-pressure product for the peak heart rate and blood pressure was 81887. There was no chest pain during stress. The stress test was terminated due to protocol completion. Pre oxygen saturation: 98 %.  Peak oxygen saturation: 100 %. With pharmacologic stress using intravenous Lexiscan, there were no electrocardiographic changes over baseline. There were no  arrhythmias.    ISOTOPE ADMINISTRATION:  The radiopharmaceutical was injected at the peak effect of pharmacologic stress.    MYOCARDIAL PERFUSION IMAGING:  Tomographic perfusion series at rest demonstrated uniformly normal uptake in activity. Following pharmacologic stress using intravenous Lexiscan, there was no change.    GATED SPECT:  Normal left ventricular systolic function, EF 53%. Normal left ventricular cavity size. Normal left ventricular wall motion.    SUMMARY:  -  Stress results: There was no chest pain during stress.    IMPRESSIONS: 1. Abnormal resting EKG  2. Negative pharmacologic stress test with intravenous Lexiscan for angina pectoris or electrocardiographic changes of ischemia over baseline abnormalities noted.  3. Normal left ventricular systolic function, EF 53%.  4. Normal tomographic perfusion series.    Prepared and signed by    Tim Mendez MD  Signed 11/16/2020 08:17:41    Results for orders placed during the hospital encounter of 07/24/23    NM myocardial perfusion spect (rx stress and/or rest)    Interpretation Summary  •  Stress ECG: No ST deviation is noted. There were no arrhythmias during recovery. The ECG was not diagnostic due to pharmacological (vasodilator) stress.  •  Perfusion: There are no perfusion defects.  •  Stress Function: Left ventricular function post-stress is normal. Stress ejection fraction is 61 %.  •  Stress Combined Conclusion: Left ventricular perfusion is normal.  No evidence of inducible ischemia or scar.    Normal nuclear pharmacologic stress test.      --------------------------------------------------------------------------------  CATH:  No results found for this or any previous visit.    --------------------------------------------------------------------------------  ECHO:   Results for orders placed during the hospital encounter of 02/24/21    Echo complete with contrast if indicated    [address]  [place] [address]  [phone]    Transthoracic Echocardiogram  2D, M-mode, Doppler, and Color Doppler    Study date:  2021    Patient: PERLA SALINAS  MR number: [id number]  Account number: [id number]  : 1965  Age: 55 years  Gender: Male  Status: Inpatient  Location: Bedside  Height: 70 in  Weight: 165 lb  BP: 142/ 76 mmHg    Indications: Chest Pain    Diagnoses: R07.9 - Chest pain, unspecified    Sonographer:  Hebert Mayo RDCS  Primary Physician:  Bety Mcintosh MD  Referring Physician:  Caroline Chatman PA-C  Group:  [facility]  Interpreting Physician:  Tim Mendez MD    SUMMARY    LEFT VENTRICLE:  Normal left ventricular systolic function, EF 68%. Moderate concentric left ventricular hypertrophy. Normal left ventricular cavity size. Normal left ventricular wall motion without regional wall motion abnormalities. Grade 1 left  ventricular diastolic dysfunction. Normal left atrial pressure.    MITRAL VALVE:  There was trace regurgitation.    AORTIC VALVE:  Tricuspid aortic valve with aortic sclerosis. No stenosis or regurgitation.    TRICUSPID VALVE:  There was trace regurgitation.    PULMONIC VALVE:  There was trace regurgitation.    PERICARDIUM:  Small hemodynamically insignificant concentric pericardial effusion    SUMMARY MEASUREMENTS  2D measurements:  Unspecified Anatomy:   %FS was 38.8 %.  Ao Diam was 3.3 cm.  Ao asc was 2.9 cm.  EDV(Teich) was 51.9 ml.  EF(Teich) was 70.2 %.  ESV(Teich) was 15.5 ml.  IVSd was 1.5 cm.  LA Diam was 3.3 cm.  LAAs A4C was 11.3 cm2.  LAESV A-L A4C was 21.2  ml.  LAESV MOD A4C was 20 ml.  LALs A4C was 5.1 cm.  LVEDV MOD A4C was 20.6 ml.  LVEF MOD A4C was 68 %.  LVESV MOD A4C was 6.6 ml.  LVIDd was 3.5 cm.  LVIDs was 2.2 cm.  LVLd A4C was 7.8 cm.  LVLs A4C was 7 cm.  LVPWd was 1.5 cm.  RAAs A4C  was 17.6 cm2.  RAESV A-L was 49.6 ml.  RAESV MOD was 47.2 ml.  RALs was 5.3 cm.  RVIDd was 3 cm.  SV MOD A4C was 14  ml.  SV(Teich) was 36.5 ml.  CW measurements:  Unspecified Anatomy:   AV Env.Ti was 266.4 ms.  AV VTI was 22.9 cm.  AV Vmax was 1.3 m/s.  AV Vmean was 0.9 m/s.  AV maxPG was 6.5 mmHg.  AV meanPG was 3.3 mmHg.  TR Vmax was 1.9 m/s.  TR maxPG was 14.9 mmHg.  MM measurements:  Unspecified Anatomy:   TAPSE was 2.1 cm.  PW measurements:  Unspecified Anatomy:   DVI was 1.1 .  E' Sept was 0.1 m/s.  E/E' Sept was 11.2 .  LVOT Env.Ti was 304.5 ms.  LVOT VTI was 25.4 cm.  LVOT Vmax was 1.2 m/s.  LVOT Vmean was 0.8 m/s.  LVOT maxPG was 6 mmHg.  LVOT meanPG was 3.2 mmHg.  MV A  Lonnie was 0.8 m/s.  MV Dec Emanuel was 2.1 m/s2.  MV DecT was 294.5 ms.  MV E Lonnie was 0.6 m/s.  MV E/A Ratio was 0.7 .  MV PHT was 85.4 ms.  MVA By PHT was 2.6 cm2.    HISTORY: PRIOR HISTORY: Hypercholesterolemia, Hypertension, ESRD, DM    PROCEDURE: The procedure was performed at the bedside. This was a routine study. The transthoracic approach was used. The study included complete 2D imaging, M-mode, complete spectral Doppler, and color Doppler. The heart rate was 70 bpm,  at the start of the study. Images were obtained from the parasternal, apical, subcostal, and suprasternal notch acoustic windows. Image quality was adequate.    LEFT VENTRICLE: Normal left ventricular systolic function, EF 68%. Moderate concentric left ventricular hypertrophy. Normal left ventricular cavity size. Normal left ventricular wall motion without regional wall motion abnormalities. Grade  1 left ventricular diastolic dysfunction. Normal left atrial pressure.    RIGHT VENTRICLE: The size was normal. Systolic function was normal. Wall thickness was normal.    LEFT ATRIUM: Size was normal.    RIGHT ATRIUM: Size was normal.    MITRAL VALVE: Valve structure was normal. There was normal leaflet separation. DOPPLER: The transmitral velocity was within the normal range. There was no evidence for stenosis. There was trace regurgitation.    AORTIC VALVE: Tricuspid aortic valve with  aortic sclerosis. No stenosis or regurgitation.    TRICUSPID VALVE: The valve structure was normal. There was normal leaflet separation. DOPPLER: The transtricuspid velocity was within the normal range. There was no evidence for stenosis. There was trace regurgitation.    PULMONIC VALVE: Leaflets exhibited normal thickness, no calcification, and normal cuspal separation. DOPPLER: The transpulmonic velocity was within the normal range. There was trace regurgitation.    PERICARDIUM: Small hemodynamically insignificant concentric pericardial effusion    AORTA: The root exhibited normal size.    PULMONARY ARTERY: The size was normal. DOPPLER: Systolic pressure was within the normal range.    SYSTEM MEASUREMENT TABLES    2D  %FS: 38.8 %  Ao Diam: 3.3 cm  Ao asc: 2.9 cm  EDV(Teich): 51.9 ml  EF(Teich): 70.2 %  ESV(Teich): 15.5 ml  IVSd: 1.5 cm  LA Diam: 3.3 cm  LAAs A4C: 11.3 cm2  LAESV A-L A4C: 21.2 ml  LAESV MOD A4C: 20 ml  LALs A4C: 5.1 cm  LVEDV MOD A4C: 20.6 ml  LVEF MOD A4C: 68 %  LVESV MOD A4C: 6.6 ml  LVIDd: 3.5 cm  LVIDs: 2.2 cm  LVLd A4C: 7.8 cm  LVLs A4C: 7 cm  LVPWd: 1.5 cm  RAAs A4C: 17.6 cm2  RAESV A-L: 49.6 ml  RAESV MOD: 47.2 ml  RALs: 5.3 cm  RVIDd: 3 cm  SV MOD A4C: 14 ml  SV(Teich): 36.5 ml    CW  AV Env.Ti: 266.4 ms  AV VTI: 22.9 cm  AV Vmax: 1.3 m/s  AV Vmean: 0.9 m/s  AV maxP.5 mmHg  AV meanPG: 3.3 mmHg  TR Vmax: 1.9 m/s  TR maxP.9 mmHg    MM  TAPSE: 2.1 cm    PW  DVI: 1.1  E' Sept: 0.1 m/s  E/E' Sept: 11.2  LVOT Env.Ti: 304.5 ms  LVOT VTI: 25.4 cm  LVOT Vmax: 1.2 m/s  LVOT Vmean: 0.8 m/s  LVOT maxP mmHg  LVOT meanPG: 3.2 mmHg  MV A Lonnie: 0.8 m/s  MV Dec Tr[place]dale: 2.1 m/s2  MV DecT: 294.5 ms  MV E Lonnie: 0.6 m/s  MV E/A Ratio: 0.7  MV PHT: 85.4 ms  MVA By PHT: 2.6 cm2    [facility]    Prepared and electronically signed by    Tim Mendez MD  Signed 2021 16:36:19    No results found for this or any previous  visit.    --------------------------------------------------------------------------------  HOLTER  No results found for this or any previous visit.    No results found for this or any previous visit.    --------------------------------------------------------------------------------  CAROTIDS  Results for orders placed during the hospital encounter of 07/21/23    VAS carotid complete study    Narrative  [facility] REPORT  CLINICAL:  Indications:  Patient presents with possible future renal and pancreatic transplant.  Patient is asymptomatic from a cerebral vascular standpoint.  Operative History:  2022-12-28 fistulagram/ graftogram  2021-09-03 IR AV fistulagram/graftogram  2021-02-26 IR AV fistulagram/graftogram  2020-06-24 IR AV fistulagram/graftogram  2019-06-14 IR AV fistulagram/graftogram  2019-04-30 IR AV fistulagram/graftogram  2019-04-03 IR tunneled dialysis catheter placement  2019-01-23 Left Brachiocephalic AVF  Risk Factors  The patient has history of HTN, Diabetes, CHF,  Hyperlipidemia, CKD, PAD and  previous smoking.  Clinical  Right Pressure:  130/60 mm Hg, Left Pressure:  AVF    FINDINGS:    Right        Impression  PSV  EDV (cm/s)  Ratio  Dist. ICA                 44           9   0.66  Mid. ICA                  81          16   1.22  Prox. ICA    1 - 49%      57          14   0.86  Dist CCA                  63           9  Mid CCA                   67           3   0.84  Prox CCA                  80           0  Ext Carotid               93           0   1.39  Prox Vert                 49          13  Subclavian               117           7    Left         Impression  PSV  EDV (cm/s)  Ratio  Dist. ICA                 56          12   0.63  Mid. ICA                  81          15   0.91  Prox. ICA    1 - 49%      87          13   0.98  Dist CCA                  73           6  Mid CCA                   89           0   0.89  Prox CCA                  99           5  Ext Carotid               100           0   1.13  Prox Vert                 58          10  Subclavian               232          71        CONCLUSION:    Impression  RIGHT:  There is <50% stenosis noted in the internal carotid artery.  Plaque is heterogenous and smooth.  Vertebral artery flow is antegrade. There is no significant subclavian artery  disease.    LEFT:  There is <50% stenosis noted in the internal carotid artery.  Plaque is heterogenous and irregular.  Vertebral artery flow is antegrade. There is no significant subclavian artery  disease.    Compared to previous study on 10/23/2020, there is no significant change in the  disease process.    SIGNATURE:  Electronically Signed by: JUAN GORDON MD on 2023-07-21 03:41:10 PM     --------------------------------------------------------------------------------  Diagnoses and all orders for this visit:    Benign hypertension with ESRD (end-stage renal disease) (HCC)  -     POCT ECG    Preop cardiovascular exam  -     POCT ECG    Peripheral arterial disease (HCC)    Acute respiratory failure with hypoxia (HCC)    Diabetic polyneuropathy associated with type 2 diabetes mellitus (HCC)    ESRD on dialysis (HCC)    Hyperlipidemia, unspecified hyperlipidemia type       ======================================================    Past Medical History:   Diagnosis Date   • Cataract    • Dizziness     occ   • GERD (gastroesophageal reflux disease)    • Hyperlipidemia    • Legally blind    • LVH (left ventricular hypertrophy)    • Preferred language is Creole     understands some English   • Use of cane as ambulatory aid      Past Surgical History:   Procedure Laterality Date   • INGUINAL HERNIA REPAIR Right    • IR AV FISTULAGRAM/GRAFTOGRAM  4/30/2019   • IR AV FISTULAGRAM/GRAFTOGRAM  6/14/2019   • IR AV FISTULAGRAM/GRAFTOGRAM  6/24/2020   • IR AV FISTULAGRAM/GRAFTOGRAM  2/26/2021   • IR AV FISTULAGRAM/GRAFTOGRAM  9/3/2021   • IR AV FISTULAGRAM/GRAFTOGRAM  12/28/2022   • IR TUNNELED DIALYSIS  CATHETER PLACEMENT  4/3/2019   • DC ARTERIOVENOUS ANASTOMOSIS OPEN DIRECT Left 1/23/2019    Procedure: CREATION FISTULA ARTERIOVENOUS (AV);  Surgeon: Matt De Anda MD;  Location: [facility];  Service: Vascular         Medications  Current Outpatient Medications   Medication Sig Dispense Refill   • acetaminophen (TYLENOL) 500 mg tablet Take 1-2 tablets (500-1,000 mg total) by mouth every 8 (eight) hours as needed for moderate pain 20 tablet 0   • aspirin (Aspirin Low Dose) 81 mg EC tablet TAKE 1 TABLET (81 MG TOTAL) BY MOUTH DAILY 90 tablet 0   • atorvastatin (LIPITOR) 40 mg tablet TAKE 1 TABLET (40 MG TOTAL) BY MOUTH DAILY 90 tablet 0   • benzonatate (TESSALON PERLES) 100 mg capsule Take 1-2 capsules (100-200 mg total) by mouth 3 (three) times a day as needed for cough 30 capsule 0   • brimonidine tartrate 0.2 % ophthalmic solution Administer 1 drop into the left eye 2 (two) times a day     • Carboxymethylcellul-Glycerin (CLEAR EYES FOR DRY EYES OP) Apply 2 drops to eye 2 (two) times a day     • Continuous Blood Gluc  (FreeStyle Jessica 2 [place]) LAUREN Check blood sugars multiple times per day 1 each 0   • Continuous Blood Gluc Sensor (FreeStyle Jessica 2 Sensor) MISC Check blood sugars multiple times per day 6 each 3   • dorzolamide (TRUSOPT) 2 % ophthalmic solution instill 1 drop into left eye three times a day     • doxazosin (CARDURA) 2 mg tablet Take 2 tablets (4 mg total) by mouth 2 (two) times a day 180 tablet 4   • furosemide (LASIX) 80 mg tablet TAKE 1 TABLET BY MOUTH DAILY 90 tablet 0   • insulin aspart (NovoLOG) 100 Units/mL injection pen 10U with breakfast, 10 U with dinner 15 mL 2   • Insulin Glargine Solostar (Basaglar KwikPen) 100 UNIT/ML SOPN Inject 0.28 mL (28 Units total) under the skin daily 15 mL 2   • Insulin Pen Needle (BD Pen Needle Tiffany 2nd Gen) 32G X 4 MM MISC USE AS DIRECTED 90 each 1   • labetalol (NORMODYNE) 200 mg tablet TAKE 3 TABLETS (600 MG TOTAL) BY MOUTH EVERY 12 (TWELVE)  HOURS 120 tablet 6   • lactulose 20 g/30 mL Take 30 mL (20 g total) by mouth 2 (two) times a day as needed (for constipation) 946 mL 2   • Lancets (freestyle) lancets Use as instructed 100 each 1   • methocarbamol (ROBAXIN) 500 mg tablet Take 1 tablet (500 mg total) by mouth 2 (two) times a day as needed for muscle spasms 10 tablet 0   • NIFEdipine (PROCARDIA XL) 90 mg 24 hr tablet TAKE 1 TABLET BY MOUTH DAILY 90 tablet 0   • polyethylene glycol (MIRALAX) 17 g packet Take 17 g by mouth daily 30 each 1   • Blood Glucose Monitoring Suppl (ONE TOUCH ULTRA 2) w/Device KIT by Does not apply route 3 (three) times a day (Patient not taking: Reported on 2023) 1 each 0     No current facility-administered medications for this visit.        Allergies   Allergen Reactions   • No Known Allergies        Social History     Socioeconomic History   • Marital status: Legally      Spouse name: Not on file   • Number of children: Not on file   • Years of education: Not on file   • Highest education level: Not on file   Occupational History   • Occupation: DISABLED   Tobacco Use   • Smoking status: Former     Current packs/day: 0.00     Types: Cigarettes     Quit date: 2018     Years since quittin.9   • Smokeless tobacco: Never   Vaping Use   • Vaping status: Never Used   Substance and Sexual Activity   • Alcohol use: Not Currently   • Drug use: No   • Sexual activity: Not Currently   Other Topics Concern   • Not on file   Social History Narrative   • Not on file     Social Determinants of Health     Financial Resource Strain: High Risk (2023)    Overall Financial Resource Strain (CARDIA)    • Difficulty of Paying Living Expenses: Very hard   Food Insecurity: No Food Insecurity (3/10/2023)    Hunger Vital Sign    • Worried About Running Out of Food in the Last Year: Never true    • Ran Out of Food in the Last Year: Never true   Transportation Needs: No Transportation Needs (2023)    PRAPARE -  "Transportation    • Lack of Transportation (Medical): No    • Lack of Transportation (Non-Medical): No   Physical Activity: Not on file   Stress: Not on file   Social Connections: Not on file   Intimate Partner Violence: Not on file   Housing Stability: Low Risk  (3/10/2023)    Housing Stability Vital Sign    • Unable to Pay for Housing in the Last Year: No    • Number of Places Lived in the Last Year: 1    • Unstable Housing in the Last Year: No        Family History   Problem Relation Age of Onset   • Hypertension Mother    • Diabetes Father    • No Known Problems Sister    • No Known Problems Brother    • No Known Problems Maternal Aunt    • No Known Problems Maternal Uncle    • No Known Problems Paternal Aunt    • No Known Problems Paternal Uncle    • No Known Problems Maternal Grandmother    • No Known Problems Maternal Grandfather    • No Known Problems Paternal Grandmother    • No Known Problems Paternal Grandfather        Lab Results   Component Value Date    WBC 7.31 03/10/2023    HGB 8.5 (L) 03/10/2023    HCT 27.3 (L) 03/10/2023    MCV 91 03/10/2023     03/10/2023      Lab Results   Component Value Date    SODIUM 140 03/11/2023    K 3.8 06/29/2023    CL 98 03/11/2023    CO2 32 03/11/2023    BUN 45 (H) 03/11/2023    CREATININE 9.60 (H) 03/11/2023    GLUC 146 (H) 03/11/2023    CALCIUM 9.6 03/11/2023      Lab Results   Component Value Date    HGBA1C 7.4 (A) 12/27/2023      No results found for: \"CHOL\"  Lab Results   Component Value Date    HDL 54 01/18/2021    HDL 50 04/27/2020    HDL 54 08/30/2019     Lab Results   Component Value Date    LDLCALC 58 01/18/2021    LDLCALC 57 04/27/2020    LDLCALC 49 08/30/2019     Lab Results   Component Value Date    TRIG 95 01/18/2021    TRIG 103 04/27/2020    TRIG 87 08/30/2019     No results found for: \"CHOLHDL\"   Lab Results   Component Value Date    INR 1.14 03/09/2023    INR 1.05 02/07/2022    INR 1.00 01/08/2022    PROTIME 14.6 (H) 03/09/2023    PROTIME 13.4 " "02/07/2022    PROTIME 13.0 01/08/2022          Patient Active Problem List    Diagnosis Date Noted   • Pulmonary nodule 02/25/2021   • Long term (current) use of antibiotics 02/15/2021   • TB lung, latent 01/21/2021   • Positive QuantiFERON-TB Gold test 01/20/2021   • Onychomycosis 12/02/2020   • Diabetic polyneuropathy associated with type 2 diabetes mellitus ([facility]) 12/02/2020   • Slow transit constipation 07/12/2019   • Hyperphosphatemia 04/07/2019   • Benign hypertension with ESRD (end-stage renal disease) ([facility])    • Azotemia 04/04/2019   • S/P arteriovenous (AV) fistula creation 02/05/2019   • ESRD on dialysis ([facility]) 01/09/2019   • Chronic constipation 07/12/2018   • Hyperlipidemia 06/22/2018   • Type 2 diabetes mellitus, with long-term current use of insulin ([facility]) 05/17/2018   • Diabetic retinopathy of both eyes associated with type 2 diabetes mellitus ([facility]) 05/17/2018   • LVH (left ventricular hypertrophy) 05/17/2018   • Acute bilateral low back pain without sciatica 06/28/2023   • Acute respiratory failure with hypoxia ([facility]) 03/09/2023   • Skin ulcer of toe of left foot with fat layer exposed ([facility]) 02/20/2023   • Secondary hyperparathyroidism of renal origin ([facility]) 02/20/2023   • Urinary retention 02/20/2023   • Peripheral arterial disease ([facility]) 07/22/2022   • Hypocalcemia 02/09/2022   • Anemia of chronic disease 02/08/2022   • Chronic diastolic heart failure ([facility]) 02/08/2022   • Visual disorder 12/23/2021   • Adenomatous polyp of colon 10/01/2021       Portions of the record may have been created with voice recognition software. Occasional wrong word or \"sound a like\" substitutions may have occurred due to the inherent limitations of voice recognition software. Read the chart carefully and recognize, using context, where substitutions have occurred.    Uriah De DO, [facility]  1/5/2024 1:31 PM          "

## 2024-01-10 ENCOUNTER — HOSPITAL ENCOUNTER (OUTPATIENT)
Dept: CT IMAGING | Facility: HOSPITAL | Age: 59
Discharge: HOME | End: 2024-01-10
Attending: INTERNAL MEDICINE
Payer: MEDICARE

## 2024-01-10 DIAGNOSIS — R91.1 PULMONARY NODULE: ICD-10-CM

## 2024-01-10 PROCEDURE — G1004 CDSM NDSC: HCPCS

## 2024-01-10 PROCEDURE — 71250 CT THORAX DX C-: CPT

## 2024-01-17 ENCOUNTER — PREP FOR PROCEDURE (OUTPATIENT)
Dept: INTERVENTIONAL RADIOLOGY/VASCULAR | Facility: CLINIC | Age: 59
End: 2024-01-17

## 2024-01-17 ENCOUNTER — TRANSCRIBE ORDERS (OUTPATIENT)
Dept: RADIOLOGY | Facility: HOSPITAL | Age: 59
End: 2024-01-17

## 2024-01-17 ENCOUNTER — TELEPHONE (OUTPATIENT)
Age: 59
End: 2024-01-17

## 2024-01-17 DIAGNOSIS — Z99.2 ESRD ON DIALYSIS (HCC): Primary | ICD-10-CM

## 2024-01-17 DIAGNOSIS — N18.6 ESRD ON DIALYSIS (HCC): Primary | ICD-10-CM

## 2024-01-17 DIAGNOSIS — N18.6 ESRD (END STAGE RENAL DISEASE) (HCC): Primary | ICD-10-CM

## 2024-01-17 NOTE — TELEPHONE ENCOUNTER
Denice [place] called and stated the imaging report is ready for the patient, please contact radiologist for any questions. Thank you.

## 2024-01-18 DIAGNOSIS — R91.8 GROUND GLASS OPACITY PRESENT ON IMAGING OF LUNG: ICD-10-CM

## 2024-01-18 DIAGNOSIS — R91.1 PULMONARY NODULE: Primary | ICD-10-CM

## 2024-01-19 ENCOUNTER — TELEPHONE (OUTPATIENT)
Dept: INTERNAL MEDICINE CLINIC | Facility: CLINIC | Age: 59
End: 2024-01-19

## 2024-01-19 NOTE — TELEPHONE ENCOUNTER
----- Message from Merlin Correa, DO sent at 1/18/2024  8:29 PM EST -----  Please give patient a call.  CT of the chest shows new lung nodule versus inflammation in the left upper lung.  This should be followed up in 3 months with a repeat CT scan.  That order has been placed.  Please let me know if any questions, thanks

## 2024-01-23 DIAGNOSIS — E87.5 HYPERKALEMIA: Primary | ICD-10-CM

## 2024-01-23 DIAGNOSIS — I10 ESSENTIAL HYPERTENSION: ICD-10-CM

## 2024-01-23 DIAGNOSIS — N18.6 ESRD ON DIALYSIS (HCC): ICD-10-CM

## 2024-01-23 DIAGNOSIS — Z99.2 ESRD ON DIALYSIS (HCC): ICD-10-CM

## 2024-01-23 RX ORDER — FUROSEMIDE 80 MG
160 TABLET ORAL DAILY
Qty: 180 TABLET | Refills: 4 | Status: SHIPPED | OUTPATIENT
Start: 2024-01-23

## 2024-01-31 ENCOUNTER — TELEPHONE (OUTPATIENT)
Dept: RADIOLOGY | Facility: HOSPITAL | Age: 59
End: 2024-01-31

## 2024-02-06 DIAGNOSIS — I10 ESSENTIAL HYPERTENSION: ICD-10-CM

## 2024-02-07 ENCOUNTER — HOSPITAL ENCOUNTER (OUTPATIENT)
Dept: INTERVENTIONAL RADIOLOGY/VASCULAR | Facility: HOSPITAL | Age: 59
Discharge: HOME | End: 2024-02-07
Attending: STUDENT IN AN ORGANIZED HEALTH CARE EDUCATION/TRAINING PROGRAM
Payer: MEDICARE

## 2024-02-07 VITALS
OXYGEN SATURATION: 98 % | SYSTOLIC BLOOD PRESSURE: 151 MMHG | TEMPERATURE: 96.8 F | HEIGHT: 69 IN | RESPIRATION RATE: 18 BRPM | WEIGHT: 173 LBS | DIASTOLIC BLOOD PRESSURE: 73 MMHG | HEART RATE: 73 BPM | BODY MASS INDEX: 25.62 KG/M2

## 2024-02-07 DIAGNOSIS — N18.6 ESRD ON DIALYSIS (HCC): ICD-10-CM

## 2024-02-07 DIAGNOSIS — Z99.2 ESRD ON DIALYSIS (HCC): ICD-10-CM

## 2024-02-07 LAB — GLUCOSE SERPL-MCNC: 159 MG/DL (ref 65–140)

## 2024-02-07 PROCEDURE — C1887 CATHETER, GUIDING: HCPCS

## 2024-02-07 PROCEDURE — C1725 CATH, TRANSLUMIN NON-LASER: HCPCS

## 2024-02-07 PROCEDURE — 76937 US GUIDE VASCULAR ACCESS: CPT | Performed by: STUDENT IN AN ORGANIZED HEALTH CARE EDUCATION/TRAINING PROGRAM

## 2024-02-07 PROCEDURE — C1894 INTRO/SHEATH, NON-LASER: HCPCS

## 2024-02-07 PROCEDURE — 36902 INTRO CATH DIALYSIS CIRCUIT: CPT

## 2024-02-07 PROCEDURE — 99153 MOD SED SAME PHYS/QHP EA: CPT

## 2024-02-07 PROCEDURE — 82948 REAGENT STRIP/BLOOD GLUCOSE: CPT

## 2024-02-07 PROCEDURE — C1769 GUIDE WIRE: HCPCS

## 2024-02-07 PROCEDURE — 99152 MOD SED SAME PHYS/QHP 5/>YRS: CPT

## 2024-02-07 PROCEDURE — 36902 INTRO CATH DIALYSIS CIRCUIT: CPT | Performed by: STUDENT IN AN ORGANIZED HEALTH CARE EDUCATION/TRAINING PROGRAM

## 2024-02-07 PROCEDURE — 99152 MOD SED SAME PHYS/QHP 5/>YRS: CPT | Performed by: STUDENT IN AN ORGANIZED HEALTH CARE EDUCATION/TRAINING PROGRAM

## 2024-02-07 RX ORDER — LIDOCAINE WITH 8.4% SOD BICARB 0.9%(10ML)
SYRINGE (ML) INJECTION AS NEEDED
Status: COMPLETED | OUTPATIENT
Start: 2024-02-07 | End: 2024-02-07

## 2024-02-07 RX ORDER — PREDNISOLONE ACETATE 10 MG/ML
SUSPENSION/ DROPS OPHTHALMIC
COMMUNITY
Start: 2024-02-03

## 2024-02-07 RX ORDER — ATROPINE SULFATE 10 MG/ML
SOLUTION/ DROPS OPHTHALMIC
COMMUNITY
Start: 2024-02-03

## 2024-02-07 RX ORDER — MIDAZOLAM HYDROCHLORIDE 2 MG/2ML
INJECTION, SOLUTION INTRAMUSCULAR; INTRAVENOUS AS NEEDED
Status: COMPLETED | OUTPATIENT
Start: 2024-02-07 | End: 2024-02-07

## 2024-02-07 RX ORDER — FENTANYL CITRATE 50 UG/ML
INJECTION, SOLUTION INTRAMUSCULAR; INTRAVENOUS AS NEEDED
Status: COMPLETED | OUTPATIENT
Start: 2024-02-07 | End: 2024-02-07

## 2024-02-07 RX ADMIN — IOHEXOL 35 ML: 350 INJECTION, SOLUTION INTRAVENOUS at 11:09

## 2024-02-07 RX ADMIN — FENTANYL CITRATE 50 MCG: 50 INJECTION, SOLUTION INTRAMUSCULAR; INTRAVENOUS at 10:45

## 2024-02-07 RX ADMIN — MIDAZOLAM 1 MG: 1 INJECTION INTRAMUSCULAR; INTRAVENOUS at 10:45

## 2024-02-07 RX ADMIN — Medication 3 ML: at 10:51

## 2024-02-07 NOTE — NURSING NOTE
Patient returned from IR with dry dressing to E, no drainage present and woggle suture under dressing per IR report. No c/o pain. Patient resting in bed, call bell within reach. Tolerating food and drink no issues.

## 2024-02-07 NOTE — H&P
"Interventional Radiology Preprocedure Note    History/Indication for procedure:   Paddy Zhong is a 58 y.o. male with a PMH of ESRD on HD dialyzed through a [facility] who presents for fistulagraphy for the indication of low kt/v.    The last intervention was last year.    Relevant past medical history:    Past Medical History:   Diagnosis Date    Cataract     Dizziness     occ    GERD (gastroesophageal reflux disease)     Hyperlipidemia     Legally blind     LVH (left ventricular hypertrophy)     Preferred language is Creole     understands some English    Use of cane as ambulatory aid      Patient Active Problem List   Diagnosis    Type 2 diabetes mellitus, with long-term current use of insulin (HCC)    Diabetic retinopathy of both eyes associated with type 2 diabetes mellitus (HCC)    LVH (left ventricular hypertrophy)    Hyperlipidemia    Chronic constipation    ESRD on dialysis (HCC)    S/P arteriovenous (AV) fistula creation    Azotemia    Hyperphosphatemia    Benign hypertension with ESRD (end-stage renal disease) (HCC)    Slow transit constipation    Onychomycosis    Diabetic polyneuropathy associated with type 2 diabetes mellitus (HCC)    Positive QuantiFERON-TB Gold test    TB lung, latent    Long term (current) use of antibiotics    Pulmonary nodule    Adenomatous polyp of colon    Visual disorder    Anemia of chronic disease    Chronic diastolic heart failure (HCC)    Hypocalcemia    Peripheral arterial disease (HCC)    Skin ulcer of toe of left foot with fat layer exposed (HCC)    Secondary hyperparathyroidism of renal origin (HCC)    Urinary retention    Acute respiratory failure with hypoxia (HCC)    Acute bilateral low back pain without sciatica       /78 (BP Location: Right arm)   Pulse 61   Temp (!) 97 °F (36.1 °C) (Temporal)   Resp 18   Ht 5' 9\" (1.753 m)   Wt 78.5 kg (173 lb)   SpO2 100%   BMI 25.55 kg/m²     Medications:    Inpatient Medications:     Scheduled " Medications:      Infusions:  No current facility-administered medications for this encounter.      PRN:      Outpatient Medications:  Current Outpatient Medications on File Prior to Encounter   Medication Sig Dispense Refill    acetaminophen (TYLENOL) 500 mg tablet Take 1-2 tablets (500-1,000 mg total) by mouth every 8 (eight) hours as needed for moderate pain 20 tablet 0    aspirin (Aspirin Low Dose) 81 mg EC tablet TAKE 1 TABLET (81 MG TOTAL) BY MOUTH DAILY 90 tablet 0    atorvastatin (LIPITOR) 40 mg tablet TAKE 1 TABLET (40 MG TOTAL) BY MOUTH DAILY 90 tablet 0    atropine (ISOPTO ATROPINE) 1 % ophthalmic solution INSTILL 1 DROP IN LEFT EYE TWICE DAILY      brimonidine tartrate 0.2 % ophthalmic solution Administer 1 drop into the left eye 2 (two) times a day      Carboxymethylcellul-Glycerin (CLEAR EYES FOR DRY EYES OP) Apply 2 drops to eye 2 (two) times a day      dorzolamide (TRUSOPT) 2 % ophthalmic solution instill 1 drop into left eye three times a day      doxazosin (CARDURA) 2 mg tablet Take 2 tablets (4 mg total) by mouth 2 (two) times a day 180 tablet 4    insulin aspart (NovoLOG) 100 Units/mL injection pen 10U with breakfast, 10 U with dinner 15 mL 2    Insulin Glargine Solostar (Basaglar KwikPen) 100 UNIT/ML SOPN Inject 0.28 mL (28 Units total) under the skin daily 15 mL 2    labetalol (NORMODYNE) 200 mg tablet TAKE 3 TABLETS (600 MG TOTAL) BY MOUTH EVERY 12 (TWELVE) HOURS 120 tablet 6    lactulose 20 g/30 mL Take 30 mL (20 g total) by mouth 2 (two) times a day as needed (for constipation) 946 mL 2    NIFEdipine (PROCARDIA XL) 90 mg 24 hr tablet TAKE 1 TABLET BY MOUTH DAILY 90 tablet 0    polyethylene glycol (MIRALAX) 17 g packet Take 17 g by mouth daily 30 each 1    prednisoLONE acetate (PRED FORTE) 1 % ophthalmic suspension SHAKE LIQUID AND INSTILL 1 DROP IN LEFT EYE TWICE DAILY      Sodium Zirconium Cyclosilicate (Lokelma) 10 g Take one pack as directed on non dialysis days 90 packet 6    Blood  Glucose Monitoring Suppl (ONE TOUCH ULTRA 2) w/Device KIT by Does not apply route 3 (three) times a day (Patient not taking: Reported on 6/28/2023) 1 each 0    Continuous Blood Gluc  (FreeStyle Jessica 2 [place]) LAUREN Check blood sugars multiple times per day 1 each 0    Continuous Blood Gluc Sensor (FreeStyle Jessica 2 Sensor) MISC Check blood sugars multiple times per day 6 each 3    Insulin Pen Needle (BD Pen Needle Tiffany 2nd Gen) 32G X 4 MM MISC USE AS DIRECTED 90 each 1    Lancets (freestyle) lancets Use as instructed 100 each 1    methocarbamol (ROBAXIN) 500 mg tablet Take 1 tablet (500 mg total) by mouth 2 (two) times a day as needed for muscle spasms 10 tablet 0    [DISCONTINUED] benzonatate (TESSALON PERLES) 100 mg capsule Take 1-2 capsules (100-200 mg total) by mouth 3 (three) times a day as needed for cough 30 capsule 0    [DISCONTINUED] calcium acetate (PHOSLO) capsule Take 667 mg by mouth 3 (three) times a day with meals      [DISCONTINUED] cinacalcet (SENSIPAR) 30 mg tablet Take 1 tablet (30 mg total) by mouth daily 90 tablet 4     No current facility-administered medications on file prior to encounter.       Allergies   Allergen Reactions    No Known Allergies        Anticoagulants: ASA    ASA classification: ASA 3 - Patient with moderate systemic disease with functional limitations    Airway Assessment: II (hard and soft palate, upper portion of tonsils anduvula visible)    Relevant family history: None    Relevant review of systems: None    Prior sedation/anesthesia: yes    Can the patient lie flat? Yes     NPO Status: yes    Labs:   CBC with diff:   Lab Results   Component Value Date    WBC 7.31 03/10/2023    HGB 8.5 (L) 03/10/2023    HCT 27.3 (L) 03/10/2023    MCV 91 03/10/2023     03/10/2023    RBC 3.01 (L) 03/10/2023    MCH 28.2 03/10/2023    MCHC 31.1 (L) 03/10/2023    RDW 14.9 03/10/2023    MPV 10.3 03/10/2023    NRBC 0 03/10/2023     BMP/CMP:  Lab Results   Component Value Date    K  3.8 06/29/2023    K 5.3 06/20/2018    CL 98 03/11/2023     06/20/2018    CO2 32 03/11/2023    CO2 20 06/20/2018    BUN 45 (H) 03/11/2023    BUN 38 (H) 06/20/2018    CREATININE 9.60 (H) 03/11/2023    CALCIUM 9.6 03/11/2023    CALCIUM 9.2 06/20/2018    AST 9 (L) 03/09/2023    AST 10 06/20/2018    ALT 7 03/09/2023    ALT 11 06/20/2018    ALKPHOS 57 03/09/2023    ALKPHOS 70 06/20/2018    EGFR 5 03/11/2023   ,     Coags:   Lab Results   Component Value Date    PTT 38 (H) 03/09/2023    INR 1.14 03/09/2023   ,          Relevant imaging studies:   Reviewed.    Directed physical examination:  CONSTITUTIONAL: The patient appeared well in no acute distress.   NEUROLOGICAL: alert, awake, answering questions appropriately.  PSYCHIATRIC: Affect normal.  PULMONARY: No respiratory distress.  CARDIAC: normal sinus rhythm on monitor, without tachycardia.  GASTROINTESTINAL: abdomen was soft, round, nontender.  EXTREMITIES: No cyanosis.  [facility] has pulsatile thrill, mild cannulation zone aneurysms.    Assessment/Plan:   For diagnostic fistulagram and possible treatment.    Sedation/Anesthesia plan:  Moderate sedation will be used as needed for procedure.    Consent with alternatives to the procedure, risks and benefits have been explained and discussed with the patient/patient's family: yes.    During the informed consent process, the following was discussed, and the patient was educated concerning paclitaxel coated balloons and stents, which may be appropriate for the patient's up-coming fistulagram procedure: Analysis of randomized trials suggest a possible increased death rate after two years in patients treated with paclitaxel-coated balloons and paclitaxel-eluting stents compared to patients treated with control devices (non-coated balloons or bare metal stents) specifically in patients with lower extremity claudication. This has not been corroborated for dialysis access circuits.  The specific cause for this observation is  yet to be determined. Currently, the FDA believes that the benefits continue to outweigh the risks for approved paclitaxel-coated balloons and paclitaxel-eluting stents when used in accordance with their indications for use. The patient gave informed consent for the use of these devices if appropriately indicated for treatment.

## 2024-02-07 NOTE — DISCHARGE INSTRUCTIONS
Fistulagram   WHAT YOU NEED TO KNOW:   Your arm or leg my  be sore, swollen, and bruised after the procedure. This is normal and should get better in a few days.     DISCHARGE INSTRUCTIONS:     Contact Interventional Radiology at [phone] and follow prompts if you experience any:    You have a fever or chills.    Your puncture site is red, swollen, or draining pus.    You have nausea or are vomiting.    Your skin is itchy, swollen, or you have a rash.    You cannot feel a thrill over your graft or fistula.     You have questions or concerns about your condition or care.    Seek care immediately if:     You have bleeding that does not stop after 10 minutes of holding firm, direct pressure over the puncture site.    Blood soaks through your bandage.    Your hand or foot closest to the graft or fistula feels cold, painful, or numb.     Your hand or foot closest to the graft or fistula is pale or blue.     You have trouble moving your arm or leg closest to the graft or fistula.     Your bruise suddenly gets bigger.    Care for your wound as directed:  Remove the bandage in 4 to 6 hours or as         directed. Wash the area once a day with soap and water. Gently pat the area dry.     Apply firm, steady pressure to the puncture site if it bleeds.  Use a clean gauze or towel to hold pressure for 10 to 15 minutes. Call 911 if you cannot stop the bleeding or the bleeding gets heavier.     Feel for a thrill once a day or as directed.  Place your index and second finger over your fistula or graft as directed. You should feel a vibration. The vibration means that blood is flowing through your graft or fistula correctly.     Rest your arm or leg as directed.  Do not lift anything heavier than 5 pounds or do strenuous activity for 24 hours.     Prevent damage to your graft or fistula.  Do not wear tight-fitting clothing over your graft or fistula. Do not wear tight jewelry on the arm or leg with the graft or fistula. Tell  healthcare providers not to do, IVs, blood draws, and blood pressure readings in the arm with your graft or fistula. Do not allow flu shots or vaccinations in your arm with your graft or fistula.    Follow up with your healthcare provider as directedProcedural Sedation   WHAT YOU NEED TO KNOW:   Procedural sedation is medicine used during procedures to help you feel relaxed and calm. You will remember little to none of the procedure. After sedation you may feel tired, weak, or unsteady on your feet. You may also have trouble concentrating or short-term memory loss. These symptoms should go away in 24 hours or less.   DISCHARGE INSTRUCTIONS:     Call 911 or have someone else call for any of the following:   You have sudden trouble breathing.     You cannot be woken.      Contact Interventional Radiology at [phone]   ([facility] PATIENTS: Contact Interventional Radiology at [phone]) ([facility] PATIENTS: Contact Interventional Radiology at [phone]) if any of the following occur:     You have a severe headache or dizziness.     Your heart is beating faster than usual.    You have a fever or chills.     Your skin is itchy, swollen, or you have a rash.     You have nausea or are vomiting for more than 8 hours after the procedure.      You have questions or concerns about your condition or care.  Self-care:   Have someone stay with you for 24 hours. This person can drive you to errands and help you do things around the house. This person can also watch for problems.      Rest and do quiet activities for 24 hours. Do not exercise, ride a bike, or play sports. Stand up slowly to prevent dizziness and falls. Take short walks around the house with another person. Slowly return to your usual activities the next day.      Do not drive or use dangerous machines or tools for 24 hours. You may injure yourself or others. Examples include a lawnmower, saw, or drill. Do not return to work for 24 hours if you use dangerous  machines or tools for work.      Do not make important decisions for 24 hours. For example, do not sign important papers or invest money.      Drink liquids as directed. Liquids help flush the sedation medicine out of your body. Ask how much liquid to drink each day and which liquids are best for you.      Eat small, frequent meals to prevent nausea and vomiting. Start with clear liquids such as juice or broth. If you do not vomit after clear liquids, you can eat your usual foods.      Do not drink alcohol or take medicines that make you drowsy. This includes medicines that help you sleep and anxiety medicines. Ask your healthcare provider if it is safe for you to take pain medicine.  Follow up with your healthcare provider as directed: Write down your questions so you remember to ask them during your visits.

## 2024-02-07 NOTE — NURSING NOTE
Woggle removed by IR staff, dressing to fistula gram site is clean, and dry, (+) bruit and thrill noted, no complaints.

## 2024-02-07 NOTE — NURSING NOTE
Pt has no complaints, tolerated diet, (+) bruit and thrill noted, dressing is clean, dry, and intact to fistula gram site.

## 2024-02-07 NOTE — BRIEF OP NOTE (RAD/CATH)
INTERVENTIONAL RADIOLOGY PROCEDURE NOTE    Date: 2/7/2024    Procedure:   Procedure Summary       Date: 02/07/24 Room / Location: [facility] Interventional Radiology    Anesthesia Start:  Anesthesia Stop:     Procedure: IR AV FISTULAGRAM/GRAFTOGRAM Diagnosis:       ESRD on dialysis (HCC)      (low kt/v)    Scheduled Providers:  Responsible Provider:     Anesthesia Type: Not recorded ASA Status: Not recorded            Preoperative diagnosis:   1. ESRD on dialysis (HCC)         Postoperative diagnosis: Same.    Surgeon: Uriah Pastrana MD     Assistant: None. No qualified resident was available.    Blood loss: 5 ml    Specimens: none.     Findings:   Fistulagram showed recurrent edge stenosis along the peripheral and central edge of the cephalic arch stent construct, treated with 8 and 9 mm HPB POBA.    Complications: None immediate.    Anesthesia: conscious sedation   tightness, shortness of breath and wheezing. Cardiovascular: Negative for chest pain, palpitations and leg swelling. Gastrointestinal: Negative for abdominal pain, blood in stool, constipation, diarrhea, nausea and vomiting. Endocrine: Negative for polydipsia. Genitourinary: Positive for penile pain. Negative for dysuria, frequency, hematuria and urgency. Musculoskeletal: Positive for arthralgias, back pain, gait problem, myalgias, neck pain and neck stiffness. Skin: Negative. Neurological: Negative for dizziness, tremors, weakness and headaches. Hematological: Does not bruise/bleed easily. Psychiatric/Behavioral: Negative for hallucinations and suicidal ideas. All other systems reviewed and are negative. Prior to Visit Medications    Medication Sig Taking? Authorizing Provider   fluticasone-vilanterol (BREO ELLIPTA) 100-25 MCG/INH AEPB inhaler Inhale 1 puff into the lungs daily Yes Fred Hayward, DO   traMADol (ULTRAM) 50 MG tablet Take 1 tablet by mouth every 8 hours as needed for Pain for up to 30 days. Intended supply: 7 days. Take lowest dose possible to manage pain Yes Fred Hayward, DO   albuterol sulfate  (90 Base) MCG/ACT inhaler Inhale 2 puffs into the lungs 4 times daily as needed for Wheezing Yes Fred Hayward, DO   ibuprofen (ADVIL;MOTRIN) 600 MG tablet Take 1 tablet by mouth every 6 hours as needed for Pain Yes Lyubov Arrington PA-C        No Known Allergies    Past Medical History:   Diagnosis Date    Back injury        No past surgical history on file.     Social History     Socioeconomic History    Marital status:      Spouse name: Not on file    Number of children: Not on file    Years of education: Not on file    Highest education level: Not on file   Occupational History    Not on file   Social Needs    Financial resource strain: Not on file    Food insecurity     Worry: Not on file     Inability: Not on file    Transportation needs     Medical: Not on file     Non-medical: Not on file   Tobacco Use    Smoking status: Current Every Day Smoker     Packs/day: 0.50     Types: Cigarettes    Smokeless tobacco: Never Used   Substance and Sexual Activity    Alcohol use: Yes     Alcohol/week: 6.0 standard drinks     Types: 6 Cans of beer per week    Drug use: Yes     Types: Marijuana    Sexual activity: Not on file   Lifestyle    Physical activity     Days per week: Not on file     Minutes per session: Not on file    Stress: Not on file   Relationships    Social connections     Talks on phone: Not on file     Gets together: Not on file     Attends Jain service: Not on file     Active member of club or organization: Not on file     Attends meetings of clubs or organizations: Not on file     Relationship status: Not on file    Intimate partner violence     Fear of current or ex partner: Not on file     Emotionally abused: Not on file     Physically abused: Not on file     Forced sexual activity: Not on file   Other Topics Concern    Not on file   Social History Narrative    Not on file        No family history on file. Vitals:    06/02/20 1120   BP: 126/80   Pulse: 90   Temp: 98.4 °F (36.9 °C)   TempSrc: Temporal   SpO2: 97%   Weight: 170 lb 3.2 oz (77.2 kg)   Height: 5' 5\" (1.651 m)     Estimated body mass index is 28.32 kg/m² as calculated from the following:    Height as of this encounter: 5' 5\" (1.651 m). Weight as of this encounter: 170 lb 3.2 oz (77.2 kg). Physical Exam  Vitals signs reviewed. HENT:      Head: Normocephalic and atraumatic. Mouth/Throat:      Mouth: Mucous membranes are moist.      Dentition: Abnormal dentition. No dental tenderness. Eyes:      General: No scleral icterus. Conjunctiva/sclera: Conjunctivae normal.      Pupils: Pupils are equal, round, and reactive to light. Neck:      Musculoskeletal: Decreased range of motion.  Neck rigidity, injury, pain with movement, spinous process tenderness and

## 2024-03-02 DIAGNOSIS — E78.5 HYPERLIPIDEMIA, UNSPECIFIED HYPERLIPIDEMIA TYPE: ICD-10-CM

## 2024-03-04 RX ORDER — ATORVASTATIN CALCIUM 40 MG/1
40 TABLET, FILM COATED ORAL DAILY
Qty: 30 TABLET | Refills: 0 | Status: SHIPPED | OUTPATIENT
Start: 2024-03-04

## 2024-03-04 NOTE — TELEPHONE ENCOUNTER
Patient needs updated blood work and has previously placed orders. Please contact patient to go for labs. Courtesy refill provided

## 2024-03-05 DIAGNOSIS — I10 PRIMARY HYPERTENSION: Primary | ICD-10-CM

## 2024-03-05 RX ORDER — LOSARTAN POTASSIUM 25 MG/1
25 TABLET ORAL DAILY
Qty: 90 TABLET | Refills: 3 | Status: SHIPPED | OUTPATIENT
Start: 2024-03-05

## 2024-03-26 DIAGNOSIS — N18.6 ESRD ON DIALYSIS (HCC): Primary | ICD-10-CM

## 2024-03-26 DIAGNOSIS — Z99.2 ESRD ON DIALYSIS (HCC): Primary | ICD-10-CM

## 2024-03-26 RX ORDER — LIDOCAINE AND PRILOCAINE 25; 25 MG/G; MG/G
CREAM TOPICAL
Qty: 5 G | Refills: 4 | Status: SHIPPED | OUTPATIENT
Start: 2024-03-26

## 2024-03-30 DIAGNOSIS — N18.6 BENIGN HYPERTENSION WITH ESRD (END-STAGE RENAL DISEASE) (HCC): ICD-10-CM

## 2024-03-30 DIAGNOSIS — I12.0 BENIGN HYPERTENSION WITH ESRD (END-STAGE RENAL DISEASE) (HCC): ICD-10-CM

## 2024-04-01 RX ORDER — LABETALOL 200 MG/1
600 TABLET, FILM COATED ORAL EVERY 12 HOURS
Qty: 120 TABLET | Refills: 5 | Status: SHIPPED | OUTPATIENT
Start: 2024-04-01

## 2024-04-12 DIAGNOSIS — E78.5 HYPERLIPIDEMIA, UNSPECIFIED HYPERLIPIDEMIA TYPE: ICD-10-CM

## 2024-04-12 RX ORDER — ATORVASTATIN CALCIUM 40 MG/1
40 TABLET, FILM COATED ORAL DAILY
Qty: 30 TABLET | Refills: 0 | Status: SHIPPED | OUTPATIENT
Start: 2024-04-12

## 2024-04-19 ENCOUNTER — HOSPITAL ENCOUNTER (OUTPATIENT)
Dept: CT IMAGING | Facility: HOSPITAL | Age: 59
Discharge: HOME | End: 2024-04-19
Attending: INTERNAL MEDICINE
Payer: MEDICARE

## 2024-04-19 DIAGNOSIS — R91.8 GROUND GLASS OPACITY PRESENT ON IMAGING OF LUNG: ICD-10-CM

## 2024-04-19 DIAGNOSIS — R91.1 PULMONARY NODULE: ICD-10-CM

## 2024-04-19 PROCEDURE — G1004 CDSM NDSC: HCPCS

## 2024-04-19 PROCEDURE — 71250 CT THORAX DX C-: CPT

## 2024-04-30 DIAGNOSIS — N18.6 ESRD ON DIALYSIS (HCC): ICD-10-CM

## 2024-04-30 DIAGNOSIS — E87.5 HYPERKALEMIA: ICD-10-CM

## 2024-04-30 DIAGNOSIS — Z99.2 ESRD ON DIALYSIS (HCC): ICD-10-CM

## 2024-05-01 ENCOUNTER — TELEPHONE (OUTPATIENT)
Dept: INTERNAL MEDICINE CLINIC | Facility: CLINIC | Age: 59
End: 2024-05-01

## 2024-05-01 NOTE — TELEPHONE ENCOUNTER
----- Message from Merlin Correa DO sent at 4/28/2024  9:09 AM EDT -----  Chest CT shows resolution of previously seen lung nodule/opacity.  This may have been related to prior inflammation or infection but is now resolved which is good news.  No follow-up imaging of the chest needed at this time

## 2024-05-03 ENCOUNTER — OFFICE VISIT (OUTPATIENT)
Dept: INTERNAL MEDICINE CLINIC | Facility: CLINIC | Age: 59
End: 2024-05-03
Payer: MEDICARE

## 2024-05-03 VITALS
SYSTOLIC BLOOD PRESSURE: 120 MMHG | DIASTOLIC BLOOD PRESSURE: 66 MMHG | BODY MASS INDEX: 23.99 KG/M2 | WEIGHT: 162 LBS | TEMPERATURE: 96.1 F | RESPIRATION RATE: 18 BRPM | HEART RATE: 69 BPM | HEIGHT: 69 IN | OXYGEN SATURATION: 97 %

## 2024-05-03 DIAGNOSIS — N52.9 ERECTILE DYSFUNCTION, UNSPECIFIED ERECTILE DYSFUNCTION TYPE: ICD-10-CM

## 2024-05-03 DIAGNOSIS — R91.1 PULMONARY NODULE: ICD-10-CM

## 2024-05-03 DIAGNOSIS — F52.4 PREMATURE EJACULATION: ICD-10-CM

## 2024-05-03 DIAGNOSIS — I50.32 CHRONIC DIASTOLIC (CONGESTIVE) HEART FAILURE (HCC): ICD-10-CM

## 2024-05-03 DIAGNOSIS — N18.6 ESRD ON DIALYSIS (HCC): ICD-10-CM

## 2024-05-03 DIAGNOSIS — Z99.2 ESRD ON DIALYSIS (HCC): ICD-10-CM

## 2024-05-03 DIAGNOSIS — E11.311 TYPE 2 DIABETES MELLITUS WITH RETINOPATHY AND MACULAR EDEMA, WITH LONG-TERM CURRENT USE OF INSULIN, UNSPECIFIED LATERALITY, UNSPECIFIED RETINOPATHY SEVERITY (HCC): ICD-10-CM

## 2024-05-03 DIAGNOSIS — I12.0 BENIGN HYPERTENSION WITH ESRD (END-STAGE RENAL DISEASE) (HCC): Primary | ICD-10-CM

## 2024-05-03 DIAGNOSIS — Z79.4 TYPE 2 DIABETES MELLITUS WITH OTHER DIABETIC KIDNEY COMPLICATION, WITH LONG-TERM CURRENT USE OF INSULIN (HCC): ICD-10-CM

## 2024-05-03 DIAGNOSIS — Z79.4 TYPE 2 DIABETES MELLITUS WITH RETINOPATHY AND MACULAR EDEMA, WITH LONG-TERM CURRENT USE OF INSULIN, UNSPECIFIED LATERALITY, UNSPECIFIED RETINOPATHY SEVERITY (HCC): ICD-10-CM

## 2024-05-03 DIAGNOSIS — N25.81 SECONDARY HYPERPARATHYROIDISM OF RENAL ORIGIN (HCC): ICD-10-CM

## 2024-05-03 DIAGNOSIS — I25.10 CORONARY ARTERY DISEASE INVOLVING NATIVE CORONARY ARTERY OF NATIVE HEART WITHOUT ANGINA PECTORIS: ICD-10-CM

## 2024-05-03 DIAGNOSIS — N18.6 BENIGN HYPERTENSION WITH ESRD (END-STAGE RENAL DISEASE) (HCC): Primary | ICD-10-CM

## 2024-05-03 DIAGNOSIS — E11.29 TYPE 2 DIABETES MELLITUS WITH OTHER DIABETIC KIDNEY COMPLICATION, WITH LONG-TERM CURRENT USE OF INSULIN (HCC): ICD-10-CM

## 2024-05-03 DIAGNOSIS — Z79.4 TYPE 2 DIABETES MELLITUS WITH DIABETIC NEPHROPATHY, WITH LONG-TERM CURRENT USE OF INSULIN (HCC): ICD-10-CM

## 2024-05-03 DIAGNOSIS — E11.21 TYPE 2 DIABETES MELLITUS WITH DIABETIC NEPHROPATHY, WITH LONG-TERM CURRENT USE OF INSULIN (HCC): ICD-10-CM

## 2024-05-03 PROBLEM — J96.01 ACUTE RESPIRATORY FAILURE WITH HYPOXIA (HCC): Status: RESOLVED | Noted: 2023-03-09 | Resolved: 2024-05-03

## 2024-05-03 PROBLEM — L97.522 SKIN ULCER OF TOE OF LEFT FOOT WITH FAT LAYER EXPOSED (HCC): Status: RESOLVED | Noted: 2023-02-20 | Resolved: 2024-05-03

## 2024-05-03 PROBLEM — Z79.2 LONG TERM (CURRENT) USE OF ANTIBIOTICS: Status: RESOLVED | Noted: 2021-02-15 | Resolved: 2024-05-03

## 2024-05-03 LAB — SL AMB POCT HEMOGLOBIN AIC: 8.1 (ref ?–6.5)

## 2024-05-03 PROCEDURE — 99214 OFFICE O/P EST MOD 30 MIN: CPT | Performed by: INTERNAL MEDICINE

## 2024-05-03 PROCEDURE — 83036 HEMOGLOBIN GLYCOSYLATED A1C: CPT | Performed by: INTERNAL MEDICINE

## 2024-05-03 RX ORDER — INSULIN GLARGINE 100 [IU]/ML
32 INJECTION, SOLUTION SUBCUTANEOUS DAILY
Start: 2024-05-03

## 2024-05-03 NOTE — ASSESSMENT & PLAN NOTE
- follows with Nephrology.  Goes to HD at [facility]  -Currently on transplant list

## 2024-05-03 NOTE — PROGRESS NOTES
INTERNAL MEDICINE OFFICE VISIT  [facility]    NAME: Paddy Zhong  AGE: 59 y.o. SEX: male    DATE OF ENCOUNTER: 5/3/2024    Assessment and Plan/History of Present Illness     Here today for follow-up  Medical history of insulin-dependent type 2 diabetes complicated by retinopathy and neuropathy, ESRD on HD, legal blindness, chronic constipation, former smoking, latent TB, pulmonary nodule        1. Benign hypertension with ESRD (end-stage renal disease) ([facility])  Assessment & Plan:  Adequately controlled today  Continue current regimen      2. Pulmonary nodule  Assessment & Plan:  Chest CT completed April 2024.  Interval resolution of previously noted 2.4 cm left upper lobe groundglass opacity and sub-6 mm left lower lobe pulmonary nodules.  Findings compatible with prior infectious/inflammatory process.  No new or suspicious pulmonary nodules      3. Type 2 diabetes mellitus with other diabetic kidney complication, with long-term current use of insulin ([facility])  Assessment & Plan:  HbA1c: 7.4 December 2023, 7.2 June 2023  Current medications:  glargine 32 units daily, NovoLog 10 units with breakfast, 10 units with dinner  Statin and ACE-inhibitor:  atorvastatin  Foot exam:  Up-to-date, follows with podiatry  Eye Exam:  Follows with Dr. Reis, he is legally blind    A1c worsened from prior.  We are going to increase glargine from 28 units to 32 units daily.  Continue NovoLog as prescribed.  Continue to monitor blood glucose at home, recheck labs prior to follow-up    Orders:  -     Lipid Panel with Direct LDL reflex; Future  -     Hemoglobin A1C; Future  -     POCT hemoglobin A1c    4. ESRD on dialysis ([facility])  Assessment & Plan:  - follows with Nephrology.  Goes to HD at [facility]  -Currently on transplant list      Orders:  -     PTH, intact; Future  -     Comprehensive metabolic panel; Future  -     Vitamin D 25 hydroxy; Future  -     CBC and differential; Future    5. Coronary  "artery disease involving native coronary artery of native heart without angina pectoris  Assessment & Plan:  Prior history of CAD -30% narrowing seen on prior cardiac cath.  To continue with aspirin and atorvastatin      6. Premature ejaculation  -     Ambulatory Referral to Urology; Future    7. Erectile dysfunction, unspecified erectile dysfunction type  Assessment & Plan:  Patient states that he will ejaculate but \"it does not feel like the sperm is coming out\".  On further questioning, it seems that he is ejaculating but has either decreased sensation or lack of sensation when ejaculating. He has ESRD, produces small amount of urine at baseline.  Exam unremarkable today, no masses or lesions appreciated, patient is uncircumcised  -Etiology of symptoms unclear, will refer to urology for further assessment    Orders:  -     Ambulatory Referral to Urology; Future    8. Chronic diastolic (congestive) heart failure ([facility])    9. Secondary hyperparathyroidism of renal origin ([facility])    10. Type 2 diabetes mellitus with retinopathy and macular edema, with long-term current use of insulin, unspecified laterality, unspecified retinopathy severity ([facility])  Assessment & Plan:  HbA1c: 7.4 December 2023, 7.2 June 2023  Current medications:  glargine 32 units daily, NovoLog 10 units with breakfast, 10 units with dinner  Statin and ACE-inhibitor:  atorvastatin  Foot exam:  Up-to-date, follows with podiatry  Eye Exam:  Follows with Dr. Reis, he is legally blind    A1c worsened from prior.  We are going to increase glargine from 28 units to 32 units daily.  Continue NovoLog as prescribed.  Continue to monitor blood glucose at home, recheck labs prior to follow-up      11. Type 2 diabetes mellitus with diabetic nephropathy, with long-term current use of insulin ([facility])  -     Insulin Glargine Solostar (Basaglar KwikPen) 100 UNIT/ML SOPN; Inject 0.32 mL (32 Units total) under the skin daily               Orders Placed This Encounter "   Procedures   • PTH, intact   • Comprehensive metabolic panel   • Vitamin D 25 hydroxy   • Lipid Panel with Direct LDL reflex   • Hemoglobin A1C   • CBC and differential   • Ambulatory Referral to Urology   • POCT hemoglobin A1c         Chief Complaint     Chief Complaint   Patient presents with   • Follow-up     4 month // pt refused zoster // allergie on eye the itch a lot please send eye drops        Review of Systems     10 point ROS negative except per HPI    The following portions of the patient's history were reviewed and updated as appropriate: allergies, current medications, past family history, past medical history, past social history, past surgical history and problem list.    Active Problem List     Patient Active Problem List   Diagnosis   • Type 2 diabetes mellitus with retinopathy and macular edema, with long-term current use of insulin, unspecified laterality, unspecified retinopathy severity ([facility])   • Diabetic retinopathy of both eyes associated with type 2 diabetes mellitus ([facility])   • LVH (left ventricular hypertrophy)   • Hyperlipidemia   • Chronic constipation   • ESRD on dialysis ([facility])   • S/P arteriovenous (AV) fistula creation   • Azotemia   • Hyperphosphatemia   • Benign hypertension with ESRD (end-stage renal disease) ([facility])   • Slow transit constipation   • Onychomycosis   • Diabetic polyneuropathy associated with type 2 diabetes mellitus ([facility])   • Positive QuantiFERON-TB Gold test   • TB lung, latent   • Pulmonary nodule   • Adenomatous polyp of colon   • Visual disorder   • Anemia of chronic disease   • Chronic diastolic heart failure ([facility])   • Hypocalcemia   • Peripheral arterial disease ([facility])   • Secondary hyperparathyroidism of renal origin ([facility])   • Urinary retention   • Acute bilateral low back pain without sciatica   • Coronary artery disease involving native coronary artery of native heart without angina pectoris   • Erectile dysfunction       Objective     /66 (BP Location:  "Left arm, Patient Position: Sitting, Cuff Size: Standard)   Pulse 69   Temp (!) 96.1 °F (35.6 °C)   Resp 18   Ht 5' 9\" (1.753 m)   Wt 73.5 kg (162 lb)   SpO2 97%   BMI 23.92 kg/m²     Physical Exam  Cardiovascular:      Rate and Rhythm: Normal rate and regular rhythm.      Pulses: no weak pulses.           Dorsalis pedis pulses are 2+ on the right side and 2+ on the left side.        Posterior tibial pulses are 2+ on the right side and 2+ on the left side.      Heart sounds: No murmur heard.  Pulmonary:      Effort: Pulmonary effort is normal.      Breath sounds: Normal breath sounds. No wheezing or rales.   Genitourinary:     Penis: Normal.       Testes: Normal.   Feet:      Right foot:      Skin integrity: No ulcer, skin breakdown, erythema, warmth, callus or dry skin.      Left foot:      Skin integrity: No ulcer, skin breakdown, erythema, warmth, callus or dry skin.         Diabetic Foot Exam    Patient's shoes and socks removed.    Right Foot/Ankle   Right Foot Inspection  Skin Exam: skin normal and skin intact. No dry skin, no warmth, no callus, no erythema, no maceration, no abnormal color, no pre-ulcer, no ulcer and no callus.     Toe Exam: ROM and strength within normal limits.     Sensory   Vibration: diminished  Monofilament testing: intact    Vascular  The right DP pulse is 2+. The right PT pulse is 2+.     Left Foot/Ankle  Left Foot Inspection  Skin Exam: skin normal and skin intact. No dry skin, no warmth, no erythema, no maceration, normal color, no pre-ulcer, no ulcer and no callus.     Toe Exam: ROM and strength within normal limits.     Sensory   Vibration: diminished  Monofilament testing: intact    Vascular  The left DP pulse is 2+. The left PT pulse is 2+.     Assign Risk Category  No deformity present  Loss of protective sensation  No weak pulses  Risk: 1      Pertinent Laboratory/Diagnostic Studies:  CT chest wo contrast    Result Date: 4/26/2024  Impression: Interval resolution of the " previously noted 2.4 cm left upper lobe groundglass opacity, and sub-6 mm left lower lobe pulmonary nodules. Findings are compatible with a prior infectious/inflammatory process. No new or suspicious pulmonary nodules. Workstation performed: [id number]       Images and diagnostics reviewed     Current Medications     Current Outpatient Medications:   •  acetaminophen (TYLENOL) 500 mg tablet, Take 1-2 tablets (500-1,000 mg total) by mouth every 8 (eight) hours as needed for moderate pain, Disp: 20 tablet, Rfl: 0  •  aspirin (Aspirin Low Dose) 81 mg EC tablet, TAKE 1 TABLET (81 MG TOTAL) BY MOUTH DAILY, Disp: 90 tablet, Rfl: 0  •  atorvastatin (LIPITOR) 40 mg tablet, TAKE 1 TABLET (40 MG TOTAL) BY MOUTH DAILY, Disp: 30 tablet, Rfl: 0  •  atropine (ISOPTO ATROPINE) 1 % ophthalmic solution, INSTILL 1 DROP IN LEFT EYE TWICE DAILY, Disp: , Rfl:   •  brimonidine tartrate 0.2 % ophthalmic solution, Administer 1 drop into the left eye 2 (two) times a day, Disp: , Rfl:   •  Carboxymethylcellul-Glycerin (CLEAR EYES FOR DRY EYES OP), Apply 2 drops to eye 2 (two) times a day, Disp: , Rfl:   •  Continuous Blood Gluc  (FreeStyle Jessica 2 [place]) LAUREN, Check blood sugars multiple times per day, Disp: 1 each, Rfl: 0  •  Continuous Blood Gluc Sensor (FreeStyle Jessica 2 Sensor) MISC, Check blood sugars multiple times per day, Disp: 6 each, Rfl: 3  •  dorzolamide (TRUSOPT) 2 % ophthalmic solution, instill 1 drop into left eye three times a day, Disp: , Rfl:   •  insulin aspart (NovoLOG) 100 Units/mL injection pen, 10U with breakfast, 10 U with dinner, Disp: 15 mL, Rfl: 2  •  Insulin Glargine Solostar (Basaglar KwikPen) 100 UNIT/ML SOPN, Inject 0.32 mL (32 Units total) under the skin daily, Disp: , Rfl:   •  Insulin Pen Needle (BD Pen Needle Tiffany 2nd Gen) 32G X 4 MM MISC, USE AS DIRECTED, Disp: 90 each, Rfl: 1  •  labetalol (NORMODYNE) 200 mg tablet, TAKE 3 TABLETS (600 MG TOTAL) BY MOUTH EVERY 12 (TWELVE) HOURS, Disp: 120 tablet,  Rfl: 5  •  lactulose 20 g/30 mL, Take 30 mL (20 g total) by mouth 2 (two) times a day as needed (for constipation), Disp: 946 mL, Rfl: 2  •  Lancets (freestyle) lancets, Use as instructed, Disp: 100 each, Rfl: 1  •  lidocaine-prilocaine (EMLA) cream, Apply to avf 30 minutes prior to dialysis, Disp: 5 g, Rfl: 4  •  losartan (COZAAR) 25 mg tablet, Take 1 tablet (25 mg total) by mouth daily, Disp: 90 tablet, Rfl: 3  •  methocarbamol (ROBAXIN) 500 mg tablet, Take 1 tablet (500 mg total) by mouth 2 (two) times a day as needed for muscle spasms, Disp: 10 tablet, Rfl: 0  •  NIFEdipine (PROCARDIA XL) 90 mg 24 hr tablet, TAKE 1 TABLET BY MOUTH DAILY, Disp: 90 tablet, Rfl: 0  •  polyethylene glycol (MIRALAX) 17 g packet, Take 17 g by mouth daily, Disp: 30 each, Rfl: 1  •  prednisoLONE acetate (PRED FORTE) 1 % ophthalmic suspension, SHAKE LIQUID AND INSTILL 1 DROP IN LEFT EYE TWICE DAILY, Disp: , Rfl:   •  Sodium Zirconium Cyclosilicate (Lokelma) 10 g, Take 1 packet (10 g total) by mouth daily, Disp: 90 packet, Rfl: 6  •  Blood Glucose Monitoring Suppl (ONE TOUCH ULTRA 2) w/Device KIT, by Does not apply route 3 (three) times a day (Patient not taking: Reported on 6/28/2023), Disp: 1 each, Rfl: 0  •  doxazosin (CARDURA) 2 mg tablet, Take 2 tablets (4 mg total) by mouth 2 (two) times a day, Disp: 180 tablet, Rfl: 4    Health Maintenance     Health Maintenance   Topic Date Due   • DM Eye Exam  Never done   • Zoster Vaccine (1 of 2) Never done   • PT PLAN OF CARE  05/25/2019   • COVID-19 Vaccine (3 - 2023-24 season) 09/01/2023   • Diabetic Foot Exam  06/28/2024   • Influenza Vaccine (Season Ended) 09/01/2024   • HEMOGLOBIN A1C  11/03/2024   • Medicare Annual Wellness Visit (AWV)  12/27/2024   • Depression Screening  05/03/2025   • Colorectal Cancer Screening  12/25/2031   • HIV Screening  Completed   • Hepatitis C Screening  Completed   • Pneumococcal Vaccine: Pediatrics (0 to 5 Years) and At-Risk Patients (6 to 64 Years)   Completed   • HIB Vaccine  Aged Out   • IPV Vaccine  Aged Out   • Hepatitis A Vaccine  Aged Out   • Meningococcal ACWY Vaccine  Aged Out   • HPV Vaccine  Aged Out     Immunization History   Administered Date(s) Administered   • COVID-19 PFIZER VACCINE 0.3 ML IM 04/01/2021, 04/22/2021   • Hep B, adult 04/23/2019, 04/23/2019, 06/22/2019, 06/22/2019, 08/22/2019, 08/22/2019, 11/23/2019, 11/23/2019   • INFLUENZA 11/11/2009, 11/11/2009, 10/06/2014, 10/06/2014, 08/04/2017, 08/04/2017, 10/01/2018, 01/02/2019, 10/17/2019, 09/18/2020, 09/18/2020, 09/18/2022, 10/22/2022   • Influenza Quadrivalent 3 years and older 10/01/2018, 10/01/2018, 10/17/2019, 10/17/2019, 10/21/2021   • Influenza, recombinant, quadrivalent,injectable, preservative free 01/02/2019, 09/09/2019, 09/16/2020   • Pneumococcal Conjugate Vaccine 20-valent (Pcv20), Polysace 07/22/2022   • Pneumococcal Polysaccharide PPV23 11/11/2009, 11/11/2009, 06/14/2018, 06/14/2018, 06/14/2020   • Tuberculin Skin Test-PPD Intradermal 04/09/2019, 04/09/2019, 01/09/2020, 01/09/2020, 01/12/2021, 01/12/2021, 01/18/2022, 01/10/2023       Merlin Correa D.O.  [facility] - [address]  [place] [address]  Office: [phone]  Fax: [phone]

## 2024-05-03 NOTE — ASSESSMENT & PLAN NOTE
HbA1c: 7.4 December 2023, 7.2 June 2023  Current medications:  glargine 32 units daily, NovoLog 10 units with breakfast, 10 units with dinner  Statin and ACE-inhibitor:  atorvastatin  Foot exam:  Up-to-date, follows with podiatry  Eye Exam:  Follows with Dr. Reis, he is legally blind    A1c worsened from prior.  We are going to increase glargine from 28 units to 32 units daily.  Continue NovoLog as prescribed.  Continue to monitor blood glucose at home, recheck labs prior to follow-up

## 2024-05-03 NOTE — ASSESSMENT & PLAN NOTE
Prior history of CAD -30% narrowing seen on prior cardiac cath.  To continue with aspirin and atorvastatin

## 2024-05-03 NOTE — ASSESSMENT & PLAN NOTE
Chest CT completed April 2024.  Interval resolution of previously noted 2.4 cm left upper lobe groundglass opacity and sub-6 mm left lower lobe pulmonary nodules.  Findings compatible with prior infectious/inflammatory process.  No new or suspicious pulmonary nodules

## 2024-05-03 NOTE — ASSESSMENT & PLAN NOTE
"Patient states that he will ejaculate but \"it does not feel like the sperm is coming out\".  On further questioning, it seems that he is ejaculating but has either decreased sensation or lack of sensation when ejaculating. He has ESRD, produces small amount of urine at baseline.  Exam unremarkable today, no masses or lesions appreciated, patient is uncircumcised  -Etiology of symptoms unclear, will refer to urology for further assessment  "

## 2024-05-10 DIAGNOSIS — E78.5 HYPERLIPIDEMIA, UNSPECIFIED HYPERLIPIDEMIA TYPE: ICD-10-CM

## 2024-05-10 RX ORDER — ATORVASTATIN CALCIUM 40 MG/1
40 TABLET, FILM COATED ORAL DAILY
Qty: 90 TABLET | Refills: 1 | Status: SHIPPED | OUTPATIENT
Start: 2024-05-10

## 2024-05-21 ENCOUNTER — VBI (OUTPATIENT)
Dept: ADMINISTRATIVE | Facility: OTHER | Age: 59
End: 2024-05-21

## 2024-05-21 LAB
LEFT EYE DIABETIC RETINOPATHY: POSITIVE
RIGHT EYE DIABETIC RETINOPATHY: POSITIVE

## 2024-08-19 DIAGNOSIS — I10 HYPERTENSION: ICD-10-CM

## 2024-08-19 PROCEDURE — 3066F NEPHROPATHY DOC TX: CPT | Performed by: INTERNAL MEDICINE

## 2024-08-19 RX ORDER — DOXAZOSIN 2 MG/1
4 TABLET ORAL 2 TIMES DAILY
Qty: 120 TABLET | Refills: 5 | Status: SHIPPED | OUTPATIENT
Start: 2024-08-19 | End: 2025-02-15

## 2024-08-28 ENCOUNTER — OFFICE VISIT (OUTPATIENT)
Dept: CARDIOLOGY CLINIC | Facility: CLINIC | Age: 59
End: 2024-08-28
Payer: COMMERCIAL

## 2024-08-28 VITALS
HEART RATE: 69 BPM | SYSTOLIC BLOOD PRESSURE: 132 MMHG | BODY MASS INDEX: 24.41 KG/M2 | HEIGHT: 69 IN | WEIGHT: 164.8 LBS | DIASTOLIC BLOOD PRESSURE: 64 MMHG

## 2024-08-28 DIAGNOSIS — Z79.4 TYPE 2 DIABETES MELLITUS WITH CHRONIC KIDNEY DISEASE ON CHRONIC DIALYSIS, WITH LONG-TERM CURRENT USE OF INSULIN (HCC): ICD-10-CM

## 2024-08-28 DIAGNOSIS — N18.6 TYPE 2 DIABETES MELLITUS WITH CHRONIC KIDNEY DISEASE ON CHRONIC DIALYSIS, WITH LONG-TERM CURRENT USE OF INSULIN (HCC): ICD-10-CM

## 2024-08-28 DIAGNOSIS — Z99.2 ESRD ON DIALYSIS (HCC): ICD-10-CM

## 2024-08-28 DIAGNOSIS — E11.311 TYPE 2 DIABETES MELLITUS WITH RETINOPATHY AND MACULAR EDEMA, WITH LONG-TERM CURRENT USE OF INSULIN, UNSPECIFIED LATERALITY, UNSPECIFIED RETINOPATHY SEVERITY (HCC): ICD-10-CM

## 2024-08-28 DIAGNOSIS — Z99.2 TYPE 2 DIABETES MELLITUS WITH CHRONIC KIDNEY DISEASE ON CHRONIC DIALYSIS, WITH LONG-TERM CURRENT USE OF INSULIN (HCC): ICD-10-CM

## 2024-08-28 DIAGNOSIS — Z01.818 ENCOUNTER FOR PRE-TRANSPLANT EVALUATION FOR KIDNEY TRANSPLANT: ICD-10-CM

## 2024-08-28 DIAGNOSIS — N18.6 ANEMIA DUE TO CHRONIC KIDNEY DISEASE, ON CHRONIC DIALYSIS (HCC): ICD-10-CM

## 2024-08-28 DIAGNOSIS — N18.6 ESRD (END STAGE RENAL DISEASE) (HCC): ICD-10-CM

## 2024-08-28 DIAGNOSIS — E11.22 TYPE 2 DIABETES MELLITUS WITH CHRONIC KIDNEY DISEASE ON CHRONIC DIALYSIS, WITH LONG-TERM CURRENT USE OF INSULIN (HCC): ICD-10-CM

## 2024-08-28 DIAGNOSIS — Z99.2 ANEMIA DUE TO CHRONIC KIDNEY DISEASE, ON CHRONIC DIALYSIS (HCC): ICD-10-CM

## 2024-08-28 DIAGNOSIS — Z79.4 TYPE 2 DIABETES MELLITUS WITH RETINOPATHY AND MACULAR EDEMA, WITH LONG-TERM CURRENT USE OF INSULIN, UNSPECIFIED LATERALITY, UNSPECIFIED RETINOPATHY SEVERITY (HCC): ICD-10-CM

## 2024-08-28 DIAGNOSIS — D63.1 ANEMIA DUE TO CHRONIC KIDNEY DISEASE, ON CHRONIC DIALYSIS (HCC): ICD-10-CM

## 2024-08-28 DIAGNOSIS — N18.6 ESRD ON DIALYSIS (HCC): ICD-10-CM

## 2024-08-28 DIAGNOSIS — I50.32 CHRONIC DIASTOLIC HEART FAILURE (HCC): ICD-10-CM

## 2024-08-28 DIAGNOSIS — E78.5 HYPERLIPIDEMIA, UNSPECIFIED HYPERLIPIDEMIA TYPE: ICD-10-CM

## 2024-08-28 DIAGNOSIS — I51.7 LVH (LEFT VENTRICULAR HYPERTROPHY): ICD-10-CM

## 2024-08-28 DIAGNOSIS — I73.9 PERIPHERAL ARTERIAL DISEASE (HCC): ICD-10-CM

## 2024-08-28 DIAGNOSIS — I16.0 HYPERTENSIVE URGENCY: ICD-10-CM

## 2024-08-28 DIAGNOSIS — D63.8 ANEMIA OF CHRONIC DISEASE: ICD-10-CM

## 2024-08-28 DIAGNOSIS — I12.0 BENIGN HYPERTENSION WITH ESRD (END-STAGE RENAL DISEASE) (HCC): ICD-10-CM

## 2024-08-28 DIAGNOSIS — Z01.810 PREOP CARDIOVASCULAR EXAM: Primary | ICD-10-CM

## 2024-08-28 DIAGNOSIS — N18.6 BENIGN HYPERTENSION WITH ESRD (END-STAGE RENAL DISEASE) (HCC): ICD-10-CM

## 2024-08-28 DIAGNOSIS — N25.81 SECONDARY HYPERPARATHYROIDISM OF RENAL ORIGIN (HCC): ICD-10-CM

## 2024-08-28 DIAGNOSIS — E11.311 DIABETIC RETINOPATHY OF BOTH EYES WITH MACULAR EDEMA ASSOCIATED WITH TYPE 2 DIABETES MELLITUS, UNSPECIFIED RETINOPATHY SEVERITY (HCC): ICD-10-CM

## 2024-08-28 DIAGNOSIS — I25.10 CORONARY ARTERY DISEASE INVOLVING NATIVE CORONARY ARTERY OF NATIVE HEART WITHOUT ANGINA PECTORIS: ICD-10-CM

## 2024-08-28 PROCEDURE — 99214 OFFICE O/P EST MOD 30 MIN: CPT

## 2024-08-28 PROCEDURE — 93000 ELECTROCARDIOGRAM COMPLETE: CPT

## 2024-08-28 NOTE — PROGRESS NOTES
Cardiology   MD Js Parr MD, FACC  Lisandro Tadeo DO, [facility] FACP, FASNC Ather Mansoor, MD Rujul Patel, DO, [facility]  Uriah De DO, [facility]  -------------------------------------------------------------------  [facility]  [address] [place] [address]  Phone: [phone]  Fax: [phone]  08/28/24  Paddy Zhong  YOB: 1965   MRN: [id number]      Referring Physician: Bridgett Traore MD  4 [facility]  [address] [place] [id number]     HPI: Paddy Zhong is a 59 y.o. male with:   Minimal CAD by Cath - discrete 30% mid LAD stenosis noted.  Hypertension   End-stage renal disease on hemodialysis, transplant workup in progress   Uncontrolled type 2 diabetes   Diabetic retinopathy, now blind   Latent tuberculosis   Abnormal electrocardiogram  Nonischemic nuclear stress November 2020  Left ventricular hypertrophy by echo November 2020    He presents today for follow-up.  Doing well from heart standpoint, denies any chest pain or significant shortness of breath.  Testing last year was negative for ischemia on his stress test, echo with LVH, ECG unchanged from prior, does have findings of LVH, blood pressure has been controlled however.  Continues to follow closely with nephrology, still on dialysis awaiting renal transplant, follows with transplant nephrologist Dr. Traore.    Review of Systems   Constitutional:  Negative for chills and fever.   HENT:  Negative for facial swelling and sore throat.    Eyes:  Negative for visual disturbance.   Respiratory:  Negative for cough, chest tightness, shortness of breath and wheezing.    Cardiovascular:  Negative for chest pain, palpitations and leg swelling.   Gastrointestinal:  Negative for abdominal pain, blood in stool, constipation, diarrhea, nausea and vomiting.   Endocrine: Negative for cold intolerance and heat intolerance.   Genitourinary:  Negative for decreased  urine volume, difficulty urinating, dysuria and hematuria.   Musculoskeletal:  Negative for arthralgias, back pain and myalgias.   Skin:  Negative for rash.   Neurological:  Negative for dizziness, syncope, weakness and numbness.   Psychiatric/Behavioral:  Negative for agitation, behavioral problems and confusion. The patient is not nervous/anxious.         OBJECTIVE  Vitals:    08/28/24 0951   BP: 132/64   Pulse: 69       Physical Exam  Constitutional: awake, alert and oriented, in no acute distress, no obvious deformities  Head: Normocephalic, without obvious abnormality, atraumatic  Eyes: conjunctivae clear and moist. Sclera anicteric.  No xanthelasmas. Pupils equal bilaterally.  Extraocular motions are full.  Ear nose mouth and throat: ears are symmetrical bilaterally, hearing appears to be equal bilaterally, no nasal discharge or epistaxis, oropharynx is clear with moist mucous membranes  Neck:  Trachea is midline, neck is supple, no thyromegaly or significant lymphadenopathy, there is full range of motion.  Lungs: clear to auscultation bilaterally, no wheezes, no rales, no rhonchi, no accessory muscle use, breathing is nonlabored  Heart: Regular rhythm with a Normal heart rate, S1, S2 normal, No Murmur, no click, rub or gallop, No lower extremity edema  Abdomen: soft, non-tender; bowel sounds normal; no masses,  no organomegaly  Psychiatric:  Patient is oriented to time, place, person, mood/affect is negative for depression, anxiety, agitation, appears to have appropriate insight  Skin: Skin is warm, dry, intact. No obvious rashes or lesions on exposed extremities.  Nail beds are pink with no cyanosis or clubbing.  He has left upper extremity AV fistula    EKG:  Results for orders placed or performed in visit on 08/28/24   POCT ECG    Impression    Sinus rhythm with biatrial enlargement and LVH with QRS widening and repolarization abnormality, poor anterior R wave progression cannot rule out Anterior Septal  Infarct        IMPRESSION:  Minimal CAD by Cath - discrete 30% mid LAD stenosis noted.  Hypertension   End-stage renal disease on hemodialysis, transplant workup in progress   Uncontrolled type 2 diabetes   Diabetic retinopathy, now blind   Latent tuberculosis   Abnormal electrocardiogram  Nonischemic nuclear stress November 2020  Left ventricular hypertrophy by echo November 2020    Personally reviewed his stress test from July 2023.  This.  Be normal, there is no evidence of reversible perfusion defect to suggest coronary artery disease  Personally viewed his echo from March 2023-does have moderate left ventricular hypertrophy but normal ejection fraction 55% with normal wall motion, no significant valvular heart disease    He has new echo and stress test ordered for September 23.    DISCUSSION/RECOMMENDATIONS:  Minimal CAD by Cath - discrete 30% mid LAD stenosis noted.  Stable clinically.  No angina symptoms at this time.  Due for new lipid panel.  Continue Lipitor 40 mg, aspirin 81 mg  Hypertension   Controlled, stable.  Continue with doxazosin, labetalol 600 mg every 12 hours, losartan 25 mg daily, nifedipine 90 mg daily  End-stage renal disease on hemodialysis, transplant workup in progress   I reviewed his transplant nephrologist notes from September 2023.  He remains on transplant list.  Continue with hemodialysis as per nephrology  Uncontrolled type 2 diabetes   A1c worse, continue with insulin  Diabetic retinopathy, now blind   Continue with insulin for diabetes as above  Latent tuberculosis   Abnormal electrocardiogram  Reviewed prior electrocardiogram in January, new ECG today unchanged from January 2024  Nonischemic nuclear stress July 2023  Left ventricular hypertrophy by echo March 2023    Uriah De DO, SARA, MICHEL  --------------------------------------------------------------------------------  TREADMILL STRESS  No results found for this or any previous visit.      ----------------------------------------------------------------------------------------------  NUCLEAR STRESS TEST: Results for orders placed during the hospital encounter of 20    NM myocardial perfusion spect (stress and/or rest)    [address] [address]    Rest/Stress Gated SPECT Myocardial Perfusion Imaging After Regadenoson    Patient: PERLA SALINAS  MR number: [id number]  Account number: [id number]  : 1965  Age: 55 years  Gender: Male  Status: Outpatient  Location: [facility]  Height: 70 in  Weight: 171 lb  BP: 134/ 71 mmHg    Diagnosis: E78.5 - Hyperlipidemia, unspecified, I11.9 - Hypertensive heart disease without heart failure, N18.6 - End stage renal disease    Primary Physician:  Bety Mcintosh MD  RN:  Zahra Laura RN  Technician:  James Pinto  Group:  [facility]  Report Prepared By::  James Pinto  Interpreting Physician:  Tim Mendez MD    INDICATIONS: Coronary artery disease.    HISTORY: The patient is a 55 year old  male. Chest pain status: no chest pain. Coronary artery disease risk factors: dyslipidemia, hypertension, and diabetes mellitus. Cardiovascular history: none significant. Co-morbidity:  history of end stage renal disease. Medications: a lipid lowering agent, alpha blocker and diabetic medications. Previous test results: abnormal ECG.    PHYSICAL EXAM: Baseline physical exam screening: normal.    REST ECG: Normal sinus rhythm at a rate of 67 beats per minute. Right axis deviation. Left ventricular hypertrophy. Anterior lateral T-wave inversions which may represent left ventricular strain pattern.    PROCEDURE: The study was performed in the the [facility]. A regadenoson infusion pharmacologic stress test was performed. Gated SPECT myocardial perfusion imaging was performed after stress and at rest. Systolic blood pressure  was  134 mmHg, at the start of the study. Diastolic blood pressure was 71 mmHg, at the start of the study. The heart rate was 66 bpm, at the start of the study.  Regadenoson protocol:  HR bpm SBP mmHg DBP mmHg Symptoms  Baseline 66 134 71 none  1 min 75 -- -- none  2 min 82 113 59 none  3 min 79 129 60 none  4 min 78 -- -- none  5 min 77 -- -- none  6 min 78 127 64 none  No medications or fluids given.    STRESS SUMMARY: Duration of pharmacologic stress was 3 min and 0 sec. Maximal work rate was 1 METs. Maximal heart rate during stress was 84 bpm. The heart rate response to stress was normal. There was normal resting blood pressure with an  appropriate response to stress. The rate-pressure product for the peak heart rate and blood pressure was 533[address]. There was no chest pain during stress. The stress test was terminated due to protocol completion. Pre oxygen saturation: 98 %.  Peak oxygen saturation: 100 %. With pharmacologic stress using intravenous Lexiscan, there were no electrocardiographic changes over baseline. There were no arrhythmias.    ISOTOPE ADMINISTRATION:  The radiopharmaceutical was injected at the peak effect of pharmacologic stress.    MYOCARDIAL PERFUSION IMAGING:  Tomographic perfusion series at rest demonstrated uniformly normal uptake in activity. Following pharmacologic stress using intravenous Lexiscan, there was no change.    GATED SPECT:  Normal left ventricular systolic function, EF 53%. Normal left ventricular cavity size. Normal left ventricular wall motion.    SUMMARY:  -  Stress results: There was no chest pain during stress.    IMPRESSIONS: 1. Abnormal resting EKG  2. Negative pharmacologic stress test with intravenous Lexiscan for angina pectoris or electrocardiographic changes of ischemia over baseline abnormalities noted.  3. Normal left ventricular systolic function, EF 53%.  4. Normal tomographic perfusion series.    Prepared and signed by    Tim Mendez MD  Signed 11/16/2020  08:17:41    Results for orders placed during the hospital encounter of 23    NM myocardial perfusion spect (rx stress and/or rest)    Interpretation Summary  •  Stress ECG: No ST deviation is noted. There were no arrhythmias during recovery. The ECG was not diagnostic due to pharmacological (vasodilator) stress.  •  Perfusion: There are no perfusion defects.  •  Stress Function: Left ventricular function post-stress is normal. Stress ejection fraction is 61 %.  •  Stress Combined Conclusion: Left ventricular perfusion is normal.  No evidence of inducible ischemia or scar.    Normal nuclear pharmacologic stress test.      --------------------------------------------------------------------------------  CATH:  No results found for this or any previous visit.    --------------------------------------------------------------------------------  ECHO:   Results for orders placed during the hospital encounter of 21    Echo complete with contrast if indicated    [place] [id number]  [phone]    Transthoracic Echocardiogram  2D, M-mode, Doppler, and Color Doppler    Study date:  2021    Patient: PERLA SALINAS  MR number: [id number]  Account number: [id number]  : 1965  Age: 55 years  Gender: Male  Status: Inpatient  Location: Bedside  Height: 70 in  Weight: 165 lb  BP: 142/ 76 mmHg    Indications: Chest Pain    Diagnoses: R07.9 - Chest pain, unspecified    Sonographer:  Hebert Mayo RDCS  Primary Physician:  Bety Mcintosh MD  Referring Physician:  KYLEIGH PintoC  Group:  [facility]  Interpreting Physician:  Tim Mendez MD    SUMMARY    LEFT VENTRICLE:  Normal left ventricular systolic function, EF 68%. Moderate concentric left ventricular hypertrophy. Normal left ventricular cavity size. Normal left ventricular wall motion without regional wall motion abnormalities. Grade  1 left  ventricular diastolic dysfunction. Normal left atrial pressure.    MITRAL VALVE:  There was trace regurgitation.    AORTIC VALVE:  Tricuspid aortic valve with aortic sclerosis. No stenosis or regurgitation.    TRICUSPID VALVE:  There was trace regurgitation.    PULMONIC VALVE:  There was trace regurgitation.    PERICARDIUM:  Small hemodynamically insignificant concentric pericardial effusion    SUMMARY MEASUREMENTS  2D measurements:  Unspecified Anatomy:   %FS was 38.8 %.  Ao Diam was 3.3 cm.  Ao asc was 2.9 cm.  EDV(Teich) was 51.9 ml.  EF(Teich) was 70.2 %.  ESV(Teich) was 15.5 ml.  IVSd was 1.5 cm.  LA Diam was 3.3 cm.  LAAs A4C was 11.3 cm2.  LAESV A-L A4C was 21.2  ml.  LAESV MOD A4C was 20 ml.  LALs A4C was 5.1 cm.  LVEDV MOD A4C was 20.6 ml.  LVEF MOD A4C was 68 %.  LVESV MOD A4C was 6.6 ml.  LVIDd was 3.5 cm.  LVIDs was 2.2 cm.  LVLd A4C was 7.8 cm.  LVLs A4C was 7 cm.  LVPWd was 1.5 cm.  RAAs A4C  was 17.6 cm2.  RAESV A-L was 49.6 ml.  RAESV MOD was 47.2 ml.  RALs was 5.3 cm.  RVIDd was 3 cm.  SV MOD A4C was 14 ml.  SV(Teich) was 36.5 ml.  CW measurements:  Unspecified Anatomy:   AV Env.Ti was 266.4 ms.  AV VTI was 22.9 cm.  AV Vmax was 1.3 m/s.  AV Vmean was 0.9 m/s.  AV maxPG was 6.5 mmHg.  AV meanPG was 3.3 mmHg.  TR Vmax was 1.9 m/s.  TR maxPG was 14.9 mmHg.  MM measurements:  Unspecified Anatomy:   TAPSE was 2.1 cm.  PW measurements:  Unspecified Anatomy:   DVI was 1.1 .  E' Sept was 0.1 m/s.  E/E' Sept was 11.2 .  LVOT Env.Ti was 304.5 ms.  LVOT VTI was 25.4 cm.  LVOT Vmax was 1.2 m/s.  LVOT Vmean was 0.8 m/s.  LVOT maxPG was 6 mmHg.  LVOT meanPG was 3.2 mmHg.  MV A  Lonnie was 0.8 m/s.  MV Dec [place] was 2.1 m/s2.  MV DecT was 294.5 ms.  MV E Lonnie was 0.6 m/s.  MV E/A Ratio was 0.7 .  MV PHT was 85.4 ms.  MVA By PHT was 2.6 cm2.    HISTORY: PRIOR HISTORY: Hypercholesterolemia, Hypertension, ESRD, DM    PROCEDURE: The procedure was performed at the bedside. This was a routine study. The  transthoracic approach was used. The study included complete 2D imaging, M-mode, complete spectral Doppler, and color Doppler. The heart rate was 70 bpm,  at the start of the study. Images were obtained from the parasternal, apical, subcostal, and suprasternal notch acoustic windows. Image quality was adequate.    LEFT VENTRICLE: Normal left ventricular systolic function, EF 68%. Moderate concentric left ventricular hypertrophy. Normal left ventricular cavity size. Normal left ventricular wall motion without regional wall motion abnormalities. Grade  1 left ventricular diastolic dysfunction. Normal left atrial pressure.    RIGHT VENTRICLE: The size was normal. Systolic function was normal. Wall thickness was normal.    LEFT ATRIUM: Size was normal.    RIGHT ATRIUM: Size was normal.    MITRAL VALVE: Valve structure was normal. There was normal leaflet separation. DOPPLER: The transmitral velocity was within the normal range. There was no evidence for stenosis. There was trace regurgitation.    AORTIC VALVE: Tricuspid aortic valve with aortic sclerosis. No stenosis or regurgitation.    TRICUSPID VALVE: The valve structure was normal. There was normal leaflet separation. DOPPLER: The transtricuspid velocity was within the normal range. There was no evidence for stenosis. There was trace regurgitation.    PULMONIC VALVE: Leaflets exhibited normal thickness, no calcification, and normal cuspal separation. DOPPLER: The transpulmonic velocity was within the normal range. There was trace regurgitation.    PERICARDIUM: Small hemodynamically insignificant concentric pericardial effusion    AORTA: The root exhibited normal size.    PULMONARY ARTERY: The size was normal. DOPPLER: Systolic pressure was within the normal range.    SYSTEM MEASUREMENT TABLES    2D  %FS: 38.8 %  Ao Diam: 3.3 cm  Ao asc: 2.9 cm  EDV(Teich): 51.9 ml  EF(Teich): 70.2 %  ESV(Teich): 15.5 ml  IVSd: 1.5 cm  LA Diam: 3.3 cm  LAAs A4C: 11.3 cm2  LAESV A-L  A4C: 21.2 ml  LAESV MOD A4C: 20 ml  LALs A4C: 5.1 cm  LVEDV MOD A4C: 20.6 ml  LVEF MOD A4C: 68 %  LVESV MOD A4C: 6.6 ml  LVIDd: 3.5 cm  LVIDs: 2.2 cm  LVLd A4C: 7.8 cm  LVLs A4C: 7 cm  LVPWd: 1.5 cm  RAAs A4C: 17.6 cm2  RAESV A-L: 49.6 ml  RAESV MOD: 47.2 ml  RALs: 5.3 cm  RVIDd: 3 cm  SV MOD A4C: 14 ml  SV(Teich): 36.5 ml    CW  AV Env.Ti: 266.4 ms  AV VTI: 22.9 cm  AV Vmax: 1.3 m/s  AV Vmean: 0.9 m/s  AV maxP.5 mmHg  AV meanPG: 3.3 mmHg  TR Vmax: 1.9 m/s  TR maxP.9 mmHg    MM  TAPSE: 2.1 cm    PW  DVI: 1.1  E' Sept: 0.1 m/s  E/E' Sept: 11.2  LVOT Env.Ti: 304.5 ms  LVOT VTI: 25.4 cm  LVOT Vmax: 1.2 m/s  LVOT Vmean: 0.8 m/s  LVOT maxP mmHg  LVOT meanPG: 3.2 mmHg  MV A Lonnie: 0.8 m/s  MV Dec [place]: 2.1 m/s2  MV DecT: 294.5 ms  MV E Lonnie: 0.6 m/s  MV E/A Ratio: 0.7  MV PHT: 85.4 ms  MVA By PHT: 2.6 cm2    [facility]    Prepared and electronically signed by    Tim Mendez MD  Signed 2021 16:36:19    No results found for this or any previous visit.    --------------------------------------------------------------------------------  HOLTER  No results found for this or any previous visit.    No results found for this or any previous visit.    --------------------------------------------------------------------------------  CAROTIDS  Results for orders placed during the hospital encounter of 23    VAS carotid complete study    Narrative  [facility] REPORT  CLINICAL:  Indications:  Patient presents with possible future renal and pancreatic transplant.  Patient is asymptomatic from a cerebral vascular standpoint.  Operative History:  2022 fistulagram/ graftogram  2021 IR AV fistulagram/graftogram  2021 IR AV fistulagram/graftogram  2020 IR AV fistulagram/graftogram  2019 IR AV fistulagram/graftogram  2019 IR AV fistulagram/graftogram  2019 IR tunneled dialysis catheter placement  2019 Left  Brachiocephalic AVF  Risk Factors  The patient has history of HTN, Diabetes, CHF,  Hyperlipidemia, CKD, PAD and  previous smoking.  Clinical  Right Pressure:  130/60 mm Hg, Left Pressure:  AVF    FINDINGS:    Right        Impression  PSV  EDV (cm/s)  Ratio  Dist. ICA                 44           9   0.66  Mid. ICA                  81          16   1.22  Prox. ICA    1 - 49%      57          14   0.86  Dist CCA                  63           9  Mid CCA                   67           3   0.84  Prox CCA                  80           0  Ext Carotid               93           0   1.39  Prox Vert                 49          13  Subclavian               117           7    Left         Impression  PSV  EDV (cm/s)  Ratio  Dist. ICA                 56          12   0.63  Mid. ICA                  81          15   0.91  Prox. ICA    1 - 49%      87          13   0.98  Dist CCA                  73           6  Mid CCA                   89           0   0.89  Prox CCA                  99           5  Ext Carotid              100           0   1.13  Prox Vert                 58          10  Subclavian               232          71        CONCLUSION:    Impression  RIGHT:  There is <50% stenosis noted in the internal carotid artery.  Plaque is heterogenous and smooth.  Vertebral artery flow is antegrade. There is no significant subclavian artery  disease.    LEFT:  There is <50% stenosis noted in the internal carotid artery.  Plaque is heterogenous and irregular.  Vertebral artery flow is antegrade. There is no significant subclavian artery  disease.    Compared to previous study on 10/23/2020, there is no significant change in the  disease process.    SIGNATURE:  Electronically Signed by: JUAN GORDON MD on 2023-07-21 03:41:10 PM     --------------------------------------------------------------------------------  Diagnoses and all orders for this visit:    Preop cardiovascular exam  -     POCT ECG    Benign hypertension with ESRD  (end-stage renal disease) ([facility])  -     Ambulatory Referral to Cardiology    Diabetic retinopathy of both eyes with macular edema associated with type 2 diabetes mellitus, unspecified retinopathy severity ([facility])  -     Ambulatory Referral to Cardiology    ESRD (end stage renal disease) ([facility])  -     Ambulatory Referral to Cardiology    Hypertensive urgency  -     Ambulatory Referral to Cardiology    Anemia of chronic disease  -     Ambulatory Referral to Cardiology    Secondary hyperparathyroidism of renal origin ([facility])  -     Ambulatory Referral to Cardiology    Anemia due to chronic kidney disease, on chronic dialysis ([facility])  -     Ambulatory Referral to Cardiology    Encounter for pre-transplant evaluation for kidney transplant  -     Ambulatory Referral to Cardiology    Type 2 diabetes mellitus with chronic kidney disease on chronic dialysis, with long-term current use of insulin ([facility])  -     Ambulatory Referral to Cardiology    Chronic diastolic heart failure ([facility])    Peripheral arterial disease ([facility])    Coronary artery disease involving native coronary artery of native heart without angina pectoris    ESRD on dialysis ([facility])    Hyperlipidemia, unspecified hyperlipidemia type    LVH (left ventricular hypertrophy)    Type 2 diabetes mellitus with retinopathy and macular edema, with long-term current use of insulin, unspecified laterality, unspecified retinopathy severity ([facility])       ======================================================    Past Medical History:   Diagnosis Date   • Cataract    • Dizziness     occ   • GERD (gastroesophageal reflux disease)    • Hyperlipidemia    • Legally blind    • LVH (left ventricular hypertrophy)    • Preferred language is Creole     understands some English   • Use of cane as ambulatory aid      Past Surgical History:   Procedure Laterality Date   • INGUINAL HERNIA REPAIR Right    • IR AV FISTULAGRAM/GRAFTOGRAM  4/30/2019   • IR AV FISTULAGRAM/GRAFTOGRAM  6/14/2019   • IR AV  FISTULAGRAM/GRAFTOGRAM  6/24/2020   • IR AV FISTULAGRAM/GRAFTOGRAM  2/26/2021   • IR AV FISTULAGRAM/GRAFTOGRAM  9/3/2021   • IR AV FISTULAGRAM/GRAFTOGRAM  12/28/2022   • IR AV FISTULAGRAM/GRAFTOGRAM  2/7/2024   • IR TUNNELED DIALYSIS CATHETER PLACEMENT  4/3/2019   • NC ARTERIOVENOUS ANASTOMOSIS OPEN DIRECT Left 1/23/2019    Procedure: CREATION FISTULA ARTERIOVENOUS (AV);  Surgeon: Matt De Anda MD;  Location: [facility];  Service: Vascular         Medications  Current Outpatient Medications   Medication Sig Dispense Refill   • acetaminophen (TYLENOL) 500 mg tablet Take 1-2 tablets (500-1,000 mg total) by mouth every 8 (eight) hours as needed for moderate pain 20 tablet 0   • aspirin (Aspirin Low Dose) 81 mg EC tablet TAKE 1 TABLET (81 MG TOTAL) BY MOUTH DAILY 90 tablet 0   • atorvastatin (LIPITOR) 40 mg tablet TAKE 1 TABLET (40 MG TOTAL) BY MOUTH DAILY 90 tablet 1   • atropine (ISOPTO ATROPINE) 1 % ophthalmic solution INSTILL 1 DROP IN LEFT EYE TWICE DAILY     • doxazosin (CARDURA) 2 mg tablet TAKE 2 TABLETS (4 MG TOTAL) BY MOUTH 2 (TWO) TIMES A  tablet 5   • insulin aspart (NovoLOG) 100 Units/mL injection pen 10U with breakfast, 10 U with dinner 15 mL 2   • Insulin Glargine Solostar (Basaglar KwikPen) 100 UNIT/ML SOPN Inject 0.32 mL (32 Units total) under the skin daily     • labetalol (NORMODYNE) 200 mg tablet TAKE 3 TABLETS (600 MG TOTAL) BY MOUTH EVERY 12 (TWELVE) HOURS 120 tablet 5   • lactulose 20 g/30 mL Take 30 mL (20 g total) by mouth 2 (two) times a day as needed (for constipation) 946 mL 2   • lidocaine-prilocaine (EMLA) cream Apply to avf 30 minutes prior to dialysis 5 g 4   • losartan (COZAAR) 25 mg tablet Take 1 tablet (25 mg total) by mouth daily 90 tablet 3   • methocarbamol (ROBAXIN) 500 mg tablet Take 1 tablet (500 mg total) by mouth 2 (two) times a day as needed for muscle spasms 10 tablet 0   • NIFEdipine (PROCARDIA XL) 90 mg 24 hr tablet TAKE 1 TABLET BY MOUTH DAILY 90 tablet 0   •  polyethylene glycol (MIRALAX) 17 g packet Take 17 g by mouth daily 30 each 1   • prednisoLONE acetate (PRED FORTE) 1 % ophthalmic suspension SHAKE LIQUID AND INSTILL 1 DROP IN LEFT EYE TWICE DAILY     • Sodium Zirconium Cyclosilicate (Lokelma) 10 g Take 1 packet (10 g total) by mouth daily 90 packet 6   • Blood Glucose Monitoring Suppl (ONE TOUCH ULTRA 2) w/Device KIT by Does not apply route 3 (three) times a day (Patient not taking: Reported on 2023) 1 each 0   • brimonidine tartrate 0.2 % ophthalmic solution Administer 1 drop into the left eye 2 (two) times a day (Patient not taking: Reported on 2024)     • Carboxymethylcellul-Glycerin (CLEAR EYES FOR DRY EYES OP) Apply 2 drops to eye 2 (two) times a day (Patient not taking: Reported on 2024)     • Continuous Blood Gluc  (FreeStyle Jessica 2 [place]) LAUREN Check blood sugars multiple times per day 1 each 0   • Continuous Blood Gluc Sensor (FreeStyle Jessica 2 Sensor) MISC Check blood sugars multiple times per day 6 each 3   • dorzolamide (TRUSOPT) 2 % ophthalmic solution instill 1 drop into left eye three times a day (Patient not taking: Reported on 2024)     • Insulin Pen Needle (BD Pen Needle Tiffany 2nd Gen) 32G X 4 MM MISC USE AS DIRECTED 90 each 1   • Lancets (freestyle) lancets Use as instructed 100 each 1     No current facility-administered medications for this visit.        Allergies   Allergen Reactions   • No Known Allergies        Social History     Socioeconomic History   • Marital status: Legally      Spouse name: Not on file   • Number of children: Not on file   • Years of education: Not on file   • Highest education level: Not on file   Occupational History   • Occupation: DISABLED   Tobacco Use   • Smoking status: Former     Current packs/day: 0.00     Types: Cigarettes     Quit date: 2018     Years since quittin.5   • Smokeless tobacco: Never   Vaping Use   • Vaping status: Never Used   Substance and Sexual  Activity   • Alcohol use: Not Currently   • Drug use: No   • Sexual activity: Not Currently   Other Topics Concern   • Not on file   Social History Narrative   • Not on file     Social Determinants of Health     Financial Resource Strain: High Risk (12/27/2023)    Overall Financial Resource Strain (CARDIA)    • Difficulty of Paying Living Expenses: Very hard   Food Insecurity: No Food Insecurity (3/10/2023)    Hunger Vital Sign    • Worried About Running Out of Food in the Last Year: Never true    • Ran Out of Food in the Last Year: Never true   Transportation Needs: No Transportation Needs (12/27/2023)    PRAPARE - Transportation    • Lack of Transportation (Medical): No    • Lack of Transportation (Non-Medical): No   Physical Activity: Not on file   Stress: Not on file   Social Connections: Not on file   Intimate Partner Violence: Not on file   Housing Stability: Low Risk  (3/10/2023)    Housing Stability Vital Sign    • Unable to Pay for Housing in the Last Year: No    • Number of Places Lived in the Last Year: 1    • Unstable Housing in the Last Year: No        Family History   Problem Relation Age of Onset   • Hypertension Mother    • Diabetes Father    • No Known Problems Sister    • No Known Problems Brother    • No Known Problems Maternal Aunt    • No Known Problems Maternal Uncle    • No Known Problems Paternal Aunt    • No Known Problems Paternal Uncle    • No Known Problems Maternal Grandmother    • No Known Problems Maternal Grandfather    • No Known Problems Paternal Grandmother    • No Known Problems Paternal Grandfather        Lab Results   Component Value Date    WBC 7.31 03/10/2023    HGB 8.5 (L) 03/10/2023    HCT 27.3 (L) 03/10/2023    MCV 91 03/10/2023     03/10/2023      Lab Results   Component Value Date    SODIUM 140 03/11/2023    K 3.8 06/29/2023    CL 98 03/11/2023    CO2 32 03/11/2023    BUN 45 (H) 03/11/2023    CREATININE 9.60 (H) 03/11/2023    GLUC 146 (H) 03/11/2023    CALCIUM 9.6  "03/11/2023      Lab Results   Component Value Date    HGBA1C 8.1 (A) 05/03/2024      No results found for: \"CHOL\"  Lab Results   Component Value Date    HDL 54 01/18/2021    HDL 50 04/27/2020    HDL 54 08/30/2019     Lab Results   Component Value Date    LDLCALC 58 01/18/2021    LDLCALC 57 04/27/2020    LDLCALC 49 08/30/2019     Lab Results   Component Value Date    TRIG 95 01/18/2021    TRIG 103 04/27/2020    TRIG 87 08/30/2019     No results found for: \"CHOLHDL\"   Lab Results   Component Value Date    INR 1.14 03/09/2023    INR 1.05 02/07/2022    INR 1.00 01/08/2022    PROTIME 14.6 (H) 03/09/2023    PROTIME 13.4 02/07/2022    PROTIME 13.0 01/08/2022          Patient Active Problem List    Diagnosis Date Noted   • Pulmonary nodule 02/25/2021   • TB lung, latent 01/21/2021   • Positive QuantiFERON-TB Gold test 01/20/2021   • Onychomycosis 12/02/2020   • Diabetic polyneuropathy associated with type 2 diabetes mellitus ([facility]) 12/02/2020   • Slow transit constipation 07/12/2019   • Hyperphosphatemia 04/07/2019   • Benign hypertension with ESRD (end-stage renal disease) ([facility])    • Azotemia 04/04/2019   • S/P arteriovenous (AV) fistula creation 02/05/2019   • ESRD on dialysis ([facility]) 01/09/2019   • Chronic constipation 07/12/2018   • Hyperlipidemia 06/22/2018   • Type 2 diabetes mellitus with retinopathy and macular edema, with long-term current use of insulin, unspecified laterality, unspecified retinopathy severity ([facility]) 05/17/2018   • Diabetic retinopathy of both eyes associated with type 2 diabetes mellitus ([facility]) 05/17/2018   • LVH (left ventricular hypertrophy) 05/17/2018   • Coronary artery disease involving native coronary artery of native heart without angina pectoris 05/03/2024   • Erectile dysfunction 05/03/2024   • Acute bilateral low back pain without sciatica 06/28/2023   • Secondary hyperparathyroidism of renal origin ([facility]) 02/20/2023   • Urinary retention 02/20/2023   • Peripheral arterial disease ([facility]) " "07/22/2022   • Hypocalcemia 02/09/2022   • Anemia of chronic disease 02/08/2022   • Chronic diastolic heart failure (HCC) 02/08/2022   • Visual disorder 12/23/2021   • Adenomatous polyp of colon 10/01/2021       Portions of the record may have been created with voice recognition software. Occasional wrong word or \"sound a like\" substitutions may have occurred due to the inherent limitations of voice recognition software. Read the chart carefully and recognize, using context, where substitutions have occurred.    Uriah De DO, FACC  8/28/2024 10:29 AM          "

## 2024-09-04 ENCOUNTER — TELEPHONE (OUTPATIENT)
Age: 59
End: 2024-09-04

## 2024-09-04 NOTE — TELEPHONE ENCOUNTER
Radiology calling in states that they need Dr Traore to co-sign for the CT scan that was ordered. She said she sent the request to co sign. Patient is scheduled for 9/23.

## 2024-09-06 ENCOUNTER — OFFICE VISIT (OUTPATIENT)
Dept: INTERNAL MEDICINE CLINIC | Facility: CLINIC | Age: 59
End: 2024-09-06
Payer: COMMERCIAL

## 2024-09-06 VITALS
SYSTOLIC BLOOD PRESSURE: 110 MMHG | RESPIRATION RATE: 18 BRPM | DIASTOLIC BLOOD PRESSURE: 58 MMHG | WEIGHT: 162 LBS | TEMPERATURE: 98.1 F | BODY MASS INDEX: 23.99 KG/M2 | HEART RATE: 80 BPM | HEIGHT: 69 IN

## 2024-09-06 DIAGNOSIS — I25.10 CORONARY ARTERY DISEASE INVOLVING NATIVE CORONARY ARTERY OF NATIVE HEART WITHOUT ANGINA PECTORIS: ICD-10-CM

## 2024-09-06 DIAGNOSIS — I12.0 BENIGN HYPERTENSION WITH ESRD (END-STAGE RENAL DISEASE) (HCC): ICD-10-CM

## 2024-09-06 DIAGNOSIS — N18.6 BENIGN HYPERTENSION WITH ESRD (END-STAGE RENAL DISEASE) (HCC): ICD-10-CM

## 2024-09-06 DIAGNOSIS — Z99.2 ESRD ON DIALYSIS (HCC): ICD-10-CM

## 2024-09-06 DIAGNOSIS — Z79.4 TYPE 2 DIABETES MELLITUS WITH RETINOPATHY AND MACULAR EDEMA, WITH LONG-TERM CURRENT USE OF INSULIN, UNSPECIFIED LATERALITY, UNSPECIFIED RETINOPATHY SEVERITY (HCC): Primary | ICD-10-CM

## 2024-09-06 DIAGNOSIS — E11.311 TYPE 2 DIABETES MELLITUS WITH RETINOPATHY AND MACULAR EDEMA, WITH LONG-TERM CURRENT USE OF INSULIN, UNSPECIFIED LATERALITY, UNSPECIFIED RETINOPATHY SEVERITY (HCC): Primary | ICD-10-CM

## 2024-09-06 DIAGNOSIS — N18.6 ESRD ON DIALYSIS (HCC): ICD-10-CM

## 2024-09-06 DIAGNOSIS — M72.0 DUPUYTREN CONTRACTURE: ICD-10-CM

## 2024-09-06 PROCEDURE — G2211 COMPLEX E/M VISIT ADD ON: HCPCS | Performed by: INTERNAL MEDICINE

## 2024-09-06 PROCEDURE — 99214 OFFICE O/P EST MOD 30 MIN: CPT | Performed by: INTERNAL MEDICINE

## 2024-09-06 NOTE — ASSESSMENT & PLAN NOTE
- follows with Nephrology.  Goes to HD at [facility]  -Currently on transplant list

## 2024-09-06 NOTE — ASSESSMENT & PLAN NOTE
HbA1c: 7.4 December 2023, 7.2 June 2023  Current medications:  glargine 32 units daily, NovoLog 10 units with breakfast, 10 units with dinner  Statin and ACE-inhibitor:  atorvastatin  Eye Exam:  Follows with Dr. Reis, he is legally blind    Due for repeat A1c, did not get blood work prior to today's visit.  Patient's sister will be getting blood work requested by transplant team done and will get labs done at that time  -Continue with current regimen for now

## 2024-09-06 NOTE — PROGRESS NOTES
INTERNAL MEDICINE OFFICE VISIT  [facility] Medicine- [place]    NAME: Paddy Zhong  AGE: 59 y.o. SEX: male    DATE OF ENCOUNTER: 9/6/2024    Assessment and Plan/History of Present Illness     Here today for follow up  Medical history of insulin-dependent type 2 diabetes complicated by retinopathy and neuropathy, ESRD on HD, chronic constipation, former smoker, latent TB, pulmonary nodule      1. Type 2 diabetes mellitus with retinopathy and macular edema, with long-term current use of insulin, unspecified laterality, unspecified retinopathy severity ([facility])  Assessment & Plan:  HbA1c: 7.4 December 2023, 7.2 June 2023  Current medications:  glargine 32 units daily, NovoLog 10 units with breakfast, 10 units with dinner  Statin and ACE-inhibitor:  atorvastatin  Eye Exam:  Follows with Dr. Reis, he is legally blind    Due for repeat A1c, did not get blood work prior to today's visit.  Patient's sister will be getting blood work requested by transplant team done and will get labs done at that time  -Continue with current regimen for now  2. Coronary artery disease involving native coronary artery of native heart without angina pectoris  Assessment & Plan:  History of nonobstructive CAD -30% narrowing seen on prior cardiac cath  -Continue aspirin and atorvastatin  -Does not report any recent anginal symptoms   3. ESRD on dialysis ([facility])  Assessment & Plan:  - follows with Nephrology.  Goes to HD at [facility]  -Currently on transplant list    4. Benign hypertension with ESRD (end-stage renal disease) ([facility])  Assessment & Plan:  Adequately controlled  Continue current regimen  5. Dupuytren contracture  Assessment & Plan:  Finding of probable Dupuytren's of the left fourth finger on exam.  Will refer to hand specialist  Orders:  -     Ambulatory Referral to Orthopedic Surgery; Future             Orders Placed This Encounter   Procedures   • Ambulatory Referral to Orthopedic Surgery       Chief Complaint  "    Chief Complaint   Patient presents with   • Follow-up     4 month // pt refused hep B rsv    • Neck Pain     X 1 day, did not take anything for the pain       Review of Systems     10 point ROS negative except per HPI    The following portions of the patient's history were reviewed and updated as appropriate: allergies, current medications, past family history, past medical history, past social history, past surgical history and problem list.    Objective     /58 (BP Location: Left arm, Patient Position: Sitting, Cuff Size: Standard)   Pulse 80   Temp 98.1 °F (36.7 °C)   Resp 18   Ht 5' 9\" (1.753 m)   Wt 73.5 kg (162 lb)   BMI 23.92 kg/m²     Physical Exam  Cardiovascular:      Rate and Rhythm: Normal rate and regular rhythm.      Heart sounds: No murmur heard.  Pulmonary:      Effort: Pulmonary effort is normal.      Breath sounds: Normal breath sounds. No wheezing or rales.   Musculoskeletal:         General: Deformity (Palmar contracture left fourth digit) present.           Current Medications     Current Outpatient Medications:   •  acetaminophen (TYLENOL) 500 mg tablet, Take 1-2 tablets (500-1,000 mg total) by mouth every 8 (eight) hours as needed for moderate pain, Disp: 20 tablet, Rfl: 0  •  aspirin (Aspirin Low Dose) 81 mg EC tablet, TAKE 1 TABLET (81 MG TOTAL) BY MOUTH DAILY, Disp: 90 tablet, Rfl: 0  •  atorvastatin (LIPITOR) 40 mg tablet, TAKE 1 TABLET (40 MG TOTAL) BY MOUTH DAILY, Disp: 90 tablet, Rfl: 1  •  atropine (ISOPTO ATROPINE) 1 % ophthalmic solution, INSTILL 1 DROP IN LEFT EYE TWICE DAILY, Disp: , Rfl:   •  brimonidine tartrate 0.2 % ophthalmic solution, Administer 1 drop into the left eye 2 (two) times a day, Disp: , Rfl:   •  Continuous Blood Gluc  (FreeStyle Jessica 2 [place]) LAUREN, Check blood sugars multiple times per day, Disp: 1 each, Rfl: 0  •  doxazosin (CARDURA) 2 mg tablet, TAKE 2 TABLETS (4 MG TOTAL) BY MOUTH 2 (TWO) TIMES A DAY, Disp: 120 tablet, Rfl: 5  •  " insulin aspart (NovoLOG) 100 Units/mL injection pen, 10U with breakfast, 10 U with dinner, Disp: 15 mL, Rfl: 2  •  Insulin Glargine Solostar (Basaglar KwikPen) 100 UNIT/ML SOPN, Inject 0.32 mL (32 Units total) under the skin daily, Disp: , Rfl:   •  Insulin Pen Needle (BD Pen Needle Tiffany 2nd Gen) 32G X 4 MM MISC, USE AS DIRECTED, Disp: 90 each, Rfl: 1  •  labetalol (NORMODYNE) 200 mg tablet, TAKE 3 TABLETS (600 MG TOTAL) BY MOUTH EVERY 12 (TWELVE) HOURS, Disp: 120 tablet, Rfl: 5  •  lactulose 20 g/30 mL, Take 30 mL (20 g total) by mouth 2 (two) times a day as needed (for constipation), Disp: 946 mL, Rfl: 2  •  Lancets (freestyle) lancets, Use as instructed, Disp: 100 each, Rfl: 1  •  lidocaine-prilocaine (EMLA) cream, Apply to avf 30 minutes prior to dialysis, Disp: 5 g, Rfl: 4  •  losartan (COZAAR) 25 mg tablet, Take 1 tablet (25 mg total) by mouth daily, Disp: 90 tablet, Rfl: 3  •  methocarbamol (ROBAXIN) 500 mg tablet, Take 1 tablet (500 mg total) by mouth 2 (two) times a day as needed for muscle spasms, Disp: 10 tablet, Rfl: 0  •  NIFEdipine (PROCARDIA XL) 90 mg 24 hr tablet, TAKE 1 TABLET BY MOUTH DAILY, Disp: 90 tablet, Rfl: 0  •  polyethylene glycol (MIRALAX) 17 g packet, Take 17 g by mouth daily, Disp: 30 each, Rfl: 1  •  prednisoLONE acetate (PRED FORTE) 1 % ophthalmic suspension, SHAKE LIQUID AND INSTILL 1 DROP IN LEFT EYE TWICE DAILY, Disp: , Rfl:   •  Sodium Zirconium Cyclosilicate (Lokelma) 10 g, Take 1 packet (10 g total) by mouth daily, Disp: 90 packet, Rfl: 6  •  Blood Glucose Monitoring Suppl (ONE TOUCH ULTRA 2) w/Device KIT, by Does not apply route 3 (three) times a day (Patient not taking: Reported on 9/6/2024), Disp: 1 each, Rfl: 0  •  Continuous Blood Gluc Sensor (FreeStyle Jessica 2 Sensor) MISC, Check blood sugars multiple times per day (Patient not taking: Reported on 9/6/2024), Disp: 6 each, Rfl: 3      Merlin Correa D.O.  [facility] Internal Medicine [facility]  [address]  [id number]  [place] [address]  Office: [phone]  Fax: [phone]

## 2024-09-06 NOTE — ASSESSMENT & PLAN NOTE
History of nonobstructive CAD -30% narrowing seen on prior cardiac cath  -Continue aspirin and atorvastatin  -Does not report any recent anginal symptoms

## 2024-09-09 ENCOUNTER — APPOINTMENT (OUTPATIENT)
Dept: LAB | Facility: CLINIC | Age: 59
End: 2024-09-09
Payer: COMMERCIAL

## 2024-09-09 DIAGNOSIS — Z79.4 TYPE 2 DIABETES MELLITUS WITH OTHER DIABETIC KIDNEY COMPLICATION, WITH LONG-TERM CURRENT USE OF INSULIN (HCC): ICD-10-CM

## 2024-09-09 DIAGNOSIS — N18.6 ESRD ON DIALYSIS (HCC): ICD-10-CM

## 2024-09-09 DIAGNOSIS — E11.29 TYPE 2 DIABETES MELLITUS WITH OTHER DIABETIC KIDNEY COMPLICATION, WITH LONG-TERM CURRENT USE OF INSULIN (HCC): ICD-10-CM

## 2024-09-09 DIAGNOSIS — Z99.2 ESRD ON DIALYSIS (HCC): ICD-10-CM

## 2024-09-09 LAB
25(OH)D3 SERPL-MCNC: 36.1 NG/ML (ref 30–100)
ALBUMIN SERPL BCG-MCNC: 4.3 G/DL (ref 3.5–5)
ALP SERPL-CCNC: 57 U/L (ref 34–104)
ALT SERPL W P-5'-P-CCNC: 26 U/L (ref 7–52)
ANION GAP SERPL CALCULATED.3IONS-SCNC: 12 MMOL/L (ref 4–13)
AST SERPL W P-5'-P-CCNC: 14 U/L (ref 13–39)
BASOPHILS # BLD AUTO: 0.06 THOUSANDS/ÂΜL (ref 0–0.1)
BASOPHILS NFR BLD AUTO: 1 % (ref 0–1)
BILIRUB SERPL-MCNC: 0.61 MG/DL (ref 0.2–1)
BUN SERPL-MCNC: 45 MG/DL (ref 5–25)
CALCIUM SERPL-MCNC: 9.4 MG/DL (ref 8.4–10.2)
CHLORIDE SERPL-SCNC: 97 MMOL/L (ref 96–108)
CHOLEST SERPL-MCNC: 120 MG/DL
CO2 SERPL-SCNC: 30 MMOL/L (ref 21–32)
CREAT SERPL-MCNC: 9.87 MG/DL (ref 0.6–1.3)
EOSINOPHIL # BLD AUTO: 0.23 THOUSAND/ÂΜL (ref 0–0.61)
EOSINOPHIL NFR BLD AUTO: 4 % (ref 0–6)
ERYTHROCYTE [DISTWIDTH] IN BLOOD BY AUTOMATED COUNT: 15 % (ref 11.6–15.1)
EST. AVERAGE GLUCOSE BLD GHB EST-MCNC: 212 MG/DL
GFR SERPL CREATININE-BSD FRML MDRD: 5 ML/MIN/1.73SQ M
GLUCOSE P FAST SERPL-MCNC: 197 MG/DL (ref 65–99)
HBA1C MFR BLD: 9 %
HCT VFR BLD AUTO: 36.6 % (ref 36.5–49.3)
HDLC SERPL-MCNC: 49 MG/DL
HGB BLD-MCNC: 11.5 G/DL (ref 12–17)
IMM GRANULOCYTES # BLD AUTO: 0.03 THOUSAND/UL (ref 0–0.2)
IMM GRANULOCYTES NFR BLD AUTO: 1 % (ref 0–2)
LDLC SERPL CALC-MCNC: 55 MG/DL (ref 0–100)
LYMPHOCYTES # BLD AUTO: 1.42 THOUSANDS/ÂΜL (ref 0.6–4.47)
LYMPHOCYTES NFR BLD AUTO: 25 % (ref 14–44)
MCH RBC QN AUTO: 28.7 PG (ref 26.8–34.3)
MCHC RBC AUTO-ENTMCNC: 31.4 G/DL (ref 31.4–37.4)
MCV RBC AUTO: 91 FL (ref 82–98)
MONOCYTES # BLD AUTO: 0.79 THOUSAND/ÂΜL (ref 0.17–1.22)
MONOCYTES NFR BLD AUTO: 14 % (ref 4–12)
NEUTROPHILS # BLD AUTO: 3.27 THOUSANDS/ÂΜL (ref 1.85–7.62)
NEUTS SEG NFR BLD AUTO: 55 % (ref 43–75)
NRBC BLD AUTO-RTO: 0 /100 WBCS
PLATELET # BLD AUTO: 211 THOUSANDS/UL (ref 149–390)
PMV BLD AUTO: 9.7 FL (ref 8.9–12.7)
POTASSIUM SERPL-SCNC: 4.4 MMOL/L (ref 3.5–5.3)
PROT SERPL-MCNC: 7 G/DL (ref 6.4–8.4)
PTH-INTACT SERPL-MCNC: 158.3 PG/ML (ref 12–88)
RBC # BLD AUTO: 4.01 MILLION/UL (ref 3.88–5.62)
SODIUM SERPL-SCNC: 139 MMOL/L (ref 135–147)
TRIGL SERPL-MCNC: 78 MG/DL
WBC # BLD AUTO: 5.8 THOUSAND/UL (ref 4.31–10.16)

## 2024-09-09 PROCEDURE — 83970 ASSAY OF PARATHORMONE: CPT

## 2024-09-09 PROCEDURE — 36415 COLL VENOUS BLD VENIPUNCTURE: CPT

## 2024-09-09 PROCEDURE — 80061 LIPID PANEL: CPT

## 2024-09-09 PROCEDURE — 83036 HEMOGLOBIN GLYCOSYLATED A1C: CPT

## 2024-09-09 PROCEDURE — 82306 VITAMIN D 25 HYDROXY: CPT

## 2024-09-09 PROCEDURE — 85025 COMPLETE CBC W/AUTO DIFF WBC: CPT

## 2024-09-09 PROCEDURE — 80053 COMPREHEN METABOLIC PANEL: CPT

## 2024-09-10 ENCOUNTER — TELEPHONE (OUTPATIENT)
Dept: INTERNAL MEDICINE CLINIC | Facility: CLINIC | Age: 59
End: 2024-09-10

## 2024-09-10 NOTE — TELEPHONE ENCOUNTER
----- Message from Merlin Correa DO sent at 9/9/2024  5:25 PM EDT -----  Hemoglobin A1c increased up to 9.0.  Recommend increasing insulin glargine from 32 to 36 units daily. Try to focus on cutting back on excess carb intake   Continue to monitor blood glucose at home and we will review at follow-up visit.  Will recheck A1c next office visit    Remainder of labs stable

## 2024-09-13 ENCOUNTER — TELEPHONE (OUTPATIENT)
Dept: NEPHROLOGY | Facility: CLINIC | Age: 59
End: 2024-09-13

## 2024-09-13 NOTE — TELEPHONE ENCOUNTER
Called patient and left a voicemail.  Time to schedule 1 year pre transplant follow up with Dr. Traore.

## 2024-09-14 DIAGNOSIS — N18.6 BENIGN HYPERTENSION WITH ESRD (END-STAGE RENAL DISEASE) (HCC): ICD-10-CM

## 2024-09-14 DIAGNOSIS — I12.0 BENIGN HYPERTENSION WITH ESRD (END-STAGE RENAL DISEASE) (HCC): ICD-10-CM

## 2024-09-16 ENCOUNTER — OFFICE VISIT (OUTPATIENT)
Dept: UROLOGY | Facility: MEDICAL CENTER | Age: 59
End: 2024-09-16
Payer: COMMERCIAL

## 2024-09-16 VITALS
HEART RATE: 65 BPM | SYSTOLIC BLOOD PRESSURE: 150 MMHG | OXYGEN SATURATION: 99 % | DIASTOLIC BLOOD PRESSURE: 70 MMHG | BODY MASS INDEX: 25.48 KG/M2 | HEIGHT: 69 IN | WEIGHT: 172 LBS | RESPIRATION RATE: 18 BRPM

## 2024-09-16 DIAGNOSIS — N52.8 MIXED ERECTILE DYSFUNCTION: ICD-10-CM

## 2024-09-16 DIAGNOSIS — N40.1 BPH WITH URINARY OBSTRUCTION: Primary | ICD-10-CM

## 2024-09-16 DIAGNOSIS — N13.8 BPH WITH URINARY OBSTRUCTION: Primary | ICD-10-CM

## 2024-09-16 PROCEDURE — 99204 OFFICE O/P NEW MOD 45 MIN: CPT | Performed by: UROLOGY

## 2024-09-16 RX ORDER — LABETALOL 200 MG/1
600 TABLET, FILM COATED ORAL EVERY 12 HOURS
Qty: 120 TABLET | Refills: 4 | Status: SHIPPED | OUTPATIENT
Start: 2024-09-16

## 2024-09-16 NOTE — PROGRESS NOTES
"   HISTORY:    1.  BPH, not that bothersome.  He makes very little urine now.  The slow flow is tolerable.    Was seen in March 2023 for slow urinary flow, did not have follow-up cystoscopy as recommended.    2.  He complains of poor quality erections, and premature ejaculation.    This problem for 3 to 6 months.    However, later in discussion, he tells me his wife lives in [facility], he does not have a partner here.  He is just looking into possible treatments.  He might go back to [place], but no plans made yet         ASSESSMENT / PLAN:    1.  We discussed Viagra, Emla cream for possible PE.    Overall, patient understands that no need to prescribe meds since he is not having an active relationship now.    If he plans to go back to 79, he will call and we will prescribe the Viagra at that time    The following portions of the patient's history were reviewed and updated as appropriate: allergies, current medications, past family history, past medical history, past social history, past surgical history, and problem list.    Review of Systems      Objective:     Physical Exam  Genitourinary:     Comments: Penis testes normal    Prostate minimally large no nodule          0   Lab Value Date/Time    PSA 0.5 02/20/2023 1449    PSA 0.6 01/18/2021 1025    PSA 0.5 04/27/2020 0929   ]  BUN   Date Value Ref Range Status   09/09/2024 45 (H) 5 - 25 mg/dL Final   04/20/2022 56 (H) 7 - 25 mg/dL Final     Creatinine   Date Value Ref Range Status   09/09/2024 9.87 (H) 0.60 - 1.30 mg/dL Final     Comment:     Standardized to IDMS reference method   04/20/2022 9.46 (H) 0.70 - 1.33 mg/dL Final     Comment:     Verified by repeat analysis.    For patients >49 years of age, the reference limit  for Creatinine is approximately 13% higher for people  identified as -American.     No components found for: \"CBC\"      Patient Active Problem List   Diagnosis    Type 2 diabetes mellitus with retinopathy and macular edema, with long-term " current use of insulin, unspecified laterality, unspecified retinopathy severity (HCC)    Diabetic retinopathy of both eyes associated with type 2 diabetes mellitus (HCC)    LVH (left ventricular hypertrophy)    Hyperlipidemia    Chronic constipation    ESRD on dialysis ([facility])    S/P arteriovenous (AV) fistula creation    Azotemia    Hyperphosphatemia    Benign hypertension with ESRD (end-stage renal disease) ([facility])    Slow transit constipation    Onychomycosis    Diabetic polyneuropathy associated with type 2 diabetes mellitus (HCC)    Positive QuantiFERON-TB Gold test    TB lung, latent    Pulmonary nodule    Adenomatous polyp of colon    Visual disorder    Anemia of chronic disease    Chronic diastolic heart failure (HCC)    Hypocalcemia    Peripheral arterial disease (HCC)    Secondary hyperparathyroidism of renal origin ([facility])    Urinary retention    Acute bilateral low back pain without sciatica    Coronary artery disease involving native coronary artery of native heart without angina pectoris    Erectile dysfunction    Dupuytren contracture        Diagnoses and all orders for this visit:    BPH with urinary obstruction    Mixed erectile dysfunction           Patient ID: Paddy Zhong is a 59 y.o. male.      Current Outpatient Medications:     acetaminophen (TYLENOL) 500 mg tablet, Take 1-2 tablets (500-1,000 mg total) by mouth every 8 (eight) hours as needed for moderate pain, Disp: 20 tablet, Rfl: 0    aspirin (Aspirin Low Dose) 81 mg EC tablet, TAKE 1 TABLET (81 MG TOTAL) BY MOUTH DAILY, Disp: 90 tablet, Rfl: 0    atorvastatin (LIPITOR) 40 mg tablet, TAKE 1 TABLET (40 MG TOTAL) BY MOUTH DAILY, Disp: 90 tablet, Rfl: 1    atropine (ISOPTO ATROPINE) 1 % ophthalmic solution, INSTILL 1 DROP IN LEFT EYE TWICE DAILY, Disp: , Rfl:     brimonidine tartrate 0.2 % ophthalmic solution, Administer 1 drop into the left eye 2 (two) times a day, Disp: , Rfl:     Continuous Blood Gluc  (FreeStyle Jessica 2 [place])  LAUREN, Check blood sugars multiple times per day, Disp: 1 each, Rfl: 0    doxazosin (CARDURA) 2 mg tablet, TAKE 2 TABLETS (4 MG TOTAL) BY MOUTH 2 (TWO) TIMES A DAY, Disp: 120 tablet, Rfl: 5    insulin aspart (NovoLOG) 100 Units/mL injection pen, 10U with breakfast, 10 U with dinner, Disp: 15 mL, Rfl: 2    Insulin Glargine Solostar (Basaglar KwikPen) 100 UNIT/ML SOPN, Inject 0.32 mL (32 Units total) under the skin daily, Disp: , Rfl:     Insulin Pen Needle (BD Pen Needle Tiffany 2nd Gen) 32G X 4 MM MISC, USE AS DIRECTED, Disp: 90 each, Rfl: 1    labetalol (NORMODYNE) 200 mg tablet, TAKE 3 TABLETS (600 MG TOTAL) BY MOUTH EVERY 12 (TWELVE) HOURS, Disp: 120 tablet, Rfl: 5    lactulose 20 g/30 mL, Take 30 mL (20 g total) by mouth 2 (two) times a day as needed (for constipation), Disp: 946 mL, Rfl: 2    Lancets (freestyle) lancets, Use as instructed, Disp: 100 each, Rfl: 1    lidocaine-prilocaine (EMLA) cream, Apply to avf 30 minutes prior to dialysis, Disp: 5 g, Rfl: 4    losartan (COZAAR) 25 mg tablet, Take 1 tablet (25 mg total) by mouth daily, Disp: 90 tablet, Rfl: 3    methocarbamol (ROBAXIN) 500 mg tablet, Take 1 tablet (500 mg total) by mouth 2 (two) times a day as needed for muscle spasms, Disp: 10 tablet, Rfl: 0    NIFEdipine (PROCARDIA XL) 90 mg 24 hr tablet, TAKE 1 TABLET BY MOUTH DAILY, Disp: 90 tablet, Rfl: 0    polyethylene glycol (MIRALAX) 17 g packet, Take 17 g by mouth daily, Disp: 30 each, Rfl: 1    prednisoLONE acetate (PRED FORTE) 1 % ophthalmic suspension, SHAKE LIQUID AND INSTILL 1 DROP IN LEFT EYE TWICE DAILY, Disp: , Rfl:     Sodium Zirconium Cyclosilicate (Lokelma) 10 g, Take 1 packet (10 g total) by mouth daily, Disp: 90 packet, Rfl: 6    Blood Glucose Monitoring Suppl (ONE TOUCH ULTRA 2) w/Device KIT, by Does not apply route 3 (three) times a day (Patient not taking: Reported on 9/6/2024), Disp: 1 each, Rfl: 0    Continuous Blood Gluc Sensor (FreeStyle Jessica 2 Sensor) MISC, Check blood sugars  multiple times per day (Patient not taking: Reported on 9/6/2024), Disp: 6 each, Rfl: 3    Past Medical History:   Diagnosis Date    Cataract     Dizziness     occ    GERD (gastroesophageal reflux disease)     Hyperlipidemia     Legally blind     LVH (left ventricular hypertrophy)     Preferred language is Creole     understands some English    Use of cane as ambulatory aid        Past Surgical History:   Procedure Laterality Date    INGUINAL HERNIA REPAIR Right     IR AV FISTULAGRAM/GRAFTOGRAM  4/30/2019    IR AV FISTULAGRAM/GRAFTOGRAM  6/14/2019    IR AV FISTULAGRAM/GRAFTOGRAM  6/24/2020    IR AV FISTULAGRAM/GRAFTOGRAM  2/26/2021    IR AV FISTULAGRAM/GRAFTOGRAM  9/3/2021    IR AV FISTULAGRAM/GRAFTOGRAM  12/28/2022    IR AV FISTULAGRAM/GRAFTOGRAM  2/7/2024    IR TUNNELED DIALYSIS CATHETER PLACEMENT  4/3/2019    SD ARTERIOVENOUS ANASTOMOSIS OPEN DIRECT Left 1/23/2019    Procedure: CREATION FISTULA ARTERIOVENOUS (AV);  Surgeon: Matt De Anda MD;  Location: [facility];  Service: Vascular       Social History

## 2024-09-16 NOTE — PATIENT INSTRUCTIONS
If you want to try Viagra in the future, call here and we will send a prescription to the pharmacy.

## 2024-09-23 ENCOUNTER — HOSPITAL ENCOUNTER (OUTPATIENT)
Dept: NON INVASIVE DIAGNOSTICS | Facility: HOSPITAL | Age: 59
Discharge: HOME | End: 2024-09-23
Attending: INTERNAL MEDICINE
Payer: COMMERCIAL

## 2024-09-23 ENCOUNTER — HOSPITAL ENCOUNTER (OUTPATIENT)
Dept: RADIOLOGY | Facility: HOSPITAL | Age: 59
Discharge: HOME | End: 2024-09-23
Attending: INTERNAL MEDICINE
Payer: COMMERCIAL

## 2024-09-23 ENCOUNTER — HOSPITAL ENCOUNTER (OUTPATIENT)
Dept: CT IMAGING | Facility: HOSPITAL | Age: 59
Discharge: HOME | End: 2024-09-23
Attending: INTERNAL MEDICINE
Payer: COMMERCIAL

## 2024-09-23 VITALS
BODY MASS INDEX: 25.48 KG/M2 | DIASTOLIC BLOOD PRESSURE: 70 MMHG | HEIGHT: 69 IN | WEIGHT: 172 LBS | HEART RATE: 62 BPM | SYSTOLIC BLOOD PRESSURE: 150 MMHG

## 2024-09-23 VITALS — WEIGHT: 172 LBS | HEIGHT: 69 IN | BODY MASS INDEX: 25.48 KG/M2

## 2024-09-23 DIAGNOSIS — N18.6 ESRD (END STAGE RENAL DISEASE) (HCC): ICD-10-CM

## 2024-09-23 DIAGNOSIS — Z99.2 TYPE 2 DIABETES MELLITUS WITH CHRONIC KIDNEY DISEASE ON CHRONIC DIALYSIS, WITH LONG-TERM CURRENT USE OF INSULIN (HCC): ICD-10-CM

## 2024-09-23 DIAGNOSIS — D63.1 ANEMIA DUE TO CHRONIC KIDNEY DISEASE, ON CHRONIC DIALYSIS (HCC): ICD-10-CM

## 2024-09-23 DIAGNOSIS — D63.8 ANEMIA OF CHRONIC DISEASE: ICD-10-CM

## 2024-09-23 DIAGNOSIS — Z99.2 ANEMIA DUE TO CHRONIC KIDNEY DISEASE, ON CHRONIC DIALYSIS (HCC): ICD-10-CM

## 2024-09-23 DIAGNOSIS — I16.0 HYPERTENSIVE URGENCY: ICD-10-CM

## 2024-09-23 DIAGNOSIS — N18.6 BENIGN HYPERTENSION WITH ESRD (END-STAGE RENAL DISEASE) (HCC): ICD-10-CM

## 2024-09-23 DIAGNOSIS — E11.311 DIABETIC RETINOPATHY OF BOTH EYES WITH MACULAR EDEMA ASSOCIATED WITH TYPE 2 DIABETES MELLITUS, UNSPECIFIED RETINOPATHY SEVERITY (HCC): ICD-10-CM

## 2024-09-23 DIAGNOSIS — E11.22 TYPE 2 DIABETES MELLITUS WITH CHRONIC KIDNEY DISEASE ON CHRONIC DIALYSIS, WITH LONG-TERM CURRENT USE OF INSULIN (HCC): ICD-10-CM

## 2024-09-23 DIAGNOSIS — Z01.818 ENCOUNTER FOR PRE-TRANSPLANT EVALUATION FOR KIDNEY TRANSPLANT: ICD-10-CM

## 2024-09-23 DIAGNOSIS — Z79.4 TYPE 2 DIABETES MELLITUS WITH CHRONIC KIDNEY DISEASE ON CHRONIC DIALYSIS, WITH LONG-TERM CURRENT USE OF INSULIN (HCC): ICD-10-CM

## 2024-09-23 DIAGNOSIS — N18.6 TYPE 2 DIABETES MELLITUS WITH CHRONIC KIDNEY DISEASE ON CHRONIC DIALYSIS, WITH LONG-TERM CURRENT USE OF INSULIN (HCC): ICD-10-CM

## 2024-09-23 DIAGNOSIS — N25.81 SECONDARY HYPERPARATHYROIDISM OF RENAL ORIGIN (HCC): ICD-10-CM

## 2024-09-23 DIAGNOSIS — N18.6 ANEMIA DUE TO CHRONIC KIDNEY DISEASE, ON CHRONIC DIALYSIS (HCC): ICD-10-CM

## 2024-09-23 DIAGNOSIS — I12.0 BENIGN HYPERTENSION WITH ESRD (END-STAGE RENAL DISEASE) (HCC): ICD-10-CM

## 2024-09-23 LAB
AORTIC ROOT: 3.4 CM
APICAL FOUR CHAMBER EJECTION FRACTION: 56 %
BSA FOR ECHO PROCEDURE: 1.94 M2
E WAVE DECELERATION TIME: 316 MS
E/A RATIO: 1.66
FRACTIONAL SHORTENING: 28 (ref 28–44)
INTERVENTRICULAR SEPTUM IN DIASTOLE (PARASTERNAL SHORT AXIS VIEW): 1.7 CM
INTERVENTRICULAR SEPTUM: 1.7 CM (ref 0.6–1.1)
LAAS-AP2: 29.1 CM2
LAAS-AP4: 24.7 CM2
LEFT ATRIUM SIZE: 4.8 CM
LEFT ATRIUM VOLUME (MOD BIPLANE): 93 ML
LEFT ATRIUM VOLUME INDEX (MOD BIPLANE): 47.9 ML/M2
LEFT INTERNAL DIMENSION IN SYSTOLE: 2.9 CM (ref 2.1–4)
LEFT VENTRICULAR INTERNAL DIMENSION IN DIASTOLE: 4 CM (ref 3.5–6)
LEFT VENTRICULAR POSTERIOR WALL IN END DIASTOLE: 1.7 CM
LEFT VENTRICULAR STROKE VOLUME: 37 ML
LVSV (TEICH): 37 ML
MV E'TISSUE VEL-SEP: 4 CM/S
MV PEAK A VEL: 0.44 M/S
MV PEAK E VEL: 73 CM/S
MV STENOSIS PRESSURE HALF TIME: 92 MS
MV VALVE AREA P 1/2 METHOD: 2.39
NUC STRESS EJECTION FRACTION: 47 %
RA PRESSURE ESTIMATED: 3 MMHG
RATE PRESSURE PRODUCT: 8004
RIGHT ATRIUM AREA SYSTOLE A4C: 17.1 CM2
RIGHT VENTRICLE ID DIMENSION: 3.3 CM
RV PSP: 36 MMHG
SL CV LEFT ATRIUM LENGTH A2C: 6.5 CM
SL CV LV EF: 60
SL CV PED ECHO LEFT VENTRICLE DIASTOLIC VOLUME (MOD BIPLANE) 2D: 71 ML
SL CV PED ECHO LEFT VENTRICLE SYSTOLIC VOLUME (MOD BIPLANE) 2D: 33 ML
SL CV REST NUCLEAR ISOTOPE DOSE: 10.6 MCI
SL CV STRESS NUCLEAR ISOTOPE DOSE: 31.2 MCI
SL CV STRESS RECOVERY BP: NORMAL MMHG
SL CV STRESS RECOVERY HR: 68 BPM
SL CV STRESS RECOVERY O2 SAT: 100 %
STRESS ANGINA INDEX: 0
STRESS BASELINE BP: NORMAL MMHG
STRESS BASELINE HR: 58 BPM
STRESS O2 SAT REST: 98 %
STRESS PEAK HR: 69 BPM
STRESS POST O2 SAT PEAK: 100 %
STRESS POST PEAK BP: 116 MMHG
STRESS/REST PERFUSION RATIO: 1.05
TR MAX PG: 33 MMHG
TR PEAK VELOCITY: 2.9 M/S
TRICUSPID ANNULAR PLANE SYSTOLIC EXCURSION: 1.9 CM
TRICUSPID VALVE PEAK REGURGITATION VELOCITY: 2.85 M/S

## 2024-09-23 PROCEDURE — 93306 TTE W/DOPPLER COMPLETE: CPT

## 2024-09-23 PROCEDURE — A9502 TC99M TETROFOSMIN: HCPCS

## 2024-09-23 PROCEDURE — 78452 HT MUSCLE IMAGE SPECT MULT: CPT

## 2024-09-23 PROCEDURE — 93017 CV STRESS TEST TRACING ONLY: CPT

## 2024-09-23 PROCEDURE — 93016 CV STRESS TEST SUPVJ ONLY: CPT

## 2024-09-23 PROCEDURE — 74176 CT ABD & PELVIS W/O CONTRAST: CPT

## 2024-09-23 PROCEDURE — 93018 CV STRESS TEST I&R ONLY: CPT

## 2024-09-24 ENCOUNTER — NURSE TRIAGE (OUTPATIENT)
Age: 59
End: 2024-09-24

## 2024-09-24 LAB
CHEST PAIN STATEMENT: NORMAL
MAX DIASTOLIC BP: 68 MMHG
MAX PREDICTED HEART RATE: 160 BPM
PROTOCOL NAME: NORMAL
REASON FOR TERMINATION: NORMAL
STRESS POST EXERCISE DUR MIN: 3 MIN
STRESS POST EXERCISE DUR SEC: 1 SEC
STRESS POST PEAK HR: 74 BPM
STRESS POST PEAK SYSTOLIC BP: 142 MMHG
TARGET HR FORMULA: NORMAL
TEST INDICATION: NORMAL

## 2024-09-24 NOTE — TELEPHONE ENCOUNTER
"Reason for Disposition  • Information only question and nurse able to answer     Addendum needed to office visit for cardiac clearance    Answer Assessment - Initial Assessment Questions  1. REASON FOR CALL or QUESTION: \"What is your reason for calling today?\" or \"How can I best help you?\" or \"What question do you have that I can help answer?\"          Spoke with Jimena from [facility]. Inquiring if office visit note from August 28, 2024 can be addended to include results from the stress test and ECHO that were done September 23, 2024, to state patient is cleared for kidney transplant surgery and to also include risk stratisification.    Phone [phone]  Fax [phone]    Protocols used: Information Only Call - No Triage-ADULT-[place]    "

## 2024-09-25 ENCOUNTER — OFFICE VISIT (OUTPATIENT)
Dept: OBGYN CLINIC | Facility: CLINIC | Age: 59
End: 2024-09-25
Payer: COMMERCIAL

## 2024-09-25 ENCOUNTER — TELEPHONE (OUTPATIENT)
Dept: NEPHROLOGY | Facility: CLINIC | Age: 59
End: 2024-09-25

## 2024-09-25 VITALS — BODY MASS INDEX: 25.48 KG/M2 | HEIGHT: 69 IN | WEIGHT: 172 LBS

## 2024-09-25 DIAGNOSIS — M72.0 DUPUYTREN CONTRACTURE: ICD-10-CM

## 2024-09-25 PROCEDURE — 99203 OFFICE O/P NEW LOW 30 MIN: CPT | Performed by: SURGERY

## 2024-09-25 NOTE — PROGRESS NOTES
Assessment    Dupuytren's disease left ring finger       Plan    Recommend treatment with observation for now as there is no significant contracture.  May follow-up on an as-needed basis with instructions call the office if the finger does become contracted over time and we can discuss further treatment options.        Subjective     HPI    Patient ID:  Paddy Zhong is a right hand dominant 59 y.o. male here for evaluation of the left hand.  According to the patient, he has 1.5-month history of thickened cord in the left palm ring finger.  It is not painful.  There was no injury or trauma to the area, and no associated numbness and tingling.  He is here for evaluation and referral from PCP.  He has a history of DM type 2, CAD, CHF, AV fistula LUE.    The following portions of the patient's history were reviewed and updated as appropriate: allergies, current medications, past family history, past medical history, past social history, past surgical history, and problem list.    Review of Systems     Objective    Imaging:  None     Physical Exam     General appearance:  NAD   Cardiac:  Regular rate  Lungs:  Unlabored breathing  Abdomen:  Non-distended    Orthopedic Examination:  Left hand     Inspection:  + Muscle wasting intrinsics and thenar region.  + Thickened palmar cord left ring finger.    Palpation: Thickened palmar fascia is nontender to palpation.    Range-of-motion: Able to hyperextend at the MCP level, 0 degrees flexion contracture deformity.    Strength:  Normal    Sensation:  ILT    Special Tests:  Tabletop test is negative

## 2024-09-25 NOTE — RESULT ENCOUNTER NOTE
Lissa    Please let [facility] team know the CT scan results are back.  Prostate hypertrophy.  Chronic urinary retention sequela.  Urology already follows the patient for his BPH.  This is not an acute issue now from a renal preevaluation standpoint but post transplant we will need to keep a close eye on his potential for retention.    Thank you

## 2024-09-25 NOTE — TELEPHONE ENCOUNTER
----- Message from Bridgett Traore MD sent at 9/25/2024  9:37 AM EDT -----  Hello    Please let [facility] team know the CT scan results are back.  Prostate hypertrophy.  Chronic urinary retention sequela.  Urology already follows the patient for his BPH.  This is not an acute issue now from a renal preevaluation standpoint but post transplant we will need to keep a close eye on his potential for retention.    Thank you

## 2024-10-08 ENCOUNTER — TELEPHONE (OUTPATIENT)
Dept: GASTROENTEROLOGY | Facility: MEDICAL CENTER | Age: 59
End: 2024-10-08

## 2024-10-08 ENCOUNTER — PREP FOR PROCEDURE (OUTPATIENT)
Dept: GASTROENTEROLOGY | Facility: MEDICAL CENTER | Age: 59
End: 2024-10-08

## 2024-10-08 DIAGNOSIS — K21.9 GASTROESOPHAGEAL REFLUX DISEASE WITHOUT ESOPHAGITIS: Primary | ICD-10-CM

## 2024-10-08 NOTE — TELEPHONE ENCOUNTER
Called and spoke to patient to see if they wanted to schedule repeat Endoscopy. Patient will call back to schedule procedure at [place]

## 2024-10-25 ENCOUNTER — TELEPHONE (OUTPATIENT)
Dept: NEPHROLOGY | Facility: CLINIC | Age: 59
End: 2024-10-25

## 2024-10-25 ENCOUNTER — APPOINTMENT (OUTPATIENT)
Dept: LAB | Facility: HOSPITAL | Age: 59
End: 2024-10-25
Payer: COMMERCIAL

## 2024-10-25 DIAGNOSIS — Z99.2 ANEMIA IN CHRONIC KIDNEY DISEASE, ON CHRONIC DIALYSIS (HCC): ICD-10-CM

## 2024-10-25 DIAGNOSIS — E11.311 DIABETIC RETINOPATHY OF BOTH EYES WITH MACULAR EDEMA ASSOCIATED WITH TYPE 2 DIABETES MELLITUS, UNSPECIFIED RETINOPATHY SEVERITY (HCC): ICD-10-CM

## 2024-10-25 DIAGNOSIS — E11.22 TYPE 2 DIABETES MELLITUS WITH CHRONIC KIDNEY DISEASE ON CHRONIC DIALYSIS, WITH LONG-TERM CURRENT USE OF INSULIN (HCC): ICD-10-CM

## 2024-10-25 DIAGNOSIS — Z79.4 TYPE 2 DIABETES MELLITUS WITH CHRONIC KIDNEY DISEASE ON CHRONIC DIALYSIS, WITH LONG-TERM CURRENT USE OF INSULIN (HCC): ICD-10-CM

## 2024-10-25 DIAGNOSIS — D63.1 ANEMIA IN CHRONIC KIDNEY DISEASE, ON CHRONIC DIALYSIS (HCC): ICD-10-CM

## 2024-10-25 DIAGNOSIS — E78.5 HYPERLIPIDEMIA, UNSPECIFIED HYPERLIPIDEMIA TYPE: ICD-10-CM

## 2024-10-25 DIAGNOSIS — Z99.2 TYPE 2 DIABETES MELLITUS WITH CHRONIC KIDNEY DISEASE ON CHRONIC DIALYSIS, WITH LONG-TERM CURRENT USE OF INSULIN (HCC): ICD-10-CM

## 2024-10-25 DIAGNOSIS — N18.6 ANEMIA IN CHRONIC KIDNEY DISEASE, ON CHRONIC DIALYSIS (HCC): ICD-10-CM

## 2024-10-25 DIAGNOSIS — I12.0 BENIGN HYPERTENSION WITH ESRD (END-STAGE RENAL DISEASE) (HCC): ICD-10-CM

## 2024-10-25 DIAGNOSIS — N18.6 TYPE 2 DIABETES MELLITUS WITH CHRONIC KIDNEY DISEASE ON CHRONIC DIALYSIS, WITH LONG-TERM CURRENT USE OF INSULIN (HCC): ICD-10-CM

## 2024-10-25 DIAGNOSIS — I16.0 HYPERTENSIVE URGENCY: ICD-10-CM

## 2024-10-25 DIAGNOSIS — N18.6 BENIGN HYPERTENSION WITH ESRD (END-STAGE RENAL DISEASE) (HCC): ICD-10-CM

## 2024-10-25 DIAGNOSIS — N25.81 SECONDARY HYPERPARATHYROIDISM OF RENAL ORIGIN (HCC): ICD-10-CM

## 2024-10-25 DIAGNOSIS — D63.8 ANEMIA OF CHRONIC DISEASE: ICD-10-CM

## 2024-10-25 DIAGNOSIS — Z01.818 ENCOUNTER FOR PRE-TRANSPLANT EVALUATION FOR KIDNEY TRANSPLANT: ICD-10-CM

## 2024-10-25 DIAGNOSIS — N18.6 ESRD (END STAGE RENAL DISEASE) (HCC): ICD-10-CM

## 2024-10-25 LAB
ALBUMIN SERPL BCG-MCNC: 4.8 G/DL (ref 3.5–5)
ALP SERPL-CCNC: 60 U/L (ref 34–104)
ALT SERPL W P-5'-P-CCNC: 21 U/L (ref 7–52)
ANION GAP SERPL CALCULATED.3IONS-SCNC: 12 MMOL/L (ref 4–13)
APTT PPP: 34 SECONDS (ref 23–34)
AST SERPL W P-5'-P-CCNC: 20 U/L (ref 13–39)
BASOPHILS # BLD AUTO: 0.04 THOUSANDS/ΜL (ref 0–0.1)
BASOPHILS NFR BLD AUTO: 1 % (ref 0–1)
BILIRUB SERPL-MCNC: 0.88 MG/DL (ref 0.2–1)
BUN SERPL-MCNC: 45 MG/DL (ref 5–25)
CALCIUM SERPL-MCNC: 9.6 MG/DL (ref 8.4–10.2)
CHLORIDE SERPL-SCNC: 91 MMOL/L (ref 96–108)
CO2 SERPL-SCNC: 33 MMOL/L (ref 21–32)
CREAT SERPL-MCNC: 9.07 MG/DL (ref 0.6–1.3)
EOSINOPHIL # BLD AUTO: 0.12 THOUSAND/ΜL (ref 0–0.61)
EOSINOPHIL NFR BLD AUTO: 3 % (ref 0–6)
ERYTHROCYTE [DISTWIDTH] IN BLOOD BY AUTOMATED COUNT: 15 % (ref 11.6–15.1)
EST. AVERAGE GLUCOSE BLD GHB EST-MCNC: 206 MG/DL
GFR SERPL CREATININE-BSD FRML MDRD: 5 ML/MIN/1.73SQ M
GLUCOSE SERPL-MCNC: 227 MG/DL (ref 65–140)
HBA1C MFR BLD: 8.8 %
HCT VFR BLD AUTO: 40.6 % (ref 36.5–49.3)
HGB BLD-MCNC: 13 G/DL (ref 12–17)
IMM GRANULOCYTES # BLD AUTO: 0.01 THOUSAND/UL (ref 0–0.2)
IMM GRANULOCYTES NFR BLD AUTO: 0 % (ref 0–2)
INR PPP: 1.09 (ref 0.85–1.19)
LYMPHOCYTES # BLD AUTO: 1.01 THOUSANDS/ΜL (ref 0.6–4.47)
LYMPHOCYTES NFR BLD AUTO: 25 % (ref 14–44)
MCH RBC QN AUTO: 28.2 PG (ref 26.8–34.3)
MCHC RBC AUTO-ENTMCNC: 32 G/DL (ref 31.4–37.4)
MCV RBC AUTO: 88 FL (ref 82–98)
MONOCYTES # BLD AUTO: 0.48 THOUSAND/ΜL (ref 0.17–1.22)
MONOCYTES NFR BLD AUTO: 12 % (ref 4–12)
NEUTROPHILS # BLD AUTO: 2.4 THOUSANDS/ΜL (ref 1.85–7.62)
NEUTS SEG NFR BLD AUTO: 59 % (ref 43–75)
NRBC BLD AUTO-RTO: 0 /100 WBCS
PLATELET # BLD AUTO: 206 THOUSANDS/UL (ref 149–390)
PMV BLD AUTO: 9.7 FL (ref 8.9–12.7)
POTASSIUM SERPL-SCNC: 5.2 MMOL/L (ref 3.5–5.3)
PROT SERPL-MCNC: 8.1 G/DL (ref 6.4–8.4)
PROTHROMBIN TIME: 14.3 SECONDS (ref 12.3–15)
RBC # BLD AUTO: 4.61 MILLION/UL (ref 3.88–5.62)
RUBV IGG SERPL IA-ACNC: 314.9 IU/ML
SODIUM SERPL-SCNC: 136 MMOL/L (ref 135–147)
WBC # BLD AUTO: 4.06 THOUSAND/UL (ref 4.31–10.16)

## 2024-10-25 PROCEDURE — 86645 CMV ANTIBODY IGM: CPT

## 2024-10-25 PROCEDURE — 86665 EPSTEIN-BARR CAPSID VCA: CPT

## 2024-10-25 PROCEDURE — 86780 TREPONEMA PALLIDUM: CPT

## 2024-10-25 PROCEDURE — 86762 RUBELLA ANTIBODY: CPT

## 2024-10-25 PROCEDURE — 86644 CMV ANTIBODY: CPT

## 2024-10-25 PROCEDURE — 85730 THROMBOPLASTIN TIME PARTIAL: CPT

## 2024-10-25 PROCEDURE — 86696 HERPES SIMPLEX TYPE 2 TEST: CPT

## 2024-10-25 PROCEDURE — 85025 COMPLETE CBC W/AUTO DIFF WBC: CPT

## 2024-10-25 PROCEDURE — 85610 PROTHROMBIN TIME: CPT

## 2024-10-25 PROCEDURE — 86787 VARICELLA-ZOSTER ANTIBODY: CPT

## 2024-10-25 PROCEDURE — 86735 MUMPS ANTIBODY: CPT

## 2024-10-25 PROCEDURE — 86765 RUBEOLA ANTIBODY: CPT

## 2024-10-25 PROCEDURE — 86682 HELMINTH ANTIBODY: CPT

## 2024-10-25 PROCEDURE — 86803 HEPATITIS C AB TEST: CPT

## 2024-10-25 PROCEDURE — 36415 COLL VENOUS BLD VENIPUNCTURE: CPT

## 2024-10-25 PROCEDURE — 87340 HEPATITIS B SURFACE AG IA: CPT

## 2024-10-25 PROCEDURE — 80053 COMPREHEN METABOLIC PANEL: CPT

## 2024-10-25 PROCEDURE — 86704 HEP B CORE ANTIBODY TOTAL: CPT

## 2024-10-25 PROCEDURE — 86480 TB TEST CELL IMMUN MEASURE: CPT

## 2024-10-25 PROCEDURE — 86706 HEP B SURFACE ANTIBODY: CPT

## 2024-10-25 PROCEDURE — 86708 HEPATITIS A ANTIBODY: CPT

## 2024-10-25 PROCEDURE — 86695 HERPES SIMPLEX TYPE 1 TEST: CPT

## 2024-10-25 PROCEDURE — 83036 HEMOGLOBIN GLYCOSYLATED A1C: CPT

## 2024-10-25 PROCEDURE — 87389 HIV-1 AG W/HIV-1&-2 AB AG IA: CPT

## 2024-10-25 RX ORDER — ATORVASTATIN CALCIUM 40 MG/1
40 TABLET, FILM COATED ORAL DAILY
Qty: 90 TABLET | Refills: 0 | Status: SHIPPED | OUTPATIENT
Start: 2024-10-25

## 2024-10-25 NOTE — TELEPHONE ENCOUNTER
----- Message from Bridgett Traore MD sent at 10/25/2024  3:23 PM EDT -----  Hello    Can you please forward this to the patient's dialysis unit.  Please let them know that the potassium is borderline elevated at 5.2 and if they can make adjustments if they need to his dialysis prescription.  Also please notify the patient his blood sugars are borderline elevated and he needs to follow-up with his primary care physician regarding this further    Thank you  ----- Message -----  From: Lab, Background User  Sent: 10/25/2024   2:27 PM EDT  To: Bridgett Traore MD

## 2024-10-26 LAB
CMV IGG SERPL QL IA: POSITIVE
CMV IGM SERPL QL IA: NEGATIVE
EBV VCA IGG SER QL IA: POSITIVE
GAMMA INTERFERON BACKGROUND BLD IA-ACNC: 0.05 IU/ML
HAV AB SER QL IA: REACTIVE
HBV CORE AB SER QL: NORMAL
HBV SURFACE AB SER-ACNC: 11.4 MIU/ML
HBV SURFACE AG SER QL: NORMAL
HCV AB SER QL: NORMAL
HIV 1+2 AB+HIV1 P24 AG SERPL QL IA: NORMAL
HIV 2 AB SERPL QL IA: NORMAL
HIV1 AB SERPL QL IA: NORMAL
HIV1 P24 AG SERPL QL IA: NORMAL
HSV2 IGG SERPL QL IA: POSITIVE
HSV2 IGG SERPL QL IA: POSITIVE
M TB IFN-G BLD-IMP: POSITIVE
M TB IFN-G CD4+ BCKGRND COR BLD-ACNC: 2.96 IU/ML
M TB IFN-G CD4+ BCKGRND COR BLD-ACNC: 3.01 IU/ML
MEV IGG SER QL IA: NORMAL
MITOGEN IGNF BCKGRD COR BLD-ACNC: 9.95 IU/ML
MUV IGG SER QL IA: NORMAL
TREPONEMA PALLIDUM IGG+IGM AB [PRESENCE] IN SERUM OR PLASMA BY IMMUNOASSAY: NORMAL
VZV IGG SER QL IA: NORMAL

## 2024-10-29 ENCOUNTER — TELEPHONE (OUTPATIENT)
Dept: NEPHROLOGY | Facility: CLINIC | Age: 59
End: 2024-10-29

## 2024-10-29 NOTE — TELEPHONE ENCOUNTER
Called patient and left a voicemail regarding a follow up with Dr. Traore from [facility].   I scheduled in [place] Tuesday 8/5/2025 at 2:30 Pm.   mailing an appointment card.

## 2024-10-30 LAB — MISCELLANEOUS LAB TEST RESULT: NORMAL

## 2024-11-05 ENCOUNTER — TELEPHONE (OUTPATIENT)
Age: 59
End: 2024-11-05

## 2024-11-05 NOTE — TELEPHONE ENCOUNTER
Received a call from Jillian at [facility]  Kidney transplant team, looking to either get a cardiac clearance or addend office note on 8/28/2024. Pt has completed testing that was requested.     Cardiac clearance letter put into epic, sent ot provider, also asked Jimena to fax over to office at [phone].     Jimena LIVE [phone], fax [phone]

## 2024-11-27 ENCOUNTER — PREP FOR PROCEDURE (OUTPATIENT)
Dept: INTERVENTIONAL RADIOLOGY/VASCULAR | Facility: CLINIC | Age: 59
End: 2024-11-27

## 2024-11-27 ENCOUNTER — TRANSCRIBE ORDERS (OUTPATIENT)
Dept: RADIOLOGY | Facility: HOSPITAL | Age: 59
End: 2024-11-27

## 2024-11-27 DIAGNOSIS — N18.9 CHRONIC KIDNEY DISEASE, UNSPECIFIED CKD STAGE: Primary | ICD-10-CM

## 2024-11-27 DIAGNOSIS — Z98.890 S/P ARTERIOVENOUS (AV) FISTULA CREATION: Primary | ICD-10-CM

## (undated) DEVICE — DRESSING ALLEVYN LIFE SACRAL 6.75 X 6.5 IN

## (undated) DEVICE — SUT MONOCRYL 4-0 PS-2 27 IN Y426H

## (undated) DEVICE — BAG DECANTER

## (undated) DEVICE — MEDI-VAC SUCTION FINE CAPACITY: Brand: CARDINAL HEALTH

## (undated) DEVICE — 3000CC GUARDIAN II: Brand: GUARDIAN

## (undated) DEVICE — GLOVE SRG BIOGEL ECLIPSE 7

## (undated) DEVICE — SOFT SILICONE HYDROCELLULAR FOAM DRESSING WITH LOCK AWAY LAYER: Brand: ALLEVYN LIFE M 12.9X12.9 CTN10

## (undated) DEVICE — SUT PROLENE 7-0 BV 175-6 24 IN 8735H

## (undated) DEVICE — SUT SILK 4-0 30 IN A303H

## (undated) DEVICE — IV CATH INTROCAN 18G X 1 1/4 SAFETY

## (undated) DEVICE — SURGIFOAM 8.5 X 12.5

## (undated) DEVICE — TIBURON SPLIT SHEET: Brand: CONVERTORS

## (undated) DEVICE — LIGACLIP MCA MULTIPLE CLIP APPLIERS, 20 MEDIUM CLIPS: Brand: LIGACLIP

## (undated) DEVICE — SUT MONOCRYL 3-0 SH 27 IN Y416H

## (undated) DEVICE — INTENDED FOR TISSUE SEPARATION, AND OTHER PROCEDURES THAT REQUIRE A SHARP SURGICAL BLADE TO PUNCTURE OR CUT.: Brand: BARD-PARKER ® CARBON RIB-BACK BLADES

## (undated) DEVICE — STRL BETHLEHEM A V FISTULA PK: Brand: CARDINAL HEALTH

## (undated) DEVICE — LIGACLIP MCA MULTIPLE CLIP APPLIERS, 20 SMALL CLIPS: Brand: LIGACLIP

## (undated) DEVICE — PROXIMATE SKIN STAPLERS (35 WIDE) CONTAINS 35 STAINLESS STEEL STAPLES (FIXED HEAD): Brand: PROXIMATE

## (undated) DEVICE — SURGICEL FIBRILLAR 1 X 2

## (undated) DEVICE — SUT SILK 2-0 30 IN A305H

## (undated) DEVICE — IV EXTENSION TUBING 33 IN

## (undated) DEVICE — 2000CC GUARDIAN II: Brand: GUARDIAN

## (undated) DEVICE — CHLORAPREP HI-LITE 26ML ORANGE

## (undated) DEVICE — SUT PROLENE 6-0 BV130 30 IN 8709H

## (undated) DEVICE — SUT VICRYL 2-0 SH 27 IN UNDYED J417H

## (undated) DEVICE — NEEDLE 25G X 1 1/2

## (undated) DEVICE — INTENDED FOR TISSUE SEPARATION, AND OTHER PROCEDURES THAT REQUIRE A SHARP SURGICAL BLADE TO PUNCTURE OR CUT.: Brand: BARD-PARKER SAFETY BLADES SIZE 15, STERILE

## (undated) DEVICE — SUT SILK 0 30 IN A306H

## (undated) DEVICE — ADHESIVE SKN CLSR HISTOACRYL FLEX 0.5ML LF

## (undated) DEVICE — COBAN 4 IN STERILE